# Patient Record
Sex: FEMALE | Race: WHITE | HISPANIC OR LATINO | Employment: UNEMPLOYED | URBAN - METROPOLITAN AREA
[De-identification: names, ages, dates, MRNs, and addresses within clinical notes are randomized per-mention and may not be internally consistent; named-entity substitution may affect disease eponyms.]

---

## 2017-08-14 ENCOUNTER — OFFICE VISIT (OUTPATIENT)
Dept: SURGERY | Facility: CLINIC | Age: 42
End: 2017-08-14
Payer: COMMERCIAL

## 2017-08-14 VITALS
SYSTOLIC BLOOD PRESSURE: 114 MMHG | DIASTOLIC BLOOD PRESSURE: 74 MMHG | WEIGHT: 168.31 LBS | HEART RATE: 67 BPM | TEMPERATURE: 98 F | HEIGHT: 64 IN | BODY MASS INDEX: 28.73 KG/M2

## 2017-08-14 DIAGNOSIS — R10.33 PERIUMBILICAL ABDOMINAL PAIN: Primary | ICD-10-CM

## 2017-08-14 PROCEDURE — 99999 PR PBB SHADOW E&M-NEW PATIENT-LVL III: CPT | Mod: PBBFAC,,, | Performed by: SURGERY

## 2017-08-14 PROCEDURE — 3008F BODY MASS INDEX DOCD: CPT | Mod: S$GLB,,, | Performed by: SURGERY

## 2017-08-14 PROCEDURE — 99204 OFFICE O/P NEW MOD 45 MIN: CPT | Mod: S$GLB,,, | Performed by: SURGERY

## 2017-08-14 RX ORDER — OMEPRAZOLE 20 MG/1
1 CAPSULE, DELAYED RELEASE ORAL DAILY
COMMUNITY
Start: 2017-07-12 | End: 2018-11-13 | Stop reason: ALTCHOICE

## 2017-08-14 NOTE — PROGRESS NOTES
History & Physical    SUBJECTIVE:     History of Present Illness:  Pt referred by Dr. House for eval of gallstones.   Reports similar pain for 11 years, on right side periumbilical, intermittent. Described as pulsating 3-4/10 and 7-8/10/worst. Radiates to upper abdomen and back. This is getting more frequent and worse. It is associated with food with spice and fried foods.     Aggravating factors: fatty foods and spicy foods.  Alleviating factors: none. Associated symptoms: none. The patient denies diarrhea, dysuria, hematochezia, hematuria, nausea and vomiting.    She has been to er twice for this pain, last time 2014 in our system.   US   Findings: Liver measures 15-cm.  There is sludge in the gallbladder.  There is no Charles sign.  Portal vein is patent.  Bile duct measures 0.25-cm.  Gallbladder wall thickness is 0.28-cm.  Right kidney measures 10.3-cm.  Pancreas is normal.  Spleen   measures 7.2-cm.    She is currently taking prilosec every day.       Chief Complaint   Patient presents with    Cholelithiasis       Review of patient's allergies indicates:  No Known Allergies    Current Outpatient Prescriptions   Medication Sig Dispense Refill    omeprazole (PRILOSEC) 20 MG capsule Take 1 capsule by mouth once daily.      ondansetron (ZOFRAN) 4 MG tablet Take 1 tablet (4 mg total) by mouth every 6 (six) hours as needed for Nausea. 12 tablet 0    famotidine (PEPCID) 20 MG tablet Take 1 tablet (20 mg total) by mouth 2 (two) times daily. 30 tablet 0     No current facility-administered medications for this visit.        History reviewed. No pertinent past medical history.  History reviewed. No pertinent surgical history.  History reviewed. No pertinent family history.  Social History   Substance Use Topics    Smoking status: Never Smoker    Smokeless tobacco: Not on file    Alcohol use No          Review of Systems   Constitutional: Negative for activity change, appetite change, chills and fever.   HENT:  "Negative for congestion and sore throat.    Eyes: Negative for photophobia and visual disturbance.   Respiratory: Negative for cough, shortness of breath and wheezing.    Cardiovascular: Negative for chest pain and palpitations.   Gastrointestinal: Positive for abdominal pain. Negative for constipation and diarrhea.   Genitourinary: Negative for difficulty urinating, dysuria and frequency.   Musculoskeletal: Negative for gait problem and joint swelling.   Skin: Negative for rash and wound.   Neurological: Negative for dizziness and headaches.   Hematological: Negative for adenopathy. Does not bruise/bleed easily.   Psychiatric/Behavioral: Negative for dysphoric mood and sleep disturbance.       OBJECTIVE:     Vital Signs (Most Recent)  Temp: 97.6 °F (36.4 °C) (08/14/17 1526)  Pulse: 67 (08/14/17 1526)  BP: 114/74 (08/14/17 1526)  5' 4" (1.626 m)  76.4 kg (168 lb 5.1 oz)     Physical Exam   Constitutional: She is oriented to person, place, and time. She appears well-developed and well-nourished. No distress.   HENT:   Head: Normocephalic and atraumatic.   Eyes: Conjunctivae and EOM are normal. No scleral icterus.   Neck: Normal range of motion.   Cardiovascular: Normal rate and intact distal pulses.    Pulmonary/Chest: Effort normal. No stridor. No respiratory distress.   Abdominal: Soft. She exhibits no distension. There is no tenderness.   Musculoskeletal: Normal range of motion. She exhibits no edema or tenderness.   Neurological: She is alert and oriented to person, place, and time.   Skin: No rash noted. No pallor.   Psychiatric: She has a normal mood and affect. Her behavior is normal.       Laboratory  n/a    Diagnostic Results:  n/a    ASSESSMENT/PLAN:   41 female with abdominal pain, cholelithiasis/sludge on us. She has had an outpatient workup that is not available to me today. Her abd pain is described as RLQ, unclear if this is biliary in etiology. willobtain labs and outside workup and plan ccy " accordingly.

## 2017-08-17 ENCOUNTER — TELEPHONE (OUTPATIENT)
Dept: SURGERY | Facility: CLINIC | Age: 42
End: 2017-08-17

## 2017-08-17 NOTE — TELEPHONE ENCOUNTER
"08/17/17   1600  Contacted pt regarding scheduling Lap Yoana. Pt informed that records have been received from [facility], and Dr. Pichardo wishes to proceed with surgery. Pt verbalized understanding, and states "do you know about how much the surgery will cost, because when I was at , they said the cost was too much." Pt informed that I was unaware of how much she would have to pay for surgery, but that I could provide the CPT code to give the insurance company for a better estimate or that she could contact fee for services. Pt states, "ok give me the number. I would like talk to them to get an estimate and I also need to speak to my . I will call back to schedule surgery. Name, office number, and CPT Code provided.   "

## 2017-08-17 NOTE — TELEPHONE ENCOUNTER
----- Message from Ashley Elizabeth LPN sent at 8/16/2017  3:55 PM CDT -----  Records from [facility] received, and scanned into chart. It looks like patient only received a HIDA Scan and a US, but no CT. Please review records, and let me know if patient still needs to be scheduled for a CT.     Thanks,  Ashley

## 2017-08-17 NOTE — TELEPHONE ENCOUNTER
Her records have been reviewed, she can be directly scheduled for lap cholecystectomy. I will do consent morning of surgery. Please call her to pick a date.

## 2017-08-22 ENCOUNTER — TELEPHONE (OUTPATIENT)
Dept: SURGERY | Facility: CLINIC | Age: 42
End: 2017-08-22

## 2017-08-22 NOTE — TELEPHONE ENCOUNTER
08/22/17    1342  Attempted to contact patient to follow up on whether or not patient has decided to proceed with surgery. No answer. Left voicemail to return call to the office.

## 2017-08-24 ENCOUNTER — TELEPHONE (OUTPATIENT)
Dept: SURGERY | Facility: CLINIC | Age: 42
End: 2017-08-24

## 2017-08-24 NOTE — TELEPHONE ENCOUNTER
----- Message from Leeann Juares sent at 8/24/2017  2:56 PM CDT -----  Contact: Self [phone]  Patient Returning Your Phone Call    08/24/2017    1621  Contacted pt regarding the above message. Pt stated that she would like to schedule her surgery on 08/31/2017.

## 2017-08-24 NOTE — TELEPHONE ENCOUNTER
----- Message from Leeann Juares sent at 8/23/2017  3:21 PM CDT -----  Contact: Self [phone]  Patient is calling to schedule her procedure. Please advice       08/24/2017     1058  Contacted pt regarding the above message. No answer. Left voicemail.

## 2017-08-30 ENCOUNTER — TELEPHONE (OUTPATIENT)
Dept: SURGERY | Facility: CLINIC | Age: 42
End: 2017-08-30

## 2017-08-30 DIAGNOSIS — K80.20 GALLSTONES: Primary | ICD-10-CM

## 2017-08-30 NOTE — TELEPHONE ENCOUNTER
----- Message from Leeann Juares sent at 8/30/2017  4:11 PM CDT -----  Contact: Self [phone]  Good Afternoon I have Tessa Wynne on the phone and she is really upset that no one has returned her phone calls concerning her procedure for tomorrow. Please advice         08/30/2017    1647  Contacted pt regarding the above message. After researching realized that the case request had not been put in the system. Explained to pt that w/o the case request surgery cannot be scheduled. Apologized to pt for any inconvenience that this has caused. Informed her that the case request would be entered into the system for her surgery to be scheduled for 09/07/2017 and someone from the pre-op dept would be contacting her w/ an appt date and time. Also, informed her any questions or concerns that she may have feel free to contact the office. Pt verbalized understanding.

## 2017-09-05 ENCOUNTER — HOSPITAL ENCOUNTER (OUTPATIENT)
Dept: PREADMISSION TESTING | Facility: HOSPITAL | Age: 42
Discharge: HOME | End: 2017-09-05
Attending: SURGERY
Payer: COMMERCIAL

## 2017-09-05 ENCOUNTER — ANESTHESIA EVENT (OUTPATIENT)
Dept: SURGERY | Facility: HOSPITAL | Age: 42
End: 2017-09-05
Payer: COMMERCIAL

## 2017-09-05 VITALS
DIASTOLIC BLOOD PRESSURE: 76 MMHG | HEART RATE: 78 BPM | RESPIRATION RATE: 16 BRPM | OXYGEN SATURATION: 100 % | WEIGHT: 168 LBS | BODY MASS INDEX: 28.68 KG/M2 | SYSTOLIC BLOOD PRESSURE: 123 MMHG | HEIGHT: 64 IN

## 2017-09-05 DIAGNOSIS — K81.9 CHOLECYSTITIS: ICD-10-CM

## 2017-09-05 RX ORDER — LIDOCAINE HYDROCHLORIDE 10 MG/ML
1 INJECTION, SOLUTION EPIDURAL; INFILTRATION; INTRACAUDAL; PERINEURAL ONCE
Status: CANCELLED | OUTPATIENT
Start: 2017-09-05 | End: 2017-09-05

## 2017-09-05 RX ORDER — SODIUM CHLORIDE, SODIUM LACTATE, POTASSIUM CHLORIDE, CALCIUM CHLORIDE 600; 310; 30; 20 MG/100ML; MG/100ML; MG/100ML; MG/100ML
INJECTION, SOLUTION INTRAVENOUS CONTINUOUS
Status: CANCELLED | OUTPATIENT
Start: 2017-09-05

## 2017-09-05 NOTE — DISCHARGE INSTRUCTIONS
Your surgery is scheduled for 9/7/17.    Please report to Outpatient Surgery Intake Office on the 2nd FLOOR at 8:45a.m.          INSTRUCTIONS IMPORTANT!!!  ¨ Do not eat or drink after 12 midnight-including water. OK to brush teeth, no   gum, candy or mints!    ¨ Take only these medicines with a small swallow of water-morning of surgery. Omeprazole      ____  Proceed to [facility] on 9/5/17 for additional blood test.        ____  Do not wear makeup, including mascara.  ____  No powder, lotions or creams to surgical area.  ____  Please remove all jewelry, including piercings and leave at home.  ____  No money or valuables needed. Please leave at home.  ____  Please bring any documents given by your doctor.  ____  If going home the same day, arrange for a ride home. You will not be able to             drive if Anesthesia was used.  ____  Wear loose fitting clothing. Allow for dressings, bandages.  ____  Wash the surgical area with Hibiclens the night before surgery, and again the             morning of surgery.  Be sure to rinse hibiclens off completely (if instructed by nurse).  ____  If you take diabetic medication, do not take am of surgery unless instructed by Doctor.  ____  Call MD for temperature above 101 degrees.  ____ Do Not wear your contact lenses the day of your procedure.  You may wear your glasses.        I have read or had read and explained to me, and understand the above information.  Additional comments or instructions:  For additional questions call [phone]     Take a Hibiclens shower twice a day for 3 days prior to surgery, including the morning of surgery.   Gargle with Listerine twice a day for 3 days prior to surgery, including the morning of surgery.      Pre-Op Bathing Instructions    Before surgery, you can play an important role in your own health.    Because skin is not sterile, we need to be sure that your skin is as free of germs as possible. By following the instructions  below, you can reduce the number of germs on your skin before surgery.    IMPORTANT: You will need to shower with a special soap called Hibiclens*, available at any pharmacy.  If you are allergic to Chlorhexidine (the antiseptic in Hibiclens), use an antibacterial soap such as Dial Soap for your preoperative shower.  You will shower with Hibiclens both the night before your surgery and the morning of your surgery.  Do not use Hibiclens on the head, face or genitals to avoid injury to those areas.    STEP #1: THE NIGHT BEFORE YOUR SURGERY     1. Do not shave the area of your body where your surgery will be performed.  2. Shower and wash your hair and body as usual with your normal soap and shampoo.  3. Rinse your hair and body thoroughly after you shower to remove all soap residue.  4. With your hand, apply one packet of Hibiclens soap to the surgical site.   5. Wash the site gently for five (5) minutes. Do not scrub your skin too hard.   6. Do not wash with your regular soap after Hibiclens is used.  7. Rinse your body thoroughly.  8. Pat yourself dry with a clean, soft towel.  9. Do not use lotion, cream, or powder.  10. Wear clean clothes.    STEP #2: THE MORNING OF YOUR SURGERY     1. Repeat Step #1.    * Not to be used by people allergic to Chlorhexidine.          Anesthesia: General Anesthesia  Youre due to have surgery. During surgery, youll be given medication called anesthesia. (It is also called anesthetic.) This will keep you comfortable and pain-free. Your anesthesia provider will use general anesthesia. This sheet tells you more about it.  What is general anesthesia?     You are watched continuously during your procedure by the anesthesia provider   General anesthesia puts you into a state like deep sleep. It goes into the bloodstream (IV anesthetics), into the lungs (gas anesthetics), or both. You feel nothing during the procedure. You will not remember it. During the procedure, the anesthesia  provider monitors you continuously. He or she checks your heart rate and rhythm, blood pressure, breathing, and blood oxygen.  · IV Anesthetics. IV anesthetics are given through an IV line in your arm. Theyre often given first. This is so you are asleep before a gas anesthetic is started. Some kinds of IV anesthetics relieve pain. Others relax you. Your doctor will decide which kind is best in your case.  · Gas Anesthetics. Gas anesthetics are breathed into the lungs. They are often used to keep you asleep. They can be given through a facemask or a tube placed in your larynx or trachea (breathing tube).  ¨ If you have a facemask, your anesthesia provider will most likely place it over your nose and mouth while youre still awake. Youll breathe oxygen through the mask as your IV anesthetic is started. Gas anesthetic may be added through the mask.  ¨ If you have a tube in the larynx or trachea, it will be inserted into your throat after youre asleep.  Anesthesia tools and medications  You will likely have:  · IV anesthetics. These are put into an IV line into your bloodstream.  · Gas anesthetics. You breathe these anesthetics into your lungs, where they pass into your bloodstream.  · Pulse oximeter. This is a small clip that is attached to the end of your finger. This measures your blood oxygen level.  · Electrocardiography leads (electrodes). These are small sticky pads that are placed on your chest. They record your heart rate and rhythm.  · Blood pressure cuff. This reads your blood pressure.  Risks and possible complications  General anesthesia has some risks. These include:  · Breathing problems  · Nausea and vomiting  · Sore throat or hoarseness (usually temporary)  · Allergic reaction to the anesthetic  · Irregular heartbeat (rare)  · Cardiac arrest (rare)   Anesthesia safety  · Follow all instructions you are given for how long not to eat or drink before your procedure.  · Be sure your doctor knows what  medications and drugs you take. This includes over-the-counter medications, herbs, supplements, alcohol or other drugs. You will be asked when those were last taken.  · Have an adult family member or friend drive you home after the procedure.  · For the first 24 hours after your surgery:  ¨ Do not drive or use heavy equipment.  ¨ Have a trusted family member or spouse make important decisions or sign documents.  ¨ Avoid alcohol.  ¨ Have a responsible adult stay with you. He or she can watch for problems and help keep you safe.  Date Last Reviewed: 10/16/2014  © 8780-7916 [employer]. [address] [place] [address]. All rights reserved. This information is not intended as a substitute for professional medical care. Always follow your healthcare professional's instructions.        Cholecystectomy     Clips close off the duct connecting the gallbladder to the bile duct. The gallbladder is then removed.     Youve had painful attacks caused by gallstones. To treat the problem, your healthcare provider wants to remove your gallbladder. This surgery is called cholecystectomy. Removing the gallbladder can relieve pain. It will also prevent future attacks. You can live a healthy life without your gallbladder. You may also be able to go back to eating foods you enjoyed before your gallbladder problems started.  Before your surgery  Be prepared:  · Tell your provider what medicines you take. Include those bought over the counter. Also include herbs or supplements. Be sure to mention if you take prescription blood thinners. This includes warfarin, clopidogrel, and aspirin.  · Have any tests your provider asks for, such as blood tests.  · Dont eat or drink after midnight, the night before your surgery. This includes water, coffee, and mints. However, you may need to take some medicine with sips of water--talk with your healthcare provider.  The day of surgery  When you arrive, you will prepare for  surgery:  · An IV line will be put into a vein in your arm or hand. This gives you fluids and medicine.  · An anesthesiologist will talk with you about anesthesia. This is medicine used to prevent pain. You will receive general anesthesia. This puts you into a state like deep sleep through the procedure.  During surgery  There are 2 methods for removing the gallbladder. Your healthcare provider will choose which method is best for you:  · Laparoscopic cholecystectomy. This is most common. During surgery, 2 to 4 small incisions are made. A thin tube with a camera is used. This is called a laparoscope. The scope is put through one of the incisions. It sends images to a video screen. Surgical tools are put through other incisions. The gallbladder is removed using the scope and these tools.  · Open cholecystectomy. One larger incision is made. The surgeon sees and works through this incision. Open surgery is most often used when scarring or other factors make it a better choice for you.  In some cases, safety requires a change from laparoscopic to open surgery during the procedure.  After surgery  You will be sent to a room to wake up from the anesthesia. You will likely go home the same day. In some cases, an overnight stay is needed. If you had open cholecystectomy, you may need to stay in the hospital for a few days. When you are released to go home, have a family member or friend ready to drive you.  Risks and possible complications of gallbladder surgery  All surgeries have risks. The risks of gallbladder surgery include:  · Bleeding  · Infection  · Injury to the common bile duct or nearby organs  · Blood clots in the legs  · Bile leaks  · Hernia at incision site  · Pnemonia   Date Last Reviewed: 7/1/2016 © 2000-2016 [employer]. [address] [place] [address], [place] [address]. All rights reserved. This information is not intended as a substitute for professional medical care. Always follow your healthcare  professional's instructions.

## 2017-09-05 NOTE — ANESTHESIA PREPROCEDURE EVALUATION
09/05/2017  Tessa Wynne is a 41 y.o., female is scheduled for laparoscopic cholecystectomy under [facility] on 9/07/2017.        Anesthesia Evaluation    I have reviewed the Patient Summary Reports.    I have reviewed the Nursing Notes.   I have reviewed the Medications.     Review of Systems  Anesthesia Hx:  No problems with previous Anesthesia  History of prior surgery of interest to airway management or planning: Previous anesthesia: MAC  EGD with MAC.  Denies Family Hx of Anesthesia complications.   Denies Personal Hx of Anesthesia complications.   Social:  Non-Smoker, No Alcohol Use    Hematology/Oncology:  Hematology Normal        EENT/Dental:EENT/Dental Normal   Cardiovascular:   Exercise tolerance: good Denies Hypertension.  Denies Dysrhythmias.   Denies Angina.        Pulmonary:   Denies Shortness of breath.    Renal/:  Renal/ Normal     Hepatic/GI:   GERD, well controlled Intermittent RUQ pain; denies at present   Neurological:  Neurology Normal    Endocrine:  Endocrine Normal        Physical Exam  General:  Obesity    Airway/Jaw/Neck:  Airway Findings: Mouth Opening: Normal Tongue: Normal  General Airway Assessment: Adult  Mallampati: II  TM Distance: Normal, at least 6 cm        Eyes/Ears/Nose:  EYES/EARS/NOSE FINDINGS: Normal   Dental:  Dental Findings: In tact   Chest/Lungs:  Chest/Lungs Clear    Heart/Vascular:  Heart Findings: Normal Heart murmur: negative    Abdomen:  Abdomen Findings: Normal      Mental Status:  Mental Status Findings: Normal        Anesthesia Plan  Type of Anesthesia, risks & benefits discussed:  Anesthesia Type:  general  Patient's Preference:   Intra-op Monitoring Plan:   Intra-op Monitoring Plan Comments:   Post Op Pain Control Plan:   Post Op Pain Control Plan Comments:   Induction:   IV  Beta Blocker:  Patient is not currently on a Beta-Blocker (No further  documentation required).       Informed Consent: Patient understands risks and agrees with Anesthesia plan.  Questions answered.   ASA Score: 2     Day of Surgery Review of History & Physical:        Anesthesia Plan Notes: 2017-09-07   - needs consent   - UPT ordered        Ready For Surgery From Anesthesia Perspective.

## 2017-09-05 NOTE — PRE-PROCEDURE INSTRUCTIONS
Allergies, medical, surgical, family and psychosocial histories reviewed with patient. Periop plan of care reviewed. Preop instructions given, including medications to take and to hold. Time allotted for questions to be addressed.  Patient verbalized understanding.

## 2017-09-07 ENCOUNTER — ANESTHESIA (OUTPATIENT)
Dept: SURGERY | Facility: HOSPITAL | Age: 42
End: 2017-09-07
Payer: COMMERCIAL

## 2017-09-07 ENCOUNTER — HOSPITAL ENCOUNTER (OUTPATIENT)
Facility: HOSPITAL | Age: 42
Discharge: HOME HEALTH | End: 2017-09-09
Attending: SURGERY | Admitting: SURGERY
Payer: COMMERCIAL

## 2017-09-07 DIAGNOSIS — K81.9 CHOLECYSTITIS: Primary | ICD-10-CM

## 2017-09-07 DIAGNOSIS — K80.50 BILIARY COLIC: ICD-10-CM

## 2017-09-07 LAB
B-HCG UR QL: NEGATIVE
CTP QC/QA: YES

## 2017-09-07 PROCEDURE — 25000003 PHARM REV CODE 250: Performed by: NURSE PRACTITIONER

## 2017-09-07 PROCEDURE — S0030 INJECTION, METRONIDAZOLE: HCPCS | Performed by: SURGERY

## 2017-09-07 PROCEDURE — C1729 CATH, DRAINAGE: HCPCS | Performed by: SURGERY

## 2017-09-07 PROCEDURE — 81025 URINE PREGNANCY TEST: CPT | Performed by: ANESTHESIOLOGY

## 2017-09-07 PROCEDURE — 63600175 PHARM REV CODE 636 W HCPCS: Performed by: SURGERY

## 2017-09-07 PROCEDURE — 36000709 HC OR TIME LEV III EA ADD 15 MIN: Performed by: SURGERY

## 2017-09-07 PROCEDURE — 25000003 PHARM REV CODE 250: Performed by: SURGERY

## 2017-09-07 PROCEDURE — 37000009 HC ANESTHESIA EA ADD 15 MINS: Performed by: SURGERY

## 2017-09-07 PROCEDURE — 25000003 PHARM REV CODE 250: Performed by: NURSE ANESTHETIST, CERTIFIED REGISTERED

## 2017-09-07 PROCEDURE — 63600175 PHARM REV CODE 636 W HCPCS: Performed by: ANESTHESIOLOGY

## 2017-09-07 PROCEDURE — 63600175 PHARM REV CODE 636 W HCPCS: Performed by: NURSE ANESTHETIST, CERTIFIED REGISTERED

## 2017-09-07 PROCEDURE — 47562 LAPAROSCOPIC CHOLECYSTECTOMY: CPT | Mod: ,,, | Performed by: SURGERY

## 2017-09-07 PROCEDURE — 88304 TISSUE EXAM BY PATHOLOGIST: CPT | Performed by: PATHOLOGY

## 2017-09-07 PROCEDURE — 27201423 OPTIME MED/SURG SUP & DEVICES STERILE SUPPLY: Performed by: SURGERY

## 2017-09-07 PROCEDURE — 37000008 HC ANESTHESIA 1ST 15 MINUTES: Performed by: SURGERY

## 2017-09-07 PROCEDURE — 36000708 HC OR TIME LEV III 1ST 15 MIN: Performed by: SURGERY

## 2017-09-07 PROCEDURE — 71000039 HC RECOVERY, EACH ADD'L HOUR: Performed by: SURGERY

## 2017-09-07 PROCEDURE — 71000033 HC RECOVERY, INTIAL HOUR: Performed by: SURGERY

## 2017-09-07 PROCEDURE — 94761 N-INVAS EAR/PLS OXIMETRY MLT: CPT

## 2017-09-07 PROCEDURE — 88304 TISSUE EXAM BY PATHOLOGIST: CPT | Mod: 26,,, | Performed by: PATHOLOGY

## 2017-09-07 RX ORDER — CIPROFLOXACIN 2 MG/ML
400 INJECTION, SOLUTION INTRAVENOUS
Status: DISCONTINUED | OUTPATIENT
Start: 2017-09-07 | End: 2017-09-09 | Stop reason: HOSPADM

## 2017-09-07 RX ORDER — SODIUM CHLORIDE 0.9 % (FLUSH) 0.9 %
3 SYRINGE (ML) INJECTION EVERY 8 HOURS
Status: DISCONTINUED | OUTPATIENT
Start: 2017-09-07 | End: 2017-09-07 | Stop reason: HOSPADM

## 2017-09-07 RX ORDER — ACETAMINOPHEN 10 MG/ML
INJECTION, SOLUTION INTRAVENOUS
Status: DISCONTINUED | OUTPATIENT
Start: 2017-09-07 | End: 2017-09-07

## 2017-09-07 RX ORDER — LIDOCAINE HYDROCHLORIDE 10 MG/ML
INJECTION, SOLUTION EPIDURAL; INFILTRATION; INTRACAUDAL; PERINEURAL
Status: DISCONTINUED | OUTPATIENT
Start: 2017-09-07 | End: 2017-09-07 | Stop reason: HOSPADM

## 2017-09-07 RX ORDER — HYDROMORPHONE HYDROCHLORIDE 2 MG/ML
1 INJECTION, SOLUTION INTRAMUSCULAR; INTRAVENOUS; SUBCUTANEOUS
Status: DISCONTINUED | OUTPATIENT
Start: 2017-09-07 | End: 2017-09-08

## 2017-09-07 RX ORDER — HYDROMORPHONE HYDROCHLORIDE 2 MG/ML
0.4 INJECTION, SOLUTION INTRAMUSCULAR; INTRAVENOUS; SUBCUTANEOUS EVERY 5 MIN PRN
Status: DISCONTINUED | OUTPATIENT
Start: 2017-09-07 | End: 2017-09-07 | Stop reason: HOSPADM

## 2017-09-07 RX ORDER — SODIUM CHLORIDE, SODIUM LACTATE, POTASSIUM CHLORIDE, CALCIUM CHLORIDE 600; 310; 30; 20 MG/100ML; MG/100ML; MG/100ML; MG/100ML
INJECTION, SOLUTION INTRAVENOUS CONTINUOUS
Status: DISCONTINUED | OUTPATIENT
Start: 2017-09-07 | End: 2017-09-07

## 2017-09-07 RX ORDER — METRONIDAZOLE 500 MG/100ML
500 INJECTION, SOLUTION INTRAVENOUS
Status: DISCONTINUED | OUTPATIENT
Start: 2017-09-07 | End: 2017-09-09 | Stop reason: HOSPADM

## 2017-09-07 RX ORDER — FENTANYL CITRATE 50 UG/ML
INJECTION, SOLUTION INTRAMUSCULAR; INTRAVENOUS
Status: DISCONTINUED | OUTPATIENT
Start: 2017-09-07 | End: 2017-09-07

## 2017-09-07 RX ORDER — ONDANSETRON 2 MG/ML
8 INJECTION INTRAMUSCULAR; INTRAVENOUS EVERY 6 HOURS PRN
Status: DISCONTINUED | OUTPATIENT
Start: 2017-09-07 | End: 2017-09-09 | Stop reason: HOSPADM

## 2017-09-07 RX ORDER — HEPARIN SODIUM 5000 [USP'U]/ML
5000 INJECTION, SOLUTION INTRAVENOUS; SUBCUTANEOUS EVERY 8 HOURS
Status: DISCONTINUED | OUTPATIENT
Start: 2017-09-07 | End: 2017-09-07 | Stop reason: HOSPADM

## 2017-09-07 RX ORDER — PANTOPRAZOLE SODIUM 40 MG/1
40 TABLET, DELAYED RELEASE ORAL DAILY
Status: DISCONTINUED | OUTPATIENT
Start: 2017-09-07 | End: 2017-09-09 | Stop reason: HOSPADM

## 2017-09-07 RX ORDER — LIDOCAINE HCL/PF 100 MG/5ML
SYRINGE (ML) INTRAVENOUS
Status: DISCONTINUED | OUTPATIENT
Start: 2017-09-07 | End: 2017-09-07

## 2017-09-07 RX ORDER — PROPOFOL 10 MG/ML
VIAL (ML) INTRAVENOUS
Status: DISCONTINUED | OUTPATIENT
Start: 2017-09-07 | End: 2017-09-07

## 2017-09-07 RX ORDER — OXYCODONE AND ACETAMINOPHEN 10; 325 MG/1; MG/1
1 TABLET ORAL EVERY 4 HOURS PRN
Status: DISCONTINUED | OUTPATIENT
Start: 2017-09-07 | End: 2017-09-09 | Stop reason: HOSPADM

## 2017-09-07 RX ORDER — ROCURONIUM BROMIDE 10 MG/ML
INJECTION, SOLUTION INTRAVENOUS
Status: DISCONTINUED | OUTPATIENT
Start: 2017-09-07 | End: 2017-09-07

## 2017-09-07 RX ORDER — BUPIVACAINE HYDROCHLORIDE 2.5 MG/ML
INJECTION, SOLUTION EPIDURAL; INFILTRATION; INTRACAUDAL
Status: DISCONTINUED | OUTPATIENT
Start: 2017-09-07 | End: 2017-09-07 | Stop reason: HOSPADM

## 2017-09-07 RX ORDER — LIDOCAINE HYDROCHLORIDE 10 MG/ML
1 INJECTION, SOLUTION EPIDURAL; INFILTRATION; INTRACAUDAL; PERINEURAL ONCE
Status: DISCONTINUED | OUTPATIENT
Start: 2017-09-07 | End: 2017-09-09 | Stop reason: HOSPADM

## 2017-09-07 RX ORDER — SODIUM CHLORIDE 0.9 % (FLUSH) 0.9 %
3 SYRINGE (ML) INJECTION
Status: DISCONTINUED | OUTPATIENT
Start: 2017-09-07 | End: 2017-09-07 | Stop reason: HOSPADM

## 2017-09-07 RX ORDER — ONDANSETRON 2 MG/ML
INJECTION INTRAMUSCULAR; INTRAVENOUS
Status: DISCONTINUED | OUTPATIENT
Start: 2017-09-07 | End: 2017-09-07

## 2017-09-07 RX ORDER — HYDROCODONE BITARTRATE AND ACETAMINOPHEN 5; 325 MG/1; MG/1
TABLET ORAL
Qty: 50 TABLET | Refills: 0 | Status: SHIPPED | OUTPATIENT
Start: 2017-09-07 | End: 2017-09-29

## 2017-09-07 RX ORDER — CEFAZOLIN SODIUM 2 G/50ML
2 SOLUTION INTRAVENOUS
Status: COMPLETED | OUTPATIENT
Start: 2017-09-07 | End: 2017-09-07

## 2017-09-07 RX ORDER — MIDAZOLAM HYDROCHLORIDE 1 MG/ML
INJECTION, SOLUTION INTRAMUSCULAR; INTRAVENOUS
Status: DISCONTINUED | OUTPATIENT
Start: 2017-09-07 | End: 2017-09-07

## 2017-09-07 RX ORDER — OXYCODONE AND ACETAMINOPHEN 5; 325 MG/1; MG/1
1 TABLET ORAL EVERY 4 HOURS PRN
Status: DISCONTINUED | OUTPATIENT
Start: 2017-09-07 | End: 2017-09-09 | Stop reason: HOSPADM

## 2017-09-07 RX ORDER — DOCUSATE SODIUM 100 MG/1
100 CAPSULE, LIQUID FILLED ORAL 2 TIMES DAILY
Qty: 60 CAPSULE | Refills: 0 | Status: SHIPPED | OUTPATIENT
Start: 2017-09-07 | End: 2017-11-08

## 2017-09-07 RX ORDER — NEOSTIGMINE METHYLSULFATE 1 MG/ML
INJECTION, SOLUTION INTRAVENOUS
Status: DISCONTINUED | OUTPATIENT
Start: 2017-09-07 | End: 2017-09-07

## 2017-09-07 RX ORDER — LIDOCAINE HYDROCHLORIDE 10 MG/ML
1 INJECTION, SOLUTION EPIDURAL; INFILTRATION; INTRACAUDAL; PERINEURAL ONCE
Status: DISCONTINUED | OUTPATIENT
Start: 2017-09-07 | End: 2017-09-07 | Stop reason: HOSPADM

## 2017-09-07 RX ORDER — HYDROMORPHONE HYDROCHLORIDE 2 MG/ML
0.2 INJECTION, SOLUTION INTRAMUSCULAR; INTRAVENOUS; SUBCUTANEOUS EVERY 5 MIN PRN
Status: DISCONTINUED | OUTPATIENT
Start: 2017-09-07 | End: 2017-09-07 | Stop reason: HOSPADM

## 2017-09-07 RX ORDER — GLYCOPYRROLATE 0.2 MG/ML
INJECTION INTRAMUSCULAR; INTRAVENOUS
Status: DISCONTINUED | OUTPATIENT
Start: 2017-09-07 | End: 2017-09-07

## 2017-09-07 RX ADMIN — FENTANYL CITRATE 50 MCG: 50 INJECTION, SOLUTION INTRAMUSCULAR; INTRAVENOUS at 11:09

## 2017-09-07 RX ADMIN — METRONIDAZOLE 500 MG: 500 INJECTION, SOLUTION INTRAVENOUS at 09:09

## 2017-09-07 RX ADMIN — ACETAMINOPHEN 1000 MG: 10 INJECTION, SOLUTION INTRAVENOUS at 12:09

## 2017-09-07 RX ADMIN — HYDROMORPHONE HYDROCHLORIDE 0.4 MG: 2 INJECTION INTRAMUSCULAR; INTRAVENOUS; SUBCUTANEOUS at 02:09

## 2017-09-07 RX ADMIN — ROCURONIUM BROMIDE 30 MG: 10 INJECTION, SOLUTION INTRAVENOUS at 11:09

## 2017-09-07 RX ADMIN — CIPROFLOXACIN 400 MG: 2 INJECTION, SOLUTION INTRAVENOUS at 03:09

## 2017-09-07 RX ADMIN — METRONIDAZOLE 500 MG: 500 INJECTION, SOLUTION INTRAVENOUS at 05:09

## 2017-09-07 RX ADMIN — NEOSTIGMINE METHYLSULFATE 4 MG: 1 INJECTION INTRAVENOUS at 01:09

## 2017-09-07 RX ADMIN — PANTOPRAZOLE SODIUM 40 MG: 40 TABLET, DELAYED RELEASE ORAL at 05:09

## 2017-09-07 RX ADMIN — HEPARIN SODIUM 5000 UNITS: 5000 INJECTION, SOLUTION INTRAVENOUS; SUBCUTANEOUS at 09:09

## 2017-09-07 RX ADMIN — LIDOCAINE HYDROCHLORIDE 80 MG: 20 INJECTION, SOLUTION INTRAVENOUS at 11:09

## 2017-09-07 RX ADMIN — OXYCODONE HYDROCHLORIDE AND ACETAMINOPHEN 1 TABLET: 10; 325 TABLET ORAL at 03:09

## 2017-09-07 RX ADMIN — MIDAZOLAM 2 MG: 1 INJECTION INTRAMUSCULAR; INTRAVENOUS at 11:09

## 2017-09-07 RX ADMIN — ONDANSETRON 8 MG: 2 INJECTION INTRAMUSCULAR; INTRAVENOUS at 06:09

## 2017-09-07 RX ADMIN — ROCURONIUM BROMIDE 20 MG: 10 INJECTION, SOLUTION INTRAVENOUS at 11:09

## 2017-09-07 RX ADMIN — CEFAZOLIN SODIUM 2 G: 2 SOLUTION INTRAVENOUS at 11:09

## 2017-09-07 RX ADMIN — SODIUM CHLORIDE, SODIUM LACTATE, POTASSIUM CHLORIDE, AND CALCIUM CHLORIDE: .6; .31; .03; .02 INJECTION, SOLUTION INTRAVENOUS at 11:09

## 2017-09-07 RX ADMIN — FENTANYL CITRATE 50 MCG: 50 INJECTION, SOLUTION INTRAMUSCULAR; INTRAVENOUS at 01:09

## 2017-09-07 RX ADMIN — FENTANYL CITRATE 150 MCG: 50 INJECTION, SOLUTION INTRAMUSCULAR; INTRAVENOUS at 11:09

## 2017-09-07 RX ADMIN — ONDANSETRON 4 MG: 2 INJECTION, SOLUTION INTRAMUSCULAR; INTRAVENOUS at 01:09

## 2017-09-07 RX ADMIN — GLYCOPYRROLATE 0.6 MG: 0.2 INJECTION, SOLUTION INTRAMUSCULAR; INTRAVENOUS at 01:09

## 2017-09-07 RX ADMIN — HYDROMORPHONE HYDROCHLORIDE 1 MG: 2 INJECTION INTRAMUSCULAR; INTRAVENOUS; SUBCUTANEOUS at 05:09

## 2017-09-07 RX ADMIN — PROPOFOL 150 MG: 10 INJECTION, EMULSION INTRAVENOUS at 11:09

## 2017-09-07 NOTE — OP NOTE
DATE OF PROCEDURE: 09/07/2017        PREOPERATIVE DIAGNOSIS: Biliary colic.      POSTOPERATIVE DIAGNOSIS: Biliary colic.      PROCEDURE PERFORMED:   1. Laparoscopic cholecystectomy      ATTENDING SURGEON: Alina Pichardo M.D.      HOUSESTAFF SURGEON: Brielle Corona M.D. ([facility])      ANESTHESIA: General endotracheal.      ESTIMATED BLOOD LOSS: 20 mL.      FINDINGS: Cholelithiasis and severe inflammation with purulent white fluid in gallbladder     SPECIMEN: Gallbladder.      DRAINS: 10 Fr EMMETT drain     COMPLICATIONS: None.      INDICATIONS: Tessa Wynne is a 41 y.o.female referred to my General Surgery Clinic with a history of postprandial right upper quadrant abdominal pain. The history and exam were consistent with biliary colic, which was confirmed by laboratory studies and ultrasound. We recommended laparoscopic cholecystectomy and the patient agreed to proceed. The patient signed informed consent and expressed understanding of the risks and benefits of surgery.      OPERATIVE PROCEDURE: The patient was identified in Preoperative Holding and brought back to the Operating Room. Placed supine on the operating table and padded appropriately. Monitors were applied and there was smooth induction of general endotracheal anesthesia. The patient's abdomen was prepped and draped in the standard sterile surgical fashion. A time-out was performed and all team members present agreed this was the correct procedure on the correct patient. We also confirmed administration of appropriate preoperative antibiotics.     A 2-cm curvilinear infraumbilical skin incision was made. Subcutaneous tissue was bluntly dissected. The abdominal wall fascia was grasped with two kochers and elevated and a 1 -cm midline infraumbilical fascial incision was made. The abdomen was entered under direct vision. A 0 Vicryl stay suture was placed around the fascial incision in a figure of eight fashion. A Dustin trocar was placed and the abdomen was  insufflated with carbon dioxide to a maximum pressure of 15 mmHg. A 10-mm laparoscope was placed and the abdomen was examined. There was no evidence of injury from the initial trocar placement. An additional 11 mm subxiphoid trocar was placed, and two 5-mm trocars were placed under direct vision through separate stab incisions in the right upper quadrant. We directed our attention to the right upper quadrant. The gallbladder was identified and noted to have significant inflammatory change. The fundus was grasped and retracted cranially and the infundibulum was grasped and retracted laterally. We bluntly dissected the peritoneal reflection off the infundibulum and neck of the gallbladder. With careful blunt dissection in this area, we were able to identify the cystic duct. Further careful dissection identified the cystic artery and we did obtain a critical view of safety. The cystic duct and artery were then triply clipped and divided. The gallbladder was dissected off the gallbladder fossa using Bovie electrocautery from infundibulum to fundus until free. It was quite adherent to the liver, and there was a small portion of the gallbladder that was left behind on the liver. We used María in the gallbladder fossa. The was placed into an EndoCatch bag and removed from the subxiphoid port. We returned the laparoscope to the umbilicus and reexamined the right upper quadrant. The gallbladder fossa was vigorously irrigated no bleeding or bile leak were noted. A 10 Fr EMMETT drain was placed through the right lateral trocar. All ports were removed under direct vision and no bleeding from any port site was noted. The insufflation of the abdomen was evacuated and the laparoscope and Dustin trocar were removed. The fascial incision at the umbilical port site was closed with the preexisting 0 Vicryl stitch. All port sites were infiltrated with Marcaine and closed in a subcuticular fashion. The drain was secured with a 2-0 Nylon.  Sterile dressings were applied. The patient was extubated in the Operating Room and transported to the Recovery Room in stable condition. All sponge, instrument and needle counts were correct at the end of the case.

## 2017-09-07 NOTE — ANESTHESIA POSTPROCEDURE EVALUATION
"Anesthesia Post Evaluation    Patient: Tessa Wynne    Procedure(s) Performed: Procedure(s) (LRB):  CHOLECYSTECTOMY-LAPAROSCOPIC (N/A)    Final Anesthesia Type: general  Patient location during evaluation: PACU  Patient participation: Yes- Able to Participate  Level of consciousness: awake  Post-procedure vital signs: reviewed and stable  Pain management: adequate  PONV status at discharge: No PONV  Anesthetic complications: no      Cardiovascular status: stable  Respiratory status: unassisted, spontaneous ventilation and room air  Hydration status: euvolemic  Follow-up not needed.        Visit Vitals  /75   Pulse 60   Temp 36.5 °C (97.7 °F)   Resp 16   Ht 5' 4" (1.626 m)   Wt 76.2 kg (168 lb)   LMP 07/18/2017   SpO2 96%   Breastfeeding? No   BMI 28.84 kg/m²       Pain/Emmie Score: Pain Assessment Performed: Yes (9/7/2017  1:55 PM)  Presence of Pain: non-verbal indicators absent (9/7/2017  1:55 PM)  Emmie Score: 8 (9/7/2017  1:55 PM)  Modified Emmie Score: 18 (9/7/2017  1:55 PM)      "

## 2017-09-07 NOTE — INTERVAL H&P NOTE
The patient has been examined and the H&P has been reviewed:    I concur with the findings and changes have been noted since the H&P was written: HIDA with nonfilling of gallbladder. Obtained consent for lap johnny    Anesthesia/Surgery risks, benefits and alternative options discussed and understood by patient/family.          Active Hospital Problems    Diagnosis  POA    Biliary colic [K80.50]  Yes      Resolved Hospital Problems    Diagnosis Date Resolved POA   No resolved problems to display.

## 2017-09-07 NOTE — ANESTHESIA RELEASE NOTE
"Anesthesia Release from PACU Note    Patient: Tessa Wynne    Procedure(s) Performed: Procedure(s) (LRB):  CHOLECYSTECTOMY-LAPAROSCOPIC (N/A)    Anesthesia type: general    Post pain: Adequate analgesia    Post assessment: no apparent anesthetic complications    Last Vitals:   Visit Vitals  /75   Pulse 60   Temp 36.5 °C (97.7 °F)   Resp 16   Ht 5' 4" (1.626 m)   Wt 76.2 kg (168 lb)   LMP 07/18/2017   SpO2 96%   Breastfeeding? No   BMI 28.84 kg/m²       Post vital signs: stable    Level of consciousness: awake, alert  and oriented    Nausea/Vomiting: no nausea/no vomiting    Complications: none    Airway Patency: patent    Respiratory: unassisted, spontaneous ventilation, room air    Cardiovascular: stable    Hydration: euvolemic  "

## 2017-09-07 NOTE — BRIEF OP NOTE
[facility]  Brief Operative Note    SUMMARY     Surgery Date: 9/7/2017     Surgeon(s) and Role:     * Alina Pichardo MD - Primary    Assisting Surgeon: Brielle Corona MD, Res    Pre-op Diagnosis:  Gallstones [K80.20]    Post-op Diagnosis:  Post-Op Diagnosis Codes:     * Gallstones [K80.20]    Procedure(s) (LRB):  CHOLECYSTECTOMY-LAPAROSCOPIC (N/A)    Anesthesia: General    Description of Procedure: inflamed gallbladder with white purulent fluid consistent with cholecystitis with large stone at infundibulum, critical view of safety obtained, 10 Fr EMMETT drain left in place    Description of the findings of the procedure: see op note    Estimated Blood Loss: * No values recorded between 9/7/2017 11:38 AM and 9/7/2017  1:41 PM *         Specimens:   Specimen (12h ago through future)    Start     Ordered    09/07/17 1314  Specimen to Pathology - Surgery  Once     Comments:  Gallbladder-perm      09/07/17 1315

## 2017-09-07 NOTE — PLAN OF CARE
Received fromOR per stretcher, Monitors nd O2 applied. See flow sheets. Oral airway in place. Family notified of pt arrival and status.

## 2017-09-07 NOTE — TRANSFER OF CARE
"Anesthesia Transfer of Care Note    Patient: Tessa Wynne    Procedure(s) Performed: Procedure(s) (LRB):  CHOLECYSTECTOMY-LAPAROSCOPIC (N/A)    Patient location: PACU    Anesthesia Type: general    Transport from OR: Transported from OR on 6-10 L/min O2 by face mask with adequate spontaneous ventilation    Post pain: adequate analgesia    Post assessment: no apparent anesthetic complications and tolerated procedure well    Post vital signs: stable    Level of consciousness: awake, alert and oriented    Nausea/Vomiting: no nausea/vomiting    Complications: none    Transfer of care protocol was followed      Last vitals:   Visit Vitals  /66 (BP Location: Left arm, Patient Position: Lying)   Pulse 63   Temp 36.5 °C (97.7 °F) (Skin)   Resp 17   Ht 5' 4" (1.626 m)   Wt 76.2 kg (168 lb)   LMP 07/18/2017   SpO2 98%   Breastfeeding? No   BMI 28.84 kg/m²     "

## 2017-09-07 NOTE — NURSING
Pt. Arrived into room 532 via stretcher.  Oriented pt. To room, understanding verbalized.  Noted x4 puncture sites/covered with gauze and tegaderm.  Will monitor pt. Throughout shift.

## 2017-09-07 NOTE — PLAN OF CARE
Problem: Patient Care Overview  Goal: Plan of Care Review  Patient is still a little sleepy.  Deep breaths on request.  SpO2 96% on room air.  Will monitor SpO2 daily

## 2017-09-07 NOTE — DISCHARGE INSTRUCTIONS
Ok to remove dressing on 9/9, leave underlying strips in place until follow up. If they fall off it is okay. Ok to shower 9/9, use mild soap on incision, pat incision dry. No soaking bath or swimming until after follow up. Take your pain medication as needed. Take over the counter stool softener while taking pain medication to prevent constipation. If no bowel movement in 2 days take miralax twice a day. Ok for light activity (light housework, walking, stair climbing) no strenuous exercise until after follow up. No lifting greater than 20 pounds or 1 gallon of milk until after follow up. Please call my office if fever > 101.5, pain uncontrolled with oral medications, persistent nausea/vomiting/or diarrhea, redness or drainage from the incisions, or any other worrisome concerns.     Empty drain daily  Ok to shower around drain, redress with gauze

## 2017-09-08 ENCOUNTER — TELEPHONE (OUTPATIENT)
Dept: SURGERY | Facility: CLINIC | Age: 42
End: 2017-09-08

## 2017-09-08 LAB
ALBUMIN SERPL BCP-MCNC: 3 G/DL
ALP SERPL-CCNC: 108 U/L
ALT SERPL W/O P-5'-P-CCNC: 55 U/L
ANION GAP SERPL CALC-SCNC: 9 MMOL/L
AST SERPL-CCNC: 64 U/L
BASOPHILS # BLD AUTO: 0.01 K/UL
BASOPHILS NFR BLD: 0.1 %
BILIRUB SERPL-MCNC: 0.6 MG/DL
BUN SERPL-MCNC: 8 MG/DL
CALCIUM SERPL-MCNC: 8.5 MG/DL
CHLORIDE SERPL-SCNC: 102 MMOL/L
CO2 SERPL-SCNC: 24 MMOL/L
CREAT SERPL-MCNC: 0.9 MG/DL
DIFFERENTIAL METHOD: ABNORMAL
EOSINOPHIL # BLD AUTO: 0 K/UL
EOSINOPHIL NFR BLD: 0.3 %
ERYTHROCYTE [DISTWIDTH] IN BLOOD BY AUTOMATED COUNT: 13.9 %
EST. GFR  (AFRICAN AMERICAN): >60 ML/MIN/1.73 M^2
EST. GFR  (NON AFRICAN AMERICAN): >60 ML/MIN/1.73 M^2
GLUCOSE SERPL-MCNC: 132 MG/DL
HCT VFR BLD AUTO: 36.1 %
HGB BLD-MCNC: 11.8 G/DL
LIPASE SERPL-CCNC: 27 U/L
LYMPHOCYTES # BLD AUTO: 1.4 K/UL
LYMPHOCYTES NFR BLD: 11.1 %
MCH RBC QN AUTO: 28.4 PG
MCHC RBC AUTO-ENTMCNC: 32.7 G/DL
MCV RBC AUTO: 87 FL
MONOCYTES # BLD AUTO: 0.6 K/UL
MONOCYTES NFR BLD: 4.8 %
NEUTROPHILS # BLD AUTO: 10.2 K/UL
NEUTROPHILS NFR BLD: 83.5 %
PLATELET # BLD AUTO: 269 K/UL
PMV BLD AUTO: 9.7 FL
POTASSIUM SERPL-SCNC: 3.7 MMOL/L
PROT SERPL-MCNC: 6.5 G/DL
RBC # BLD AUTO: 4.15 M/UL
SODIUM SERPL-SCNC: 135 MMOL/L
WBC # BLD AUTO: 12.19 K/UL

## 2017-09-08 PROCEDURE — 94761 N-INVAS EAR/PLS OXIMETRY MLT: CPT

## 2017-09-08 PROCEDURE — 85025 COMPLETE CBC W/AUTO DIFF WBC: CPT

## 2017-09-08 PROCEDURE — 25000003 PHARM REV CODE 250: Performed by: SURGERY

## 2017-09-08 PROCEDURE — 63600175 PHARM REV CODE 636 W HCPCS: Performed by: SURGERY

## 2017-09-08 PROCEDURE — 87040 BLOOD CULTURE FOR BACTERIA: CPT

## 2017-09-08 PROCEDURE — 80053 COMPREHEN METABOLIC PANEL: CPT

## 2017-09-08 PROCEDURE — S0030 INJECTION, METRONIDAZOLE: HCPCS | Performed by: SURGERY

## 2017-09-08 PROCEDURE — 83690 ASSAY OF LIPASE: CPT

## 2017-09-08 PROCEDURE — 36415 COLL VENOUS BLD VENIPUNCTURE: CPT

## 2017-09-08 RX ORDER — METRONIDAZOLE 500 MG/1
500 TABLET ORAL EVERY 8 HOURS
Qty: 30 TABLET | Refills: 0 | Status: SHIPPED | OUTPATIENT
Start: 2017-09-08 | End: 2017-09-18

## 2017-09-08 RX ORDER — CIPROFLOXACIN 500 MG/1
500 TABLET ORAL EVERY 12 HOURS
Qty: 20 TABLET | Refills: 0 | Status: SHIPPED | OUTPATIENT
Start: 2017-09-08 | End: 2017-09-18

## 2017-09-08 RX ADMIN — CIPROFLOXACIN 400 MG: 2 INJECTION, SOLUTION INTRAVENOUS at 02:09

## 2017-09-08 RX ADMIN — OXYCODONE HYDROCHLORIDE AND ACETAMINOPHEN 1 TABLET: 10; 325 TABLET ORAL at 10:09

## 2017-09-08 RX ADMIN — OXYCODONE AND ACETAMINOPHEN 1 TABLET: 5; 325 TABLET ORAL at 04:09

## 2017-09-08 RX ADMIN — METRONIDAZOLE 500 MG: 500 INJECTION, SOLUTION INTRAVENOUS at 02:09

## 2017-09-08 RX ADMIN — OXYCODONE AND ACETAMINOPHEN 1 TABLET: 5; 325 TABLET ORAL at 05:09

## 2017-09-08 RX ADMIN — OXYCODONE HYDROCHLORIDE AND ACETAMINOPHEN 1 TABLET: 10; 325 TABLET ORAL at 09:09

## 2017-09-08 RX ADMIN — OXYCODONE HYDROCHLORIDE AND ACETAMINOPHEN 1 TABLET: 10; 325 TABLET ORAL at 02:09

## 2017-09-08 RX ADMIN — PANTOPRAZOLE SODIUM 40 MG: 40 TABLET, DELAYED RELEASE ORAL at 08:09

## 2017-09-08 RX ADMIN — METRONIDAZOLE 500 MG: 500 INJECTION, SOLUTION INTRAVENOUS at 09:09

## 2017-09-08 RX ADMIN — OXYCODONE AND ACETAMINOPHEN 1 TABLET: 5; 325 TABLET ORAL at 12:09

## 2017-09-08 RX ADMIN — METRONIDAZOLE 500 MG: 500 INJECTION, SOLUTION INTRAVENOUS at 05:09

## 2017-09-08 NOTE — ASSESSMENT & PLAN NOTE
S/p lap johnny 9/7/17, with gallbladder appearing acutely inflamed with purulent white fluid  Cont cipro/flagyl  Reg diet as richard  EMMETT serosang with no signs of bile leak or bleeding  D/c today if pain controlled

## 2017-09-08 NOTE — HOSPITAL COURSE
Pt admitted for lap johnny 9/7/16. Intraop found to have gallbladder consistent with acute cholecystitis. Placed in obs, starting on abx.

## 2017-09-08 NOTE — PROGRESS NOTES
[facility]  General Surgery  Progress Note    Subjective:     History of Present Illness:  Pt referred by Dr. House for eval of gallstones.   Reports similar pain for 11 years, on right side periumbilical, intermittent. Described as pulsating 3-4/10 and 7-8/10/worst. Radiates to upper abdomen and back. This is getting more frequent and worse. It is associated with food with spice and fried foods.     Aggravating factors: fatty foods and spicy foods.  Alleviating factors: none. Associated symptoms: none. The patient denies diarrhea, dysuria, hematochezia, hematuria, nausea and vomiting.     She has been to er twice for this pain, last time 2014 in our system.     Post-Op Info:  Procedure(s) (LRB):  CHOLECYSTECTOMY-LAPAROSCOPIC (N/A)   1 Day Post-Op     Interval History: NAEON. Pt c/o pain, no N/V. Hasn't eaten yet, richard water.     Medications:  Continuous Infusions:   Scheduled Meds:   ciprofloxacin  400 mg Intravenous Q12H    lidocaine (PF) 10 mg/ml (1%)  1 mL Intradermal Once    metronidazole  500 mg Intravenous Q8H    pantoprazole  40 mg Oral Daily     PRN Meds:ondansetron, oxycodone-acetaminophen, oxycodone-acetaminophen     Review of patient's allergies indicates:  No Known Allergies  Objective:     Vital Signs (Most Recent):  Temp: 97.1 °F (36.2 °C) (09/08/17 0441)  Pulse: 62 (09/08/17 0441)  Resp: 18 (09/08/17 0441)  BP: 119/71 (09/08/17 0441)  SpO2: 97 % (09/08/17 0410) Vital Signs (24h Range):  Temp:  [97.1 °F (36.2 °C)-98.2 °F (36.8 °C)] 97.1 °F (36.2 °C)  Pulse:  [58-73] 62  Resp:  [14-18] 18  SpO2:  [90 %-100 %] 97 %  BP: (110-131)/(61-80) 119/71     Weight: 76.2 kg (168 lb)  Body mass index is 28.84 kg/m².    Intake/Output - Last 3 Shifts       09/06 0700 - 09/07 0659 09/07 0700 - 09/08 0659 09/08 0700 - 09/09 0659    P.O.  340     I.V. (mL/kg)  2100 (27.6)     IV Piggyback  700     Total Intake(mL/kg)  3140 (41.2)     Drains  120     Stool  0     Total Output   120      Net    +3020             Stool Occurrence  0 x           Physical Exam   Constitutional: She is oriented to person, place, and time. She appears well-developed and well-nourished. No distress.   Cardiovascular: Normal rate and regular rhythm.    Pulmonary/Chest: Effort normal. She has no wheezes.   Abdominal: Soft.   Incisions C/D/I  EMMETT serosang  Appropriately ttp   Musculoskeletal: Normal range of motion. She exhibits no edema.   Neurological: She is alert and oriented to person, place, and time.   Skin: Skin is warm and dry. She is not diaphoretic.   Psychiatric: She has a normal mood and affect. Her behavior is normal.       Significant Labs:  No new labs    Significant Diagnostics:  No new imaging    Assessment/Plan:     Cholecystitis    S/p lap johnny 9/7/17, with gallbladder appearing acutely inflamed with purulent white fluid  Cont cipro/flagyl  Reg diet as richard  EMMETT serosang with no signs of bile leak or bleeding  D/c today if pain controlled              Brielle Corona MD  General Surgery  [facility]

## 2017-09-08 NOTE — PLAN OF CARE
Patient states she was transferred here from [facility], due to [facility] being in network provider. Pt AAOx3, independent with ADL's. Mother at bedside.         09/08/17 1212   Discharge Assessment   Assessment Type Discharge Planning Assessment   Confirmed/corrected address and phone number on facesheet? Yes   Assessment information obtained from? Patient   Communicated expected length of stay with patient/caregiver yes   Prior to hospitilization cognitive status: Alert/Oriented   Prior to hospitalization functional status: Independent   Current cognitive status: Alert/Oriented   Current Functional Status: Independent   Lives With spouse;child(corazon), dependent   Able to Return to Prior Arrangements yes   Is patient able to care for self after discharge? Yes   Patient's perception of discharge disposition home or selfcare   Readmission Within The Last 30 Days no previous admission in last 30 days   Patient currently being followed by outpatient case management? No   Patient currently receives any other outside agency services? No   Equipment Currently Used at Home none   Do you have any problems affording any of your prescribed medications? No   Is the patient taking medications as prescribed? yes   Does the patient have transportation home? Yes   Transportation Available family or friend will provide   Does the patient receive services at the [facility]? No   Discharge Plan A Home with family;Home Health   Discharge Plan B Home with family   Patient/Family In Agreement With Plan yes

## 2017-09-08 NOTE — PLAN OF CARE
Patient to discharge today,   [facility] arranged for SN services.                                                                             Future Appointments  Date Time Provider Department Center   9/14/2017 9:40 AM Germán Hernandez MD [facility]             09/08/17 1543   Final Note   Assessment Type Final Discharge Note   Discharge Disposition Home-Health   What phone number can be called within the next 1-3 days to see how you are doing after discharge? [phone]   Hospital Follow Up  Appt(s) scheduled? Yes   Discharge plans and expectations educations in teach back method with documentation complete? Yes   Right Care Referral Info   Post Acute Recommendation No Care

## 2017-09-08 NOTE — HPI
Pt referred by Dr. House for eval of gallstones.   Reports similar pain for 11 years, on right side periumbilical, intermittent. Described as pulsating 3-4/10 and 7-8/10/worst. Radiates to upper abdomen and back. This is getting more frequent and worse. It is associated with food with spice and fried foods.     Aggravating factors: fatty foods and spicy foods.  Alleviating factors: none. Associated symptoms: none. The patient denies diarrhea, dysuria, hematochezia, hematuria, nausea and vomiting.     She has been to er twice for this pain, last time 2014 in our system.

## 2017-09-08 NOTE — PLAN OF CARE
Problem: Patient Care Overview  Goal: Plan of Care Review  Reviewed plan of care with pt.  Pt. Verbalized understanding.

## 2017-09-08 NOTE — PROGRESS NOTES
orders faxed to [facility], , [facility] not in network. Leeann with [facility] updated on referral       Per geraldo patient experiencing a lot of pain, TN placed call to MD for update on d/c status.    Call back received, per Dr Pichardo plan for patient to discharge today.      Future Appointments  Date Time Provider Department Center   9/14/2017 9:40 AM Germán Hernandez MD [facility] [place]

## 2017-09-09 VITALS
HEART RATE: 84 BPM | BODY MASS INDEX: 28.68 KG/M2 | OXYGEN SATURATION: 90 % | DIASTOLIC BLOOD PRESSURE: 70 MMHG | TEMPERATURE: 98 F | RESPIRATION RATE: 18 BRPM | SYSTOLIC BLOOD PRESSURE: 110 MMHG | WEIGHT: 168 LBS | HEIGHT: 64 IN

## 2017-09-09 LAB
ALBUMIN SERPL BCP-MCNC: 2.9 G/DL
ALP SERPL-CCNC: 116 U/L
ALT SERPL W/O P-5'-P-CCNC: 40 U/L
ANION GAP SERPL CALC-SCNC: 10 MMOL/L
AST SERPL-CCNC: 40 U/L
BASOPHILS # BLD AUTO: 0.01 K/UL
BASOPHILS NFR BLD: 0.1 %
BILIRUB SERPL-MCNC: 0.4 MG/DL
BUN SERPL-MCNC: 7 MG/DL
CALCIUM SERPL-MCNC: 8.6 MG/DL
CHLORIDE SERPL-SCNC: 102 MMOL/L
CO2 SERPL-SCNC: 22 MMOL/L
CREAT SERPL-MCNC: 0.8 MG/DL
DIFFERENTIAL METHOD: ABNORMAL
EOSINOPHIL # BLD AUTO: 0.1 K/UL
EOSINOPHIL NFR BLD: 0.9 %
ERYTHROCYTE [DISTWIDTH] IN BLOOD BY AUTOMATED COUNT: 13.8 %
EST. GFR  (AFRICAN AMERICAN): >60 ML/MIN/1.73 M^2
EST. GFR  (NON AFRICAN AMERICAN): >60 ML/MIN/1.73 M^2
GLUCOSE SERPL-MCNC: 149 MG/DL
HCT VFR BLD AUTO: 35.8 %
HGB BLD-MCNC: 11.6 G/DL
LIPASE SERPL-CCNC: 53 U/L
LYMPHOCYTES # BLD AUTO: 1.2 K/UL
LYMPHOCYTES NFR BLD: 10.5 %
MCH RBC QN AUTO: 28.2 PG
MCHC RBC AUTO-ENTMCNC: 32.4 G/DL
MCV RBC AUTO: 87 FL
MONOCYTES # BLD AUTO: 0.7 K/UL
MONOCYTES NFR BLD: 5.9 %
NEUTROPHILS # BLD AUTO: 9.5 K/UL
NEUTROPHILS NFR BLD: 82.3 %
PLATELET # BLD AUTO: 268 K/UL
PMV BLD AUTO: 9.9 FL
POTASSIUM SERPL-SCNC: 3.8 MMOL/L
PROT SERPL-MCNC: 6.5 G/DL
RBC # BLD AUTO: 4.11 M/UL
SODIUM SERPL-SCNC: 134 MMOL/L
WBC # BLD AUTO: 11.6 K/UL

## 2017-09-09 PROCEDURE — 83690 ASSAY OF LIPASE: CPT

## 2017-09-09 PROCEDURE — S0030 INJECTION, METRONIDAZOLE: HCPCS | Performed by: SURGERY

## 2017-09-09 PROCEDURE — 25000003 PHARM REV CODE 250: Performed by: SURGERY

## 2017-09-09 PROCEDURE — 94761 N-INVAS EAR/PLS OXIMETRY MLT: CPT

## 2017-09-09 PROCEDURE — 99024 POSTOP FOLLOW-UP VISIT: CPT | Mod: ,,, | Performed by: SURGERY

## 2017-09-09 PROCEDURE — 63600175 PHARM REV CODE 636 W HCPCS: Performed by: SURGERY

## 2017-09-09 PROCEDURE — 85025 COMPLETE CBC W/AUTO DIFF WBC: CPT

## 2017-09-09 PROCEDURE — 36415 COLL VENOUS BLD VENIPUNCTURE: CPT

## 2017-09-09 PROCEDURE — 94799 UNLISTED PULMONARY SVC/PX: CPT

## 2017-09-09 PROCEDURE — 80053 COMPREHEN METABOLIC PANEL: CPT

## 2017-09-09 RX ADMIN — METRONIDAZOLE 500 MG: 500 INJECTION, SOLUTION INTRAVENOUS at 05:09

## 2017-09-09 RX ADMIN — OXYCODONE HYDROCHLORIDE AND ACETAMINOPHEN 1 TABLET: 10; 325 TABLET ORAL at 02:09

## 2017-09-09 RX ADMIN — PANTOPRAZOLE SODIUM 40 MG: 40 TABLET, DELAYED RELEASE ORAL at 09:09

## 2017-09-09 RX ADMIN — CIPROFLOXACIN 400 MG: 2 INJECTION, SOLUTION INTRAVENOUS at 02:09

## 2017-09-09 NOTE — PLAN OF CARE
Problem: Patient Care Overview  Goal: Plan of Care Review  Outcome: Ongoing (interventions implemented as appropriate)  Pts safety maintained, family at bedside. Pt stating she is feeling overall better, no feer spikes during night. Pt also asking less for pain medications. Incisions CDI, drain draining serosanguinous fluid. No SOB, N/V, distress during night. Vitals stable.

## 2017-09-09 NOTE — PROGRESS NOTES
[facility]  General Surgery  Progress Note    Subjective:     History of Present Illness:  Pt referred by Dr. House for eval of gallstones.   Reports similar pain for 11 years, on right side periumbilical, intermittent. Described as pulsating 3-4/10 and 7-8/10/worst. Radiates to upper abdomen and back. This is getting more frequent and worse. It is associated with food with spice and fried foods.     Aggravating factors: fatty foods and spicy foods.  Alleviating factors: none. Associated symptoms: none. The patient denies diarrhea, dysuria, hematochezia, hematuria, nausea and vomiting.     She has been to er twice for this pain, last time 2014 in our system.     Post-Op Info:  Procedure(s) (LRB):  CHOLECYSTECTOMY-LAPAROSCOPIC (N/A)   2 Days Post-Op     Interval History: No o/n events, tmax 101 4pm 9/8/17. Pt now feels well. No c/o. Pain controlled. Caesar diet. Interested in d/c home later today. Voiding well. No BM.    Medications:  Continuous Infusions:   Scheduled Meds:   ciprofloxacin  400 mg Intravenous Q12H    lidocaine (PF) 10 mg/ml (1%)  1 mL Intradermal Once    metronidazole  500 mg Intravenous Q8H    pantoprazole  40 mg Oral Daily     PRN Meds:ondansetron, oxycodone-acetaminophen, oxycodone-acetaminophen     Review of patient's allergies indicates:  No Known Allergies  Objective:     Vital Signs (Most Recent):  Temp: 98.8 °F (37.1 °C) (09/09/17 0813)  Pulse: 82 (09/09/17 0813)  Resp: 18 (09/09/17 0813)  BP: 104/60 (09/09/17 0813)  SpO2: 95 % (09/08/17 1147) Vital Signs (24h Range):  Temp:  [98 °F (36.7 °C)-101.1 °F (38.4 °C)] 98.8 °F (37.1 °C)  Pulse:  [64-86] 82  Resp:  [18] 18  SpO2:  [95 %] 95 %  BP: (103-122)/(60-71) 104/60     Weight: 76.2 kg (168 lb)  Body mass index is 28.84 kg/m².    Intake/Output - Last 3 Shifts       09/07 0700 - 09/08 0659 09/08 0700 - 09/09 0659 09/09 0700 - 09/10 0659    P.O. 340      I.V. (mL/kg) 2100 (27.6) 225 (3)     IV Piggyback 700 700     Total  Intake(mL/kg) 3140 (41.2) 925 (12.1)     Urine (mL/kg/hr)  3000 (1.6)     Drains 120 115 (0.1)     Stool 0 0 (0)     Total Output 120 3115      Net +3020 -2190             Stool Occurrence 0 x 0 x           Physical Exam   NAD  RRR  CTA b/l  Abd- soft, approp ttp near incisions, drain with serosang output, drsg c/d/i    Significant Labs:  CBC:   Recent Labs  Lab 09/08/17 1819   WBC 12.19   RBC 4.15   HGB 11.8*   HCT 36.1*      MCV 87   MCH 28.4   MCHC 32.7     CMP:   Recent Labs  Lab 09/08/17 1819   *   CALCIUM 8.5*   ALBUMIN 3.0*   PROT 6.5   *   K 3.7   CO2 24      BUN 8   CREATININE 0.9   ALKPHOS 108   ALT 55*   AST 64*   BILITOT 0.6     Microbiology Results (last 7 days)     Procedure Component Value Units Date/Time    Blood culture [[id number]] Collected:  09/08/17 1819    Order Status:  Completed Specimen:  Blood Updated:  09/09/17 0515     Blood Culture, Routine No Growth to date    Blood culture [[id number]]     Order Status:  Canceled Specimen:  Blood         No results for input(s): COLORU, CLARITYU, SPECGRAV, PHUR, PROTEINUA, GLUCOSEU, BILIRUBINCON, BLOODU, WBCU, RBCU, BACTERIA, MUCUS, NITRITE, LEUKOCYTESUR, UROBILINOGEN, HYALINECASTS in the last 168 hours.    Significant Diagnostics:  path - pending    Assessment/Plan:     * Cholecystitis    S/p lap johnny 9/7/17, with gallbladder appearing acutely inflamed with purulent white fluid  Cont cipro/flagyl  Reg diet as richard  EMMETT serosang with no signs of bile leak or bleeding  Fever 4pm yesterday. Bl cx NGTD  Ok to d/c home on oral antibiotics if patient remains afebrile  All questions answered  IS and drain teaching ordered          Pt has f/u appt 9/14/17 with Dr. David Daniels, DO  General Surgery  [facility]

## 2017-09-09 NOTE — ASSESSMENT & PLAN NOTE
S/p lap johnny 9/7/17, with gallbladder appearing acutely inflamed with purulent white fluid  Cont cipro/flagyl  Reg diet as richard  EMMETT serosang with no signs of bile leak or bleeding  Fever 4pm yesterday. Bl cx NGTD  Ok to d/c home on oral antibiotics if patient remains afebrile  All questions answered  IS and drain teaching ordered

## 2017-09-09 NOTE — SUBJECTIVE & OBJECTIVE
Interval History: No o/n events, tmax 101 4pm 9/8/17. Pt now feels well. No c/o. Pain controlled. Caesar diet. Interested in d/c home later today. Voiding well. No BM.    Medications:  Continuous Infusions:   Scheduled Meds:   ciprofloxacin  400 mg Intravenous Q12H    lidocaine (PF) 10 mg/ml (1%)  1 mL Intradermal Once    metronidazole  500 mg Intravenous Q8H    pantoprazole  40 mg Oral Daily     PRN Meds:ondansetron, oxycodone-acetaminophen, oxycodone-acetaminophen     Review of patient's allergies indicates:  No Known Allergies  Objective:     Vital Signs (Most Recent):  Temp: 98.8 °F (37.1 °C) (09/09/17 0813)  Pulse: 82 (09/09/17 0813)  Resp: 18 (09/09/17 0813)  BP: 104/60 (09/09/17 0813)  SpO2: 95 % (09/08/17 1147) Vital Signs (24h Range):  Temp:  [98 °F (36.7 °C)-101.1 °F (38.4 °C)] 98.8 °F (37.1 °C)  Pulse:  [64-86] 82  Resp:  [18] 18  SpO2:  [95 %] 95 %  BP: (103-122)/(60-71) 104/60     Weight: 76.2 kg (168 lb)  Body mass index is 28.84 kg/m².    Intake/Output - Last 3 Shifts       09/07 0700 - 09/08 0659 09/08 0700 - 09/09 0659 09/09 0700 - 09/10 0659    P.O. 340      I.V. (mL/kg) 2100 (27.6) 225 (3)     IV Piggyback 700 700     Total Intake(mL/kg) 3140 (41.2) 925 (12.1)     Urine (mL/kg/hr)  3000 (1.6)     Drains 120 115 (0.1)     Stool 0 0 (0)     Total Output 120 3115      Net +3020 -2190             Stool Occurrence 0 x 0 x           Physical Exam   NAD  RRR  CTA b/l  Abd- soft, approp ttp near incisions, drain with serosang output, drsg c/d/i    Significant Labs:  CBC:   Recent Labs  Lab 09/08/17  1819   WBC 12.19   RBC 4.15   HGB 11.8*   HCT 36.1*      MCV 87   MCH 28.4   MCHC 32.7     CMP:   Recent Labs  Lab 09/08/17 1819   *   CALCIUM 8.5*   ALBUMIN 3.0*   PROT 6.5   *   K 3.7   CO2 24      BUN 8   CREATININE 0.9   ALKPHOS 108   ALT 55*   AST 64*   BILITOT 0.6     Microbiology Results (last 7 days)     Procedure Component Value Units Date/Time    Blood culture [[id number]]  Collected:  09/08/17 1819    Order Status:  Completed Specimen:  Blood Updated:  09/09/17 0515     Blood Culture, Routine No Growth to date    Blood culture [[id number]]     Order Status:  Canceled Specimen:  Blood         No results for input(s): COLORU, CLARITYU, SPECGRAV, PHUR, PROTEINUA, GLUCOSEU, BILIRUBINCON, BLOODU, WBCU, RBCU, BACTERIA, MUCUS, NITRITE, LEUKOCYTESUR, UROBILINOGEN, HYALINECASTS in the last 168 hours.    Significant Diagnostics:  path - pending

## 2017-09-09 NOTE — PROGRESS NOTES
TN called Kenyetta Young [phone] ([facility] main line not working, called Kenyetta's cell). TN informed [facility] that pt discharging today and they will contact pt to perform assessment Tomorrow, Sunday.  No E ordered    Final note input by RUTHANN Javier, 9/8/2017 @3:44pm

## 2017-09-09 NOTE — PLAN OF CARE
Problem: Patient Care Overview  Goal: Plan of Care Review  Outcome: Ongoing (interventions implemented as appropriate)  Patient AAO x 3. Bed remained low, locked and call bell within reach. Patient verbalized understanding to call for any needs or assistance. PRN pain medication administered per Md order. Physicians discharge order discontinued per telephone order via Dr. Pichardo. Telephone orders received for CBC, CMP, lipase, and blood culture Patient had temp 101 oral. Temperature recheck 99.3 oral. Will continue to monitor patient

## 2017-09-09 NOTE — PROGRESS NOTES
Patient discharge per Md orders. Patient verbalized understanding of discharge instructions. IV discontinued per Md order. Catheter tip intact and pressure dressing applied. Patient verbalized understanding of how to empty EMMETT drain and record measurements. Patient informed to bring measurements to follow up appointment. Awaiting transport to arrive to [facility] patient to main lobby

## 2017-09-10 NOTE — DISCHARGE SUMMARY
[facility]  General Surgery  Discharge Summary      Patient Name: Tessa Wynne  MRN: [id number]  Admission Date: 9/7/2017  Hospital Length of Stay: 2 days  Discharge Date and Time: 9/9/2017  4:38 PM  Attending Physician: Alina Pichardo MD  Discharging Provider: Leora Daniels DO  Primary Care Provider: Jonathan White MD     HPI: Patient presented for outpatient surgery and was found to have purulent cholecystitis intra-operatively.  A drain was placed at the time of OR. She tolerated the procedure well and was kept overnight for observation and pain control.      Procedure(s) (LRB):  CHOLECYSTECTOMY-LAPAROSCOPIC (N/A)     Hospital Course: Patient presented for outpatient surgery and was found to have purulent cholecystitis intra-operatively. A drain was placed at the time of OR. She tolerated the procedure well and was kept overnight for observation and pain control.  She had a fever of 101 pod 1 and remained overnight for another night. She was continued on IV cipro/flagyl. She subsequently remained afebrile over the next 24hr and was discharged home. She was ambulating well, tolerating her diet and voiding well. No BM so prn stool softeners were initiated.  She will f/u later this week with Surgeon Dr. Hernandez for possible drain removal. All of her questions were answered prior to discharge. Detailed postop restrictions and wound care instructions were also discussed.      Consults: none    Significant Diagnostic Studies: path pending    Pending Diagnostic Studies:     None        Final Active Diagnoses:    Diagnosis Date Noted POA    PRINCIPAL PROBLEM:  Cholecystitis [K81.9] 09/05/2017 Yes    Biliary colic [K80.50] 09/07/2017 Yes      Problems Resolved During this Admission:    Diagnosis Date Noted Date Resolved POA      Discharged Condition: good    [place] [facility]    Follow Up:  Follow-up Information     Alina Pichardo MD In 1 week.    Specialties:  Surgery, General  Surgery  Why:  EMMETT drain check  Contact information:  [address]  [address]  [place] [address]  [phone]             [facility].    Specialties:  Home Health Services, DME Provider  Why:  Home Health  Contact information:  [address] [facility] [address]  [phone]                 Patient Instructions:     Ambulatory referral to Home Health   Referral Priority: Routine Referral Type: Home Health   Referral Reason: Specialty Services Required    Requested Specialty: Home Health Services    Number of Visits Requested: 1      Diet general     Diet general     Activity as tolerated     Shower on day dressing removed (No bath)     Remove dressing in 48 hours     Activity as tolerated       Medications:  Reconciled Home Medications:   Discharge Medication List as of 9/9/2017  1:45 PM      START taking these medications    Details   ciprofloxacin HCl (CIPRO) 500 MG tablet Take 1 tablet (500 mg total) by mouth every 12 (twelve) hours., Starting Fri 9/8/2017, Until Mon 9/18/2017, Normal      docusate sodium (COLACE) 100 MG capsule Take 1 capsule (100 mg total) by mouth 2 (two) times daily., Starting Thu 9/7/2017, Normal      hydrocodone-acetaminophen 5-325mg (NORCO) 5-325 mg per tablet Take 1-2 tablets PO q4-6hours PRN pain, Normal      metronidazole (FLAGYL) 500 MG tablet Take 1 tablet (500 mg total) by mouth every 8 (eight) hours., Starting Fri 9/8/2017, Until Mon 9/18/2017, Normal         CONTINUE these medications which have NOT CHANGED    Details   omeprazole (PRILOSEC) 20 MG capsule Take 1 capsule by mouth once daily., Starting Wed 7/12/2017, Historical Med      ondansetron (ZOFRAN) 4 MG tablet Take 1 tablet (4 mg total) by mouth every 6 (six) hours as needed for Nausea., Starting 3/16/2014, Until Discontinued, Print         STOP taking these medications       famotidine (PEPCID) 20 MG tablet Comments:   Reason for Stopping:               Leora Daniels, DO  General Surgery  [facility]

## 2017-09-13 LAB — BACTERIA BLD CULT: NORMAL

## 2017-09-14 ENCOUNTER — OFFICE VISIT (OUTPATIENT)
Dept: SURGERY | Facility: CLINIC | Age: 42
End: 2017-09-14
Payer: COMMERCIAL

## 2017-09-14 VITALS
TEMPERATURE: 97 F | DIASTOLIC BLOOD PRESSURE: 76 MMHG | BODY MASS INDEX: 28.09 KG/M2 | SYSTOLIC BLOOD PRESSURE: 104 MMHG | WEIGHT: 164.56 LBS | HEART RATE: 85 BPM | HEIGHT: 64 IN

## 2017-09-14 DIAGNOSIS — K81.9 CHOLECYSTITIS: Primary | ICD-10-CM

## 2017-09-14 PROCEDURE — 99999 PR PBB SHADOW E&M-EST. PATIENT-LVL III: CPT | Mod: PBBFAC,,, | Performed by: STUDENT IN AN ORGANIZED HEALTH CARE EDUCATION/TRAINING PROGRAM

## 2017-09-14 PROCEDURE — 99024 POSTOP FOLLOW-UP VISIT: CPT | Mod: S$GLB,,, | Performed by: STUDENT IN AN ORGANIZED HEALTH CARE EDUCATION/TRAINING PROGRAM

## 2017-09-29 ENCOUNTER — TELEPHONE (OUTPATIENT)
Dept: SURGERY | Facility: CLINIC | Age: 42
End: 2017-09-29

## 2017-09-29 NOTE — TELEPHONE ENCOUNTER
----- Message from Hannah Abbott LPN sent at 9/29/2017  3:08 PM CDT -----  Contact: [phone]/ self       ----- Message -----  From: Tessa De Jesus  Sent: 9/29/2017  11:16 AM  To: [facility] Staff    Pt its requesting a letter stating she can go back to work . Please advise

## 2017-10-19 ENCOUNTER — OFFICE VISIT (OUTPATIENT)
Dept: CARDIOLOGY | Facility: CLINIC | Age: 42
End: 2017-10-19
Payer: COMMERCIAL

## 2017-10-19 VITALS
DIASTOLIC BLOOD PRESSURE: 79 MMHG | WEIGHT: 168.63 LBS | HEART RATE: 86 BPM | OXYGEN SATURATION: 98 % | BODY MASS INDEX: 28.94 KG/M2 | SYSTOLIC BLOOD PRESSURE: 113 MMHG

## 2017-10-19 DIAGNOSIS — Z91.89 RISK FOR CORONARY ARTERY DISEASE LESS THAN 10% IN NEXT 10 YEARS: ICD-10-CM

## 2017-10-19 DIAGNOSIS — R07.9 CHEST PAIN, UNSPECIFIED TYPE: Primary | ICD-10-CM

## 2017-10-19 DIAGNOSIS — R00.2 PALPITATIONS: ICD-10-CM

## 2017-10-19 PROCEDURE — 93000 ELECTROCARDIOGRAM COMPLETE: CPT | Mod: S$GLB,,, | Performed by: INTERNAL MEDICINE

## 2017-10-19 PROCEDURE — 99999 PR PBB SHADOW E&M-EST. PATIENT-LVL III: CPT | Mod: PBBFAC,,, | Performed by: INTERNAL MEDICINE

## 2017-10-19 PROCEDURE — 99204 OFFICE O/P NEW MOD 45 MIN: CPT | Mod: S$GLB,,, | Performed by: INTERNAL MEDICINE

## 2017-10-19 NOTE — PROGRESS NOTES
Subjective:   Patient ID:  Tessa Wynne is a 42 y.o. female who presents for evaluation of No chief complaint on file.      HPI: 41 y/o  female with no significant PMH present with complaints of chest pain that started 2 weeks ago while sleeping and awaken her from sleep. Chest pain is located in left chest and radiate under both breast. Pain is sharp/aching in quality and moderate in intensity and went away in 20-25 minutes. Pain again occurred last night so she made urgent appointment. She has associated palpitation describe as heart racing but denies syncope, dizziness, dyspnea, orthopnea or PND. BP is controlled. 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   She does not smoke and have no family history of CAD.    History reviewed. No pertinent past medical history.    History reviewed. No pertinent surgical history.    Social History   Substance Use Topics    Smoking status: Never Smoker    Smokeless tobacco: Never Used    Alcohol use No       History reviewed. No pertinent family history.    Patient's Medications   New Prescriptions    No medications on file   Previous Medications    DOCUSATE SODIUM (COLACE) 100 MG CAPSULE    Take 1 capsule (100 mg total) by mouth 2 (two) times daily.    OMEPRAZOLE (PRILOSEC) 20 MG CAPSULE    Take 1 capsule by mouth once daily.    ONDANSETRON (ZOFRAN) 4 MG TABLET    Take 1 tablet (4 mg total) by mouth every 6 (six) hours as needed for Nausea.   Modified Medications    No medications on file   Discontinued Medications    No medications on file        Review of patient's allergies indicates:  No Known Allergies    Review of Systems   Constitution: Negative.   HENT: Negative.    Eyes: Negative.    Cardiovascular: Positive for chest pain. Negative for claudication, cyanosis, dyspnea on exertion, irregular heartbeat, leg swelling, near-syncope, orthopnea,  palpitations, paroxysmal nocturnal dyspnea and syncope.   Respiratory: Negative.    Endocrine: Negative.    Hematologic/Lymphatic: Negative.    Skin: Negative.    Musculoskeletal: Negative.    Gastrointestinal: Negative.    Neurological: Negative.    Psychiatric/Behavioral: Negative.      Objective:   Physical Exam   Constitutional: She is oriented to person, place, and time. She appears well-developed and well-nourished. No distress.   Examination of the digits showed no clubbing or cyanosis   HENT:   Head: Normocephalic and atraumatic.   Eyes: Conjunctivae are normal. Pupils are equal, round, and reactive to light. Right eye exhibits no discharge.   Neck: Normal range of motion. Neck supple. No JVD present. No thyromegaly present.   No carotid bruits   Cardiovascular: Normal rate, regular rhythm, S1 normal, S2 normal, normal heart sounds, intact distal pulses and normal pulses.  PMI is not displaced.  Exam reveals no gallop, no friction rub and no opening snap.    No murmur heard.  Pulmonary/Chest: Effort normal and breath sounds normal. No respiratory distress. She has no wheezes. She has no rales. She exhibits no tenderness.   Exacerbation of pain with palpation of chest.   Abdominal: Soft. Bowel sounds are normal. She exhibits no distension and no mass. There is no tenderness. There is no guarding.   No hepatosplenomegaly   Musculoskeletal: Normal range of motion. She exhibits no edema or tenderness.   Lymphadenopathy:     She has no cervical adenopathy.   Neurological: She is alert and oriented to person, place, and time.   Skin: Skin is warm. No rash noted. She is not diaphoretic. No erythema.   Psychiatric: She has a normal mood and affect.   Nursing note and vitals reviewed.      Lab Results   Component Value Date    WBC 11.60 09/09/2017    HGB 11.6 (L) 09/09/2017    HCT 35.8 (L) 09/09/2017    MCV 87 09/09/2017     09/09/2017         Chemistry        Component Value Date/Time     (L) 09/09/2017  1328    K 3.8 09/09/2017 1328     09/09/2017 1328    CO2 22 (L) 09/09/2017 1328    BUN 7 09/09/2017 1328    CREATININE 0.8 09/09/2017 1328     (H) 09/09/2017 1328        Component Value Date/Time    CALCIUM 8.6 (L) 09/09/2017 1328    ALKPHOS 116 09/09/2017 1328    AST 40 09/09/2017 1328    ALT 40 09/09/2017 1328    BILITOT 0.4 09/09/2017 1328    ESTGFRAFRICA >60 09/09/2017 1328    EGFRNONAA >60 09/09/2017 1328          ECGs reviewed-NSR  LABS reviewed      Assessment:     1. Chest pain, unspecified type    2. Risk for coronary artery disease less than 10% in next 10 years    3. Palpitations        Plan:     Chest pain considering description and risk factors is likely muscle skeletal. Will however get treadmill stress to rule out obstructive CAD as she is also having palpitations with pain.   Continue current medications as is.  Continue activity as tolerate  Lipid panel  F/u in 2 months      Clinic time spent with patient discussing and treating medical condition including reviewing images, ECG, labs and medications > 40 minutes.

## 2017-10-24 DIAGNOSIS — R00.2 PALPITATIONS: Primary | ICD-10-CM

## 2017-10-27 ENCOUNTER — CLINICAL SUPPORT (OUTPATIENT)
Dept: LAB | Facility: HOSPITAL | Age: 42
End: 2017-10-27
Attending: INTERNAL MEDICINE
Payer: COMMERCIAL

## 2017-10-27 DIAGNOSIS — R00.2 PALPITATIONS: ICD-10-CM

## 2017-10-27 PROCEDURE — 93010 ELECTROCARDIOGRAM REPORT: CPT | Mod: ,,, | Performed by: INTERNAL MEDICINE

## 2017-10-27 PROCEDURE — 93005 ELECTROCARDIOGRAM TRACING: CPT

## 2017-10-27 PROCEDURE — 93010 EKG 12-LEAD: ICD-10-PCS | Mod: ,,, | Performed by: INTERNAL MEDICINE

## 2017-11-08 ENCOUNTER — LAB VISIT (OUTPATIENT)
Dept: LAB | Facility: HOSPITAL | Age: 42
End: 2017-11-08
Attending: INTERNAL MEDICINE
Payer: COMMERCIAL

## 2017-11-08 ENCOUNTER — OFFICE VISIT (OUTPATIENT)
Dept: INTERNAL MEDICINE | Facility: CLINIC | Age: 42
End: 2017-11-08
Payer: COMMERCIAL

## 2017-11-08 VITALS
HEART RATE: 68 BPM | OXYGEN SATURATION: 98 % | SYSTOLIC BLOOD PRESSURE: 112 MMHG | WEIGHT: 167.56 LBS | DIASTOLIC BLOOD PRESSURE: 68 MMHG | HEIGHT: 64 IN | BODY MASS INDEX: 28.6 KG/M2

## 2017-11-08 DIAGNOSIS — Z12.31 ENCOUNTER FOR SCREENING MAMMOGRAM FOR BREAST CANCER: ICD-10-CM

## 2017-11-08 DIAGNOSIS — Z23 NEED FOR IMMUNIZATION AGAINST INFLUENZA: ICD-10-CM

## 2017-11-08 DIAGNOSIS — R73.9 HYPERGLYCEMIA: ICD-10-CM

## 2017-11-08 DIAGNOSIS — R00.2 HEART PALPITATIONS: ICD-10-CM

## 2017-11-08 DIAGNOSIS — Z00.00 ANNUAL PHYSICAL EXAM: Primary | ICD-10-CM

## 2017-11-08 PROBLEM — K81.9 CHOLECYSTITIS: Status: RESOLVED | Noted: 2017-09-05 | Resolved: 2017-11-08

## 2017-11-08 PROBLEM — K80.50 BILIARY COLIC: Status: RESOLVED | Noted: 2017-09-07 | Resolved: 2017-11-08

## 2017-11-08 LAB
ESTIMATED AVG GLUCOSE: 105 MG/DL
HBA1C MFR BLD HPLC: 5.3 %
TSH SERPL DL<=0.005 MIU/L-ACNC: 1.38 UIU/ML

## 2017-11-08 PROCEDURE — 36415 COLL VENOUS BLD VENIPUNCTURE: CPT | Mod: PO

## 2017-11-08 PROCEDURE — 83036 HEMOGLOBIN GLYCOSYLATED A1C: CPT

## 2017-11-08 PROCEDURE — 84443 ASSAY THYROID STIM HORMONE: CPT

## 2017-11-08 PROCEDURE — 90688 IIV4 VACCINE SPLT 0.5 ML IM: CPT | Mod: S$GLB,,, | Performed by: INTERNAL MEDICINE

## 2017-11-08 PROCEDURE — 99999 PR PBB SHADOW E&M-EST. PATIENT-LVL III: CPT | Mod: PBBFAC,,, | Performed by: INTERNAL MEDICINE

## 2017-11-08 PROCEDURE — 99386 PREV VISIT NEW AGE 40-64: CPT | Mod: 25,S$GLB,, | Performed by: INTERNAL MEDICINE

## 2017-11-08 PROCEDURE — 90471 IMMUNIZATION ADMIN: CPT | Mod: S$GLB,,, | Performed by: INTERNAL MEDICINE

## 2017-11-08 NOTE — PROGRESS NOTES
Portions of this note are generated with voice recognition software. Typographical errors may exist.       Patient Name:CAROL FLOWER  Patient MRN:   [id number]    History of Present Illness   ================================================================  CAROL FLOWER is a 42 y.o. female here for primary care visit for  Chief Complaint   Patient presents with    Establish Care       History reviewed. No pertinent past medical history.    History reviewed. No pertinent surgical history.    Review of patient's allergies indicates:  No Known Allergies    Current Outpatient Prescriptions on File Prior to Visit   Medication Sig Dispense Refill    omeprazole (PRILOSEC) 20 MG capsule Take 1 capsule by mouth once daily.      [DISCONTINUED] docusate sodium (COLACE) 100 MG capsule Take 1 capsule (100 mg total) by mouth 2 (two) times daily. 60 capsule 0    [DISCONTINUED] ondansetron (ZOFRAN) 4 MG tablet Take 1 tablet (4 mg total) by mouth every 6 (six) hours as needed for Nausea. 12 tablet 0     No current facility-administered medications on file prior to visit.        Family History   Problem Relation Age of Onset    Diabetes Mellitus Mother     Diabetes Mellitus Father     Colon cancer Neg Hx     Breast cancer Neg Hx     Ovarian cancer Neg Hx     Uterine cancer Neg Hx        Social History     Social History    Marital status:      Spouse name: N/A    Number of children: 2    Years of education: N/A     Occupational History    Not on file.     Social History Main Topics    Smoking status: Never Smoker    Smokeless tobacco: Never Used    Alcohol use No    Drug use: No    Sexual activity: Not on file     Other Topics Concern    Not on file     Social History Narrative    No narrative on file       History   Sexual Activity    Sexual activity: Not on file         SUBJECTIVE:    Patient states that she has been out of routine primary care.  Has not been seen for probably more than 10 years.   Did receive some primary care from gynecologist.  States that she has not been diagnosed with diabetes.  Several family members with type 2 diabetes mellitus.    Patient states that she has struggled to lose weight but she has maintained relatively the same weight since giving birth to her last child.    States that palpitations that prompted cardiology evaluation and have never recurred and this is the reason that she did not pursue cardiac stress testing.  States that Nexium helped with substernal chest symptoms.      Receives routine gynecologic care at outside gynecology office.  States that she is up-to-date on Pap testing and all Pap testing has been normal.        Patient states that she is doing well after cholecystectomy but has been sidelined with regard to her aerobic exercise due to intra-abdominal discomfort when jogging.  States that this is gradually getting better    Medications Reviewed and Updated    Past medical, family, and social histories were reviewed and updated.    Review of Systems negative unless otherwise noted in history of present illness-  ROS      General ROS: negative  Psychological ROS: negative  ENT ROS: negative  Allergy and Immunology ROS: negative  Cardiovascular ROS: negative  Gastrointestinal ROS: negative  Genito-Urinary ROS: negative  Musculoskeletal ROS: negative  Neurological ROS: negative  Dermatological ROS: negative      Allergic:  Review of patient's allergies indicates:  No Known Allergies    OBJECTIVE:  BP: 112/68 Pulse: 68    Wt Readings from Last 3 Encounters:   11/08/17 76 kg (167 lb 8.8 oz)   10/19/17 76.5 kg (168 lb 9.6 oz)   09/14/17 74.6 kg (164 lb 9.2 oz)    Body mass index is 28.76 kg/m².  Previous Blood Pressure Readings :   BP Readings from Last 3 Encounters:   11/08/17 112/68   10/19/17 113/79   09/14/17 104/76       Physical Exam    GEN: healthy appearing  HEENT: sclera non-icteric, conjunctiva clear  CV: no peripheral edema, RRR, no r/m/g  PULM:  breathing non-labored, no w/r/r  ABD: no HSM   PSYCH: appropriate affect  MSK: able to rise from chair without assistance  SKIN: normal skin turgor      Pertinent Labs Reviewed         ASSESSMENT/PLAN:    Annual physical exam    Encounter for screening mammogram for breast cancer  -     Mammo Digital Screening Bilateral With CAD; Future; Expected date: 11/08/2017    Hyperglycemia  -     Hemoglobin A1c; Future; Expected date: 11/22/2017  -     Hemoglobin A1c; Future; Expected date: 11/22/2017  -     TSH; Future; Expected date: 11/22/2017    Need for immunization against influenza  -     Influenza - Quadrivalent (3 years & older)    Heart palpitations.Condition stable.  Counseling on self-care measures. Plan to monitor clinically. Continue current medical plan.           Future Appointments  Date Time Provider Department Center   11/13/2017 5:30 PM [facility] MAMMO1 [facility] MAMMO [facility]   12/20/2017 3:20 PM Yrn Jack MD [facility] CARDIO [facility]   5/7/2018 7:00 AM LAB, [facility] [place]   5/10/2018 9:00 AM Bill Roe MD [facility]  11/8/2017  3:04 PM

## 2017-11-08 NOTE — PATIENT INSTRUCTIONS
Diabetes: Información sobre carbohidratos, grasas y proteínas  Los alimentos son la kunal de energía y nutrición para el cuerpo, además de ser jaziel kunal de placer. La diabetes no significa que deba comer alimentos especiales o dejar de comer postres por completo. Herrera dietista le enseñará a preparar comidas adecuadas para herrera cuerpo. Para comenzar, aprenda cómo los distintos alimentos afectan el nivel de azúcar en la leanne.  Carbohidratos  Los carbohidratos son la principal kunal de energía para el cuerpo y aumentan el nivel de azúcar en la leanne. Muchas personas piensan que los carbohidratos se encuentran sólo en las pastas y el pan, nickie en realidad los carbohidratos están presentes en muchos tipos diferentes de alimentos.  · Los azúcares se encuentran de forma natural en ciertos alimentos felix las frutas, la leche, la miel y la melaza. También pueden añadirse azúcares a muchos tipos de comidas, desde cereales y yogures hasta dulces y postres. Los azúcares aumentan el nivel de glucosa en la leanne.  · Las féculas se encuentran en el pan, los cereales, las pastas, los frijoles secos, el maíz, los chícharos (arvejas), las kirsty, las batatas, los ñames y las calabazas. Las féculas también aumentan el azúcar en la leanne.   · La fibra se encuentra en alimentos felix las verduras, las frutas, los frijoles y los granos integrales. A diferencia de otros carbohidratos, la fibra no se digiere ni se absorbe, y por lo tanto no aumenta el nivel de azúcar en la leanne. En realidad, la fibra puede evitar que el nivel de azúcar en la leanne aumente demasiado rápido y también ayuda a mantener el nivel de colesterol dentro de los límites considerados saludables.     ¿Sabía usted?  Aunque los carbohidratos aumentan el nivel de azúcar, es mejor incluir jaziel cierta cantidad de ellos en todas las comidas ya que son parte importante de jaziel dieta saludable.   Grasas  Las grasas son jaziel kunal de energía que puede almacenarse hasta que  se necesite. Las grasas no aumentan el nivel de azúcar, eloina pueden aumentar el nivel de colesterol, y en consecuencia el riesgo de trastornos cardíacos. Las grasas también tienen un alto contenido calórico que puede provocar un aumento de peso. Eloina no todos los tipos de grasas son iguales.  Más saludables  · Las grasas monoinsaturadas se encuentran sobre todo en aceites vegetales felix el aceite de morin, de canola y de cacahuate (maní). También se encuentran en los aguacates y en algunos tipos de nueces. Las grasas monoinsaturadas son saludables para el corazón porque reducen el nivel de colesterol LDL (colesterol santos).  · Las grasas poliinsaturadas se encuentran sobre todo en los aceites vegetales felix el aceite de maíz, de alazor y de soya. También se encuentran en algunas semillas, nueces y pescados.  Las grasas poliinsaturadas reducen el colesterol LDL (santos). De manera que escoger grasas poliinsaturadas en vez de grasas saturadas es más suzan para el corazón. Ciertas grasas insaturadas pueden ayudar a reducir los valores de triglicéridos.  Menos saludables  · Las grasas saturadas se encuentran en productos animales felix la carne, la leche entera, la manteca y la mantequilla. Las grasas saturadas aumentan el nivel de colesterol LDL y no son saludables para el corazón.  · Los aceites hidrogenados y las grasas trans se pablo al procesar los aceites vegetales para producir las grasas sólidas. Se encuentran en muchos alimentos procesados. Los aceites hidrogenados y las grasas trans aumentan el nivel de colesterol LDL (santos)  y reducen el colesterol HDL (beck). Éstos no son saludables para el corazón.  Proteínas  Las proteínas ayudan al cuerpo a generar y reparar el tejido muscular y otros tejidos. Las proteínas tienen poco o ningún efecto sobre el nivel de azúcar en la leanne. De todas formas, muchos alimentos que contienen proteínas también contienen grasas saturadas. Si escoge mccauley de proteínas bajas en  grasas, puede aprovechar los beneficios de las proteínas sin las desventajas de la grasa adicional.  · Las proteínas vegetales se encuentran en los frijoles y chícharos secos, las nueces y los productos derivados de la soya, felix el tofu y la leche de soya. Estas mccauley de proteínas tienden a estar libres de colesterol y tener un bajo contenido de grasas saturadas.  · Las proteínas animales se encuentran en el pescado, la carne, el queso, la leche y los huevos. Estos productos tienen colesterol y pueden tener un alto contenido de grasas saturadas. Trate de escoger mccauley de proteínas con bajo contenido en grasa.  Date Last Reviewed: 3/31/2014  © 9582-8089 [email or web address]. [address] [place] [address]. Todos los derechos reservados. Esta información no pretende sustituir la atención médica profesional. Sólo herrera médico puede diagnosticar y tratar un problema de amanda.        Diet: Diabetes  Food is an important tool that you can use to control diabetes and stay healthy. Eating well-balanced meals in the correct amounts will help you control your blood glucose levels and prevent low blood sugar reactions. It will also help you reduce the health risks of diabetes. There is no one specific diet that is right for everyone with diabetes. But there are general guidelines to follow. A registered dietitian (RD) will create a tailored diet approach thats just right for you. He or she will also help you plan healthy meals and snacks. If you have any questions, call your dietitian for advice.     Guidelines for success  Talk with your healthcare provider before starting a diabetes diet or weight loss program. If you haven't talked with a dietitian yet, ask your provider for a referral. The following guidelines can help you succeed:  · Select foods from the 6 food groups below. Your dietitian will help you find food choices within each group. He or she will also show you serving sizes and how many  servings you can have at each meal.  ¨ Grains, beans, and starchy vegetables  ¨ Vegetables  ¨ Fruit  ¨ Milk or yogurt  ¨ Meat, poultry, fish, or tofu  ¨ Healthy fats  · Check your blood sugar levels as directed by your provider. Take any medicine as prescribed by your provider.  · Learn to read food labels and pick the right portion sizes.  · Eat only the amount of food in your meal plan. Eat about the same amount of food at regular times each day. Dont skip meals. Eat meals 4 to 5 hours apart, with snacks in between.  · Limit alcohol. It raises blood sugar levels. Drink water or calorie-free diet drinks that use safe sweeteners.  · Eat less fat to help lower your risk of heart disease. Use nonfat or low-fat dairy products and lean meats. Avoid fried foods. Use cooking oils that are unsaturated, such as olive, canola, or peanut oil.  · Talk with your dietitian about safe sugar substitutes.  · Avoid added salt. It can contribute to high blood pressure, which can cause heart disease. People with diabetes already have a risk of high blood pressure and heart disease.  · Stay at a healthy weight. If you need to lose weight, cut down on your portion sizes. But dont skip meals. Exercise is an important part of any weight management program. Talk with your provider about an exercise program thats right for you.  · For more information about the best diet plan for you, talk with a registered dietitian (RD). To find an RD in your area, contact:  ¨ Academy of Nutrition and Dietetics www.eatright.org  ¨ The American Diabetes Association 682-090-3758 www.diabetes.org  Date Last Reviewed: 8/1/2016  © 5577-0395 [employer]. [address] [place] [address], [place] [address]. All rights reserved. This information is not intended as a substitute for professional medical care. Always follow your healthcare professional's instructions.        Dieta: Diabetes [Diabetic Diet]  Los alimentos son jaziel herramienta importante que  puede utilizar para controlar la diabetes y mantenerse suzan. [place] alimentos jeannette equilibrados en las cantidades adecuadas le ayudará a controlar la concentración de azúcar en la leanne (glucemia) y a evitar las reacciones por falta de azúcar en leanne. También le ayudará a reducir los riesgos de la diabetes para la amanda.     Un dietista certificado (RADHA, registered dietitian) le explicará la dieta para la diabetes y le ayudará a planificar comidas y refrigerios que zonia saludables. Si tiene alguna fernando, consulte al dietista.  Pautas Para El [place]xito:  · Consulte con herrera médico antes de iniciar jaziel dieta para la diabetes o un programa para adelgazar. Si todavía no ha consultado a un dietista, pídale a herrera médico que lo transfiera.  · Seleccione alimentos de los seis grupos. Herrera dietista lo asesorará sobre las elecciones de alimentos dentro de cada kalyani, el tamaño de las porciones y la cantidad de porciones que puede comer en cada comida.  ¨ Granos, frijoles y vegetales con almidón  ¨ Vegetales  ¨ Fruta  ¨ Leche o yogur  ¨ Lj  ¨ Grasas, dulces y alcohol (solamente jaziel pequeña cantidad de shiv kalyani)  · Vigile el azúcar en herrera leanne según lo indique herrera médico. [place] los medicamentos según le indique herrera médico.  · Aprenda a leer las etiquetas de información nutricional y elija las porciones adecuadas.  · Ingiera solamente la cantidad de alimentos de herrera plan de comidas. Coma más o menos la misma cantidad de alimentos en horarios regulares todos los días. No saltee comidas. Deje un intervalo de 4 a 5 horas entre las comidas, con refrigerios intermedios.  · Limite el consumo de alcohol. Aumenta el azúcar en la leanne. Sandra agua o bebidas dietéticas sin calorías que utilizan edulcorantes seguros.  · Coma menos grasa para ayudar a reducir herrera riesgo de enfermedad del corazón. Consuma productos lácteos sin grasa o con bajo contenido graso y lj magras. Evite los alimentos fritos. Use aceites de cocina no  saturados.  · Consulte a herrera nutricionista sobre los sustitutos seguros del azúcar.  · Evite la sal adicional. Puede contribuir a la presión alexander (hipertensión), que puede provocar enfermedad del corazón. Las personas con diabetes ya tienen riesgo de hipertensión y enfermedad del corazón.  · Mantenga un peso saludable. Si necesita adelgazar, reduzca el tamaño de disha porciones. No se saltee comidas. El ejercicio es jaziel parte importante de un programa para el manejo del peso. Pregúntele a herrera médico si un programa de ejercicios es adecuado para usted.  · Si desea obtener más información acerca del mejor plan para usted, consulte con un dietista certificado. Si desea que lo transfieran a un dietista certificado en herrera shawanda, comuníquese con:  ¨ Academy of Nutrition and Dietetics: www.eatright.org  ¨ American Diabetes Association: [phone] www.diabetes.org  Date Last Reviewed: 11/29/2013  © 0431-6831 [employer]. [address] [place] [address], [place] [address]. Todos los derechos reservados. Esta información no pretende sustituir la atención médica profesional. Sólo herrera médico puede diagnosticar y tratar un problema de amanda.

## 2017-11-13 ENCOUNTER — HOSPITAL ENCOUNTER (OUTPATIENT)
Dept: RADIOLOGY | Facility: HOSPITAL | Age: 42
Discharge: HOME | End: 2017-11-13
Attending: INTERNAL MEDICINE
Payer: COMMERCIAL

## 2017-11-13 VITALS — BODY MASS INDEX: 28.51 KG/M2 | WEIGHT: 167 LBS | HEIGHT: 64 IN

## 2017-11-13 DIAGNOSIS — Z12.31 ENCOUNTER FOR SCREENING MAMMOGRAM FOR BREAST CANCER: ICD-10-CM

## 2017-11-13 PROCEDURE — 77067 SCR MAMMO BI INCL CAD: CPT | Mod: 26,,, | Performed by: RADIOLOGY

## 2017-11-13 PROCEDURE — 77067 SCR MAMMO BI INCL CAD: CPT | Mod: TC

## 2017-11-13 PROCEDURE — 77063 BREAST TOMOSYNTHESIS BI: CPT | Mod: 26,,, | Performed by: RADIOLOGY

## 2018-05-07 ENCOUNTER — LAB VISIT (OUTPATIENT)
Dept: LAB | Facility: HOSPITAL | Age: 43
End: 2018-05-07
Attending: INTERNAL MEDICINE
Payer: MEDICAID

## 2018-05-07 DIAGNOSIS — R73.9 HYPERGLYCEMIA: ICD-10-CM

## 2018-05-07 LAB
ESTIMATED AVG GLUCOSE: 108 MG/DL
HBA1C MFR BLD HPLC: 5.4 %

## 2018-05-07 PROCEDURE — 36415 COLL VENOUS BLD VENIPUNCTURE: CPT

## 2018-05-07 PROCEDURE — 83036 HEMOGLOBIN GLYCOSYLATED A1C: CPT

## 2018-05-10 ENCOUNTER — OFFICE VISIT (OUTPATIENT)
Dept: INTERNAL MEDICINE | Facility: CLINIC | Age: 43
End: 2018-05-10
Payer: MEDICAID

## 2018-05-10 ENCOUNTER — LAB VISIT (OUTPATIENT)
Dept: LAB | Facility: HOSPITAL | Age: 43
End: 2018-05-10
Attending: INTERNAL MEDICINE
Payer: MEDICAID

## 2018-05-10 VITALS
HEIGHT: 64 IN | OXYGEN SATURATION: 98 % | SYSTOLIC BLOOD PRESSURE: 110 MMHG | BODY MASS INDEX: 28.46 KG/M2 | HEART RATE: 62 BPM | WEIGHT: 166.69 LBS | DIASTOLIC BLOOD PRESSURE: 70 MMHG

## 2018-05-10 DIAGNOSIS — R10.31 RLQ ABDOMINAL PAIN: Primary | ICD-10-CM

## 2018-05-10 DIAGNOSIS — K58.9 IRRITABLE BOWEL SYNDROME WITHOUT DIARRHEA: ICD-10-CM

## 2018-05-10 DIAGNOSIS — R00.2 HEART PALPITATIONS: ICD-10-CM

## 2018-05-10 DIAGNOSIS — R10.31 RLQ ABDOMINAL PAIN: ICD-10-CM

## 2018-05-10 LAB
ALBUMIN SERPL BCP-MCNC: 3.8 G/DL
ALP SERPL-CCNC: 79 U/L
ALT SERPL W/O P-5'-P-CCNC: 22 U/L
ANION GAP SERPL CALC-SCNC: 7 MMOL/L
AST SERPL-CCNC: 23 U/L
BASOPHILS # BLD AUTO: 0.02 K/UL
BASOPHILS NFR BLD: 0.3 %
BILIRUB SERPL-MCNC: 0.3 MG/DL
BUN SERPL-MCNC: 13 MG/DL
CALCIUM SERPL-MCNC: 9.1 MG/DL
CHLORIDE SERPL-SCNC: 106 MMOL/L
CO2 SERPL-SCNC: 26 MMOL/L
CREAT SERPL-MCNC: 1 MG/DL
CRP SERPL-MCNC: 8.5 MG/L
DIFFERENTIAL METHOD: ABNORMAL
EOSINOPHIL # BLD AUTO: 0 K/UL
EOSINOPHIL NFR BLD: 0.6 %
ERYTHROCYTE [DISTWIDTH] IN BLOOD BY AUTOMATED COUNT: 13.5 %
ERYTHROCYTE [SEDIMENTATION RATE] IN BLOOD BY WESTERGREN METHOD: 9 MM/HR
EST. GFR  (AFRICAN AMERICAN): >60 ML/MIN/1.73 M^2
EST. GFR  (NON AFRICAN AMERICAN): >60 ML/MIN/1.73 M^2
GLUCOSE SERPL-MCNC: 89 MG/DL
HCT VFR BLD AUTO: 38.1 %
HGB BLD-MCNC: 12 G/DL
IMM GRANULOCYTES # BLD AUTO: 0 K/UL
IMM GRANULOCYTES NFR BLD AUTO: 0 %
LYMPHOCYTES # BLD AUTO: 2.1 K/UL
LYMPHOCYTES NFR BLD: 31.1 %
MCH RBC QN AUTO: 29.3 PG
MCHC RBC AUTO-ENTMCNC: 31.5 G/DL
MCV RBC AUTO: 93 FL
MONOCYTES # BLD AUTO: 0.5 K/UL
MONOCYTES NFR BLD: 6.6 %
NEUTROPHILS # BLD AUTO: 4.2 K/UL
NEUTROPHILS NFR BLD: 61.4 %
NRBC BLD-RTO: 0 /100 WBC
PLATELET # BLD AUTO: 261 K/UL
PMV BLD AUTO: 11.2 FL
POTASSIUM SERPL-SCNC: 4.2 MMOL/L
PROT SERPL-MCNC: 7.2 G/DL
RBC # BLD AUTO: 4.1 M/UL
SODIUM SERPL-SCNC: 139 MMOL/L
WBC # BLD AUTO: 6.79 K/UL

## 2018-05-10 PROCEDURE — 99213 OFFICE O/P EST LOW 20 MIN: CPT | Mod: PBBFAC,PO | Performed by: INTERNAL MEDICINE

## 2018-05-10 PROCEDURE — 99999 PR PBB SHADOW E&M-EST. PATIENT-LVL III: CPT | Mod: PBBFAC,,, | Performed by: INTERNAL MEDICINE

## 2018-05-10 PROCEDURE — 85025 COMPLETE CBC W/AUTO DIFF WBC: CPT

## 2018-05-10 PROCEDURE — 86140 C-REACTIVE PROTEIN: CPT

## 2018-05-10 PROCEDURE — 99213 OFFICE O/P EST LOW 20 MIN: CPT | Mod: S$PBB,,, | Performed by: INTERNAL MEDICINE

## 2018-05-10 PROCEDURE — 36415 COLL VENOUS BLD VENIPUNCTURE: CPT | Mod: PO

## 2018-05-10 PROCEDURE — 80053 COMPREHEN METABOLIC PANEL: CPT

## 2018-05-10 PROCEDURE — 85651 RBC SED RATE NONAUTOMATED: CPT

## 2018-05-10 NOTE — PROGRESS NOTES
Portions of this note are generated with voice recognition software. Typographical errors may exist.     SUBJECTIVE:    This is a/an 42 y.o. female here for primary care visit for  Chief Complaint   Patient presents with    Follow-up     6 month     Results    Abdominal Pain     a few weeks ago      Patient states that she has been having lower abdominal pain on a recurring basis.  There has not been associated constipation or flatus.  Bowel movements have been normal.  No melanotic stool or hematochezia.  No pain with defecation.  Does not have a history of hernia.  Pain is not cyclical are associated with menstrual cycles.    Patient states that she has ongoing concerns about palpitations which seem to bother her whenever they induce and emotional reaction.  Most recent episode happened overnight.  No syncopal or presyncopal symptoms.  Patient canceled her cardiac testing recommended by cardiology.  States that she has not had angina symptoms.  Patient completed CHELSY 7 screen for anxiety.  Patient does not believe that symptoms are related to anxiety.  She is ambivalent about pursuing diagnostic cardiac testing today and instead prefers watchful waiting.    Medications Reviewed and Updated    Past medical, family, and social histories were reviewed and updated.    Review of Systems negative unless otherwise noted in history of present illness-  ROS    General ROS: negative  Psychological ROS: negative  ENT ROS: negative  Endocrine ROS: Negative  Allergy and Immunology ROS: negative  Cardiovascular ROS: negative  Pulmonary ROS: Negative  Gastrointestinal ROS: negative  Genito-Urinary ROS: negative    Allergic:  Review of patient's allergies indicates:  No Known Allergies    OBJECTIVE:  BP: 110/70 Pulse: 62    Wt Readings from Last 3 Encounters:   05/10/18 75.6 kg (166 lb 10.7 oz)   11/13/17 75.8 kg (167 lb)   11/08/17 76 kg (167 lb 8.8 oz)    Body mass index is 28.61 kg/m².  Previous Blood Pressure Readings :    BP Readings from Last 3 Encounters:   05/10/18 110/70   11/08/17 112/68   10/19/17 113/79       Physical Exam    GEN: No apparent distress  HEENT: sclera non-icteric, conjunctiva clear  CV: no peripheral edema.  Regular rate and rhythm.  No murmurs.  PULM: breathing non-labored  ABD: non, protuberant abdomen.  Supple abdomen.  No tenderness to palpation.  No masses.  PSYCH: appropriate affect  MSK: able to rise from chair without assistance  SKIN: normal skin turgor    Pertinent Labs Reviewed       ASSESSMENT/PLAN:    RLQ abdominal pain.Condition not optimally controlled. Detailed counseling on self care measures. Plan to monitor clinically in addition to plan below and on After Visit Summary.   -     Sedimentation rate, manual; Future; Expected date: 05/10/2018  -     C-reactive protein; Future; Expected date: 05/10/2018  -     CBC auto differential; Future; Expected date: 05/10/2018  -     Comprehensive metabolic panel; Future; Expected date: 05/10/2018    Irritable bowel syndrome without diarrhea.Condition not optimally controlled. Detailed counseling on self care measures. Plan to monitor clinically in addition to plan below and on After Visit Summary.     Heart palpitations.Condition stable.  Counseling on self-care measures. Plan to monitor clinically. Continue current medical plan.       Future Appointments  Date Time Provider Department Center   5/31/2018 2:40 PM Bill Roe MD [facility] [place]       Bill Roe  5/13/2018  9:32 AM

## 2018-05-14 ENCOUNTER — TELEPHONE (OUTPATIENT)
Dept: INTERNAL MEDICINE | Facility: CLINIC | Age: 43
End: 2018-05-14

## 2018-05-14 NOTE — TELEPHONE ENCOUNTER
----- Message from Bill Roe MD sent at 5/13/2018  1:09 PM CDT -----  Please call patient letting her know the blood work was normal so no CT scan is recommended. We think her pain could be related to IBS. We will disuss more at the next visit.

## 2018-05-15 ENCOUNTER — TELEPHONE (OUTPATIENT)
Dept: INTERNAL MEDICINE | Facility: CLINIC | Age: 43
End: 2018-05-15

## 2018-05-15 NOTE — TELEPHONE ENCOUNTER
----- Message from Beata Peck MA sent at 5/15/2018  4:16 PM CDT -----  Contact: Self [phone]      ----- Message -----  From: Leeann Juares  Sent: 5/15/2018   3:39 PM  To: Bhupinder HART Staff    Patient Returning Your Phone Call

## 2018-05-15 NOTE — TELEPHONE ENCOUNTER
----- Message from Beata Peck MA sent at 5/14/2018 12:59 PM CDT -----  Contact: self, [phone]      ----- Message -----  From: Amy Ovalle  Sent: 5/14/2018  11:28 AM  To: Bhupinder HART Staff    Patient called in returning your call. Please advise.

## 2018-09-11 ENCOUNTER — OFFICE VISIT (OUTPATIENT)
Dept: OBSTETRICS AND GYNECOLOGY | Facility: CLINIC | Age: 43
End: 2018-09-11
Payer: MEDICAID

## 2018-09-11 VITALS
BODY MASS INDEX: 29.02 KG/M2 | SYSTOLIC BLOOD PRESSURE: 120 MMHG | HEIGHT: 64 IN | WEIGHT: 170 LBS | DIASTOLIC BLOOD PRESSURE: 80 MMHG

## 2018-09-11 DIAGNOSIS — Z01.419 ENCOUNTER FOR GYNECOLOGICAL EXAMINATION WITHOUT ABNORMAL FINDING: Primary | ICD-10-CM

## 2018-09-11 DIAGNOSIS — Z30.011 ENCOUNTER FOR INITIAL PRESCRIPTION OF CONTRACEPTIVE PILLS: ICD-10-CM

## 2018-09-11 DIAGNOSIS — Z12.39 SCREENING BREAST EXAMINATION: ICD-10-CM

## 2018-09-11 DIAGNOSIS — Z12.4 CERVICAL CANCER SCREENING: ICD-10-CM

## 2018-09-11 PROCEDURE — 87624 HPV HI-RISK TYP POOLED RSLT: CPT

## 2018-09-11 PROCEDURE — 88175 CYTOPATH C/V AUTO FLUID REDO: CPT

## 2018-09-11 PROCEDURE — 99999 PR PBB SHADOW E&M-EST. PATIENT-LVL III: CPT | Mod: PBBFAC,,, | Performed by: OBSTETRICS & GYNECOLOGY

## 2018-09-11 PROCEDURE — 99213 OFFICE O/P EST LOW 20 MIN: CPT | Mod: PBBFAC,PO | Performed by: OBSTETRICS & GYNECOLOGY

## 2018-09-11 PROCEDURE — 99386 PREV VISIT NEW AGE 40-64: CPT | Mod: S$PBB,,, | Performed by: OBSTETRICS & GYNECOLOGY

## 2018-09-11 PROCEDURE — 87491 CHLMYD TRACH DNA AMP PROBE: CPT

## 2018-09-11 RX ORDER — NORGESTIMATE AND ETHINYL ESTRADIOL 7DAYSX3 28
1 KIT ORAL DAILY
Qty: 30 TABLET | Refills: 11 | Status: SHIPPED | OUTPATIENT
Start: 2018-09-11 | End: 2019-08-15 | Stop reason: SDUPTHER

## 2018-09-11 NOTE — PROGRESS NOTES
"43 yo now g2 female who presents for routine gyn visit.  Regular cycles q month with OCPs. Wants refills.  . No h/o STDs;  No h/o abl pap smears.  No h/o abl mammograms.    ROS:  GENERAL: Denies weight gain or weight loss. Feeling well overall.   SKIN: Denies rash or lesions.   CHEST: Denies chest pain or shortness of breath.   CARDIOVASCULAR: Denies palpitations or left sided chest pain.   ABDOMEN: No abdominal pain, constipation, diarrhea, nausea, vomiting or rectal bleeding.   URINARY: No frequency, dysuria, hematuria, or burning on urination.  REPRODUCTIVE: no complaints  BREASTS:  denies pain, lumps, or nipple discharge.   HEMATOLOGIC: No easy bruisability or excessive bleeding.   MUSCULOSKELETAL: Denies joint pain or swelling.   NEUROLOGIC: Denies syncope or weakness.   PSYCHIATRIC: Denies depression, anxiety or mood swings.         PE:   Vitals: /80   Ht 5' 4" (1.626 m)   Wt 77.1 kg (169 lb 15.6 oz)   LMP 08/17/2018   BMI 29.18 kg/m²   APPEARANCE: Well nourished, well developed, in no acute distress.  SKIN: Normal skin turgor, no lesions.  CHEST: Lungs clear to auscultation.  HEART: Regular rate and rhythm, no murmurs, rubs or gallops.  ABDOMEN: Soft. No tenderness or masses. No hepatosplenomegaly. No hernias.  BREASTS: Symmetrical, no skin changes or visible lesions. No palpable masses, nipple discharge or adenopathy bilaterally.  PELVIC: Normal external female genitalia without lesions. Normal hair distribution. Adequate perineal body, normal urethral meatus. Vagina moist and well rugated without lesions or discharge. Cervix pink and without lesions. No significant cystocele or rectocele. Bimanual exam showed uterus normal size, shape, position, mobile and nontender. Adnexa without masses or tenderness. Urethra and bladder normal.    AP  Routine gyn  -s/p normal breast exam: mammogram ordered  -s/p normal pelvic exam:   -Pap and HPV: collected  -STD testing: gc/chl ordered; declined " HIV  -contraception: refills on ortho tri cyclen provided        TERESA Butler MD

## 2018-09-13 LAB
C TRACH DNA SPEC QL NAA+PROBE: NOT DETECTED
N GONORRHOEA DNA SPEC QL NAA+PROBE: NOT DETECTED

## 2018-09-17 LAB
HPV HR 12 DNA CVX QL NAA+PROBE: NEGATIVE
HPV16 AG SPEC QL: NEGATIVE
HPV18 DNA SPEC QL NAA+PROBE: NEGATIVE

## 2018-09-28 ENCOUNTER — TELEPHONE (OUTPATIENT)
Dept: OBSTETRICS AND GYNECOLOGY | Facility: CLINIC | Age: 43
End: 2018-09-28

## 2018-10-12 ENCOUNTER — TELEPHONE (OUTPATIENT)
Dept: INTERNAL MEDICINE | Facility: CLINIC | Age: 43
End: 2018-10-12

## 2018-10-12 NOTE — TELEPHONE ENCOUNTER
Informed pt due to her ins I will have my supervisor schedule her appt and we will call her as soon as possible. Pt verbalized understanding.

## 2018-10-12 NOTE — TELEPHONE ENCOUNTER
----- Message from Kelsi Higgins sent at 10/12/2018  1:46 PM CDT -----  Patient request an appt for her annual exam, she has medicaid.

## 2018-11-13 ENCOUNTER — OFFICE VISIT (OUTPATIENT)
Dept: INTERNAL MEDICINE | Facility: CLINIC | Age: 43
End: 2018-11-13
Payer: MEDICAID

## 2018-11-13 VITALS
DIASTOLIC BLOOD PRESSURE: 80 MMHG | SYSTOLIC BLOOD PRESSURE: 124 MMHG | OXYGEN SATURATION: 97 % | HEART RATE: 64 BPM | HEIGHT: 64 IN | WEIGHT: 166.69 LBS | BODY MASS INDEX: 28.46 KG/M2

## 2018-11-13 DIAGNOSIS — Z00.00 ANNUAL PHYSICAL EXAM: Primary | ICD-10-CM

## 2018-11-13 DIAGNOSIS — Z12.31 BREAST CANCER SCREENING BY MAMMOGRAM: ICD-10-CM

## 2018-11-13 DIAGNOSIS — Z23 NEED FOR IMMUNIZATION AGAINST INFLUENZA: ICD-10-CM

## 2018-11-13 DIAGNOSIS — Z13.6 ENCOUNTER FOR LIPID SCREENING FOR CARDIOVASCULAR DISEASE: ICD-10-CM

## 2018-11-13 DIAGNOSIS — Z13.220 ENCOUNTER FOR LIPID SCREENING FOR CARDIOVASCULAR DISEASE: ICD-10-CM

## 2018-11-13 DIAGNOSIS — R10.2 PELVIC PAIN IN FEMALE: ICD-10-CM

## 2018-11-13 DIAGNOSIS — Z23 NEED FOR DIPHTHERIA-TETANUS-PERTUSSIS (TDAP) VACCINE: ICD-10-CM

## 2018-11-13 PROCEDURE — 90715 TDAP VACCINE 7 YRS/> IM: CPT | Mod: PBBFAC,PO

## 2018-11-13 PROCEDURE — 99999 PR PBB SHADOW E&M-EST. PATIENT-LVL III: CPT | Mod: PBBFAC,,, | Performed by: INTERNAL MEDICINE

## 2018-11-13 PROCEDURE — 90471 IMMUNIZATION ADMIN: CPT | Mod: PBBFAC,PO

## 2018-11-13 PROCEDURE — 99396 PREV VISIT EST AGE 40-64: CPT | Mod: S$PBB,,, | Performed by: INTERNAL MEDICINE

## 2018-11-13 PROCEDURE — 90472 IMMUNIZATION ADMIN EACH ADD: CPT | Mod: PBBFAC,PO

## 2018-11-13 PROCEDURE — 99213 OFFICE O/P EST LOW 20 MIN: CPT | Mod: PBBFAC,PO,25 | Performed by: INTERNAL MEDICINE

## 2018-11-13 RX ORDER — PANTOPRAZOLE SODIUM 40 MG/1
TABLET, DELAYED RELEASE ORAL
Refills: 12 | COMMUNITY
Start: 2018-10-30 | End: 2019-07-29

## 2018-11-18 NOTE — PROGRESS NOTES
Portions of this note are generated with voice recognition software. Typographical errors may exist.     SUBJECTIVE:    This is a/an 43 y.o. female here for primary care visit for  Chief Complaint   Patient presents with    Annual Exam     Patient doing well. Voices no complaints. PPI use sporadic. No misuse. Pelvic pains resolved.       Medications Reviewed and Updated    Past medical, family, and social histories were reviewed and updated.    Review of Systems negative unless otherwise noted in history of present illness-  ROS    General ROS: negative  Psychological ROS: negative  ENT ROS: negative  Endocrine ROS: Negative  Allergy and Immunology ROS: negative  Cardiovascular ROS: negative  Pulmonary ROS: Negative  Gastrointestinal ROS: negative  Genito-Urinary ROS: negative  Musculoskeletal ROS: negative  Neurological ROS: negative  Dermatological ROS: negative        Allergic:  Review of patient's allergies indicates:  No Known Allergies    OBJECTIVE:  BP: 124/80 Pulse: 64    Wt Readings from Last 3 Encounters:   11/13/18 75.6 kg (166 lb 10.7 oz)   09/11/18 77.1 kg (169 lb 15.6 oz)   05/10/18 75.6 kg (166 lb 10.7 oz)    Body mass index is 28.61 kg/m².  Previous Blood Pressure Readings :   BP Readings from Last 3 Encounters:   11/13/18 124/80   09/11/18 120/80   05/10/18 110/70       Physical Exam    GEN: No apparent distress  HEENT: sclera non-icteric, conjunctiva clear  CV: no peripheral edema, RRR  PULM: breathing non-labored  ABD: non, protuberant abdomen.  PSYCH: appropriate affect  MSK: able to rise from chair without assistance  SKIN: normal skin turgor    Pertinent Labs Reviewed       ASSESSMENT/PLAN:    Annual physical exam  -     Mammo Digital Screening Bilat with CAD; Standing  -     Comprehensive metabolic panel; Standing  -     Lipid panel; Standing    Need for diphtheria-tetanus-pertussis (Tdap) vaccine  -     (In Office Administered) Tdap Vaccine    Need for immunization against influenza  -      Influenza - Quadrivalent (3 years & older) (PF)    Breast cancer screening by mammogram  -     Mammo Digital Screening Bilat with CAD; Standing    Encounter for lipid screening for cardiovascular disease  -     Lipid panel; Standing    Pelvic pain in female  -     Comprehensive metabolic panel; Standing          Future Appointments   Date Time Provider Department Center   11/19/2018  5:00 PM [facility]   5/13/2019  8:45 AM [facility] [place]   5/16/2019  4:00 PM Bill Roe MD [facility] Roe  11/18/2018  4:32 PM

## 2018-11-20 ENCOUNTER — HOSPITAL ENCOUNTER (OUTPATIENT)
Dept: RADIOLOGY | Facility: HOSPITAL | Age: 43
Discharge: HOME | End: 2018-11-20
Attending: OBSTETRICS & GYNECOLOGY
Payer: MEDICAID

## 2018-11-20 DIAGNOSIS — Z12.39 SCREENING BREAST EXAMINATION: ICD-10-CM

## 2018-11-20 PROCEDURE — 77063 BREAST TOMOSYNTHESIS BI: CPT | Mod: TC

## 2018-11-20 PROCEDURE — 77067 SCR MAMMO BI INCL CAD: CPT | Mod: 26,,, | Performed by: RADIOLOGY

## 2018-11-20 PROCEDURE — 77063 BREAST TOMOSYNTHESIS BI: CPT | Mod: 26,,, | Performed by: RADIOLOGY

## 2019-02-19 ENCOUNTER — LAB VISIT (OUTPATIENT)
Dept: LAB | Facility: HOSPITAL | Age: 44
End: 2019-02-19
Attending: FAMILY MEDICINE
Payer: MEDICAID

## 2019-02-19 ENCOUNTER — TELEPHONE (OUTPATIENT)
Dept: INTERNAL MEDICINE | Facility: CLINIC | Age: 44
End: 2019-02-19

## 2019-02-19 ENCOUNTER — OFFICE VISIT (OUTPATIENT)
Dept: FAMILY MEDICINE | Facility: CLINIC | Age: 44
End: 2019-02-19
Payer: MEDICAID

## 2019-02-19 VITALS
HEART RATE: 69 BPM | HEIGHT: 64 IN | DIASTOLIC BLOOD PRESSURE: 62 MMHG | SYSTOLIC BLOOD PRESSURE: 112 MMHG | WEIGHT: 171.75 LBS | BODY MASS INDEX: 29.32 KG/M2 | OXYGEN SATURATION: 97 %

## 2019-02-19 DIAGNOSIS — R10.30 LOWER ABDOMINAL PAIN: Primary | ICD-10-CM

## 2019-02-19 DIAGNOSIS — R10.30 LOWER ABDOMINAL PAIN: ICD-10-CM

## 2019-02-19 LAB
ALBUMIN SERPL BCP-MCNC: 3.9 G/DL
ALP SERPL-CCNC: 85 U/L
ALT SERPL W/O P-5'-P-CCNC: 27 U/L
ANION GAP SERPL CALC-SCNC: 7 MMOL/L
AST SERPL-CCNC: 26 U/L
BASOPHILS # BLD AUTO: 0.03 K/UL
BASOPHILS NFR BLD: 0.5 %
BILIRUB SERPL-MCNC: 0.2 MG/DL
BUN SERPL-MCNC: 17 MG/DL
CALCIUM SERPL-MCNC: 9.5 MG/DL
CHLORIDE SERPL-SCNC: 103 MMOL/L
CO2 SERPL-SCNC: 28 MMOL/L
CREAT SERPL-MCNC: 0.9 MG/DL
DIFFERENTIAL METHOD: NORMAL
EOSINOPHIL # BLD AUTO: 0.1 K/UL
EOSINOPHIL NFR BLD: 1.6 %
ERYTHROCYTE [DISTWIDTH] IN BLOOD BY AUTOMATED COUNT: 13.1 %
EST. GFR  (AFRICAN AMERICAN): >60 ML/MIN/1.73 M^2
EST. GFR  (NON AFRICAN AMERICAN): >60 ML/MIN/1.73 M^2
GLUCOSE SERPL-MCNC: 93 MG/DL
HCT VFR BLD AUTO: 37.8 %
HGB BLD-MCNC: 12.3 G/DL
IMM GRANULOCYTES # BLD AUTO: 0 K/UL
IMM GRANULOCYTES NFR BLD AUTO: 0 %
LYMPHOCYTES # BLD AUTO: 2.8 K/UL
LYMPHOCYTES NFR BLD: 46.3 %
MCH RBC QN AUTO: 29.4 PG
MCHC RBC AUTO-ENTMCNC: 32.5 G/DL
MCV RBC AUTO: 90 FL
MONOCYTES # BLD AUTO: 0.4 K/UL
MONOCYTES NFR BLD: 6.3 %
NEUTROPHILS # BLD AUTO: 2.8 K/UL
NEUTROPHILS NFR BLD: 45.3 %
NRBC BLD-RTO: 0 /100 WBC
PLATELET # BLD AUTO: 321 K/UL
PMV BLD AUTO: 10.8 FL
POTASSIUM SERPL-SCNC: 4.1 MMOL/L
PROT SERPL-MCNC: 7.2 G/DL
RBC # BLD AUTO: 4.18 M/UL
SODIUM SERPL-SCNC: 138 MMOL/L
WBC # BLD AUTO: 6.07 K/UL

## 2019-02-19 PROCEDURE — 99999 PR PBB SHADOW E&M-EST. PATIENT-LVL IV: ICD-10-PCS | Mod: PBBFAC,,, | Performed by: FAMILY MEDICINE

## 2019-02-19 PROCEDURE — 85025 COMPLETE CBC W/AUTO DIFF WBC: CPT

## 2019-02-19 PROCEDURE — 80053 COMPREHEN METABOLIC PANEL: CPT

## 2019-02-19 PROCEDURE — 99214 OFFICE O/P EST MOD 30 MIN: CPT | Mod: PBBFAC,PO | Performed by: FAMILY MEDICINE

## 2019-02-19 PROCEDURE — 36415 COLL VENOUS BLD VENIPUNCTURE: CPT | Mod: PO

## 2019-02-19 PROCEDURE — 99214 PR OFFICE/OUTPT VISIT, EST, LEVL IV, 30-39 MIN: ICD-10-PCS | Mod: S$PBB,,, | Performed by: FAMILY MEDICINE

## 2019-02-19 PROCEDURE — 99999 PR PBB SHADOW E&M-EST. PATIENT-LVL IV: CPT | Mod: PBBFAC,,, | Performed by: FAMILY MEDICINE

## 2019-02-19 PROCEDURE — 99214 OFFICE O/P EST MOD 30 MIN: CPT | Mod: S$PBB,,, | Performed by: FAMILY MEDICINE

## 2019-02-19 NOTE — TELEPHONE ENCOUNTER
----- Message from Kiki Manjarrez sent at 2/19/2019  7:07 AM CST -----  Contact: [phone]'s  Zafar   Patient's  would like pt's to be seen today for pain left side of abdomen.  Please advise.

## 2019-02-19 NOTE — PROGRESS NOTES
Subjective:       Patient ID: Tessa Wynne is a 43 y.o. female.    Chief Complaint: Abdominal Pain (middle right side x 3 wks )    HPI   44 yo female pt of Dr. Bill Roe presents c/o abdominal pain x 1.5 years. Pain has worsened over the past 3 weeks. Yesterday had constant pain. Pain is intermittent today. Yesterday pain was 5/10. Today pain is 2/10. No change with eating. Pt s/p cholecystectomy Sept 7, 2017. Pt has not taken any medications for this pain. LMP 3 weeks ago. Menses q month. Pt uses OCPs daily. Pt is followed by Dr. Rao for GERD. Pt s/p EGD 2-3 years ago.  Review of Systems   Constitutional: Negative for chills and fever.   Gastrointestinal: Negative for anal bleeding, blood in stool, constipation, diarrhea, nausea and vomiting.   Genitourinary: Negative for dysuria and hematuria.       Objective:      Physical Exam   Constitutional: She is oriented to person, place, and time. She appears well-developed and well-nourished. No distress.   HENT:   Head: Normocephalic and atraumatic.   Neck: Normal range of motion. Neck supple. No JVD present. No thyromegaly present.   Cardiovascular: Normal rate, regular rhythm, normal heart sounds and intact distal pulses. Exam reveals no gallop and no friction rub.   No murmur heard.  Pulmonary/Chest: Effort normal and breath sounds normal. She has no wheezes. She has no rales.   Abdominal: Soft. Bowel sounds are normal. There is no hepatosplenomegaly. There is tenderness in the right lower quadrant and left lower quadrant. There is no rebound and no CVA tenderness.   Lymphadenopathy:     She has no cervical adenopathy.   Neurological: She is alert and oriented to person, place, and time.   Skin: Skin is warm and dry. She is not diaphoretic.   Vitals reviewed.      Assessment:       See plan  Plan:       Tessa RAHMAN was seen today for abdominal pain.    Diagnoses and all orders for this visit:    Lower abdominal pain: Unclear etiology  -     Ambulatory  consult to Gastroenterology (Dr. Rao)  -     US Pelvis Complete Non OB; Future  -     CBC auto differential; Future  -     Comprehensive metabolic panel; Future    F/U as needed.

## 2019-02-19 NOTE — TELEPHONE ENCOUNTER
Spoke with patient and she request a Dr that speaks Moldovan if she can not see Dr. Roe today. Appt scheduled with Dr. Reynaga. Patient voices understanding.

## 2019-02-24 ENCOUNTER — HOSPITAL ENCOUNTER (EMERGENCY)
Facility: HOSPITAL | Age: 44
Discharge: HOME | End: 2019-02-25
Attending: EMERGENCY MEDICINE
Payer: MEDICAID

## 2019-02-24 VITALS
OXYGEN SATURATION: 99 % | WEIGHT: 165 LBS | HEIGHT: 64 IN | DIASTOLIC BLOOD PRESSURE: 83 MMHG | HEART RATE: 74 BPM | SYSTOLIC BLOOD PRESSURE: 156 MMHG | BODY MASS INDEX: 28.17 KG/M2 | RESPIRATION RATE: 16 BRPM | TEMPERATURE: 97 F

## 2019-02-24 DIAGNOSIS — M79.10 MYALGIA: Primary | ICD-10-CM

## 2019-02-24 PROCEDURE — 99284 PR EMERGENCY DEPT VISIT,LEVEL IV: ICD-10-PCS | Mod: ,,, | Performed by: PHYSICIAN ASSISTANT

## 2019-02-24 PROCEDURE — 99284 EMERGENCY DEPT VISIT MOD MDM: CPT | Mod: ,,, | Performed by: PHYSICIAN ASSISTANT

## 2019-02-24 PROCEDURE — 99283 EMERGENCY DEPT VISIT LOW MDM: CPT

## 2019-02-25 ENCOUNTER — TELEPHONE (OUTPATIENT)
Dept: INTERNAL MEDICINE | Facility: CLINIC | Age: 44
End: 2019-02-25

## 2019-02-25 PROCEDURE — 25000003 PHARM REV CODE 250: Performed by: PHYSICIAN ASSISTANT

## 2019-02-25 RX ORDER — METHOCARBAMOL 750 MG/1
750 TABLET, FILM COATED ORAL
Status: COMPLETED | OUTPATIENT
Start: 2019-02-25 | End: 2019-02-25

## 2019-02-25 RX ORDER — NAPROXEN 500 MG/1
500 TABLET ORAL
Status: COMPLETED | OUTPATIENT
Start: 2019-02-25 | End: 2019-02-25

## 2019-02-25 RX ADMIN — NAPROXEN 500 MG: 500 TABLET ORAL at 12:02

## 2019-02-25 RX ADMIN — METHOCARBAMOL TABLETS 750 MG: 750 TABLET, COATED ORAL at 12:02

## 2019-02-25 NOTE — TELEPHONE ENCOUNTER
----- Message from Faith Leija sent at 2/25/2019  8:00 AM CST -----  No. [phone]   Patient went to the ER last night because of bone pain.   She needs an appointment today for a follow up.

## 2019-02-25 NOTE — ED PROVIDER NOTES
Encounter Date: 2/24/2019       History     Chief Complaint   Patient presents with    Generalized Body Aches     Pt to ER c/o body aches starting yesterday. Denies fevers or URI symptoms.      Patient is a 43 year old female with no significant PMHX. She presents to the ED for generalized bodyaches. She reports having generalized bodyaches for one week. Denies OTC medication use. She denies fever,chills, nausea, vomiting, sob, chest pain, abd pain, dysuria, diarrhea, or constipation. She is a non smoker and denies alcohol use.          Review of patient's allergies indicates:  No Known Allergies  History reviewed. No pertinent past medical history.  Past Surgical History:   Procedure Laterality Date    CHOLECYSTECTOMY      CHOLECYSTECTOMY-LAPAROSCOPIC N/A 9/7/2017    Performed by Alina Pichardo MD at [facility] OR     Family History   Problem Relation Age of Onset    Diabetes Mellitus Mother     Diabetes Mellitus Father     Colon cancer Neg Hx     Breast cancer Neg Hx     Ovarian cancer Neg Hx     Uterine cancer Neg Hx     Heart attack Neg Hx      Social History     Tobacco Use    Smoking status: Never Smoker    Smokeless tobacco: Never Used   Substance Use Topics    Alcohol use: No    Drug use: No     Review of Systems   Constitutional: Negative for fever.   HENT: Negative for sore throat.    Respiratory: Negative for shortness of breath.    Cardiovascular: Negative for chest pain.   Gastrointestinal: Negative for abdominal pain, nausea and vomiting.   Genitourinary: Negative for dysuria.   Musculoskeletal: Positive for myalgias. Negative for back pain.   Skin: Negative for rash.   Neurological: Negative for weakness.   Hematological: Does not bruise/bleed easily.       Physical Exam     Initial Vitals [02/24/19 2248]   BP Pulse Resp Temp SpO2   (!) 156/83 74 16 97.4 °F (36.3 °C) 99 %      MAP       --         Physical Exam    Vitals reviewed.  Constitutional: She appears well-developed and  well-nourished. No distress.   HENT:   Head: Normocephalic.   Eyes: Conjunctivae are normal.   Neck: Normal range of motion.   Cardiovascular: Normal rate and regular rhythm.   No murmur heard.  Pulmonary/Chest: Breath sounds normal. No respiratory distress. She has no wheezes. She has no rales.   Abdominal: Soft. Bowel sounds are normal. She exhibits no distension. There is no tenderness.   Musculoskeletal: Normal range of motion.   Neurological: She is alert and oriented to person, place, and time. She has normal strength.   Skin: Skin is warm and dry. No erythema.         ED Course   Procedures  Labs Reviewed - No data to display                APC / Resident Notes:   Patient is a 43 year old female presents to the ED for emergent evaluation of myalgias.     Differential diagnoses include, but are not limited to: muscle strain, fibromyalgia, viral syndrome, or neuropathy.     I have discussed emergency department findings, and plan with the patient. Will discharge home with F/U with PCP. Patient verbalizes understanding of plan and agrees. Return precautions given.     I have discussed and reviewed with my supervising physician.        Clinical Impression:       ICD-10-CM ICD-9-CM   1. Myalgia M79.10 729.1         Disposition:   Disposition: Discharged  Condition: Stable                        Jane Mathews PA-C  02/25/19 [id number]

## 2019-02-25 NOTE — ED TRIAGE NOTES
Pt reports both wrist pain for weeks and generalized body aches since Monday. Denies fever or chills, n/v      LOC: The patient is awake, alert, aware of environment with an appropriate affect. Oriented x4, speaking appropriately  APPEARANCE: Pt resting comfortably, in no acute distress, pt is clean and well groomed, clothing properly fastened  SKIN:The skin is warm and dry, color consistent with ethnicity, patient has normal skin turgor and moist mucus membranes  RESPIRATORY:Airway is open and patent, respirations are spontaneous, patient has a normal effort and rate, no accessory muscle use noted.  CARDIAC: Normal rate and rhythm, no peripheral edema noted, capillary refill < 3 seconds, bilateral radial pulses 2+.  NEUROLOGIC: PERRLA, facial expression is symmetrical, patient moving all extremities spontaneously, normal sensation in all extremities when touched with a finger.  Follows all commands appropriately  MUSCULOSKELETAL: Patient moving all extremities spontaneously, no obvious swelling or deformities noted.

## 2019-02-27 ENCOUNTER — HOSPITAL ENCOUNTER (OUTPATIENT)
Dept: RADIOLOGY | Facility: HOSPITAL | Age: 44
Discharge: HOME | End: 2019-02-27
Attending: FAMILY MEDICINE
Payer: MEDICAID

## 2019-02-27 DIAGNOSIS — R10.30 LOWER ABDOMINAL PAIN: ICD-10-CM

## 2019-02-27 PROCEDURE — 76856 US EXAM PELVIC COMPLETE: CPT | Mod: 26,,, | Performed by: RADIOLOGY

## 2019-02-27 PROCEDURE — 76856 US PELVIS COMP WITH TRANSVAG NON-OB (XPD): ICD-10-PCS | Mod: 26,,, | Performed by: RADIOLOGY

## 2019-02-27 PROCEDURE — 76830 US PELVIS COMP WITH TRANSVAG NON-OB (XPD): ICD-10-PCS | Mod: 26,,, | Performed by: RADIOLOGY

## 2019-02-27 PROCEDURE — 76830 TRANSVAGINAL US NON-OB: CPT | Mod: 26,,, | Performed by: RADIOLOGY

## 2019-02-27 PROCEDURE — 76830 TRANSVAGINAL US NON-OB: CPT | Mod: TC

## 2019-03-04 ENCOUNTER — OFFICE VISIT (OUTPATIENT)
Dept: INTERNAL MEDICINE | Facility: CLINIC | Age: 44
End: 2019-03-04
Payer: MEDICAID

## 2019-03-04 ENCOUNTER — LAB VISIT (OUTPATIENT)
Dept: LAB | Facility: HOSPITAL | Age: 44
End: 2019-03-04
Attending: INTERNAL MEDICINE
Payer: MEDICAID

## 2019-03-04 VITALS
WEIGHT: 170.88 LBS | DIASTOLIC BLOOD PRESSURE: 70 MMHG | HEART RATE: 70 BPM | BODY MASS INDEX: 29.17 KG/M2 | OXYGEN SATURATION: 99 % | HEIGHT: 64 IN | SYSTOLIC BLOOD PRESSURE: 128 MMHG

## 2019-03-04 DIAGNOSIS — M25.50 ARTHRALGIA OF MULTIPLE JOINTS: ICD-10-CM

## 2019-03-04 DIAGNOSIS — M79.7 FIBROMYALGIA SYNDROME: Primary | ICD-10-CM

## 2019-03-04 DIAGNOSIS — M79.10 MYALGIA: ICD-10-CM

## 2019-03-04 DIAGNOSIS — K58.1 IRRITABLE BOWEL SYNDROME WITH CONSTIPATION: ICD-10-CM

## 2019-03-04 LAB
CK SERPL-CCNC: 173 U/L
CRP SERPL-MCNC: 4.2 MG/L
ERYTHROCYTE [SEDIMENTATION RATE] IN BLOOD BY WESTERGREN METHOD: 7 MM/HR

## 2019-03-04 PROCEDURE — 36415 COLL VENOUS BLD VENIPUNCTURE: CPT | Mod: PO

## 2019-03-04 PROCEDURE — 99213 OFFICE O/P EST LOW 20 MIN: CPT | Mod: S$PBB,,, | Performed by: INTERNAL MEDICINE

## 2019-03-04 PROCEDURE — 86140 C-REACTIVE PROTEIN: CPT

## 2019-03-04 PROCEDURE — 82550 ASSAY OF CK (CPK): CPT

## 2019-03-04 PROCEDURE — 86592 SYPHILIS TEST NON-TREP QUAL: CPT

## 2019-03-04 PROCEDURE — 99213 OFFICE O/P EST LOW 20 MIN: CPT | Mod: PBBFAC,PO | Performed by: INTERNAL MEDICINE

## 2019-03-04 PROCEDURE — 99999 PR PBB SHADOW E&M-EST. PATIENT-LVL III: CPT | Mod: PBBFAC,,, | Performed by: INTERNAL MEDICINE

## 2019-03-04 PROCEDURE — 99213 PR OFFICE/OUTPT VISIT, EST, LEVL III, 20-29 MIN: ICD-10-PCS | Mod: S$PBB,,, | Performed by: INTERNAL MEDICINE

## 2019-03-04 PROCEDURE — 99999 PR PBB SHADOW E&M-EST. PATIENT-LVL III: ICD-10-PCS | Mod: PBBFAC,,, | Performed by: INTERNAL MEDICINE

## 2019-03-04 PROCEDURE — 85652 RBC SED RATE AUTOMATED: CPT

## 2019-03-04 NOTE — PATIENT INSTRUCTIONS
Recommendations for today    It is okay to take over-the-counter analgesics medicine such as Tylenol for aches and pains.  This is a much safer medication that prescription medication and it is safe to take this on a daily basis if necessary.      Qué es la fibromialgia     Las personas con fibromialgia suelen presentar por lo menos 11 de los 18 puntos sensibles que se indican arriba.     La fibromialgia es un trastorno que produce dolor muscular, rigidez corporal y cansancio. Las pruebas de laboratorio no permiten diagnosticar shiv trastorno. Para hacerlo, herrera médico observará herrera historia clínica y examinará disha articulaciones y músculos. Luego, juntos, podrán diseñar un plan para ayudarle a manejar los síntomas.  Puntos sensibles  En la mayoría de los casos, usted tendrá puntos sensibles en herrera cuerpo. Estos puntos son muy dolorosos, sobre todo cuando se los toca. Encontrar algunos de estos puntos ayuda a herrera médico a diagnosticar la fibromialgia.  Otros síntomas de la fibromialgia  Además de los puntos sensibles, es posible que se presente algunos de los siguientes síntomas (o todos):  · Cansancio aaron, incluso después de dormir toda la noche  · Ardor o dolor punzante en muchas partes del cuerpo. La intensidad de shiv dolor puede variar greyson el día.  · Rigidez o dolor en todo el cuerpo  · Adormecimiento u hormigueo en los brazos y piernas  · Dificultad para dormir  · Problemas intestinales (inflamación abdominal, diarrea, estreñimiento)  · Stefany de angle  · Depresión  Date Last Reviewed: 2/14/2016  © 5131-2937 [employer]. [address] [place] [address], [place] [address]. Todos los derechos reservados. Esta información no pretende sustituir la atención médica profesional. Sólo herrera médico puede diagnosticar y tratar un problema de amanda.

## 2019-03-04 NOTE — PROGRESS NOTES
Portions of this note are generated with voice recognition software. Typographical errors may exist.     SUBJECTIVE:    This is a/an 43 y.o. female here for primary care visit for  Chief Complaint   Patient presents with    Follow-up     The patient states that she went to the emergency room because she was having diffuse pain that became intolerable.  She states that the focus of pain involve the bilateral knees lower back shoulders.  She denies misuse or abuse of supplemental complementary or alternative medication.  Patient states that is not having joint swelling or erythema.  No muscle weakness.  No rashes in the past in a distribution on the face.  No sun sensitivity rashes.  No Raynaud symptoms.  No family history known of rheumatologic conditions.    Patient states that abdominal pain tends to be most often in the right lower quadrant.  She has had a cholecystectomy but not an appendectomy.  She feels that symptoms in the right lower quadrant may correspond with episodes of constipation.  Patient states that self-care behaviors include nothing for recurring constipation.  She will have bowel movements daily but sometimes hard with straining.    Patient is very reluctant to use pharmacotherapy is to help with episodic pain.    Menses q month. Pt uses OCPs daily. Pt is followed by Dr. Rao for GERD. Pt s/p EGD 2-3 years ago.      Medications Reviewed and Updated    Past medical, family, and social histories were reviewed and updated.    Review of Systems negative unless otherwise noted in history of present illness-  ROS    General ROS: negative  Psychological ROS: negative  Endocrine ROS: Negative  Allergy and Immunology ROS: negative  Cardiovascular ROS: negative  Pulmonary ROS: Negative  Gastrointestinal ROS: negative  Genito-Urinary ROS: negative  Musculoskeletal ROS: negative  Neurological ROS: negative  Dermatological ROS: negative        Allergic:  Review of patient's allergies indicates:  No Known  Allergies    OBJECTIVE:  BP: 128/70 Pulse: 70    Wt Readings from Last 3 Encounters:   03/04/19 77.5 kg (170 lb 13.7 oz)   02/24/19 74.8 kg (165 lb)   02/19/19 77.9 kg (171 lb 11.8 oz)    Body mass index is 29.33 kg/m².  Previous Blood Pressure Readings :   BP Readings from Last 3 Encounters:   03/04/19 128/70   02/24/19 (!) 156/83   02/19/19 112/62       Physical Exam    GEN: No apparent distress  HEENT: sclera non-icteric, conjunctiva clear  CV: no peripheral edema regular rate and rhythm.  PULM: breathing non-labored  ABD: non, protuberant abdomen.  Supple abdomen in all quadrants.  PSYCH: appropriate affect  MSK: able to rise from chair without assistance.  No signs of synovitis or joint hypertrophy hands bilaterally, knees and ankles bilaterally  SKIN: normal skin turgor    Pertinent Labs Reviewed       ASSESSMENT/PLAN:    Fibromyalgia syndrome.Condition not optimally controlled. Detailed counseling on self care measures. Plan to monitor clinically in addition to plan below or as listed on After Visit Summary.   -     CK; Future; Expected date: 03/04/2019  -     RPR; Future; Expected date: 03/04/2019    Arthralgia of multiple joints.Etiology unclear. Not controlled. Further evaluation warranted.  Recommendations as below or as written on After Visit Summary.   -     Sedimentation rate; Future; Expected date: 03/04/2019  -     C-reactive protein; Future; Expected date: 03/04/2019  -     RPR; Future; Expected date: 03/04/2019    Irritable bowel syndrome with constipation.Condition stable.  Counseling given today on self-care measures. Plan to monitor clinically. Continue current medical plan.       Future Appointments   Date Time Provider Department Center   4/2/2019  4:00 PM Bill Roe MD [facility] [place]   5/13/2019  8:45 AM LAB, [facility] [place]   5/16/2019  4:00 PM Bill Roe MD [facility]       Bill Roe  3/8/2019  12:37 PM

## 2019-03-06 LAB — RPR SER QL: NORMAL

## 2019-04-02 ENCOUNTER — OFFICE VISIT (OUTPATIENT)
Dept: INTERNAL MEDICINE | Facility: CLINIC | Age: 44
End: 2019-04-02
Payer: MEDICAID

## 2019-04-02 VITALS
DIASTOLIC BLOOD PRESSURE: 64 MMHG | HEIGHT: 64 IN | HEART RATE: 64 BPM | BODY MASS INDEX: 29.24 KG/M2 | SYSTOLIC BLOOD PRESSURE: 118 MMHG | OXYGEN SATURATION: 98 % | WEIGHT: 171.31 LBS

## 2019-04-02 DIAGNOSIS — M79.7 FIBROMYALGIA: ICD-10-CM

## 2019-04-02 DIAGNOSIS — R51.9 SINUS HEADACHE: Primary | ICD-10-CM

## 2019-04-02 PROCEDURE — 99213 OFFICE O/P EST LOW 20 MIN: CPT | Mod: S$PBB,,, | Performed by: INTERNAL MEDICINE

## 2019-04-02 PROCEDURE — 99213 PR OFFICE/OUTPT VISIT, EST, LEVL III, 20-29 MIN: ICD-10-PCS | Mod: S$PBB,,, | Performed by: INTERNAL MEDICINE

## 2019-04-02 PROCEDURE — 99999 PR PBB SHADOW E&M-EST. PATIENT-LVL III: ICD-10-PCS | Mod: PBBFAC,,, | Performed by: INTERNAL MEDICINE

## 2019-04-02 PROCEDURE — 99213 OFFICE O/P EST LOW 20 MIN: CPT | Mod: PBBFAC,PO | Performed by: INTERNAL MEDICINE

## 2019-04-02 PROCEDURE — 99999 PR PBB SHADOW E&M-EST. PATIENT-LVL III: CPT | Mod: PBBFAC,,, | Performed by: INTERNAL MEDICINE

## 2019-04-02 RX ORDER — FLUTICASONE PROPIONATE 50 MCG
2 SPRAY, SUSPENSION (ML) NASAL DAILY PRN
Qty: 16 G | Refills: 1 | Status: SHIPPED | OUTPATIENT
Start: 2019-04-02 | End: 2019-08-09

## 2019-04-02 NOTE — PATIENT INSTRUCTIONS
Dolor De Angle Por Sinusitis [Sinus Headache]    Los senos paranasales son cavidades llenas de aire en los huesos de la kim. Se conectan al interior de la nariz. La sinusitis es jaziel inflamación del tejido que recubre la cavidad de los senos paranasales. La inflamación de los senos paranasales se puede producir greyson un resfriado o jaziel rinitis alérgica (alergias al polen y otras partículas en el aire) y provocar síntomas de congestión y plenitud sinusal y quizás jaziel ligera fiebre. Por lo general, existe infección cuando también hay dolor facial o de angle. Además, puede wolf secreción maryjane o amarillenta de la nariz o en la parte posterior de la garganta (goteo posnasal). Esta enfermedad se trata a menudo con antibióticos.  El dolor de angle por sinusitis puede causar dolor en diferentes partes del cuerpo, según el seno que esté infectado. Puede wolf dolor en las sienes, la frente, la parte superior de la angle, detrás o alrededor de los ojos, en los pómulos o en los dientes superiores.  El dolor sinusal y de angle pueden empeorar con la tos o los esfuerzos. Algunos conrad de angle por sinusitis se alivian al estar sentado y empeoran al estar acostado, mientras que sucede al revés para otras personas.  Cuidados En [place]:  1. Sandra mucha agua, té caliente y otros líquidos para mantenerse jeannette hidratado. [place] diluye el moco y favorece la secreción nasal.  2. Aplique calor a las zonas dolorosas de la kim. Utilice jaziel toalla empapada en [place]. O, de pie en la ducha, dirija el chorro caliente hacia la kim. Esta es jaziel buena forma de inhalar vapor de agua tibia y calentar la kim simultáneamente. (Cúbrase la boca y la nariz con las lorenzo para poder respirar).  3. Utilice un vaporizador con productos felix Vicks VapoRub (contiene mentol) greyson la noche. Consuma caramelos duros de menta, mentol o eucaliptos greyson el día.  4. Puede utilizar un expectorante que contenga guaifenesina (felix  Robitussin) para ayudar a diluir el moco y a estimular la descongestión de los senos paranasales.  5. Puede utilizar descongestivos de venta sin receta a menos que le hayan recetado un medicamento similar. Los aerosoles nasales son los que más rápido actúan. Elija ebonie que contenga fenilefrina (Neosynephrine, Sinex y otros) u oximetazolina (Afrin). Sople la nariz con cuidado para eliminar el moco y después aplique las gotas. No utilice estos medicamentos con mayor frecuencia que la indicada en la etiqueta, ni greyson más de mavis días o los síntomas pueden empeorar. También puede nicole comprimidos que contengan seudoefedrina (Sudafed). Muchos medicamentos para la sinusitis combinan ingredientes, lo que puede aumentar los efectos secundarios. Christine las etiquetas o pida ayuda al farmacéutico. NOTA:  Las personas con presión arterial alexander no deben utilizar descongestivos. Pueden aumentar la presión arterial.  6. Los antihistamínicos resultan útiles si herrera sinusitis es de origen alérgico. El más suave es la clorfeniramina. La dosis para adultos es de 8 a 12 mg mavis veces por día. [NOTA: No tome chlorpheniramine si tiene glaucoma o dificultades al orinar debido a jaziel próstata agrandada]. Claritin (loratidine) es un otra antihistamínico que se puede comprar sin receta médica y que no causa sueño.  7. Cuando la sinusitis se debe a alergias, un enjuague nasal de solución salina puede aliviarla. La solución salina reduce la hinchazón y limpia el exceso de moco. De esta forma los senos paranasales pueden drenar. La mayoría de las farmacias venden kits preempacados. Los kits tienen paquetes premezclados de sal y un dispositivo de irrigación. Si le recetaron antibióticos para tratar jaziel infección aguda de los senos paranasales, consulte a herrera médico antes de usar un enjuague nasal para claire si es seguro para usted.  8. Puede nicole acetaminofeno (Tylenol) o ibuprofeno (Motrin, Advil) para controlar el dolor, a menos que se le haya  recetado otro analgésico. [NOTA: Si usted tiene jaziel enfermedad crónica del hígado o del riñón o ha tenido alguna vez jaziel úlcera de estómago, hable con herrera médico antes de usar estos medicamentos.] (Los menores de 18 años que tengan fiebre no deben usar aspirina. Puede provocar daño grave al hígado).  9. Si le recetaron antibióticos, complete el tratamiento, aunque se sienta mejor después de unos días.  Seguimiento  con herrera médico o shiv centro en jaziel semana o felix lo indique nuestro personal en prabha de que no mejore.  Busque Prontamente Atención Médica  si algo de lo siguiente ocurre:  · Dolor facial o de angle que se agrava  · Rigidez en el parul  · Somnolencia o confusión inusuales, o conducta diferente a herrera conducta habitual  · Hinchazón de la frente o los párpados  · Problemas de visión, felix visión borrosa o doble  · Fiebre con temperatura bucal superior a 101.5º F (38.6º C) greyson más de mavis nicolette de tratamiento con antibióticos  · Un ataque (convulsión)  Date Last Reviewed: 1/9/2014  © 7023-2508 [employer]. [address] [place] [address], [place] [address]. Todos los derechos reservados. Esta información no pretende sustituir la atención médica profesional. Sólo herrera médico puede diagnosticar y tratar un problema de amanda.

## 2019-04-03 NOTE — PROGRESS NOTES
Portions of this note are generated with voice recognition software. Typographical errors may exist.     SUBJECTIVE:    This is a/an 43 y.o. female here for primary care visit for  Chief Complaint   Patient presents with    Results     Patient states that for the last 10 days she has been having recurring frontal headache in the setting of rhinitis and postnasal drip.  No fevers chills or body aches.  Self care measures have been minimal.  She feels that her right lower quadrant pain has resolved completely.  She is using MiraLax once daily to help have a bowel movement with less effort.  She finds this to be useful.    Patient states that she has not been having significant problems with myalgias.  Overall stress levels are low.      Medications Reviewed and Updated    Past medical, family, and social histories were reviewed and updated.    Review of Systems negative unless otherwise noted in history of present illness-  ROS    General ROS: negative  Psychological ROS: negative  ENT ROS: negative  Endocrine ROS: Negative  Allergy and Immunology ROS: negative  Gastrointestinal ROS: negative  Genito-Urinary ROS: negative  Musculoskeletal ROS: negative  Neurological ROS: negative      Allergic:  Review of patient's allergies indicates:  No Known Allergies    OBJECTIVE:  BP: 118/64 Pulse: 64    Wt Readings from Last 3 Encounters:   04/02/19 77.7 kg (171 lb 4.8 oz)   03/04/19 77.5 kg (170 lb 13.7 oz)   02/24/19 74.8 kg (165 lb)    Body mass index is 29.4 kg/m².  Previous Blood Pressure Readings :   BP Readings from Last 3 Encounters:   04/02/19 118/64   03/04/19 128/70   02/24/19 (!) 156/83       Physical Exam    GEN: No apparent distress  HEENT: sclera non-icteric, conjunctiva clear no sinus pain on palpation  CV: no peripheral edema  PULM: breathing non-labored  ABD: Obese, protuberant abdomen.  PSYCH: appropriate affect  MSK: able to rise from chair without assistance no point tenderness muscles all 4  extremities  SKIN: normal skin turgor    Pertinent Labs Reviewed       ASSESSMENT/PLAN:    Sinus headache.Condition not optimally controlled. Detailed counseling on self care measures. Plan to monitor clinically in addition to plan below or as listed on After Visit Summary.   -     fluticasone (FLONASE) 50 mcg/actuation nasal spray; 2 sprays (100 mcg total) by Each Nare route daily as needed for Rhinitis or Allergies.  Dispense: 16 g; Refill: 1    Fibromyalgia.Condition stable.  Counseling given today on self-care measures. Plan to monitor clinically. Continue current medical plan.     Future Appointments   Date Time Provider Department Center   10/1/2019  9:00 AM LAB, [facility] [place]   10/3/2019  4:00 PM Bill Roe MD [facility] [place] Roe  4/6/2019  7:10 PM

## 2019-07-26 ENCOUNTER — TELEPHONE (OUTPATIENT)
Dept: INTERNAL MEDICINE | Facility: CLINIC | Age: 44
End: 2019-07-26

## 2019-07-26 NOTE — TELEPHONE ENCOUNTER
----- Message from Beata Peck MA sent at 7/26/2019  3:31 PM CDT -----  Contact: Self [phone]      ----- Message -----  From: Priyanka Ordonez  Sent: 7/26/2019   3:00 PM  To: Bhupinder HART Staff    Patient would like to speak with you about being seen today for foot pain. Please advise.

## 2019-07-26 NOTE — TELEPHONE ENCOUNTER
I spoke to patient and inform that appointment was made for Monday since he was not in on Friday. Patient voices understanding

## 2019-07-29 ENCOUNTER — HOSPITAL ENCOUNTER (OUTPATIENT)
Dept: RADIOLOGY | Facility: HOSPITAL | Age: 44
Discharge: HOME | End: 2019-07-29
Attending: INTERNAL MEDICINE
Payer: MEDICAID

## 2019-07-29 ENCOUNTER — OFFICE VISIT (OUTPATIENT)
Dept: INTERNAL MEDICINE | Facility: CLINIC | Age: 44
End: 2019-07-29
Payer: MEDICAID

## 2019-07-29 VITALS
BODY MASS INDEX: 29.84 KG/M2 | SYSTOLIC BLOOD PRESSURE: 126 MMHG | DIASTOLIC BLOOD PRESSURE: 74 MMHG | HEIGHT: 64 IN | HEART RATE: 58 BPM | WEIGHT: 174.81 LBS | OXYGEN SATURATION: 99 %

## 2019-07-29 DIAGNOSIS — M79.673 ACUTE FOOT PAIN, UNSPECIFIED LATERALITY: ICD-10-CM

## 2019-07-29 DIAGNOSIS — M79.673 ACUTE FOOT PAIN, UNSPECIFIED LATERALITY: Primary | ICD-10-CM

## 2019-07-29 PROCEDURE — 99213 PR OFFICE/OUTPT VISIT, EST, LEVL III, 20-29 MIN: ICD-10-PCS | Mod: S$PBB,,, | Performed by: INTERNAL MEDICINE

## 2019-07-29 PROCEDURE — 99999 PR PBB SHADOW E&M-EST. PATIENT-LVL IV: CPT | Mod: PBBFAC,,, | Performed by: INTERNAL MEDICINE

## 2019-07-29 PROCEDURE — 73630 XR FOOT COMPLETE 3 VIEW LEFT: ICD-10-PCS | Mod: 26,LT,, | Performed by: RADIOLOGY

## 2019-07-29 PROCEDURE — 99999 PR PBB SHADOW E&M-EST. PATIENT-LVL IV: ICD-10-PCS | Mod: PBBFAC,,, | Performed by: INTERNAL MEDICINE

## 2019-07-29 PROCEDURE — 73630 X-RAY EXAM OF FOOT: CPT | Mod: 26,LT,, | Performed by: RADIOLOGY

## 2019-07-29 PROCEDURE — 99214 OFFICE O/P EST MOD 30 MIN: CPT | Mod: PBBFAC,25,PO | Performed by: INTERNAL MEDICINE

## 2019-07-29 PROCEDURE — 73630 X-RAY EXAM OF FOOT: CPT | Mod: TC,FY,PO,LT

## 2019-07-29 PROCEDURE — 99213 OFFICE O/P EST LOW 20 MIN: CPT | Mod: S$PBB,,, | Performed by: INTERNAL MEDICINE

## 2019-07-29 RX ORDER — MELOXICAM 7.5 MG/1
7.5 TABLET ORAL DAILY PRN
Qty: 30 TABLET | Refills: 0 | Status: SHIPPED | OUTPATIENT
Start: 2019-07-29 | End: 2019-08-23 | Stop reason: SDUPTHER

## 2019-07-29 RX ORDER — PANTOPRAZOLE SODIUM 20 MG/1
20 TABLET, DELAYED RELEASE ORAL DAILY
Qty: 30 TABLET | Refills: 0 | Status: SHIPPED | OUTPATIENT
Start: 2019-07-29 | End: 2019-08-09

## 2019-07-30 NOTE — PROGRESS NOTES
Portions of this note are generated with voice recognition software. Typographical errors may exist.     SUBJECTIVE:    This is a/an 43 y.o. female here for primary care visit for  Chief Complaint   Patient presents with    Foot Pain     x2w     Patient states that with no apparent provocation she has started to have pain along the extensor surface of her left foot.  States that it got to be so bad that she was not able to continue walking which was something that she did at a low intensity most days.  States that she started to develop some of the pain after completing a normal day at work.  Nothing strenuous or out of the ordinary.  The next day she went to the park for a walk and was not able to do so because of pain. Patient states that the pain has gradually improved with refraining from walking in the park but she is concerned because this pain is the 1st of it is kind.  She does not have a family history of gout.  She describes the extensor surface of the foot as swollen but not red.  Self care measures have been limited      Medications Reviewed and Updated    Past medical, family, and social histories were reviewed and updated.    Review of Systems negative unless otherwise noted in history of present illness-  ROS    General ROS: negative  Psychological ROS: negative  ENT ROS: negative  Endocrine ROS: Negative  Allergy and Immunology ROS: negative  Cardiovascular ROS: negative  Pulmonary ROS: Negative  Gastrointestinal ROS: negative  Genito-Urinary ROS: negative  Musculoskeletal ROS: negative        Allergic:  Review of patient's allergies indicates:  No Known Allergies    OBJECTIVE:  BP: 126/74 Pulse: (!) 58    Wt Readings from Last 3 Encounters:   07/29/19 79.3 kg (174 lb 13.2 oz)   04/02/19 77.7 kg (171 lb 4.8 oz)   03/04/19 77.5 kg (170 lb 13.7 oz)    Body mass index is 30.01 kg/m².  Previous Blood Pressure Readings :   BP Readings from Last 3 Encounters:   07/29/19 126/74   04/02/19 118/64   03/04/19  128/70       Physical Exam    GEN: No apparent distress  HEENT: sclera non-icteric, conjunctiva clear  CV: no peripheral edema  PULM: breathing non-labored  ABD: non, protuberant abdomen.  PSYCH: appropriate affect  MSK: able to rise from chair without assistance Pain at extremes of extension of the forefoot.  SKIN: normal skin turgor no signs of podagra.      Pertinent Labs Reviewed       ASSESSMENT/PLAN:    Acute foot pain, unspecified laterality.Etiology unclear. Not controlled. Further evaluation warranted.  Recommendations as below or as written on After Visit Summary.   -     X-Ray Foot Complete Left; Future; Expected date: 07/29/2019  -     Ambulatory Referral to Podiatry  -     Comprehensive metabolic panel; Future; Expected date: 07/29/2019  -     URIC ACID; Future; Expected date: 07/29/2019  -     meloxicam (MOBIC) 7.5 MG tablet; Take 1 tablet (7.5 mg total) by mouth daily as needed for Pain.  Dispense: 30 tablet; Refill: 0  -     pantoprazole (PROTONIX) 20 MG tablet; Take 1 tablet (20 mg total) by mouth once daily.  Dispense: 30 tablet; Refill: 0  -     Sedimentation rate; Future; Expected date: 07/29/2019  -     C-reactive protein; Future; Expected date: 07/29/2019          Future Appointments   Date Time Provider Department Center   8/9/2019  2:00 PM Edith Morales DPM [facility] [place]   10/1/2019  9:00 AM LAB, [facility] [place]   10/3/2019  4:00 PM Bill Roe MD [facility] [place] Roe  7/30/2019  6:34 PM

## 2019-08-09 ENCOUNTER — OFFICE VISIT (OUTPATIENT)
Dept: PODIATRY | Facility: CLINIC | Age: 44
End: 2019-08-09
Payer: MEDICAID

## 2019-08-09 VITALS
HEIGHT: 64 IN | SYSTOLIC BLOOD PRESSURE: 101 MMHG | BODY MASS INDEX: 29.65 KG/M2 | DIASTOLIC BLOOD PRESSURE: 68 MMHG | HEART RATE: 65 BPM | WEIGHT: 173.69 LBS

## 2019-08-09 DIAGNOSIS — M79.672 LEFT FOOT PAIN: Primary | ICD-10-CM

## 2019-08-09 PROCEDURE — 99203 PR OFFICE/OUTPT VISIT, NEW, LEVL III, 30-44 MIN: ICD-10-PCS | Mod: S$PBB,,, | Performed by: PODIATRIST

## 2019-08-09 PROCEDURE — 99213 OFFICE O/P EST LOW 20 MIN: CPT | Mod: PBBFAC,PN | Performed by: PODIATRIST

## 2019-08-09 PROCEDURE — 99999 PR PBB SHADOW E&M-EST. PATIENT-LVL III: ICD-10-PCS | Mod: PBBFAC,,, | Performed by: PODIATRIST

## 2019-08-09 PROCEDURE — 99999 PR PBB SHADOW E&M-EST. PATIENT-LVL III: CPT | Mod: PBBFAC,,, | Performed by: PODIATRIST

## 2019-08-09 PROCEDURE — 99203 OFFICE O/P NEW LOW 30 MIN: CPT | Mod: S$PBB,,, | Performed by: PODIATRIST

## 2019-08-09 RX ORDER — METRONIDAZOLE 7.5 MG/G
GEL TOPICAL
Refills: 3 | COMMUNITY
Start: 2019-07-26 | End: 2019-11-21

## 2019-08-09 RX ORDER — CLOBETASOL PROPIONATE 0.46 MG/ML
SOLUTION TOPICAL
Refills: 1 | COMMUNITY
Start: 2019-07-26 | End: 2019-11-21

## 2019-08-09 RX ORDER — OMEPRAZOLE 20 MG/1
CAPSULE, DELAYED RELEASE ORAL
COMMUNITY
Start: 2019-08-05 | End: 2019-11-21

## 2019-08-09 NOTE — LETTER
August 9, 2019      Bill Roe MD  [address] [facility]  [place] [address]           [facility]  [address], [id number]  [place] [address]  Phone: [phone]  Fax: [phone]          Patient: Tessa Wynne   MR Number: [id number]   YOB: 1975   Date of Visit: 8/9/2019       Dear Dr. Bill Roe:    Thank you for referring Tessa Wynne to me for evaluation. Attached you will find relevant portions of my assessment and plan of care.    If you have questions, please do not hesitate to call me. I look forward to following Tessa Wynne along with you.    Sincerely,    Edith Morales, COSTA    Enclosure  CC:  No Recipients    If you would like to receive this communication electronically, please contact [email or web address] or [phone] to request more information on [employer] access.    For providers and/or their staff who would like to refer a patient to [facility], please contact us through our one-stop-shop provider referral line, [facility], at [phone].    If you feel you have received this communication in error or would no longer like to receive these types of communications, please e-mail [email or web address]

## 2019-08-09 NOTE — PROGRESS NOTES
Subjective:      Patient ID: Tessa Wynne is a 43 y.o. female.    Chief Complaint: Foot Pain (Left, approx. 3 weeks)    43 y.o. female presenting with left foot pain.  Patient points dorsal aspect of the 2nd and 3rd metatarsal.  First noticed symptoms 3 weeks ago. Denies any trauma.  Describes pain as aching and throbbing.  Gets worse with ambulation and standing. Patient denies pain during resting.  Patient tried meloxicam but not working with the pain.  Patient is ambulating in open toe shoe.  Presenting with her .   Pain level 7/10 today.       Review of Systems   Constitution: Negative for decreased appetite, fever and malaise/fatigue.   HENT: Negative for congestion.    Cardiovascular: Negative for chest pain and leg swelling.   Respiratory: Negative for cough and shortness of breath.    Skin: Negative for color change, nail changes and rash.   Musculoskeletal: Negative for arthritis, joint pain, joint swelling and muscle weakness.        Left foot pain   Gastrointestinal: Negative for bloating, abdominal pain, nausea and vomiting.   Neurological: Negative for headaches, numbness and weakness.   Psychiatric/Behavioral: Negative for altered mental status.             No past medical history on file.    Past Surgical History:   Procedure Laterality Date    CHOLECYSTECTOMY      CHOLECYSTECTOMY-LAPAROSCOPIC N/A 9/7/2017    Performed by Alina Pichardo MD at [facility] OR       Family History   Problem Relation Age of Onset    Diabetes Mellitus Mother     Diabetes Mellitus Father     Colon cancer Neg Hx     Breast cancer Neg Hx     Ovarian cancer Neg Hx     Uterine cancer Neg Hx     Heart attack Neg Hx        Social History     Socioeconomic History    Marital status:      Spouse name: Not on file    Number of children: 2    Years of education: Not on file    Highest education level: Not on file   Occupational History    Not on file   Social Needs    Financial resource strain: Not on file  "   Food insecurity:     Worry: Not on file     Inability: Not on file    Transportation needs:     Medical: Not on file     Non-medical: Not on file   Tobacco Use    Smoking status: Never Smoker    Smokeless tobacco: Never Used   Substance and Sexual Activity    Alcohol use: No    Drug use: No    Sexual activity: Yes     Partners: Male     Birth control/protection: OCP   Lifestyle    Physical activity:     Days per week: Not on file     Minutes per session: Not on file    Stress: Not on file   Relationships    Social connections:     Talks on phone: Not on file     Gets together: Not on file     Attends Restorationism service: Not on file     Active member of club or organization: Not on file     Attends meetings of clubs or organizations: Not on file     Relationship status: Not on file   Other Topics Concern    Not on file   Social History Narrative    Not on file       Current Outpatient Medications   Medication Sig Dispense Refill    clobetasol (TEMOVATE) 0.05 % external solution ELAINE EXT AA QHS  1    meloxicam (MOBIC) 7.5 MG tablet Take 1 tablet (7.5 mg total) by mouth daily as needed for Pain. 30 tablet 0    metroNIDAZOLE (METROGEL) 0.75 % gel ELAINE EXT AA BID  3    norgestimate-ethinyl estradiol (ORTHO TRI-CYCLEN,TRI-SPRINTEC) 0.18/0.215/0.25 mg-35 mcg (28) tablet Take 1 tablet by mouth once daily. 30 tablet 11    omeprazole (PRILOSEC) 20 MG capsule        No current facility-administered medications for this visit.        Review of patient's allergies indicates:  No Known Allergies    Vitals:    08/09/19 1443   BP: 101/68   Pulse: 65   Weight: 78.8 kg (173 lb 11.2 oz)   Height: 5' 4" (1.626 m)   PainSc:   7   PainLoc: Foot       Objective:      Physical Exam    Vascular: Distal DP/PT pulses palpable 2/4. CRT < 3 sec to tips of toes. No vericosities noted to LEs. Hair growth present LE, warm to touch LE, No edema noted to LE.    Dermatologic: No open lesions, lacerations or wounds. Interdigital " spaces clean, dry and intact. No erythema, rubor, calor noted LE    Musculoskeletal: No calf tenderness LE, Compartments soft/compressible.   Left foot:  Pain on palpation distal aspect of the shaft of 2nd and more on 3rd metatarsal.  Diffuse pain and metatarsal phalangeal joint 2nd and 3rd.  No pain on along the extensors upon range of motion of the MPJs.     Neurological: Light touch, proprioception, and sharp/dull sensation are all intact. Protective threshold with the [place]-Wienstein monofilament is intact. Vibratory sensation intact.         Assessment:       Encounter Diagnosis   Name Primary?    Left foot pain - Left Foot Yes         Plan:       Tessa RAHMAN was seen today for foot pain.    Diagnoses and all orders for this visit:    Left foot pain - Left Foot  -     MRI Foot (Forefoot) Left Without Contrast; Future      I counseled the patient on her conditions, their implications and medical management.    43 y.o. female with left foot pain over 2nd and 3rd metatarsal shaft    -Rx left foot MRI.  X-rays were reviewed with the patient. No bony abnormality.  Differential diagnosis are stress fracture, bone contusion, cyst formation.   -short Cam dispensed.  Weightbearing as tolerated in short Cam  -The nature of the condition, options for management, as well as potential risks and complications were discussed in detail with patient. Patient was amenable to my recommendations and left my office fully informed and will follow up as instructed or sooner if necessary.    -f/u 2 weeks      Note dictated with voice recognition software, please excuse any grammatical errors.

## 2019-08-15 DIAGNOSIS — Z30.011 ENCOUNTER FOR INITIAL PRESCRIPTION OF CONTRACEPTIVE PILLS: ICD-10-CM

## 2019-08-20 ENCOUNTER — HOSPITAL ENCOUNTER (OUTPATIENT)
Dept: RADIOLOGY | Facility: HOSPITAL | Age: 44
Discharge: HOME | End: 2019-08-20
Attending: PODIATRIST
Payer: MEDICAID

## 2019-08-20 DIAGNOSIS — Z30.011 ENCOUNTER FOR INITIAL PRESCRIPTION OF CONTRACEPTIVE PILLS: ICD-10-CM

## 2019-08-20 DIAGNOSIS — M79.672 LEFT FOOT PAIN: ICD-10-CM

## 2019-08-20 PROCEDURE — 73718 MRI FOOT (FOREFOOT) LEFT WITHOUT CONTRAST: ICD-10-PCS | Mod: 26,LT,, | Performed by: RADIOLOGY

## 2019-08-20 PROCEDURE — 73718 MRI LOWER EXTREMITY W/O DYE: CPT | Mod: 26,LT,, | Performed by: RADIOLOGY

## 2019-08-20 PROCEDURE — 73718 MRI LOWER EXTREMITY W/O DYE: CPT | Mod: TC,LT

## 2019-08-22 ENCOUNTER — TELEPHONE (OUTPATIENT)
Dept: OBSTETRICS AND GYNECOLOGY | Facility: CLINIC | Age: 44
End: 2019-08-22

## 2019-08-22 NOTE — TELEPHONE ENCOUNTER
----- Message from Natalia Olson sent at 8/22/2019  2:42 PM CDT -----  Contact: [phone]/self  Type:  RX Refill Request    Who Called: pt  Refill or New Rx: refill  RX Name and Strength: norgestimate-ethinyl estradiol (ORTHO TRI-CYCLEN,TRI-SPRINTEC) 0.18/0.215/0.25 mg-35 mcg (28) tablet  How is the patient currently taking it? (ex. 1XDay):  Is this a 30 day or 90 day RX: 90 day  [facility] phone number: [facility]  Local or Mail Order: local  Ordering Provider: Dr. Butler   Would the patient rather a call back or a response via [facility]? Call back  Best Call Back Number: [phone]  Additional Information:

## 2019-08-22 NOTE — TELEPHONE ENCOUNTER
Called and left a voicemail requesting a callback. Pt will need to schedule annual in order to refill OCP.

## 2019-08-23 DIAGNOSIS — M79.673 ACUTE FOOT PAIN, UNSPECIFIED LATERALITY: ICD-10-CM

## 2019-08-24 RX ORDER — MELOXICAM 7.5 MG/1
TABLET ORAL
Qty: 30 TABLET | Refills: 0 | Status: SHIPPED | OUTPATIENT
Start: 2019-08-24 | End: 2019-09-11

## 2019-08-25 RX ORDER — NORGESTIMATE AND ETHINYL ESTRADIOL 7DAYSX3 28
KIT ORAL
Qty: 28 TABLET | Refills: 0 | Status: SHIPPED | OUTPATIENT
Start: 2019-08-25 | End: 2019-11-21

## 2019-08-25 RX ORDER — NORGESTIMATE AND ETHINYL ESTRADIOL 7DAYSX3 28
KIT ORAL
Qty: 28 TABLET | Refills: 0 | Status: SHIPPED | OUTPATIENT
Start: 2019-08-25 | End: 2019-09-11 | Stop reason: SDUPTHER

## 2019-09-03 ENCOUNTER — OFFICE VISIT (OUTPATIENT)
Dept: PODIATRY | Facility: CLINIC | Age: 44
End: 2019-09-03
Payer: MEDICAID

## 2019-09-03 VITALS
DIASTOLIC BLOOD PRESSURE: 75 MMHG | HEART RATE: 68 BPM | BODY MASS INDEX: 29.85 KG/M2 | WEIGHT: 174.88 LBS | HEIGHT: 64 IN | SYSTOLIC BLOOD PRESSURE: 113 MMHG

## 2019-09-03 DIAGNOSIS — S92.335A CLOSED NONDISPLACED FRACTURE OF THIRD METATARSAL BONE OF LEFT FOOT, INITIAL ENCOUNTER: Primary | ICD-10-CM

## 2019-09-03 PROCEDURE — 99213 OFFICE O/P EST LOW 20 MIN: CPT | Mod: S$PBB,,, | Performed by: PODIATRIST

## 2019-09-03 PROCEDURE — 99999 PR PBB SHADOW E&M-EST. PATIENT-LVL III: ICD-10-PCS | Mod: PBBFAC,,, | Performed by: PODIATRIST

## 2019-09-03 PROCEDURE — 99213 OFFICE O/P EST LOW 20 MIN: CPT | Mod: PBBFAC,PN | Performed by: PODIATRIST

## 2019-09-03 PROCEDURE — 99999 PR PBB SHADOW E&M-EST. PATIENT-LVL III: CPT | Mod: PBBFAC,,, | Performed by: PODIATRIST

## 2019-09-03 PROCEDURE — 99213 PR OFFICE/OUTPT VISIT, EST, LEVL III, 20-29 MIN: ICD-10-PCS | Mod: S$PBB,,, | Performed by: PODIATRIST

## 2019-09-03 NOTE — PROGRESS NOTES
Subjective:      Patient ID: Tessa Wynne is a 43 y.o. female.    Chief Complaint: Follow-up (Left foot MRI)    43 y.o. female presenting with left foot pain.  Patient points dorsal aspect of the 2nd and 3rd metatarsal.  First noticed symptoms 3 weeks ago. Denies any trauma.  Describes pain as aching and throbbing.  Gets worse with ambulation and standing. Patient denies pain during resting.  Patient tried meloxicam but not working with the pain.  Patient is ambulating in open toe shoe.  Presenting with her .   Pain level 7/10 today.     9/3/19: f/u for left foot pain. Pt is here to review the MRI as well. Pt is presenting and ambulating in normal tennis shoes today. Saw her 2 weeks ago and recommended to be WBAT in CAM. Pt was wearing for a week. Symptoms significantly improved so she transitioned about of CAM. She tells me she does not feel any pain on her left foot.     Review of Systems   Constitution: Negative for decreased appetite, fever and malaise/fatigue.   HENT: Negative for congestion.    Cardiovascular: Negative for chest pain and leg swelling.   Respiratory: Negative for cough and shortness of breath.    Skin: Negative for color change, nail changes and rash.   Musculoskeletal: Negative for arthritis, joint pain, joint swelling and muscle weakness.   Gastrointestinal: Negative for bloating, abdominal pain, nausea and vomiting.   Neurological: Negative for headaches, numbness and weakness.   Psychiatric/Behavioral: Negative for altered mental status.             History reviewed. No pertinent past medical history.    Past Surgical History:   Procedure Laterality Date    CHOLECYSTECTOMY      CHOLECYSTECTOMY-LAPAROSCOPIC N/A 9/7/2017    Performed by [facility] at [facility] OR       Family History   Problem Relation Age of Onset    Diabetes Mellitus Mother     Diabetes Mellitus Father     Colon cancer Neg Hx     Breast cancer Neg Hx     Ovarian cancer Neg Hx     Uterine cancer Neg Hx      Heart attack Neg Hx        Social History     Socioeconomic History    Marital status:      Spouse name: Not on file    Number of children: 2    Years of education: Not on file    Highest education level: Not on file   Occupational History    Not on file   Social Needs    Financial resource strain: Not on file    Food insecurity:     Worry: Not on file     Inability: Not on file    Transportation needs:     Medical: Not on file     Non-medical: Not on file   Tobacco Use    Smoking status: Never Smoker    Smokeless tobacco: Never Used   Substance and Sexual Activity    Alcohol use: No    Drug use: No    Sexual activity: Yes     Partners: Male     Birth control/protection: OCP   Lifestyle    Physical activity:     Days per week: Not on file     Minutes per session: Not on file    Stress: Not on file   Relationships    Social connections:     Talks on phone: Not on file     Gets together: Not on file     Attends Denominational service: Not on file     Active member of club or organization: Not on file     Attends meetings of clubs or organizations: Not on file     Relationship status: Not on file   Other Topics Concern    Not on file   Social History Narrative    Not on file       Current Outpatient Medications   Medication Sig Dispense Refill    metroNIDAZOLE (METROGEL) 0.75 % gel ELAINE EXT AA BID  3    omeprazole (PRILOSEC) 20 MG capsule       clobetasol (TEMOVATE) 0.05 % external solution ELAINE EXT AA QHS  1    meloxicam (MOBIC) 7.5 MG tablet TAKE 1 TABLET(7.5 MG) BY MOUTH DAILY AS NEEDED FOR PAIN 30 tablet 0    TRI-SPRINTEC, 28, 0.18/0.215/0.25 mg-35 mcg (28) tablet TAKE 1 TABLET BY MOUTH EVERY DAY 28 tablet 0    TRI-SPRINTEC, 28, 0.18/0.215/0.25 mg-35 mcg (28) tablet TAKE 1 TABLET BY MOUTH EVERY DAY 28 tablet 0     No current facility-administered medications for this visit.        Review of patient's allergies indicates:  No Known Allergies    Vitals:    09/03/19 1415   BP: 113/75  "  Pulse: 68   Weight: 79.3 kg (174 lb 14.4 oz)   Height: 5' 4" (1.626 m)   PainSc:   1       Objective:      Physical Exam    Vascular: Distal DP/PT pulses palpable 2/4. CRT < 3 sec to tips of toes. No vericosities noted to LEs. Hair growth present LE, warm to touch LE, No edema noted to LE.    Dermatologic: No open lesions, lacerations or wounds. Interdigital spaces clean, dry and intact. No erythema, rubor, calor noted LE    Musculoskeletal: No calf tenderness LE, Compartments soft/compressible.   Left foot:  Mild tenderness at distal aspect of 3rd metatarsal.  Diffuse pain and metatarsal phalangeal joint 2nd and 3rd.  No pain on along the extensors upon range of motion of the MPJs.     Neurological: Light touch, proprioception, and sharp/dull sensation are all intact. Protective threshold with the [place]-Wienstein monofilament is intact. Vibratory sensation intact.         Assessment:       Encounter Diagnosis   Name Primary?    Closed nondisplaced fracture of third metatarsal bone of left foot, initial encounter Yes         Plan:       Tessa RAHMAN was seen today for follow-up.    Diagnoses and all orders for this visit:    Closed nondisplaced fracture of third metatarsal bone of left foot, initial encounter      I counseled the patient on her conditions, their implications and medical management.    43 y.o. female with left foot pain 2/2 fx of 3rd met metaphysis distally.     -MRI reviewed with the patient. MRI shows patient has nondisplaced fracture at the distal metaphysis of the 3rd metatarsal with the 2nd intensity of the 3rd metatarsal.  Symptoms significantly improved.   -WBAT in CAM for 2 more weeks then transition to normal weight-bearing in tennis shoe.   -It was discussed the importance of wearing shoes with adequate room in toe box to accommodate toe deformities. Recommended New Balance/[employer]ics shoe brands with adequate arch supports to alleviate abnormal pressure and improve stability of foot while " walking. Avoid flat shoes and barefoot walking as these will exacerbate or worsen symptoms.   -The nature of the condition, options for management, as well as potential risks and complications were discussed in detail with patient. Patient was amenable to my recommendations and left my office fully informed and will follow up as instructed or sooner if necessary.    -f/u prn      Note dictated with voice recognition software, please excuse any grammatical errors.

## 2019-09-11 ENCOUNTER — OFFICE VISIT (OUTPATIENT)
Dept: OBSTETRICS AND GYNECOLOGY | Facility: CLINIC | Age: 44
End: 2019-09-11
Payer: MEDICAID

## 2019-09-11 VITALS — SYSTOLIC BLOOD PRESSURE: 104 MMHG | DIASTOLIC BLOOD PRESSURE: 64 MMHG | BODY MASS INDEX: 29.78 KG/M2 | WEIGHT: 173.5 LBS

## 2019-09-11 DIAGNOSIS — Z12.4 CERVICAL CANCER SCREENING: ICD-10-CM

## 2019-09-11 DIAGNOSIS — Z01.419 ENCOUNTER FOR GYNECOLOGICAL EXAMINATION WITHOUT ABNORMAL FINDING: Primary | ICD-10-CM

## 2019-09-11 DIAGNOSIS — Z12.39 SCREENING BREAST EXAMINATION: ICD-10-CM

## 2019-09-11 DIAGNOSIS — Z30.011 ENCOUNTER FOR INITIAL PRESCRIPTION OF CONTRACEPTIVE PILLS: ICD-10-CM

## 2019-09-11 PROCEDURE — 99396 PR PREVENTIVE VISIT,EST,40-64: ICD-10-PCS | Mod: S$PBB,,, | Performed by: OBSTETRICS & GYNECOLOGY

## 2019-09-11 PROCEDURE — 99999 PR PBB SHADOW E&M-EST. PATIENT-LVL III: CPT | Mod: PBBFAC,,, | Performed by: OBSTETRICS & GYNECOLOGY

## 2019-09-11 PROCEDURE — 87624 HPV HI-RISK TYP POOLED RSLT: CPT

## 2019-09-11 PROCEDURE — 99999 PR PBB SHADOW E&M-EST. PATIENT-LVL III: ICD-10-PCS | Mod: PBBFAC,,, | Performed by: OBSTETRICS & GYNECOLOGY

## 2019-09-11 PROCEDURE — 88175 CYTOPATH C/V AUTO FLUID REDO: CPT

## 2019-09-11 PROCEDURE — 99213 OFFICE O/P EST LOW 20 MIN: CPT | Mod: PBBFAC,PO | Performed by: OBSTETRICS & GYNECOLOGY

## 2019-09-11 PROCEDURE — 99396 PREV VISIT EST AGE 40-64: CPT | Mod: S$PBB,,, | Performed by: OBSTETRICS & GYNECOLOGY

## 2019-09-11 PROCEDURE — 87491 CHLMYD TRACH DNA AMP PROBE: CPT

## 2019-09-11 RX ORDER — NORGESTIMATE AND ETHINYL ESTRADIOL 7DAYSX3 28
1 KIT ORAL DAILY
Qty: 28 TABLET | Refills: 12 | Status: SHIPPED | OUTPATIENT
Start: 2019-09-11 | End: 2020-02-04

## 2019-09-11 NOTE — PROGRESS NOTES
42 yo now g2 female who presents for routine gyn visit.  Regular cycles q month with OCPs. Wants refills.  . No h/o STDs;  No h/o abl pap smears.  No h/o abl mammograms.    ROS:  GENERAL: Denies weight gain or weight loss. Feeling well overall.   SKIN: Denies rash or lesions.   CHEST: Denies chest pain or shortness of breath.   CARDIOVASCULAR: Denies palpitations or left sided chest pain.   ABDOMEN: No abdominal pain, constipation, diarrhea, nausea, vomiting or rectal bleeding.   URINARY: No frequency, dysuria, hematuria, or burning on urination.  REPRODUCTIVE: no complaints  BREASTS:  denies pain, lumps, or nipple discharge.   HEMATOLOGIC: No easy bruisability or excessive bleeding.   MUSCULOSKELETAL: Denies joint pain or swelling.   NEUROLOGIC: Denies syncope or weakness.   PSYCHIATRIC: Denies depression, anxiety or mood swings.         PE:   Vitals: /64   Wt 78.7 kg (173 lb 8 oz)   LMP 08/14/2019   BMI 29.78 kg/m²   APPEARANCE: Well nourished, well developed, in no acute distress.  SKIN: Normal skin turgor, no lesions.  CHEST: Lungs clear to auscultation.  HEART: Regular rate and rhythm, no murmurs, rubs or gallops.  ABDOMEN: Soft. No tenderness or masses. No hepatosplenomegaly. No hernias.  BREASTS: Symmetrical, no skin changes or visible lesions. No palpable masses, nipple discharge or adenopathy bilaterally.  PELVIC: Normal external female genitalia without lesions. Normal hair distribution. Adequate perineal body, normal urethral meatus. Vagina moist and well rugated without lesions or discharge. Cervix pink and without lesions. No significant cystocele or rectocele. Bimanual exam showed uterus normal size, shape, position, mobile and nontender. Adnexa without masses or tenderness. Urethra and bladder normal.    AP  Routine gyn  -s/p normal breast exam: mammogram ordered  -s/p normal pelvic exam:   -Pap and HPV: collected  -STD testing: gc/chl ordered; declined HIV  -contraception: refills on  ortho tri cyclen provided        TERESA Butler MD

## 2019-09-12 LAB
C TRACH DNA SPEC QL NAA+PROBE: NOT DETECTED
N GONORRHOEA DNA SPEC QL NAA+PROBE: NOT DETECTED

## 2019-10-01 ENCOUNTER — LAB VISIT (OUTPATIENT)
Dept: LAB | Facility: HOSPITAL | Age: 44
End: 2019-10-01
Attending: INTERNAL MEDICINE
Payer: MEDICAID

## 2019-10-01 DIAGNOSIS — Z00.00 ANNUAL PHYSICAL EXAM: ICD-10-CM

## 2019-10-01 DIAGNOSIS — R10.2 PELVIC PAIN IN FEMALE: ICD-10-CM

## 2019-10-01 DIAGNOSIS — Z13.220 ENCOUNTER FOR LIPID SCREENING FOR CARDIOVASCULAR DISEASE: ICD-10-CM

## 2019-10-01 DIAGNOSIS — Z13.6 ENCOUNTER FOR LIPID SCREENING FOR CARDIOVASCULAR DISEASE: ICD-10-CM

## 2019-10-01 LAB
ALBUMIN SERPL BCP-MCNC: 4 G/DL (ref 3.5–5.2)
ALP SERPL-CCNC: 72 U/L (ref 55–135)
ALT SERPL W/O P-5'-P-CCNC: 30 U/L (ref 10–44)
ANION GAP SERPL CALC-SCNC: 9 MMOL/L (ref 8–16)
AST SERPL-CCNC: 34 U/L (ref 10–40)
BILIRUB SERPL-MCNC: 0.3 MG/DL (ref 0.1–1)
BUN SERPL-MCNC: 12 MG/DL (ref 6–20)
CALCIUM SERPL-MCNC: 9.2 MG/DL (ref 8.7–10.5)
CHLORIDE SERPL-SCNC: 104 MMOL/L (ref 95–110)
CHOLEST SERPL-MCNC: 221 MG/DL (ref 120–199)
CHOLEST/HDLC SERPL: 3.6 {RATIO} (ref 2–5)
CO2 SERPL-SCNC: 24 MMOL/L (ref 23–29)
CREAT SERPL-MCNC: 0.9 MG/DL (ref 0.5–1.4)
EST. GFR  (AFRICAN AMERICAN): >60 ML/MIN/1.73 M^2
EST. GFR  (NON AFRICAN AMERICAN): >60 ML/MIN/1.73 M^2
GLUCOSE SERPL-MCNC: 89 MG/DL (ref 70–110)
HDLC SERPL-MCNC: 61 MG/DL (ref 40–75)
HDLC SERPL: 27.6 % (ref 20–50)
LDLC SERPL CALC-MCNC: 141 MG/DL (ref 63–159)
NONHDLC SERPL-MCNC: 160 MG/DL
POTASSIUM SERPL-SCNC: 3.9 MMOL/L (ref 3.5–5.1)
PROT SERPL-MCNC: 7 G/DL (ref 6–8.4)
SODIUM SERPL-SCNC: 137 MMOL/L (ref 136–145)
TRIGL SERPL-MCNC: 95 MG/DL (ref 30–150)

## 2019-10-01 PROCEDURE — 80061 LIPID PANEL: CPT

## 2019-10-01 PROCEDURE — 80053 COMPREHEN METABOLIC PANEL: CPT

## 2019-10-01 PROCEDURE — 36415 COLL VENOUS BLD VENIPUNCTURE: CPT | Mod: PO

## 2019-10-03 ENCOUNTER — OFFICE VISIT (OUTPATIENT)
Dept: INTERNAL MEDICINE | Facility: CLINIC | Age: 44
End: 2019-10-03
Payer: MEDICAID

## 2019-10-03 VITALS
WEIGHT: 170.44 LBS | OXYGEN SATURATION: 98 % | SYSTOLIC BLOOD PRESSURE: 112 MMHG | TEMPERATURE: 98 F | HEIGHT: 64 IN | HEART RATE: 72 BPM | BODY MASS INDEX: 29.1 KG/M2 | DIASTOLIC BLOOD PRESSURE: 60 MMHG

## 2019-10-03 DIAGNOSIS — F41.9 ANXIETY: ICD-10-CM

## 2019-10-03 DIAGNOSIS — L29.9 PRURITIC DISORDER: ICD-10-CM

## 2019-10-03 DIAGNOSIS — M72.2 PLANTAR FASCIITIS: Primary | ICD-10-CM

## 2019-10-03 PROCEDURE — 99213 OFFICE O/P EST LOW 20 MIN: CPT | Mod: PBBFAC,PO | Performed by: INTERNAL MEDICINE

## 2019-10-03 PROCEDURE — 99213 PR OFFICE/OUTPT VISIT, EST, LEVL III, 20-29 MIN: ICD-10-PCS | Mod: S$PBB,,, | Performed by: INTERNAL MEDICINE

## 2019-10-03 PROCEDURE — 99999 PR PBB SHADOW E&M-EST. PATIENT-LVL III: CPT | Mod: PBBFAC,,, | Performed by: INTERNAL MEDICINE

## 2019-10-03 PROCEDURE — 99999 PR PBB SHADOW E&M-EST. PATIENT-LVL III: ICD-10-PCS | Mod: PBBFAC,,, | Performed by: INTERNAL MEDICINE

## 2019-10-03 PROCEDURE — 99213 OFFICE O/P EST LOW 20 MIN: CPT | Mod: S$PBB,,, | Performed by: INTERNAL MEDICINE

## 2019-10-03 RX ORDER — HYDROXYZINE HYDROCHLORIDE 25 MG/1
25 TABLET, FILM COATED ORAL 3 TIMES DAILY PRN
Qty: 60 TABLET | Refills: 0 | Status: SHIPPED | OUTPATIENT
Start: 2019-10-03 | End: 2019-11-02

## 2019-10-03 NOTE — PATIENT INSTRUCTIONS
Tratamiento de la fascitis plantar  En primer lugar, herrera proveedor de atención médica tratará de determinar la causa de herrera problema para sugerirle formas de aliviar el dolor. Si el dolor se debe a la fariha mecánica del pie, puede ayudar usar unas plantillas hechas a la medida (órtesis).  Reduzca los síntomas    Las siguientes son algunas sugerencias:  · Para aliviar síntomas leves, pruebe tomando aspirina, ibuprofeno u otro medicamento según le indiquen. También podría ayudar frotarse hielo en la shawanda afectada.  · Para reducir un dolor e hinchazón joann, herrera proveedor de atención médica puede recetarle pastillas, inyecciones o jaziel férula para caminar en algunos casos. También podrían recomendarle un tipo de fisioterapia, felix el ultrasonido o un plan de ejercicios diarios de estiramiento. Es raro que se necesite cirugía.  · Para reducir los síntomas producidos por jaziel fariha mecánica del pie, erik vez le coloquen jaziel venda adhesiva para boni apoyo al arco y controlar los movimientos por un tiempo. Las férulas nocturnas también pueden ayudar a estirar la fascia.  Control del movimiento  Si los vendajes surten efecto, herrera proveedor de atención médica podría recetarle unas órtesis. Elaboradas a partir de un molde de yeso de disha pies, estas órtesis controlan los movimientos del pie y deberían eliminar los síntomas.  Reducción del uso excesivo  Cada vez que herrera pie golpea el suelo, la fascia plantar se estira. Para reducir el esfuerzo de la fascia plantar y la posibilidad de usarla excesivamente, siga estos consejos:  · Adelgace lo necesario.  · Evite correr en terreno amado o irregular.  · Use las órtesis todo el tiempo en disha zapatos o pantuflas.  Si se necesita cirugía  Si los demás tipos de tratamiento no logran controlar herrera dolor, herrera proveedor de atención médica podría considerar hacerle jaziel cirugía. Merle esta operación, se corta parcialmente la fascia plantar para liberar la tensión. En la etapa de cicatrización, el  espacio entre el hueso del talón y la fascia plantar se rellena de tejido fibroso.   Date Last Reviewed: 10/14/2015  © 7388-1813 [employer]. [address] [place] [address], [place] [address]. Todos los derechos reservados. Esta información no pretende sustituir la atención médica profesional. Sólo herrera médico puede diagnosticar y tratar un problema de amanda.

## 2019-10-04 NOTE — PROGRESS NOTES
Portions of this note are generated with voice recognition software. Typographical errors may exist.     SUBJECTIVE:    This is a/an 43 y.o. female here for primary care visit for  Chief Complaint   Patient presents with    Foot Pain     Patient states that she was completely recovered from the toe fracture that she had many months ago but states that about 2 or 3 days ago she started to have significant pain in the plantar aspect of the left foot.  Not traumatic in nature.  Patient states that over night the pain became very difficult but during the daytime with stretching and walking the symptoms have gradually improved.  Self care measures have been minimal.    Patient has also been having recurring diffuse itching.  She is under lot of stress.  There has not been any hives or any primary skin lesions. No swelling of the lips or tongue.  No sneezing.  No coughing.      Medications Reviewed and Updated    Past medical, family, and social histories were reviewed and updated.    Review of Systems negative unless otherwise noted in history of present illness-  ROS    General ROS: negative  Psychological ROS: negative  ENT ROS: negative  Endocrine ROS: Negative  Allergy and Immunology ROS: negative  Cardiovascular ROS: negative  Pulmonary ROS: Negative  Gastrointestinal ROS: negative  Genito-Urinary ROS: negative  Musculoskeletal ROS: negative  Neurological ROS: negative  Dermatological ROS: negative        Allergic:  Review of patient's allergies indicates:  No Known Allergies    OBJECTIVE:  BP: 112/60 Pulse: 72 Temp: 97.9 °F (36.6 °C)  Wt Readings from Last 3 Encounters:   10/03/19 77.3 kg (170 lb 6.7 oz)   09/11/19 78.7 kg (173 lb 8 oz)   09/03/19 79.3 kg (174 lb 14.4 oz)    Body mass index is 29.25 kg/m².  Previous Blood Pressure Readings :   BP Readings from Last 3 Encounters:   10/03/19 112/60   09/11/19 104/64   09/03/19 113/75       Physical Exam    GEN: No apparent distress  HEENT: sclera non-icteric,  conjunctiva clear  CV: no peripheral edema  PULM: breathing non-labored  ABD: Obese, protuberant abdomen.  PSYCH:  Anxious affect  MSK: able to rise from chair without assistance  - plantar aspect of the foot with point tenderness bilaterally  SKIN: normal skin turgor    Pertinent Labs Reviewed       ASSESSMENT/PLAN:    Plantar fasciitis.Condition not optimally controlled. Detailed counseling on self care measures. Plan to monitor clinically in addition to plan below or as listed on After Visit Summary.     Anxiety.Condition not optimally controlled. Detailed counseling on self care measures. Plan to monitor clinically in addition to plan below or as listed on After Visit Summary.   -     hydrOXYzine HCl (ATARAX) 25 MG tablet; Take 1 tablet (25 mg total) by mouth 3 (three) times daily as needed for Itching.  Dispense: 60 tablet; Refill: 0    Pruritic disorder.Condition stable.  Counseling given today on self-care measures. Plan to monitor clinically. Continue current medical plan.   -     hydrOXYzine HCl (ATARAX) 25 MG tablet; Take 1 tablet (25 mg total) by mouth 3 (three) times daily as needed for Itching.  Dispense: 60 tablet; Refill: 0          Future Appointments   Date Time Provider Department Center   11/21/2019  1:00 PM Bill Roe MD [facility]   11/22/2019  3:00 PM [facility] BRENDA Roe  10/4/2019  5:14 PM

## 2019-11-21 ENCOUNTER — OFFICE VISIT (OUTPATIENT)
Dept: INTERNAL MEDICINE | Facility: CLINIC | Age: 44
End: 2019-11-21
Payer: MEDICAID

## 2019-11-21 VITALS
OXYGEN SATURATION: 98 % | HEIGHT: 64 IN | HEART RATE: 70 BPM | SYSTOLIC BLOOD PRESSURE: 126 MMHG | DIASTOLIC BLOOD PRESSURE: 68 MMHG | WEIGHT: 168.88 LBS | BODY MASS INDEX: 28.83 KG/M2

## 2019-11-21 DIAGNOSIS — R10.31 RLQ ABDOMINAL PAIN: Primary | ICD-10-CM

## 2019-11-21 PROCEDURE — 99213 OFFICE O/P EST LOW 20 MIN: CPT | Mod: PBBFAC,PO | Performed by: INTERNAL MEDICINE

## 2019-11-21 PROCEDURE — 99213 PR OFFICE/OUTPT VISIT, EST, LEVL III, 20-29 MIN: ICD-10-PCS | Mod: S$PBB,,, | Performed by: INTERNAL MEDICINE

## 2019-11-21 PROCEDURE — 99999 PR PBB SHADOW E&M-EST. PATIENT-LVL III: CPT | Mod: PBBFAC,,, | Performed by: INTERNAL MEDICINE

## 2019-11-21 PROCEDURE — 99213 OFFICE O/P EST LOW 20 MIN: CPT | Mod: S$PBB,,, | Performed by: INTERNAL MEDICINE

## 2019-11-21 PROCEDURE — 99999 PR PBB SHADOW E&M-EST. PATIENT-LVL III: ICD-10-PCS | Mod: PBBFAC,,, | Performed by: INTERNAL MEDICINE

## 2019-11-21 RX ORDER — HYOSCYAMINE SULFATE 0.125 MG
125 TABLET ORAL EVERY 6 HOURS PRN
Qty: 30 TABLET | Refills: 0 | Status: SHIPPED | OUTPATIENT
Start: 2019-11-21 | End: 2020-02-04

## 2019-11-21 NOTE — PROGRESS NOTES
Portions of this note are generated with voice recognition software. Typographical errors may exist.     SUBJECTIVE:    This is a/an 44 y.o. female here for primary care visit for  Chief Complaint   Patient presents with    Anxiety     1 mo      patient states that pruritus and plantar pain that was noted at the previous visit completely resolved.  Patient states that she has having a recurrence of a chronic issue her left lower quadrant abdominal discomfort.  Not severe but very frequent now.  States that there is no association with a change in bowel habits.  She describes [place] type for bowel movements despite having almost daily recurrences of this pain.  Does not seem to have a coincidence with cycles although cycles have been fairly regular for the past 6 months.  Patient has not had hot flashes but understands that she is perimenopausal.    Medications Reviewed and Updated    Past medical, family, and social histories were reviewed and updated.    Review of Systems negative unless otherwise noted in history of present illness-  ROS    General ROS: negative  Psychological ROS: negative  ENT ROS: negative  Endocrine ROS: Negative  Allergy and Immunology ROS: negative  Cardiovascular ROS: negative  Pulmonary ROS: Negative  Gastrointestinal ROS: negative  Genito-Urinary ROS: negative  Musculoskeletal ROS: negative      Allergic:  Review of patient's allergies indicates:  No Known Allergies    OBJECTIVE:  BP: 126/68 Pulse: 70    Wt Readings from Last 3 Encounters:   11/21/19 76.6 kg (168 lb 14 oz)   10/03/19 77.3 kg (170 lb 6.7 oz)   09/11/19 78.7 kg (173 lb 8 oz)    Body mass index is 28.99 kg/m².  Previous Blood Pressure Readings :   BP Readings from Last 3 Encounters:   11/21/19 126/68   10/03/19 112/60   09/11/19 104/64       Physical Exam    GEN: No apparent distress  HEENT: sclera non-icteric, conjunctiva clear  CV: no peripheral edema  PULM: breathing non-labored  ABD: non, protuberant abdomen.modest TTP  RLQ. No abdominal masses.  PSYCH: appropriate affect  MSK: able to rise from chair without assistance  SKIN: normal skin turgor    Pertinent Labs Reviewed       ASSESSMENT/PLAN:    RLQ abdominal pain.Etiology unclear. Not controlled. Further evaluation warranted.  Recommendations as below or as written on After Visit Summary.   -     hyoscyamine (ANASPAZ,LEVSIN) 0.125 mg Tab; Take 1 tablet (125 mcg total) by mouth every 6 (six) hours as needed (abdominal spasms (espasmos abdominales)).  Dispense: 30 tablet; Refill: 0  -     CT Abdomen Pelvis With Contrast; Future; Expected date: 11/21/2019          Future Appointments   Date Time Provider Department Center   11/25/2019  2:45 PM [facility] MAMMO1 [facility] MAMMO [facility]   11/26/2019  1:30 PM [facility] CT1 LIMIT 400 LBS [facility] CT SCAN [facility]   1/15/2020  2:20 PM Bill Roe MD [facility]       Bill Roe  11/24/2019  2:23 PM

## 2019-11-21 NOTE — PATIENT INSTRUCTIONS
Hyoscyamine tablets  ¿Qué es pamela medicamento?  La HIOSCIAMINA se utiliza para tratar problemas del estómago o la vejiga. Pamela medicamento también se utiliza para la rinitis, para reducir algunos problemas provocados por la enfermedad de Parkinson y para el tratamiento de la intoxicación con medicamentos normalmente usados para tratar la miastenia gravis.  ¿Cómo faiza utilizar pamela medicamento?  [place] pamela medicamento por vía oral con un vaso de agua. Siga las instrucciones de la etiqueta del medicamento. [place] disha dosis a intervalos regulares. No tome herrera medicamento con jaziel frecuencia mayor a la indicada.  Hable con herrera pediatra para informarse acerca del uso de pamela medicamento en niños. Puede requerir atención especial. Aunque pamela medicamento ha sido recetado a niños tan menores felix de 12 años de edad para condiciones selectivas, las precauciones se aplican.  ¿Qué efectos secundarios puedo tener al utilizar pamela medicamento?  Efectos secundarios que debe informar a herrera médico o a herrera profesional de la amanda tan pronto felix sea posible:  · ansiedad, nerviosismo  · confusión  · mareos o desmayos  · pulso cardiaco rápido  · fiebre  · dolor o dificultad para orinar  · cansancio o debilidad inusual  · vómito  Efectos secundarios que, por lo general, no requieren atención médica (debe informarlos a herrera médico o a herrera profesional de la amanda si persisten o si son molestos):  · alteración en el sentido del gusto  · estreñimiento  · náuseas      ¿Qué le faiza informar a mi profesional de la amanda antes de nicole pamela medicamento?  Necesita saber si usted presenta alguno de los siguientes problemas o situaciones:  · dificultad para orinar      ¿A qué faiza estar atento al usar pamela medicamento?  Puede experimentar mareos. No conduzca ni utilice maquinaria, ni sharon nada que le exija permanecer en estado de alerta hasta que sepa cómo le afecta pamela medicamento. Para reducir el riesgo de mareos o desmayos, no se siente ni se ponga  de pie con rapidez, especialmente si es un paciente de edad avanzada. El alcohol puede hacerlo sentirse más mareado. Evite consumir bebidas alcohólicas.  Si disha ojos se vuelven más sensibles a la casey por el uso de pamela medicamento, evite la casey abbey y use anteojos para el sol.  Se le podrá secar la boca. Masticar chicle sin azúcar, chupar caramelos duros y beber agua en abundancia le ayudarán a mantener la boca húmeda. Si el problema no desaparece o es severo, consulte a herrera médico.  Pamela medicamento puede resecarle los ojos y provocar visión borrosa. Si usa lentes de contacto, puede sentir ciertas molestias. Las gotas lubricantes pueden ser útiles. Si el problema no desaparece o es severo, consulte a herrera médico de los ojos.  Evite temperaturas elevadas extremas (p. ej. piletas de [place], saunas). Pamela medicamento puede hacer que sude menos que lo normal. La temperatura de herrera cuerpo puede elevarse a niveles peligrosos, lo que puede causar un golpe de calor.  © 9368-5161 [employer], [employer]. [address] [place] [address], [place] [address]. Todos los derechos reservados. Esta información no pretende sustituir la atención médica profesional. Sólo herrera médico puede diagnosticar y tratar un problema de amanda.

## 2019-11-25 ENCOUNTER — HOSPITAL ENCOUNTER (OUTPATIENT)
Dept: RADIOLOGY | Facility: HOSPITAL | Age: 44
Discharge: HOME | End: 2019-11-25
Attending: OBSTETRICS & GYNECOLOGY
Payer: MEDICAID

## 2019-11-25 DIAGNOSIS — Z12.39 SCREENING BREAST EXAMINATION: ICD-10-CM

## 2019-11-25 PROCEDURE — 77063 BREAST TOMOSYNTHESIS BI: CPT | Mod: 26,,, | Performed by: RADIOLOGY

## 2019-11-25 PROCEDURE — 77063 MAMMO DIGITAL SCREENING BILAT WITH TOMOSYNTHESIS_CAD: ICD-10-PCS | Mod: 26,,, | Performed by: RADIOLOGY

## 2019-11-25 PROCEDURE — 77067 SCR MAMMO BI INCL CAD: CPT | Mod: TC

## 2019-11-25 PROCEDURE — 77067 MAMMO DIGITAL SCREENING BILAT WITH TOMOSYNTHESIS_CAD: ICD-10-PCS | Mod: 26,,, | Performed by: RADIOLOGY

## 2019-11-25 PROCEDURE — 77067 SCR MAMMO BI INCL CAD: CPT | Mod: 26,,, | Performed by: RADIOLOGY

## 2019-11-26 ENCOUNTER — HOSPITAL ENCOUNTER (OUTPATIENT)
Dept: RADIOLOGY | Facility: HOSPITAL | Age: 44
Discharge: HOME | End: 2019-11-26
Attending: INTERNAL MEDICINE
Payer: MEDICAID

## 2019-11-26 DIAGNOSIS — R10.31 RLQ ABDOMINAL PAIN: ICD-10-CM

## 2019-11-26 PROCEDURE — 74177 CT ABD & PELVIS W/CONTRAST: CPT | Mod: TC

## 2019-11-26 PROCEDURE — 74177 CT ABDOMEN PELVIS WITH CONTRAST: ICD-10-PCS | Mod: 26,,, | Performed by: RADIOLOGY

## 2019-11-26 PROCEDURE — 74177 CT ABD & PELVIS W/CONTRAST: CPT | Mod: 26,,, | Performed by: RADIOLOGY

## 2019-11-26 PROCEDURE — 25500020 PHARM REV CODE 255: Performed by: INTERNAL MEDICINE

## 2019-11-26 RX ADMIN — IOHEXOL 30 ML: 350 INJECTION, SOLUTION INTRAVENOUS at 01:11

## 2019-11-26 RX ADMIN — IOHEXOL 75 ML: 350 INJECTION, SOLUTION INTRAVENOUS at 02:11

## 2019-11-26 RX ADMIN — IOHEXOL 60 ML: 350 INJECTION, SOLUTION INTRAVENOUS at 02:11

## 2019-12-03 ENCOUNTER — TELEPHONE (OUTPATIENT)
Dept: INTERNAL MEDICINE | Facility: CLINIC | Age: 44
End: 2019-12-03

## 2019-12-03 NOTE — TELEPHONE ENCOUNTER
Please contact patient about results. No urgent findings. CT did should that the right side of colon showed significant signs of constipation and increasing daily water intake and fiber (citrucel brand, 1 dose up to 3 times daily) will be important to reduce abdominal bloating and pain. If the pain does not improve, over the next 7-10 days she should call so we can do additional testing.

## 2019-12-03 NOTE — TELEPHONE ENCOUNTER
Spoke with pt. Informed her CT scan results . Advised pt to buy vitamins recommened  by  ,also to intake more fiber in diet . Pt voices understanding

## 2019-12-20 ENCOUNTER — TELEPHONE (OUTPATIENT)
Dept: FAMILY MEDICINE | Facility: CLINIC | Age: 44
End: 2019-12-20

## 2019-12-20 ENCOUNTER — TELEPHONE (OUTPATIENT)
Dept: INTERNAL MEDICINE | Facility: CLINIC | Age: 44
End: 2019-12-20

## 2019-12-20 DIAGNOSIS — R10.31 RLQ ABDOMINAL PAIN: Primary | ICD-10-CM

## 2019-12-20 NOTE — TELEPHONE ENCOUNTER
Spoke with patient to make appointment  To have u/s done and also to see someone at urgent care tomorrow pt. Voices understanding

## 2019-12-20 NOTE — TELEPHONE ENCOUNTER
----- Message from Indy Falcon MA sent at 12/20/2019  3:35 PM CST -----  Patient called to inform you medication is not helping abdominal  pain , patient would like to know if anything else can be done?

## 2019-12-20 NOTE — TELEPHONE ENCOUNTER
----- Message from Patsy Rosenberg sent at 12/20/2019  3:19 PM CST -----  Contact: patient   Patient wanted to inform the doctor that she is still not feeling well. She stated that she would like to see the doctor as soon as possible. Medication is not helping at all.     Please call [phone] to discuss today.

## 2019-12-20 NOTE — TELEPHONE ENCOUNTER
----- Message from Patsy Rosenberg sent at 12/20/2019  3:19 PM CST -----  Contact: patient   Patient wanted to inform the doctor that she is still not feeling well. She stated that she would like to see the doctor as soon as possible. Medication is not helping at all.     Please call [phone] to discuss today.

## 2019-12-26 ENCOUNTER — HOSPITAL ENCOUNTER (OUTPATIENT)
Dept: RADIOLOGY | Facility: HOSPITAL | Age: 44
Discharge: HOME | End: 2019-12-26
Attending: INTERNAL MEDICINE
Payer: MEDICAID

## 2019-12-26 ENCOUNTER — TELEPHONE (OUTPATIENT)
Dept: INTERNAL MEDICINE | Facility: CLINIC | Age: 44
End: 2019-12-26

## 2019-12-26 DIAGNOSIS — R10.31 RLQ ABDOMINAL PAIN: ICD-10-CM

## 2019-12-26 PROCEDURE — 76856 US EXAM PELVIC COMPLETE: CPT | Mod: 26,,, | Performed by: RADIOLOGY

## 2019-12-26 PROCEDURE — 76830 TRANSVAGINAL US NON-OB: CPT | Mod: 26,,, | Performed by: RADIOLOGY

## 2019-12-26 PROCEDURE — 76856 US PELVIS COMP WITH TRANSVAG NON-OB (XPD): ICD-10-PCS | Mod: 26,,, | Performed by: RADIOLOGY

## 2019-12-26 PROCEDURE — 76830 US PELVIS COMP WITH TRANSVAG NON-OB (XPD): ICD-10-PCS | Mod: 26,,, | Performed by: RADIOLOGY

## 2019-12-26 PROCEDURE — 76830 TRANSVAGINAL US NON-OB: CPT | Mod: TC

## 2019-12-26 NOTE — TELEPHONE ENCOUNTER
Please cancel follow-up appointment with me and instead get an appointment with gynecologist Dr. Butler.  Ultrasound indicates that pain is likely due to ovaries.  Gynecologist Dr. Butler will help to review the results of the recent ultrasound and come up with a treatment plan.  She can follow up with me after the gynecology appointment.

## 2019-12-26 NOTE — TELEPHONE ENCOUNTER
Spoke with patient and scheduled appt with ob/gyn and f/u after with Dr. Roe. Patient voice understanding.

## 2020-01-20 ENCOUNTER — OFFICE VISIT (OUTPATIENT)
Dept: URGENT CARE | Facility: CLINIC | Age: 45
End: 2020-01-20
Payer: MEDICAID

## 2020-01-20 VITALS
DIASTOLIC BLOOD PRESSURE: 46 MMHG | HEART RATE: 70 BPM | HEIGHT: 64 IN | BODY MASS INDEX: 28.17 KG/M2 | RESPIRATION RATE: 16 BRPM | WEIGHT: 165 LBS | SYSTOLIC BLOOD PRESSURE: 112 MMHG | TEMPERATURE: 99 F | OXYGEN SATURATION: 98 %

## 2020-01-20 DIAGNOSIS — J02.9 SORE THROAT: ICD-10-CM

## 2020-01-20 DIAGNOSIS — J06.9 URI, ACUTE: Primary | ICD-10-CM

## 2020-01-20 LAB
CTP QC/QA: YES
MOLECULAR STREP A: NEGATIVE

## 2020-01-20 PROCEDURE — 87651 POCT STREP A MOLECULAR: ICD-10-PCS | Mod: QW,S$GLB,, | Performed by: PHYSICIAN ASSISTANT

## 2020-01-20 PROCEDURE — 99214 OFFICE O/P EST MOD 30 MIN: CPT | Mod: 25,S$GLB,, | Performed by: PHYSICIAN ASSISTANT

## 2020-01-20 PROCEDURE — 87651 STREP A DNA AMP PROBE: CPT | Mod: QW,S$GLB,, | Performed by: PHYSICIAN ASSISTANT

## 2020-01-20 PROCEDURE — 99214 PR OFFICE/OUTPT VISIT, EST, LEVL IV, 30-39 MIN: ICD-10-PCS | Mod: 25,S$GLB,, | Performed by: PHYSICIAN ASSISTANT

## 2020-01-20 RX ORDER — OXYMETAZOLINE HCL 0.05 %
2 SPRAY, NON-AEROSOL (ML) NASAL 2 TIMES DAILY
Qty: 15 ML | Refills: 0 | COMMUNITY
Start: 2020-01-20 | End: 2020-01-23

## 2020-01-20 RX ORDER — PSEUDOEPHEDRINE HCL 120 MG/1
120 TABLET, FILM COATED, EXTENDED RELEASE ORAL 2 TIMES DAILY PRN
Qty: 20 TABLET | Refills: 0 | Status: SHIPPED | OUTPATIENT
Start: 2020-01-20 | End: 2020-01-30

## 2020-01-20 RX ORDER — DEXAMETHASONE SODIUM PHOSPHATE 100 MG/10ML
8 INJECTION INTRAMUSCULAR; INTRAVENOUS
Status: COMPLETED | OUTPATIENT
Start: 2020-01-20 | End: 2020-01-20

## 2020-01-20 RX ADMIN — DEXAMETHASONE SODIUM PHOSPHATE 8 MG: 100 INJECTION INTRAMUSCULAR; INTRAVENOUS at 12:01

## 2020-01-20 NOTE — PROGRESS NOTES
"Subjective:       Patient ID: Tessa Wynne is a 44 y.o. female.    Vitals:  height is 5' 4" (1.626 m) and weight is 74.8 kg (165 lb). Her temperature is 99.1 °F (37.3 °C). Her blood pressure is 112/46 (abnormal) and her pulse is 70. Her respiration is 16 and oxygen saturation is 98%.     Chief Complaint: Sore Throat    44-year-old female presents with nasal congestion, sore throat, ear pain for 3 days.  Patient denies any fevers, chills, chest pain, shortness of breath, dyspnea, nausea, vomiting, diarrhea or abdominal pain.  Patient denies any known sick contacts or recent travel.  Patient has been taking Tylenol without much improvement.    Sore Throat    This is a new problem. The current episode started in the past 7 days (3 days). The problem has been gradually worsening. There has been no fever. The pain is at a severity of 4/10. The pain is mild. Associated symptoms include congestion, ear pain and trouble swallowing. Pertinent negatives include no coughing, shortness of breath, stridor or vomiting. She has had no exposure to strep or mono. She has tried acetaminophen (decongestion) for the symptoms. The treatment provided mild relief.       Constitution: Negative for chills, sweating, fatigue and fever.   HENT: Positive for ear pain, congestion, sinus pain, sinus pressure, sore throat and trouble swallowing. Negative for voice change.    Neck: Negative for painful lymph nodes.   Eyes: Negative for eye redness.   Respiratory: Negative for chest tightness, cough, sputum production, bloody sputum, COPD, shortness of breath, stridor, wheezing and asthma.    Gastrointestinal: Negative for nausea and vomiting.   Musculoskeletal: Negative for muscle ache.   Skin: Negative for rash.   Allergic/Immunologic: Negative for seasonal allergies and asthma.   Hematologic/Lymphatic: Negative for swollen lymph nodes.       Objective:      Physical Exam   Constitutional: She is oriented to person, place, and time. She " appears well-developed and well-nourished. She is cooperative.  Non-toxic appearance. She does not have a sickly appearance. She does not appear ill. No distress.   HENT:   Head: Normocephalic and atraumatic.   Right Ear: Hearing, tympanic membrane, external ear and ear canal normal.   Left Ear: Hearing, tympanic membrane, external ear and ear canal normal.   Nose: Mucosal edema present. No rhinorrhea or nasal deformity. No epistaxis. Right sinus exhibits no maxillary sinus tenderness and no frontal sinus tenderness. Left sinus exhibits no maxillary sinus tenderness and no frontal sinus tenderness.   Mouth/Throat: Uvula is midline and mucous membranes are normal. No trismus in the jaw. Normal dentition. No uvula swelling. Posterior oropharyngeal erythema present. No oropharyngeal exudate or posterior oropharyngeal edema.   Eyes: Conjunctivae and lids are normal. No scleral icterus.   Neck: Trachea normal, full passive range of motion without pain and phonation normal. Neck supple. No neck rigidity. No edema and no erythema present.   Cardiovascular: Normal rate, regular rhythm, normal heart sounds, intact distal pulses and normal pulses.   Pulmonary/Chest: Effort normal and breath sounds normal. No respiratory distress. She has no decreased breath sounds. She has no rhonchi.   Abdominal: Normal appearance.   Musculoskeletal: Normal range of motion. She exhibits no edema or deformity.   Neurological: She is alert and oriented to person, place, and time. She exhibits normal muscle tone. Coordination normal.   Skin: Skin is warm, dry, intact, not diaphoretic and not pale.   Psychiatric: She has a normal mood and affect. Her speech is normal and behavior is normal. Judgment and thought content normal. Cognition and memory are normal.   Nursing note and vitals reviewed.          Results for orders placed or performed in visit on 01/20/20   POCT Strep A, Molecular   Result Value Ref Range    Molecular Strep A, POC  Negative Negative     Acceptable Yes        Assessment:       1. URI, acute    2. Sore throat        Plan:         URI, acute  -     dexamethasone injection 8 mg  -     pseudoephedrine (SUDAFED) 120 mg 12 hr tablet; Take 1 tablet (120 mg total) by mouth 2 (two) times daily as needed for Congestion.  Dispense: 20 tablet; Refill: 0  -     oxymetazoline (AFRIN) 0.05 % nasal spray; 2 sprays by Nasal route 2 (two) times daily. for 3 days  Dispense: 15 mL; Refill: 0    Sore throat  -     POCT Strep A, Molecular      Patient Instructions     Enfermedad Respiratoria Viral [Uri, Viral Respiratory Illness, Adult, No Abx]  Usted tiene jaziel enfermedad respiratoria de las vías superiores provocada por un virus. Esta enfermedad es contagiosa greyson los primeros días. Se transmite por el aire, por la tos o el estornudo de la persona afectada, o por contacto directo con alok persona (si ebonie toca a la persona enferma y después se toca los ojos, la nariz o la boca). La mayoría de las enfermedades virales mejoran en 7-10 días con reposo y simples chito caseros; aunque, en ocasiones, la enfermedad puede durar varias semanas. Los antibióticos son ineficaces contra los virus, por lo que generalmente no se recetan para esta enfermedad.    Cuidados En [place]:  1) Si los síntomas son severos, descanse en herrera casa greyson los primeros 2 ó 3 días. Cuando reanude disha actividades, evite cansarse demasiado.  2) Evite exponerse al humo de cigarrillo (suyo o de otras personas).  3) Puede usar Tylenol (acetaminofén) o ibuprofeno (Motrin o Advil) para controlar la fiebre o el dolor muscular y el dolor de angle. (La aspirina no debe usarse nunca en personas menores de 18 años enfermas con fiebre, ya que puede causar daños graves al hígado.)  4) Es posible que tenga poco apetito, por lo que jaziel dieta ligera es adecuada. Para evitar la deshidratación, bear entre 6 y 8 vasos de líquido cada día (agua, refrescos, jugos de fruta, té,  sopa etc.). La abundancia de líquido ayuda a desprender las secreciones de la nariz y los pulmones.  5) Los medicamentos sin receta para el resfriado no acortarán la duración de la enfermedad, nickie pueden ser útiles para aliviar los siguientes síntomas: tos (Robitussin MD); dolor de garganta (Chloraseptic en comprimidos o spray); congestión nasal y de los senos paranasales (Actifed, Sudafed o Chlortrimeton).  Keven jaziel VISITA DE CONTROL a herrera médico, o según le indiquen, si no se siente mejor greyson la semana próxima.  Busque Prontamente Atención Médica  si algo de lo siguiente ocurre:  -- Tos con esputo de color (mucosidad) o con leanne.  -- Dolor en el pecho, falta de aire, silbidos o dificultad para respirar.  -- Dolor de angle abbey, dolor en la kim, el parul o los oídos.  -- Fiebre superior a los 100.4º F (38.0º C) greyson más de mavis días.  -- Incapacidad de tragar a causa del dolor de garganta.  Date Last Reviewed: 9/13/2015  © 7803-7953 [employer], "2,10E+07". [address] [place] [address], [place] [address]. Todos los derechos reservados. Esta información no pretende sustituir la atención médica profesional. Sólo herrera médico puede diagnosticar y tratar un problema de amanda.

## 2020-01-20 NOTE — PATIENT INSTRUCTIONS
Enfermedad Respiratoria Viral [Uri, Viral Respiratory Illness, Adult, No Abx]  Usted tiene jaziel enfermedad respiratoria de las vías superiores provocada por un virus. Esta enfermedad es contagiosa greyson los primeros días. Se transmite por el aire, por la tos o el estornudo de la persona afectada, o por contacto directo con alok persona (si ebonie toca a la persona enferma y después se toca los ojos, la nariz o la boca). La mayoría de las enfermedades virales mejoran en 7-10 días con reposo y simples chito caseros; aunque, en ocasiones, la enfermedad puede durar varias semanas. Los antibióticos son ineficaces contra los virus, por lo que generalmente no se recetan para esta enfermedad.    Cuidados En [place]:  1) Si los síntomas son severos, descanse en herrera casa greyson los primeros 2 ó 3 días. Cuando reanude disha actividades, evite cansarse demasiado.  2) Evite exponerse al humo de cigarrillo (suyo o de otras personas).  3) Puede usar Tylenol (acetaminofén) o ibuprofeno (Motrin o Advil) para controlar la fiebre o el dolor muscular y el dolor de angle. (La aspirina no debe usarse nunca en personas menores de 18 años enfermas con fiebre, ya que puede causar daños graves al hígado.)  4) Es posible que tenga poco apetito, por lo que jaziel dieta ligera es adecuada. Para evitar la deshidratación, bear entre 6 y 8 vasos de líquido cada día (agua, refrescos, jugos de fruta, té, sopa etc.). La abundancia de líquido ayuda a desprender las secreciones de la nariz y los pulmones.  5) Los medicamentos sin receta para el resfriado no acortarán la duración de la enfermedad, nickie pueden ser útiles para aliviar los siguientes síntomas: tos (Robitussin MD); dolor de garganta (Chloraseptic en comprimidos o spray); congestión nasal y de los senos paranasales (Actifed, Sudafed o Chlortrimeton).  Keven jaziel VISITA DE CONTROL a herrera médico, o según le indiquen, si no se siente mejor greyson la semana próxima.  Busque Prontamente Atención  Médica  si algo de lo siguiente ocurre:  -- Tos con esputo de color (mucosidad) o con leanne.  -- Dolor en el pecho, falta de aire, silbidos o dificultad para respirar.  -- Dolor de angle abbey, dolor en la kim, el parul o los oídos.  -- Fiebre superior a los 100.4º F (38.0º C) greyson más de mavis días.  -- Incapacidad de tragar a causa del dolor de garganta.  Date Last Reviewed: 9/13/2015  © 5977-0500 [employer]. [address] [place] [address], [place] [address]. Todos los derechos reservados. Esta información no pretende sustituir la atención médica profesional. Sólo herrera médico puede diagnosticar y tratar un problema de amanda.

## 2020-01-23 ENCOUNTER — PATIENT OUTREACH (OUTPATIENT)
Dept: ADMINISTRATIVE | Facility: OTHER | Age: 45
End: 2020-01-23

## 2020-01-28 ENCOUNTER — OFFICE VISIT (OUTPATIENT)
Dept: OBSTETRICS AND GYNECOLOGY | Facility: CLINIC | Age: 45
End: 2020-01-28
Payer: MEDICAID

## 2020-01-28 VITALS
DIASTOLIC BLOOD PRESSURE: 62 MMHG | WEIGHT: 167.13 LBS | SYSTOLIC BLOOD PRESSURE: 102 MMHG | BODY MASS INDEX: 28.68 KG/M2

## 2020-01-28 DIAGNOSIS — N83.202 LEFT OVARIAN CYST: Primary | ICD-10-CM

## 2020-01-28 DIAGNOSIS — N89.8 CLEAR VAGINAL DISCHARGE: ICD-10-CM

## 2020-01-28 PROCEDURE — 87661 TRICHOMONAS VAGINALIS AMPLIF: CPT

## 2020-01-28 PROCEDURE — 87481 CANDIDA DNA AMP PROBE: CPT | Mod: 59

## 2020-01-28 PROCEDURE — 99999 PR PBB SHADOW E&M-EST. PATIENT-LVL III: ICD-10-PCS | Mod: PBBFAC,,, | Performed by: OBSTETRICS & GYNECOLOGY

## 2020-01-28 PROCEDURE — 99213 OFFICE O/P EST LOW 20 MIN: CPT | Mod: PBBFAC,PO | Performed by: OBSTETRICS & GYNECOLOGY

## 2020-01-28 PROCEDURE — 99213 PR OFFICE/OUTPT VISIT, EST, LEVL III, 20-29 MIN: ICD-10-PCS | Mod: S$PBB,,, | Performed by: OBSTETRICS & GYNECOLOGY

## 2020-01-28 PROCEDURE — 99999 PR PBB SHADOW E&M-EST. PATIENT-LVL III: CPT | Mod: PBBFAC,,, | Performed by: OBSTETRICS & GYNECOLOGY

## 2020-01-28 PROCEDURE — 99213 OFFICE O/P EST LOW 20 MIN: CPT | Mod: S$PBB,,, | Performed by: OBSTETRICS & GYNECOLOGY

## 2020-01-28 RX ORDER — SULFACETAMIDE SODIUM, SULFUR 100; 50 MG/G; MG/G
EMULSION TOPICAL
Refills: 0 | COMMUNITY
Start: 2019-11-20 | End: 2020-02-04

## 2020-01-28 RX ORDER — TRETINOIN 0.25 MG/G
CREAM TOPICAL
Refills: 0 | COMMUNITY
Start: 2019-11-21 | End: 2020-02-04

## 2020-01-28 RX ORDER — HYOSCYAMINE SULFATE 0.12 MG/1
TABLET, ORALLY DISINTEGRATING ORAL
Refills: 0 | COMMUNITY
Start: 2019-11-21 | End: 2020-02-04

## 2020-01-28 NOTE — PROGRESS NOTES
43 yo female who presents to discuss pelvic pain.  Reports that pain started in dec 2019.  Her PCP ordered an US and patient was noted to have LEFT complex ovarian cyst.  She reports that her pain comes and goes now. But, it does still occur.  For this patient, however, the pain on the right side.  She has not taken her OCPs since her last visit because of problems with her pharmacy.  Reports that she is not sexually active with her  - so she has been ok with NOT taking the pills.  Her menstrual cycle, however, has not been regular without the OCPs.  Reports that she saw me in sept 2019. She then had her next cycle just this Jan 6, 2020.  Patient also complains of clear vaginal discharge for a few months.    On pelvic exam, there is clear discharge from the vagina.  On bimanual exam, I can feel a mass in the LLQ. It is slightly tender to palpation. No rebound. NO guarding.    Pelvic US completed in 12/2019 was reviewed with the patient today.    Recommend f/u for repeat US per radiology recommendation.  I suspect that the patient's LEFT complex ovarian cyst is still present.  We will discuss management at her next visit.  May want to restart OCPs (can send rx to our pharmacy) and then repeat US a few cycles later to see if cyst has resolved.  Other option could be to remove ovary.  Affirm collected today    F/u in 2 wks    charly patterson MD

## 2020-01-30 LAB
BACTERIAL VAGINOSIS DNA: NEGATIVE
CANDIDA GLABRATA DNA: NEGATIVE
CANDIDA KRUSEI DNA: NEGATIVE
CANDIDA RRNA VAG QL PROBE: NEGATIVE
T VAGINALIS RRNA GENITAL QL PROBE: NEGATIVE

## 2020-02-04 ENCOUNTER — OFFICE VISIT (OUTPATIENT)
Dept: INTERNAL MEDICINE | Facility: CLINIC | Age: 45
End: 2020-02-04
Payer: MEDICAID

## 2020-02-04 ENCOUNTER — LAB VISIT (OUTPATIENT)
Dept: LAB | Facility: HOSPITAL | Age: 45
End: 2020-02-04
Attending: INTERNAL MEDICINE
Payer: MEDICAID

## 2020-02-04 VITALS
SYSTOLIC BLOOD PRESSURE: 128 MMHG | WEIGHT: 167.56 LBS | BODY MASS INDEX: 28.6 KG/M2 | OXYGEN SATURATION: 98 % | DIASTOLIC BLOOD PRESSURE: 64 MMHG | HEIGHT: 64 IN | HEART RATE: 73 BPM

## 2020-02-04 DIAGNOSIS — Z11.4 SCREENING FOR HIV WITHOUT PRESENCE OF RISK FACTORS: ICD-10-CM

## 2020-02-04 DIAGNOSIS — Z23 FLU VACCINE NEED: Primary | ICD-10-CM

## 2020-02-04 DIAGNOSIS — K58.2 IRRITABLE BOWEL SYNDROME WITH BOTH CONSTIPATION AND DIARRHEA: Primary | ICD-10-CM

## 2020-02-04 DIAGNOSIS — N83.209 CYST OF OVARY, UNSPECIFIED LATERALITY: ICD-10-CM

## 2020-02-04 PROCEDURE — 36415 COLL VENOUS BLD VENIPUNCTURE: CPT | Mod: PO

## 2020-02-04 PROCEDURE — 99213 PR OFFICE/OUTPT VISIT, EST, LEVL III, 20-29 MIN: ICD-10-PCS | Mod: S$PBB,,, | Performed by: INTERNAL MEDICINE

## 2020-02-04 PROCEDURE — 90686 IIV4 VACC NO PRSV 0.5 ML IM: CPT | Mod: PBBFAC,PO

## 2020-02-04 PROCEDURE — 99999 PR PBB SHADOW E&M-EST. PATIENT-LVL III: ICD-10-PCS | Mod: PBBFAC,,, | Performed by: INTERNAL MEDICINE

## 2020-02-04 PROCEDURE — 99213 OFFICE O/P EST LOW 20 MIN: CPT | Mod: PBBFAC,PO,25 | Performed by: INTERNAL MEDICINE

## 2020-02-04 PROCEDURE — 99999 PR PBB SHADOW E&M-EST. PATIENT-LVL III: CPT | Mod: PBBFAC,,, | Performed by: INTERNAL MEDICINE

## 2020-02-04 PROCEDURE — 86703 HIV-1/HIV-2 1 RESULT ANTBDY: CPT

## 2020-02-04 PROCEDURE — 99213 OFFICE O/P EST LOW 20 MIN: CPT | Mod: S$PBB,,, | Performed by: INTERNAL MEDICINE

## 2020-02-04 NOTE — PATIENT INSTRUCTIONS
Dieta FODMAP       Qué es el síndrome del intestino irritable (IBS)     Las contracciones musculares mueven los alimentos a través del tracto digestivo.      Las personas que tienen el síndrome del intestino irritable (IBS, por disha siglas en inglés) tienen tractos digestivos que normalmente reaccionan a ciertas sustancias o al estrés. [place] produce síntomas felix retorcijones, gas, hinchazón, dolor, estreñimiento y diarrea. A veces también se le llama colon espástico y es jaziel condición médica común que no es jaziel enfermedad, sino más jeannette un kalyani de síntomas que ocurren juntos.  IBS: un problema de motilidad  El movimiento muscular que hace que pase la comida a través del tracto digestivo se llama motilidad. Cuando se tiene IBS, la motilidad normal del tracto digestivo, sobre todo del colon, se ve afectada. La motilidad puede acelerarse, o hacerse más lenta e irregular. Si las heces pasan demasiado rápido por el colon, no se absorbe suficiente agua, y felix resultado las deposiciones pueden ser blandas y aguadas (diarrea). Por lo contrario, si las heces pasan demasiado lentamente por el colon se absorbe demasiada agua y las deposiciones se hacen duras y secas (estreñimiento). Además, las heces y el gas pueden retroceder, produciendo jaziel presión y retorcijones dolorosos. No existe jaziel prueba única que pueda diagnosticar el IBS. Pamela es un conjunto de síntomas que le ayudan a herrera proveedor de atención médica a hacer el diagnóstico. Por lo general se realizan múltiples análisis de leanne, materias fecales, pruebas radiológicas o incluso jaziel colonoscopia para evaluar a las personas a quienes se les sospecha IBS. Estos se hacen principalmente para cerciorarse de que no haya otras enfermedades que causen los síntomas.  Qué causa el IBS  Se ha hecho mucha investigación sobre el IBS, nickie todavía se desconoce herrera causa. algunos de los posibles factores incluyen:  · Fumar, comer ciertos alimentos o nicole alcohol o bebidas con  cafeína pueden desencadenar los síntomas de IBS.  · Aunque no se tiene la certeza absoluta, se loreta que el IBS puede ser causado por un problema de los nervios o de los músculos del tracto digestivo.  · También hay evidencia de que ciertas bacterias que quedan en el intestino barron y el colon después de infección gastrointetinal pueden causar el IBS.  · Aunque el estrés y la ansiedad empeoran los síntomas del IBS, no se loreta que lo causen.  Qué puede hacer  Entre las recomendaciones están:  · Algunos medicamentos pueden ayudar a regular la motilidad del tracto digestivo. Herrera proveedor de atención médica podría recetarle ebonie de ellos o más.  · Los medicamentos no pueden curar el IBS, sino que pueden ayudarle a tratar los síntomas.  · Algunos medicamentos pueden empeorar el IBS. Por esto, a menos que se lo recete herrera proveedor de atención médica, no tome medicamentos sin consultárselo, especialmente laxantes.  · Herrera proveedor de atención médica puede sugerirle cambios en el estilo de katharine que pueden contribuir a controlar el IBS. Dos de los cambios más importantes son el cambio de dieta y el control del estrés. Si se sugieren cambios en la alimentación, pida la orientación de un nutricionista de manera que pueda mantener un consumo saludable de alimentos.  Date Last Reviewed: 3/29/2014  © 6731-3687 [employer], [employer]. [address] [place] [address], [place] [address]. Todos los derechos reservados. Esta información no pretende sustituir la atención médica profesional. Sólo herrera médico puede diagnosticar y tratar un problema de amanda.

## 2020-02-05 LAB — HIV 1+2 AB+HIV1 P24 AG SERPL QL IA: NEGATIVE

## 2020-02-05 NOTE — PROGRESS NOTES
Portions of this note are generated with voice recognition software. Typographical errors may exist.     SUBJECTIVE:    This is a/an 44 y.o. female here for primary care visit for  Chief Complaint   Patient presents with    Follow-up     Patient states that she has tried to gain control of bowel habits by eliminating lactose and most glucose containing food.  Even so she has bowel irregularity and occasional cramping.      Medications Reviewed and Updated    Past medical, family, and social histories were reviewed and updated.    Review of Systems negative unless otherwise noted in history of present illness-  ROS    General ROS: negative  Psychological ROS: negative  ENT ROS: negative  Endocrine ROS: Negative  Allergy and Immunology ROS: negative  Cardiovascular ROS: negative  Pulmonary ROS: Negative  Gastrointestinal ROS: negative    Allergic:  Review of patient's allergies indicates:  No Known Allergies    OBJECTIVE:  BP: 128/64 Pulse: 73    Wt Readings from Last 3 Encounters:   02/04/20 76 kg (167 lb 8.8 oz)   01/28/20 75.8 kg (167 lb 1.7 oz)   01/20/20 74.8 kg (165 lb)    Body mass index is 28.76 kg/m².  Previous Blood Pressure Readings :   BP Readings from Last 3 Encounters:   02/04/20 128/64   01/28/20 102/62   01/20/20 (!) 112/46       Physical Exam    GEN: No apparent distress  HEENT: sclera non-icteric, conjunctiva clear  CV: no peripheral edema  PULM: breathing non-labored  ABD: non protuberant abdomen.  PSYCH: appropriate affect  MSK: able to rise from chair without assistance  SKIN: normal skin turgor    Pertinent Labs Reviewed       ASSESSMENT/PLAN:    Irritable bowel syndrome with both constipation and diarrhea.Condition not optimally controlled. Detailed counseling on self care measures. Plan to monitor clinically in addition to plan below or as listed on After Visit Summary.    - FODMAP diet     Screening for HIV without presence of risk factors  -     HIV 1/2 Ag/Ab (4th Gen); Future; Expected  date: 02/04/2020    Ovarian cyst. . Further evaluation warranted.  Recommendations as below or as written on After Visit Summary.    - follow with OB/Gyn         Future Appointments   Date Time Provider Department Center   2/12/2020  3:30 PM Ariel Butler MD [facility]   2/12/2020  3:30 PM BEN MINOR [facility]   4/2/2020  3:00 PM Bill Roe MD [facility]       Bill Roe  2/5/2020  8:40 AM

## 2020-02-12 ENCOUNTER — PROCEDURE VISIT (OUTPATIENT)
Dept: OBSTETRICS AND GYNECOLOGY | Facility: CLINIC | Age: 45
End: 2020-02-12
Payer: MEDICAID

## 2020-02-12 ENCOUNTER — OFFICE VISIT (OUTPATIENT)
Dept: OBSTETRICS AND GYNECOLOGY | Facility: CLINIC | Age: 45
End: 2020-02-12
Payer: MEDICAID

## 2020-02-12 VITALS — WEIGHT: 168 LBS | SYSTOLIC BLOOD PRESSURE: 100 MMHG | DIASTOLIC BLOOD PRESSURE: 62 MMHG | BODY MASS INDEX: 28.84 KG/M2

## 2020-02-12 DIAGNOSIS — N83.202 LEFT OVARIAN CYST: ICD-10-CM

## 2020-02-12 DIAGNOSIS — N83.202 CYSTS OF BOTH OVARIES: Primary | ICD-10-CM

## 2020-02-12 DIAGNOSIS — N83.201 CYSTS OF BOTH OVARIES: Primary | ICD-10-CM

## 2020-02-12 PROCEDURE — 99213 OFFICE O/P EST LOW 20 MIN: CPT | Mod: PBBFAC,PO,25 | Performed by: OBSTETRICS & GYNECOLOGY

## 2020-02-12 PROCEDURE — 99212 OFFICE O/P EST SF 10 MIN: CPT | Mod: 25,S$PBB,, | Performed by: OBSTETRICS & GYNECOLOGY

## 2020-02-12 PROCEDURE — 99999 PR PBB SHADOW E&M-EST. PATIENT-LVL III: ICD-10-PCS | Mod: PBBFAC,,, | Performed by: OBSTETRICS & GYNECOLOGY

## 2020-02-12 PROCEDURE — 76830 TRANSVAGINAL US NON-OB: CPT | Mod: PBBFAC,PO

## 2020-02-12 PROCEDURE — 76830 TRANSVAGINAL US NON-OB: CPT | Mod: 26,S$PBB,, | Performed by: OBSTETRICS & GYNECOLOGY

## 2020-02-12 PROCEDURE — 76830 PR  ECHOGRAPHY,TRANSVAGINAL: ICD-10-PCS | Mod: 26,S$PBB,, | Performed by: OBSTETRICS & GYNECOLOGY

## 2020-02-12 PROCEDURE — 99999 PR PBB SHADOW E&M-EST. PATIENT-LVL III: CPT | Mod: PBBFAC,,, | Performed by: OBSTETRICS & GYNECOLOGY

## 2020-02-12 PROCEDURE — 99212 PR OFFICE/OUTPT VISIT, EST, LEVL II, 10-19 MIN: ICD-10-PCS | Mod: 25,S$PBB,, | Performed by: OBSTETRICS & GYNECOLOGY

## 2020-02-12 RX ORDER — NORGESTIMATE AND ETHINYL ESTRADIOL 0.25-0.035
1 KIT ORAL DAILY
Qty: 30 TABLET | Refills: 11 | Status: SHIPPED | OUTPATIENT
Start: 2020-02-12 | End: 2020-12-15 | Stop reason: SDUPTHER

## 2020-02-12 NOTE — PROGRESS NOTES
43 yo female who presents to discuss pelvic pain.  The patient has h/o ovarian cyst.  She presents today to discuss pelvic US results.  US today shows that both ovaries have multiple simple cyst.  Endometrial lining is 9.4mm  Patient's last menstrual period was 01/06/2020 (exact date).  Patient reports no recent sexual activity. Reports that she is NOT pregnant.  We have discussed using OCPs to help problems with recurrent cysts.  The patient has used OCPs in the past.  She wants to proceed with OCPs.  rx for sprintec provided.  Patient instructed on use.    F/u in 12 weeks for repeat US and gyn appointment.  If no improvement, surgical intervention to be discussed, if warranted.    charly patterson MD

## 2020-03-11 ENCOUNTER — TELEPHONE (OUTPATIENT)
Dept: INTERNAL MEDICINE | Facility: CLINIC | Age: 45
End: 2020-03-11

## 2020-03-11 NOTE — TELEPHONE ENCOUNTER
----- Message from Priyanka Ordonez sent at 3/11/2020  1:07 PM CDT -----  Contact: Self [phone]  Patient would like to speak with you about not feeling well. Please advise

## 2020-03-12 ENCOUNTER — OFFICE VISIT (OUTPATIENT)
Dept: INTERNAL MEDICINE | Facility: CLINIC | Age: 45
End: 2020-03-12
Payer: MEDICAID

## 2020-03-12 VITALS
WEIGHT: 167.31 LBS | HEIGHT: 64 IN | DIASTOLIC BLOOD PRESSURE: 64 MMHG | TEMPERATURE: 98 F | BODY MASS INDEX: 28.56 KG/M2 | OXYGEN SATURATION: 97 % | HEART RATE: 64 BPM | SYSTOLIC BLOOD PRESSURE: 100 MMHG

## 2020-03-12 DIAGNOSIS — K64.4 INFLAMED EXTERNAL HEMORRHOID: Primary | ICD-10-CM

## 2020-03-12 PROCEDURE — 99213 PR OFFICE/OUTPT VISIT, EST, LEVL III, 20-29 MIN: ICD-10-PCS | Mod: S$PBB,,, | Performed by: INTERNAL MEDICINE

## 2020-03-12 PROCEDURE — 99213 OFFICE O/P EST LOW 20 MIN: CPT | Mod: PBBFAC,PO | Performed by: INTERNAL MEDICINE

## 2020-03-12 PROCEDURE — 99999 PR PBB SHADOW E&M-EST. PATIENT-LVL III: CPT | Mod: PBBFAC,,, | Performed by: INTERNAL MEDICINE

## 2020-03-12 PROCEDURE — 99999 PR PBB SHADOW E&M-EST. PATIENT-LVL III: ICD-10-PCS | Mod: PBBFAC,,, | Performed by: INTERNAL MEDICINE

## 2020-03-12 PROCEDURE — 99213 OFFICE O/P EST LOW 20 MIN: CPT | Mod: S$PBB,,, | Performed by: INTERNAL MEDICINE

## 2020-03-12 NOTE — PROGRESS NOTES
Portions of this note are generated with voice recognition software. Typographical errors may exist.     SUBJECTIVE:    This is a/an 44 y.o. female here for primary care visit for  Chief Complaint   Patient presents with    Follow-up     Patient for the past 2 weeks has been having increased rectal itching.  States that there has been no bright red blood per rectum.  No pain with bowel movements.  She is using daily fiber supplementation.  [place] category for bowel movements most of the time.  No protruding mass from the rectal area.  Self care measures have been limited.  This is the 1st such occurrence.  Not a significant recurring problem.      Medications Reviewed and Updated    Past medical, family, and social histories were reviewed and updated.    Review of Systems negative unless otherwise noted in history of present illness-  ROS    General ROS: negative  Psychological ROS: negative  ENT ROS: negative  Endocrine ROS: Negative  Allergy and Immunology ROS: negative  Cardiovascular ROS: negative  Pulmonary ROS: Negative  Gastrointestinal ROS: negative  Genito-Urinary ROS: negative  Musculoskeletal ROS: negative      Allergic:  Review of patient's allergies indicates:  No Known Allergies    OBJECTIVE:  BP: 100/64 Pulse: 64 Temp: 98.3 °F (36.8 °C)  Wt Readings from Last 3 Encounters:   03/12/20 75.9 kg (167 lb 5.3 oz)   02/12/20 76.2 kg (167 lb 15.9 oz)   02/04/20 76 kg (167 lb 8.8 oz)    Body mass index is 28.72 kg/m².  Previous Blood Pressure Readings :   BP Readings from Last 3 Encounters:   03/12/20 100/64   02/12/20 100/62   02/04/20 128/64       Physical Exam    GEN: No apparent distress  HEENT: sclera non-icteric, conjunctiva clear  CV: no peripheral edema  PULM: breathing non-labored  ABD: non, protuberant abdomen.  PSYCH: appropriate affect  MSK: able to rise from chair without assistance  SKIN: normal skin turgor    Pertinent Labs Reviewed       ASSESSMENT/PLAN:    Inflamed external  hemorrhoid.Condition not optimally controlled. Detailed counseling on self care measures. Plan to monitor clinically in addition to plan below or as listed on After Visit Summary.   -     hydrocortisone 2.5 % cream; Apply topically 2 (two) times daily as needed.  Dispense: 20 g; Refill: 0          Future Appointments   Date Time Provider Department Center   5[facility]12[facility]2020  [facility]   5/12/2020  3:00 PM [facility]   5/27/2020  8:00 AM LAB, [facility] [place]   6/2/2020  3:20 PM Bill Roe MD [facility] [place]       Bill Roe  3/15/2020  4:06 PM

## 2020-03-13 RX ORDER — HYDROCORTISONE 25 MG/G
CREAM TOPICAL 2 TIMES DAILY PRN
Qty: 20 G | Refills: 0 | Status: SHIPPED | OUTPATIENT
Start: 2020-03-13 | End: 2022-09-06

## 2020-05-26 ENCOUNTER — TELEPHONE (OUTPATIENT)
Dept: OBSTETRICS AND GYNECOLOGY | Facility: CLINIC | Age: 45
End: 2020-05-26

## 2020-05-26 NOTE — TELEPHONE ENCOUNTER
----- Message from Mary Flores MA sent at 5/26/2020  3:02 PM CDT -----  Contact: [phone] self -[place]      ----- Message -----  From: Marleni Perez  Sent: 5/26/2020   2:45 PM CDT  To: Luke RIVERA Staff    Pt is requesting to reschedule US appt that was cancelled. Please advise

## 2020-05-27 ENCOUNTER — LAB VISIT (OUTPATIENT)
Dept: LAB | Facility: HOSPITAL | Age: 45
End: 2020-05-27
Attending: INTERNAL MEDICINE
Payer: MEDICAID

## 2020-05-27 DIAGNOSIS — R10.2 PELVIC PAIN IN FEMALE: ICD-10-CM

## 2020-05-27 DIAGNOSIS — Z00.00 ANNUAL PHYSICAL EXAM: ICD-10-CM

## 2020-05-27 DIAGNOSIS — Z13.6 ENCOUNTER FOR LIPID SCREENING FOR CARDIOVASCULAR DISEASE: ICD-10-CM

## 2020-05-27 DIAGNOSIS — Z13.220 ENCOUNTER FOR LIPID SCREENING FOR CARDIOVASCULAR DISEASE: ICD-10-CM

## 2020-05-27 LAB
ALBUMIN SERPL BCP-MCNC: 3.6 G/DL (ref 3.5–5.2)
ALP SERPL-CCNC: 54 U/L (ref 55–135)
ALT SERPL W/O P-5'-P-CCNC: 15 U/L (ref 10–44)
ANION GAP SERPL CALC-SCNC: 5 MMOL/L (ref 8–16)
AST SERPL-CCNC: 22 U/L (ref 10–40)
BILIRUB SERPL-MCNC: 0.3 MG/DL (ref 0.1–1)
BUN SERPL-MCNC: 7 MG/DL (ref 6–20)
CALCIUM SERPL-MCNC: 8.7 MG/DL (ref 8.7–10.5)
CHLORIDE SERPL-SCNC: 108 MMOL/L (ref 95–110)
CHOLEST SERPL-MCNC: 166 MG/DL (ref 120–199)
CHOLEST/HDLC SERPL: 2.3 {RATIO} (ref 2–5)
CO2 SERPL-SCNC: 25 MMOL/L (ref 23–29)
CREAT SERPL-MCNC: 0.9 MG/DL (ref 0.5–1.4)
EST. GFR  (AFRICAN AMERICAN): >60 ML/MIN/1.73 M^2
EST. GFR  (NON AFRICAN AMERICAN): >60 ML/MIN/1.73 M^2
GLUCOSE SERPL-MCNC: 81 MG/DL (ref 70–110)
HDLC SERPL-MCNC: 71 MG/DL (ref 40–75)
HDLC SERPL: 42.8 % (ref 20–50)
LDLC SERPL CALC-MCNC: 73 MG/DL (ref 63–159)
NONHDLC SERPL-MCNC: 95 MG/DL
POTASSIUM SERPL-SCNC: 4.1 MMOL/L (ref 3.5–5.1)
PROT SERPL-MCNC: 6.7 G/DL (ref 6–8.4)
SODIUM SERPL-SCNC: 138 MMOL/L (ref 136–145)
TRIGL SERPL-MCNC: 110 MG/DL (ref 30–150)

## 2020-05-27 PROCEDURE — 36415 COLL VENOUS BLD VENIPUNCTURE: CPT | Mod: PO

## 2020-05-27 PROCEDURE — 80061 LIPID PANEL: CPT

## 2020-05-27 PROCEDURE — 80053 COMPREHEN METABOLIC PANEL: CPT

## 2020-06-02 ENCOUNTER — OFFICE VISIT (OUTPATIENT)
Dept: INTERNAL MEDICINE | Facility: CLINIC | Age: 45
End: 2020-06-02
Payer: MEDICAID

## 2020-06-02 VITALS
OXYGEN SATURATION: 98 % | TEMPERATURE: 98 F | DIASTOLIC BLOOD PRESSURE: 68 MMHG | WEIGHT: 171.5 LBS | BODY MASS INDEX: 29.28 KG/M2 | SYSTOLIC BLOOD PRESSURE: 114 MMHG | HEIGHT: 64 IN | HEART RATE: 73 BPM

## 2020-06-02 DIAGNOSIS — K58.1 IRRITABLE BOWEL SYNDROME WITH CONSTIPATION: Primary | ICD-10-CM

## 2020-06-02 PROCEDURE — 99214 OFFICE O/P EST MOD 30 MIN: CPT | Mod: S$PBB,,, | Performed by: INTERNAL MEDICINE

## 2020-06-02 PROCEDURE — 99999 PR PBB SHADOW E&M-EST. PATIENT-LVL III: CPT | Mod: PBBFAC,,, | Performed by: INTERNAL MEDICINE

## 2020-06-02 PROCEDURE — 99999 PR PBB SHADOW E&M-EST. PATIENT-LVL III: ICD-10-PCS | Mod: PBBFAC,,, | Performed by: INTERNAL MEDICINE

## 2020-06-02 PROCEDURE — 99213 OFFICE O/P EST LOW 20 MIN: CPT | Mod: PBBFAC,PO | Performed by: INTERNAL MEDICINE

## 2020-06-02 PROCEDURE — 99214 PR OFFICE/OUTPT VISIT, EST, LEVL IV, 30-39 MIN: ICD-10-PCS | Mod: S$PBB,,, | Performed by: INTERNAL MEDICINE

## 2020-06-02 RX ORDER — HYOSCYAMINE SULFATE 0.125 MG
125 TABLET ORAL EVERY 4 HOURS PRN
Qty: 45 TABLET | Refills: 0 | Status: SHIPPED | OUTPATIENT
Start: 2020-06-02 | End: 2021-04-07

## 2020-06-02 NOTE — PROGRESS NOTES
Portions of this note are generated with voice recognition software. Typographical errors may exist.     SUBJECTIVE:    This is a/an 44 y.o. female here for primary care visit for  Chief Complaint   Patient presents with    Follow-up      patient states that often on for the past couple months she has been having a feeling of bloating and diffuse abdominal pain.  The pain is mild.  Relapsing and remitting most days.  When it happens it seems to precede bowel movements.  Most often on postprandial.  Patient notes a clear relationship with foods higher in fat but can happen even with low-fat meals.  There is also in association with stress.  She denies melena or hematochezia.  Denies nausea.  States that she continues to use Citrucel fiber supplement to prevent constipation.  Patient has never tried anti spasmodic medication.   Patient understands that dicyclomine does not carry clear pregnancy precautions but that she should practice a contraceptive method to prevent unintended pregnancy.      Medications Reviewed and Updated    Past medical, family, and social histories were reviewed and updated.    Review of Systems negative unless otherwise noted in history of present illness-  ROS    General ROS: negative  Psychological ROS: negative  ENT ROS: negative  Endocrine ROS: Negative  Allergy and Immunology ROS: negative  Cardiovascular ROS: negative  Pulmonary ROS: Negative  Gastrointestinal ROS: negative  Genito-Urinary ROS: negative  Musculoskeletal ROS: negative      Allergic:  Review of patient's allergies indicates:  No Known Allergies    OBJECTIVE:  BP: 114/68 Pulse: 73 Temp: 98.2 °F (36.8 °C)  Wt Readings from Last 3 Encounters:   06/02/20 77.8 kg (171 lb 8.3 oz)   03/12/20 75.9 kg (167 lb 5.3 oz)   02/12/20 76.2 kg (167 lb 15.9 oz)    Body mass index is 29.44 kg/m².  Previous Blood Pressure Readings :   BP Readings from Last 3 Encounters:   06/02/20 114/68   03/12/20 100/64   02/12/20 100/62       Physical  Exam    GEN: No apparent distress  HEENT: sclera non-icteric, conjunctiva clear  CV: no peripheral edema  PULM: breathing non-labored  ABD: non, protuberant abdomen.  Supple in all quadrants without masses or focal tenderness.  PSYCH: appropriate affect  MSK: able to rise from chair without assistance  SKIN: normal skin turgor    Pertinent Labs Reviewed       ASSESSMENT/PLAN:    Irritable bowel syndrome with constipation.Condition not optimally controlled. Detailed counseling on self care measures. Plan to monitor clinically in addition to plan below or as listed on After Visit Summary.     -     hyoscyamine (ANASPAZ,LEVSIN) 0.125 mg Tab; Take 1 tablet (125 mcg total) by mouth every 4 (four) hours as needed.  Dispense: 45 tablet; Refill: 0  -     CBC auto differential; Future; Expected date: 06/02/2020  -     TSH; Future; Expected date: 06/02/2020  -     Sedimentation rate; Future; Expected date: 06/02/2020  -     C-Reactive Protein; Future; Expected date: 06/02/2020          Future Appointments   Date Time Provider Department Center   7/8/2020  1:00 PM [facility] [place]   7/8/2020  2:00 PM Ariel Butler MD [facility] [place]   7/10/2020  9:30 AM LAB, [facility] [place]   11/25/2020  3:00 PM [facility] Roe  6/7/2020  3:23 PM

## 2020-07-08 ENCOUNTER — OFFICE VISIT (OUTPATIENT)
Dept: OBSTETRICS AND GYNECOLOGY | Facility: CLINIC | Age: 45
End: 2020-07-08
Payer: MEDICAID

## 2020-07-08 ENCOUNTER — PROCEDURE VISIT (OUTPATIENT)
Dept: OBSTETRICS AND GYNECOLOGY | Facility: CLINIC | Age: 45
End: 2020-07-08
Payer: MEDICAID

## 2020-07-08 DIAGNOSIS — N83.202 CYSTS OF BOTH OVARIES: ICD-10-CM

## 2020-07-08 DIAGNOSIS — N83.201 CYSTS OF BOTH OVARIES: Primary | ICD-10-CM

## 2020-07-08 DIAGNOSIS — N83.202 CYSTS OF BOTH OVARIES: Primary | ICD-10-CM

## 2020-07-08 DIAGNOSIS — N83.201 CYSTS OF BOTH OVARIES: ICD-10-CM

## 2020-07-08 PROCEDURE — 76830 PR  ECHOGRAPHY,TRANSVAGINAL: ICD-10-PCS | Mod: 26,S$PBB,, | Performed by: OBSTETRICS & GYNECOLOGY

## 2020-07-08 PROCEDURE — 76830 TRANSVAGINAL US NON-OB: CPT | Mod: PBBFAC,PO

## 2020-07-08 PROCEDURE — 76856 US EXAM PELVIC COMPLETE: CPT | Mod: PBBFAC,PO

## 2020-07-08 PROCEDURE — 99212 PR OFFICE/OUTPT VISIT, EST, LEVL II, 10-19 MIN: ICD-10-PCS | Mod: 25,S$PBB,, | Performed by: OBSTETRICS & GYNECOLOGY

## 2020-07-08 PROCEDURE — 76856 PR  ECHO,PELVIC (NONOBSTETRIC): ICD-10-PCS | Mod: 26,S$PBB,, | Performed by: OBSTETRICS & GYNECOLOGY

## 2020-07-08 PROCEDURE — 76856 US EXAM PELVIC COMPLETE: CPT | Mod: 26,S$PBB,, | Performed by: OBSTETRICS & GYNECOLOGY

## 2020-07-08 PROCEDURE — 99212 OFFICE O/P EST SF 10 MIN: CPT | Mod: 25,S$PBB,, | Performed by: OBSTETRICS & GYNECOLOGY

## 2020-07-08 PROCEDURE — 76830 TRANSVAGINAL US NON-OB: CPT | Mod: 26,S$PBB,, | Performed by: OBSTETRICS & GYNECOLOGY

## 2020-07-08 NOTE — PROGRESS NOTES
43 yo female who presents to discuss pelvic pain.  The patient has h/o ovarian cyst.  She presents today to discuss pelvic US results.  US today shows that both ovaries have multiple simple cyst.  Today the left ovary is larger than before measuring with simple cyst of 6cm.  Patient, however, reports that her pain is on the RIGHT side.  I have discussed options with the patient.  She desires expectant management for now.  Will have repeat US in 8 weeks.  If no improvement, if pain is worse, or if cyst is growing - the patient will reconsider surgery.  Continue OCPs for now.    Today, we discussed LSC LEFT oophorectomy.    charly patterson MD

## 2020-07-10 ENCOUNTER — LAB VISIT (OUTPATIENT)
Dept: LAB | Facility: HOSPITAL | Age: 45
End: 2020-07-10
Attending: INTERNAL MEDICINE
Payer: MEDICAID

## 2020-07-10 ENCOUNTER — HOSPITAL ENCOUNTER (EMERGENCY)
Facility: HOSPITAL | Age: 45
Discharge: HOME | End: 2020-07-10
Attending: EMERGENCY MEDICINE
Payer: MEDICAID

## 2020-07-10 VITALS
BODY MASS INDEX: 27.31 KG/M2 | SYSTOLIC BLOOD PRESSURE: 117 MMHG | DIASTOLIC BLOOD PRESSURE: 71 MMHG | WEIGHT: 160 LBS | TEMPERATURE: 99 F | HEART RATE: 66 BPM | HEIGHT: 64 IN | RESPIRATION RATE: 16 BRPM | OXYGEN SATURATION: 100 %

## 2020-07-10 DIAGNOSIS — Z20.822 SUSPECTED 2019 NOVEL CORONAVIRUS INFECTION: ICD-10-CM

## 2020-07-10 DIAGNOSIS — M79.10 MYALGIA: Primary | ICD-10-CM

## 2020-07-10 DIAGNOSIS — R06.02 SHORTNESS OF BREATH: ICD-10-CM

## 2020-07-10 DIAGNOSIS — K58.1 IRRITABLE BOWEL SYNDROME WITH CONSTIPATION: ICD-10-CM

## 2020-07-10 LAB
B-HCG UR QL: NEGATIVE
BASOPHILS # BLD AUTO: 0.03 K/UL (ref 0–0.2)
BASOPHILS NFR BLD: 0.4 % (ref 0–1.9)
CRP SERPL-MCNC: 8.5 MG/L (ref 0–8.2)
CTP QC/QA: YES
DIFFERENTIAL METHOD: ABNORMAL
EOSINOPHIL # BLD AUTO: 0.1 K/UL (ref 0–0.5)
EOSINOPHIL NFR BLD: 0.6 % (ref 0–8)
ERYTHROCYTE [DISTWIDTH] IN BLOOD BY AUTOMATED COUNT: 13.8 % (ref 11.5–14.5)
ERYTHROCYTE [SEDIMENTATION RATE] IN BLOOD BY WESTERGREN METHOD: 6 MM/HR (ref 0–36)
HCT VFR BLD AUTO: 40.6 % (ref 37–48.5)
HGB BLD-MCNC: 12.7 G/DL (ref 12–16)
IMM GRANULOCYTES # BLD AUTO: 0.01 K/UL (ref 0–0.04)
IMM GRANULOCYTES NFR BLD AUTO: 0.1 % (ref 0–0.5)
LYMPHOCYTES # BLD AUTO: 2.6 K/UL (ref 1–4.8)
LYMPHOCYTES NFR BLD: 32.7 % (ref 18–48)
MCH RBC QN AUTO: 29.4 PG (ref 27–31)
MCHC RBC AUTO-ENTMCNC: 31.3 G/DL (ref 32–36)
MCV RBC AUTO: 94 FL (ref 82–98)
MONOCYTES # BLD AUTO: 0.5 K/UL (ref 0.3–1)
MONOCYTES NFR BLD: 6.2 % (ref 4–15)
NEUTROPHILS # BLD AUTO: 4.7 K/UL (ref 1.8–7.7)
NEUTROPHILS NFR BLD: 60 % (ref 38–73)
NRBC BLD-RTO: 0 /100 WBC
PLATELET # BLD AUTO: 285 K/UL (ref 150–350)
PMV BLD AUTO: 11.6 FL (ref 9.2–12.9)
RBC # BLD AUTO: 4.32 M/UL (ref 4–5.4)
SARS-COV-2 RNA RESP QL NAA+PROBE: NOT DETECTED
TSH SERPL DL<=0.005 MIU/L-ACNC: 2.54 UIU/ML (ref 0.4–4)
WBC # BLD AUTO: 7.85 K/UL (ref 3.9–12.7)

## 2020-07-10 PROCEDURE — 99283 EMERGENCY DEPT VISIT LOW MDM: CPT | Mod: 25

## 2020-07-10 PROCEDURE — 86140 C-REACTIVE PROTEIN: CPT

## 2020-07-10 PROCEDURE — 36415 COLL VENOUS BLD VENIPUNCTURE: CPT | Mod: PO

## 2020-07-10 PROCEDURE — U0003 INFECTIOUS AGENT DETECTION BY NUCLEIC ACID (DNA OR RNA); SEVERE ACUTE RESPIRATORY SYNDROME CORONAVIRUS 2 (SARS-COV-2) (CORONAVIRUS DISEASE [COVID-19]), AMPLIFIED PROBE TECHNIQUE, MAKING USE OF HIGH THROUGHPUT TECHNOLOGIES AS DESCRIBED BY CMS-2020-01-R: HCPCS

## 2020-07-10 PROCEDURE — 84443 ASSAY THYROID STIM HORMONE: CPT

## 2020-07-10 PROCEDURE — 81025 URINE PREGNANCY TEST: CPT | Performed by: EMERGENCY MEDICINE

## 2020-07-10 PROCEDURE — 85025 COMPLETE CBC W/AUTO DIFF WBC: CPT

## 2020-07-10 PROCEDURE — 85652 RBC SED RATE AUTOMATED: CPT

## 2020-07-10 PROCEDURE — 25000003 PHARM REV CODE 250: Performed by: EMERGENCY MEDICINE

## 2020-07-10 RX ORDER — IBUPROFEN 600 MG/1
600 TABLET ORAL
Status: COMPLETED | OUTPATIENT
Start: 2020-07-10 | End: 2020-07-10

## 2020-07-10 RX ORDER — IBUPROFEN 600 MG/1
600 TABLET ORAL EVERY 6 HOURS PRN
Qty: 20 TABLET | Refills: 0 | Status: SHIPPED | OUTPATIENT
Start: 2020-07-10 | End: 2021-04-07

## 2020-07-10 RX ADMIN — IBUPROFEN 600 MG: 600 TABLET ORAL at 03:07

## 2020-07-10 NOTE — ED PROVIDER NOTES
"Encounter Date: 7/10/2020    SCRIBE #1 NOTE: I, Joseph Zuniga , am scribing for, and in the presence of,  Dr.Lefort . I have scribed the entire note.       History     Chief Complaint   Patient presents with    Shortness of Breath     pt having sore throat, chest pain and all over body aches. pt reports not being able to sleep. pt denies any OTC. pt reports having an appointment to day with primary.    Abdominal Pain    Chest Pain     Tessa Wynne is a 44 y.o. female who  has no past medical history on file.    The patient presents to the ED with complaint of generalized body pain favoring the L side. Patient reports that symptoms onset early last night and could not go to sleep. She describes the pain as a "electricity" sensation. Her associated symptoms include sore throat and body aches. Pt explains that she does not want covid test because she already had tested negative two weeks ago. She denies any abdominal pain,  cough, shortness of breath or fever.  Pt notes she has appointment later today with PCP.          The history is provided by the patient.     Review of patient's allergies indicates:  No Known Allergies  No past medical history on file.  Past Surgical History:   Procedure Laterality Date    CHOLECYSTECTOMY       Family History   Problem Relation Age of Onset    Diabetes Mellitus Mother     Diabetes Mellitus Father     Colon cancer Neg Hx     Breast cancer Neg Hx     Ovarian cancer Neg Hx     Uterine cancer Neg Hx     Heart attack Neg Hx      Social History     Tobacco Use    Smoking status: Never Smoker    Smokeless tobacco: Never Used   Substance Use Topics    Alcohol use: No    Drug use: No     Review of Systems   Constitutional: Negative for chills and fever.   HENT: Positive for sore throat.    Respiratory: Negative for cough and shortness of breath.    Cardiovascular: Positive for chest pain.   Gastrointestinal: Negative for abdominal pain, diarrhea, nausea and vomiting. "   Genitourinary: Negative for dysuria.   Musculoskeletal: Positive for myalgias (generalized ).   Neurological: Negative for weakness.   Psychiatric/Behavioral: Positive for sleep disturbance. Negative for agitation and behavioral problems.   All other systems reviewed and are negative.      Physical Exam     Initial Vitals [07/10/20 0325]   BP Pulse Resp Temp SpO2   (!) 156/78 77 16 98.6 °F (37 °C) 100 %      MAP       --         Physical Exam    Nursing note and vitals reviewed.  Constitutional: She appears well-developed and well-nourished. She is not diaphoretic. No distress.   HENT:   Head: Normocephalic and atraumatic.   Mouth/Throat: Oropharynx is clear and moist.   Eyes: EOM are normal. Pupils are equal, round, and reactive to light.   Neck: Normal range of motion. No tracheal deviation present.   Cardiovascular: Normal rate, regular rhythm, normal heart sounds and intact distal pulses.   Pulmonary/Chest: Breath sounds normal. No stridor. No respiratory distress. She has no wheezes.   Abdominal: Soft. Bowel sounds are normal. She exhibits no distension and no mass. There is no abdominal tenderness.   Musculoskeletal: Normal range of motion. No edema.   Neurological: She is alert and oriented to person, place, and time. She has normal strength. No cranial nerve deficit or sensory deficit.   Skin: Skin is warm and dry. Capillary refill takes less than 2 seconds.   Psychiatric: She has a normal mood and affect. Her behavior is normal. Thought content normal.         ED Course   Procedures  Labs Reviewed   SARS-COV-2 (COVID-19) QUALITATIVE PCR    Narrative:     Is this needed for pre-procedure or pre-op testing?->No   POCT URINE PREGNANCY          Imaging Results          X-Ray Chest AP Portable (Final result)  Result time 07/10/20 04:16:52    Final result by Gopal Stephens MD (07/10/20 04:16:52)                 Impression:      No radiographic evidence of acute intrathoracic process on this single  view.      Electronically signed by: Gopal Stephens MD  Date:    07/10/2020  Time:    04:16             Narrative:    EXAMINATION:  XR CHEST AP PORTABLE    CLINICAL HISTORY:  Shortness of breath    TECHNIQUE:  Single frontal view of the chest was performed.    COMPARISON:  None    FINDINGS:  The cardiomediastinal silhouette appears within normal limits.  Lungs are symmetrically expanded without evidence of confluent airspace consolidation.  No evidence of significant volume of pleural fluid or pneumothorax.  Osseous structures appear grossly intact.                                 Medical Decision Making:   Independently Interpreted Test(s):   I have ordered and independently interpreted X-rays - see prior notes.  Clinical Tests:   Lab Tests: Reviewed and Ordered  Radiological Study: Reviewed and Ordered  ED Management:  Unremarkable PE  VSS  No distress, VSS  New myalgias/sore throat, will reswab covid considered  Imaging reassuring  Discussed conitnued follow up return precautions and indications for return                                  Clinical Impression:       ICD-10-CM ICD-9-CM   1. Shortness of breath  R06.02 786.05         Disposition:   Disposition: Discharged  Condition: Stable           Scribe attestation I, Dr. Guy Lefort, personally performed the services described in this documentation. All medical record entries made by the scribe were at my direction and in my presence. I have reviewed the chart and agree that the record reflects my personal performance and is accurate and complete. Guy Lefort, MD.  7:22 AM 07/11/2020               Guy J. Lefort, MD  07/11/20 0725

## 2020-07-10 NOTE — ED NOTES
Review of patient's allergies indicates:  No Known Allergies     Patient has verified the spelling of their name and  on armband.   APPEARANCE: Patient is alert, calm, oriented x 4, and does not appear distressed.   SKIN: Skin is normal for race, warm, and dry. Normal skin turgor and mucous membranes moist.  CARDIAC: Normal rate and rhythm, no murmur heard.   RESPIRATORY:Normal rate and effort. Breath sounds clear bilaterally throughout chest. Respirations are equal and unlabored.  Pt reports SOB and cough at this time. Pt is 100% on RA.   GASTRO: Bowel sounds normal, abdomen is soft, no tenderness, and no abdominal distention. Pt denying any pain at this time  MUSCLE: Full ROM. No bony tenderness or soft tissue tenderness. No obvious deformity. Pt reports burning constant pain to left leg that goes down her leg.   PERIPHERAL VASCULAR: peripheral pulses present. Normal cap refill. No edema. Warm to touch.  NEURO: 5/5 strength major flexors/extensors bilaterally. Sensory intact to light touch bilaterally. [place] coma scale: eyes open spontaneously-4, oriented & converses-5, obeys commands-6. No neurological abnormalities.   MENTAL STATUS: awake, alert and aware of environment.  : Voids without complication

## 2020-07-14 ENCOUNTER — TELEPHONE (OUTPATIENT)
Dept: INTERNAL MEDICINE | Facility: CLINIC | Age: 45
End: 2020-07-14

## 2020-07-14 NOTE — TELEPHONE ENCOUNTER
----- Message from Priyanka Ordonez sent at 7/14/2020  3:15 PM CDT -----  Contact: SELF [phone]  Patient would like to speak with you about getting test results. Please advise.

## 2020-07-14 NOTE — TELEPHONE ENCOUNTER
Spoke with pt. Who informed me of going to ER and having a feeling inside her stomach and throat. Patient is not feeling like her self. I informed pt a message will be sent to

## 2020-07-15 ENCOUNTER — TELEPHONE (OUTPATIENT)
Dept: INTERNAL MEDICINE | Facility: CLINIC | Age: 45
End: 2020-07-15

## 2020-07-15 NOTE — TELEPHONE ENCOUNTER
----- Message from Bill Roe MD sent at 7/14/2020  5:27 PM CDT -----  Think she was having a panic attack. If you can bump someone, try to add her to my schedule. Otherwise, try to schedule with someone who speaks Kiswahili as an urgent patient  ----- Message -----  From: Indy Falcon MA  Sent: 7/14/2020   3:48 PM CDT  To: Bill Roe MD    Pt. Went to ER ,felt as if something was in her throat and stomach

## 2020-07-27 ENCOUNTER — LAB VISIT (OUTPATIENT)
Dept: LAB | Facility: HOSPITAL | Age: 45
End: 2020-07-27
Attending: INTERNAL MEDICINE
Payer: MEDICAID

## 2020-07-27 ENCOUNTER — TELEPHONE (OUTPATIENT)
Dept: INTERNAL MEDICINE | Facility: CLINIC | Age: 45
End: 2020-07-27

## 2020-07-27 DIAGNOSIS — Z13.6 ENCOUNTER FOR LIPID SCREENING FOR CARDIOVASCULAR DISEASE: ICD-10-CM

## 2020-07-27 DIAGNOSIS — Z13.220 ENCOUNTER FOR LIPID SCREENING FOR CARDIOVASCULAR DISEASE: ICD-10-CM

## 2020-07-27 DIAGNOSIS — Z00.00 ANNUAL PHYSICAL EXAM: ICD-10-CM

## 2020-07-27 DIAGNOSIS — R10.2 PELVIC PAIN IN FEMALE: ICD-10-CM

## 2020-07-27 PROCEDURE — 80061 LIPID PANEL: CPT

## 2020-07-27 PROCEDURE — 36415 COLL VENOUS BLD VENIPUNCTURE: CPT | Mod: PO

## 2020-07-27 PROCEDURE — 80053 COMPREHEN METABOLIC PANEL: CPT

## 2020-07-27 NOTE — TELEPHONE ENCOUNTER
----- Message from Leeann Multani sent at 7/27/2020  8:30 AM CDT -----  Regarding: call back  Contact: [phone]  Patient is calling to talk to nurse in regards to see if she can be recommended to another PCP since she is still having severe body aches and pain. Please advice

## 2020-07-27 NOTE — TELEPHONE ENCOUNTER
Spoke with pt. Who informed me she has been having abd. Pain . Patient is requesting to have lab work done, an appointment has been scheduled to have lab work today.

## 2020-07-28 ENCOUNTER — TELEPHONE (OUTPATIENT)
Dept: FAMILY MEDICINE | Facility: CLINIC | Age: 45
End: 2020-07-28

## 2020-07-28 LAB
ALBUMIN SERPL BCP-MCNC: 3.9 G/DL (ref 3.5–5.2)
ALP SERPL-CCNC: 62 U/L (ref 55–135)
ALT SERPL W/O P-5'-P-CCNC: 15 U/L (ref 10–44)
ANION GAP SERPL CALC-SCNC: 9 MMOL/L (ref 8–16)
AST SERPL-CCNC: 22 U/L (ref 10–40)
BILIRUB SERPL-MCNC: 0.2 MG/DL (ref 0.1–1)
BUN SERPL-MCNC: 15 MG/DL (ref 6–20)
CALCIUM SERPL-MCNC: 9.5 MG/DL (ref 8.7–10.5)
CHLORIDE SERPL-SCNC: 103 MMOL/L (ref 95–110)
CHOLEST SERPL-MCNC: 176 MG/DL (ref 120–199)
CHOLEST/HDLC SERPL: 2.3 {RATIO} (ref 2–5)
CO2 SERPL-SCNC: 24 MMOL/L (ref 23–29)
CREAT SERPL-MCNC: 0.9 MG/DL (ref 0.5–1.4)
EST. GFR  (AFRICAN AMERICAN): >60 ML/MIN/1.73 M^2
EST. GFR  (NON AFRICAN AMERICAN): >60 ML/MIN/1.73 M^2
GLUCOSE SERPL-MCNC: 87 MG/DL (ref 70–110)
HDLC SERPL-MCNC: 75 MG/DL (ref 40–75)
HDLC SERPL: 42.6 % (ref 20–50)
LDLC SERPL CALC-MCNC: 76 MG/DL (ref 63–159)
NONHDLC SERPL-MCNC: 101 MG/DL
POTASSIUM SERPL-SCNC: 4 MMOL/L (ref 3.5–5.1)
PROT SERPL-MCNC: 6.9 G/DL (ref 6–8.4)
SODIUM SERPL-SCNC: 136 MMOL/L (ref 136–145)
TRIGL SERPL-MCNC: 125 MG/DL (ref 30–150)

## 2020-07-29 ENCOUNTER — TELEPHONE (OUTPATIENT)
Dept: INTERNAL MEDICINE | Facility: CLINIC | Age: 45
End: 2020-07-29

## 2020-08-03 NOTE — PROGRESS NOTES
Subjective:      I asked the patient their language preference; Thai  I asked the patient what they preferred for translation; video  ; [id number]         Patient ID: Tessa Wynne is a 44 y.o. female.    Chief Complaint:   Establish Care      HPI    #Last visit/Previous Provider  This patient is new to me  Previously seen by Bill Roe MD  Last visit was 6/7/20        #Interim Hx      #Concerns Today      Patient reports multiple complaints today.  She reports that she is having pain in the legs.  She notes that with walking.  She reports that she has tried taking meloxicam for the pain symptoms without improvement in the pain symptoms.  She reports that she also feels a burning sensation throughout the whole body.  She states that it is not really her skin but just in her whole body.  She also reports that she has burning of the chest and reflux symptoms.  She has tried PPI and Zantac in the past.  She does have a gastroenterologist that she is following with.  She has an appointment later this month with that  doctor.  She reports that she has a rash that is gone often on for the last 3-4 days.  She notes redness, itching with central papule.  She does not have any oral or your genital lesions.  She has no fevers or chills.  She has no night sweats or weight loss.  Of note she has a history of fibromyalgia and IBS.      #Chronic Problems    Fibromyalgia    IBS            Health Maintenance Due   Topic Date Due    Hepatitis C Screening  1975       Health Maintenance Topics with due status: Not Due       Topic Last Completion Date    TETANUS VACCINE 11/13/2018    Cervical Cancer Screening 09/11/2019    Mammogram 11/25/2019    Influenza Vaccine 02/04/2020    Lipid Panel 07/27/2020             Results for orders placed during the hospital encounter of 11/25/19   Mammo Digital Screening Bilat w/ Jose Guadalupe    Narrative Result:  Mammo Digital Screening Bilat w/ Jose Guadalupe    History:  Patient is 44  "y.o. and is seen for a screening mammogram.    Films Compared:  Prior images (if available) were compared.    Findings:  Computer-aided detection was utilized in the interpretation of this   examination. This procedure was performed using tomosynthesis.       The breasts have scattered areas of fibroglandular density. There is no   evidence of suspicious masses, microcalcifications or architectural   distortion.      Impression No mammographic evidence of malignancy.    BI-RADS Category 1: Negative    Recommendation:  Routine screening mammogram in 1 year is recommended.     Your estimated lifetime risk of breast cancer (to age 85) based on   Tyrer-Cuzick risk assessment model is Tyrer-Cuzick: 7.17 %. According to   the American Cancer Society, patients with a lifetime breast cancer risk   of 20% or higher might benefit from supplemental screening tests. ??              Review of Systems   Constitutional: Negative for activity change, chills, diaphoresis, fatigue, fever and unexpected weight change.   Eyes: Negative for redness and visual disturbance.   Respiratory: Negative for shortness of breath.    Cardiovascular: Negative for chest pain and palpitations.   Gastrointestinal: Negative for abdominal distention and blood in stool.   Genitourinary: Negative for difficulty urinating, dysuria, frequency and menstrual problem.   Neurological: Negative for syncope and headaches.       Objective:   /74 (BP Location: Right arm, Patient Position: Sitting, BP Method: Large (Manual))   Pulse 73   Temp 98.8 °F (37.1 °C) (Temporal)   Ht 5' 4" (1.626 m)   Wt 78.6 kg (173 lb 4.5 oz)   SpO2 98%   BMI 29.74 kg/m²              Physical Exam  Vitals signs and nursing note reviewed.   Constitutional:       General: She is not in acute distress.     Appearance: She is well-developed. She is not diaphoretic.   HENT:      Head: Normocephalic.      Nose: Nose normal.   Eyes:      General:         Right eye: No discharge.    "      Left eye: No discharge.      Conjunctiva/sclera: Conjunctivae normal.      Pupils: Pupils are equal, round, and reactive to light.   Neck:      Musculoskeletal: Normal range of motion.   Cardiovascular:      Rate and Rhythm: Normal rate and regular rhythm.      Heart sounds: Normal heart sounds. No murmur. No friction rub. No gallop.    Pulmonary:      Effort: Pulmonary effort is normal. No respiratory distress.   Abdominal:      General: Bowel sounds are normal. There is no distension.      Palpations: Abdomen is soft.   Musculoskeletal: Normal range of motion.         General: No deformity.   Skin:     General: Skin is warm.   Neurological:      Mental Status: She is alert and oriented to person, place, and time.      Cranial Nerves: No cranial nerve deficit.             ASCVD  The 10-year ASCVD risk score (Axel SHEIKH Jr., et al., 2013) is: 0.3%    Values used to calculate the score:      Age: 44 years      Sex: Female      Is Non- : No      Diabetic: No      Tobacco smoker: No      Systolic Blood Pressure: 118 mmHg      Is BP treated: No      HDL Cholesterol: 75 mg/dL      Total Cholesterol: 176 mg/dL    Basic Labs  BMP  Lab Results   Component Value Date     07/27/2020    K 4.0 07/27/2020     07/27/2020    CO2 24 07/27/2020    BUN 15 07/27/2020    CREATININE 0.9 07/27/2020    CALCIUM 9.5 07/27/2020    ANIONGAP 9 07/27/2020    ESTGFRAFRICA >60.0 07/27/2020    EGFRNONAA >60.0 07/27/2020     Lab Results   Component Value Date    ALT 15 07/27/2020    AST 22 07/27/2020    ALKPHOS 62 07/27/2020    BILITOT 0.2 07/27/2020       Lab Results   Component Value Date    TSH 2.538 07/10/2020           STD  Lab Results   Component Value Date    LMO33YJLW Negative 02/04/2020     Lab Results   Component Value Date    RPR Non-reactive 03/04/2019     No results found for: HAV, HEPAIGM, HEPBIGM, HEPBCAB, HBEAG, HEPCAB      Lipids  Lab Results   Component Value Date    CHOL 176 07/27/2020      Lab Results   Component Value Date    HDL 75 07/27/2020     Lab Results   Component Value Date    LDLCALC 76.0 07/27/2020     Lab Results   Component Value Date    TRIG 125 07/27/2020     Lab Results   Component Value Date    CHOLHDL 42.6 07/27/2020       DM  Lab Results   Component Value Date    HGBA1C 5.4 05/07/2018     Lab Results   Component Value Date    SEDRATE 6 07/10/2020     Lab Results   Component Value Date    CRP 8.5 (H) 07/10/2020         Assessment:       1. Pain in both lower extremities    2. Preventative health care    3. Irritable bowel syndrome with constipation    4. Anxiety    5. Fibromyalgia syndrome    6. Arthropod bite, initial encounter          New: stable  New: Uncontrolled  New : worsening  New: Improved    Chronic stable  [place]onic : uncontrolled  Chronic : worsening  Chronic : improved    Plan:             Tessa was seen today for establish care.    Diagnoses and all orders for this visit:    Preventative health care    Irritable bowel syndrome with constipation    Anxiety    Fibromyalgia syndrome    Arthropod bite, initial encounter  -     hydrocortisone 2.5 % ointment; Apply topically 2 (two) times daily. for 7 days    Pain in both lower extremities  -     CV Ankle Brachial Indices W Stress W Toe Tracings; Future          Future Appointments   Date Time Provider Department Center   8/5/2020 11:30 AM [facility], [place] [facility] [place]   9/9/2020  3:00 PM Ariel Butler MD [facility] [place] [facility]   11/25/2020  3:00 PM [facility] MAMMO1 [facility] M[facility] [place] [facility]           Medication List with Changes/Refills   New Medications    HYDROCORTISONE 2.5 % OINTMENT    Apply topically 2 (two) times daily. for 7 days   Current Medications    HYDROCORTISONE 2.5 % CREAM    Apply topically 2 (two) times daily as needed.    HYOSCYAMINE (ANASPAZ,LEVSIN) 0.125 MG TAB    Take 1 tablet (125 mcg total) by mouth every 4 (four) hours as needed.    IBUPROFEN (ADVIL,MOTRIN) 600 MG TABLET    Take 1 tablet  (600 mg total) by mouth every 6 (six) hours as needed for Pain.    NORGESTIMATE-ETHINYL ESTRADIOL (ORTHO-CYCLEN) 0.25-35 MG-MCG PER TABLET    Take 1 tablet by mouth once daily.         Disclaimer:  This note has been generated using voice-recognition software. There may be grammatical or spelling errors that have been missed during proof-reading

## 2020-08-04 ENCOUNTER — OFFICE VISIT (OUTPATIENT)
Dept: INTERNAL MEDICINE | Facility: CLINIC | Age: 45
End: 2020-08-04
Payer: MEDICAID

## 2020-08-04 VITALS
DIASTOLIC BLOOD PRESSURE: 74 MMHG | BODY MASS INDEX: 29.59 KG/M2 | WEIGHT: 173.31 LBS | TEMPERATURE: 99 F | HEIGHT: 64 IN | OXYGEN SATURATION: 98 % | HEART RATE: 73 BPM | SYSTOLIC BLOOD PRESSURE: 118 MMHG

## 2020-08-04 DIAGNOSIS — F41.9 ANXIETY: ICD-10-CM
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
## 2020-08-04 DIAGNOSIS — M79.605 PAIN IN BOTH LOWER EXTREMITIES: Primary | ICD-10-CM

## 2020-08-04 DIAGNOSIS — M79.7 FIBROMYALGIA SYNDROME: ICD-10-CM

## 2020-08-04 DIAGNOSIS — Z00.00 PREVENTATIVE HEALTH CARE: ICD-10-CM

## 2020-08-04 DIAGNOSIS — K58.1 IRRITABLE BOWEL SYNDROME WITH CONSTIPATION: ICD-10-CM

## 2020-08-04 DIAGNOSIS — M79.604 PAIN IN BOTH LOWER EXTREMITIES: Primary | ICD-10-CM

## 2020-08-04 PROCEDURE — 99214 OFFICE O/P EST MOD 30 MIN: CPT | Mod: S$PBB,,, | Performed by: INTERNAL MEDICINE

## 2020-08-04 PROCEDURE — 99214 PR OFFICE/OUTPT VISIT, EST, LEVL IV, 30-39 MIN: ICD-10-PCS | Mod: S$PBB,,, | Performed by: INTERNAL MEDICINE

## 2020-08-04 PROCEDURE — 99999 PR PBB SHADOW E&M-EST. PATIENT-LVL IV: CPT | Mod: PBBFAC,,, | Performed by: INTERNAL MEDICINE

## 2020-08-04 PROCEDURE — 99214 OFFICE O/P EST MOD 30 MIN: CPT | Mod: PBBFAC,PO | Performed by: INTERNAL MEDICINE

## 2020-08-04 PROCEDURE — 99999 PR PBB SHADOW E&M-EST. PATIENT-LVL IV: ICD-10-PCS | Mod: PBBFAC,,, | Performed by: INTERNAL MEDICINE

## 2020-08-04 RX ORDER — HYDROCORTISONE 25 MG/G
OINTMENT TOPICAL 2 TIMES DAILY
Qty: 20 G | Refills: 0 | Status: SHIPPED | OUTPATIENT
Start: 2020-08-04 | End: 2020-08-11

## 2020-08-05 ENCOUNTER — CLINICAL SUPPORT (OUTPATIENT)
Dept: CARDIOLOGY | Facility: CLINIC | Age: 45
End: 2020-08-05
Attending: INTERNAL MEDICINE
Payer: MEDICAID

## 2020-08-05 DIAGNOSIS — M79.604 PAIN IN BOTH LOWER EXTREMITIES: ICD-10-CM

## 2020-08-05 DIAGNOSIS — I73.9 PAD (PERIPHERAL ARTERY DISEASE): Primary | ICD-10-CM

## 2020-08-05 DIAGNOSIS — M79.605 PAIN IN BOTH LOWER EXTREMITIES: ICD-10-CM

## 2020-08-05 LAB
LEFT ABI: 1.08
LEFT ARM BP: 109 MMHG
LEFT DORSALIS PEDIS: 124 MMHG
LEFT POSTERIOR TIBIAL: 128 MMHG
RIGHT ABI: 1.13
RIGHT ARM BP: 119 MMHG
RIGHT DORSALIS PEDIS: 126 MMHG
RIGHT POSTERIOR TIBIAL: 135 MMHG
TREADMILL GRADE: 12 %
TREADMILL SPEED: 2 MPH
TREADMILL TIME: 5 MIN

## 2020-08-05 PROCEDURE — 93924 LWR XTR VASC STDY BILAT: CPT | Mod: PBBFAC,PO | Performed by: INTERNAL MEDICINE

## 2020-08-05 PROCEDURE — 93924 CV US ANKLE BRACHIAL INDICES W STRESS W TOE TRACINGS (CUPID ONLY): ICD-10-PCS | Mod: 26,S$PBB,, | Performed by: INTERNAL MEDICINE

## 2020-08-12 RX ORDER — ATORVASTATIN CALCIUM 20 MG/1
20 TABLET, FILM COATED ORAL DAILY
Qty: 90 TABLET | Refills: 3 | Status: SHIPPED | OUTPATIENT
Start: 2020-08-12 | End: 2021-04-07

## 2020-08-12 NOTE — PROGRESS NOTES
pelase call patient    She did have some signs of athrosclerosis in the lower extremitiy  Recommending to start an over the counter aspirin, and a choletrol medication that we will send to the pharmacy  If symptoms are persistent she can be seen by cardiology or vascular   
.

## 2020-09-04 ENCOUNTER — TELEPHONE (OUTPATIENT)
Dept: FAMILY MEDICINE | Facility: CLINIC | Age: 45
End: 2020-09-04

## 2020-09-04 NOTE — TELEPHONE ENCOUNTER
Spoke with pt. Who informed me she does not feel good with her medication . Patient is requesting to to have an appt. With a new primary care doctor. I scheduled an appt. Also placed her on waiting list in case I have a cancellation patient voices understanding

## 2020-09-04 NOTE — TELEPHONE ENCOUNTER
----- Message from Eden Mae sent at 9/4/2020  1:24 PM CDT -----  Contact: patient  Name of Caller:     Tessa     Reason for Visit/Symptoms:    high cholesterol      Best Contact Number or Confirm if Mychart Preferred: [phone]    Preferred Date/Time of Appointment:as soon as possible     Interested in Virtual Visit:  no    Additional Information: Patient called and was former patient Bhupinder and was told you were going to be her new PCP and wants an appointment     Patient saw  and he gave her medication for cholesterol but it seems like its not working     Patient speaks Malay

## 2020-09-08 ENCOUNTER — PATIENT OUTREACH (OUTPATIENT)
Dept: ADMINISTRATIVE | Facility: OTHER | Age: 45
End: 2020-09-08

## 2020-09-08 NOTE — PROGRESS NOTES
Health Maintenance Due   Topic Date Due    Hepatitis C Screening  1975    Influenza Vaccine (1) 08/01/2020     Updates were requested from care everywhere.  Chart was reviewed for overdue Proactive [facility] Encounters ([facility]) topics (CRS, Breast Cancer Screening, Eye exam)  Health Maintenance has been updated.  LINKS immunization registry triggered.  Immunizations were reconciled.

## 2020-09-09 ENCOUNTER — PROCEDURE VISIT (OUTPATIENT)
Dept: OBSTETRICS AND GYNECOLOGY | Facility: CLINIC | Age: 45
End: 2020-09-09
Payer: MEDICAID

## 2020-09-09 ENCOUNTER — OFFICE VISIT (OUTPATIENT)
Dept: OBSTETRICS AND GYNECOLOGY | Facility: CLINIC | Age: 45
End: 2020-09-09
Payer: MEDICAID

## 2020-09-09 VITALS
BODY MASS INDEX: 29.82 KG/M2 | SYSTOLIC BLOOD PRESSURE: 110 MMHG | DIASTOLIC BLOOD PRESSURE: 70 MMHG | WEIGHT: 173.75 LBS

## 2020-09-09 DIAGNOSIS — N83.201 RIGHT OVARIAN CYST: Primary | ICD-10-CM

## 2020-09-09 DIAGNOSIS — N83.201 CYSTS OF BOTH OVARIES: ICD-10-CM

## 2020-09-09 DIAGNOSIS — N83.202 CYSTS OF BOTH OVARIES: ICD-10-CM

## 2020-09-09 PROCEDURE — 76856 US EXAM PELVIC COMPLETE: CPT | Mod: 26,S$PBB,, | Performed by: OBSTETRICS & GYNECOLOGY

## 2020-09-09 PROCEDURE — 76830 PR  ECHOGRAPHY,TRANSVAGINAL: ICD-10-PCS | Mod: 26,S$PBB,, | Performed by: OBSTETRICS & GYNECOLOGY

## 2020-09-09 PROCEDURE — 99212 OFFICE O/P EST SF 10 MIN: CPT | Mod: 25,S$PBB,, | Performed by: OBSTETRICS & GYNECOLOGY

## 2020-09-09 PROCEDURE — 76856 PR  ECHO,PELVIC (NONOBSTETRIC): ICD-10-PCS | Mod: 26,S$PBB,, | Performed by: OBSTETRICS & GYNECOLOGY

## 2020-09-09 PROCEDURE — 99999 PR PBB SHADOW E&M-EST. PATIENT-LVL III: ICD-10-PCS | Mod: PBBFAC,,, | Performed by: OBSTETRICS & GYNECOLOGY

## 2020-09-09 PROCEDURE — 76856 US EXAM PELVIC COMPLETE: CPT | Mod: PBBFAC,PO

## 2020-09-09 PROCEDURE — 76830 TRANSVAGINAL US NON-OB: CPT | Mod: 26,S$PBB,, | Performed by: OBSTETRICS & GYNECOLOGY

## 2020-09-09 PROCEDURE — 99999 PR PBB SHADOW E&M-EST. PATIENT-LVL III: CPT | Mod: PBBFAC,,, | Performed by: OBSTETRICS & GYNECOLOGY

## 2020-09-09 PROCEDURE — 99213 OFFICE O/P EST LOW 20 MIN: CPT | Mod: PBBFAC,PO,25 | Performed by: OBSTETRICS & GYNECOLOGY

## 2020-09-09 PROCEDURE — 76830 TRANSVAGINAL US NON-OB: CPT | Mod: PBBFAC,PO

## 2020-09-09 PROCEDURE — 99212 PR OFFICE/OUTPT VISIT, EST, LEVL II, 10-19 MIN: ICD-10-PCS | Mod: 25,S$PBB,, | Performed by: OBSTETRICS & GYNECOLOGY

## 2020-09-09 NOTE — PROGRESS NOTES
45 yo female who presents to discuss pelvic pain.  The patient has h/o ovarian cyst.  She presents today to discuss pelvic US results.  U/S in the pasts have showed both ovaries have multiple simple cyst.  Today the left ovary is larger than before measuring with simple cyst of 6cm.  Patient, however, reports that her pain is on the RIGHT side in the past.  She was started on OCPs at her last visit.  Reports NO pain since that time.  Repeat U/S today shows stable LEFT ovarian simple cyst.  RIGHT ovary is multiloculated and measures 6.5cm.  I have discussed options with the patient.  She desires to continue expectant management for now.  Will have repeat US in 12 weeks.  Will continue OCPs for now.    If no improvement, if pain is worse, or if cyst is growing - the patient will reconsider surgery.    charly patterson MD

## 2020-09-14 ENCOUNTER — DOCUMENTATION ONLY (OUTPATIENT)
Dept: INTERNAL MEDICINE | Facility: CLINIC | Age: 45
End: 2020-09-14

## 2020-09-14 DIAGNOSIS — K27.9 PUD (PEPTIC ULCER DISEASE): ICD-10-CM

## 2020-11-13 ENCOUNTER — OFFICE VISIT (OUTPATIENT)
Dept: FAMILY MEDICINE | Facility: CLINIC | Age: 45
End: 2020-11-13
Payer: MEDICAID

## 2020-11-13 VITALS
DIASTOLIC BLOOD PRESSURE: 68 MMHG | TEMPERATURE: 98 F | SYSTOLIC BLOOD PRESSURE: 114 MMHG | HEIGHT: 64 IN | OXYGEN SATURATION: 97 % | WEIGHT: 169.06 LBS | HEART RATE: 63 BPM | BODY MASS INDEX: 28.86 KG/M2

## 2020-11-13 DIAGNOSIS — Z11.59 ENCOUNTER FOR HEPATITIS C SCREENING TEST FOR LOW RISK PATIENT: ICD-10-CM

## 2020-11-13 DIAGNOSIS — Z09 FRACTURE FOLLOW-UP: ICD-10-CM

## 2020-11-13 DIAGNOSIS — Z91.89 RISK FOR CORONARY ARTERY DISEASE LESS THAN 10% IN NEXT 10 YEARS: Primary | ICD-10-CM

## 2020-11-13 PROCEDURE — 99999 PR PBB SHADOW E&M-EST. PATIENT-LVL IV: ICD-10-PCS | Mod: PBBFAC,,, | Performed by: INTERNAL MEDICINE

## 2020-11-13 PROCEDURE — 99214 OFFICE O/P EST MOD 30 MIN: CPT | Mod: S$PBB,,, | Performed by: INTERNAL MEDICINE

## 2020-11-13 PROCEDURE — 99999 PR PBB SHADOW E&M-EST. PATIENT-LVL IV: CPT | Mod: PBBFAC,,, | Performed by: INTERNAL MEDICINE

## 2020-11-13 PROCEDURE — 99214 OFFICE O/P EST MOD 30 MIN: CPT | Mod: PBBFAC,PO | Performed by: INTERNAL MEDICINE

## 2020-11-13 PROCEDURE — 99214 PR OFFICE/OUTPT VISIT, EST, LEVL IV, 30-39 MIN: ICD-10-PCS | Mod: S$PBB,,, | Performed by: INTERNAL MEDICINE

## 2020-11-13 NOTE — PROGRESS NOTES
Subjective:       Patient ID: Tessa Wynne is a 45 y.o. female.    Chief Complaint: Establish Care      This is a 45-year-old female that comes to the clinic to get established with new PCP.  She reports history of nonspecific no extremity pains.  She was evaluated with normal labs but an NASIMA done was compatible with mild and early peripheral vascular disease.  Due to these findings she was started on medication for cholesterol and feels he may have helped her lower extremity pain.  Reports occasional neck pains on both sign an feeling of little bumps on her scalp.  At this moment the symptoms have resolved but they tend to be intermittent with no clear precipitants.  Denies previous medical illnesses.  Exercises regularly walking or running 3-4 miles per day.  Denies chest pains, palpitations, lightheadedness, shortness of breath, or headaches.  Has been tolerating the medications.  She was taking ibuprofen for her discomfort but had upper endoscopy finding ulcers and she was advised to stop aspirin or anti-inflammatories    Review of Systems   Constitutional: Negative for appetite change, chills, fatigue, fever and unexpected weight change.   HENT: Negative for nasal congestion, ear pain, hearing loss, rhinorrhea and sore throat.    Eyes: Negative for redness and visual disturbance.   Respiratory: Negative for cough and shortness of breath.    Cardiovascular: Negative for chest pain, palpitations, leg swelling and claudication.   Gastrointestinal: Negative for abdominal pain, change in bowel habit, constipation, diarrhea, nausea, vomiting and change in bowel habit.   Genitourinary: Negative for bladder incontinence, difficulty urinating and dysuria.   Musculoskeletal: Positive for leg pain (Reported leg pains that were below-the-knee I better and not present at this moment.) and neck pain (Location on pain on both sides of her neck to the sternocleidomastoid muscle).   Integumentary:  Positive for rash  (Occasional tiny bumps on her scalp.).   Neurological: Negative for dizziness, weakness, light-headedness, numbness and headaches.   Hematological: Does not bruise/bleed easily.   Psychiatric/Behavioral: Negative for sleep disturbance. The patient is not nervous/anxious.       No past medical history on file.    Past Surgical History:   Procedure Laterality Date    CHOLECYSTECTOMY         Family History   Problem Relation Age of Onset    Diabetes Mellitus Mother     Diabetes Mellitus Father     Colon cancer Neg Hx     Breast cancer Neg Hx     Ovarian cancer Neg Hx     Uterine cancer Neg Hx     Heart attack Neg Hx        Social History     Socioeconomic History    Marital status:      Spouse name: Not on file    Number of children: 2    Years of education: Not on file    Highest education level: Not on file   Occupational History    Occupation: cleaning housing    Social Needs    Financial resource strain: Not on file    Food insecurity     Worry: Not on file     Inability: Not on file    Transportation needs     Medical: Not on file     Non-medical: Not on file   Tobacco Use    Smoking status: Never Smoker    Smokeless tobacco: Never Used   Substance and Sexual Activity    Alcohol use: No    Drug use: No    Sexual activity: Yes     Partners: Male     Birth control/protection: OCP   Lifestyle    Physical activity     Days per week: Not on file     Minutes per session: Not on file    Stress: Not on file   Relationships    Social connections     Talks on phone: Not on file     Gets together: Not on file     Attends Rastafari service: Not on file     Active member of club or organization: Not on file     Attends meetings of clubs or organizations: Not on file     Relationship status: Not on file   Other Topics Concern    Not on file   Social History Narrative    Patient is originally from [place] [id number]         School at ; high school         College/university ;         Working ; [employer]   "               Children    joban - 21    silvia - 13         Lives with     dazoë and son and husabd        Diet/Exericse           Current Outpatient Medications   Medication Sig Dispense Refill    atorvastatin (LIPITOR) 20 MG tablet Take 1 tablet (20 mg total) by mouth once daily. 90 tablet 3    FINACEA 15 % Foam APPLY TO FACE TWICE A DAY      hydrocortisone 2.5 % cream Apply topically 2 (two) times daily as needed. 20 g 0    hydrocortisone 2.5 % ointment ELAINE EXT AA BID FOR 7 DAYS      ibuprofen (ADVIL,MOTRIN) 600 MG tablet Take 1 tablet (600 mg total) by mouth every 6 (six) hours as needed for Pain. 20 tablet 0    norgestimate-ethinyl estradiol (ORTHO-CYCLEN) 0.25-35 mg-mcg per tablet Take 1 tablet by mouth once daily. 30 tablet 11    pantoprazole (PROTONIX) 40 MG tablet       tretinoin (RETIN-A) 0.025 % cream APPLY PEA SIZED AMOUNT TO ENTIRE FACE ONCE EVERY NIGHT BEFORE BED      hyoscyamine (ANASPAZ,LEVSIN) 0.125 mg Tab Take 1 tablet (125 mcg total) by mouth every 4 (four) hours as needed. 45 tablet 0     No current facility-administered medications for this visit.        Review of patient's allergies indicates:  No Known Allergies      Objective:       Last 3 sets of Vitals    Vitals - 1 value per visit 8/4/2020 9/9/2020 11/13/2020   SYSTOLIC 118 110 114   DIASTOLIC 74 70 68   PULSE 73 - 63   TEMPERATURE 98.8 - 98.3   RESPIRATIONS - - -   SPO2 98 - 97   Weight (lb) 173.28 173.72 169.09   Weight (kg) 78.6 78.8 76.7   HEIGHT 5' 4" - 5' 4"   BODY MASS INDEX 29.74 29.82 29.02   VISIT REPORT - - -   Pain Score  0 0 0   Physical Exam  Constitutional:       General: She is not in acute distress.     Appearance: Normal appearance.   HENT:      Head: Normocephalic.      Right Ear: Tympanic membrane, ear canal and external ear normal.      Left Ear: Tympanic membrane, ear canal and external ear normal.      Nose: Nose normal.      Mouth/Throat:      Mouth: Mucous membranes are moist.      Pharynx: " Oropharynx is clear.   Eyes:      General: No scleral icterus.     Extraocular Movements: Extraocular movements intact.      Conjunctiva/sclera: Conjunctivae normal.      Pupils: Pupils are equal, round, and reactive to light.   Neck:      Musculoskeletal: Neck supple. No neck rigidity or muscular tenderness.      Vascular: No carotid bruit.   Cardiovascular:      Rate and Rhythm: Normal rate and regular rhythm.      Pulses: Normal pulses.      Heart sounds: Normal heart sounds.   Pulmonary:      Effort: Pulmonary effort is normal.      Breath sounds: Normal breath sounds.   Abdominal:      General: Bowel sounds are normal. There is no distension.      Palpations: Abdomen is soft. There is no mass.      Tenderness: There is no abdominal tenderness.   Musculoskeletal: Normal range of motion.         General: No swelling or tenderness.   Lymphadenopathy:      Cervical: No cervical adenopathy.   Skin:     General: Skin is warm and dry.   Neurological:      General: No focal deficit present.      Mental Status: She is alert and oriented to person, place, and time.   Psychiatric:         Mood and Affect: Mood normal.         Behavior: Behavior normal.           Results for orders placed or performed in visit on 08/05/20   CV Ankle Brachial Indices W Stress W Toe Tracings   Result Value Ref Range    Left arm  mmHg    Right arm  mmHg    Left posterior tibial 128 mmHg    Right posterior tibial 135 mmHg    Left dorsalis pedis 124 mmHg    Right dorsalis pedis 126 mmHg    Left NASIMA 1.08     Right NASIMA 1.13     Treadmill speed 2 mph    Treadmill grade 12 %    Treadmill time 5 min   Conclusion    · Normal resting NASIMA's.  · Triphasic waveforms throughout.  · Exercise NASIMA's are not increased consistent with mild bilateral LE PAD.       Results for CAROL FLOWER (MRN [id number]) as of 11/13/2020 19:10   Ref. Range 7/27/2020 15:30   Sodium Latest Ref Range: 136 - 145 mmol/L 136   Potassium Latest Ref Range: 3.5 - 5.1  mmol/L 4.0   Chloride Latest Ref Range: 95 - 110 mmol/L 103   CO2 Latest Ref Range: 23 - 29 mmol/L 24   Anion Gap Latest Ref Range: 8 - 16 mmol/L 9   BUN Latest Ref Range: 6 - 20 mg/dL 15   Creatinine Latest Ref Range: 0.5 - 1.4 mg/dL 0.9   eGFR if non African American Latest Ref Range: >60 mL/min/1.73 m^2 >60.0   eGFR if African American Latest Ref Range: >60 mL/min/1.73 m^2 >60.0   Glucose Latest Ref Range: 70 - 110 mg/dL 87   Calcium Latest Ref Range: 8.7 - 10.5 mg/dL 9.5   Alkaline Phosphatase Latest Ref Range: 55 - 135 U/L 62   PROTEIN TOTAL Latest Ref Range: 6.0 - 8.4 g/dL 6.9   Albumin Latest Ref Range: 3.5 - 5.2 g/dL 3.9   BILIRUBIN TOTAL Latest Ref Range: 0.1 - 1.0 mg/dL 0.2   AST Latest Ref Range: 10 - 40 U/L 22   ALT Latest Ref Range: 10 - 44 U/L 15   Triglycerides Latest Ref Range: 30 - 150 mg/dL 125   Cholesterol Latest Ref Range: 120 - 199 mg/dL 176   HDL Latest Ref Range: 40 - 75 mg/dL 75   HDL/Cholesterol Ratio Latest Ref Range: 20.0 - 50.0 % 42.6   LDL Cholesterol External Latest Ref Range: 63.0 - 159.0 mg/dL 76.0   Non-HDL Cholesterol Latest Units: mg/dL 101   Total Cholesterol/HDL Ratio Latest Ref Range: 2.0 - 5.0  2.3     A1C:  Recent Labs   Lab 05/07/18  0712   Hemoglobin A1C 5.4       Foot MRI.  Impression:   Nondisplaced fracture of the 3rd metatarsal neck with associated periosteal and marrow edema.    Assessment:       1. Risk for coronary artery disease less than 10% in next 10 years    2. Encounter for hepatitis C screening test for low risk patient    3. Fracture follow-up        Plan:       Tessa was seen today for establish care.    Diagnoses and all orders for this visit:    Risk for coronary artery disease less than 10% in next 10 years  -     C-Reactive Protein; Future  -     Comprehensive Metabolic Panel; Future  - The 10 yr CVD risk is low but in view of toleration and possible benefit of statin will continue tx for now.     Encounter for hepatitis C screening test for low risk  patient  -     Hepatitis C Antibody; Future    Fracture follow-up  -     Noted foot fx which patient reports no significant trauma.   -  DXA Bone Density Spine And Hip; Future    Other orders  -     Influenza - Quadrivalent *Preferred* (6 months+) (PF)

## 2020-11-16 ENCOUNTER — LAB VISIT (OUTPATIENT)
Dept: LAB | Facility: HOSPITAL | Age: 45
End: 2020-11-16
Attending: INTERNAL MEDICINE
Payer: MEDICAID

## 2020-11-16 DIAGNOSIS — Z11.59 ENCOUNTER FOR HEPATITIS C SCREENING TEST FOR LOW RISK PATIENT: ICD-10-CM

## 2020-11-16 DIAGNOSIS — Z91.89 RISK FOR CORONARY ARTERY DISEASE LESS THAN 10% IN NEXT 10 YEARS: ICD-10-CM

## 2020-11-16 LAB
ALBUMIN SERPL BCP-MCNC: 3.9 G/DL (ref 3.5–5.2)
ALP SERPL-CCNC: 71 U/L (ref 55–135)
ALT SERPL W/O P-5'-P-CCNC: 19 U/L (ref 10–44)
ANION GAP SERPL CALC-SCNC: 8 MMOL/L (ref 8–16)
AST SERPL-CCNC: 21 U/L (ref 10–40)
BILIRUB SERPL-MCNC: 0.3 MG/DL (ref 0.1–1)
BUN SERPL-MCNC: 16 MG/DL (ref 6–20)
CALCIUM SERPL-MCNC: 9.5 MG/DL (ref 8.7–10.5)
CHLORIDE SERPL-SCNC: 103 MMOL/L (ref 95–110)
CO2 SERPL-SCNC: 28 MMOL/L (ref 23–29)
CREAT SERPL-MCNC: 1.1 MG/DL (ref 0.5–1.4)
CRP SERPL-MCNC: 3.6 MG/L (ref 0–8.2)
EST. GFR  (AFRICAN AMERICAN): >60 ML/MIN/1.73 M^2
EST. GFR  (NON AFRICAN AMERICAN): >60 ML/MIN/1.73 M^2
GLUCOSE SERPL-MCNC: 88 MG/DL (ref 70–110)
HCV AB SERPL QL IA: NEGATIVE
POTASSIUM SERPL-SCNC: 4.5 MMOL/L (ref 3.5–5.1)
PROT SERPL-MCNC: 7.1 G/DL (ref 6–8.4)
SODIUM SERPL-SCNC: 139 MMOL/L (ref 136–145)

## 2020-11-16 PROCEDURE — 86803 HEPATITIS C AB TEST: CPT

## 2020-11-16 PROCEDURE — 86140 C-REACTIVE PROTEIN: CPT

## 2020-11-16 PROCEDURE — 80053 COMPREHEN METABOLIC PANEL: CPT

## 2020-11-16 PROCEDURE — 36415 COLL VENOUS BLD VENIPUNCTURE: CPT

## 2020-11-18 ENCOUNTER — TELEPHONE (OUTPATIENT)
Dept: FAMILY MEDICINE | Facility: CLINIC | Age: 45
End: 2020-11-18

## 2020-11-18 NOTE — TELEPHONE ENCOUNTER
Left msg for CB to inform patient and let them know that their labs were fine and do not require any change in treatment

## 2020-11-18 NOTE — TELEPHONE ENCOUNTER
----- Message from Sabiha Overton MD sent at 11/17/2020  7:51 PM CST -----  Please contact the patient and let them know that their labs were fine and do not require any change in treatment.

## 2020-11-19 ENCOUNTER — TELEPHONE (OUTPATIENT)
Dept: FAMILY MEDICINE | Facility: CLINIC | Age: 45
End: 2020-11-19

## 2020-11-19 NOTE — TELEPHONE ENCOUNTER
----- Message from Leeann Multani sent at 11/19/2020  1:58 PM CST -----  Regarding: call back  Contact: [phone]  Patient Returning Your Phone Call

## 2020-11-24 ENCOUNTER — HOSPITAL ENCOUNTER (OUTPATIENT)
Dept: RADIOLOGY | Facility: HOSPITAL | Age: 45
Discharge: HOME | End: 2020-11-24
Attending: INTERNAL MEDICINE
Payer: MEDICAID

## 2020-11-24 DIAGNOSIS — Z09 FRACTURE FOLLOW-UP: ICD-10-CM

## 2020-11-24 DIAGNOSIS — Z12.31 ENCOUNTER FOR SCREENING MAMMOGRAM FOR BREAST CANCER: ICD-10-CM

## 2020-11-24 PROCEDURE — 77080 DEXA BONE DENSITY SPINE HIP: ICD-10-PCS | Mod: 26,,, | Performed by: RADIOLOGY

## 2020-11-24 PROCEDURE — 77063 MAMMO DIGITAL SCREENING BILAT WITH TOMO: ICD-10-PCS | Mod: 26,,, | Performed by: RADIOLOGY

## 2020-11-24 PROCEDURE — 77067 SCR MAMMO BI INCL CAD: CPT | Mod: TC

## 2020-11-24 PROCEDURE — 77067 SCR MAMMO BI INCL CAD: CPT | Mod: 26,,, | Performed by: RADIOLOGY

## 2020-11-24 PROCEDURE — 77080 DXA BONE DENSITY AXIAL: CPT | Mod: TC

## 2020-11-24 PROCEDURE — 77080 DXA BONE DENSITY AXIAL: CPT | Mod: 26,,, | Performed by: RADIOLOGY

## 2020-11-24 PROCEDURE — 77067 MAMMO DIGITAL SCREENING BILAT WITH TOMO: ICD-10-PCS | Mod: 26,,, | Performed by: RADIOLOGY

## 2020-11-24 PROCEDURE — 77063 BREAST TOMOSYNTHESIS BI: CPT | Mod: 26,,, | Performed by: RADIOLOGY

## 2020-11-30 ENCOUNTER — TELEPHONE (OUTPATIENT)
Dept: FAMILY MEDICINE | Facility: CLINIC | Age: 45
End: 2020-11-30

## 2020-11-30 NOTE — TELEPHONE ENCOUNTER
----- Message from Sabiha Overton MD sent at 11/25/2020 12:15 PM CST -----  Please contact the patient and let them know that mammogram was fine.

## 2020-12-01 ENCOUNTER — PATIENT OUTREACH (OUTPATIENT)
Dept: ADMINISTRATIVE | Facility: OTHER | Age: 45
End: 2020-12-01

## 2020-12-01 NOTE — PROGRESS NOTES
Health Maintenance Due   Topic Date Due    Influenza Vaccine (1) 08/01/2020     Updates were requested from care everywhere.  Chart was reviewed for overdue Proactive [facility] Encounters ([facility]) topics (CRS, Breast Cancer Screening, Eye exam)  Health Maintenance has been updated.  LINKS immunization registry triggered.  Immunizations were reconciled.

## 2020-12-15 ENCOUNTER — OFFICE VISIT (OUTPATIENT)
Dept: OBSTETRICS AND GYNECOLOGY | Facility: CLINIC | Age: 45
End: 2020-12-15
Payer: MEDICAID

## 2020-12-15 ENCOUNTER — PROCEDURE VISIT (OUTPATIENT)
Dept: OBSTETRICS AND GYNECOLOGY | Facility: CLINIC | Age: 45
End: 2020-12-15
Payer: MEDICAID

## 2020-12-15 VITALS — DIASTOLIC BLOOD PRESSURE: 64 MMHG | WEIGHT: 170.19 LBS | BODY MASS INDEX: 29.21 KG/M2 | SYSTOLIC BLOOD PRESSURE: 98 MMHG

## 2020-12-15 DIAGNOSIS — N83.202 CYSTS OF BOTH OVARIES: ICD-10-CM

## 2020-12-15 DIAGNOSIS — N83.201 RIGHT OVARIAN CYST: ICD-10-CM

## 2020-12-15 DIAGNOSIS — N83.201 CYSTS OF BOTH OVARIES: ICD-10-CM

## 2020-12-15 PROCEDURE — 99212 OFFICE O/P EST SF 10 MIN: CPT | Mod: PBBFAC,PO | Performed by: OBSTETRICS & GYNECOLOGY

## 2020-12-15 PROCEDURE — 76830 PR  ECHOGRAPHY,TRANSVAGINAL: ICD-10-PCS | Mod: 26,S$PBB,, | Performed by: OBSTETRICS & GYNECOLOGY

## 2020-12-15 PROCEDURE — 99212 PR OFFICE/OUTPT VISIT, EST, LEVL II, 10-19 MIN: ICD-10-PCS | Mod: 25,S$PBB,, | Performed by: OBSTETRICS & GYNECOLOGY

## 2020-12-15 PROCEDURE — 76856 US EXAM PELVIC COMPLETE: CPT | Mod: PBBFAC,PO

## 2020-12-15 PROCEDURE — 99212 OFFICE O/P EST SF 10 MIN: CPT | Mod: 25,S$PBB,, | Performed by: OBSTETRICS & GYNECOLOGY

## 2020-12-15 PROCEDURE — 76856 US EXAM PELVIC COMPLETE: CPT | Mod: 26,S$PBB,, | Performed by: OBSTETRICS & GYNECOLOGY

## 2020-12-15 PROCEDURE — 99999 PR PBB SHADOW E&M-EST. PATIENT-LVL II: CPT | Mod: PBBFAC,,, | Performed by: OBSTETRICS & GYNECOLOGY

## 2020-12-15 PROCEDURE — 76856 PR  ECHO,PELVIC (NONOBSTETRIC): ICD-10-PCS | Mod: 26,S$PBB,, | Performed by: OBSTETRICS & GYNECOLOGY

## 2020-12-15 PROCEDURE — 76830 TRANSVAGINAL US NON-OB: CPT | Mod: 26,S$PBB,, | Performed by: OBSTETRICS & GYNECOLOGY

## 2020-12-15 PROCEDURE — 99999 PR PBB SHADOW E&M-EST. PATIENT-LVL II: ICD-10-PCS | Mod: PBBFAC,,, | Performed by: OBSTETRICS & GYNECOLOGY

## 2020-12-15 PROCEDURE — 76830 TRANSVAGINAL US NON-OB: CPT | Mod: PBBFAC,PO

## 2020-12-15 RX ORDER — NORGESTIMATE AND ETHINYL ESTRADIOL 0.25-0.035
1 KIT ORAL DAILY
Qty: 30 TABLET | Refills: 11 | Status: SHIPPED | OUTPATIENT
Start: 2020-12-15 | End: 2022-09-06

## 2020-12-15 NOTE — PROGRESS NOTES
44 yo female who presents to discuss pelvic pain.  The patient has h/o ovarian cyst.  She presents today to discuss pelvic US results.  U/S in the past have showed both ovaries have multiple simple cyst.  On US on 9/9/2020, the left ovary is larger than before measuring with simple cyst of 6cm.  Patient is current on OCPs.  Reports no side effects.  We have repeated her US today.  On 12/15/2020: no ovarian cysts are appreciated. Stable small uterine fibroid appreciated.  Results discussed with the patient.  She is happy with results.  Wants to continue OCPs.  rx for 1 yr sent.    F/u in 1 yr for annual exam    charly patterson

## 2021-03-02 ENCOUNTER — LAB VISIT (OUTPATIENT)
Dept: LAB | Facility: HOSPITAL | Age: 46
End: 2021-03-02
Attending: INTERNAL MEDICINE
Payer: MEDICAID

## 2021-03-02 ENCOUNTER — OFFICE VISIT (OUTPATIENT)
Dept: INTERNAL MEDICINE | Facility: CLINIC | Age: 46
End: 2021-03-02
Payer: MEDICAID

## 2021-03-02 VITALS
BODY MASS INDEX: 29.32 KG/M2 | WEIGHT: 171.75 LBS | HEIGHT: 64 IN | OXYGEN SATURATION: 98 % | DIASTOLIC BLOOD PRESSURE: 72 MMHG | TEMPERATURE: 97 F | HEART RATE: 71 BPM | SYSTOLIC BLOOD PRESSURE: 124 MMHG | RESPIRATION RATE: 17 BRPM

## 2021-03-02 DIAGNOSIS — K58.1 IRRITABLE BOWEL SYNDROME WITH CONSTIPATION: ICD-10-CM

## 2021-03-02 DIAGNOSIS — K58.1 IRRITABLE BOWEL SYNDROME WITH CONSTIPATION: Primary | ICD-10-CM

## 2021-03-02 DIAGNOSIS — M54.2 NECK PAIN: ICD-10-CM

## 2021-03-02 DIAGNOSIS — I73.9 PAD (PERIPHERAL ARTERY DISEASE): ICD-10-CM

## 2021-03-02 LAB
BASOPHILS # BLD AUTO: 0.02 K/UL (ref 0–0.2)
BASOPHILS NFR BLD: 0.4 % (ref 0–1.9)
DIFFERENTIAL METHOD: NORMAL
EOSINOPHIL # BLD AUTO: 0.1 K/UL (ref 0–0.5)
EOSINOPHIL NFR BLD: 1.5 % (ref 0–8)
ERYTHROCYTE [DISTWIDTH] IN BLOOD BY AUTOMATED COUNT: 13.5 % (ref 11.5–14.5)
ERYTHROCYTE [SEDIMENTATION RATE] IN BLOOD BY WESTERGREN METHOD: 4 MM/HR (ref 0–36)
HCT VFR BLD AUTO: 41 % (ref 37–48.5)
HGB BLD-MCNC: 13.3 G/DL (ref 12–16)
IMM GRANULOCYTES # BLD AUTO: 0.01 K/UL (ref 0–0.04)
IMM GRANULOCYTES NFR BLD AUTO: 0.2 % (ref 0–0.5)
LYMPHOCYTES # BLD AUTO: 2.1 K/UL (ref 1–4.8)
LYMPHOCYTES NFR BLD: 43 % (ref 18–48)
MCH RBC QN AUTO: 30 PG (ref 27–31)
MCHC RBC AUTO-ENTMCNC: 32.4 G/DL (ref 32–36)
MCV RBC AUTO: 93 FL (ref 82–98)
MONOCYTES # BLD AUTO: 0.4 K/UL (ref 0.3–1)
MONOCYTES NFR BLD: 9.1 % (ref 4–15)
NEUTROPHILS # BLD AUTO: 2.2 K/UL (ref 1.8–7.7)
NEUTROPHILS NFR BLD: 45.8 % (ref 38–73)
NRBC BLD-RTO: 0 /100 WBC
PLATELET # BLD AUTO: 265 K/UL (ref 150–350)
PMV BLD AUTO: 11.5 FL (ref 9.2–12.9)
RBC # BLD AUTO: 4.43 M/UL (ref 4–5.4)
WBC # BLD AUTO: 4.81 K/UL (ref 3.9–12.7)

## 2021-03-02 PROCEDURE — 99214 OFFICE O/P EST MOD 30 MIN: CPT | Mod: S$PBB,,, | Performed by: INTERNAL MEDICINE

## 2021-03-02 PROCEDURE — 85652 RBC SED RATE AUTOMATED: CPT

## 2021-03-02 PROCEDURE — 36415 COLL VENOUS BLD VENIPUNCTURE: CPT | Mod: PO

## 2021-03-02 PROCEDURE — 80061 LIPID PANEL: CPT

## 2021-03-02 PROCEDURE — 99214 OFFICE O/P EST MOD 30 MIN: CPT | Mod: PBBFAC,PO | Performed by: INTERNAL MEDICINE

## 2021-03-02 PROCEDURE — 80048 BASIC METABOLIC PNL TOTAL CA: CPT

## 2021-03-02 PROCEDURE — 99999 PR PBB SHADOW E&M-EST. PATIENT-LVL IV: CPT | Mod: PBBFAC,,, | Performed by: INTERNAL MEDICINE

## 2021-03-02 PROCEDURE — 99999 PR PBB SHADOW E&M-EST. PATIENT-LVL IV: ICD-10-PCS | Mod: PBBFAC,,, | Performed by: INTERNAL MEDICINE

## 2021-03-02 PROCEDURE — 99214 PR OFFICE/OUTPT VISIT, EST, LEVL IV, 30-39 MIN: ICD-10-PCS | Mod: S$PBB,,, | Performed by: INTERNAL MEDICINE

## 2021-03-02 PROCEDURE — 85025 COMPLETE CBC W/AUTO DIFF WBC: CPT

## 2021-03-03 ENCOUNTER — PATIENT MESSAGE (OUTPATIENT)
Dept: INTERNAL MEDICINE | Facility: CLINIC | Age: 46
End: 2021-03-03

## 2021-03-03 LAB
ANION GAP SERPL CALC-SCNC: 7 MMOL/L (ref 8–16)
BUN SERPL-MCNC: 11 MG/DL (ref 6–20)
CALCIUM SERPL-MCNC: 9.5 MG/DL (ref 8.7–10.5)
CHLORIDE SERPL-SCNC: 104 MMOL/L (ref 95–110)
CHOLEST SERPL-MCNC: 156 MG/DL (ref 120–199)
CHOLEST/HDLC SERPL: 2.8 {RATIO} (ref 2–5)
CO2 SERPL-SCNC: 28 MMOL/L (ref 23–29)
CREAT SERPL-MCNC: 0.9 MG/DL (ref 0.5–1.4)
EST. GFR  (AFRICAN AMERICAN): >60 ML/MIN/1.73 M^2
EST. GFR  (NON AFRICAN AMERICAN): >60 ML/MIN/1.73 M^2
GLUCOSE SERPL-MCNC: 93 MG/DL (ref 70–110)
HDLC SERPL-MCNC: 56 MG/DL (ref 40–75)
HDLC SERPL: 35.9 % (ref 20–50)
LDLC SERPL CALC-MCNC: 83.2 MG/DL (ref 63–159)
NONHDLC SERPL-MCNC: 100 MG/DL
POTASSIUM SERPL-SCNC: 4.3 MMOL/L (ref 3.5–5.1)
SODIUM SERPL-SCNC: 139 MMOL/L (ref 136–145)
TRIGL SERPL-MCNC: 84 MG/DL (ref 30–150)

## 2021-03-29 ENCOUNTER — TELEPHONE (OUTPATIENT)
Dept: FAMILY MEDICINE | Facility: CLINIC | Age: 46
End: 2021-03-29

## 2021-04-07 ENCOUNTER — OFFICE VISIT (OUTPATIENT)
Dept: FAMILY MEDICINE | Facility: CLINIC | Age: 46
End: 2021-04-07
Payer: MEDICAID

## 2021-04-07 VITALS
HEIGHT: 64 IN | OXYGEN SATURATION: 98 % | TEMPERATURE: 98 F | WEIGHT: 169.56 LBS | DIASTOLIC BLOOD PRESSURE: 66 MMHG | SYSTOLIC BLOOD PRESSURE: 108 MMHG | HEART RATE: 73 BPM | BODY MASS INDEX: 28.95 KG/M2

## 2021-04-07 DIAGNOSIS — M79.605 PAIN IN BOTH LOWER EXTREMITIES: ICD-10-CM

## 2021-04-07 DIAGNOSIS — M79.604 PAIN IN BOTH LOWER EXTREMITIES: ICD-10-CM

## 2021-04-07 DIAGNOSIS — I73.9 PAD (PERIPHERAL ARTERY DISEASE): ICD-10-CM

## 2021-04-07 DIAGNOSIS — M79.10 MYALGIA: Primary | ICD-10-CM

## 2021-04-07 PROCEDURE — 99214 OFFICE O/P EST MOD 30 MIN: CPT | Mod: S$PBB,,, | Performed by: INTERNAL MEDICINE

## 2021-04-07 PROCEDURE — 99214 OFFICE O/P EST MOD 30 MIN: CPT | Mod: PBBFAC,PO | Performed by: INTERNAL MEDICINE

## 2021-04-07 PROCEDURE — 99999 PR PBB SHADOW E&M-EST. PATIENT-LVL IV: ICD-10-PCS | Mod: PBBFAC,,, | Performed by: INTERNAL MEDICINE

## 2021-04-07 PROCEDURE — 99999 PR PBB SHADOW E&M-EST. PATIENT-LVL IV: CPT | Mod: PBBFAC,,, | Performed by: INTERNAL MEDICINE

## 2021-04-07 PROCEDURE — 99214 PR OFFICE/OUTPT VISIT, EST, LEVL IV, 30-39 MIN: ICD-10-PCS | Mod: S$PBB,,, | Performed by: INTERNAL MEDICINE

## 2021-04-07 RX ORDER — ATORVASTATIN CALCIUM 10 MG/1
10 TABLET, FILM COATED ORAL DAILY
Qty: 90 TABLET | Refills: 3 | Status: SHIPPED | OUTPATIENT
Start: 2021-04-07 | End: 2022-09-06

## 2021-04-07 RX ORDER — ASPIRIN 81 MG/1
81 TABLET ORAL
Qty: 30 TABLET | Refills: 3 | Status: SHIPPED | OUTPATIENT
Start: 2021-04-07 | End: 2022-09-06

## 2021-04-07 RX ORDER — PANTOPRAZOLE SODIUM 40 MG/1
40 TABLET, DELAYED RELEASE ORAL DAILY
Qty: 30 TABLET | Refills: 6 | Status: SHIPPED | OUTPATIENT
Start: 2021-04-07 | End: 2022-09-16 | Stop reason: SDUPTHER

## 2021-04-20 ENCOUNTER — TELEPHONE (OUTPATIENT)
Dept: FAMILY MEDICINE | Facility: CLINIC | Age: 46
End: 2021-04-20

## 2021-04-20 PROCEDURE — 93005 ELECTROCARDIOGRAM TRACING: CPT

## 2021-04-20 PROCEDURE — 99285 EMERGENCY DEPT VISIT HI MDM: CPT | Mod: 25

## 2021-04-20 PROCEDURE — 93010 EKG 12-LEAD: ICD-10-PCS | Mod: ,,, | Performed by: INTERNAL MEDICINE

## 2021-04-20 PROCEDURE — 93010 ELECTROCARDIOGRAM REPORT: CPT | Mod: ,,, | Performed by: INTERNAL MEDICINE

## 2021-04-21 ENCOUNTER — HOSPITAL ENCOUNTER (EMERGENCY)
Facility: HOSPITAL | Age: 46
Discharge: HOME | End: 2021-04-21
Attending: EMERGENCY MEDICINE
Payer: MEDICAID

## 2021-04-21 VITALS
HEART RATE: 53 BPM | WEIGHT: 165 LBS | BODY MASS INDEX: 28.32 KG/M2 | RESPIRATION RATE: 18 BRPM | SYSTOLIC BLOOD PRESSURE: 121 MMHG | DIASTOLIC BLOOD PRESSURE: 79 MMHG | TEMPERATURE: 98 F | OXYGEN SATURATION: 99 %

## 2021-04-21 DIAGNOSIS — R07.9 CHEST PAIN: ICD-10-CM

## 2021-04-21 LAB
ALBUMIN SERPL BCP-MCNC: 4.4 G/DL (ref 3.5–5.2)
ALP SERPL-CCNC: 71 U/L (ref 55–135)
ALT SERPL W/O P-5'-P-CCNC: 25 U/L (ref 10–44)
ANION GAP SERPL CALC-SCNC: 8 MMOL/L (ref 8–16)
AST SERPL-CCNC: 32 U/L (ref 10–40)
B-HCG UR QL: NEGATIVE
BASOPHILS # BLD AUTO: 0.02 K/UL (ref 0–0.2)
BASOPHILS NFR BLD: 0.3 % (ref 0–1.9)
BILIRUB SERPL-MCNC: 0.2 MG/DL (ref 0.1–1)
BNP SERPL-MCNC: <10 PG/ML (ref 0–99)
BUN SERPL-MCNC: 9 MG/DL (ref 6–20)
CALCIUM SERPL-MCNC: 9.3 MG/DL (ref 8.7–10.5)
CHLORIDE SERPL-SCNC: 104 MMOL/L (ref 95–110)
CO2 SERPL-SCNC: 26 MMOL/L (ref 23–29)
CREAT SERPL-MCNC: 0.8 MG/DL (ref 0.5–1.4)
CTP QC/QA: YES
DIFFERENTIAL METHOD: ABNORMAL
EOSINOPHIL # BLD AUTO: 0.1 K/UL (ref 0–0.5)
EOSINOPHIL NFR BLD: 1.7 % (ref 0–8)
ERYTHROCYTE [DISTWIDTH] IN BLOOD BY AUTOMATED COUNT: 13.2 % (ref 11.5–14.5)
EST. GFR  (AFRICAN AMERICAN): >60 ML/MIN/1.73 M^2
EST. GFR  (NON AFRICAN AMERICAN): >60 ML/MIN/1.73 M^2
GLUCOSE SERPL-MCNC: 93 MG/DL (ref 70–110)
HCT VFR BLD AUTO: 38.4 % (ref 37–48.5)
HGB BLD-MCNC: 12.7 G/DL (ref 12–16)
IMM GRANULOCYTES # BLD AUTO: 0.02 K/UL (ref 0–0.04)
IMM GRANULOCYTES NFR BLD AUTO: 0.3 % (ref 0–0.5)
LYMPHOCYTES # BLD AUTO: 3.5 K/UL (ref 1–4.8)
LYMPHOCYTES NFR BLD: 49.9 % (ref 18–48)
MCH RBC QN AUTO: 29.7 PG (ref 27–31)
MCHC RBC AUTO-ENTMCNC: 33.1 G/DL (ref 32–36)
MCV RBC AUTO: 90 FL (ref 82–98)
MONOCYTES # BLD AUTO: 0.6 K/UL (ref 0.3–1)
MONOCYTES NFR BLD: 8.7 % (ref 4–15)
NEUTROPHILS # BLD AUTO: 2.7 K/UL (ref 1.8–7.7)
NEUTROPHILS NFR BLD: 39.1 % (ref 38–73)
NRBC BLD-RTO: 0 /100 WBC
PLATELET # BLD AUTO: 229 K/UL (ref 150–450)
PMV BLD AUTO: 11 FL (ref 9.2–12.9)
POTASSIUM SERPL-SCNC: 3.7 MMOL/L (ref 3.5–5.1)
PROT SERPL-MCNC: 7.3 G/DL (ref 6–8.4)
RBC # BLD AUTO: 4.27 M/UL (ref 4–5.4)
SODIUM SERPL-SCNC: 138 MMOL/L (ref 136–145)
TROPONIN I SERPL DL<=0.01 NG/ML-MCNC: <0.006 NG/ML (ref 0–0.03)
WBC # BLD AUTO: 6.92 K/UL (ref 3.9–12.7)

## 2021-04-21 PROCEDURE — 81025 URINE PREGNANCY TEST: CPT | Performed by: EMERGENCY MEDICINE

## 2021-04-21 PROCEDURE — 83880 ASSAY OF NATRIURETIC PEPTIDE: CPT | Performed by: NURSE PRACTITIONER

## 2021-04-21 PROCEDURE — 85025 COMPLETE CBC W/AUTO DIFF WBC: CPT | Performed by: NURSE PRACTITIONER

## 2021-04-21 PROCEDURE — 84484 ASSAY OF TROPONIN QUANT: CPT | Performed by: NURSE PRACTITIONER

## 2021-04-21 PROCEDURE — 25000003 PHARM REV CODE 250: Performed by: EMERGENCY MEDICINE

## 2021-04-21 PROCEDURE — 80053 COMPREHEN METABOLIC PANEL: CPT | Performed by: NURSE PRACTITIONER

## 2021-04-21 RX ORDER — LIDOCAINE HYDROCHLORIDE 20 MG/ML
10 SOLUTION OROPHARYNGEAL
Status: COMPLETED | OUTPATIENT
Start: 2021-04-21 | End: 2021-04-21

## 2021-04-21 RX ORDER — MAG HYDROX/ALUMINUM HYD/SIMETH 200-200-20
30 SUSPENSION, ORAL (FINAL DOSE FORM) ORAL
Status: COMPLETED | OUTPATIENT
Start: 2021-04-21 | End: 2021-04-21

## 2021-04-21 RX ADMIN — ALUMINUM HYDROXIDE, MAGNESIUM HYDROXIDE, AND SIMETHICONE 30 ML: 200; 200; 20 SUSPENSION ORAL at 01:04

## 2021-04-21 RX ADMIN — LIDOCAINE HYDROCHLORIDE 10 ML: 20 SOLUTION ORAL at 01:04

## 2021-04-25 ENCOUNTER — HOSPITAL ENCOUNTER (EMERGENCY)
Facility: HOSPITAL | Age: 46
Discharge: HOME | End: 2021-04-25
Attending: EMERGENCY MEDICINE
Payer: MEDICAID

## 2021-04-25 VITALS
RESPIRATION RATE: 18 BRPM | HEIGHT: 64 IN | SYSTOLIC BLOOD PRESSURE: 168 MMHG | TEMPERATURE: 98 F | OXYGEN SATURATION: 100 % | WEIGHT: 165 LBS | BODY MASS INDEX: 28.17 KG/M2 | HEART RATE: 79 BPM | DIASTOLIC BLOOD PRESSURE: 81 MMHG

## 2021-04-25 DIAGNOSIS — M79.7 FIBROMYALGIA: ICD-10-CM

## 2021-04-25 DIAGNOSIS — R52 BODY ACHES: Primary | ICD-10-CM

## 2021-04-25 PROCEDURE — 99281 EMR DPT VST MAYX REQ PHY/QHP: CPT

## 2021-04-27 ENCOUNTER — TELEPHONE (OUTPATIENT)
Dept: FAMILY MEDICINE | Facility: CLINIC | Age: 46
End: 2021-04-27

## 2021-04-28 ENCOUNTER — TELEPHONE (OUTPATIENT)
Dept: FAMILY MEDICINE | Facility: CLINIC | Age: 46
End: 2021-04-28

## 2021-04-28 DIAGNOSIS — R19.5 CHANGE IN STOOL: Primary | ICD-10-CM

## 2021-04-29 ENCOUNTER — TELEPHONE (OUTPATIENT)
Dept: FAMILY MEDICINE | Facility: CLINIC | Age: 46
End: 2021-04-29

## 2021-04-30 ENCOUNTER — LAB VISIT (OUTPATIENT)
Dept: LAB | Facility: HOSPITAL | Age: 46
End: 2021-04-30
Attending: INTERNAL MEDICINE
Payer: MEDICAID

## 2021-04-30 DIAGNOSIS — R19.5 CHANGE IN STOOL: ICD-10-CM

## 2021-04-30 LAB — WBC #/AREA STL HPF: NORMAL /[HPF]

## 2021-04-30 PROCEDURE — 87209 SMEAR COMPLEX STAIN: CPT | Performed by: INTERNAL MEDICINE

## 2021-04-30 PROCEDURE — 89055 LEUKOCYTE ASSESSMENT FECAL: CPT | Performed by: INTERNAL MEDICINE

## 2021-05-04 LAB — O+P STL MICRO: NORMAL

## 2021-05-07 ENCOUNTER — PATIENT MESSAGE (OUTPATIENT)
Dept: FAMILY MEDICINE | Facility: CLINIC | Age: 46
End: 2021-05-07

## 2021-05-11 ENCOUNTER — TELEPHONE (OUTPATIENT)
Dept: NEUROLOGY | Facility: CLINIC | Age: 46
End: 2021-05-11

## 2021-05-11 ENCOUNTER — TELEPHONE (OUTPATIENT)
Dept: FAMILY MEDICINE | Facility: CLINIC | Age: 46
End: 2021-05-11

## 2021-05-18 ENCOUNTER — TELEPHONE (OUTPATIENT)
Dept: FAMILY MEDICINE | Facility: CLINIC | Age: 46
End: 2021-05-18

## 2021-05-20 ENCOUNTER — TELEPHONE (OUTPATIENT)
Dept: FAMILY MEDICINE | Facility: CLINIC | Age: 46
End: 2021-05-20

## 2021-06-11 ENCOUNTER — LAB VISIT (OUTPATIENT)
Dept: LAB | Facility: HOSPITAL | Age: 46
End: 2021-06-11
Attending: INTERNAL MEDICINE
Payer: MEDICAID

## 2021-06-11 ENCOUNTER — TELEPHONE (OUTPATIENT)
Dept: FAMILY MEDICINE | Facility: CLINIC | Age: 46
End: 2021-06-11

## 2021-06-11 ENCOUNTER — OFFICE VISIT (OUTPATIENT)
Dept: FAMILY MEDICINE | Facility: CLINIC | Age: 46
End: 2021-06-11
Payer: MEDICAID

## 2021-06-11 VITALS
SYSTOLIC BLOOD PRESSURE: 106 MMHG | HEIGHT: 64 IN | WEIGHT: 166.88 LBS | OXYGEN SATURATION: 99 % | BODY MASS INDEX: 28.49 KG/M2 | HEART RATE: 54 BPM | DIASTOLIC BLOOD PRESSURE: 64 MMHG | TEMPERATURE: 98 F

## 2021-06-11 DIAGNOSIS — M79.10 MYALGIA: ICD-10-CM

## 2021-06-11 DIAGNOSIS — M79.604 PAIN IN BOTH LOWER EXTREMITIES: ICD-10-CM

## 2021-06-11 DIAGNOSIS — R23.3 EASY BRUISING: Primary | ICD-10-CM

## 2021-06-11 DIAGNOSIS — M79.605 PAIN IN BOTH LOWER EXTREMITIES: ICD-10-CM

## 2021-06-11 DIAGNOSIS — R23.3 EASY BRUISING: ICD-10-CM

## 2021-06-11 DIAGNOSIS — I73.9 PAD (PERIPHERAL ARTERY DISEASE): ICD-10-CM

## 2021-06-11 DIAGNOSIS — R73.9 HYPERGLYCEMIA: ICD-10-CM

## 2021-06-11 LAB
APTT BLDCRRT: 26.5 SEC (ref 21–32)
CK SERPL-CCNC: 160 U/L (ref 20–180)
INR PPP: 0.9 (ref 0.8–1.2)
PROTHROMBIN TIME: 10 SEC (ref 9–12.5)
URATE SERPL-MCNC: 4.1 MG/DL (ref 2.4–5.7)
VIT B12 SERPL-MCNC: 852 PG/ML (ref 210–950)

## 2021-06-11 PROCEDURE — 85610 PROTHROMBIN TIME: CPT | Performed by: INTERNAL MEDICINE

## 2021-06-11 PROCEDURE — 99214 OFFICE O/P EST MOD 30 MIN: CPT | Mod: PBBFAC,PO | Performed by: INTERNAL MEDICINE

## 2021-06-11 PROCEDURE — 99214 OFFICE O/P EST MOD 30 MIN: CPT | Mod: S$PBB,,, | Performed by: INTERNAL MEDICINE

## 2021-06-11 PROCEDURE — 86038 ANTINUCLEAR ANTIBODIES: CPT | Performed by: INTERNAL MEDICINE

## 2021-06-11 PROCEDURE — 82550 ASSAY OF CK (CPK): CPT | Performed by: INTERNAL MEDICINE

## 2021-06-11 PROCEDURE — 36415 COLL VENOUS BLD VENIPUNCTURE: CPT | Performed by: INTERNAL MEDICINE

## 2021-06-11 PROCEDURE — 84550 ASSAY OF BLOOD/URIC ACID: CPT | Performed by: INTERNAL MEDICINE

## 2021-06-11 PROCEDURE — 85730 THROMBOPLASTIN TIME PARTIAL: CPT | Performed by: INTERNAL MEDICINE

## 2021-06-11 PROCEDURE — 99999 PR PBB SHADOW E&M-EST. PATIENT-LVL IV: CPT | Mod: PBBFAC,,, | Performed by: INTERNAL MEDICINE

## 2021-06-11 PROCEDURE — 99214 PR OFFICE/OUTPT VISIT, EST, LEVL IV, 30-39 MIN: ICD-10-PCS | Mod: S$PBB,,, | Performed by: INTERNAL MEDICINE

## 2021-06-11 PROCEDURE — 82607 VITAMIN B-12: CPT | Performed by: INTERNAL MEDICINE

## 2021-06-11 PROCEDURE — 99999 PR PBB SHADOW E&M-EST. PATIENT-LVL IV: ICD-10-PCS | Mod: PBBFAC,,, | Performed by: INTERNAL MEDICINE

## 2021-06-14 ENCOUNTER — PATIENT MESSAGE (OUTPATIENT)
Dept: FAMILY MEDICINE | Facility: CLINIC | Age: 46
End: 2021-06-14

## 2021-06-14 LAB — ANA SER QL IF: NORMAL

## 2021-07-15 ENCOUNTER — OFFICE VISIT (OUTPATIENT)
Dept: RHEUMATOLOGY | Facility: CLINIC | Age: 46
End: 2021-07-15
Payer: MEDICAID

## 2021-07-15 VITALS
SYSTOLIC BLOOD PRESSURE: 102 MMHG | WEIGHT: 167.56 LBS | BODY MASS INDEX: 28.6 KG/M2 | HEART RATE: 68 BPM | DIASTOLIC BLOOD PRESSURE: 68 MMHG | HEIGHT: 64 IN

## 2021-07-15 DIAGNOSIS — M79.7 FIBROMYALGIA: ICD-10-CM

## 2021-07-15 PROCEDURE — 99999 PR PBB SHADOW E&M-EST. PATIENT-LVL III: CPT | Mod: PBBFAC,,,

## 2021-07-15 PROCEDURE — 99204 OFFICE O/P NEW MOD 45 MIN: CPT | Mod: S$PBB,,,

## 2021-07-15 PROCEDURE — 99204 PR OFFICE/OUTPT VISIT, NEW, LEVL IV, 45-59 MIN: ICD-10-PCS | Mod: S$PBB,,,

## 2021-07-15 PROCEDURE — 99213 OFFICE O/P EST LOW 20 MIN: CPT | Mod: PBBFAC

## 2021-07-15 PROCEDURE — 99999 PR PBB SHADOW E&M-EST. PATIENT-LVL III: ICD-10-PCS | Mod: PBBFAC,,,

## 2021-08-03 ENCOUNTER — OFFICE VISIT (OUTPATIENT)
Dept: OBSTETRICS AND GYNECOLOGY | Facility: CLINIC | Age: 46
End: 2021-08-03
Payer: MEDICAID

## 2021-08-03 VITALS
BODY MASS INDEX: 28.53 KG/M2 | WEIGHT: 166.25 LBS | DIASTOLIC BLOOD PRESSURE: 78 MMHG | SYSTOLIC BLOOD PRESSURE: 102 MMHG

## 2021-08-03 DIAGNOSIS — Z12.31 VISIT FOR SCREENING MAMMOGRAM: ICD-10-CM

## 2021-08-03 DIAGNOSIS — N76.0 ACUTE VAGINITIS: Primary | ICD-10-CM

## 2021-08-03 PROCEDURE — 99999 PR PBB SHADOW E&M-EST. PATIENT-LVL II: CPT | Mod: PBBFAC,,, | Performed by: OBSTETRICS & GYNECOLOGY

## 2021-08-03 PROCEDURE — 87491 CHLMYD TRACH DNA AMP PROBE: CPT | Performed by: OBSTETRICS & GYNECOLOGY

## 2021-08-03 PROCEDURE — 99999 PR PBB SHADOW E&M-EST. PATIENT-LVL II: ICD-10-PCS | Mod: PBBFAC,,, | Performed by: OBSTETRICS & GYNECOLOGY

## 2021-08-03 PROCEDURE — 87591 N.GONORRHOEAE DNA AMP PROB: CPT | Performed by: OBSTETRICS & GYNECOLOGY

## 2021-08-03 PROCEDURE — 87481 CANDIDA DNA AMP PROBE: CPT | Mod: 59 | Performed by: OBSTETRICS & GYNECOLOGY

## 2021-08-03 PROCEDURE — 99213 PR OFFICE/OUTPT VISIT, EST, LEVL III, 20-29 MIN: ICD-10-PCS | Mod: S$PBB,,, | Performed by: OBSTETRICS & GYNECOLOGY

## 2021-08-03 PROCEDURE — 99212 OFFICE O/P EST SF 10 MIN: CPT | Mod: PBBFAC,PO | Performed by: OBSTETRICS & GYNECOLOGY

## 2021-08-03 PROCEDURE — 87661 TRICHOMONAS VAGINALIS AMPLIF: CPT | Performed by: OBSTETRICS & GYNECOLOGY

## 2021-08-03 PROCEDURE — 99213 OFFICE O/P EST LOW 20 MIN: CPT | Mod: S$PBB,,, | Performed by: OBSTETRICS & GYNECOLOGY

## 2021-08-05 LAB
BACTERIAL VAGINOSIS DNA: POSITIVE
CANDIDA GLABRATA DNA: NEGATIVE
CANDIDA KRUSEI DNA: NEGATIVE
CANDIDA RRNA VAG QL PROBE: NEGATIVE
T VAGINALIS RRNA GENITAL QL PROBE: NEGATIVE

## 2021-08-06 LAB
C TRACH DNA SPEC QL NAA+PROBE: NOT DETECTED
N GONORRHOEA DNA SPEC QL NAA+PROBE: NOT DETECTED

## 2021-08-08 DIAGNOSIS — B96.89 BV (BACTERIAL VAGINOSIS): Primary | ICD-10-CM

## 2021-08-08 DIAGNOSIS — N76.0 BV (BACTERIAL VAGINOSIS): Primary | ICD-10-CM

## 2021-08-08 RX ORDER — METRONIDAZOLE 500 MG/1
500 TABLET ORAL
Qty: 14 TABLET | Refills: 0 | Status: SHIPPED | OUTPATIENT
Start: 2021-08-08 | End: 2021-08-15

## 2021-08-10 ENCOUNTER — TELEPHONE (OUTPATIENT)
Dept: OBSTETRICS AND GYNECOLOGY | Facility: CLINIC | Age: 46
End: 2021-08-10

## 2021-08-17 DIAGNOSIS — I73.9 PAD (PERIPHERAL ARTERY DISEASE): ICD-10-CM

## 2021-08-18 RX ORDER — ATORVASTATIN CALCIUM 20 MG/1
TABLET, FILM COATED ORAL
Qty: 90 TABLET | Refills: 3 | OUTPATIENT
Start: 2021-08-18

## 2021-08-23 NOTE — SUBJECTIVE & OBJECTIVE
Interval History: NAEON. Pt c/o pain, no N/V. Hasn't eaten yet, richard water.     Medications:  Continuous Infusions:   Scheduled Meds:   ciprofloxacin  400 mg Intravenous Q12H    lidocaine (PF) 10 mg/ml (1%)  1 mL Intradermal Once    metronidazole  500 mg Intravenous Q8H    pantoprazole  40 mg Oral Daily     PRN Meds:ondansetron, oxycodone-acetaminophen, oxycodone-acetaminophen     Review of patient's allergies indicates:  No Known Allergies  Objective:     Vital Signs (Most Recent):  Temp: 97.1 °F (36.2 °C) (09/08/17 0441)  Pulse: 62 (09/08/17 0441)  Resp: 18 (09/08/17 0441)  BP: 119/71 (09/08/17 0441)  SpO2: 97 % (09/08/17 0410) Vital Signs (24h Range):  Temp:  [97.1 °F (36.2 °C)-98.2 °F (36.8 °C)] 97.1 °F (36.2 °C)  Pulse:  [58-73] 62  Resp:  [14-18] 18  SpO2:  [90 %-100 %] 97 %  BP: (110-131)/(61-80) 119/71     Weight: 76.2 kg (168 lb)  Body mass index is 28.84 kg/m².    Intake/Output - Last 3 Shifts       09/06 0700 - 09/07 0659 09/07 0700 - 09/08 0659 09/08 0700 - 09/09 0659    P.O.  340     I.V. (mL/kg)  2100 (27.6)     IV Piggyback  700     Total Intake(mL/kg)  3140 (41.2)     Drains  120     Stool  0     Total Output   120      Net   +3020             Stool Occurrence  0 x           Physical Exam   Constitutional: She is oriented to person, place, and time. She appears well-developed and well-nourished. No distress.   Cardiovascular: Normal rate and regular rhythm.    Pulmonary/Chest: Effort normal. She has no wheezes.   Abdominal: Soft.   Incisions C/D/I  EMMETT serosang  Appropriately ttp   Musculoskeletal: Normal range of motion. She exhibits no edema.   Neurological: She is alert and oriented to person, place, and time.   Skin: Skin is warm and dry. She is not diaphoretic.   Psychiatric: She has a normal mood and affect. Her behavior is normal.       Significant Labs:  No new labs    Significant Diagnostics:  No new imaging   [Time Spent: ___ minutes] : I have spent [unfilled] minutes of time on the encounter.

## 2021-12-15 ENCOUNTER — PATIENT OUTREACH (OUTPATIENT)
Dept: ADMINISTRATIVE | Facility: OTHER | Age: 46
End: 2021-12-15
Payer: MEDICAID

## 2021-12-17 ENCOUNTER — TELEPHONE (OUTPATIENT)
Dept: OBSTETRICS AND GYNECOLOGY | Facility: CLINIC | Age: 46
End: 2021-12-17
Payer: MEDICAID

## 2021-12-17 ENCOUNTER — OFFICE VISIT (OUTPATIENT)
Dept: OBSTETRICS AND GYNECOLOGY | Facility: CLINIC | Age: 46
End: 2021-12-17
Payer: MEDICAID

## 2021-12-17 ENCOUNTER — LAB VISIT (OUTPATIENT)
Dept: LAB | Facility: HOSPITAL | Age: 46
End: 2021-12-17
Attending: OBSTETRICS & GYNECOLOGY
Payer: MEDICAID

## 2021-12-17 VITALS
WEIGHT: 166.25 LBS | SYSTOLIC BLOOD PRESSURE: 100 MMHG | BODY MASS INDEX: 28.38 KG/M2 | HEIGHT: 64 IN | DIASTOLIC BLOOD PRESSURE: 64 MMHG

## 2021-12-17 DIAGNOSIS — Z12.4 CERVICAL CANCER SCREENING: ICD-10-CM

## 2021-12-17 DIAGNOSIS — Z01.419 ROUTINE GYNECOLOGICAL EXAMINATION: Primary | ICD-10-CM

## 2021-12-17 DIAGNOSIS — Z12.31 VISIT FOR SCREENING MAMMOGRAM: ICD-10-CM

## 2021-12-17 DIAGNOSIS — N93.9 ABNORMAL UTERINE BLEEDING (AUB): ICD-10-CM

## 2021-12-17 DIAGNOSIS — N72 CERVICITIS: ICD-10-CM

## 2021-12-17 LAB
BASOPHILS # BLD AUTO: 0.02 K/UL (ref 0–0.2)
BASOPHILS NFR BLD: 0.3 % (ref 0–1.9)
DIFFERENTIAL METHOD: ABNORMAL
EOSINOPHIL # BLD AUTO: 0.1 K/UL (ref 0–0.5)
EOSINOPHIL NFR BLD: 0.7 % (ref 0–8)
ERYTHROCYTE [DISTWIDTH] IN BLOOD BY AUTOMATED COUNT: 13.2 % (ref 11.5–14.5)
HCT VFR BLD AUTO: 35.1 % (ref 37–48.5)
HGB BLD-MCNC: 11.7 G/DL (ref 12–16)
IMM GRANULOCYTES # BLD AUTO: 0.01 K/UL (ref 0–0.04)
IMM GRANULOCYTES NFR BLD AUTO: 0.1 % (ref 0–0.5)
LYMPHOCYTES # BLD AUTO: 2.2 K/UL (ref 1–4.8)
LYMPHOCYTES NFR BLD: 32.3 % (ref 18–48)
MCH RBC QN AUTO: 30.7 PG (ref 27–31)
MCHC RBC AUTO-ENTMCNC: 33.3 G/DL (ref 32–36)
MCV RBC AUTO: 92 FL (ref 82–98)
MONOCYTES # BLD AUTO: 0.6 K/UL (ref 0.3–1)
MONOCYTES NFR BLD: 8.2 % (ref 4–15)
NEUTROPHILS # BLD AUTO: 4 K/UL (ref 1.8–7.7)
NEUTROPHILS NFR BLD: 58.4 % (ref 38–73)
NRBC BLD-RTO: 0 /100 WBC
PLATELET # BLD AUTO: 228 K/UL (ref 150–450)
PMV BLD AUTO: 11.3 FL (ref 9.2–12.9)
RBC # BLD AUTO: 3.81 M/UL (ref 4–5.4)
TSH SERPL DL<=0.005 MIU/L-ACNC: 1.41 UIU/ML (ref 0.4–4)
WBC # BLD AUTO: 6.84 K/UL (ref 3.9–12.7)

## 2021-12-17 PROCEDURE — 85025 COMPLETE CBC W/AUTO DIFF WBC: CPT | Performed by: OBSTETRICS & GYNECOLOGY

## 2021-12-17 PROCEDURE — 99999 PR PBB SHADOW E&M-EST. PATIENT-LVL III: CPT | Mod: PBBFAC,,, | Performed by: OBSTETRICS & GYNECOLOGY

## 2021-12-17 PROCEDURE — 87624 HPV HI-RISK TYP POOLED RSLT: CPT | Performed by: OBSTETRICS & GYNECOLOGY

## 2021-12-17 PROCEDURE — 84443 ASSAY THYROID STIM HORMONE: CPT | Performed by: OBSTETRICS & GYNECOLOGY

## 2021-12-17 PROCEDURE — 87491 CHLMYD TRACH DNA AMP PROBE: CPT | Performed by: OBSTETRICS & GYNECOLOGY

## 2021-12-17 PROCEDURE — 58100 BIOPSY OF UTERUS LINING: CPT | Mod: PBBFAC,PO | Performed by: OBSTETRICS & GYNECOLOGY

## 2021-12-17 PROCEDURE — 87591 N.GONORRHOEAE DNA AMP PROB: CPT | Performed by: OBSTETRICS & GYNECOLOGY

## 2021-12-17 PROCEDURE — 88175 CYTOPATH C/V AUTO FLUID REDO: CPT | Performed by: OBSTETRICS & GYNECOLOGY

## 2021-12-17 PROCEDURE — 99213 OFFICE O/P EST LOW 20 MIN: CPT | Mod: PBBFAC,PO | Performed by: OBSTETRICS & GYNECOLOGY

## 2021-12-17 PROCEDURE — 88305 TISSUE EXAM BY PATHOLOGIST: CPT | Performed by: PATHOLOGY

## 2021-12-17 PROCEDURE — 88305 TISSUE EXAM BY PATHOLOGIST: CPT | Mod: 26,,, | Performed by: PATHOLOGY

## 2021-12-17 PROCEDURE — 99999 PR PBB SHADOW E&M-EST. PATIENT-LVL III: ICD-10-PCS | Mod: PBBFAC,,, | Performed by: OBSTETRICS & GYNECOLOGY

## 2021-12-17 PROCEDURE — 58100 BIOPSY OF UTERUS LINING: CPT | Mod: S$PBB,,, | Performed by: OBSTETRICS & GYNECOLOGY

## 2021-12-17 PROCEDURE — 36415 COLL VENOUS BLD VENIPUNCTURE: CPT | Performed by: OBSTETRICS & GYNECOLOGY

## 2021-12-17 PROCEDURE — 88305 TISSUE EXAM BY PATHOLOGIST: ICD-10-PCS | Mod: 26,,, | Performed by: PATHOLOGY

## 2021-12-17 PROCEDURE — 58100 PR BIOPSY OF UTERUS LINING: ICD-10-PCS | Mod: S$PBB,,, | Performed by: OBSTETRICS & GYNECOLOGY

## 2021-12-17 PROCEDURE — 99396 PREV VISIT EST AGE 40-64: CPT | Mod: 57,S$PBB,, | Performed by: OBSTETRICS & GYNECOLOGY

## 2021-12-17 PROCEDURE — 99396 PR PREVENTIVE VISIT,EST,40-64: ICD-10-PCS | Mod: 57,S$PBB,, | Performed by: OBSTETRICS & GYNECOLOGY

## 2021-12-25 LAB
C TRACH DNA SPEC QL NAA+PROBE: NOT DETECTED
N GONORRHOEA DNA SPEC QL NAA+PROBE: NOT DETECTED

## 2021-12-27 LAB
CLINICAL INFO: ABNORMAL
CYTO CVX: ABNORMAL
CYTOLOGIST CVX/VAG CYTO: ABNORMAL
CYTOLOGIST CVX/VAG CYTO: ABNORMAL
CYTOLOGY CMNT CVX/VAG CYTO-IMP: ABNORMAL
CYTOLOGY PAP THIN PREP EXPLANATION: ABNORMAL
DATE OF PREVIOUS PAP: ABNORMAL
DATE PREVIOUS BX: NO
GEN CATEG CVX/VAG CYTO-IMP: ABNORMAL
HPV I/H RISK 4 DNA CVX QL NAA+PROBE: NOT DETECTED
LMP START DATE: ABNORMAL
MICROORGANISM CVX/VAG CYTO: ABNORMAL
PATHOLOGIST CVX/VAG CYTO: ABNORMAL
SERVICE CMNT-IMP: ABNORMAL
SPECIMEN SOURCE CVX/VAG CYTO: ABNORMAL
STAT OF ADQ CVX/VAG CYTO-IMP: ABNORMAL

## 2021-12-28 ENCOUNTER — TELEPHONE (OUTPATIENT)
Dept: OBSTETRICS AND GYNECOLOGY | Facility: CLINIC | Age: 46
End: 2021-12-28
Payer: MEDICAID

## 2021-12-28 ENCOUNTER — HOSPITAL ENCOUNTER (OUTPATIENT)
Dept: RADIOLOGY | Facility: HOSPITAL | Age: 46
Discharge: HOME | End: 2021-12-28
Attending: OBSTETRICS & GYNECOLOGY
Payer: MEDICAID

## 2021-12-28 DIAGNOSIS — N93.9 ABNORMAL UTERINE BLEEDING (AUB): ICD-10-CM

## 2021-12-28 LAB
FINAL PATHOLOGIC DIAGNOSIS: NORMAL
GROSS: NORMAL
Lab: NORMAL

## 2021-12-28 PROCEDURE — 76856 US EXAM PELVIC COMPLETE: CPT | Mod: TC

## 2021-12-28 PROCEDURE — 76856 US EXAM PELVIC COMPLETE: CPT | Mod: 26,,, | Performed by: RADIOLOGY

## 2021-12-28 PROCEDURE — 76830 TRANSVAGINAL US NON-OB: CPT | Mod: 26,,, | Performed by: RADIOLOGY

## 2021-12-28 PROCEDURE — 76856 US PELVIS COMP WITH TRANSVAG NON-OB (XPD): ICD-10-PCS | Mod: 26,,, | Performed by: RADIOLOGY

## 2021-12-28 PROCEDURE — 76830 US PELVIS COMP WITH TRANSVAG NON-OB (XPD): ICD-10-PCS | Mod: 26,,, | Performed by: RADIOLOGY

## 2021-12-29 ENCOUNTER — OFFICE VISIT (OUTPATIENT)
Dept: OBSTETRICS AND GYNECOLOGY | Facility: CLINIC | Age: 46
End: 2021-12-29
Payer: MEDICAID

## 2021-12-29 VITALS
BODY MASS INDEX: 28.87 KG/M2 | DIASTOLIC BLOOD PRESSURE: 74 MMHG | WEIGHT: 168.19 LBS | SYSTOLIC BLOOD PRESSURE: 114 MMHG

## 2021-12-29 DIAGNOSIS — N93.9 ABNORMAL UTERINE BLEEDING (AUB): Primary | ICD-10-CM

## 2021-12-29 PROCEDURE — 1159F MED LIST DOCD IN RCRD: CPT | Mod: CPTII,,, | Performed by: OBSTETRICS & GYNECOLOGY

## 2021-12-29 PROCEDURE — 99999 PR PBB SHADOW E&M-EST. PATIENT-LVL III: CPT | Mod: PBBFAC,,, | Performed by: OBSTETRICS & GYNECOLOGY

## 2021-12-29 PROCEDURE — 3078F DIAST BP <80 MM HG: CPT | Mod: CPTII,,, | Performed by: OBSTETRICS & GYNECOLOGY

## 2021-12-29 PROCEDURE — 1159F PR MEDICATION LIST DOCUMENTED IN MEDICAL RECORD: ICD-10-PCS | Mod: CPTII,,, | Performed by: OBSTETRICS & GYNECOLOGY

## 2021-12-29 PROCEDURE — 3008F PR BODY MASS INDEX (BMI) DOCUMENTED: ICD-10-PCS | Mod: CPTII,,, | Performed by: OBSTETRICS & GYNECOLOGY

## 2021-12-29 PROCEDURE — 99212 OFFICE O/P EST SF 10 MIN: CPT | Mod: S$PBB,,, | Performed by: OBSTETRICS & GYNECOLOGY

## 2021-12-29 PROCEDURE — 3074F SYST BP LT 130 MM HG: CPT | Mod: CPTII,,, | Performed by: OBSTETRICS & GYNECOLOGY

## 2021-12-29 PROCEDURE — 3008F BODY MASS INDEX DOCD: CPT | Mod: CPTII,,, | Performed by: OBSTETRICS & GYNECOLOGY

## 2021-12-29 PROCEDURE — 99212 PR OFFICE/OUTPT VISIT, EST, LEVL II, 10-19 MIN: ICD-10-PCS | Mod: S$PBB,,, | Performed by: OBSTETRICS & GYNECOLOGY

## 2021-12-29 PROCEDURE — 99999 PR PBB SHADOW E&M-EST. PATIENT-LVL III: ICD-10-PCS | Mod: PBBFAC,,, | Performed by: OBSTETRICS & GYNECOLOGY

## 2021-12-29 PROCEDURE — 3078F PR MOST RECENT DIASTOLIC BLOOD PRESSURE < 80 MM HG: ICD-10-PCS | Mod: CPTII,,, | Performed by: OBSTETRICS & GYNECOLOGY

## 2021-12-29 PROCEDURE — 3074F PR MOST RECENT SYSTOLIC BLOOD PRESSURE < 130 MM HG: ICD-10-PCS | Mod: CPTII,,, | Performed by: OBSTETRICS & GYNECOLOGY

## 2021-12-29 PROCEDURE — 99213 OFFICE O/P EST LOW 20 MIN: CPT | Mod: PBBFAC,PO | Performed by: OBSTETRICS & GYNECOLOGY

## 2021-12-29 RX ORDER — PROGESTERONE 200 MG/1
200 CAPSULE ORAL NIGHTLY
Qty: 30 CAPSULE | Refills: 0 | Status: SHIPPED | OUTPATIENT
Start: 2021-12-29 | End: 2022-09-06

## 2022-01-04 ENCOUNTER — PATIENT MESSAGE (OUTPATIENT)
Dept: OBSTETRICS AND GYNECOLOGY | Facility: CLINIC | Age: 47
End: 2022-01-04
Payer: MEDICAID

## 2022-01-04 ENCOUNTER — HOSPITAL ENCOUNTER (OUTPATIENT)
Dept: RADIOLOGY | Facility: HOSPITAL | Age: 47
Discharge: HOME | End: 2022-01-04
Attending: OBSTETRICS & GYNECOLOGY
Payer: MEDICAID

## 2022-01-04 DIAGNOSIS — Z12.31 VISIT FOR SCREENING MAMMOGRAM: ICD-10-CM

## 2022-01-04 PROCEDURE — 77063 MAMMO DIGITAL SCREENING BILAT WITH TOMO: ICD-10-PCS | Mod: 26,,, | Performed by: RADIOLOGY

## 2022-01-04 PROCEDURE — 77067 SCR MAMMO BI INCL CAD: CPT | Mod: 26,,, | Performed by: RADIOLOGY

## 2022-01-04 PROCEDURE — 77063 BREAST TOMOSYNTHESIS BI: CPT | Mod: 26,,, | Performed by: RADIOLOGY

## 2022-01-04 PROCEDURE — 77063 BREAST TOMOSYNTHESIS BI: CPT | Mod: TC

## 2022-01-04 PROCEDURE — 77067 MAMMO DIGITAL SCREENING BILAT WITH TOMO: ICD-10-PCS | Mod: 26,,, | Performed by: RADIOLOGY

## 2022-01-18 ENCOUNTER — TELEPHONE (OUTPATIENT)
Dept: OBSTETRICS AND GYNECOLOGY | Facility: CLINIC | Age: 47
End: 2022-01-18
Payer: MEDICAID

## 2022-01-18 NOTE — TELEPHONE ENCOUNTER
I spoke to the patient.  She finished her 10 days of prometrium.  Has been having heavy bleeding/period x 3 days.  Informed her that bleeding is expected (the medication was given to make her have a cycle given that she had amenorrhea recently and proliferative endometrium on endometrial biopsy).    Patient reeducated that withdrawal bleed was expected. Her bleeding should last no more than 7 days.    Need to get her scheduled for endometrial ablation.    charly patterson MD

## 2022-01-20 ENCOUNTER — TELEPHONE (OUTPATIENT)
Dept: OBSTETRICS AND GYNECOLOGY | Facility: CLINIC | Age: 47
End: 2022-01-20
Payer: MEDICAID

## 2022-01-20 DIAGNOSIS — Z01.818 PRE-OP TESTING: ICD-10-CM

## 2022-01-20 DIAGNOSIS — N92.0 MENORRHAGIA: ICD-10-CM

## 2022-01-20 RX ORDER — SODIUM CHLORIDE, SODIUM LACTATE, POTASSIUM CHLORIDE, CALCIUM CHLORIDE 600; 310; 30; 20 MG/100ML; MG/100ML; MG/100ML; MG/100ML
INJECTION, SOLUTION INTRAVENOUS CONTINUOUS
Status: CANCELLED | OUTPATIENT
Start: 2022-01-20

## 2022-01-20 RX ORDER — MUPIROCIN 20 MG/G
OINTMENT TOPICAL
Status: CANCELLED | OUTPATIENT
Start: 2022-01-20

## 2022-02-05 ENCOUNTER — LAB VISIT (OUTPATIENT)
Dept: PRIMARY CARE CLINIC | Facility: CLINIC | Age: 47
End: 2022-02-05
Payer: MEDICAID

## 2022-02-05 DIAGNOSIS — Z01.818 PRE-OP TESTING: ICD-10-CM

## 2022-02-05 PROCEDURE — U0003 INFECTIOUS AGENT DETECTION BY NUCLEIC ACID (DNA OR RNA); SEVERE ACUTE RESPIRATORY SYNDROME CORONAVIRUS 2 (SARS-COV-2) (CORONAVIRUS DISEASE [COVID-19]), AMPLIFIED PROBE TECHNIQUE, MAKING USE OF HIGH THROUGHPUT TECHNOLOGIES AS DESCRIBED BY CMS-2020-01-R: HCPCS | Performed by: OBSTETRICS & GYNECOLOGY

## 2022-02-05 PROCEDURE — U0005 INFEC AGEN DETEC AMPLI PROBE: HCPCS | Performed by: OBSTETRICS & GYNECOLOGY

## 2022-02-06 LAB
SARS-COV-2 RNA RESP QL NAA+PROBE: NOT DETECTED
SARS-COV-2- CYCLE NUMBER: NORMAL

## 2022-02-07 ENCOUNTER — TELEPHONE (OUTPATIENT)
Dept: OBSTETRICS AND GYNECOLOGY | Facility: CLINIC | Age: 47
End: 2022-02-07
Payer: MEDICAID

## 2022-02-07 NOTE — TELEPHONE ENCOUNTER
----- Message from Sarahi Jackson sent at 2/7/2022 10:35 AM CST -----  Contact: [phone]/ self  Can you inform this patient that outpatient surgery will be calling her this afternoon to confirm her surgery time  ----- Message -----  From: Kristen Diaz MA  Sent: 2/7/2022  10:32 AM CST  To: Sarahi Jackson      ----- Message -----  From: Tessa De Jesus  Sent: 2/7/2022  10:04 AM CST  To: Luke RIVERA Staff    Who Called: pt   Regarding: wants to know the time of her procedure tomorrow   Would the patient rather a call back or a response via [facility]? Call back  Best Call Back Number: [phone]  Additional Information:

## 2022-02-08 ENCOUNTER — TELEPHONE (OUTPATIENT)
Dept: OBSTETRICS AND GYNECOLOGY | Facility: CLINIC | Age: 47
End: 2022-02-08
Payer: MEDICAID

## 2022-02-08 ENCOUNTER — HOSPITAL ENCOUNTER (OUTPATIENT)
Facility: HOSPITAL | Age: 47
Discharge: HOME | End: 2022-02-08
Attending: OBSTETRICS & GYNECOLOGY | Admitting: OBSTETRICS & GYNECOLOGY
Payer: MEDICAID

## 2022-02-08 ENCOUNTER — ANESTHESIA EVENT (OUTPATIENT)
Dept: SURGERY | Facility: HOSPITAL | Age: 47
End: 2022-02-08
Payer: MEDICAID

## 2022-02-08 ENCOUNTER — ANESTHESIA (OUTPATIENT)
Dept: SURGERY | Facility: HOSPITAL | Age: 47
End: 2022-02-08
Payer: MEDICAID

## 2022-02-08 VITALS
TEMPERATURE: 98 F | DIASTOLIC BLOOD PRESSURE: 68 MMHG | BODY MASS INDEX: 27.31 KG/M2 | OXYGEN SATURATION: 99 % | RESPIRATION RATE: 17 BRPM | HEIGHT: 64 IN | WEIGHT: 160 LBS | HEART RATE: 59 BPM | SYSTOLIC BLOOD PRESSURE: 118 MMHG

## 2022-02-08 DIAGNOSIS — N92.0 MENORRHAGIA: ICD-10-CM

## 2022-02-08 DIAGNOSIS — G89.18 POSTOPERATIVE PAIN: Primary | ICD-10-CM

## 2022-02-08 PROCEDURE — 25000003 PHARM REV CODE 250: Performed by: NURSE ANESTHETIST, CERTIFIED REGISTERED

## 2022-02-08 PROCEDURE — 88305 TISSUE EXAM BY PATHOLOGIST: CPT | Performed by: STUDENT IN AN ORGANIZED HEALTH CARE EDUCATION/TRAINING PROGRAM

## 2022-02-08 PROCEDURE — 00952 ANES VAG PX HYSTSC&/HSG: CPT | Performed by: OBSTETRICS & GYNECOLOGY

## 2022-02-08 PROCEDURE — 36000707: Performed by: OBSTETRICS & GYNECOLOGY

## 2022-02-08 PROCEDURE — 88305 TISSUE EXAM BY PATHOLOGIST: CPT | Mod: 26,,, | Performed by: STUDENT IN AN ORGANIZED HEALTH CARE EDUCATION/TRAINING PROGRAM

## 2022-02-08 PROCEDURE — 37000008 HC ANESTHESIA 1ST 15 MINUTES: Performed by: OBSTETRICS & GYNECOLOGY

## 2022-02-08 PROCEDURE — 63600175 PHARM REV CODE 636 W HCPCS: Performed by: OBSTETRICS & GYNECOLOGY

## 2022-02-08 PROCEDURE — 63600175 PHARM REV CODE 636 W HCPCS: Performed by: NURSE ANESTHETIST, CERTIFIED REGISTERED

## 2022-02-08 PROCEDURE — 71000015 HC POSTOP RECOV 1ST HR: Performed by: OBSTETRICS & GYNECOLOGY

## 2022-02-08 PROCEDURE — 88305 TISSUE EXAM BY PATHOLOGIST: ICD-10-PCS | Mod: 26,,, | Performed by: STUDENT IN AN ORGANIZED HEALTH CARE EDUCATION/TRAINING PROGRAM

## 2022-02-08 PROCEDURE — 36000706: Performed by: OBSTETRICS & GYNECOLOGY

## 2022-02-08 PROCEDURE — 37000009 HC ANESTHESIA EA ADD 15 MINS: Performed by: OBSTETRICS & GYNECOLOGY

## 2022-02-08 PROCEDURE — 71000033 HC RECOVERY, INTIAL HOUR: Performed by: OBSTETRICS & GYNECOLOGY

## 2022-02-08 PROCEDURE — 58563 HYSTEROSCOPY ABLATION: CPT | Mod: ,,, | Performed by: OBSTETRICS & GYNECOLOGY

## 2022-02-08 PROCEDURE — 71000016 HC POSTOP RECOV ADDL HR: Performed by: OBSTETRICS & GYNECOLOGY

## 2022-02-08 PROCEDURE — 58563 PR HYSTEROSCOPY,W/ENDOMETRIAL ABLATION: ICD-10-PCS | Mod: ,,, | Performed by: OBSTETRICS & GYNECOLOGY

## 2022-02-08 PROCEDURE — 27201423 OPTIME MED/SURG SUP & DEVICES STERILE SUPPLY: Performed by: OBSTETRICS & GYNECOLOGY

## 2022-02-08 RX ORDER — SODIUM CHLORIDE, SODIUM LACTATE, POTASSIUM CHLORIDE, CALCIUM CHLORIDE 600; 310; 30; 20 MG/100ML; MG/100ML; MG/100ML; MG/100ML
INJECTION, SOLUTION INTRAVENOUS CONTINUOUS
Status: DISCONTINUED | OUTPATIENT
Start: 2022-02-08 | End: 2022-02-08 | Stop reason: HOSPADM

## 2022-02-08 RX ORDER — DIPHENHYDRAMINE HCL 25 MG
25 CAPSULE ORAL EVERY 4 HOURS PRN
Status: DISCONTINUED | OUTPATIENT
Start: 2022-02-08 | End: 2022-02-08 | Stop reason: HOSPADM

## 2022-02-08 RX ORDER — ACETAMINOPHEN 10 MG/ML
INJECTION, SOLUTION INTRAVENOUS
Status: DISCONTINUED | OUTPATIENT
Start: 2022-02-08 | End: 2022-02-08

## 2022-02-08 RX ORDER — FENTANYL CITRATE 50 UG/ML
INJECTION, SOLUTION INTRAMUSCULAR; INTRAVENOUS
Status: DISCONTINUED | OUTPATIENT
Start: 2022-02-08 | End: 2022-02-08

## 2022-02-08 RX ORDER — HYDROCODONE BITARTRATE AND ACETAMINOPHEN 5; 325 MG/1; MG/1
1 TABLET ORAL EVERY 4 HOURS PRN
Status: DISCONTINUED | OUTPATIENT
Start: 2022-02-08 | End: 2022-02-08 | Stop reason: HOSPADM

## 2022-02-08 RX ORDER — KETOROLAC TROMETHAMINE 30 MG/ML
INJECTION, SOLUTION INTRAMUSCULAR; INTRAVENOUS
Status: DISCONTINUED | OUTPATIENT
Start: 2022-02-08 | End: 2022-02-08

## 2022-02-08 RX ORDER — DIPHENHYDRAMINE HYDROCHLORIDE 50 MG/ML
25 INJECTION INTRAMUSCULAR; INTRAVENOUS EVERY 4 HOURS PRN
Status: DISCONTINUED | OUTPATIENT
Start: 2022-02-08 | End: 2022-02-08 | Stop reason: HOSPADM

## 2022-02-08 RX ORDER — ONDANSETRON 8 MG/1
8 TABLET, ORALLY DISINTEGRATING ORAL EVERY 8 HOURS PRN
Status: DISCONTINUED | OUTPATIENT
Start: 2022-02-08 | End: 2022-02-08 | Stop reason: HOSPADM

## 2022-02-08 RX ORDER — MUPIROCIN 20 MG/G
OINTMENT TOPICAL
Status: DISCONTINUED | OUTPATIENT
Start: 2022-02-08 | End: 2022-02-08

## 2022-02-08 RX ORDER — LIDOCAINE HCL/PF 100 MG/5ML
SYRINGE (ML) INTRAVENOUS
Status: DISCONTINUED | OUTPATIENT
Start: 2022-02-08 | End: 2022-02-08

## 2022-02-08 RX ORDER — PROCHLORPERAZINE EDISYLATE 5 MG/ML
5 INJECTION INTRAMUSCULAR; INTRAVENOUS EVERY 6 HOURS PRN
Status: DISCONTINUED | OUTPATIENT
Start: 2022-02-08 | End: 2022-02-08 | Stop reason: HOSPADM

## 2022-02-08 RX ORDER — ONDANSETRON 2 MG/ML
INJECTION INTRAMUSCULAR; INTRAVENOUS
Status: DISCONTINUED | OUTPATIENT
Start: 2022-02-08 | End: 2022-02-08

## 2022-02-08 RX ORDER — MIDAZOLAM HYDROCHLORIDE 1 MG/ML
INJECTION INTRAMUSCULAR; INTRAVENOUS
Status: DISCONTINUED | OUTPATIENT
Start: 2022-02-08 | End: 2022-02-08

## 2022-02-08 RX ORDER — PHENYLEPHRINE HYDROCHLORIDE 10 MG/ML
INJECTION INTRAVENOUS
Status: DISCONTINUED | OUTPATIENT
Start: 2022-02-08 | End: 2022-02-08

## 2022-02-08 RX ORDER — PROPOFOL 10 MG/ML
VIAL (ML) INTRAVENOUS
Status: DISCONTINUED | OUTPATIENT
Start: 2022-02-08 | End: 2022-02-08

## 2022-02-08 RX ORDER — DEXAMETHASONE SODIUM PHOSPHATE 4 MG/ML
INJECTION, SOLUTION INTRA-ARTICULAR; INTRALESIONAL; INTRAMUSCULAR; INTRAVENOUS; SOFT TISSUE
Status: DISCONTINUED | OUTPATIENT
Start: 2022-02-08 | End: 2022-02-08

## 2022-02-08 RX ORDER — IBUPROFEN 800 MG/1
800 TABLET ORAL EVERY 8 HOURS PRN
Qty: 30 TABLET | Refills: 0 | Status: SHIPPED | OUTPATIENT
Start: 2022-02-08 | End: 2022-09-06

## 2022-02-08 RX ADMIN — LIDOCAINE HYDROCHLORIDE 100 MG: 20 INJECTION, SOLUTION INTRAVENOUS at 10:02

## 2022-02-08 RX ADMIN — SODIUM CHLORIDE, SODIUM LACTATE, POTASSIUM CHLORIDE, AND CALCIUM CHLORIDE: .6; .31; .03; .02 INJECTION, SOLUTION INTRAVENOUS at 10:02

## 2022-02-08 RX ADMIN — KETOROLAC TROMETHAMINE 30 MG: 30 INJECTION, SOLUTION INTRAMUSCULAR; INTRAVENOUS at 10:02

## 2022-02-08 RX ADMIN — ACETAMINOPHEN 1000 MG: 10 INJECTION, SOLUTION INTRAVENOUS at 10:02

## 2022-02-08 RX ADMIN — FENTANYL CITRATE 50 MCG: 50 INJECTION, SOLUTION INTRAMUSCULAR; INTRAVENOUS at 10:02

## 2022-02-08 RX ADMIN — GLYCOPYRROLATE 0.2 MG: 0.2 INJECTION, SOLUTION INTRAMUSCULAR; INTRAVITREAL at 10:02

## 2022-02-08 RX ADMIN — PHENYLEPHRINE HYDROCHLORIDE 200 MCG: 10 INJECTION INTRAVENOUS at 10:02

## 2022-02-08 RX ADMIN — MIDAZOLAM HYDROCHLORIDE 2 MG: 1 INJECTION, SOLUTION INTRAMUSCULAR; INTRAVENOUS at 10:02

## 2022-02-08 RX ADMIN — PROPOFOL 200 MG: 10 INJECTION, EMULSION INTRAVENOUS at 10:02

## 2022-02-08 RX ADMIN — DEXAMETHASONE SODIUM PHOSPHATE 8 MG: 4 INJECTION, SOLUTION INTRA-ARTICULAR; INTRALESIONAL; INTRAMUSCULAR; INTRAVENOUS; SOFT TISSUE at 10:02

## 2022-02-08 RX ADMIN — ONDANSETRON 4 MG: 2 INJECTION, SOLUTION INTRAMUSCULAR; INTRAVENOUS at 10:02

## 2022-02-08 NOTE — TELEPHONE ENCOUNTER
----- Message from Ariel Butler MD sent at 2/8/2022  9:41 AM CST -----  Regarding: postop visit  Please add patient to schedule for postop visit on feb 23 at 830am    Dr butler

## 2022-02-08 NOTE — ANESTHESIA PREPROCEDURE EVALUATION
02/08/2022  eTssa Wynne is a 46 y.o., female 9/07/2018.        Pre-op Assessment    I have reviewed the Patient Summary Reports.     I have reviewed the Nursing Notes.    I have reviewed the Medications.     Review of Systems  Anesthesia Hx:  No problems with previous Anesthesia  History of prior surgery of interest to airway management or planning: Previous anesthesia: MAC  EGD with MAC.  Denies Family Hx of Anesthesia complications.   Denies Personal Hx of Anesthesia complications.   Social:  Non-Smoker, No Alcohol Use    Hematology/Oncology:  Hematology Normal        EENT/Dental:EENT/Dental Normal   Cardiovascular:   Exercise tolerance: good Denies Hypertension.  Denies Dysrhythmias.   Denies Angina.        Pulmonary:   Denies Shortness of breath.    Renal/:  Renal/ Normal     Hepatic/GI:   GERD, well controlled Intermittent RUQ pain; denies at present   Neurological:  Neurology Normal    Endocrine:  Endocrine Normal        Physical Exam  General:  Obesity    Airway/Jaw/Neck:  Airway Findings: Mouth Opening: Normal Tongue: Normal  General Airway Assessment: Adult  Mallampati: II  TM Distance: Normal, at least 6 cm        Eyes/Ears/Nose:  EYES/EARS/NOSE FINDINGS: Normal   Dental:  Dental Findings: In tact   Chest/Lungs:  Chest/Lungs Clear    Heart/Vascular:  Heart Findings: Normal Heart murmur: negative    Abdomen:  Abdomen Findings: Normal      Mental Status:  Mental Status Findings: Normal        Anesthesia Plan  Type of Anesthesia, risks & benefits discussed:  Anesthesia Type:  general    Patient's Preference:   Plan Factors:          Intra-op Monitoring Plan:   Intra-op Monitoring Plan Comments:   Post Op Pain Control Plan:   Post Op Pain Control Plan Comments:     Induction:   IV  Beta Blocker:  Patient is not currently on a Beta-Blocker (No further documentation required).       Informed  Consent: Patient understands risks and agrees with Anesthesia plan.  Questions answered.   ASA Score: 2     Day of Surgery Review of History & Physical:        Anesthesia Plan Notes: 2017-09-07   - needs consent   - UPT ordered        Ready For Surgery From Anesthesia Perspective.

## 2022-02-08 NOTE — DISCHARGE INSTRUCTIONS
Patient Education       Dilation and Curettage Discharge Instructions   About this topic   The uterus or womb is the organ where a baby grows when you are pregnant. You get rid of the lining of the uterus each time you have your period. Sometimes, the lining needs to be scraped out. Doctors call the procedure a dilation and curettage or a D and C. You may need to have a D and C to:  · Remove polyps from the uterus  · Remove a birth control device  · Remove a part of the placenta after you have a baby if part of the placenta does not come out by itself  · Empty the womb after a miscarried pregnancy  · Check the lining of the womb for diseases or infection  · Understand why you are having heavy periods or bleeding that is not normal. You may be having pain in your pelvis or your uterus may be larger than a normal uterus.  · Removal of molar pregnancy  · Induced   Dilation means the opening to the womb or cervix is stretched. Curettage is the scraping of the lining.     What care is needed at home?   · Ask your doctor what you need to do when you go home. Make sure you understand everything the doctor says so you will know what you need to do.   · It is normal to have vaginal bleeding for a few weeks. You may use sanitary napkins, but not tampons.  · Ask your doctor when you may douche, use tampons, or have sexual contact. NOT UNTIL AFTER YOUR POST OP VISIT UNTIL RELEASED BY MD  · Talk to your doctor about when you may take a tub bath, go swimming, or use a hot tub. NOT FOR AT LEAST 3-5 DAYS  · Talk to your doctor about when it is safe for you to go back to your normal activities like work, driving, and sex. NOT UNTIL RELEASED AFTER POST OP VISIT  · Your doctor may give you antibiotics. Take the drugs as ordered.  What follow-up care is needed?   · Your doctor may ask you to make visits to the office to check on your progress. Be sure to keep these visits.  · Ask your doctor when you can get the results.  Discuss the results with your doctor. Together you can make a plan for further care.  What drugs may be needed?   The doctor may order drugs to:  · Help with pain  · Prevent infection  Will physical activity be limited?   Rest for the first few days after the procedure. Avoid strenuous activities like heavy lifting and hard workouts.  What problems could happen?   · Harm to the cervix  · Scarring of the lining of the uterus  · Infection  · Bleeding  · A hole in the uterus from the tools used during the procedure  When do I need to call the doctor?   · Signs of infection like a fever of 100.4°F (38°C) or higher, chills, pain with passing urine, or bad smelling drainage from the vagina.  · Very bad belly pain  · Heavy bleeding  · Upset stomach and throwing up  · You are not feeling better in 2 to 3 days or you are feeling worse  Teach Back: Helping You Understand   The Teach Back Method helps you understand the information we are giving you. After you talk with the staff, tell them in your own words what you learned. This helps to make sure the staff has described each thing clearly. It also helps to explain things that may have been confusing. Before going home, make sure you can do these:  · I can tell you about my procedure.  · I can tell you how much vaginal bleeding is normal and what I can use for it.  · I can tell you what I will do if I have a bad smell coming from my vagina.  Where can I learn more?   [employer]  https://www.acog.org/Patients/FAQs/Dilation-and-Curettage   Family Doctor  [email or web address]/egenvuzf-bccacfsbk-v-c /   [facility] Choices  http://www.nhs.uk/Conditions/Dilatation-and-curettage-(DC)/Pages/Introduction.aspx   Last Reviewed Date   2021-03-17  Consumer Information Use and Disclaimer   This information is not specific medical advice and does not replace information you receive from your health care provider. This is only a brief summary of  general information. It does NOT include all information about conditions, illnesses, injuries, tests, procedures, treatments, therapies, discharge instructions or life-style choices that may apply to you. You must talk with your health care provider for complete information about your health and treatment options. This information should not be used to decide whether or not to accept your health care providers advice, instructions or recommendations. Only your health care provider has the knowledge and training to provide advice that is right for you.  Copyright   Copyright © 2021 [employer] and its affiliates and/or licensors. All rights reserved.  ANESTHESIA  -For the first 24 hours after surgery:  Do not drive, use heavy equipment, make important decisions, or drink alcohol  -It is normal to feel sleepy for several hours.  Rest until you are more awake.  -Have someone stay with you, if needed.  They can watch for problems and help keep you safe.  -Some possible post anesthesia side effects include: nausea and vomiting, sore throat and hoarseness, sleepiness, and dizziness.    PAIN  -If you have pain after surgery, pain medicine will help you feel better.  Take it as directed, before pain becomes severe.  Most pain relievers taken by mouth need at least 20-30 minutes to start working.  -Do not drive or drink alcohol while taking pain medicine.  -Pain medication can upset your stomach.  Taking them with a little food may help.  -Other ways to help control pain: elevation, ice, and relaxation  -Call your surgeon if still having unmanageable pain an hour after taking pain medicine.  -Pain medicine can cause constipation.  Taking an over-the counter stool softener while on prescription pain medicine and drinking plenty of fluids can prevent this side effect.  -Call your surgeon if you have severe side effects like: breathing problems, trouble waking up, dizziness, confusion, or severe  constipation.    NAUSEA  -Some people have nausea after surgery.  This is often because of anesthesia, pain, pain medicine, or the stress of surgery.  -Do not push yourself to eat.  Start off with clear liquids and soup.  Slowly move to solid foods.  Don't eat fatty, rich, spicy foods at first.  Eat smaller amounts.  -If you develop persistent nausea and vomiting please notify your surgeon immediately.    BLEEDING  -Different types of surgery require different types of care and dressing changes.  It is important to follow all instructions and advice from your surgeon.  Change dressing as directed.  Call your surgeon for any concerns regarding postop bleeding.    SIGNS OF INFECTION  -Signs of infection include: fever, swelling, drainage, and redness  -Notify your surgeon if you have a fever of 100.4 F (38.0 C) or higher.  -Notify your surgeon if you notice redness, swelling, increased pain, pus, or a foul smell at the incision site.  Patient Education       Ibuprofen (eye byoo PROE fen)   Brand Names: US Addaprin [OTC] [DSC]; Advil Jose Strength [OTC]; Advil Liqui-Gels minis [OTC]; Advil Migraine [OTC]; Advil [OTC]; Caldolor; Childrens Advil [OTC]; Childrens Ibuprofen [OTC]; Childrens Motrin [OTC]; Dyspel [OTC] [DSC]; Genpril [OTC] [DSC]; GoodSense Ibuprofen Childrens [OTC]; GoodSense Ibuprofen [OTC]; I-Prin [OTC] [DSC]; IBU; -EZS [DSC]; IBU-200 [OTC]; Ibupak; Ibuprofen Childrens [OTC]; Ibuprofen Comfort Pac [DSC]; Infants Advil [OTC]; KS Ibuprofen [OTC] [DSC]; Motrin Childrens [OTC]; Motrin IB [OTC]; Motrin Infants Drops [OTC]; NeoProfen; Provil [OTC]   Brand Names: Jennifer APO-Ibuprofen FC; Caldolor; PMS-Ibuprofen [DSC]; TEVA-Profen   Warning   · This drug may raise the risk of heart and blood vessel problems like heart attack and stroke. These effects can be deadly. The risk may be greater if you have heart disease or risks for heart disease. However, it can also be raised even if you do not have heart  disease or risks for heart disease. The risk can happen within the first weeks of using this drug and may be greater with higher doses or long-term use. Do not use this drug right before or after bypass heart surgery.  · This drug may raise the chance of severe and sometimes deadly stomach or bowel problems like ulcers or bleeding. The risk is greater in older people, and in people who have had stomach or bowel ulcers or bleeding before. These problems may occur without warning signs.    What is this drug used for?   · It is used to ease pain, swelling, and fever.  · It is used to ease painful period (menstrual) cycles.  · It is used to treat arthritis.  · It may be given to you for other reasons. Talk with the doctor.    What do I need to tell my doctor BEFORE I take this drug?   · If you are allergic to this drug; any part of this drug; or any other drugs, foods, or substances. Tell your doctor about the allergy and what signs you had.  · If you have an allergy to aspirin or nonsteroidal anti-inflammatory drugs (NSAIDs) like ibuprofen or naproxen.  · If you have ever had asthma caused by a salicylate drug like aspirin or a drug like this one like NSAIDs.  · If you have any of these health problems: GI (gastrointestinal) bleeding or kidney problems.  · If you have heart failure (weak heart).  · If you have had a recent heart attack.  · If you are taking any other NSAID, a salicylate drug like aspirin, or pemetrexed.  · If you are having trouble getting pregnant or you are having your fertility checked.  · If you are pregnant, plan to become pregnant, or get pregnant while taking this drug. This drug may cause harm to an unborn baby if taken at 20 weeks or later in pregnancy. If you are between 20 to 30 weeks of pregnancy, only take this drug if your doctor has told you to. Do not take this drug if you are more than 30 weeks pregnant.  This is not a list of all drugs or health problems that interact with this  drug.  Tell your doctor and pharmacist about all of your drugs (prescription or OTC, natural products, vitamins) and health problems. You must check to make sure that it is safe for you to take this drug with all of your drugs and health problems. Do not start, stop, or change the dose of any drug without checking with your doctor.  What are some things I need to know or do while I take this drug?   · Tell all of your health care providers that you take this drug. This includes your doctors, nurses, pharmacists, and dentists.  · Have your blood work checked if you are on this drug for a long time. Talk with your doctor.  · High blood pressure has happened with drugs like this one. Have your blood pressure checked as you have been told by your doctor.  · Talk with your doctor before you drink alcohol.  · If you smoke, talk with your doctor.  · If you have asthma, talk with your doctor. You may be more sensitive to this drug.  · Do not take more than what your doctor told you to take. Taking more than you are told may raise your chance of very bad side effects.  · Do not take this drug for longer than you were told by your doctor.  · You may bleed more easily. Be careful and avoid injury. Use a soft toothbrush and an electric razor.  · The chance of heart failure is raised with the use of drugs like this one. In people who already have heart failure, the chance of heart attack, having to go to the hospital for heart failure, and death is raised. Talk with the doctor.  · The chance of heart attack and heart-related death is raised in people taking drugs like this one after a recent heart attack. People taking drugs like this one after a first heart attack were also more likely to die in the year after the heart attack compared with people not taking drugs like this one. Talk with the doctor.  · If you have phenylketonuria (PKU), talk with your doctor. Some products have phenylalanine.  · This drug may raise the chance  of a very bad brain problem called aseptic meningitis. Call your doctor right away if you have a headache, fever, chills, very upset stomach or throwing up, stiff neck, rash, bright lights bother your eyes, feeling sleepy, or feeling confused.  · Liver problems have happened with drugs like this one. Sometimes, this has been deadly. Call your doctor right away if you have signs of liver problems like dark urine, feeling tired, not hungry, upset stomach or stomach pain, light-colored stools, throwing up, or yellow skin or eyes.  · A severe and sometimes deadly reaction has happened. Most of the time, this reaction has signs like fever, rash, or swollen glands with problems in body organs like the liver, kidney, blood, heart, muscles and joints, or lungs. If you have questions, talk with the doctor.  · If you are 60 or older, use this drug with care. You could have more side effects.  · NSAIDs like this drug may affect egg release (ovulation). This may affect being able to get pregnant. This goes back to normal when this drug is stopped. Talk with the doctor.  · Tell your doctor if you are breast-feeding. You will need to talk about any risks to your baby.    What are some side effects that I need to call my doctor about right away?   WARNING/CAUTION: Even though it may be rare, some people may have very bad and sometimes deadly side effects when taking a drug. Tell your doctor or get medical help right away if you have any of the following signs or symptoms that may be related to a very bad side effect:  · Signs of an allergic reaction, like rash; hives; itching; red, swollen, blistered, or peeling skin with or without fever; wheezing; tightness in the chest or throat; trouble breathing, swallowing, or talking; unusual hoarseness; or swelling of the mouth, face, lips, tongue, or throat.  · Signs of bleeding like throwing up or coughing up blood; vomit that looks like coffee grounds; blood in the urine; black, red, or  tarry stools; bleeding from the gums; abnormal vaginal bleeding; bruises without a cause or that get bigger; or bleeding you cannot stop.  · Signs of kidney problems like unable to pass urine, change in how much urine is passed, blood in the urine, or a big weight gain.  · Signs of high potassium levels like a heartbeat that does not feel normal; feeling confused; feeling weak, lightheaded, or dizzy; feeling like passing out; numbness or tingling; or shortness of breath.  · Signs of high blood pressure like very bad headache or dizziness, passing out, or change in eyesight.  · Shortness of breath, a big weight gain, or swelling in the arms or legs.  · Chest pain or pressure or a fast heartbeat.  · Weakness on 1 side of the body, trouble speaking or thinking, change in balance, drooping on one side of the face, or blurred eyesight.  · Feeling very tired or weak.  · Ringing in ears.  · Very bad back pain.  · Change in eyesight.  · Swollen gland.  · A severe skin reaction (Valderrama-Jason syndrome/toxic epidermal necrolysis) may happen. It can cause severe health problems that may not go away, and sometimes death. Get medical help right away if you have signs like red, swollen, blistered, or peeling skin (with or without fever); red or irritated eyes; or sores in your mouth, throat, nose, or eyes.  What are some other side effects of this drug?   All drugs may cause side effects. However, many people have no side effects or only have minor side effects. Call your doctor or get medical help if any of these side effects or any other side effects bother you or do not go away:  · Constipation, diarrhea, stomach pain, upset stomach, or throwing up.  · Heartburn.  · Gas.  · Dizziness.  These are not all of the side effects that may occur. If you have questions about side effects, call your doctor. Call your doctor for medical advice about side effects.  You may report side effects to your national health agency.  You may  report side effects to the FDA at 1-645.416.2873. You may also report side effects at https://www.fda.gov/medwatch.  How is this drug best taken?   Use this drug as ordered by your doctor. Read all information given to you. Follow all instructions closely.  All oral products:   · Take with or without food. Take with food if it causes an upset stomach.  · Take with a full glass of water.  Capsules:   · Swallow whole. Do not chew, break, or crush.  Chewable tablets:   · Chew well before swallowing.  Liquid (suspension):   · Shake well before use.  · Measure liquid doses carefully. Use the measuring device that comes with this drug. If there is none, ask the pharmacist for a device to measure this drug.  Injection:   · It is given as an infusion into a vein over a period of time.  What do I do if I miss a dose?   All oral products:   · If you take this drug on a regular basis, take a missed dose as soon as you think about it.  · If it is close to the time for your next dose, skip the missed dose and go back to your normal time.  · Do not take 2 doses at the same time or extra doses.  · Many times this drug is taken on an as needed basis. Do not take more often than told by the doctor.  Injection:   · Call your doctor to find out what to do.  How do I store and/or throw out this drug?   All oral products:   · Store at room temperature in a dry place. Do not store in a bathroom.  · Protect from heat.  Injection:   · If you need to store this drug at home, talk with your doctor, nurse, or pharmacist about how to store it.  All products:   · Keep all drugs in a safe place. Keep all drugs out of the reach of children and pets.  · Throw away unused or  drugs. Do not flush down a toilet or pour down a drain unless you are told to do so. Check with your pharmacist if you have questions about the best way to throw out drugs. There may be drug take-back programs in your area.  General drug facts   · If your symptoms or  health problems do not get better or if they become worse, call your doctor.  · Do not share your drugs with others and do not take anyone else's drugs.  · Some drugs may have another patient information leaflet. If you have any questions about this drug, please talk with your doctor, nurse, pharmacist, or other health care provider.  · Some drugs may have another patient information leaflet. Check with your pharmacist. If you have any questions about this drug, please talk with your doctor, nurse, pharmacist, or other health care provider.  · If you think there has been an overdose, call your poison control center or get medical care right away. Be ready to tell or show what was taken, how much, and when it happened.    Consumer Information Use and Disclaimer   This generalized information is a limited summary of diagnosis, treatment, and/or medication information. It is not meant to be comprehensive and should be used as a tool to help the user understand and/or assess potential diagnostic and treatment options. It does NOT include all information about conditions, treatments, medications, side effects, or risks that may apply to a specific patient. It is not intended to be medical advice or a substitute for the medical advice, diagnosis, or treatment of a health care provider based on the health care provider's examination and assessment of a patient's specific and unique circumstances. Patients must speak with a health care provider for complete information about their health, medical questions, and treatment options, including any risks or benefits regarding use of medications. This information does not endorse any treatments or medications as safe, effective, or approved for treating a specific patient. [employer] and its affiliates disclaim any warranty or liability relating to this information or the use thereof. The use of this information is governed by the Terms of Use, available at  https://www.[employer].com/en/solutions/lexicomp/about/angelo.  Last Reviewed Date   2020-11-19  Copyright   © 2021 [employer] and its affiliates and/or licensors. All rights reserved.

## 2022-02-08 NOTE — H&P
Dr. Butler' GYN H&P    Diagnosis: Menorrhagia  Planned Procedure: hysteroscopy, D&C, endometrial ablation  Date of Planned Procedure: 2022    Cc: heavy menstrual cycles    HPI: Tessa Wynen is a 46 y.o. female with history of irregular menstrual cycles and menorrhagia.  At her last visit in early dec 2021, endometrial biopsy was completed.  Showed disordered proliferative endometrium.  Patient has been on OCPs in the past to control her cycle. But, she does not like the side effects. She does not want to continue with hormones to regulate her cycle. Now desires to proceed with surgery.  Wants to have endometrial ablation.    ROS:  GENERAL: Denies weight gain or weight loss. Feeling well overall.   SKIN: Denies rash or lesions.   HEAD: Denies head injury or headache.   CHEST: Denies chest pain or shortness of breath.   CARDIOVASCULAR: Denies palpitations or left sided chest pain.   ABDOMEN: No abdominal pain, constipation, diarrhea, nausea, vomiting or rectal bleeding.   URINARY: No frequency, dysuria, hematuria, or burning on urination.  REPRODUCTIVE: See HPI.   HEMATOLOGIC: No easy bruisability or excessive bleeding.   MUSCULOSKELETAL: Denies joint pain or swelling.   NEUROLOGIC: Denies syncope or weakness.   PSYCHIATRIC: Denies depression, anxiety or mood swings.     PMHx:   Past Medical History:   Diagnosis Date    Elevated fasting glucose     PAD (peripheral artery disease)        Surgical hx:   Past Surgical History:   Procedure Laterality Date    CHOLECYSTECTOMY         GYNhx: No LMP recorded.    Obhx:     ALLERGY: NKDA    MEDS: Reviewed, reconciled      Current Facility-Administered Medications:     lactated ringers infusion, , Intravenous, Continuous, Ariel Butler MD    Social hx:    Social History     Socioeconomic History    Marital status:     Number of children: 2   Occupational History    Occupation: cleaning housing    Tobacco Use    Smoking status: Never Smoker     "Smokeless tobacco: Never Used   Substance and Sexual Activity    Alcohol use: No    Drug use: No    Sexual activity: Yes     Partners: Male     Birth control/protection: OCP   Social History Narrative    Patient is originally from [place] [id number]         School at ; high school         College/university ;         Working ; [employer]                 Children    joban - 21    lidmira - 13         Lives with     zeb and son and finn        Diet/Exericse           Family hx:    Family History   Problem Relation Age of Onset    Diabetes Mellitus Mother     Diabetes Mellitus Father     Colon cancer Neg Hx     Breast cancer Neg Hx     Ovarian cancer Neg Hx     Uterine cancer Neg Hx     Heart attack Neg Hx        PE:   Vitals: /62 (BP Location: Right arm, Patient Position: Lying)   Pulse (!) 52   Temp 99 °F (37.2 °C) (Temporal)   Resp 17   Ht 5' 4" (1.626 m)   Wt 72.6 kg (160 lb)   SpO2 98%   Breastfeeding No   BMI 27.46 kg/m²   APPEARANCE: Well nourished, well developed, in no acute distress.  Exam: deferred    Imaging: pelvic US 12/2021    TECHNIQUE:  Transabdominal sonography of the pelvis was performed, followed by transvaginal sonography to better evaluate the uterus and ovaries.     COMPARISON:  12/26/2019     FINDINGS:  Uterus:     Size: 9.4 x 5.2 x 5.5 cm     Masses: 2.8 cm intramural fibroid in the left anterior uterus.     Endometrium: Prominent in this pre menopausal patient, measuring 16 mm.     Right ovary:     Size: 3.9 x 2.7 x 3.8 cm     Appearance: Benign-appearing cyst within internal septation measures 3.4 cm.     Vascular flow: Normal.     Left ovary:     Size: 3 x 2.2 x 5.2 cm     Appearance: Benign-appearing cyst within internal septation measures 4.5 cm.     Vascular Flow: Normal.     Free Fluid:     None.     Impression:     Bilateral benign-appearing ovarian cyst within internal septations.  If there is concern, 8-10 week follow up may be obtained to ensure " resolution.     Uterine fibroid is again noted.  The endometrial stripe is again noted to be prominent (16 mm thickness), similar in appearance to 2019 exam.  Further evaluation as clinically indicated.         A/P: Tessa Wynne is a 46 y.o. female who presents for surgery.      1) Surgery:   -Risks and benefits of surgery discussed with the patient.  All questions were answered.  Consents for surgery and blood were signed by the patient today.  -Patient has been instructed to be NPO on night prior to procedure  -Preop labs ordered: MCKENNA Butler MD

## 2022-02-08 NOTE — TRANSFER OF CARE
"Anesthesia Transfer of Care Note    Patient: Tessa Wynne    Procedure(s) Performed: Procedure(s) (LRB):  HYSTEROSCOPY, WITH DILATION AND CURETTAGE OF UTERUS (N/A)  ABLATION, ENDOMETRIUM (N/A)    Patient location: PACU    Anesthesia Type: general    Transport from OR: Transported from OR on room air with adequate spontaneous ventilation    Post pain: adequate analgesia    Post assessment: no apparent anesthetic complications    Post vital signs: stable    Level of consciousness: awake    Nausea/Vomiting: no nausea/vomiting    Complications: none    Transfer of care protocol was followed      Last vitals:   Visit Vitals  /62 (BP Location: Right arm, Patient Position: Lying)   Pulse (!) 52   Temp 37.2 °C (99 °F) (Temporal)   Resp 17   Ht 5' 4" (1.626 m)   Wt 72.6 kg (160 lb)   SpO2 98%   Breastfeeding No   BMI 27.46 kg/m²     "

## 2022-02-08 NOTE — OP NOTE
Operative Note    Date: 2/8/2022  Time: 11:04 AM  Preoperative Diagnosis: menorrhagia, irregular menstrual cycles  Postoperative Diagnosis: same  Procedure: hysteroscopy, D&C, endometrial ablation with novasure device (settings: length 4.5cm width 3.5cm)  Type of Anesthesia: general   Surgeon: Ariel Butler MD  Specimen/Tissue Removed: endometrial currettings  Estimated Blood Loss: min  Complications: none  Findings: normal appearing endometrial cavity, bilateral ostia noted.    TECHNIQUE:  The patient was taken to the Operating Room where her general   anesthesia was found to be adequate.  Her legs were placed in Mahendra stirrups.    She was then prepared and draped in normal sterile fashion.  Speculum was placed   into the vagina where the cervix was visualized.  Single-tooth tenaculum was   used to grasp at the anterior cervix.  The patient's uterus was sounded.  The   cervical dilators were used to dilate the cervix.  The hysteroscope was then   placed into the uterus where the aforementioned findings were noted.  The camera   was then removed and then a sharp curette was used to obtain endometrial curetting.  The sample was sent to Pathology.  The endometrial ablation device was placed into the uterus.  Cavity assessment was completed and noted to be successful and the device was then turned on.  The ablation started and was completed without complications.  The endometrial device was then removed from the uterine cavity.  The hysteroscope was then   replaced into the uterus where the endometrial cavity was noted to be ablated.    The hysteroscope was then removed.  The single-tooth tenaculum was removed.    Hemostasis was noted.  Speculum was removed from the vagina.    The patient tolerated the procedure well.  Sponge and needle counts were correct   x3.  The patient did not receive any antibiotics as this was not indicated.    She was extubated successfully in the Operating Room and then taken to the PACU    in stable condition.      mD KEVIN dee MD

## 2022-02-08 NOTE — ANESTHESIA POSTPROCEDURE EVALUATION
Anesthesia Post Evaluation    Patient: Tessa Wynne    Procedure(s) Performed: Procedure(s) (LRB):  HYSTEROSCOPY, WITH DILATION AND CURETTAGE OF UTERUS (N/A)  ABLATION, ENDOMETRIUM (N/A)    Final Anesthesia Type: general      Patient location during evaluation: PACU  Patient participation: Yes- Able to Participate  Level of consciousness: awake and alert  Post-procedure vital signs: reviewed and stable  Pain management: adequate  Airway patency: patent    PONV status at discharge: No PONV  Anesthetic complications: no      Cardiovascular status: blood pressure returned to baseline and hemodynamically stable  Respiratory status: unassisted  Hydration status: euvolemic  Follow-up not needed.          Vitals Value Taken Time   /67 02/08/22 1138   Temp 36.6 °C (97.9 °F) 02/08/22 1135   Pulse 62 02/08/22 1138   Resp 9 02/08/22 1138   SpO2 99 % 02/08/22 1138   Vitals shown include unvalidated device data.      Event Time   Out of Recovery 11:39:40         Pain/Emmie Score: Emmie Score: 10 (2/8/2022 11:38 AM)

## 2022-02-08 NOTE — DISCHARGE SUMMARY
[facility] (H[facility])  Discharge Note  Short Stay    Procedure(s) (LRB):  HYSTEROSCOPY, WITH DILATION AND CURETTAGE OF UTERUS (N/A)  ABLATION, ENDOMETRIUM (N/A)    OUTCOME: Patient tolerated treatment/procedure well without complication and is now ready for discharge.    DISPOSITION: Home or Self Care    FINAL DIAGNOSIS:  Menorrhagia    FOLLOWUP: In clinic    DISCHARGE INSTRUCTIONS:  No discharge procedures on file.     TIME SPENT ON DISCHARGE: 5 minutes

## 2022-02-08 NOTE — PLAN OF CARE
PATIENT OOB AND URINATED ON ARRIVAL, VSS, ALL INTRUCTIONS GIVEN, WAITING ON MEDS THEN WILL DISCHARGE TO HOME IN

## 2022-02-10 LAB
FINAL PATHOLOGIC DIAGNOSIS: NORMAL
GROSS: NORMAL
Lab: NORMAL

## 2022-02-23 ENCOUNTER — OFFICE VISIT (OUTPATIENT)
Dept: OBSTETRICS AND GYNECOLOGY | Facility: CLINIC | Age: 47
End: 2022-02-23
Payer: MEDICAID

## 2022-02-23 VITALS
SYSTOLIC BLOOD PRESSURE: 100 MMHG | BODY MASS INDEX: 29.02 KG/M2 | WEIGHT: 170 LBS | DIASTOLIC BLOOD PRESSURE: 63 MMHG | HEIGHT: 64 IN

## 2022-02-23 DIAGNOSIS — Z09 POSTOP CHECK: Primary | ICD-10-CM

## 2022-02-23 DIAGNOSIS — N76.0 ACUTE VAGINITIS: ICD-10-CM

## 2022-02-23 PROCEDURE — 3078F PR MOST RECENT DIASTOLIC BLOOD PRESSURE < 80 MM HG: ICD-10-PCS | Mod: CPTII,,, | Performed by: OBSTETRICS & GYNECOLOGY

## 2022-02-23 PROCEDURE — 3008F PR BODY MASS INDEX (BMI) DOCUMENTED: ICD-10-PCS | Mod: CPTII,,, | Performed by: OBSTETRICS & GYNECOLOGY

## 2022-02-23 PROCEDURE — 3074F SYST BP LT 130 MM HG: CPT | Mod: CPTII,,, | Performed by: OBSTETRICS & GYNECOLOGY

## 2022-02-23 PROCEDURE — 99213 OFFICE O/P EST LOW 20 MIN: CPT | Mod: PBBFAC,PO | Performed by: OBSTETRICS & GYNECOLOGY

## 2022-02-23 PROCEDURE — 3074F PR MOST RECENT SYSTOLIC BLOOD PRESSURE < 130 MM HG: ICD-10-PCS | Mod: CPTII,,, | Performed by: OBSTETRICS & GYNECOLOGY

## 2022-02-23 PROCEDURE — 99024 PR POST-OP FOLLOW-UP VISIT: ICD-10-PCS | Mod: ,,, | Performed by: OBSTETRICS & GYNECOLOGY

## 2022-02-23 PROCEDURE — 3078F DIAST BP <80 MM HG: CPT | Mod: CPTII,,, | Performed by: OBSTETRICS & GYNECOLOGY

## 2022-02-23 PROCEDURE — 1159F PR MEDICATION LIST DOCUMENTED IN MEDICAL RECORD: ICD-10-PCS | Mod: CPTII,,, | Performed by: OBSTETRICS & GYNECOLOGY

## 2022-02-23 PROCEDURE — 99999 PR PBB SHADOW E&M-EST. PATIENT-LVL III: CPT | Mod: PBBFAC,,, | Performed by: OBSTETRICS & GYNECOLOGY

## 2022-02-23 PROCEDURE — 1159F MED LIST DOCD IN RCRD: CPT | Mod: CPTII,,, | Performed by: OBSTETRICS & GYNECOLOGY

## 2022-02-23 PROCEDURE — 99999 PR PBB SHADOW E&M-EST. PATIENT-LVL III: ICD-10-PCS | Mod: PBBFAC,,, | Performed by: OBSTETRICS & GYNECOLOGY

## 2022-02-23 PROCEDURE — 99024 POSTOP FOLLOW-UP VISIT: CPT | Mod: ,,, | Performed by: OBSTETRICS & GYNECOLOGY

## 2022-02-23 PROCEDURE — 3008F BODY MASS INDEX DOCD: CPT | Mod: CPTII,,, | Performed by: OBSTETRICS & GYNECOLOGY

## 2022-02-23 RX ORDER — METRONIDAZOLE 500 MG/1
500 TABLET ORAL
Qty: 14 TABLET | Refills: 0 | Status: SHIPPED | OUTPATIENT
Start: 2022-02-23 | End: 2022-05-27

## 2022-02-23 NOTE — PROGRESS NOTES
"Postop     Patient presents for postoperative visit today. She is s/p hysteroscopy, endometrial ablation  For irregular menstrual cycles on on 2/8/2022. Patient denies feverand chills. She denies constipation and diarrhea. Denies pain.  Does report discharge with an odor.     ROS: per HPI     PE:   Vitals: /63   Ht 5' 4" (1.626 m)   Wt 77.1 kg (169 lb 15.6 oz)   LMP 01/15/2022   BMI 29.18 kg/m²   APPEARANCE: Well nourished, well developed, in no acute distress.  PELVIC: Normal external female genitalia without lesions. Normal hair distribution. Adequate perineal body, normal urethral meatus. Vagina moist and well rugated without lesions watery discharge. Cervix pink and without lesions. No significant cystocele or rectocele. Bimanual exam deferred  EXTREMITIES: No clubbing cyanosis or edema.    A/P:   1) Postop:  -patient healing well from procedure/surgery  -continue pain meds as needed   -benign pathology discussed with the patient - copy of pathology results discussed with the patient   -rx for flagyl provided    F/u in 12/2022 for annual exam, pap smear    charly patterson MD              "

## 2022-03-18 ENCOUNTER — PATIENT MESSAGE (OUTPATIENT)
Dept: ADMINISTRATIVE | Facility: HOSPITAL | Age: 47
End: 2022-03-18
Payer: MEDICAID

## 2022-05-03 ENCOUNTER — TELEPHONE (OUTPATIENT)
Dept: FAMILY MEDICINE | Facility: CLINIC | Age: 47
End: 2022-05-03
Payer: MEDICAID

## 2022-05-03 DIAGNOSIS — R73.9 HYPERGLYCEMIA: Primary | ICD-10-CM

## 2022-05-03 DIAGNOSIS — M79.10 MYALGIA: ICD-10-CM

## 2022-05-03 NOTE — TELEPHONE ENCOUNTER
----- Message from Suma Che sent at 5/3/2022 10:08 AM CDT -----  Type: Requesting to speak with nurse         Who Called: PT  Regarding: pt needing to make an appt for check up please   Would the patient rather a call back or a response via [facility]? Call back  Best Call Back Number: [phone]  Additional Information: n/a

## 2022-05-04 ENCOUNTER — OFFICE VISIT (OUTPATIENT)
Dept: FAMILY MEDICINE | Facility: CLINIC | Age: 47
End: 2022-05-04
Payer: MEDICAID

## 2022-05-04 ENCOUNTER — LAB VISIT (OUTPATIENT)
Dept: LAB | Facility: HOSPITAL | Age: 47
End: 2022-05-04
Attending: INTERNAL MEDICINE
Payer: MEDICAID

## 2022-05-04 VITALS
WEIGHT: 168 LBS | BODY MASS INDEX: 28.68 KG/M2 | OXYGEN SATURATION: 98 % | HEIGHT: 64 IN | HEART RATE: 60 BPM | DIASTOLIC BLOOD PRESSURE: 62 MMHG | SYSTOLIC BLOOD PRESSURE: 114 MMHG

## 2022-05-04 DIAGNOSIS — H53.8 BLURRY VISION: ICD-10-CM

## 2022-05-04 DIAGNOSIS — R73.9 HYPERGLYCEMIA: ICD-10-CM

## 2022-05-04 DIAGNOSIS — M79.10 MYALGIA: ICD-10-CM

## 2022-05-04 DIAGNOSIS — Z12.11 COLON CANCER SCREENING: ICD-10-CM

## 2022-05-04 DIAGNOSIS — Z00.00 ANNUAL PHYSICAL EXAM: Primary | ICD-10-CM

## 2022-05-04 DIAGNOSIS — I73.9 PAD (PERIPHERAL ARTERY DISEASE): ICD-10-CM

## 2022-05-04 DIAGNOSIS — M79.605 PAIN IN BOTH LOWER EXTREMITIES: ICD-10-CM

## 2022-05-04 DIAGNOSIS — M54.2 NECK PAIN: ICD-10-CM

## 2022-05-04 DIAGNOSIS — M79.604 PAIN IN BOTH LOWER EXTREMITIES: ICD-10-CM

## 2022-05-04 DIAGNOSIS — Z00.00 ANNUAL PHYSICAL EXAM: ICD-10-CM

## 2022-05-04 LAB
ESTIMATED AVG GLUCOSE: 108 MG/DL (ref 68–131)
HBA1C MFR BLD: 5.4 % (ref 4–5.6)
URATE SERPL-MCNC: 4.8 MG/DL (ref 2.4–5.7)
VIT B12 SERPL-MCNC: 1083 PG/ML (ref 210–950)

## 2022-05-04 PROCEDURE — 3008F PR BODY MASS INDEX (BMI) DOCUMENTED: ICD-10-PCS | Mod: CPTII,,, | Performed by: INTERNAL MEDICINE

## 2022-05-04 PROCEDURE — 3078F PR MOST RECENT DIASTOLIC BLOOD PRESSURE < 80 MM HG: ICD-10-PCS | Mod: CPTII,,, | Performed by: INTERNAL MEDICINE

## 2022-05-04 PROCEDURE — 82274 ASSAY TEST FOR BLOOD FECAL: CPT | Performed by: INTERNAL MEDICINE

## 2022-05-04 PROCEDURE — 3074F SYST BP LT 130 MM HG: CPT | Mod: CPTII,,, | Performed by: INTERNAL MEDICINE

## 2022-05-04 PROCEDURE — 1160F PR REVIEW ALL MEDS BY PRESCRIBER/CLIN PHARMACIST DOCUMENTED: ICD-10-PCS | Mod: CPTII,,, | Performed by: INTERNAL MEDICINE

## 2022-05-04 PROCEDURE — 1160F RVW MEDS BY RX/DR IN RCRD: CPT | Mod: CPTII,,, | Performed by: INTERNAL MEDICINE

## 2022-05-04 PROCEDURE — 3008F BODY MASS INDEX DOCD: CPT | Mod: CPTII,,, | Performed by: INTERNAL MEDICINE

## 2022-05-04 PROCEDURE — 1159F PR MEDICATION LIST DOCUMENTED IN MEDICAL RECORD: ICD-10-PCS | Mod: CPTII,,, | Performed by: INTERNAL MEDICINE

## 2022-05-04 PROCEDURE — 99396 PREV VISIT EST AGE 40-64: CPT | Mod: S$PBB,,, | Performed by: INTERNAL MEDICINE

## 2022-05-04 PROCEDURE — 83036 HEMOGLOBIN GLYCOSYLATED A1C: CPT | Performed by: INTERNAL MEDICINE

## 2022-05-04 PROCEDURE — 99214 OFFICE O/P EST MOD 30 MIN: CPT | Mod: PBBFAC,PO | Performed by: INTERNAL MEDICINE

## 2022-05-04 PROCEDURE — 36415 COLL VENOUS BLD VENIPUNCTURE: CPT | Performed by: INTERNAL MEDICINE

## 2022-05-04 PROCEDURE — 82607 VITAMIN B-12: CPT | Performed by: INTERNAL MEDICINE

## 2022-05-04 PROCEDURE — 99396 PR PREVENTIVE VISIT,EST,40-64: ICD-10-PCS | Mod: S$PBB,,, | Performed by: INTERNAL MEDICINE

## 2022-05-04 PROCEDURE — 99999 PR PBB SHADOW E&M-EST. PATIENT-LVL IV: ICD-10-PCS | Mod: PBBFAC,,, | Performed by: INTERNAL MEDICINE

## 2022-05-04 PROCEDURE — 3074F PR MOST RECENT SYSTOLIC BLOOD PRESSURE < 130 MM HG: ICD-10-PCS | Mod: CPTII,,, | Performed by: INTERNAL MEDICINE

## 2022-05-04 PROCEDURE — 1159F MED LIST DOCD IN RCRD: CPT | Mod: CPTII,,, | Performed by: INTERNAL MEDICINE

## 2022-05-04 PROCEDURE — 86038 ANTINUCLEAR ANTIBODIES: CPT | Performed by: INTERNAL MEDICINE

## 2022-05-04 PROCEDURE — 3044F HG A1C LEVEL LT 7.0%: CPT | Mod: CPTII,,, | Performed by: INTERNAL MEDICINE

## 2022-05-04 PROCEDURE — 3078F DIAST BP <80 MM HG: CPT | Mod: CPTII,,, | Performed by: INTERNAL MEDICINE

## 2022-05-04 PROCEDURE — 3044F PR MOST RECENT HEMOGLOBIN A1C LEVEL <7.0%: ICD-10-PCS | Mod: CPTII,,, | Performed by: INTERNAL MEDICINE

## 2022-05-04 PROCEDURE — 99999 PR PBB SHADOW E&M-EST. PATIENT-LVL IV: CPT | Mod: PBBFAC,,, | Performed by: INTERNAL MEDICINE

## 2022-05-04 PROCEDURE — 84550 ASSAY OF BLOOD/URIC ACID: CPT | Performed by: INTERNAL MEDICINE

## 2022-05-04 NOTE — PROGRESS NOTES
Subjective:       Patient ID: Tessa Wynne is a 46 y.o. female.    Chief Complaint: Annual Exam      HPI  Tessa Wynne is a 46 y.o. female with chronic conditions of mild hyperglycemia, mild changes of PAD in [place]  who presents today for annual evaluation.    Has been exercising for her lost a few lb.  Watching her diet is and currently not taking her cholesterol medication.  Leg pain has improved but continues with occasional bruising of extremities.  No nose bleeds, gum bleeding or any other evidence of bleeding.    Has been having some sinus congestion but no shortness of breath, chest pains, palpitations.  Her neck tends to hurt by the sternocleidomastoid muscles.    Has no toxic habits.    Health Maintenance:  Health Maintenance   Topic Date Due    Mammogram  01/04/2023    Lipid Panel  03/02/2026    TETANUS VACCINE  11/13/2028    Hepatitis C Screening  Completed       Review of Systems   Constitutional: Negative for appetite change, chills, fatigue and fever.   HENT: Positive for nasal congestion. Negative for hearing loss, rhinorrhea and sore throat.    Eyes: Positive for visual disturbance (With pterygium).   Respiratory: Negative for cough and shortness of breath.    Cardiovascular: Negative for chest pain, palpitations, leg swelling and claudication.   Gastrointestinal: Negative for abdominal pain, change in bowel habit, constipation, diarrhea, nausea, vomiting and change in bowel habit.   Genitourinary: Negative for bladder incontinence, difficulty urinating and dysuria.   Musculoskeletal: Negative.    Neurological: Negative for dizziness, weakness, light-headedness, numbness and headaches.   Hematological: Bruises/bleeds easily.   Psychiatric/Behavioral: Negative for sleep disturbance. The patient is not nervous/anxious.       Past Medical History:   Diagnosis Date    Elevated fasting glucose     PAD (peripheral artery disease)        Past Surgical History:   Procedure Laterality Date     CHOLECYSTECTOMY      ENDOMETRIAL ABLATION N/A 2/8/2022    Procedure: ABLATION, ENDOMETRIUM;  Surgeon: Ariel Butler MD;  Location: [facility] OR;  Service: OB/GYN;  Laterality: N/A;    HYSTEROSCOPY WITH DILATION AND CURETTAGE OF UTERUS N/A 2/8/2022    Procedure: HYSTEROSCOPY, WITH DILATION AND CURETTAGE OF UTERUS;  Surgeon: Ariel Butler MD;  Location: [facility] OR;  Service: OB/GYN;  Laterality: N/A;       Family History   Problem Relation Age of Onset    Diabetes Mellitus Mother     Diabetes Mellitus Father     Colon cancer Neg Hx     Breast cancer Neg Hx     Ovarian cancer Neg Hx     Uterine cancer Neg Hx     Heart attack Neg Hx        Social History     Socioeconomic History    Marital status:     Number of children: 2   Occupational History    Occupation: cleaning housing    Tobacco Use    Smoking status: Never Smoker    Smokeless tobacco: Never Used   Substance and Sexual Activity    Alcohol use: No    Drug use: No    Sexual activity: Yes     Partners: Male     Birth control/protection: OCP   Social History Narrative    Patient is originally from [place] [address]         School at ; high school         College/university ;         Working ; [employer]                 Children    joban - 21    lidnsey - 13         Lives with     dazoë and son and husabd        Diet/Exericse           Current Outpatient Medications   Medication Sig Dispense Refill    aspirin (ECOTRIN) 81 MG EC tablet Take 1 tablet (81 mg total) by mouth every Mon, Wed, Fri. 30 tablet 3    atorvastatin (LIPITOR) 10 MG tablet Take 1 tablet (10 mg total) by mouth once daily. 90 tablet 3    FINACEA 15 % Foam APPLY TO FACE TWICE A DAY      hydrocortisone 2.5 % cream Apply topically 2 (two) times daily as needed. 20 g 0    hydrocortisone 2.5 % ointment ELAINE EXT AA BID FOR 7 DAYS      ibuprofen (ADVIL,MOTRIN) 800 MG tablet Take 1 tablet (800 mg total) by mouth every 8 (eight) hours as needed for Pain. 30 tablet 0     pantoprazole (PROTONIX) 40 MG tablet Take 1 tablet (40 mg total) by mouth once daily. 30 tablet 6    progesterone (PROMETRIUM) 200 MG capsule Take 1 capsule (200 mg total) by mouth nightly. 30 capsule 0    tretinoin (RETIN-A) 0.025 % cream APPLY PEA SIZED AMOUNT TO ENTIRE FACE ONCE EVERY NIGHT BEFORE BED      norgestimate-ethinyl estradioL (ORTHO-CYCLEN) 0.25-35 mg-mcg per tablet Take 1 tablet by mouth once daily. 30 tablet 11     No current facility-administered medications for this visit.       Review of patient's allergies indicates:  No Known Allergies      Objective:       Last 3 sets of Vitals    Vitals - 1 value per visit 2/23/2022 5/4/2022 5/4/2022   SYSTOLIC 100 - 114   DIASTOLIC 63 - 62   Pulse - - 60   Temp - - -   Resp - - -   SPO2 - - 98   Weight (lb) 169.97 - 167.99   Weight (kg) 77.1 - 76.2   Height 64 - 64   BMI (Calculated) 29.2 - 28.8   VISIT REPORT - - -   Pain Score  - 0 -   Some recent data might be hidden   Physical Exam  Constitutional:       General: She is not in acute distress.     Appearance: Normal appearance.   HENT:      Head: Normocephalic.      Right Ear: Tympanic membrane, ear canal and external ear normal.      Left Ear: Tympanic membrane, ear canal and external ear normal.      Nose: Nose normal.      Mouth/Throat:      Mouth: Mucous membranes are moist.   Eyes:      General: No scleral icterus.     Extraocular Movements: Extraocular movements intact.      Conjunctiva/sclera: Conjunctivae normal.      Pupils: Pupils are equal, round, and reactive to light.   Neck:      Vascular: No carotid bruit.      Comments: Possible thyroid nodule on left side  Cardiovascular:      Rate and Rhythm: Normal rate and regular rhythm.      Pulses: Normal pulses.      Heart sounds: Normal heart sounds.   Pulmonary:      Effort: Pulmonary effort is normal.      Breath sounds: Normal breath sounds.   Abdominal:      General: Bowel sounds are normal. There is no distension.      Palpations: Abdomen  is soft. There is no mass.      Tenderness: There is no abdominal tenderness.   Musculoskeletal:         General: No swelling. Normal range of motion.      Cervical back: Neck supple.   Lymphadenopathy:      Cervical: No cervical adenopathy.   Skin:     General: Skin is warm and dry.      Findings: Bruising (Almost healed scattered bruises it upper extremities.) present.   Neurological:      General: No focal deficit present.      Mental Status: She is alert and oriented to person, place, and time.   Psychiatric:         Mood and Affect: Mood normal.         Behavior: Behavior normal.           CBC:  Recent Labs   Lab 03/02/21  0959 04/21/21  0031 12/17/21  1537   WBC 4.81 6.92 6.84   RBC 4.43 4.27 3.81 L   Hemoglobin 13.3 12.7 11.7 L   Hematocrit 41.0 38.4 35.1 L   Platelets 265 229 228   MCV 93 90 92   MCH 30.0 29.7 30.7   MCHC 32.4 33.1 33.3     CMP:  Recent Labs   Lab 07/27/20  1530 11/16/20  0707 03/02/21  0959 04/21/21  0031   Glucose 87 88   < > 93   Calcium 9.5 9.5   < > 9.3   Albumin 3.9 3.9  --  4.4   Total Protein 6.9 7.1  --  7.3   Sodium 136 139   < > 138   Potassium 4.0 4.5   < > 3.7   CO2 24 28   < > 26   Chloride 103 103   < > 104   BUN 15 16   < > 9   Creatinine 0.9 1.1   < > 0.8   Alkaline Phosphatase 62 71  --  71   ALT 15 19  --  25   AST 22 21  --  32   Total Bilirubin 0.2 0.3  --  0.2    < > = values in this interval not displayed.     URINALYSIS:       LIPIDS:  Recent Labs   Lab 05/27/20  0742 07/10/20  0715 07/27/20  1530 03/02/21  0959 12/17/21  1537   TSH  --  2.538  --   --  1.412   HDL 71  --  75 56  --    Cholesterol 166  --  176 156  --    Triglycerides 110  --  125 84  --    LDL Cholesterol 73.0  --  76.0 83.2  --    HDL/Cholesterol Ratio 42.8  --  42.6 35.9  --    Non-HDL Cholesterol 95  --  101 100  --    Total Cholesterol/HDL Ratio 2.3  --  2.3 2.8  --      TSH:  Recent Labs   Lab 07/10/20  0715 12/17/21  1537   TSH 2.538 1.412       A1C:  Recent Labs   Lab 05/04/22  0809    Hemoglobin A1C 5.4       Imaging:  Mammo Digital Screening Bilat w/ Jose Guadalupe  Narrative: Result:  Mammo Digital Screening Bilat w/ Jose Guadalupe    History:  Patient is 46 y.o. and is seen for a screening mammogram.    Films Compared:  Prior images (if available) were compared.     Findings:   This procedure was performed using tomosynthesis.   Computer-aided detection was utilized in the interpretation of this   examination.    The breasts have scattered areas of fibroglandular density. There is no   evidence of suspicious masses, microcalcifications or architectural   distortion.  Impression:    No mammographic evidence of malignancy.    BI-RADS Category 1: Negative    Recommendation:  Routine screening mammogram in 1 year is recommended.    Your estimated lifetime risk of breast cancer (to age 85) based on   Tyrer-Cuzick risk assessment model is 5.71 %.  According to the American   Cancer Society, patients with a lifetime breast cancer risk of 20% or   higher might benefit from supplemental screening tests. ??       Assessment:       1. Annual physical exam    2. Neck pain    3. Hyperglycemia    4. Blurry vision    5. Colon cancer screening    6. PAD (peripheral artery disease)          Chronic conditions status updated as per HPI. Other than changes above, cont current medications and maintain follow up with specialist.   Plan:       Tessa was seen today for annual exam.    Diagnoses and all orders for this visit:    Annual physical exam  -     Ambulatory referral/consult to Ophthalmology; Future  -     Fecal Immunochemical Test (iFOBT); Future  -     US Soft Tissue Head Neck Thyroid; Future  -     CBC Auto Differential; Future  -     Comprehensive Metabolic Panel; Future  -     Hemoglobin A1C; Future  -     Lipid Panel; Future  -     TSH; Future  - Stable today she    Neck pain  -     US Soft Tissue Head Neck Thyroid; Future  -     TSH; Future-is  - possible thyroid nodule or lymph node but on the anterior side of the neck  not worse she reports the pain.    Hyperglycemia  -     Comprehensive Metabolic Panel; Future  -     Hemoglobin A1C; Future    Blurry vision  -     Ambulatory referral/consult to Ophthalmology; Future    Colon cancer screening  -     Fecal Immunochemical Test (iFOBT); Future    PAD (peripheral artery disease)  -     Lipid Panel; Future      Health Maintenance Due   Topic Date Due    Colorectal Cancer Screening  Never done    COVID-19 Vaccine (3 - Booster for Pfizer series) 04/06/2022        Sabiha Overton MD  [facility]  Disclaimer:  This note has been generated using voice-recognition software. There may be grammatical or spelling errors that have been missed during proof-reading

## 2022-05-05 ENCOUNTER — TELEPHONE (OUTPATIENT)
Dept: OPTOMETRY | Facility: CLINIC | Age: 47
End: 2022-05-05
Payer: MEDICAID

## 2022-05-05 ENCOUNTER — PATIENT MESSAGE (OUTPATIENT)
Dept: FAMILY MEDICINE | Facility: CLINIC | Age: 47
End: 2022-05-05
Payer: MEDICAID

## 2022-05-05 LAB — ANA SER QL IF: NORMAL

## 2022-05-05 NOTE — TELEPHONE ENCOUNTER
----- Message from Loraine Begum sent at 5/5/2022  4:00 PM CDT -----    ----- Message -----  From: Indy Galicia  Sent: 5/5/2022   3:50 PM CDT  To: Loraine Begum    Can you leanna this pt for Dr. Wasserman  or Dr. Srinivasan please   ----- Message -----  From: Mouna Lo  Sent: 5/5/2022   7:50 AM CDT  To: [facility] Clinical Support Staff    Patient has a referral placed by Dr. Overton diagnosis is       H53.8 (ICD-10-CM) - Blurry vision  Z00.00 (ICD-10-CM) - Annual physical exam      Can you please assist in scheduling, thank you

## 2022-05-10 LAB — HEMOCCULT STL QL IA: NEGATIVE

## 2022-05-11 ENCOUNTER — HOSPITAL ENCOUNTER (OUTPATIENT)
Dept: RADIOLOGY | Facility: HOSPITAL | Age: 47
Discharge: HOME | End: 2022-05-11
Attending: INTERNAL MEDICINE
Payer: MEDICAID

## 2022-05-11 DIAGNOSIS — M54.2 NECK PAIN: ICD-10-CM

## 2022-05-11 DIAGNOSIS — Z00.00 ANNUAL PHYSICAL EXAM: ICD-10-CM

## 2022-05-11 PROCEDURE — 76536 US EXAM OF HEAD AND NECK: CPT | Mod: TC

## 2022-05-11 PROCEDURE — 76536 US EXAM OF HEAD AND NECK: CPT | Mod: 26,,, | Performed by: RADIOLOGY

## 2022-05-11 PROCEDURE — 76536 US SOFT TISSUE HEAD NECK THYROID: ICD-10-PCS | Mod: 26,,, | Performed by: RADIOLOGY

## 2022-05-13 ENCOUNTER — PATIENT MESSAGE (OUTPATIENT)
Dept: FAMILY MEDICINE | Facility: CLINIC | Age: 47
End: 2022-05-13
Payer: MEDICAID

## 2022-05-30 ENCOUNTER — PATIENT MESSAGE (OUTPATIENT)
Dept: FAMILY MEDICINE | Facility: CLINIC | Age: 47
End: 2022-05-30
Payer: MEDICAID

## 2022-06-27 ENCOUNTER — OFFICE VISIT (OUTPATIENT)
Dept: OPTOMETRY | Facility: CLINIC | Age: 47
End: 2022-06-27
Payer: MEDICAID

## 2022-06-27 DIAGNOSIS — H52.03 HYPEROPIA WITH PRESBYOPIA, BILATERAL: Primary | ICD-10-CM

## 2022-06-27 DIAGNOSIS — H52.4 HYPEROPIA WITH PRESBYOPIA, BILATERAL: Primary | ICD-10-CM

## 2022-06-27 DIAGNOSIS — H53.8 BLURRY VISION: ICD-10-CM

## 2022-06-27 DIAGNOSIS — Z00.00 ANNUAL PHYSICAL EXAM: ICD-10-CM

## 2022-06-27 DIAGNOSIS — H11.001 PTERYGIUM EYE, RIGHT: ICD-10-CM

## 2022-06-27 DIAGNOSIS — G51.4 EYELID MYOKYMIA: ICD-10-CM

## 2022-06-27 DIAGNOSIS — H53.10 SUBJECTIVE VISUAL DISTURBANCE: ICD-10-CM

## 2022-06-27 PROCEDURE — 92004 COMPRE OPH EXAM NEW PT 1/>: CPT | Mod: S$PBB,,, | Performed by: OPTOMETRIST

## 2022-06-27 PROCEDURE — 99999 PR PBB SHADOW E&M-EST. PATIENT-LVL III: CPT | Mod: PBBFAC,,, | Performed by: OPTOMETRIST

## 2022-06-27 PROCEDURE — 3044F PR MOST RECENT HEMOGLOBIN A1C LEVEL <7.0%: ICD-10-PCS | Mod: CPTII,,, | Performed by: OPTOMETRIST

## 2022-06-27 PROCEDURE — 99999 PR PBB SHADOW E&M-EST. PATIENT-LVL III: ICD-10-PCS | Mod: PBBFAC,,, | Performed by: OPTOMETRIST

## 2022-06-27 PROCEDURE — 92004 PR EYE EXAM, NEW PATIENT,COMPREHESV: ICD-10-PCS | Mod: S$PBB,,, | Performed by: OPTOMETRIST

## 2022-06-27 PROCEDURE — 3044F HG A1C LEVEL LT 7.0%: CPT | Mod: CPTII,,, | Performed by: OPTOMETRIST

## 2022-06-27 PROCEDURE — 1159F PR MEDICATION LIST DOCUMENTED IN MEDICAL RECORD: ICD-10-PCS | Mod: CPTII,,, | Performed by: OPTOMETRIST

## 2022-06-27 PROCEDURE — 92015 PR REFRACTION: ICD-10-PCS | Mod: ,,, | Performed by: OPTOMETRIST

## 2022-06-27 PROCEDURE — 1159F MED LIST DOCD IN RCRD: CPT | Mod: CPTII,,, | Performed by: OPTOMETRIST

## 2022-06-27 PROCEDURE — 92015 DETERMINE REFRACTIVE STATE: CPT | Mod: ,,, | Performed by: OPTOMETRIST

## 2022-06-27 PROCEDURE — 99213 OFFICE O/P EST LOW 20 MIN: CPT | Mod: PBBFAC,PN | Performed by: OPTOMETRIST

## 2022-06-27 NOTE — PROGRESS NOTES
HPI     SEEMA: 3 years    Presents today for ocular health check.  Pt reports blurry vision x 7 months.  Pt report occasional twitching OD.  Pt denies eye pain, flashes and floaters.  No using any eye meds.    Last edited by Shannon Rosa MA on 6/27/2022  2:45 PM. (History)        ROS     Negative for: Constitutional, Gastrointestinal, Neurological, Skin,   Genitourinary, Musculoskeletal, HENT, Endocrine, Cardiovascular, Eyes,   Respiratory, Psychiatric, Allergic/Imm, Heme/Lymph    Last edited by Paloma Aponte, OD on 6/27/2022  3:52 PM. (History)        Assessment /Plan     For exam results, see Encounter Report.    Hyperopia with presbyopia, bilateral    Blurry vision  -     Ambulatory referral/consult to Ophthalmology    Annual physical exam  -     Ambulatory referral/consult to Ophthalmology    Subjective visual disturbance    Pterygium eye, right    Eyelid myokymia      MONITOR. ED PT ON ALL EXAM FINDINGS  RX FINAL SPECS   DISCUSSED CAUSES OF MYOKEMIA; CONSIDER TOPICAL ANTI-HISTMAINE AND ETC TO PROVIDE RELIEF  MILD PTERYGIUM OD; NO SURGICAL INTERVENTION NEEDED AT THIS TIME  RTC 1 YR//PRN FOR REE/DFE

## 2022-07-29 ENCOUNTER — LAB VISIT (OUTPATIENT)
Dept: LAB | Facility: HOSPITAL | Age: 47
End: 2022-07-29
Attending: INTERNAL MEDICINE
Payer: MEDICAID

## 2022-07-29 DIAGNOSIS — M54.2 NECK PAIN: ICD-10-CM

## 2022-07-29 DIAGNOSIS — I73.9 PAD (PERIPHERAL ARTERY DISEASE): ICD-10-CM

## 2022-07-29 DIAGNOSIS — R73.9 HYPERGLYCEMIA: ICD-10-CM

## 2022-07-29 DIAGNOSIS — Z00.00 ANNUAL PHYSICAL EXAM: ICD-10-CM

## 2022-07-29 LAB
ALBUMIN SERPL BCP-MCNC: 3.5 G/DL (ref 3.5–5.2)
ALP SERPL-CCNC: 55 U/L (ref 55–135)
ALT SERPL W/O P-5'-P-CCNC: 21 U/L (ref 10–44)
ANION GAP SERPL CALC-SCNC: 9 MMOL/L (ref 8–16)
AST SERPL-CCNC: 28 U/L (ref 10–40)
BASOPHILS # BLD AUTO: 0.02 K/UL (ref 0–0.2)
BASOPHILS NFR BLD: 0.3 % (ref 0–1.9)
BILIRUB SERPL-MCNC: 0.4 MG/DL (ref 0.1–1)
BUN SERPL-MCNC: 14 MG/DL (ref 6–20)
CALCIUM SERPL-MCNC: 9.3 MG/DL (ref 8.7–10.5)
CHLORIDE SERPL-SCNC: 103 MMOL/L (ref 95–110)
CHOLEST SERPL-MCNC: 127 MG/DL (ref 120–199)
CHOLEST/HDLC SERPL: 2.2 {RATIO} (ref 2–5)
CO2 SERPL-SCNC: 28 MMOL/L (ref 23–29)
CREAT SERPL-MCNC: 0.9 MG/DL (ref 0.5–1.4)
DIFFERENTIAL METHOD: ABNORMAL
EOSINOPHIL # BLD AUTO: 0.1 K/UL (ref 0–0.5)
EOSINOPHIL NFR BLD: 1.5 % (ref 0–8)
ERYTHROCYTE [DISTWIDTH] IN BLOOD BY AUTOMATED COUNT: 13.3 % (ref 11.5–14.5)
EST. GFR  (AFRICAN AMERICAN): >60 ML/MIN/1.73 M^2
EST. GFR  (NON AFRICAN AMERICAN): >60 ML/MIN/1.73 M^2
ESTIMATED AVG GLUCOSE: 128 MG/DL (ref 68–131)
GLUCOSE SERPL-MCNC: 74 MG/DL (ref 70–110)
HBA1C MFR BLD: 6.1 % (ref 4–5.6)
HCT VFR BLD AUTO: 36.4 % (ref 37–48.5)
HDLC SERPL-MCNC: 57 MG/DL (ref 40–75)
HDLC SERPL: 44.9 % (ref 20–50)
HGB BLD-MCNC: 12.2 G/DL (ref 12–16)
IMM GRANULOCYTES # BLD AUTO: 0.02 K/UL (ref 0–0.04)
IMM GRANULOCYTES NFR BLD AUTO: 0.3 % (ref 0–0.5)
LDLC SERPL CALC-MCNC: 57.4 MG/DL (ref 63–159)
LYMPHOCYTES # BLD AUTO: 2.2 K/UL (ref 1–4.8)
LYMPHOCYTES NFR BLD: 36.1 % (ref 18–48)
MCH RBC QN AUTO: 30.4 PG (ref 27–31)
MCHC RBC AUTO-ENTMCNC: 33.5 G/DL (ref 32–36)
MCV RBC AUTO: 91 FL (ref 82–98)
MONOCYTES # BLD AUTO: 0.4 K/UL (ref 0.3–1)
MONOCYTES NFR BLD: 7.3 % (ref 4–15)
NEUTROPHILS # BLD AUTO: 3.3 K/UL (ref 1.8–7.7)
NEUTROPHILS NFR BLD: 54.5 % (ref 38–73)
NONHDLC SERPL-MCNC: 70 MG/DL
NRBC BLD-RTO: 0 /100 WBC
PLATELET # BLD AUTO: 214 K/UL (ref 150–450)
PMV BLD AUTO: 11.4 FL (ref 9.2–12.9)
POTASSIUM SERPL-SCNC: 3.9 MMOL/L (ref 3.5–5.1)
PROT SERPL-MCNC: 6.5 G/DL (ref 6–8.4)
RBC # BLD AUTO: 4.01 M/UL (ref 4–5.4)
SODIUM SERPL-SCNC: 140 MMOL/L (ref 136–145)
TRIGL SERPL-MCNC: 63 MG/DL (ref 30–150)
TSH SERPL DL<=0.005 MIU/L-ACNC: 2.02 UIU/ML (ref 0.4–4)
WBC # BLD AUTO: 5.99 K/UL (ref 3.9–12.7)

## 2022-07-29 PROCEDURE — 83036 HEMOGLOBIN GLYCOSYLATED A1C: CPT | Performed by: INTERNAL MEDICINE

## 2022-07-29 PROCEDURE — 80053 COMPREHEN METABOLIC PANEL: CPT | Performed by: INTERNAL MEDICINE

## 2022-07-29 PROCEDURE — 36415 COLL VENOUS BLD VENIPUNCTURE: CPT | Performed by: INTERNAL MEDICINE

## 2022-07-29 PROCEDURE — 85025 COMPLETE CBC W/AUTO DIFF WBC: CPT | Performed by: INTERNAL MEDICINE

## 2022-07-29 PROCEDURE — 84443 ASSAY THYROID STIM HORMONE: CPT | Performed by: INTERNAL MEDICINE

## 2022-07-29 PROCEDURE — 80061 LIPID PANEL: CPT | Performed by: INTERNAL MEDICINE

## 2022-08-01 ENCOUNTER — OFFICE VISIT (OUTPATIENT)
Dept: FAMILY MEDICINE | Facility: CLINIC | Age: 47
End: 2022-08-01
Payer: MEDICAID

## 2022-08-01 VITALS
HEART RATE: 61 BPM | OXYGEN SATURATION: 99 % | WEIGHT: 172.81 LBS | DIASTOLIC BLOOD PRESSURE: 62 MMHG | SYSTOLIC BLOOD PRESSURE: 114 MMHG | BODY MASS INDEX: 29.5 KG/M2 | HEIGHT: 64 IN

## 2022-08-01 DIAGNOSIS — R73.03 PREDIABETES: Primary | ICD-10-CM

## 2022-08-01 DIAGNOSIS — R63.5 WEIGHT GAIN: ICD-10-CM

## 2022-08-01 DIAGNOSIS — R20.0 HAND NUMBNESS: ICD-10-CM

## 2022-08-01 DIAGNOSIS — M48.02 SPINAL STENOSIS, CERVICAL REGION: ICD-10-CM

## 2022-08-01 DIAGNOSIS — R55 SYNCOPE AND COLLAPSE: ICD-10-CM

## 2022-08-01 PROCEDURE — 99215 OFFICE O/P EST HI 40 MIN: CPT | Mod: PBBFAC,PO | Performed by: INTERNAL MEDICINE

## 2022-08-01 PROCEDURE — 1160F PR REVIEW ALL MEDS BY PRESCRIBER/CLIN PHARMACIST DOCUMENTED: ICD-10-PCS | Mod: CPTII,,, | Performed by: INTERNAL MEDICINE

## 2022-08-01 PROCEDURE — 1159F PR MEDICATION LIST DOCUMENTED IN MEDICAL RECORD: ICD-10-PCS | Mod: CPTII,,, | Performed by: INTERNAL MEDICINE

## 2022-08-01 PROCEDURE — 3078F DIAST BP <80 MM HG: CPT | Mod: CPTII,,, | Performed by: INTERNAL MEDICINE

## 2022-08-01 PROCEDURE — 3044F HG A1C LEVEL LT 7.0%: CPT | Mod: CPTII,,, | Performed by: INTERNAL MEDICINE

## 2022-08-01 PROCEDURE — 99999 PR PBB SHADOW E&M-EST. PATIENT-LVL V: CPT | Mod: PBBFAC,,, | Performed by: INTERNAL MEDICINE

## 2022-08-01 PROCEDURE — 3008F BODY MASS INDEX DOCD: CPT | Mod: CPTII,,, | Performed by: INTERNAL MEDICINE

## 2022-08-01 PROCEDURE — 1160F RVW MEDS BY RX/DR IN RCRD: CPT | Mod: CPTII,,, | Performed by: INTERNAL MEDICINE

## 2022-08-01 PROCEDURE — 3074F SYST BP LT 130 MM HG: CPT | Mod: CPTII,,, | Performed by: INTERNAL MEDICINE

## 2022-08-01 PROCEDURE — 3044F PR MOST RECENT HEMOGLOBIN A1C LEVEL <7.0%: ICD-10-PCS | Mod: CPTII,,, | Performed by: INTERNAL MEDICINE

## 2022-08-01 PROCEDURE — 99214 OFFICE O/P EST MOD 30 MIN: CPT | Mod: S$PBB,,, | Performed by: INTERNAL MEDICINE

## 2022-08-01 PROCEDURE — 3008F PR BODY MASS INDEX (BMI) DOCUMENTED: ICD-10-PCS | Mod: CPTII,,, | Performed by: INTERNAL MEDICINE

## 2022-08-01 PROCEDURE — 1159F MED LIST DOCD IN RCRD: CPT | Mod: CPTII,,, | Performed by: INTERNAL MEDICINE

## 2022-08-01 PROCEDURE — 3074F PR MOST RECENT SYSTOLIC BLOOD PRESSURE < 130 MM HG: ICD-10-PCS | Mod: CPTII,,, | Performed by: INTERNAL MEDICINE

## 2022-08-01 PROCEDURE — 3078F PR MOST RECENT DIASTOLIC BLOOD PRESSURE < 80 MM HG: ICD-10-PCS | Mod: CPTII,,, | Performed by: INTERNAL MEDICINE

## 2022-08-01 PROCEDURE — 99214 PR OFFICE/OUTPT VISIT, EST, LEVL IV, 30-39 MIN: ICD-10-PCS | Mod: S$PBB,,, | Performed by: INTERNAL MEDICINE

## 2022-08-01 PROCEDURE — 99999 PR PBB SHADOW E&M-EST. PATIENT-LVL V: ICD-10-PCS | Mod: PBBFAC,,, | Performed by: INTERNAL MEDICINE

## 2022-08-01 NOTE — PROGRESS NOTES
Subjective:       Patient ID: Tessa Wynne is a 46 y.o. female.    Chief Complaint: Follow-up (3 month )      HPI  Tessa Wynne is a 46 y.o. female with chronic conditions of  mild hyperglycemia, mild changes of PAD in NASMIA who presents today for follow up.    Continues to exercise at the gym for 40-50 min. Had a fainting episode once when she was getting off a machine. She eats small meals 3 times a day and a juice. Feels ok during exercise. No chest pain, abnormal palpitations, or abnormal dizziness. Has some ROWLEY. Has noted a right arm pain that radiates to fingers 2-3, no weakness or discoloration. No headache, leg or arm swelling, no fever.    Despite above efforts has not been able to loose weight.    Labs with normal CBC, metabolic panel, thyroid, and lipid. A1c elevated compatible with prediabetes.    Health Maintenance:  Health Maintenance   Topic Date Due    Mammogram  01/04/2023    Lipid Panel  07/29/2027    TETANUS VACCINE  11/13/2028    Hepatitis C Screening  Completed       Review of Systems   Constitutional: Positive for unexpected weight change (gaining weight). Negative for fatigue and fever.   HENT: Positive for sinus pressure/congestion.    Respiratory: Positive for shortness of breath (ROWLEY).    Cardiovascular: Positive for chest pain (Nonexertional.  At random and for a short time and nonradiating.). Negative for palpitations and leg swelling.   Gastrointestinal: Negative.    Genitourinary: Positive for menstrual irregularity.   Musculoskeletal: Positive for neck pain.   Neurological: Positive for syncope, numbness and headaches. Negative for weakness.   Psychiatric/Behavioral: Negative for sleep disturbance.      Past Medical History:   Diagnosis Date    Elevated fasting glucose     PAD (peripheral artery disease)        Past Surgical History:   Procedure Laterality Date    CHOLECYSTECTOMY      ENDOMETRIAL ABLATION N/A 2/8/2022    Procedure: ABLATION, ENDOMETRIUM;  Surgeon:  Ariel Butler MD;  Location: [facility] OR;  Service: OB/GYN;  Laterality: N/A;    HYSTEROSCOPY WITH DILATION AND CURETTAGE OF UTERUS N/A 2/8/2022    Procedure: HYSTEROSCOPY, WITH DILATION AND CURETTAGE OF UTERUS;  Surgeon: Ariel Butler MD;  Location: [facility] OR;  Service: OB/GYN;  Laterality: N/A;       Family History   Problem Relation Age of Onset    Diabetes Mellitus Mother     Diabetes Mellitus Father     Colon cancer Neg Hx     Breast cancer Neg Hx     Ovarian cancer Neg Hx     Uterine cancer Neg Hx     Heart attack Neg Hx        Social History     Socioeconomic History    Marital status:     Number of children: 2   Occupational History    Occupation: cleaning housing    Tobacco Use    Smoking status: Never Smoker    Smokeless tobacco: Never Used   Substance and Sexual Activity    Alcohol use: No    Drug use: No    Sexual activity: Yes     Partners: Male     Birth control/protection: OCP   Social History Narrative    Patient is originally from [place] [id number]         School at ; high school         College/university ;         Working ; [employer]                 Children    joban - 21    lidnsey - 13         Lives with     daught and son and husabd        Diet/Exericse           Current Outpatient Medications   Medication Sig Dispense Refill    FINACEA 15 % Foam APPLY TO FACE TWICE A DAY      hydrocortisone 2.5 % cream Apply topically 2 (two) times daily as needed. 20 g 0    hydrocortisone 2.5 % ointment ELAINE EXT AA BID FOR 7 DAYS      ibuprofen (ADVIL,MOTRIN) 800 MG tablet Take 1 tablet (800 mg total) by mouth every 8 (eight) hours as needed for Pain. 30 tablet 0    metroNIDAZOLE (FLAGYL) 500 MG tablet TAKE 1 TABLET(500 MG) BY MOUTH TWICE DAILY 2 HOURS AFTER A MEAL FOR 7 DAYS 14 tablet 0    pantoprazole (PROTONIX) 40 MG tablet Take 1 tablet (40 mg total) by mouth once daily. 30 tablet 6    progesterone (PROMETRIUM) 200 MG capsule Take 1 capsule (200 mg total) by mouth  nightly. 30 capsule 0    tretinoin (RETIN-A) 0.025 % cream APPLY PEA SIZED AMOUNT TO ENTIRE FACE ONCE EVERY NIGHT BEFORE BED      aspirin (ECOTRIN) 81 MG EC tablet Take 1 tablet (81 mg total) by mouth every Mon, Wed, Fri. 30 tablet 3    atorvastatin (LIPITOR) 10 MG tablet Take 1 tablet (10 mg total) by mouth once daily. 90 tablet 3    norgestimate-ethinyl estradioL (ORTHO-CYCLEN) 0.25-35 mg-mcg per tablet Take 1 tablet by mouth once daily. 30 tablet 11     No current facility-administered medications for this visit.       Review of patient's allergies indicates:  No Known Allergies      Objective:       Last 3 sets of Vitals    Vitals - 1 value per visit 6/27/2022 8/1/2022 8/1/2022   SYSTOLIC - - 114   DIASTOLIC - - 62   Pulse - - 61   Temp - - -   Resp - - -   SPO2 - - 99   Weight (lb) - - 172.84   Weight (kg) - - 78.4   Height - - 64   BMI (Calculated) - - 29.7   VISIT REPORT - - -   Pain Score  0 0 -   Some recent data might be hidden   Physical Exam  Constitutional:       General: She is not in acute distress.     Comments: Overweight   HENT:      Head: Normocephalic.      Right Ear: Tympanic membrane, ear canal and external ear normal.      Left Ear: Tympanic membrane, ear canal and external ear normal.      Nose: Nose normal.      Mouth/Throat:      Mouth: Mucous membranes are moist.   Eyes:      General: No scleral icterus.     Extraocular Movements: Extraocular movements intact.      Conjunctiva/sclera: Conjunctivae normal.   Neck:      Vascular: No carotid bruit.      Comments: No goiter.  Cardiovascular:      Rate and Rhythm: Normal rate and regular rhythm.      Pulses: Normal pulses.      Heart sounds: Normal heart sounds.   Pulmonary:      Effort: Pulmonary effort is normal.      Breath sounds: Normal breath sounds.   Abdominal:      General: Bowel sounds are normal. There is no distension.      Palpations: Abdomen is soft. There is no mass.      Tenderness: There is no abdominal tenderness.    Musculoskeletal:         General: No swelling.   Lymphadenopathy:      Cervical: No cervical adenopathy.   Skin:     General: Skin is warm and dry.   Neurological:      General: No focal deficit present.      Mental Status: She is alert and oriented to person, place, and time.   Psychiatric:         Mood and Affect: Mood normal.         Behavior: Behavior normal.           CBC:  Recent Labs   Lab 04/21/21  0031 12/17/21 1537 07/29/22  0827   WBC 6.92 6.84 5.99   RBC 4.27 3.81 L 4.01   Hemoglobin 12.7 11.7 L 12.2   Hematocrit 38.4 35.1 L 36.4 L   Platelets 229 228 214   MCV 90 92 91   MCH 29.7 30.7 30.4   MCHC 33.1 33.3 33.5     CMP:  Recent Labs   Lab 11/16/20  0707 03/02/21  0959 04/21/21 0031 07/29/22 0827   Glucose 88   < > 93 74   Calcium 9.5   < > 9.3 9.3   Albumin 3.9  --  4.4 3.5   Total Protein 7.1  --  7.3 6.5   Sodium 139   < > 138 140   Potassium 4.5   < > 3.7 3.9   CO2 28   < > 26 28   Chloride 103   < > 104 103   BUN 16   < > 9 14   Creatinine 1.1   < > 0.8 0.9   Alkaline Phosphatase 71  --  71 55   ALT 19  --  25 21   AST 21  --  32 28   Total Bilirubin 0.3  --  0.2 0.4    < > = values in this interval not displayed.     URINALYSIS:       LIPIDS:  Recent Labs   Lab 07/10/20  0715 07/27/20  1530 03/02/21 0959 12/17/21 1537 07/29/22  0827   TSH 2.538  --   --  1.412 2.017   HDL  --  75 56  --  57   Cholesterol  --  176 156  --  127   Triglycerides  --  125 84  --  63   LDL Cholesterol  --  76.0 83.2  --  57.4 L   HDL/Cholesterol Ratio  --  42.6 35.9  --  44.9   Non-HDL Cholesterol  --  101 100  --  70   Total Cholesterol/HDL Ratio  --  2.3 2.8  --  2.2     TSH:  Recent Labs   Lab 07/10/20  0715 12/17/21  1537 07/29/22  0827   TSH 2.538 1.412 2.017       A1C:  Recent Labs   Lab 05/04/22  0809 07/29/22  0827   Hemoglobin A1C 5.4 6.1 H       Imaging:  US Soft Tissue Head Neck Thyroid  Narrative: EXAMINATION:  US SOFT TISSUE HEAD NECK THYROID    CLINICAL HISTORY:  Cervicalgia    TECHNIQUE:  Ultrasound  of the thyroid and cervical lymph nodes was performed.    COMPARISON:  None.    FINDINGS:  Thyroid gland is normal in size.  The right lobe measures 4.6 x 1.4 x 1.3 cm.  The left lobe measures 3.9 x 1.3 x 1.0 cm.  Total thyroid volume is 6.6 mL.  Background thyroid parenchyma is homogeneous and vascularity is within normal limits.  No thyroid nodules are present.    There is no cervical adenopathy.  Impression: Normal thyroid ultrasound.    Electronically signed by: Indy Younger MD  Date:    05/12/2022  Time:    08:20      Assessment:       1. Prediabetes    2. Syncope and collapse    3. Spinal stenosis, cervical region    4. Hand numbness    5. Weight gain            Plan:       Tessa was seen today for follow-up.    Diagnoses and all orders for this visit:    Prediabetes  -     Ambulatory referral/consult to Neurology; Future  -     GLUCOSE TOLERANCE, 5 HOURS; Future  -     CORTISOL, 8AM; Future    Syncope and collapse  -     MRI Brain Without Contrast; Future  -     GLUCOSE TOLERANCE, 5 HOURS; Future  -     CORTISOL, 8AM; Future    Spinal stenosis, cervical region  -     MRI Cervical Spine Without Contrast; Future    Hand numbness  -     C-Reactive Protein; Future  -     BERNARDO Screen w/Reflex; Future    Weight gain  -     CORTISOL, 8AM; Future      Health Maintenance Due   Topic Date Due    COVID-19 Vaccine (3 - Booster for Pfizer series) 04/06/2022        Return to clinic in 1 months.    Sabiha Overton MD  [facility]  Disclaimer:  This note has been generated using voice-recognition software. There may be grammatical or spelling errors that have been missed during proof-reading

## 2022-08-15 ENCOUNTER — HOSPITAL ENCOUNTER (OUTPATIENT)
Dept: RADIOLOGY | Facility: HOSPITAL | Age: 47
Discharge: HOME | End: 2022-08-15
Attending: INTERNAL MEDICINE
Payer: MEDICAID

## 2022-08-15 DIAGNOSIS — M48.02 SPINAL STENOSIS, CERVICAL REGION: ICD-10-CM

## 2022-08-15 DIAGNOSIS — R55 SYNCOPE AND COLLAPSE: ICD-10-CM

## 2022-08-15 PROCEDURE — 72141 MRI CERVICAL SPINE WITHOUT CONTRAST: ICD-10-PCS | Mod: 26,,, | Performed by: INTERNAL MEDICINE

## 2022-08-15 PROCEDURE — 72141 MRI NECK SPINE W/O DYE: CPT | Mod: TC

## 2022-08-15 PROCEDURE — 70551 MRI BRAIN STEM W/O DYE: CPT | Mod: 26,,, | Performed by: RADIOLOGY

## 2022-08-15 PROCEDURE — 72141 MRI NECK SPINE W/O DYE: CPT | Mod: 26,,, | Performed by: INTERNAL MEDICINE

## 2022-08-15 PROCEDURE — 70551 MRI BRAIN STEM W/O DYE: CPT | Mod: TC

## 2022-08-15 PROCEDURE — 70551 MRI BRAIN WITHOUT CONTRAST: ICD-10-PCS | Mod: 26,,, | Performed by: RADIOLOGY

## 2022-08-17 ENCOUNTER — TELEPHONE (OUTPATIENT)
Dept: FAMILY MEDICINE | Facility: CLINIC | Age: 47
End: 2022-08-17
Payer: MEDICAID

## 2022-08-17 DIAGNOSIS — R93.89 ABNORMAL MRI: ICD-10-CM

## 2022-08-17 DIAGNOSIS — R55 SYNCOPE AND COLLAPSE: Primary | ICD-10-CM

## 2022-08-18 ENCOUNTER — TELEPHONE (OUTPATIENT)
Dept: FAMILY MEDICINE | Facility: CLINIC | Age: 47
End: 2022-08-18
Payer: MEDICAID

## 2022-08-18 ENCOUNTER — PATIENT MESSAGE (OUTPATIENT)
Dept: FAMILY MEDICINE | Facility: CLINIC | Age: 47
End: 2022-08-18
Payer: MEDICAID

## 2022-08-23 ENCOUNTER — TELEPHONE (OUTPATIENT)
Dept: FAMILY MEDICINE | Facility: CLINIC | Age: 47
End: 2022-08-23
Payer: MEDICAID

## 2022-08-23 ENCOUNTER — TELEPHONE (OUTPATIENT)
Dept: ENDOCRINOLOGY | Facility: CLINIC | Age: 47
End: 2022-08-23
Payer: MEDICAID

## 2022-08-23 ENCOUNTER — PATIENT MESSAGE (OUTPATIENT)
Dept: FAMILY MEDICINE | Facility: CLINIC | Age: 47
End: 2022-08-23
Payer: MEDICAID

## 2022-08-23 DIAGNOSIS — E23.7 PITUITARY LESION: Primary | ICD-10-CM

## 2022-08-23 NOTE — TELEPHONE ENCOUNTER
Pt referred for large pituitary lesion compressing optic chiasm.  Please schedule 8 am fasting lab (ordered by me, not Dr. Overton) asap followed by same day visit with me and Dr. Ferrell with in person .  She also needs to have hvf (blake visual fields) asap.     MRI 8/15/22  Sella: There is a large sellar lesion with suprasellar extension.  Lesion measures roughly 3.2 x 2.8 x 3.5 cm.  There is mass effect on the adjacent structures including the optic chiasm which is displaced superiorly as well as the bilateral A1 segments of the anterior cerebral arteries.  There is possible extension into the cavernous sinuses bilaterally best visualized on coronal T2 series 10.  The carotid flow voids are preserved.  There is bony remodeling of the adjacent clivus secondary to this lesion with preserved marrow signal.    1. Pituitary lesion  - Ambulatory referral/consult to Ophthalmology; Future  - ACTH; Future  - Cortisol, 8AM; Future  - Follicle Stimulating Hormone; Future  - Insulin-Like Growth Factor; Future  - Prolactin; Future  - T4, Free; Future  - TSH; Future  - Basic Metabolic Panel; Future    Jimmy Batista MD

## 2022-08-23 NOTE — TELEPHONE ENCOUNTER
----- Message from Joseline Birmingham sent at 8/23/2022  9:38 AM CDT -----  Regarding: Pt returning calll  Contact: Pt  Please call the pt back @ [phone]

## 2022-08-24 NOTE — PROGRESS NOTES
"  Date:  8/25/2022    ?  Referring Provider:   Jimmy Batista MD    Copies of Letters to the Following:   MD Miguel Ángel Rick MD    Chief Complaint:  I saw Tessa Wynne at the [facility] for neuro-ophthalmic evaluation.   She is a 46 y.o. female with a history of FM, HLD, pituitary adenoma who presents for evaluation of formal visual fields.     History:        45 y/o female is here for Neuro-ophthalmic evaluation who present with   concerns of Pituitary lesion. H/o of Pterygium excision, right eye. Pt   report of blurry vision of the LT eye associated with headaches, eye   allergies and tinnitus. No diplopia or flashes of light reported today. Pt   is accompany by son who is her . Declines formal interpretation.     Eyemeds  No gtts       6/27/2022 Fadi:  "Presents today for ocular health check.  Pt reports blurry vision x 7 months.  Pt report occasional twitching OD.  Pt denies eye pain, flashes and floaters.  No using any eye meds. "  ?  Current Outpatient Medications   Medication Sig Dispense Refill    FINACEA 15 % Foam APPLY TO FACE TWICE A DAY      hydrocortisone 2.5 % cream Apply topically 2 (two) times daily as needed. 20 g 0    hydrocortisone 2.5 % ointment ELAINE EXT AA BID FOR 7 DAYS      ibuprofen (ADVIL,MOTRIN) 800 MG tablet Take 1 tablet (800 mg total) by mouth every 8 (eight) hours as needed for Pain. 30 tablet 0    metroNIDAZOLE (FLAGYL) 500 MG tablet TAKE 1 TABLET(500 MG) BY MOUTH TWICE DAILY 2 HOURS AFTER A MEAL FOR 7 DAYS 14 tablet 0    pantoprazole (PROTONIX) 40 MG tablet Take 1 tablet (40 mg total) by mouth once daily. 30 tablet 6    progesterone (PROMETRIUM) 200 MG capsule Take 1 capsule (200 mg total) by mouth nightly. 30 capsule 0    tretinoin (RETIN-A) 0.025 % cream APPLY PEA SIZED AMOUNT TO ENTIRE FACE ONCE EVERY NIGHT BEFORE BED      aspirin (ECOTRIN) 81 MG EC tablet Take 1 tablet (81 mg total) by mouth every Mon, Wed, Fri. 30 tablet 3    " atorvastatin (LIPITOR) 10 MG tablet Take 1 tablet (10 mg total) by mouth once daily. 90 tablet 3    norgestimate-ethinyl estradioL (ORTHO-CYCLEN) 0.25-35 mg-mcg per tablet Take 1 tablet by mouth once daily. 30 tablet 11     No current facility-administered medications for this visit.     Review of patient's allergies indicates:  No Known Allergies  Past Medical History:   Diagnosis Date    Elevated fasting glucose     PAD (peripheral artery disease)      Past Surgical History:   Procedure Laterality Date    CHOLECYSTECTOMY      ENDOMETRIAL ABLATION N/A 2/8/2022    Procedure: ABLATION, ENDOMETRIUM;  Surgeon: [place]el Butler MD;  Location: [facility] OR;  Service: OB/GYN;  Laterality: N/A;    HYSTEROSCOPY WITH DILATION AND CURETTAGE OF UTERUS N/A 2/8/2022    Procedure: HYSTEROSCOPY, WITH DILATION AND CURETTAGE OF UTERUS;  Surgeon: [place] Butler MD;  Location: [facility] OR;  Service: OB/GYN;  Laterality: N/A;     Family History   Problem Relation Age of Onset    Diabetes Mellitus Mother     Diabetes Mellitus Father     Colon cancer Neg Hx     Breast cancer Neg Hx     Ovarian cancer Neg Hx     Uterine cancer Neg Hx     Heart attack Neg Hx      Social History     Socioeconomic History    Marital status:     Number of children: 2   Occupational History    Occupation: cleaning housing    Tobacco Use    Smoking status: Never Smoker    Smokeless tobacco: Never Used   Substance and Sexual Activity    Alcohol use: No    Drug use: No    Sexual activity: Yes     Partners: Male     Birth control/protection: OCP   Social History Narrative    Patient is originally from [place] [address]         School at ; high school         College/university ;         Working ; [employer]                 Children    joban - 21    lidnsey - 13         Lives with     daught and son and husabd        Diet/Exericse           ?  Review of Systems:  Detailed review of systems was negative except as noted below.  Endocrine  (hormone): Negative  Cardiovascular ( heart/blood vessels): Negative  Fevers/weight loss (constitutional):Negative  Ear, nose, or throat : Negative  Respiratory (lung): Negative  Gastrointestinal (stomach): Negative  Genitourinary (bladder/kidneys): Negative  Musculoskeletal (muscle/bones): Negative  Skin: Negative  Psychiatric: Negative  Hematologic (blood): Negative    Examination:  She was well-appearing. She was alert and oriented. Attention span and concentration were normal. Speech, language, memory, and general knowledge were intact.      Her distance visual acuity without correction was 20/20  in the right eye and 20/20  in the left eye.  Her near visual acuity without correction was J7 PH J5 in the right eye and J7 PH NI in the left eye.      She perceived 8/8 OD and 8/8 OS Ishihara color plates correctly. Pupils were brisk to light without an afferent defect. Ocular ductions were full. Orthophoric in primary, right, left, up and down gaze by cross cover. There was no nystagmus. Saccades and pursuits were normal. Lids were symmetric.     Optic discs with mild pallor OU, no edema. Pupillary dilation was not necessary for visualization of the optic disc today.     Her intra ocular pressure was 12 in the right eye and 13 in the left eye at 1025 by applanation.     On the remainder of the neurologic examination, facial sensation was intact. Face was symmetric. Tongue was midline.     Laboratories Reviewed:     n/a  ?  Neuroimaging Reviewed:     8/15/2022 MRI brain wo contrast  No midline shift or hydrocephalus.  No acute infarction.  Brain parenchyma is overall normal in signal and contour.  No acute intracranial hemorrhage.  Possible left frontal developmental venous anomaly incidentally noted on SWAN images.  No abnormal extra-axial fluid collections.  Mild cerebellar tonsillar ectopia.  Otherwise, structures at the craniocervical junction show no significant abnormalities.  Major skull base flow voids are  present.  Orbits, paranasal sinuses and mastoid air cells show no significant abnormalities.     Sella: There is a large sellar lesion with suprasellar extension.  Lesion measures roughly 3.2 x 2.8 x 3.5 cm.  There is mass effect on the adjacent structures including the optic chiasm which is displaced superiorly as well as the bilateral A1 segments of the anterior cerebral arteries.  There is possible extension into the cavernous sinuses bilaterally best visualized on coronal T2 series 10.  The carotid flow voids are preserved.  There is bony remodeling of the adjacent clivus secondary to this lesion with preserved marrow signal.     Impression:     Large sellar/suprasellar lesion primarily worrisome for pituitary macroadenoma.  There is mass effect on the optic chiasm and possible extension into the cavernous sinus bilaterally.  Recommend correlation with pituitary labs, dedicated MRI pituitary protocol and follow-up with neurosurgery.       I personally reviewed these images and findings include large sellar mass with optic chiasm compression/elevation and extension into bilateral cavernous sinuses  ?  Ocular Imaging, Photos, Records Reviewed:     OCT RNFL Today 8/25/2022:   Right Eye - Average RNFL 81 borderline inferior and temporal thinning   Left Eye - Average RNFL 81 borderline inferior thinning     Normal macular architecture OU    Visual Field Test 24-2 OU Today 8/25/2022: Right Eye - fixation losses 0/13, false positives 0%, false negatives 6%, MD -9.38dB, Impression OD: moderate generalized depression, worst temporally, ST. Left Eye - fixation losses 0/15, false positives 0%, false negatives 9%, MD -9.28dB, Impression OS: temporal depression, worst ST.  ?  Impression:  Tessa Wynne has history of FM, HLD, pituitary adenoma who presents for evaluation of formal visual fields. She reports blurred vision and headaches. She has MRI with large sellar mass with optic chiasm compression/elevation and  extension into bilateral cavernous sinuses. OCT with borderline RNFL thinning OD>OS. HVF with generalized depression OD (worse temporally) and temporal depression OS. Findings consistent with chiasmal compression. Exam notable for mild disc pallor OU, normal eye movements and alignment. I will monitor her for any changes throughout treatment and beyond.   ?  Plan:  1. Follow up with Dalia Batista and Ghassan as planned (9/6/2022 at 1400 and 1420)  2. Monitor for any new blur, red desaturation, diplopia, ptosis  3. Follow up with optometry/ophthalmology for yearly routine eye exams and refraction needs    Follow-up:  I will see her in follow-up in 3 months or sooner with any change.  ?OCT and HVF  ?  Visit Checklist (as applicable):  1. Status of new and prior symptoms discussed? yes  2. Neuroimaging reviewed/ ordered as appropriate? yes  3. Ocular imaging and photos reviewed/ ordered as appropriate? yes  4. Plan for work-up and treatment discussed with patient? yes  5. Potential medication side-effects and monitoring plan discussed? n/a  6. Review of outside medical records was performed and pertinent details are summarized in the HPI above? n/a    Time spent on this encounter: 45 minutes. This includes face to face time and non-face to face time preparing to see the patient (eg, review of tests), obtaining and/or reviewing separately obtained history, documenting clinical information in the electronic or other health record, independently interpreting results and communicating results to the patient/family/caregiver, or care coordinator.      JUANITA Palmer  Neuro-Ophthalmology Consultant

## 2022-08-25 ENCOUNTER — OFFICE VISIT (OUTPATIENT)
Dept: OPHTHALMOLOGY | Facility: CLINIC | Age: 47
End: 2022-08-25
Payer: MEDICAID

## 2022-08-25 ENCOUNTER — CLINICAL SUPPORT (OUTPATIENT)
Dept: OPHTHALMOLOGY | Facility: CLINIC | Age: 47
End: 2022-08-25
Payer: MEDICAID

## 2022-08-25 ENCOUNTER — PATIENT MESSAGE (OUTPATIENT)
Dept: NEUROSURGERY | Facility: CLINIC | Age: 47
End: 2022-08-25
Payer: MEDICAID

## 2022-08-25 ENCOUNTER — TELEPHONE (OUTPATIENT)
Dept: NEUROSURGERY | Facility: CLINIC | Age: 47
End: 2022-08-25
Payer: MEDICAID

## 2022-08-25 DIAGNOSIS — H53.15 VISUAL DISTORTIONS OF SHAPE AND SIZE: ICD-10-CM

## 2022-08-25 DIAGNOSIS — H53.47 BITEMPORAL HEMIANOPIA: ICD-10-CM

## 2022-08-25 DIAGNOSIS — R93.89 ABNORMAL MRI: ICD-10-CM

## 2022-08-25 DIAGNOSIS — D35.2 PITUITARY MACROADENOMA WITH EXTRASELLAR EXTENSION: Primary | ICD-10-CM

## 2022-08-25 PROCEDURE — 92083 EXTENDED VISUAL FIELD XM: CPT | Mod: PBBFAC | Performed by: STUDENT IN AN ORGANIZED HEALTH CARE EDUCATION/TRAINING PROGRAM

## 2022-08-25 PROCEDURE — 1159F PR MEDICATION LIST DOCUMENTED IN MEDICAL RECORD: ICD-10-PCS | Mod: CPTII,,, | Performed by: STUDENT IN AN ORGANIZED HEALTH CARE EDUCATION/TRAINING PROGRAM

## 2022-08-25 PROCEDURE — 99213 OFFICE O/P EST LOW 20 MIN: CPT | Mod: PBBFAC | Performed by: STUDENT IN AN ORGANIZED HEALTH CARE EDUCATION/TRAINING PROGRAM

## 2022-08-25 PROCEDURE — 1160F RVW MEDS BY RX/DR IN RCRD: CPT | Mod: CPTII,,, | Performed by: STUDENT IN AN ORGANIZED HEALTH CARE EDUCATION/TRAINING PROGRAM

## 2022-08-25 PROCEDURE — 1160F PR REVIEW ALL MEDS BY PRESCRIBER/CLIN PHARMACIST DOCUMENTED: ICD-10-PCS | Mod: CPTII,,, | Performed by: STUDENT IN AN ORGANIZED HEALTH CARE EDUCATION/TRAINING PROGRAM

## 2022-08-25 PROCEDURE — 99999 PR PBB SHADOW E&M-EST. PATIENT-LVL III: CPT | Mod: PBBFAC,,, | Performed by: STUDENT IN AN ORGANIZED HEALTH CARE EDUCATION/TRAINING PROGRAM

## 2022-08-25 PROCEDURE — 3044F PR MOST RECENT HEMOGLOBIN A1C LEVEL <7.0%: ICD-10-PCS | Mod: CPTII,,, | Performed by: STUDENT IN AN ORGANIZED HEALTH CARE EDUCATION/TRAINING PROGRAM

## 2022-08-25 PROCEDURE — 92133 POSTERIOR SEGMENT OCT OPTIC NERVE(OCULAR COHERENCE TOMOGRAPHY) - OU - BOTH EYES: ICD-10-PCS | Mod: 26,S$PBB,, | Performed by: STUDENT IN AN ORGANIZED HEALTH CARE EDUCATION/TRAINING PROGRAM

## 2022-08-25 PROCEDURE — 92133 CPTRZD OPH DX IMG PST SGM ON: CPT | Mod: PBBFAC | Performed by: STUDENT IN AN ORGANIZED HEALTH CARE EDUCATION/TRAINING PROGRAM

## 2022-08-25 PROCEDURE — 99205 PR OFFICE/OUTPT VISIT, NEW, LEVL V, 60-74 MIN: ICD-10-PCS | Mod: S$PBB,,, | Performed by: STUDENT IN AN ORGANIZED HEALTH CARE EDUCATION/TRAINING PROGRAM

## 2022-08-25 PROCEDURE — 1159F MED LIST DOCD IN RCRD: CPT | Mod: CPTII,,, | Performed by: STUDENT IN AN ORGANIZED HEALTH CARE EDUCATION/TRAINING PROGRAM

## 2022-08-25 PROCEDURE — 3044F HG A1C LEVEL LT 7.0%: CPT | Mod: CPTII,,, | Performed by: STUDENT IN AN ORGANIZED HEALTH CARE EDUCATION/TRAINING PROGRAM

## 2022-08-25 PROCEDURE — 99999 PR PBB SHADOW E&M-EST. PATIENT-LVL III: ICD-10-PCS | Mod: PBBFAC,,, | Performed by: STUDENT IN AN ORGANIZED HEALTH CARE EDUCATION/TRAINING PROGRAM

## 2022-08-25 PROCEDURE — 99205 OFFICE O/P NEW HI 60 MIN: CPT | Mod: S$PBB,,, | Performed by: STUDENT IN AN ORGANIZED HEALTH CARE EDUCATION/TRAINING PROGRAM

## 2022-08-25 PROCEDURE — 92083 HUMPHREY VISUAL FIELD - OU - BOTH EYES: ICD-10-PCS | Mod: 26,S$PBB,, | Performed by: STUDENT IN AN ORGANIZED HEALTH CARE EDUCATION/TRAINING PROGRAM

## 2022-08-25 NOTE — LETTER
[facility] - [address] [place] [address]  [address]  [facility] [address]  Phone: [phone]  Fax: [phone]   August 25, 2022    Jimmy Batista MD  [address]  [facility] [address]    Patient: Tessa Wynne   MR Number: [id number]   YOB: 1975   Date of Visit: 8/25/2022       Dear Dr. Batista :    Thank you for referring Tessa Wynne to me for evaluation. Here is my assessment and plan of care:    ?  Impression:  Tessa Wynne has history of FM, HLD, pituitary adenoma who presents for evaluation of formal visual fields. She reports blurred vision and headaches. She has MRI with large sellar mass with optic chiasm compression/elevation and extension into bilateral cavernous sinuses. OCT with borderline RNFL thinning OD>OS. HVF with generalized depression OD (worse temporally) and temporal depression OS. Findings consistent with chiasmal compression. Exam notable for mild disc pallor OU, normal eye movements and alignment. I will monitor her for any changes throughout treatment and beyond.   ?  Plan:  1. Follow up with Dalia Batista and Ghassan as planned (9/6/2022 at 1400 and 1420)  2. Monitor for any new blur, red desaturation, diplopia, ptosis  3. Follow up with optometry/ophthalmology for yearly routine eye exams and refraction needs    Follow-up:  I will see her in follow-up in 3 months or sooner with any change.  ?OCT and HVF  If you have questions, please do not hesitate to call me. I look forward to following Ms. Tessa Wynne along with you.    Sincerely,        Bekah Yan MD       CC  Miguel Ángel Ferrell, DO

## 2022-08-25 NOTE — TELEPHONE ENCOUNTER
AMAURY for pt, scheduled her fasting labs to be in conjunction with her previously scheduled fasting labs for 8/26/22. Scheduled her appointment with Dr. Batista and Dr. Ferrell for 9/6/22. Advised pt call back with any questions or concerns. Information will also sent through the portal.  requested and letter mailed.

## 2022-08-26 ENCOUNTER — LAB VISIT (OUTPATIENT)
Dept: LAB | Facility: HOSPITAL | Age: 47
End: 2022-08-26
Attending: INTERNAL MEDICINE
Payer: MEDICAID

## 2022-08-26 DIAGNOSIS — R55 SYNCOPE AND COLLAPSE: ICD-10-CM

## 2022-08-26 DIAGNOSIS — R73.03 PREDIABETES: ICD-10-CM

## 2022-08-26 DIAGNOSIS — R63.5 WEIGHT GAIN: ICD-10-CM

## 2022-08-26 DIAGNOSIS — R20.0 HAND NUMBNESS: ICD-10-CM

## 2022-08-26 LAB
CORTIS SERPL-MCNC: 13.7 UG/DL (ref 4.3–22.4)
CRP SERPL-MCNC: 3.4 MG/L (ref 0–8.2)
GLUCOSE SERPL-MCNC: 172 MG/DL
GLUCOSE SERPL-MCNC: 173 MG/DL
GLUCOSE SERPL-MCNC: 56 MG/DL
GLUCOSE SERPL-MCNC: 65 MG/DL
GLUCOSE SERPL-MCNC: 70 MG/DL
GLUCOSE SERPL-MCNC: 88 MG/DL (ref 70–110)

## 2022-08-26 PROCEDURE — 36415 COLL VENOUS BLD VENIPUNCTURE: CPT | Performed by: INTERNAL MEDICINE

## 2022-08-26 PROCEDURE — 86038 ANTINUCLEAR ANTIBODIES: CPT | Performed by: INTERNAL MEDICINE

## 2022-08-26 PROCEDURE — 82951 GLUCOSE TOLERANCE TEST (GTT): CPT | Performed by: INTERNAL MEDICINE

## 2022-08-26 PROCEDURE — 86140 C-REACTIVE PROTEIN: CPT | Performed by: INTERNAL MEDICINE

## 2022-08-26 PROCEDURE — 82533 TOTAL CORTISOL: CPT | Mod: 91 | Performed by: INTERNAL MEDICINE

## 2022-08-29 LAB — ANA SER QL IF: NORMAL

## 2022-08-31 ENCOUNTER — TELEPHONE (OUTPATIENT)
Dept: FAMILY MEDICINE | Facility: CLINIC | Age: 47
End: 2022-08-31
Payer: MEDICAID

## 2022-08-31 NOTE — TELEPHONE ENCOUNTER
----- Message from Wilman Muller sent at 8/30/2022  2:12 PM CDT -----  Regarding: Patient advice  Contact: [facility] [place] Called in regards to questions/concerns about referral that was faxed over     Please advise , Juana can be reached at [phone]

## 2022-09-01 ENCOUNTER — OFFICE VISIT (OUTPATIENT)
Dept: FAMILY MEDICINE | Facility: CLINIC | Age: 47
End: 2022-09-01
Payer: MEDICAID

## 2022-09-01 VITALS
HEIGHT: 64 IN | OXYGEN SATURATION: 99 % | WEIGHT: 169.56 LBS | SYSTOLIC BLOOD PRESSURE: 116 MMHG | HEART RATE: 62 BPM | BODY MASS INDEX: 28.95 KG/M2 | DIASTOLIC BLOOD PRESSURE: 68 MMHG

## 2022-09-01 DIAGNOSIS — H53.47 BITEMPORAL HEMIANOPIA: ICD-10-CM

## 2022-09-01 DIAGNOSIS — R73.9 HYPERGLYCEMIA: ICD-10-CM

## 2022-09-01 DIAGNOSIS — E03.9 HYPOTHYROIDISM, UNSPECIFIED TYPE: ICD-10-CM

## 2022-09-01 DIAGNOSIS — D35.2 PITUITARY MACROADENOMA WITH EXTRASELLAR EXTENSION: Primary | ICD-10-CM

## 2022-09-01 PROCEDURE — 3078F DIAST BP <80 MM HG: CPT | Mod: CPTII,,, | Performed by: INTERNAL MEDICINE

## 2022-09-01 PROCEDURE — 99214 OFFICE O/P EST MOD 30 MIN: CPT | Mod: PBBFAC,PO | Performed by: INTERNAL MEDICINE

## 2022-09-01 PROCEDURE — 99214 PR OFFICE/OUTPT VISIT, EST, LEVL IV, 30-39 MIN: ICD-10-PCS | Mod: S$PBB,,, | Performed by: INTERNAL MEDICINE

## 2022-09-01 PROCEDURE — 3074F SYST BP LT 130 MM HG: CPT | Mod: CPTII,,, | Performed by: INTERNAL MEDICINE

## 2022-09-01 PROCEDURE — 99999 PR PBB SHADOW E&M-EST. PATIENT-LVL IV: CPT | Mod: PBBFAC,,, | Performed by: INTERNAL MEDICINE

## 2022-09-01 PROCEDURE — 3044F PR MOST RECENT HEMOGLOBIN A1C LEVEL <7.0%: ICD-10-PCS | Mod: CPTII,,, | Performed by: INTERNAL MEDICINE

## 2022-09-01 PROCEDURE — 3078F PR MOST RECENT DIASTOLIC BLOOD PRESSURE < 80 MM HG: ICD-10-PCS | Mod: CPTII,,, | Performed by: INTERNAL MEDICINE

## 2022-09-01 PROCEDURE — 3044F HG A1C LEVEL LT 7.0%: CPT | Mod: CPTII,,, | Performed by: INTERNAL MEDICINE

## 2022-09-01 PROCEDURE — 1159F MED LIST DOCD IN RCRD: CPT | Mod: CPTII,,, | Performed by: INTERNAL MEDICINE

## 2022-09-01 PROCEDURE — 1160F RVW MEDS BY RX/DR IN RCRD: CPT | Mod: CPTII,,, | Performed by: INTERNAL MEDICINE

## 2022-09-01 PROCEDURE — 3008F BODY MASS INDEX DOCD: CPT | Mod: CPTII,,, | Performed by: INTERNAL MEDICINE

## 2022-09-01 PROCEDURE — 3008F PR BODY MASS INDEX (BMI) DOCUMENTED: ICD-10-PCS | Mod: CPTII,,, | Performed by: INTERNAL MEDICINE

## 2022-09-01 PROCEDURE — 1159F PR MEDICATION LIST DOCUMENTED IN MEDICAL RECORD: ICD-10-PCS | Mod: CPTII,,, | Performed by: INTERNAL MEDICINE

## 2022-09-01 PROCEDURE — 1160F PR REVIEW ALL MEDS BY PRESCRIBER/CLIN PHARMACIST DOCUMENTED: ICD-10-PCS | Mod: CPTII,,, | Performed by: INTERNAL MEDICINE

## 2022-09-01 PROCEDURE — 99999 PR PBB SHADOW E&M-EST. PATIENT-LVL IV: ICD-10-PCS | Mod: PBBFAC,,, | Performed by: INTERNAL MEDICINE

## 2022-09-01 PROCEDURE — 99214 OFFICE O/P EST MOD 30 MIN: CPT | Mod: S$PBB,,, | Performed by: INTERNAL MEDICINE

## 2022-09-01 PROCEDURE — 3074F PR MOST RECENT SYSTOLIC BLOOD PRESSURE < 130 MM HG: ICD-10-PCS | Mod: CPTII,,, | Performed by: INTERNAL MEDICINE

## 2022-09-01 NOTE — PROGRESS NOTES
Subjective:       Patient ID: Tessa Wynne is a 46 y.o. female.    Chief Complaint: Follow-up      HPI  Tessa Wynne is a 46 y.o. female with chronic conditions of mild hyperglycemia, mild changes of PAD in [place] who presents today for follow up.    Brain MRI showed a large sellar/suprasellar lesion very some for pituitary macroadenoma with mass effect on the optic chiasm and possibly to the cavernous sinus bilaterally.  Reports occasional headaches on an off and mild on an of vision changes.  She was seen by the Ophthalmology and noted with some compression changes that will be followed.  She had appointment with Endocrinology and Neurosurgery but reports she was called with appointment cancellation and to be managed at [facility].    Labs with stable metabolic panel, ANS, CRP are normal, glucose tolerance test and cortisol levels are normal.  Acth, FSH, and TSH are normal.  Prolactin is high.  Free T4 is mildly decreased.  Has appointment with Endocrinology next week.        Health Maintenance:  Health Maintenance   Topic Date Due    Mammogram  01/04/2023    Lipid Panel  07/29/2027    TETANUS VACCINE  11/13/2028    Hepatitis C Screening  Completed       Review of Systems   Constitutional: Negative.  Negative for fever and unexpected weight change.   HENT: Negative.     Eyes:  Positive for visual disturbance (On an off).   Respiratory: Negative.     Cardiovascular: Negative.    Gastrointestinal: Negative.    Genitourinary:  Negative for difficulty urinating.        No menses is after ablation.   Musculoskeletal: Negative.    Integumentary:  Negative.   Neurological:  Positive for headaches (Occasional headaches self-limited).   Psychiatric/Behavioral: Negative.      Past Medical History:   Diagnosis Date    Elevated fasting glucose     PAD (peripheral artery disease)        Past Surgical History:   Procedure Laterality Date    CHOLECYSTECTOMY      ENDOMETRIAL ABLATION N/A 2/8/2022    Procedure: ABLATION,  ENDOMETRIUM;  Surgeon: Ariel Butler MD;  Location: [facility] OR;  Service: OB/GYN;  Laterality: N/A;    HYSTEROSCOPY WITH DILATION AND CURETTAGE OF UTERUS N/A 2/8/2022    Procedure: HYSTEROSCOPY, WITH DILATION AND CURETTAGE OF UTERUS;  Surgeon: Ariel Butler MD;  Location: [facility] OR;  Service: OB/GYN;  Laterality: N/A;       Family History   Problem Relation Age of Onset    Diabetes Mellitus Mother     Diabetes Mellitus Father     Colon cancer Neg Hx     Breast cancer Neg Hx     Ovarian cancer Neg Hx     Uterine cancer Neg Hx     Heart attack Neg Hx        Social History     Socioeconomic History    Marital status:     Number of children: 2   Occupational History    Occupation: cleaning housing    Tobacco Use    Smoking status: Never    Smokeless tobacco: Never   Substance and Sexual Activity    Alcohol use: No    Drug use: No    Sexual activity: Yes     Partners: Male     Birth control/protection: OCP   Social History Narrative    Patient is originally from [place] [address]         School at ; high school         College/university ;         Working ; [employer]                 Children    joban - 21    lidnsey - 13         Lives with     daught and son and husabd        Diet/Exericse           Current Outpatient Medications   Medication Sig Dispense Refill    aspirin (ECOTRIN) 81 MG EC tablet Take 1 tablet (81 mg total) by mouth every Mon, Wed, Fri. 30 tablet 3    atorvastatin (LIPITOR) 10 MG tablet Take 1 tablet (10 mg total) by mouth once daily. 90 tablet 3    FINACEA 15 % Foam APPLY TO FACE TWICE A DAY      hydrocortisone 2.5 % cream Apply topically 2 (two) times daily as needed. 20 g 0    hydrocortisone 2.5 % ointment ELAINE EXT AA BID FOR 7 DAYS      ibuprofen (ADVIL,MOTRIN) 800 MG tablet Take 1 tablet (800 mg total) by mouth every 8 (eight) hours as needed for Pain. 30 tablet 0    metroNIDAZOLE (FLAGYL) 500 MG tablet TAKE 1 TABLET(500 MG) BY MOUTH TWICE DAILY 2 HOURS AFTER A MEAL FOR 7 DAYS 14  tablet 0    norgestimate-ethinyl estradioL (ORTHO-CYCLEN) 0.25-35 mg-mcg per tablet Take 1 tablet by mouth once daily. 30 tablet 11    pantoprazole (PROTONIX) 40 MG tablet Take 1 tablet (40 mg total) by mouth once daily. 30 tablet 6    progesterone (PROMETRIUM) 200 MG capsule Take 1 capsule (200 mg total) by mouth nightly. 30 capsule 0    tretinoin (RETIN-A) 0.025 % cream APPLY PEA SIZED AMOUNT TO ENTIRE FACE ONCE EVERY NIGHT BEFORE BED       No current facility-administered medications for this visit.       Review of patient's allergies indicates:  No Known Allergies      Objective:       Last 3 sets of Vitals    Vitals - 1 value per visit 8/25/2022 9/1/2022 9/1/2022   SYSTOLIC - - 116   DIASTOLIC - - 68   Pulse - - 62   Temp - - -   Resp - - -   SPO2 - - 99   Weight (lb) - - 169.53   Weight (kg) - - 76.9   Height - - 64   BMI (Calculated) - - 29.1   VISIT REPORT - - -   Pain Score  0 0 -   Some recent data might be hidden   Physical Exam  Constitutional:       General: She is not in acute distress.     Appearance: Normal appearance.   Eyes:      General: No scleral icterus.     Extraocular Movements: Extraocular movements intact.      Conjunctiva/sclera: Conjunctivae normal.   Neck:      Comments: No goiter.  Cardiovascular:      Rate and Rhythm: Normal rate and regular rhythm.      Pulses: Normal pulses.      Heart sounds: Normal heart sounds.   Pulmonary:      Effort: Pulmonary effort is normal.      Breath sounds: Normal breath sounds.   Abdominal:      General: Bowel sounds are normal. There is no distension.      Palpations: Abdomen is soft.   Musculoskeletal:         General: No swelling. Normal range of motion.   Lymphadenopathy:      Cervical: No cervical adenopathy.   Skin:     General: Skin is warm and dry.   Neurological:      General: No focal deficit present.      Mental Status: She is alert and oriented to person, place, and time.   Psychiatric:         Mood and Affect: Mood normal.         Behavior:  Behavior normal.         CBC:  Recent Labs   Lab 04/21/21  0031 12/17/21 1537 07/29/22  0827   WBC 6.92 6.84 5.99   RBC 4.27 3.81 L 4.01   Hemoglobin 12.7 11.7 L 12.2   Hematocrit 38.4 35.1 L 36.4 L   Platelets 229 228 214   MCV 90 92 91   MCH 29.7 30.7 30.4   MCHC 33.1 33.3 33.5     CMP:  Recent Labs   Lab 11/16/20  0707 03/02/21  0959 04/21/21  0031 07/29/22 0827 08/26/22  0845   Glucose 88   < > 93 74 91   Calcium 9.5   < > 9.3 9.3 8.8   Albumin 3.9  --  4.4 3.5  --    Total Protein 7.1  --  7.3 6.5  --    Sodium 139   < > 138 140 138   Potassium 4.5   < > 3.7 3.9 4.0   CO2 28   < > 26 28 24   Chloride 103   < > 104 103 105   BUN 16   < > 9 14 12   Creatinine 1.1   < > 0.8 0.9 0.8   Alkaline Phosphatase 71  --  71 55  --    ALT 19  --  25 21  --    AST 21  --  32 28  --    Total Bilirubin 0.3  --  0.2 0.4  --     < > = values in this interval not displayed.     URINALYSIS:       LIPIDS:  Recent Labs   Lab 07/27/20  1530 03/02/21  0959 12/17/21  1537 07/29/22  0827 08/26/22  0845   TSH  --   --  1.412 2.017 2.402   HDL 75 56  --  57  --    Cholesterol 176 156  --  127  --    Triglycerides 125 84  --  63  --    LDL Cholesterol 76.0 83.2  --  57.4 L  --    HDL/Cholesterol Ratio 42.6 35.9  --  44.9  --    Non-HDL Cholesterol 101 100  --  70  --    Total Cholesterol/HDL Ratio 2.3 2.8  --  2.2  --      TSH:  Recent Labs   Lab 12/17/21  1537 07/29/22  0827 08/26/22  0845   TSH 1.412 2.017 2.402       A1C:  Recent Labs   Lab 05/04/22  0809 07/29/22  0827   Hemoglobin A1C 5.4 6.1 H       Imaging:  Posterior Segment OCT Optic Nerve- Both eyes  Right Eye - Average RNFL 81 borderline inferior and temporal thinning   Left Eye - Average RNFL 81 borderline inferior thinning     Normal macular architecture OU  Au Visual Field - OU - Extended - Both Eyes  Right Eye - fixation losses 0/13, false positives 0%, false negatives 6%,   MD -9.38dB, Impression OD: moderate generalized depression, worst   temporally, ST. Left Eye  - fixation losses 0/15, false positives 0%, false   negatives 9%, MD -9.28dB, Impression OS: temporal depression, worst ST.      Assessment:       1. Pituitary macroadenoma with extrasellar extension    2. Bitemporal hemianopia    3. Hypothyroidism, unspecified type    4. Hyperglycemia            Plan:       Tessa was seen today for follow-up.    Diagnoses and all orders for this visit:    Pituitary macroadenoma with extrasellar extension   - follow-up with consultants: Endocrinology, Neurosurgery.  Apparently to be seen at [facility].  The patient will notify us if does not hear from [facility] in 2 weeks.    Bitemporal hemianopia   - often montages following.    Hypothyroidism, unspecified type   - normal TSH with low free T4.  She will see Endocrinology within a week and will defer treatment until evaluated by endocrinology.    Hyperglycemia   - normal glucose tolerance.  Continue healthy diet with low refined carbs.    Health Maintenance Due   Topic Date Due    COVID-19 Vaccine (3 - Booster for Pfizer series) 04/06/2022    Influenza Vaccine (1) 09/01/2022        Return to clinic in 3 months.    Sabiha Overton MD  [facility]  Disclaimer:  This note has been generated using voice-recognition software. There may be grammatical or spelling errors that have been missed during proof-reading

## 2022-09-02 ENCOUNTER — TELEPHONE (OUTPATIENT)
Dept: FAMILY MEDICINE | Facility: CLINIC | Age: 47
End: 2022-09-02
Payer: MEDICAID

## 2022-09-02 NOTE — TELEPHONE ENCOUNTER
----- Message from Leidy Fall sent at 9/2/2022  8:14 AM CDT -----  Regarding: Get/[phone]  Contact: [place]/[phone]  Type:  Patient Returning Call    Who Called: Get  Who Left Message for Patient: Indy   Does the patient know what this is regarding?: referral   Would the patient rather a call back or a response via [facility]?  call  Best Call Back Number: Get/[phone]  Additional Information:

## 2022-09-05 PROBLEM — E03.9 HYPOTHYROIDISM: Status: ACTIVE | Noted: 2022-09-05

## 2022-09-06 ENCOUNTER — OFFICE VISIT (OUTPATIENT)
Dept: NEUROSURGERY | Facility: CLINIC | Age: 47
End: 2022-09-06
Payer: MEDICAID

## 2022-09-06 ENCOUNTER — OFFICE VISIT (OUTPATIENT)
Dept: ENDOCRINOLOGY | Facility: CLINIC | Age: 47
End: 2022-09-06
Payer: MEDICAID

## 2022-09-06 VITALS
DIASTOLIC BLOOD PRESSURE: 84 MMHG | BODY MASS INDEX: 29.24 KG/M2 | TEMPERATURE: 99 F | WEIGHT: 171.31 LBS | SYSTOLIC BLOOD PRESSURE: 136 MMHG | HEIGHT: 64 IN | HEART RATE: 72 BPM

## 2022-09-06 DIAGNOSIS — R55 SYNCOPE AND COLLAPSE: ICD-10-CM

## 2022-09-06 DIAGNOSIS — E03.9 HYPOTHYROIDISM, UNSPECIFIED TYPE: ICD-10-CM

## 2022-09-06 DIAGNOSIS — H53.47 BITEMPORAL HEMIANOPIA: ICD-10-CM

## 2022-09-06 DIAGNOSIS — D35.2 PITUITARY MACROADENOMA WITH EXTRASELLAR EXTENSION: Primary | ICD-10-CM

## 2022-09-06 DIAGNOSIS — E23.7 PITUITARY LESION: ICD-10-CM

## 2022-09-06 DIAGNOSIS — R93.89 ABNORMAL MRI: ICD-10-CM

## 2022-09-06 PROCEDURE — 3044F PR MOST RECENT HEMOGLOBIN A1C LEVEL <7.0%: ICD-10-PCS | Mod: CPTII,,, | Performed by: NEUROLOGICAL SURGERY

## 2022-09-06 PROCEDURE — 3008F BODY MASS INDEX DOCD: CPT | Mod: CPTII,,, | Performed by: NEUROLOGICAL SURGERY

## 2022-09-06 PROCEDURE — 3008F PR BODY MASS INDEX (BMI) DOCUMENTED: ICD-10-PCS | Mod: CPTII,,, | Performed by: NEUROLOGICAL SURGERY

## 2022-09-06 PROCEDURE — 3079F PR MOST RECENT DIASTOLIC BLOOD PRESSURE 80-89 MM HG: ICD-10-PCS | Mod: CPTII,,, | Performed by: NEUROLOGICAL SURGERY

## 2022-09-06 PROCEDURE — 3044F HG A1C LEVEL LT 7.0%: CPT | Mod: CPTII,,, | Performed by: NEUROLOGICAL SURGERY

## 2022-09-06 PROCEDURE — 99999 PR PBB SHADOW E&M-EST. PATIENT-LVL IV: CPT | Mod: PBBFAC,,, | Performed by: NEUROLOGICAL SURGERY

## 2022-09-06 PROCEDURE — 1160F PR REVIEW ALL MEDS BY PRESCRIBER/CLIN PHARMACIST DOCUMENTED: ICD-10-PCS | Mod: CPTII,,, | Performed by: NEUROLOGICAL SURGERY

## 2022-09-06 PROCEDURE — 99212 OFFICE O/P EST SF 10 MIN: CPT | Mod: PBBFAC,27 | Performed by: INTERNAL MEDICINE

## 2022-09-06 PROCEDURE — 1160F RVW MEDS BY RX/DR IN RCRD: CPT | Mod: CPTII,,, | Performed by: NEUROLOGICAL SURGERY

## 2022-09-06 PROCEDURE — 99214 OFFICE O/P EST MOD 30 MIN: CPT | Mod: PBBFAC | Performed by: NEUROLOGICAL SURGERY

## 2022-09-06 PROCEDURE — 99204 OFFICE O/P NEW MOD 45 MIN: CPT | Mod: S$PBB,,, | Performed by: INTERNAL MEDICINE

## 2022-09-06 PROCEDURE — 1159F MED LIST DOCD IN RCRD: CPT | Mod: CPTII,,, | Performed by: INTERNAL MEDICINE

## 2022-09-06 PROCEDURE — 99204 PR OFFICE/OUTPT VISIT, NEW, LEVL IV, 45-59 MIN: ICD-10-PCS | Mod: S$PBB,,, | Performed by: INTERNAL MEDICINE

## 2022-09-06 PROCEDURE — 1160F RVW MEDS BY RX/DR IN RCRD: CPT | Mod: CPTII,,, | Performed by: INTERNAL MEDICINE

## 2022-09-06 PROCEDURE — 3044F HG A1C LEVEL LT 7.0%: CPT | Mod: CPTII,,, | Performed by: INTERNAL MEDICINE

## 2022-09-06 PROCEDURE — 3079F DIAST BP 80-89 MM HG: CPT | Mod: CPTII,,, | Performed by: NEUROLOGICAL SURGERY

## 2022-09-06 PROCEDURE — 3075F SYST BP GE 130 - 139MM HG: CPT | Mod: CPTII,,, | Performed by: NEUROLOGICAL SURGERY

## 2022-09-06 PROCEDURE — 99205 PR OFFICE/OUTPT VISIT, NEW, LEVL V, 60-74 MIN: ICD-10-PCS | Mod: S$PBB,,, | Performed by: NEUROLOGICAL SURGERY

## 2022-09-06 PROCEDURE — 99999 PR PBB SHADOW E&M-EST. PATIENT-LVL IV: ICD-10-PCS | Mod: PBBFAC,,, | Performed by: NEUROLOGICAL SURGERY

## 2022-09-06 PROCEDURE — 99999 PR PBB SHADOW E&M-EST. PATIENT-LVL II: CPT | Mod: PBBFAC,,, | Performed by: INTERNAL MEDICINE

## 2022-09-06 PROCEDURE — 99999 PR PBB SHADOW E&M-EST. PATIENT-LVL II: ICD-10-PCS | Mod: PBBFAC,,, | Performed by: INTERNAL MEDICINE

## 2022-09-06 PROCEDURE — 1159F PR MEDICATION LIST DOCUMENTED IN MEDICAL RECORD: ICD-10-PCS | Mod: CPTII,,, | Performed by: INTERNAL MEDICINE

## 2022-09-06 PROCEDURE — 1160F PR REVIEW ALL MEDS BY PRESCRIBER/CLIN PHARMACIST DOCUMENTED: ICD-10-PCS | Mod: CPTII,,, | Performed by: INTERNAL MEDICINE

## 2022-09-06 PROCEDURE — 3044F PR MOST RECENT HEMOGLOBIN A1C LEVEL <7.0%: ICD-10-PCS | Mod: CPTII,,, | Performed by: INTERNAL MEDICINE

## 2022-09-06 PROCEDURE — 1159F MED LIST DOCD IN RCRD: CPT | Mod: CPTII,,, | Performed by: NEUROLOGICAL SURGERY

## 2022-09-06 PROCEDURE — 99205 OFFICE O/P NEW HI 60 MIN: CPT | Mod: S$PBB,,, | Performed by: NEUROLOGICAL SURGERY

## 2022-09-06 PROCEDURE — 3075F PR MOST RECENT SYSTOLIC BLOOD PRESS GE 130-139MM HG: ICD-10-PCS | Mod: CPTII,,, | Performed by: NEUROLOGICAL SURGERY

## 2022-09-06 PROCEDURE — 1159F PR MEDICATION LIST DOCUMENTED IN MEDICAL RECORD: ICD-10-PCS | Mod: CPTII,,, | Performed by: NEUROLOGICAL SURGERY

## 2022-09-06 RX ORDER — LEVOTHYROXINE SODIUM 75 UG/1
75 TABLET ORAL
Qty: 30 TABLET | Refills: 11 | Status: SHIPPED | OUTPATIENT
Start: 2022-09-06 | End: 2022-09-27

## 2022-09-06 NOTE — PROGRESS NOTES
CHIEF COMPLAINT:  Pituitary tumor    HPI:  Tessa Wynne is a 46 y.o.  female with below PMH, who is referred for evaluation of pituitary macroadenoma.  Brain MRI obtained for syncopal episode and HA revealed large pituitary tumor.  She's had HA x 1 year (mainly occipital location).  Denies significant vision change however recent ophtho eval revealed evidence of chiasmal compression.    Elevated prolactin (138) and hypothyroidism.    See endo note for hormone eval    Review of patient's allergies indicates:  No Known Allergies    Past Medical History:   Diagnosis Date    Elevated fasting glucose     PAD (peripheral artery disease)      Past Surgical History:   Procedure Laterality Date    CHOLECYSTECTOMY      ENDOMETRIAL ABLATION N/A 2/8/2022    Procedure: ABLATION, ENDOMETRIUM;  Surgeon: Ariel Butler MD;  Location: [facility] OR;  Service: OB/GYN;  Laterality: N/A;    HYSTEROSCOPY WITH DILATION AND CURETTAGE OF UTERUS N/A 2/8/2022    Procedure: HYSTEROSCOPY, WITH DILATION AND CURETTAGE OF UTERUS;  Surgeon: Ariel Butler MD;  Location: [facility] OR;  Service: OB/GYN;  Laterality: N/A;     Family History   Problem Relation Age of Onset    Diabetes Mellitus Mother     Diabetes Mellitus Father     Colon cancer Neg Hx     Breast cancer Neg Hx     Ovarian cancer Neg Hx     Uterine cancer Neg Hx     Heart attack Neg Hx      Social History     Tobacco Use    Smoking status: Never    Smokeless tobacco: Never   Substance Use Topics    Alcohol use: No    Drug use: No        Review of Systems   Constitutional: Negative.    HENT:  Negative for congestion, ear discharge, ear pain, hearing loss, nosebleeds, sinus pain and tinnitus.    Eyes:  Positive for blurred vision. Negative for double vision, photophobia, pain, discharge and redness.   Respiratory: Negative.     Cardiovascular:  Negative for chest pain, palpitations, claudication and leg swelling.   Gastrointestinal:  Negative for abdominal pain, blood in stool,  constipation, diarrhea, melena and vomiting.   Genitourinary:  Negative for flank pain, frequency and urgency.   Musculoskeletal:  Negative for falls.   Skin: Negative.    Neurological:  Positive for headaches. Negative for dizziness, tingling, tremors, sensory change, speech change, focal weakness, seizures, loss of consciousness and weakness.   Endo/Heme/Allergies:  Does not bruise/bleed easily.   Psychiatric/Behavioral: Negative.       OBJECTIVE:   Vital Signs:  Temp: 98.8 °F (37.1 °C) (09/06/22 1427)  Pulse: 72 (09/06/22 1427)  BP: 136/84 (09/06/22 1427)    Physical Exam:  Constitutional: Patient sitting comfortably in chair. Appears well developed and well nourished.  Skin: Exposed areas are intact without abnormal markings, rashes or other lesions.  HEENT: Normocephalic. Normal conjunctivae.  Cardiovascular: Normal rate and regular rhythm.  Respiratory: Chest wall rises and falls symmetrically, without signs of respiratory distress.  Abdomen: Soft and non-tender.  Extremities: Warm and without edema. Calves supple, non-tender.  Psych/Behavior: Normal affect.    Neurological:    Mental status: Alert and oriented. Conversational and appropriate.       Cranial Nerves: VFF to confrontation. PERRL. EOMI without nystagmus. Facial STLT normal and symmetric. Strong, symmetric muscles of mastication. Facial strength full and symmetric. Hearing equal bilaterally to finger rub. Palate and uvula rise and fall normally in midline. Shoulder shrug 5/5 strength. Tongue midline.     Motor:    Upper:  Deltoids Triceps Biceps WE WF     R 5/5 5/5 5/5 5/5 5/5 5/5    L 5/5 5/5 5/5 5/5 5/5 5/5      Lower:  HF KE KF DF PF EHL    R 5/5 5/5 5/5 5/5 5/5 5/5    L 5/5 5/5 5/5 5/5 5/5 5/5     Sensory: Intact sensation to light touch in all extremities. Romberg negative.    Reflexes:          DTR: 2+ symmetrically throughout.     Rey's: Negative.     Babinski's: Negative.     Clonus: Negative.    Cerebellar: Finger-to-nose and rapid  alternating movements normal.     Gait stable    Diagnostic Results:  All imaging was independently reviewed by me.    MRI brain, dated 8/15/22:  1. 2.8 x 3.5 x 2.7 cm pit macroadenoma with compression of chiasm  2. Knosp 3 on R  3. Knosp 2 on L    ASSESSMENT/PLAN:     Problem List Items Addressed This Visit          Endocrine    Pituitary macroadenoma with extrasellar extension - Primary    Relevant Orders    MRI Pituitary W W/O Contrast    CT Medtronic Sinuses without     Other Visit Diagnoses       Syncope and collapse        Abnormal MRI                Pituitary macroadenoma with suprasellar extension and compression of optic chiasm as well as cavernous sinus extension (Knosp 3 on R and 2 on L).  HVF revealed evidence of chiasmal compression.  Elevate prolactin (138) could be due to stalk effect and hypothyroid.  Given vision loss and hormonal disturbance, I recommend endoscopic, endonasal resection.  Will coordinate with ENT colleague to plan surgery.    - Surgery scheduled for 10/13/22 at [facility]  - Preop clearance needed from Dr Gamez  - STOP TAKING ASPIRIN, NSAIDS, AND ALL OTHER BLOOD THINNERS 7 DAYS PRIOR TO SURGERY  - Ordered pituitary stealth MRI and CT medtronic sinus  - Referral to Dr Astorga (ENT)  - F/u with endo as scheduled    The patient understands and agrees with the plan of care. All questions were answered.    Time spent on this encounter: 60 minutes. This includes face-to-face time and non-face to face time preparing to see the patient (eg, review of tests), obtaining and/or reviewing separately obtained history, documenting clinical information in the electronic or other health record, independently interpreting results and communicating results to the patient/family/caregiver, or care coordinator.

## 2022-09-07 ENCOUNTER — PATIENT MESSAGE (OUTPATIENT)
Dept: NEUROSURGERY | Facility: CLINIC | Age: 47
End: 2022-09-07
Payer: MEDICAID

## 2022-09-07 ENCOUNTER — TELEPHONE (OUTPATIENT)
Dept: NEUROSURGERY | Facility: HOSPITAL | Age: 47
End: 2022-09-07
Payer: MEDICAID

## 2022-09-07 ENCOUNTER — TELEPHONE (OUTPATIENT)
Dept: NEUROSURGERY | Facility: CLINIC | Age: 47
End: 2022-09-07
Payer: MEDICAID

## 2022-09-07 DIAGNOSIS — D35.2 PITUITARY MACROADENOMA WITH EXTRASELLAR EXTENSION: Primary | ICD-10-CM

## 2022-09-07 PROBLEM — E23.7 PITUITARY LESION: Status: ACTIVE | Noted: 2022-09-07

## 2022-09-07 NOTE — TELEPHONE ENCOUNTER
Left message for son re: date for surgery on 10/13/22.  He was instructed to call clinic back to confirm if this date works for his mother.

## 2022-09-07 NOTE — ASSESSMENT & PLAN NOTE
I have independently reviewed the pituitary MRI from 8/15/22 which shows a large hypoenhancing lesion in the sella consistent with pituitary macroadenoma with significant suprasellar extension.  The mass displacing the optic chiasm and see pituitary stalk is not visible.      Although the patient has not noticed any peripheral vision changes her formal HPF shows peripheral vision loss which can potentially be reversible with surgery.  Discussed indications for surgery including vision change as well as secondary hypothyroidism and she is agreeable to proceeding with surgery which she will discuss further with Dr. Ferrell today.      She has no signs or symptoms of adrenal insufficiency and has a normal 8:00 a.m. cortisol and ACTH so she does not require perioperative glucocorticoids.       Perioperative management recommendations:  - recommend inpatient endocrine consult on admission for monitoring for adrenal insufficiency, diabetes insipidus, and coordination of outpatient follow-up.  The inpatient endocrine service is not typically write orders for neuro surgery patient however we will communicate our recommendation.  Please refer to Inpatient transsphenoidal resection protocol guide for further details.  - Avoid routine use of perioperative glucocorticoid (dexamethasone, hydrocortisone, methylprednisolone, etc) administration.  - check daily 8:00 a.m. cortisol levels and if cortisol drops to less than 8 start oral hydrocortisone 20/10 mg which patient should be discharged on   - Do not check cortisol levels if the patient has received any glucocorticoids in the past 24 hours  - Monitor for DI with strict ins and outs q1H and if urine output is >250 cc x2 hours draw BMP, Serum osm, urine osm, urine Na and UA/SG stat and notify primary team and endocrine on call  - Avoid inpatient ordering of LH, FSH, testosterone, estrogen, progesterone, TSH, free T4 as these are unlikely to be helpful in making clinical decisions  during hospital admission.  - recommended inpatient labs:    - after discharge patient will need postop day 7 BMP to screen for development of SIADH.  - inpatient endocrine service to determine if additional endocrine labs need to be obtained prior to 2 week follow-up visit with me in multidisciplinary pituitary clinic.

## 2022-09-07 NOTE — TELEPHONE ENCOUNTER
ALONDRA FINK, PTS SON, CONFIRMED SURGERY DATE OF October 13 TH. WILL GET BACK TO HIM RE: CLEARANCE AND ARRIVAL TIMES FOR SURGERY, ETC.

## 2022-09-07 NOTE — ASSESSMENT & PLAN NOTE
Preoperative labs consistent with secondary hypothyroidism from large sellar mass so will start levothyroxine 75 mcg daily.  After surgery will need to re-evaluate if she continues to need thyroid hormone replacement.  Will aim to keep free T4 upper half of the normal range

## 2022-09-07 NOTE — TELEPHONE ENCOUNTER
----- Message from Maricruz Anderson sent at 9/7/2022  2:25 PM CDT -----  Regarding: appt  Contact: Sg, son, @[phone]  Caller returning call to confirm that 10/13 is a good date for surgery. Pls call to confirm

## 2022-09-07 NOTE — ASSESSMENT & PLAN NOTE
Planning to undergo transsphenoidal resection.  Will need follow-up field testing 2-3 months after surgery for new baseline.

## 2022-09-09 ENCOUNTER — TELEPHONE (OUTPATIENT)
Dept: PREADMISSION TESTING | Facility: HOSPITAL | Age: 47
End: 2022-09-09
Payer: MEDICAID

## 2022-09-09 ENCOUNTER — HOSPITAL ENCOUNTER (EMERGENCY)
Facility: HOSPITAL | Age: 47
Discharge: HOME | End: 2022-09-10
Attending: EMERGENCY MEDICINE
Payer: MEDICAID

## 2022-09-09 DIAGNOSIS — R11.10 VOMITING, INTRACTABILITY OF VOMITING NOT SPECIFIED, PRESENCE OF NAUSEA NOT SPECIFIED, UNSPECIFIED VOMITING TYPE: ICD-10-CM

## 2022-09-09 DIAGNOSIS — M79.10 MYALGIA: Primary | ICD-10-CM

## 2022-09-09 DIAGNOSIS — Z20.822 COVID-19 VIRUS NOT DETECTED: ICD-10-CM

## 2022-09-09 DIAGNOSIS — R19.00 PELVIC MASS: ICD-10-CM

## 2022-09-09 PROCEDURE — 96375 TX/PRO/DX INJ NEW DRUG ADDON: CPT

## 2022-09-09 PROCEDURE — 99284 PR EMERGENCY DEPT VISIT,LEVEL IV: ICD-10-PCS | Mod: CS,,, | Performed by: EMERGENCY MEDICINE

## 2022-09-09 PROCEDURE — 96361 HYDRATE IV INFUSION ADD-ON: CPT

## 2022-09-09 PROCEDURE — 99284 EMERGENCY DEPT VISIT MOD MDM: CPT | Mod: CS,,, | Performed by: EMERGENCY MEDICINE

## 2022-09-09 PROCEDURE — 99285 EMERGENCY DEPT VISIT HI MDM: CPT | Mod: 25

## 2022-09-09 PROCEDURE — 96374 THER/PROPH/DIAG INJ IV PUSH: CPT

## 2022-09-09 NOTE — TELEPHONE ENCOUNTER
----- Message from Cherelle Duncan LPN sent at 9/8/2022  7:45 AM CDT -----  Looks like can be with any provider  ----- Message -----  From: Evie Gamez MD  Sent: 9/7/2022   5:57 PM CDT  To: Miguel Ángel Ferrell DO, [facility] Staff    Thanks Napoleon Recio, please schedule her for bebeto op care evaluation   ----- Message -----  From: Miguel Ángel Ferrell DO  Sent: 9/6/2022   6:57 PM CDT  To: Evie Gamez MD, #

## 2022-09-10 VITALS
DIASTOLIC BLOOD PRESSURE: 59 MMHG | TEMPERATURE: 99 F | HEIGHT: 64 IN | OXYGEN SATURATION: 99 % | BODY MASS INDEX: 28.17 KG/M2 | WEIGHT: 165 LBS | SYSTOLIC BLOOD PRESSURE: 98 MMHG | HEART RATE: 62 BPM | RESPIRATION RATE: 16 BRPM

## 2022-09-10 LAB
ALBUMIN SERPL BCP-MCNC: 4.1 G/DL (ref 3.5–5.2)
ALP SERPL-CCNC: 65 U/L (ref 55–135)
ALT SERPL W/O P-5'-P-CCNC: 20 U/L (ref 10–44)
ANION GAP SERPL CALC-SCNC: 9 MMOL/L (ref 8–16)
AST SERPL-CCNC: 37 U/L (ref 10–40)
B-HCG UR QL: NEGATIVE
BASOPHILS # BLD AUTO: 0.02 K/UL (ref 0–0.2)
BASOPHILS NFR BLD: 0.2 % (ref 0–1.9)
BILIRUB SERPL-MCNC: 0.5 MG/DL (ref 0.1–1)
BILIRUB UR QL STRIP: NEGATIVE
BUN SERPL-MCNC: 16 MG/DL (ref 6–20)
CALCIUM SERPL-MCNC: 9.2 MG/DL (ref 8.7–10.5)
CHLORIDE SERPL-SCNC: 102 MMOL/L (ref 95–110)
CK SERPL-CCNC: 155 U/L (ref 20–180)
CLARITY UR REFRACT.AUTO: CLEAR
CO2 SERPL-SCNC: 24 MMOL/L (ref 23–29)
COLOR UR AUTO: YELLOW
CREAT SERPL-MCNC: 0.8 MG/DL (ref 0.5–1.4)
CTP QC/QA: YES
DIFFERENTIAL METHOD: ABNORMAL
EOSINOPHIL # BLD AUTO: 0 K/UL (ref 0–0.5)
EOSINOPHIL NFR BLD: 0.3 % (ref 0–8)
ERYTHROCYTE [DISTWIDTH] IN BLOOD BY AUTOMATED COUNT: 13.5 % (ref 11.5–14.5)
EST. GFR  (NO RACE VARIABLE): >60 ML/MIN/1.73 M^2
GLUCOSE SERPL-MCNC: 101 MG/DL (ref 70–110)
GLUCOSE UR QL STRIP: NEGATIVE
HCT VFR BLD AUTO: 40.5 % (ref 37–48.5)
HGB BLD-MCNC: 14.1 G/DL (ref 12–16)
HGB UR QL STRIP: NEGATIVE
IMM GRANULOCYTES # BLD AUTO: 0.02 K/UL (ref 0–0.04)
IMM GRANULOCYTES NFR BLD AUTO: 0.2 % (ref 0–0.5)
KETONES UR QL STRIP: NEGATIVE
LEUKOCYTE ESTERASE UR QL STRIP: NEGATIVE
LIPASE SERPL-CCNC: 49 U/L (ref 4–60)
LYMPHOCYTES # BLD AUTO: 1 K/UL (ref 1–4.8)
LYMPHOCYTES NFR BLD: 8.5 % (ref 18–48)
MCH RBC QN AUTO: 31.8 PG (ref 27–31)
MCHC RBC AUTO-ENTMCNC: 34.8 G/DL (ref 32–36)
MCV RBC AUTO: 91 FL (ref 82–98)
MONOCYTES # BLD AUTO: 0.4 K/UL (ref 0.3–1)
MONOCYTES NFR BLD: 3.6 % (ref 4–15)
NEUTROPHILS # BLD AUTO: 10.3 K/UL (ref 1.8–7.7)
NEUTROPHILS NFR BLD: 87.2 % (ref 38–73)
NITRITE UR QL STRIP: NEGATIVE
NRBC BLD-RTO: 0 /100 WBC
PH UR STRIP: 8 [PH] (ref 5–8)
PLATELET # BLD AUTO: 225 K/UL (ref 150–450)
PMV BLD AUTO: 11.1 FL (ref 9.2–12.9)
POTASSIUM SERPL-SCNC: 4.7 MMOL/L (ref 3.5–5.1)
PROT SERPL-MCNC: 7.5 G/DL (ref 6–8.4)
PROT UR QL STRIP: NEGATIVE
RBC # BLD AUTO: 4.43 M/UL (ref 4–5.4)
SARS-COV-2 RDRP RESP QL NAA+PROBE: NEGATIVE
SODIUM SERPL-SCNC: 135 MMOL/L (ref 136–145)
SP GR UR STRIP: 1.02 (ref 1–1.03)
URN SPEC COLLECT METH UR: NORMAL
WBC # BLD AUTO: 11.82 K/UL (ref 3.9–12.7)

## 2022-09-10 PROCEDURE — 81003 URINALYSIS AUTO W/O SCOPE: CPT | Performed by: EMERGENCY MEDICINE

## 2022-09-10 PROCEDURE — 80053 COMPREHEN METABOLIC PANEL: CPT | Performed by: EMERGENCY MEDICINE

## 2022-09-10 PROCEDURE — 25500020 PHARM REV CODE 255: Performed by: EMERGENCY MEDICINE

## 2022-09-10 PROCEDURE — 85025 COMPLETE CBC W/AUTO DIFF WBC: CPT | Performed by: EMERGENCY MEDICINE

## 2022-09-10 PROCEDURE — 63600175 PHARM REV CODE 636 W HCPCS: Performed by: EMERGENCY MEDICINE

## 2022-09-10 PROCEDURE — 82550 ASSAY OF CK (CPK): CPT | Performed by: EMERGENCY MEDICINE

## 2022-09-10 PROCEDURE — 25000003 PHARM REV CODE 250: Performed by: EMERGENCY MEDICINE

## 2022-09-10 PROCEDURE — 83690 ASSAY OF LIPASE: CPT | Performed by: EMERGENCY MEDICINE

## 2022-09-10 PROCEDURE — 81025 URINE PREGNANCY TEST: CPT | Performed by: EMERGENCY MEDICINE

## 2022-09-10 PROCEDURE — U0002 COVID-19 LAB TEST NON-CDC: HCPCS | Performed by: EMERGENCY MEDICINE

## 2022-09-10 RX ORDER — ONDANSETRON 4 MG/1
4 TABLET, ORALLY DISINTEGRATING ORAL EVERY 6 HOURS PRN
Qty: 12 TABLET | Refills: 0 | Status: SHIPPED | OUTPATIENT
Start: 2022-09-10 | End: 2022-09-13

## 2022-09-10 RX ORDER — ONDANSETRON 2 MG/ML
4 INJECTION INTRAMUSCULAR; INTRAVENOUS
Status: COMPLETED | OUTPATIENT
Start: 2022-09-10 | End: 2022-09-10

## 2022-09-10 RX ORDER — IBUPROFEN 600 MG/1
600 TABLET ORAL EVERY 6 HOURS PRN
Qty: 20 TABLET | Refills: 0 | Status: SHIPPED | OUTPATIENT
Start: 2022-09-10 | End: 2022-09-10 | Stop reason: SDUPTHER

## 2022-09-10 RX ORDER — KETOROLAC TROMETHAMINE 30 MG/ML
10 INJECTION, SOLUTION INTRAMUSCULAR; INTRAVENOUS
Status: COMPLETED | OUTPATIENT
Start: 2022-09-10 | End: 2022-09-10

## 2022-09-10 RX ADMIN — SODIUM CHLORIDE 1000 ML: 0.9 INJECTION, SOLUTION INTRAVENOUS at 12:09

## 2022-09-10 RX ADMIN — KETOROLAC TROMETHAMINE 10 MG: 30 INJECTION, SOLUTION INTRAMUSCULAR at 12:09

## 2022-09-10 RX ADMIN — ONDANSETRON 4 MG: 2 INJECTION INTRAMUSCULAR; INTRAVENOUS at 12:09

## 2022-09-10 RX ADMIN — IOHEXOL 100 ML: 350 INJECTION, SOLUTION INTRAVENOUS at 02:09

## 2022-09-10 NOTE — DISCHARGE INSTRUCTIONS
Diagnosis: Vomiting    Your CT scan showed multiple incidental findings.  You have an enlarged stomach concerning for ulcers.  You have a pelvic cyst which needs to be followed up by a gynecologist.  You have a chronically enlarged appendix as well.    Tests today showed:   Labs Reviewed   CBC W/ AUTO DIFFERENTIAL - Abnormal; Notable for the following components:       Result Value    MCH 31.8 (*)     Gran # (ANC) 10.3 (*)     Gran % 87.2 (*)     Lymph % 8.5 (*)     Mono % 3.6 (*)     All other components within normal limits   COMPREHENSIVE METABOLIC PANEL - Abnormal; Notable for the following components:    Sodium 135 (*)     All other components within normal limits   SARS-COV-2 RNA AMPLIFICATION, QUAL   LIPASE   URINALYSIS, REFLEX TO URINE CULTURE    Narrative:     Specimen Source->Urine   CK   POCT URINE PREGNANCY     Imaging Results               CT Abdomen Pelvis With Contrast (Final result)  Result time 09/10/22 04:33:51      Final result by Alvin Amanda MD (09/10/22 04:33:51)                   Impression:      1. Left adnexal minimally complex cystic lesion measuring 6.6 cm in greatest dimension.  No inflammatory changes identified.  Differential diagnosis is broad including hydrosalpinx, serous and mucinous cystadenoma, hemorrhagic ovarian cyst or endometrioma.  Pelvic ultrasound can be performed for further initial assessment.  2. Small focus of air adjacent to the inferior aspect of the left sacroiliac joint of uncertain significance.  Given the history of left lower quadrant pain, consider evaluation to exclude sacroiliitis, if clinically indicated.  3. Markedly distended stomach with fluid and muscular hypertrophy of the gastric pylorus.  Consider further evaluation for PUD, as clinically indicated.  4. Mildly distended appendix (7 mm) containing a 5 mm appendicolith.  No periappendiceal inflammatory change to suggest acute inflammatory process.  Presence of appendicolith with mild appendiceal  distention could represent a subacute or chronic appendicitis.  5. Additional findings as above.  This report was flagged in Epic as abnormal.    Electronically signed by resident: Kailash Eller  Date:    09/10/2022  Time:    03:01    Electronically signed by: Alvin Amanda MD  Date:    09/10/2022  Time:    04:33               Narrative:    EXAMINATION:  CT ABDOMEN PELVIS WITH CONTRAST    CLINICAL HISTORY:  LLQ abdominal pain;    TECHNIQUE:  Low dose axial images, sagittal and coronal reformations were obtained from the lung bases to the pubic symphysis following the IV administration of 100 mL of Omnipaque 350 .  Oral contrast was not administered.    COMPARISON:  CT abdomen pelvis 11/26/2019    FINDINGS:  LUNG BASES & MEDIASTINUM (limited):  Unremarkable.    HEPATOBILIARY:  No focal hepatic lesions. No biliary ductal dilatation. Cholecystectomy.    SPLEEN:  No splenomegaly.    PANCREAS:  No focal masses. No ductal dilatation.    ADRENALS:  No adrenal nodules.    KIDNEYS/URETERS:  No hydronephrosis, stones or solid mass lesions. Ureters are unremarkable.    PELVIC ORGANS/BLADDER:  Bladder is moderately distended without focal wall thickening.  Uterus is unremarkable.  Left adnexal hypodense 6.6 x 4.7 x 5.0 cm minimally complex cystic lesion.  1.4 cm right adnexal hypodense follicle.    PERITONEUM / RETROPERITONEUM:  Small focus of intrapelvic free air adjacent to the left iliac wing (axial series 2, image 145; coronal series 601, image 89) at the left sacroiliac joint.  No free fluid.    LYMPH NODES:  No lymphadenopathy.    VESSELS:  Unremarkable.    GI TRACT: Markedly distended fluid-filled stomach with hypertrophy of the gastric pylorus.  No bowel obstruction.  Fluid-filled colon.  Mildly distended appendix (7 mm) containing a 5 mm appendicolith.  No periappendiceal inflammatory change..    BONES AND SOFT TISSUES:  Soft tissues are unremarkable.  No fractures or focal osseous lesions.                                         CT Head Without Contrast (Final result)  Result time 09/10/22 03:35:49      Final result by Alvin Amanda MD (09/10/22 03:35:49)                   Impression:      Large sellar mass with suprasellar extension, as above, with mass effect better characterized on recent MRI brain 08/15/2022.  As with recent prior, recommend correlation with pituitary labs, dedicated MRI pituitary protocol and follow-up with neuro surgery.    This report was flagged in Epic as abnormal.    Electronically signed by resident: Kailash Eller  Date:    09/10/2022  Time:    02:55    Electronically signed by: Alvin Amanda MD  Date:    09/10/2022  Time:    03:35               Narrative:    EXAMINATION:  CT HEAD WITHOUT CONTRAST    CLINICAL HISTORY:  Headache, sudden, severe;Pituitary macroadenoma, vomiting;    TECHNIQUE:  Low dose axial CT images obtained throughout the head without the use of intravenous contrast.  Axial, sagittal and coronal reconstructions were performed.    COMPARISON:  MRI brain 08/15/2022    FINDINGS:  INTRACRANIAL COMPARTMENT:    Ventricles and sulci are normal in size for age without evidence of hydrocephalus.    Large sellar isodense mass with suprasellar extension measuring 2.0 x 2.5 x 2.1 cm, AP by TV by CC, consistent known macro adenoma.  Local mass effect better characterized on recent brain 08/15/2022.  The brain parenchyma otherwise appears within normal limits.  No hemorrhage, edema or major vascular distribution infarct.    No extra-axial blood or fluid collections.    SKULL/EXTRACRANIAL CONTENTS (limited evaluation):    No fracture. Mastoid air cells and paranasal sinuses are essentially clear.                                      Treatments you had today:   Medications   sodium chloride 0.9% bolus 1,000 mL (0 mLs Intravenous Stopped 9/10/22 0313)   ondansetron injection 4 mg (4 mg Intravenous Given 9/10/22 0050)   ketorolac injection 9.999 mg (9.999 mg Intravenous Given 9/10/22  0049)   iohexoL (OMNIPAQUE 350) injection 100 mL (100 mLs Intravenous Given 9/10/22 0200)       Home Care Instructions include:  - Continue taking your home medications as prescribed    Take the ondansetron (Zofran) as prescribed.  Place it under your tongue and let the medication dissolve on its own  Do not swallow whole  Give the medication 30 minutes to work before eating or drinking  Drink clear fluids (water, gatorade, broth, etc) and eat simple foods  Do not eat fatty, greasy, heavy meals when using this medication    Follow-up plan:  - Follow-up with: Primary care doctor within 3 - 5  days  - Follow-up for additional testing and/or evaluation as directed by your primary doctor    Return to the emergency department for symptoms including but not limited to: worsening symptoms, persistent abdominal pain, shortness of breath or chest pain, vomiting with inability to hold down fluids, fevers greater than 100.4°F, bloody vomit or poop, passing out/unconsciousness, or other concerning symptoms.

## 2022-09-10 NOTE — ED NOTES
"Pt received to ED intake 01. Pt complaint of generalized body aches and head pain. Duration since 1600 / 9/9/22. Pt denies chest pain / shortness of breath. Pt reporting abdominal pain. Pt reports abdominal pain to RLQ and LLQ. Pt writhing in intake exam chair rating pain 10/10 on numerical pain scale. Pt reports nausea and vomiting but reports diarrhea. Pt states " I'm feeling body aches and body pain." Pt reports all over body aches and pain as well as headache. Pt denies urinary problems. Pt denies photophobia. Pt reports neck pain as well as stiffness.     Per family pt has brain tumor. Pituitary / pt to have surgery in 1 month to remove tumor.   "

## 2022-09-10 NOTE — ED PROVIDER NOTES
Source of History:  Patient - she is Vietnamese-speaking, history of is obtained through her son who she specifically requests to act as her .  Chart    Chief complaint:  Muscle Pain (Generalized muscle pain since 4pm. Pt has a tumor on brain, scheduled for removal in a month. Pt thinks the pain is correlated to this )      HPI:  Tessa Wynne is a 46 y.o. female with history of fibromyalgia, peptic ulcer disease, pituitary macroadenoma, hypothyroidism, presenting to emergency department with complaint of nausea, myalgias, abdominal pain, diarrhea, fatigue, headache.  Patient states the muscle pain began around 4:00 p.m. and is only improved mildly with Tylenol.  No rashes.  No cough or shortness of breath.  She complains of diffuse abdominal pain without dysuria or hematuria.  She has had diarrhea that is nonbloody.  No known exposure to COVID.  No visual changes.    ROS: As per HPI and below:  Review of Systems   Constitutional:  Positive for malaise/fatigue. Negative for fever.   HENT:  Negative for sore throat.    Eyes:  Negative for double vision.   Respiratory:  Negative for cough and shortness of breath.    Cardiovascular:  Negative for chest pain.   Gastrointestinal:  Positive for abdominal pain, diarrhea, nausea and vomiting.   Genitourinary:  Negative for dysuria and urgency.   Musculoskeletal:  Positive for myalgias. Negative for falls.   Skin:  Negative for rash.   Neurological:  Positive for headaches.     Review of patient's allergies indicates:  No Known Allergies    No current facility-administered medications on file prior to encounter.     Current Outpatient Medications on File Prior to Encounter   Medication Sig Dispense Refill    levothyroxine (SYNTHROID) 75 MCG tablet Take 1 tablet (75 mcg total) by mouth before breakfast. 30 tablet 11    pantoprazole (PROTONIX) 40 MG tablet Take 1 tablet (40 mg total) by mouth once daily. 30 tablet 6       PMH:  As per HPI and below:  Past Medical  History:   Diagnosis Date    Elevated fasting glucose     PAD (peripheral artery disease)      Past Surgical History:   Procedure Laterality Date    CHOLECYSTECTOMY      ENDOMETRIAL ABLATION N/A 2/8/2022    Procedure: ABLATION, ENDOMETRIUM;  Surgeon: Ariel Butler MD;  Location: [facility] OR;  Service: OB/GYN;  Laterality: N/A;    HYSTEROSCOPY WITH DILATION AND CURETTAGE OF UTERUS N/A 2/8/2022    Procedure: HYSTEROSCOPY, WITH DILATION AND CURETTAGE OF UTERUS;  Surgeon: Ariel Butler MD;  Location: [facility] OR;  Service: OB/GYN;  Laterality: N/A;       Social History     Socioeconomic History    Marital status:     Number of children: 2   Occupational History    Occupation: cleaning housing    Tobacco Use    Smoking status: Never    Smokeless tobacco: Never   Substance and Sexual Activity    Alcohol use: No    Drug use: No    Sexual activity: Yes     Partners: Male     Birth control/protection: OCP   Social History Narrative    Patient is originally from [place] [address]         School at ; high school         College/university ;         Working ; [employer]                 Children    joban - 21    lidnsey - 13         Lives with     dazoë and son and finn        Diet/Exericse           Family History   Problem Relation Age of Onset    Diabetes Mellitus Mother     Diabetes Mellitus Father     Colon cancer Neg Hx     Breast cancer Neg Hx     Ovarian cancer Neg Hx     Uterine cancer Neg Hx     Heart attack Neg Hx        Physical Exam:      Vitals:    09/10/22 0400   BP: (!) 98/59   Pulse: 62   Resp: 16   Temp: 99.2 °F (37.3 °C)     Gen: No acute distress.  Nontoxic.  Well appearing.  Mental Status:  Alert and oriented.  Appropriate, conversant.  Skin: Warm, dry. No rashes seen.  Eyes: No conjunctival injection.  Pulm: CTAB. No increased work of breathing.  No significant tachypnea.  No audible stridor or wheezing.  No conversational dyspnea.    CV: Regular rate. Regular rhythm.   Abd: Soft.  Not  distended.  Diffuse tenderness to palpation, most prominent in the left lower quadrant.  No focal tenderness in the right lower quadrant.  No rebound tenderness or guarding.  MSK: Good range of motion all joints.  No deformities.  Bilateral CVA tenderness.  Neck supple, no meningismus.  Neuro: Awake. Speech normal. No focal neuro deficit observed.    Laboratory Studies:  Labs Reviewed   CBC W/ AUTO DIFFERENTIAL - Abnormal; Notable for the following components:       Result Value    MCH 31.8 (*)     Gran # (ANC) 10.3 (*)     Gran % 87.2 (*)     Lymph % 8.5 (*)     Mono % 3.6 (*)     All other components within normal limits   COMPREHENSIVE METABOLIC PANEL - Abnormal; Notable for the following components:    Sodium 135 (*)     All other components within normal limits   SARS-COV-2 RNA AMPLIFICATION, QUAL   LIPASE   URINALYSIS, REFLEX TO URINE CULTURE    Narrative:     Specimen Source->Urine   CK   POCT URINE PREGNANCY       Chart reviewed. See summary in HPI    Imaging Results               CT Abdomen Pelvis With Contrast (Final result)  Result time 09/10/22 04:33:51      Final result by Alvin Amanda MD (09/10/22 04:33:51)                   Impression:      1. Left adnexal minimally complex cystic lesion measuring 6.6 cm in greatest dimension.  No inflammatory changes identified.  Differential diagnosis is broad including hydrosalpinx, serous and mucinous cystadenoma, hemorrhagic ovarian cyst or endometrioma.  Pelvic ultrasound can be performed for further initial assessment.  2. Small focus of air adjacent to the inferior aspect of the left sacroiliac joint of uncertain significance.  Given the history of left lower quadrant pain, consider evaluation to exclude sacroiliitis, if clinically indicated.  3. Markedly distended stomach with fluid and muscular hypertrophy of the gastric pylorus.  Consider further evaluation for PUD, as clinically indicated.  4. Mildly distended appendix (7 mm) containing a 5 mm  appendicolith.  No periappendiceal inflammatory change to suggest acute inflammatory process.  Presence of appendicolith with mild appendiceal distention could represent a subacute or chronic appendicitis.  5. Additional findings as above.  This report was flagged in Epic as abnormal.    Electronically signed by resident: Kailash Eller  Date:    09/10/2022  Time:    03:01    Electronically signed by: Alvin Amanda MD  Date:    09/10/2022  Time:    04:33               Narrative:    EXAMINATION:  CT ABDOMEN PELVIS WITH CONTRAST    CLINICAL HISTORY:  LLQ abdominal pain;    TECHNIQUE:  Low dose axial images, sagittal and coronal reformations were obtained from the lung bases to the pubic symphysis following the IV administration of 100 mL of Omnipaque 350 .  Oral contrast was not administered.    COMPARISON:  CT abdomen pelvis 11/26/2019    FINDINGS:  LUNG BASES & MEDIASTINUM (limited):  Unremarkable.    HEPATOBILIARY:  No focal hepatic lesions. No biliary ductal dilatation. Cholecystectomy.    SPLEEN:  No splenomegaly.    PANCREAS:  No focal masses. No ductal dilatation.    ADRENALS:  No adrenal nodules.    KIDNEYS/URETERS:  No hydronephrosis, stones or solid mass lesions. Ureters are unremarkable.    PELVIC ORGANS/BLADDER:  Bladder is moderately distended without focal wall thickening.  Uterus is unremarkable.  Left adnexal hypodense 6.6 x 4.7 x 5.0 cm minimally complex cystic lesion.  1.4 cm right adnexal hypodense follicle.    PERITONEUM / RETROPERITONEUM:  Small focus of intrapelvic free air adjacent to the left iliac wing (axial series 2, image 145; coronal series 601, image 89) at the left sacroiliac joint.  No free fluid.    LYMPH NODES:  No lymphadenopathy.    VESSELS:  Unremarkable.    GI TRACT: Markedly distended fluid-filled stomach with hypertrophy of the gastric pylorus.  No bowel obstruction.  Fluid-filled colon.  Mildly distended appendix (7 mm) containing a 5 mm appendicolith.  No periappendiceal  inflammatory change..    BONES AND SOFT TISSUES:  Soft tissues are unremarkable.  No fractures or focal osseous lesions.                                        CT Head Without Contrast (Final result)  Result time 09/10/22 03:35:49      Final result by Alvin Amanda MD (09/10/22 03:35:49)                   Impression:      Large sellar mass with suprasellar extension, as above, with mass effect better characterized on recent MRI brain 08/15/2022.  As with recent prior, recommend correlation with pituitary labs, dedicated MRI pituitary protocol and follow-up with neuro surgery.    This report was flagged in Epic as abnormal.    Electronically signed by resident: Kailash Eller  Date:    09/10/2022  Time:    02:55    Electronically signed by: Alvin Amanda MD  Date:    09/10/2022  Time:    03:35               Narrative:    EXAMINATION:  CT HEAD WITHOUT CONTRAST    CLINICAL HISTORY:  Headache, sudden, severe;Pituitary macroadenoma, vomiting;    TECHNIQUE:  Low dose axial CT images obtained throughout the head without the use of intravenous contrast.  Axial, sagittal and coronal reconstructions were performed.    COMPARISON:  MRI brain 08/15/2022    FINDINGS:  INTRACRANIAL COMPARTMENT:    Ventricles and sulci are normal in size for age without evidence of hydrocephalus.    Large sellar isodense mass with suprasellar extension measuring 2.0 x 2.5 x 2.1 cm, AP by TV by CC, consistent known macro adenoma.  Local mass effect better characterized on recent brain 08/15/2022.  The brain parenchyma otherwise appears within normal limits.  No hemorrhage, edema or major vascular distribution infarct.    No extra-axial blood or fluid collections.    SKULL/EXTRACRANIAL CONTENTS (limited evaluation):    No fracture. Mastoid air cells and paranasal sinuses are essentially clear.                                      Medications Given:  Medications   sodium chloride 0.9% bolus 1,000 mL (0 mLs Intravenous Stopped 9/10/22 3423)    ondansetron injection 4 mg (4 mg Intravenous Given 9/10/22 0050)   ketorolac injection 9.999 mg (9.999 mg Intravenous Given 9/10/22 0049)   iohexoL (OMNIPAQUE 350) injection 100 mL (100 mLs Intravenous Given 9/10/22 0200)       MDM:    46 y.o. female with above complaints.  She is afebrile, stable, nontoxic.  Normal work of breathing.  Will give fluids, Toradol, Zofran.  Will obtain labs, urinalysis, CT.    COVID negative.  Labs reviewed, no evidence of rhabdomyolysis, electrolyte derangement, or acute kidney injury.  No elevation of lipase.      CT scan of the abdomen and pelvis with multiple incidental findings, however do not feel that any of these findings either clinically correlate with her complaint today, or are relevant.  She has no evidence of sacroiliitis clinically.  She is not tender over her appendix to suggest chronic appendicitis requiring intervention.  With respect to her pelvic mass, she will need Gyne follow-up.  With respect to her distended stomach, recommend primary care follow-up after the resolution of her acute illness, with GI follow-up as needed if still having issues.    She was re-evaluated following medication administration.  States that she is feeling better.  She was updated regarding findings and plan.  Discharged home in stable condition.  Return precautions discussed at bedside.    Diagnostic Impression:    1. Myalgia    2. COVID-19 virus not detected    3. Vomiting, intractability of vomiting not specified, presence of nausea not specified, unspecified vomiting type    4. Pelvic mass         ED Disposition Condition    Discharge Stable          ED Prescriptions       Medication Sig Dispense Start Date End Date Auth. Provider    ibuprofen (ADVIL,MOTRIN) 600 MG tablet  (Status: Discontinued) Take 1 tablet (600 mg total) by mouth every 6 (six) hours as needed for Pain. 20 tablet 9/10/2022 9/10/2022 Aurelia Marcano MD    ondansetron (ZOFRAN-ODT) 4 MG TbDL Take 1 tablet (4 mg  total) by mouth every 6 (six) hours as needed. 12 tablet 9/10/2022 9/13/2022 Aurelia Marcano MD          Follow-up Information       Follow up With Specialties Details Why Contact Info Additional Information    Sabiha Overton MD Family Medicine Schedule an appointment as soon as possible for a visit   [address]  [place] [address]  [phone]       [facility] - Ob/Gyn 5th Fl Obstetrics and Gynecology Schedule an appointment as soon as possible for a visit   [address] [facility] [address] [phone] Main Building, 5th Floor Please park in [facility] and take Clinic elevator            Patient and family understand the plan and is in agreement, verbalized understanding, questions answered    Aurelia Marcano MD  Emergency Medicine         Aurelia Marcano MD  09/10/22 6807

## 2022-09-13 ENCOUNTER — TELEPHONE (OUTPATIENT)
Dept: FAMILY MEDICINE | Facility: CLINIC | Age: 47
End: 2022-09-13
Payer: MEDICAID

## 2022-09-13 ENCOUNTER — TELEPHONE (OUTPATIENT)
Dept: PREADMISSION TESTING | Facility: HOSPITAL | Age: 47
End: 2022-09-13
Payer: MEDICAID

## 2022-09-13 NOTE — TELEPHONE ENCOUNTER
----- Message from Carmen Sin sent at 9/12/2022  8:08 AM CDT -----  Type:  Same Day Appointment Request    Caller is requesting a same day appointment.  Caller declined first available appointment listed below.    Name of Caller: [place] Leone[place] speaking pt  When is the first available appointment?none  Symptoms:f/u [facility] ER per cat scan inflamed appendis   Best Call Back Number:[phone]  Additional Information: pt has Medicaid

## 2022-09-16 ENCOUNTER — OFFICE VISIT (OUTPATIENT)
Dept: FAMILY MEDICINE | Facility: CLINIC | Age: 47
End: 2022-09-16
Payer: MEDICAID

## 2022-09-16 VITALS
WEIGHT: 165.56 LBS | OXYGEN SATURATION: 98 % | BODY MASS INDEX: 28.27 KG/M2 | DIASTOLIC BLOOD PRESSURE: 66 MMHG | HEIGHT: 64 IN | SYSTOLIC BLOOD PRESSURE: 110 MMHG | HEART RATE: 69 BPM

## 2022-09-16 DIAGNOSIS — K27.9 PUD (PEPTIC ULCER DISEASE): ICD-10-CM

## 2022-09-16 DIAGNOSIS — R19.8 LEFT PELVIC ADNEXAL FLUID COLLECTION: ICD-10-CM

## 2022-09-16 DIAGNOSIS — E03.8 OTHER SPECIFIED HYPOTHYROIDISM: ICD-10-CM

## 2022-09-16 DIAGNOSIS — K37 APPENDICITIS, UNSPECIFIED APPENDICITIS TYPE: Primary | ICD-10-CM

## 2022-09-16 DIAGNOSIS — D35.2 PITUITARY MACROADENOMA WITH EXTRASELLAR EXTENSION: ICD-10-CM

## 2022-09-16 PROCEDURE — 99214 OFFICE O/P EST MOD 30 MIN: CPT | Mod: PBBFAC,PO | Performed by: INTERNAL MEDICINE

## 2022-09-16 PROCEDURE — 1160F RVW MEDS BY RX/DR IN RCRD: CPT | Mod: CPTII,,, | Performed by: INTERNAL MEDICINE

## 2022-09-16 PROCEDURE — 3008F BODY MASS INDEX DOCD: CPT | Mod: CPTII,,, | Performed by: INTERNAL MEDICINE

## 2022-09-16 PROCEDURE — 3078F DIAST BP <80 MM HG: CPT | Mod: CPTII,,, | Performed by: INTERNAL MEDICINE

## 2022-09-16 PROCEDURE — 3044F HG A1C LEVEL LT 7.0%: CPT | Mod: CPTII,,, | Performed by: INTERNAL MEDICINE

## 2022-09-16 PROCEDURE — 99999 PR PBB SHADOW E&M-EST. PATIENT-LVL IV: ICD-10-PCS | Mod: PBBFAC,,, | Performed by: INTERNAL MEDICINE

## 2022-09-16 PROCEDURE — 3074F SYST BP LT 130 MM HG: CPT | Mod: CPTII,,, | Performed by: INTERNAL MEDICINE

## 2022-09-16 PROCEDURE — 1160F PR REVIEW ALL MEDS BY PRESCRIBER/CLIN PHARMACIST DOCUMENTED: ICD-10-PCS | Mod: CPTII,,, | Performed by: INTERNAL MEDICINE

## 2022-09-16 PROCEDURE — 1159F PR MEDICATION LIST DOCUMENTED IN MEDICAL RECORD: ICD-10-PCS | Mod: CPTII,,, | Performed by: INTERNAL MEDICINE

## 2022-09-16 PROCEDURE — 3078F PR MOST RECENT DIASTOLIC BLOOD PRESSURE < 80 MM HG: ICD-10-PCS | Mod: CPTII,,, | Performed by: INTERNAL MEDICINE

## 2022-09-16 PROCEDURE — 99215 PR OFFICE/OUTPT VISIT, EST, LEVL V, 40-54 MIN: ICD-10-PCS | Mod: S$PBB,,, | Performed by: INTERNAL MEDICINE

## 2022-09-16 PROCEDURE — 99999 PR PBB SHADOW E&M-EST. PATIENT-LVL IV: CPT | Mod: PBBFAC,,, | Performed by: INTERNAL MEDICINE

## 2022-09-16 PROCEDURE — 3074F PR MOST RECENT SYSTOLIC BLOOD PRESSURE < 130 MM HG: ICD-10-PCS | Mod: CPTII,,, | Performed by: INTERNAL MEDICINE

## 2022-09-16 PROCEDURE — 3008F PR BODY MASS INDEX (BMI) DOCUMENTED: ICD-10-PCS | Mod: CPTII,,, | Performed by: INTERNAL MEDICINE

## 2022-09-16 PROCEDURE — 1159F MED LIST DOCD IN RCRD: CPT | Mod: CPTII,,, | Performed by: INTERNAL MEDICINE

## 2022-09-16 PROCEDURE — 3044F PR MOST RECENT HEMOGLOBIN A1C LEVEL <7.0%: ICD-10-PCS | Mod: CPTII,,, | Performed by: INTERNAL MEDICINE

## 2022-09-16 PROCEDURE — 99215 OFFICE O/P EST HI 40 MIN: CPT | Mod: S$PBB,,, | Performed by: INTERNAL MEDICINE

## 2022-09-16 RX ORDER — LEVOFLOXACIN 500 MG/1
500 TABLET, FILM COATED ORAL DAILY
Qty: 7 TABLET | Refills: 0 | Status: SHIPPED | OUTPATIENT
Start: 2022-09-16 | End: 2022-09-23

## 2022-09-16 RX ORDER — PANTOPRAZOLE SODIUM 40 MG/1
40 TABLET, DELAYED RELEASE ORAL DAILY
Qty: 90 TABLET | Refills: 2 | Status: SHIPPED | OUTPATIENT
Start: 2022-09-16 | End: 2022-10-10

## 2022-09-16 RX ORDER — METRONIDAZOLE 500 MG/1
500 TABLET ORAL EVERY 8 HOURS
Qty: 21 TABLET | Refills: 0 | Status: SHIPPED | OUTPATIENT
Start: 2022-09-16 | End: 2022-09-23

## 2022-09-16 NOTE — Clinical Note
I was going to wait for the surgery and GI evaluation of her abnormal abd/pelvic CT scan. Has left adnexal changes and endometrial ablation earlier this year.  Will she need to see you soon? Abdomen was benign.

## 2022-09-16 NOTE — PROGRESS NOTES
Subjective:       Patient ID: Tessa Wynne is a 46 y.o. female.    Chief Complaint: Pelvic Inflammatory Disease      HPI  Tessa Wynne is a 46 y.o. female with chronic conditions of hyperglycemia, mild changes of PAD in [place], and recently found with a pituitary macroadenoma who presents today for hospital follow-up.    Patient reports she was having muscle aches and went to the emergency room thinking it could be associated to her brain tumor.  Her she was evaluated and noted with abdominal pain.  There was history of nausea and diarrhea but no fever.  She has been taking Tylenol.  On evaluation she was found with an abnormal CT scan showing a hypertrophic gastric pylorus or peptic ulcer changes, appendicoliths with distention of the appendix, and left adnexa on complex cyst.  She was given anti-inflammatory shot and advised to follow-up as an outpatient.  COVID was negative    Reports no longer has abdominal pain and bowel movements have been okay.  No nausea vomiting or evidence of bleeding.  No fever.  Muscle aches have improved.  No antibiotics given or referrals.  She is scheduled for surgery early next month.  Usual headaches.  No new weaknesses.    Health Maintenance:  Health Maintenance   Topic Date Due    Mammogram  01/04/2023    Lipid Panel  07/29/2027    TETANUS VACCINE  11/13/2028    Hepatitis C Screening  Completed       Review of Systems   All other systems reviewed and are negative.   Past Medical History:   Diagnosis Date    Elevated fasting glucose     PAD (peripheral artery disease)        Past Surgical History:   Procedure Laterality Date    CHOLECYSTECTOMY      ENDOMETRIAL ABLATION N/A 2/8/2022    Procedure: ABLATION, ENDOMETRIUM;  Surgeon: Ariel Butler MD;  Location: [facility] OR;  Service: OB/GYN;  Laterality: N/A;    HYSTEROSCOPY WITH DILATION AND CURETTAGE OF UTERUS N/A 2/8/2022    Procedure: HYSTEROSCOPY, WITH DILATION AND CURETTAGE OF UTERUS;  Surgeon: Ariel Butler MD;   Location: [facility] OR;  Service: OB/GYN;  Laterality: N/A;       Family History   Problem Relation Age of Onset    Diabetes Mellitus Mother     Diabetes Mellitus Father     Colon cancer Neg Hx     Breast cancer Neg Hx     Ovarian cancer Neg Hx     Uterine cancer Neg Hx     Heart attack Neg Hx        Social History     Socioeconomic History    Marital status:     Number of children: 2   Occupational History    Occupation: cleaning housing    Tobacco Use    Smoking status: Never    Smokeless tobacco: Never   Substance and Sexual Activity    Alcohol use: No    Drug use: No    Sexual activity: Yes     Partners: Male     Birth control/protection: OCP   Social History Narrative    Patient is originally from [place] [id number]         School at ; high school         College/university ;         Working ; [employer]                 Children    joban - 21    lidnstaye - 13         Lives with     zeb and son and finn        Diet/Exericse           Current Outpatient Medications   Medication Sig Dispense Refill    levothyroxine (SYNTHROID) 75 MCG tablet Take 1 tablet (75 mcg total) by mouth before breakfast. 30 tablet 11    levoFLOXacin (LEVAQUIN) 500 MG tablet Take 1 tablet (500 mg total) by mouth once daily. for 7 days 7 tablet 0    metroNIDAZOLE (FLAGYL) 500 MG tablet Take 1 tablet (500 mg total) by mouth every 8 (eight) hours. for 7 days 21 tablet 0    pantoprazole (PROTONIX) 40 MG tablet Take 1 tablet (40 mg total) by mouth once daily. 90 tablet 2     No current facility-administered medications for this visit.       Review of patient's allergies indicates:  No Known Allergies      Objective:       Last 3 sets of Vitals    Vitals - 1 value per visit 9/10/2022 9/16/2022 9/16/2022   SYSTOLIC 98 - 110   DIASTOLIC 59 - 66   Pulse 62 - 69   Temp 99.2 - -   Resp 16 - -   SPO2 99 - 98   Weight (lb) - - 165.57   Weight (kg) - - 75.1   Height - - 64   BMI (Calculated) - - 28.4   VISIT REPORT - - -   Pain Score  - 0 -   Some  recent data might be hidden   Physical Exam  Constitutional:       General: She is not in acute distress.     Appearance: Normal appearance.   Eyes:      General: No scleral icterus.     Extraocular Movements: Extraocular movements intact.      Conjunctiva/sclera: Conjunctivae normal.   Neck:      Comments: No goiter.  Cardiovascular:      Rate and Rhythm: Normal rate and regular rhythm.      Pulses: Normal pulses.      Heart sounds: Normal heart sounds.   Pulmonary:      Effort: Pulmonary effort is normal.      Breath sounds: Normal breath sounds.   Abdominal:      General: Bowel sounds are normal. There is no distension.      Palpations: Abdomen is soft. There is no mass.      Tenderness: There is no abdominal tenderness. There is no guarding or rebound.   Musculoskeletal:         General: No swelling. Normal range of motion.   Lymphadenopathy:      Cervical: No cervical adenopathy.   Skin:     General: Skin is warm and dry.   Neurological:      General: No focal deficit present.      Mental Status: She is alert and oriented to person, place, and time.   Psychiatric:         Mood and Affect: Mood normal.         Behavior: Behavior normal.         CBC:  Recent Labs   Lab 12/17/21  1537 07/29/22  0827 09/10/22  0041   WBC 6.84 5.99 11.82   RBC 3.81 L 4.01 4.43   Hemoglobin 11.7 L 12.2 14.1   Hematocrit 35.1 L 36.4 L 40.5   Platelets 228 214 225   MCV 92 91 91   MCH 30.7 30.4 31.8 H   MCHC 33.3 33.5 34.8     CMP:  Recent Labs   Lab 04/21/21  0031 07/29/22  0827 08/26/22  0845 09/10/22  0041   Glucose 93 74   < > 101   Calcium 9.3 9.3   < > 9.2   Albumin 4.4 3.5  --  4.1   Total Protein 7.3 6.5  --  7.5   Sodium 138 140   < > 135 L   Potassium 3.7 3.9   < > 4.7   CO2 26 28   < > 24   Chloride 104 103   < > 102   BUN 9 14   < > 16   Creatinine 0.8 0.9   < > 0.8   Alkaline Phosphatase 71 55  --  65   ALT 25 21  --  20   AST 32 28  --  37   Total Bilirubin 0.2 0.4  --  0.5    < > = values in this interval not displayed.      URINALYSIS:  Recent Labs   Lab 09/10/22  0044   Color, UA Yellow   Specific Gravity, UA 1.020   pH, UA 8.0   Protein, UA Negative   Nitrite, UA Negative   Leukocytes, UA Negative      LIPIDS:  Recent Labs   Lab 07/27/20  1530 03/02/21  0959 12/17/21  1537 07/29/22  0827 08/26/22  0845   TSH  --   --  1.412 2.017 2.402   HDL 75 56  --  57  --    Cholesterol 176 156  --  127  --    Triglycerides 125 84  --  63  --    LDL Cholesterol 76.0 83.2  --  57.4 L  --    HDL/Cholesterol Ratio 42.6 35.9  --  44.9  --    Non-HDL Cholesterol 101 100  --  70  --    Total Cholesterol/HDL Ratio 2.3 2.8  --  2.2  --      TSH:  Recent Labs   Lab 12/17/21  1537 07/29/22  0827 08/26/22  0845   TSH 1.412 2.017 2.402       A1C:  Recent Labs   Lab 05/04/22  0809 07/29/22  0827   Hemoglobin A1C 5.4 6.1 H       Imaging:  CT Abdomen Pelvis With Contrast  Narrative: EXAMINATION:  CT ABDOMEN PELVIS WITH CONTRAST    CLINICAL HISTORY:  LLQ abdominal pain;    TECHNIQUE:  Low dose axial images, sagittal and coronal reformations were obtained from the lung bases to the pubic symphysis following the IV administration of 100 mL of Omnipaque 350 .  Oral contrast was not administered.    COMPARISON:  CT abdomen pelvis 11/26/2019    FINDINGS:  LUNG BASES & MEDIASTINUM (limited):  Unremarkable.    HEPATOBILIARY:  No focal hepatic lesions. No biliary ductal dilatation. Cholecystectomy.    SPLEEN:  No splenomegaly.    PANCREAS:  No focal masses. No ductal dilatation.    ADRENALS:  No adrenal nodules.    KIDNEYS/URETERS:  No hydronephrosis, stones or solid mass lesions. Ureters are unremarkable.    PELVIC ORGANS/BLADDER:  Bladder is moderately distended without focal wall thickening.  Uterus is unremarkable.  Left adnexal hypodense 6.6 x 4.7 x 5.0 cm minimally complex cystic lesion.  1.4 cm right adnexal hypodense follicle.    PERITONEUM / RETROPERITONEUM:  Small focus of intrapelvic free air adjacent to the left iliac wing (axial series 2, image 145;  coronal series 601, image 89) at the left sacroiliac joint.  No free fluid.    LYMPH NODES:  No lymphadenopathy.    VESSELS:  Unremarkable.    GI TRACT: Markedly distended fluid-filled stomach with hypertrophy of the gastric pylorus.  No bowel obstruction.  Fluid-filled colon.  Mildly distended appendix (7 mm) containing a 5 mm appendicolith.  No periappendiceal inflammatory change..    BONES AND SOFT TISSUES:  Soft tissues are unremarkable.  No fractures or focal osseous lesions.  Impression: 1. Left adnexal minimally complex cystic lesion measuring 6.6 cm in greatest dimension.  No inflammatory changes identified.  Differential diagnosis is broad including hydrosalpinx, serous and mucinous cystadenoma, hemorrhagic ovarian cyst or endometrioma.  Pelvic ultrasound can be performed for further initial assessment.  2. Small focus of air adjacent to the inferior aspect of the left sacroiliac joint of uncertain significance.  Given the history of left lower quadrant pain, consider evaluation to exclude sacroiliitis, if clinically indicated.  3. Markedly distended stomach with fluid and muscular hypertrophy of the gastric pylorus.  Consider further evaluation for PUD, as clinically indicated.  4. Mildly distended appendix (7 mm) containing a 5 mm appendicolith.  No periappendiceal inflammatory change to suggest acute inflammatory process.  Presence of appendicolith with mild appendiceal distention could represent a subacute or chronic appendicitis.  5. Additional findings as above.  This report was flagged in Epic as abnormal.    Electronically signed by resident: Kailash Eller  Date:    09/10/2022  Time:    03:01    Electronically signed by: Alvin Amanda MD  Date:    09/10/2022  Time:    04:33  CT Head Without Contrast  Narrative: EXAMINATION:  CT HEAD WITHOUT CONTRAST    CLINICAL HISTORY:  Headache, sudden, severe;Pituitary macroadenoma, vomiting;    TECHNIQUE:  Low dose axial CT images obtained throughout the  head without the use of intravenous contrast.  Axial, sagittal and coronal reconstructions were performed.    COMPARISON:  MRI brain 08/15/2022    FINDINGS:  INTRACRANIAL COMPARTMENT:    Ventricles and sulci are normal in size for age without evidence of hydrocephalus.    Large sellar isodense mass with suprasellar extension measuring 2.0 x 2.5 x 2.1 cm, AP by TV by CC, consistent known macro adenoma.  Local mass effect better characterized on recent brain 08/15/2022.  The brain parenchyma otherwise appears within normal limits.  No hemorrhage, edema or major vascular distribution infarct.    No extra-axial blood or fluid collections.    SKULL/EXTRACRANIAL CONTENTS (limited evaluation):    No fracture. Mastoid air cells and paranasal sinuses are essentially clear.  Impression: Large sellar mass with suprasellar extension, as above, with mass effect better characterized on recent MRI brain 08/15/2022.  As with recent prior, recommend correlation with pituitary labs, dedicated MRI pituitary protocol and follow-up with neuro surgery.    This report was flagged in Epic as abnormal.    Electronically signed by resident: Kailash Eller  Date:    09/10/2022  Time:    02:55    Electronically signed by: Alvin Amanda MD  Date:    09/10/2022  Time:    03:35      Assessment:       1. Appendicitis, unspecified appendicitis type    2. PUD (peptic ulcer disease)    3. Left pelvic adnexal fluid collection    4. Other specified hypothyroidism    5. Pituitary macroadenoma with extrasellar extension              Plan:       Tessa was seen today for pelvic inflammatory disease.    Diagnoses and all orders for this visit:    Appendicitis, unspecified appendicitis type  -     levoFLOXacin (LEVAQUIN) 500 MG tablet; Take 1 tablet (500 mg total) by mouth once daily. for 7 days  -     metroNIDAZOLE (FLAGYL) 500 MG tablet; Take 1 tablet (500 mg total) by mouth every 8 (eight) hours. for 7 days  -     Ambulatory referral/consult to  General Surgery; Future  - if has fever or increased abdominal pain advised to go to the ER.    PUD (peptic ulcer disease)  -     pantoprazole (PROTONIX) 40 MG tablet; Take 1 tablet (40 mg total) by mouth once daily.  -     Ambulatory referral/consult to Gastroenterology; Future    Left pelvic adnexal fluid collection   - She had recent ablation and followed by Dr. Butler.  Will await surgery evaluation and message gynecology.   - associated to pituitary adenoma?    Other specified hypothyroidism   - Same treatment.    Pituitary macroadenoma with extrasellar extension   - has appointment with ENT and neurosurgery.   - Follows with endocrinology and ophthalmology.    Health Maintenance Due   Topic Date Due    COVID-19 Vaccine (3 - Booster for Pfizer series) 04/06/2022    Influenza Vaccine (1) 09/01/2022        Return to clinic as needed.    Sabiha Overton MD  [facility]  Disclaimer:  This note has been generated using voice-recognition software. There may be grammatical or spelling errors that have been missed during proof-reading

## 2022-09-19 PROBLEM — K37 APPENDICITIS: Status: ACTIVE | Noted: 2022-09-19

## 2022-09-20 ENCOUNTER — OFFICE VISIT (OUTPATIENT)
Dept: SURGERY | Facility: CLINIC | Age: 47
End: 2022-09-20
Payer: MEDICAID

## 2022-09-20 ENCOUNTER — OFFICE VISIT (OUTPATIENT)
Dept: OTOLARYNGOLOGY | Facility: CLINIC | Age: 47
End: 2022-09-20
Payer: MEDICAID

## 2022-09-20 VITALS
OXYGEN SATURATION: 98 % | HEIGHT: 64 IN | SYSTOLIC BLOOD PRESSURE: 112 MMHG | WEIGHT: 165 LBS | BODY MASS INDEX: 28.17 KG/M2 | HEART RATE: 60 BPM | DIASTOLIC BLOOD PRESSURE: 65 MMHG

## 2022-09-20 VITALS
HEART RATE: 67 BPM | WEIGHT: 167.75 LBS | SYSTOLIC BLOOD PRESSURE: 106 MMHG | BODY MASS INDEX: 28.8 KG/M2 | DIASTOLIC BLOOD PRESSURE: 71 MMHG

## 2022-09-20 DIAGNOSIS — D35.2 PITUITARY MACROADENOMA WITH EXTRASELLAR EXTENSION: Primary | ICD-10-CM

## 2022-09-20 DIAGNOSIS — K37 APPENDICITIS, UNSPECIFIED APPENDICITIS TYPE: ICD-10-CM

## 2022-09-20 DIAGNOSIS — H53.47 BITEMPORAL HEMIANOPIA: ICD-10-CM

## 2022-09-20 DIAGNOSIS — J34.3 HYPERTROPHY OF BOTH INFERIOR NASAL TURBINATES: ICD-10-CM

## 2022-09-20 PROCEDURE — 3074F PR MOST RECENT SYSTOLIC BLOOD PRESSURE < 130 MM HG: ICD-10-PCS | Mod: CPTII,,, | Performed by: SURGERY

## 2022-09-20 PROCEDURE — 3008F BODY MASS INDEX DOCD: CPT | Mod: CPTII,,, | Performed by: SURGERY

## 2022-09-20 PROCEDURE — 3074F PR MOST RECENT SYSTOLIC BLOOD PRESSURE < 130 MM HG: ICD-10-PCS | Mod: CPTII,,, | Performed by: STUDENT IN AN ORGANIZED HEALTH CARE EDUCATION/TRAINING PROGRAM

## 2022-09-20 PROCEDURE — 99203 PR OFFICE/OUTPT VISIT, NEW, LEVL III, 30-44 MIN: ICD-10-PCS | Mod: S$PBB,,, | Performed by: SURGERY

## 2022-09-20 PROCEDURE — 3044F HG A1C LEVEL LT 7.0%: CPT | Mod: CPTII,,, | Performed by: STUDENT IN AN ORGANIZED HEALTH CARE EDUCATION/TRAINING PROGRAM

## 2022-09-20 PROCEDURE — 99999 PR PBB SHADOW E&M-EST. PATIENT-LVL III: CPT | Mod: PBBFAC,,, | Performed by: STUDENT IN AN ORGANIZED HEALTH CARE EDUCATION/TRAINING PROGRAM

## 2022-09-20 PROCEDURE — 99203 OFFICE O/P NEW LOW 30 MIN: CPT | Mod: S$PBB,,, | Performed by: SURGERY

## 2022-09-20 PROCEDURE — 99204 OFFICE O/P NEW MOD 45 MIN: CPT | Mod: 25,S$PBB,, | Performed by: STUDENT IN AN ORGANIZED HEALTH CARE EDUCATION/TRAINING PROGRAM

## 2022-09-20 PROCEDURE — 3008F PR BODY MASS INDEX (BMI) DOCUMENTED: ICD-10-PCS | Mod: CPTII,,, | Performed by: SURGERY

## 2022-09-20 PROCEDURE — 3044F HG A1C LEVEL LT 7.0%: CPT | Mod: CPTII,,, | Performed by: SURGERY

## 2022-09-20 PROCEDURE — 3074F SYST BP LT 130 MM HG: CPT | Mod: CPTII,,, | Performed by: SURGERY

## 2022-09-20 PROCEDURE — 99213 OFFICE O/P EST LOW 20 MIN: CPT | Mod: PBBFAC | Performed by: STUDENT IN AN ORGANIZED HEALTH CARE EDUCATION/TRAINING PROGRAM

## 2022-09-20 PROCEDURE — 31231 PR NASAL ENDOSCOPY, DX: ICD-10-PCS | Mod: S$PBB,,, | Performed by: STUDENT IN AN ORGANIZED HEALTH CARE EDUCATION/TRAINING PROGRAM

## 2022-09-20 PROCEDURE — 3044F PR MOST RECENT HEMOGLOBIN A1C LEVEL <7.0%: ICD-10-PCS | Mod: CPTII,,, | Performed by: SURGERY

## 2022-09-20 PROCEDURE — 3074F SYST BP LT 130 MM HG: CPT | Mod: CPTII,,, | Performed by: STUDENT IN AN ORGANIZED HEALTH CARE EDUCATION/TRAINING PROGRAM

## 2022-09-20 PROCEDURE — 1159F PR MEDICATION LIST DOCUMENTED IN MEDICAL RECORD: ICD-10-PCS | Mod: CPTII,,, | Performed by: SURGERY

## 2022-09-20 PROCEDURE — 99999 PR PBB SHADOW E&M-EST. PATIENT-LVL III: ICD-10-PCS | Mod: PBBFAC,,, | Performed by: STUDENT IN AN ORGANIZED HEALTH CARE EDUCATION/TRAINING PROGRAM

## 2022-09-20 PROCEDURE — 3008F BODY MASS INDEX DOCD: CPT | Mod: CPTII,,, | Performed by: STUDENT IN AN ORGANIZED HEALTH CARE EDUCATION/TRAINING PROGRAM

## 2022-09-20 PROCEDURE — 99999 PR PBB SHADOW E&M-EST. PATIENT-LVL III: ICD-10-PCS | Mod: PBBFAC,,, | Performed by: SURGERY

## 2022-09-20 PROCEDURE — 99999 PR PBB SHADOW E&M-EST. PATIENT-LVL III: CPT | Mod: PBBFAC,,, | Performed by: SURGERY

## 2022-09-20 PROCEDURE — 99204 PR OFFICE/OUTPT VISIT, NEW, LEVL IV, 45-59 MIN: ICD-10-PCS | Mod: 25,S$PBB,, | Performed by: STUDENT IN AN ORGANIZED HEALTH CARE EDUCATION/TRAINING PROGRAM

## 2022-09-20 PROCEDURE — 3078F DIAST BP <80 MM HG: CPT | Mod: CPTII,,, | Performed by: SURGERY

## 2022-09-20 PROCEDURE — 3078F PR MOST RECENT DIASTOLIC BLOOD PRESSURE < 80 MM HG: ICD-10-PCS | Mod: CPTII,,, | Performed by: SURGERY

## 2022-09-20 PROCEDURE — 3078F DIAST BP <80 MM HG: CPT | Mod: CPTII,,, | Performed by: STUDENT IN AN ORGANIZED HEALTH CARE EDUCATION/TRAINING PROGRAM

## 2022-09-20 PROCEDURE — 31231 NASAL ENDOSCOPY DX: CPT | Mod: S$PBB,,, | Performed by: STUDENT IN AN ORGANIZED HEALTH CARE EDUCATION/TRAINING PROGRAM

## 2022-09-20 PROCEDURE — 3078F PR MOST RECENT DIASTOLIC BLOOD PRESSURE < 80 MM HG: ICD-10-PCS | Mod: CPTII,,, | Performed by: STUDENT IN AN ORGANIZED HEALTH CARE EDUCATION/TRAINING PROGRAM

## 2022-09-20 PROCEDURE — 99213 OFFICE O/P EST LOW 20 MIN: CPT | Mod: PBBFAC,27 | Performed by: SURGERY

## 2022-09-20 PROCEDURE — 3044F PR MOST RECENT HEMOGLOBIN A1C LEVEL <7.0%: ICD-10-PCS | Mod: CPTII,,, | Performed by: STUDENT IN AN ORGANIZED HEALTH CARE EDUCATION/TRAINING PROGRAM

## 2022-09-20 PROCEDURE — 1159F MED LIST DOCD IN RCRD: CPT | Mod: CPTII,,, | Performed by: SURGERY

## 2022-09-20 PROCEDURE — 31231 NASAL ENDOSCOPY DX: CPT | Mod: PBBFAC | Performed by: STUDENT IN AN ORGANIZED HEALTH CARE EDUCATION/TRAINING PROGRAM

## 2022-09-20 PROCEDURE — 3008F PR BODY MASS INDEX (BMI) DOCUMENTED: ICD-10-PCS | Mod: CPTII,,, | Performed by: STUDENT IN AN ORGANIZED HEALTH CARE EDUCATION/TRAINING PROGRAM

## 2022-09-20 NOTE — ASSESSMENT & PLAN NOTE
Not new- still have occasional symptoms.   Seen by Cardiology 2017 with recs for stress test since patient was having chest pain with palpitations. This was not done and she no longer is  followed with cardiology.  Patient works out 5-6 days weekly doing cardio and with weights without chest pain/shortness of breath.   Will get new EKG

## 2022-09-20 NOTE — PROGRESS NOTES
General Surgery Office Visit   History and Physical    Patient Name: Tessa Wynne  YOB: 1975 (46 y.o.)  MRN: [id number]  Today's Date: 09/20/2022    Referring Md:   Sabiha Overton Md  [address]  [place] [id number]    SUBJECTIVE:     Chief Complaint: abdominal pain concern for appendicitis    History of Present Illness:  Tessa Wynne is a 46 y.o. female with PMHx of pituitary macroadenoma, PUD, fibromyalgia, adnexal mass and HLD who presents to the clinic today with recent history of abdominal pain which she first noticed two weeks ago. She came to the ER complaining of abdominal pain, chills, diarrhea, and nausea, and obtained a CT of the abdomen, was given IV fluids, antiemetics and was discharged. She was told that she has a large stomach, and that her appendix was enlarged. She saw her primary care doctor a week ago, and she was prescribed Cipro and Flagyl, and was recommended to come to the general surgery office for concerns of appendicitis. Today, she says the pain has significantly improved, she is no longer nauseous, and denies fever, chills, unintentional weight loss, hematochezia, dysuria, hematuria, CP, SOB, and all other symptoms. She has surgery planned for Oct 16th for removal of her pituitary adenoma.    Patient denies personal history of MI, CVA, lung disease, DM  Previous abdominal surgeries include: cholecystectomy  Denies alcohol, tobacco, and elicit drug use.   Not currently on any anticoagulants      Review of patient's allergies indicates:  No Known Allergies    Past Medical History:   Diagnosis Date    Elevated fasting glucose     PAD (peripheral artery disease)      Past Surgical History:   Procedure Laterality Date    CHOLECYSTECTOMY      ENDOMETRIAL ABLATION N/A 2/8/2022    Procedure: ABLATION, ENDOMETRIUM;  Surgeon: rAiel Butler MD;  Location: [facility] OR;  Service: OB/GYN;  Laterality: N/A;    HYSTEROSCOPY WITH DILATION AND CURETTAGE OF UTERUS N/A  "2/8/2022    Procedure: HYSTEROSCOPY, WITH DILATION AND CURETTAGE OF UTERUS;  Surgeon: Ariel Butler MD;  Location: [facility];  Service: OB/GYN;  Laterality: N/A;     Family History   Problem Relation Age of Onset    Diabetes Mellitus Mother     Diabetes Mellitus Father     Colon cancer Neg Hx     Breast cancer Neg Hx     Ovarian cancer Neg Hx     Uterine cancer Neg Hx     Heart attack Neg Hx      Social History     Tobacco Use    Smoking status: Never    Smokeless tobacco: Never   Substance Use Topics    Alcohol use: No    Drug use: No        Review of Systems:  Review of Systems   Constitutional:  Negative for chills and fever.   HENT:  Negative for congestion.    Eyes:  Negative for redness.   Respiratory:  Negative for cough and shortness of breath.    Cardiovascular:  Negative for chest pain and palpitations.   Gastrointestinal:  Negative for nausea and vomiting.   Genitourinary:  Negative for dysuria and frequency.   Skin:  Negative for itching and rash.   Neurological:  Negative for weakness.   Endo/Heme/Allergies:  Negative for environmental allergies. Does not bruise/bleed easily.     OBJECTIVE:     Vital Signs (Most Recent)  /65 (BP Location: Left arm, Patient Position: Sitting)   Pulse 60   Ht 5' 4" (1.626 m)   Wt 74.8 kg (165 lb)   SpO2 98%   BMI 28.32 kg/m²     Physical Exam  Constitutional:       General: She is not in acute distress.     Appearance: She is not ill-appearing or toxic-appearing.   HENT:      Head: Normocephalic and atraumatic.      Nose: No congestion.   Eyes:      Extraocular Movements: Extraocular movements intact.      Conjunctiva/sclera: Conjunctivae normal.   Cardiovascular:      Rate and Rhythm: Normal rate and regular rhythm.   Pulmonary:      Effort: Pulmonary effort is normal.   Abdominal:      General: There is no distension.      Palpations: Abdomen is soft.      Tenderness: There is no abdominal tenderness. There is no guarding.   Musculoskeletal:         " General: Normal range of motion.      Cervical back: Normal range of motion.   Skin:     General: Skin is warm and dry.   Neurological:      Mental Status: She is oriented to person, place, and time.       Labs:     Lab Results   Component Value Date    WBC 11.82 09/10/2022    HGB 14.1 09/10/2022    HCT 40.5 09/10/2022    MCV 91 09/10/2022     09/10/2022         CMP  Sodium   Date Value Ref Range Status   09/10/2022 135 (L) 136 - 145 mmol/L Final     Potassium   Date Value Ref Range Status   09/10/2022 4.7 3.5 - 5.1 mmol/L Final     Chloride   Date Value Ref Range Status   09/10/2022 102 95 - 110 mmol/L Final     CO2   Date Value Ref Range Status   09/10/2022 24 23 - 29 mmol/L Final     Glucose   Date Value Ref Range Status   09/10/2022 101 70 - 110 mg/dL Final     BUN   Date Value Ref Range Status   09/10/2022 16 6 - 20 mg/dL Final     Creatinine   Date Value Ref Range Status   09/10/2022 0.8 0.5 - 1.4 mg/dL Final     Calcium   Date Value Ref Range Status   09/10/2022 9.2 8.7 - 10.5 mg/dL Final     Total Protein   Date Value Ref Range Status   09/10/2022 7.5 6.0 - 8.4 g/dL Final     Albumin   Date Value Ref Range Status   09/10/2022 4.1 3.5 - 5.2 g/dL Final     Total Bilirubin   Date Value Ref Range Status   09/10/2022 0.5 0.1 - 1.0 mg/dL Final     Comment:     For infants and newborns, interpretation of results should be based  on gestational age, weight and in agreement with clinical  observations.    Premature Infant recommended reference ranges:  Up to 24 hours.............<8.0 mg/dL  Up to 48 hours............<12.0 mg/dL  3-5 days..................<15.0 mg/dL  6-29 days.................<15.0 mg/dL       Alkaline Phosphatase   Date Value Ref Range Status   09/10/2022 65 55 - 135 U/L Final     AST   Date Value Ref Range Status   09/10/2022 37 10 - 40 U/L Final     Comment:     *Result may be interfered by visible hemolysis     ALT   Date Value Ref Range Status   09/10/2022 20 10 - 44 U/L Final     Anion  Gap   Date Value Ref Range Status   09/10/2022 9 8 - 16 mmol/L Final     eGFR if    Date Value Ref Range Status   07/29/2022 >60 >60 mL/min/1.73 m^2 Final     eGFR if non    Date Value Ref Range Status   07/29/2022 >60 >60 mL/min/1.73 m^2 Final     Comment:     Calculation used to obtain the estimated glomerular filtration  rate (eGFR) is the CKD-EPI equation.          Imaging:     CT Abdomen/Pelvis 9/10/22    1. Left adnexal minimally complex cystic lesion measuring 6.6 cm in greatest dimension.  No inflammatory changes identified.  Differential diagnosis is broad including hydrosalpinx, serous and mucinous cystadenoma, hemorrhagic ovarian cyst or endometrioma.  Pelvic ultrasound can be performed for further initial assessment.  2. Small focus of air adjacent to the inferior aspect of the left sacroiliac joint of uncertain significance.  Given the history of left lower quadrant pain, consider evaluation to exclude sacroiliitis, if clinically indicated.  3. Markedly distended stomach with fluid and muscular hypertrophy of the gastric pylorus.  Consider further evaluation for PUD, as clinically indicated.  4. Mildly distended appendix (7 mm) containing a 5 mm appendicolith.  No periappendiceal inflammatory change to suggest acute inflammatory process.  Presence of appendicolith with mild appendiceal distention could represent a subacute or chronic appendicitis.      ASSESSMENT/PLAN:     Tessa was seen today for consult.    Diagnoses and all orders for this visit:    Appendicitis, unspecified appendicitis type  -     Ambulatory referral/consult to General Surgery    Tessa Wynne is a 46 y.o. female who presents to the clinic after being referred by her primary care doctor last week for concern of appendicitis. She visited the Emergency Room 2 weeks ago complaining of abdominal pain, nausea, diarrhea, and chills, and CT of the abdomen and pelvis was obtained. Based on CT results  revealing no inflammatory changes suggesting any acute inflammatory process, and the fact that her condition has improved, there is no pressing need for surgery at this time. She will be undergoing surgery October 16th for her pituitary adenoma, and is following up with her gynecologist regarding her adnexal mass.     - Patient to complete course of Cipro and Flagyl that were prescribed by primary care physician  - Patient to follow up with gynecologist, if she undergoes gynecologic surgery, consider removal of appendix at the same time.   - Patient to undergo procedure 10/16/22 for pituitary adenoma  - Patient instructed to call clinic with any questions, concerns, or new symptoms  - Patient understands and in agreement with plan; all questions answered    Pascual Sibley MD  Acute Care Surgery  [facility] [place] Department of Surgery

## 2022-09-20 NOTE — PROGRESS NOTES
General Surgery Office Visit   History and Physical    Patient Name: Tessa Wynne  YOB: 1975 (46 y.o.)  MRN: [id number]  Today's Date: 09/20/2022    Referring Md:   Sabiha Overton Md  [address]  [place] [id number]    SUBJECTIVE:     Chief Complaint: abdominal pain concern for appendicitis    History of Present Illness:  Tessa Wynne is a 46 y.o. female with PMHx of pituitary macroadenoma, PUD, fibromyalgia, adnexal mass and HLD who presents to the clinic today with recent history of abdominal pain which she first noticed two weeks ago. She came to the ER complaining of abdominal pain, chills, diarrhea, and nausea, and obtained a CT of the abdomen, was given IV fluids, antiemetics and was discharged. She was told that she has a large stomach, and that her appendix was enlarged. She saw her primary care doctor a week ago, and she was prescribed Cipro and Flagyl, and was recommended to come to the general surgery office for concerns of appendicitis. Today, she says the pain has significantly improved, she is no longer nauseous, and denies fever, chills, unintentional weight loss, hematochezia, dysuria, hematuria, CP, SOB, and all other symptoms. She has surgery planned for Oct 16th for removal of her pituitary adenoma.    Patient denies personal history of MI, CVA, lung disease, DM  Previous abdominal surgeries include: cholecystectomy  Denies alcohol, tobacco, and elicit drug use.   Not currently on any anticoagulants      Review of patient's allergies indicates:  No Known Allergies    Past Medical History:   Diagnosis Date    Elevated fasting glucose     PAD (peripheral artery disease)      Past Surgical History:   Procedure Laterality Date    CHOLECYSTECTOMY      ENDOMETRIAL ABLATION N/A 2/8/2022    Procedure: ABLATION, ENDOMETRIUM;  Surgeon: Ariel Butler MD;  Location: [facility] OR;  Service: OB/GYN;  Laterality: N/A;    HYSTEROSCOPY WITH DILATION AND CURETTAGE OF UTERUS N/A  "2/8/2022    Procedure: HYSTEROSCOPY, WITH DILATION AND CURETTAGE OF UTERUS;  Surgeon: Ariel Butler MD;  Location: [facility];  Service: OB/GYN;  Laterality: N/A;     Family History   Problem Relation Age of Onset    Diabetes Mellitus Mother     Diabetes Mellitus Father     Colon cancer Neg Hx     Breast cancer Neg Hx     Ovarian cancer Neg Hx     Uterine cancer Neg Hx     Heart attack Neg Hx      Social History     Tobacco Use    Smoking status: Never    Smokeless tobacco: Never   Substance Use Topics    Alcohol use: No    Drug use: No        Review of Systems:  Review of Systems   Constitutional:  Negative for chills and fever.   HENT:  Negative for congestion.    Eyes:  Negative for redness.   Respiratory:  Negative for cough and shortness of breath.    Cardiovascular:  Negative for chest pain and palpitations.   Gastrointestinal:  Negative for nausea and vomiting.   Genitourinary:  Negative for dysuria.   Neurological:  Negative for weakness.     OBJECTIVE:     Vital Signs (Most Recent)  /65 (BP Location: Left arm, Patient Position: Sitting)   Pulse 60   Ht 5' 4" (1.626 m)   Wt 74.8 kg (165 lb)   SpO2 98%   BMI 28.32 kg/m²     Physical Exam  Constitutional:       General: She is not in acute distress.     Appearance: She is not ill-appearing or toxic-appearing.   HENT:      Head: Normocephalic and atraumatic.      Nose: No congestion.   Eyes:      Extraocular Movements: Extraocular movements intact.      Conjunctiva/sclera: Conjunctivae normal.   Cardiovascular:      Rate and Rhythm: Normal rate and regular rhythm.   Pulmonary:      Effort: Pulmonary effort is normal.   Abdominal:      General: There is no distension.      Palpations: Abdomen is soft.      Tenderness: There is no abdominal tenderness. There is no guarding.   Musculoskeletal:         General: Normal range of motion.      Cervical back: Normal range of motion.   Skin:     General: Skin is warm and dry.   Neurological:      Mental " Status: She is oriented to person, place, and time.       Labs:     Lab Results   Component Value Date    WBC 11.82 09/10/2022    HGB 14.1 09/10/2022    HCT 40.5 09/10/2022    MCV 91 09/10/2022     09/10/2022         CMP  Sodium   Date Value Ref Range Status   09/10/2022 135 (L) 136 - 145 mmol/L Final     Potassium   Date Value Ref Range Status   09/10/2022 4.7 3.5 - 5.1 mmol/L Final     Chloride   Date Value Ref Range Status   09/10/2022 102 95 - 110 mmol/L Final     CO2   Date Value Ref Range Status   09/10/2022 24 23 - 29 mmol/L Final     Glucose   Date Value Ref Range Status   09/10/2022 101 70 - 110 mg/dL Final     BUN   Date Value Ref Range Status   09/10/2022 16 6 - 20 mg/dL Final     Creatinine   Date Value Ref Range Status   09/10/2022 0.8 0.5 - 1.4 mg/dL Final     Calcium   Date Value Ref Range Status   09/10/2022 9.2 8.7 - 10.5 mg/dL Final     Total Protein   Date Value Ref Range Status   09/10/2022 7.5 6.0 - 8.4 g/dL Final     Albumin   Date Value Ref Range Status   09/10/2022 4.1 3.5 - 5.2 g/dL Final     Total Bilirubin   Date Value Ref Range Status   09/10/2022 0.5 0.1 - 1.0 mg/dL Final     Comment:     For infants and newborns, interpretation of results should be based  on gestational age, weight and in agreement with clinical  observations.    Premature Infant recommended reference ranges:  Up to 24 hours.............<8.0 mg/dL  Up to 48 hours............<12.0 mg/dL  3-5 days..................<15.0 mg/dL  6-29 days.................<15.0 mg/dL       Alkaline Phosphatase   Date Value Ref Range Status   09/10/2022 65 55 - 135 U/L Final     AST   Date Value Ref Range Status   09/10/2022 37 10 - 40 U/L Final     Comment:     *Result may be interfered by visible hemolysis     ALT   Date Value Ref Range Status   09/10/2022 20 10 - 44 U/L Final     Anion Gap   Date Value Ref Range Status   09/10/2022 9 8 - 16 mmol/L Final     eGFR if    Date Value Ref Range Status   07/29/2022 >60 >60  mL/min/1.73 m^2 Final     eGFR if non    Date Value Ref Range Status   07/29/2022 >60 >60 mL/min/1.73 m^2 Final     Comment:     Calculation used to obtain the estimated glomerular filtration  rate (eGFR) is the CKD-EPI equation.          Imaging:     CT Abdomen/Pelvis 9/10/22    1. Left adnexal minimally complex cystic lesion measuring 6.6 cm in greatest dimension.  No inflammatory changes identified.  Differential diagnosis is broad including hydrosalpinx, serous and mucinous cystadenoma, hemorrhagic ovarian cyst or endometrioma.  Pelvic ultrasound can be performed for further initial assessment.  2. Small focus of air adjacent to the inferior aspect of the left sacroiliac joint of uncertain significance.  Given the history of left lower quadrant pain, consider evaluation to exclude sacroiliitis, if clinically indicated.  3. Markedly distended stomach with fluid and muscular hypertrophy of the gastric pylorus.  Consider further evaluation for PUD, as clinically indicated.  4. Mildly distended appendix (7 mm) containing a 5 mm appendicolith.  No periappendiceal inflammatory change to suggest acute inflammatory process.  Presence of appendicolith with mild appendiceal distention could represent a subacute or chronic appendicitis.      ASSESSMENT/PLAN:     Tessa was seen today for consult.    Diagnoses and all orders for this visit:    Appendicitis, unspecified appendicitis type  -     Ambulatory referral/consult to General Surgery      Tessa Wynne is a 46 y.o. female who presents to the clinic after being referred by her primary care doctor last week for concern of appendicitis. She visited the Emergency Room 2 weeks ago complaining of abdominal pain, nausea, diarrhea, and chills, and CT of the abdomen and pelvis was obtained. Based on CT results revealing no inflammatory changes suggesting any acute inflammatory process, and the fact that her condition has improved, there is no pressing need for  surgery at this time. She will be undergoing surgery October 16th for her pituitary adenoma, and is following up with her gynecologist regarding her adnexal mass.     - Patient to complete course of Cipro and Flagyl that were prescribed by primary care physician  - Patient to follow up with gynecologist, if she undergoes gynecologic surgery, consider removal of appendix at the same time.   - Patient to undergo procedure 10/16/22 for pituitary adenoma  - Patient instructed to call clinic with any questions, concerns, or new symptoms  - Patient understands and in agreement with plan; all questions answered    Case discussed with Dr. Maryjo Sherman, MS4  General Surgery  [facility]

## 2022-09-20 NOTE — H&P (VIEW-ONLY)
Otolaryngology - Head and Neck Surgery New Patient Visit    9/20/2022    Referring Provider: Self, Aaareferral    Chief Complaint   Patient presents with    Pre-op Exam    c/o head and ear pain (both)       History of Present Illness, Otolaryngology Specialty-Specific Exam, and Assessment and Plan:     Tessa Wynne is a 46 y.o. female who presents for evaluation of a pituitary mass, which was discovered on an MRI after a syncopal event. She complains of 1 year of non specific head aches. She denies vision changes, but has been seen by ophthalmology that shows some bitemporal depression. She has been treated with no medications for this in the past. She has  been evaluated by neurosurgery, neuroendocrinology and neurophthalmology . She is here for preoperative evaluation. She denies any recurrent sinus infections or epistaxis, no reports of nasal congestion. She denies previous skullbase surgery. She denies snoring.    She had a MRI of the brain  done on 8/15/22 which I reviewed along with the associated radiology report.    On exam today, the ears are normal. The oral cavity is clear. The neck is clear. The nasopharynx, hypopharynx, and larynx are normal. Nasal endoscopy reveals right sided septal deviation with spur. Hypertrophic inferior turbinates bilaterally. No nasal masses or nasal polyposis. No purulent drainage in the middle meatus. Nasopharynx clear without lesions. Eustachian tubes WNL.     Impression today is pituitary mass with suprasellar extention and optic chiasm compression . I have recommended that she undergo endoscopic removal and decompression with Dr Ferrell on 10/13/22.     She would benefit from an endoscopic endonasal transsphenoidal approach to the pituitary for the treatment of her condition.  I discussed the risks, benefits and alternatives to surgery with the patient, as well as the expected postoperative course.  I gave her the opportunity to ask questions and I answered all of them.   I provided relevant printed information on her condition for her to review at home.  Postoperative admission is anticipated for neuromonitoring.  She may have anesthesia triage by telephone.   The surgery will be tentatively scheduled for 10/13/22.  We will coordinate the surgery with Dr. Ferrell.  She will need to return for a postoperative visit 1 week after surgery.      Thank you for allowing us to participate in the care of your patient. We will continue to keep you informed of her progress.    Sincerely yours,    Ed Astorga MD      Objective     Physical Examination  Vitals -  weight is 76.1 kg (167 lb 12.3 oz). Her blood pressure is 106/71 and her pulse is 67.   Constitutional - General appearance: Normal. Ability to communicate: Normal.  Head & Face - Overall appearance, scars, masses: Normal. Palpation &/or percussion of face: Normal. Salivary glands: Normal. Facial strength: Normal  ENMT - Otoscopic exam: Normal. Assessment of hearing: Normal. External inspection: Normal. Nasal mucosa, septum, turbinates: Abnormal see exam details. Lips, teeth, gums: Normal. Oropharynx: Normal. Pharyngeal walls/pyriform sinus: Normal. Larynx: Normal. Nasopharynx: Normal  Neck - Neck: Normal. Thyroid: Normal  Lymphatic - Palpation of lymph nodes: Normal  Eyes - Ocular mobility: Normal  Respiratory - Inspection of Chest: Normal  Cardiovascular - Peripheral vascular system: Normal  Neurological/Psychiatric - Orientation: Normal    Review of Systems  A complete review of systems was obtained 09/20/2022 and reviewed.  The review of systems is negative for symptoms except as described above.    /71   Pulse 67   Wt 76.1 kg (167 lb 12.3 oz)   BMI 28.80 kg/m²      Nasal Endoscopy:  9/20/2022    The use of diagnostic nasal endoscopy was considered medically necessary for the evaluation and visualization of the nasal anatomy for symptoms suggestive of nasal or sinus origin. Physical examination (including a nasal speculum  evaluation) did not provide sufficient clinical information to establish a diagnosis, or symptoms did not improve or worsened following treatment.     The nasal cavity was decongested with topical 1% phenylephrine and anesthetized with 4% lidocaine.  A rigid 0-degree endoscope was introduced into the nasal cavity.    The patient was seated in the examination chair. After discussion of risks and benefits, a nasal endoscope was inserted into the nose the endoscope was passed along the left nasal floor to the nasopharynx. It was then passed between the middle and superior meatus, nasal turbinates, nasal septum, nasopharynx and sphenoethmoid region. The nasal endoscope was withdrawn and there was no complications. An identical procedure was performed on the right side. I was present for the entire procedure.The patient tolerated the above procedure well. The findings of this procedure can be found in the dictated note from 9/20/2022 visit.                            Data Reviewed    WBC (K/uL)   Date Value   09/10/2022 11.82     Eosinophil % (%)   Date Value   09/10/2022 0.3     Eos # (K/uL)   Date Value   09/10/2022 0.0     Platelets (K/uL)   Date Value   09/10/2022 225     Glucose (mg/dL)   Date Value   09/10/2022 101     No results found for: IGE    I independently reviewed the images of the MRI brain dated 8/15/22. Pertinent findings include large pituitary mass with suprasellar extension with compression of the TANISHA and optic chiasm. Some minimal extension into the cavernous sinus.

## 2022-09-20 NOTE — ASSESSMENT & PLAN NOTE
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  Currently treated with Levothyroxine  TSH:  normal but T4 is low  Denies Hx of nodules. Denies heat/cold intolerance.   Followed by Endocrinology;last seen 9/6/22

## 2022-09-20 NOTE — ASSESSMENT & PLAN NOTE
Seen in the ER two weeks for abdominal pain, nausea, chills. CT abdomen 9/10/22 revealed mildly distended appendix.   Seen by General Surgery today and since patient's condition improved and CT did not reveal any inflammatory changes- there is no need for surgery.

## 2022-09-20 NOTE — ASSESSMENT & PLAN NOTE
Currently being treated with Protonix; controlled.   Encouraged to elevate HOB and avoid laying down for 2-3 hours after meals.

## 2022-09-21 ENCOUNTER — OFFICE VISIT (OUTPATIENT)
Dept: INTERNAL MEDICINE | Facility: CLINIC | Age: 47
End: 2022-09-21
Payer: MEDICAID

## 2022-09-21 ENCOUNTER — HOSPITAL ENCOUNTER (OUTPATIENT)
Dept: CARDIOLOGY | Facility: CLINIC | Age: 47
Discharge: HOME | End: 2022-09-21
Payer: MEDICAID

## 2022-09-21 VITALS
TEMPERATURE: 98 F | BODY MASS INDEX: 28.17 KG/M2 | WEIGHT: 165 LBS | OXYGEN SATURATION: 99 % | DIASTOLIC BLOOD PRESSURE: 72 MMHG | HEIGHT: 64 IN | SYSTOLIC BLOOD PRESSURE: 126 MMHG | HEART RATE: 65 BPM

## 2022-09-21 DIAGNOSIS — R73.9 HYPERGLYCEMIA: ICD-10-CM

## 2022-09-21 DIAGNOSIS — K37 APPENDICITIS, UNSPECIFIED APPENDICITIS TYPE: ICD-10-CM

## 2022-09-21 DIAGNOSIS — E03.8 OTHER SPECIFIED HYPOTHYROIDISM: ICD-10-CM

## 2022-09-21 DIAGNOSIS — I73.9 PERIPHERAL ARTERY DISEASE: ICD-10-CM

## 2022-09-21 DIAGNOSIS — R00.2 HEART PALPITATIONS: ICD-10-CM

## 2022-09-21 DIAGNOSIS — E23.7 PITUITARY LESION: ICD-10-CM

## 2022-09-21 DIAGNOSIS — M79.7 FIBROMYALGIA: ICD-10-CM

## 2022-09-21 DIAGNOSIS — K27.9 PUD (PEPTIC ULCER DISEASE): ICD-10-CM

## 2022-09-21 PROCEDURE — 93010 ELECTROCARDIOGRAM REPORT: CPT | Mod: S$PBB,,, | Performed by: INTERNAL MEDICINE

## 2022-09-21 PROCEDURE — 93005 ELECTROCARDIOGRAM TRACING: CPT | Mod: PBBFAC | Performed by: INTERNAL MEDICINE

## 2022-09-21 PROCEDURE — 3078F DIAST BP <80 MM HG: CPT | Mod: CPTII,,, | Performed by: NURSE PRACTITIONER

## 2022-09-21 PROCEDURE — 3074F PR MOST RECENT SYSTOLIC BLOOD PRESSURE < 130 MM HG: ICD-10-PCS | Mod: CPTII,,, | Performed by: NURSE PRACTITIONER

## 2022-09-21 PROCEDURE — 3078F PR MOST RECENT DIASTOLIC BLOOD PRESSURE < 80 MM HG: ICD-10-PCS | Mod: CPTII,,, | Performed by: NURSE PRACTITIONER

## 2022-09-21 PROCEDURE — 3044F PR MOST RECENT HEMOGLOBIN A1C LEVEL <7.0%: ICD-10-PCS | Mod: CPTII,,, | Performed by: NURSE PRACTITIONER

## 2022-09-21 PROCEDURE — 99214 OFFICE O/P EST MOD 30 MIN: CPT | Mod: S$PBB,,, | Performed by: NURSE PRACTITIONER

## 2022-09-21 PROCEDURE — 93010 EKG 12-LEAD: ICD-10-PCS | Mod: S$PBB,,, | Performed by: INTERNAL MEDICINE

## 2022-09-21 PROCEDURE — 3074F SYST BP LT 130 MM HG: CPT | Mod: CPTII,,, | Performed by: NURSE PRACTITIONER

## 2022-09-21 PROCEDURE — 99999 PR PBB SHADOW E&M-EST. PATIENT-LVL III: ICD-10-PCS | Mod: PBBFAC,,, | Performed by: NURSE PRACTITIONER

## 2022-09-21 PROCEDURE — 1160F PR REVIEW ALL MEDS BY PRESCRIBER/CLIN PHARMACIST DOCUMENTED: ICD-10-PCS | Mod: CPTII,,, | Performed by: NURSE PRACTITIONER

## 2022-09-21 PROCEDURE — 99213 OFFICE O/P EST LOW 20 MIN: CPT | Mod: PBBFAC | Performed by: NURSE PRACTITIONER

## 2022-09-21 PROCEDURE — 1159F MED LIST DOCD IN RCRD: CPT | Mod: CPTII,,, | Performed by: NURSE PRACTITIONER

## 2022-09-21 PROCEDURE — 3008F BODY MASS INDEX DOCD: CPT | Mod: CPTII,,, | Performed by: NURSE PRACTITIONER

## 2022-09-21 PROCEDURE — 99999 PR PBB SHADOW E&M-EST. PATIENT-LVL III: CPT | Mod: PBBFAC,,, | Performed by: NURSE PRACTITIONER

## 2022-09-21 PROCEDURE — 3044F HG A1C LEVEL LT 7.0%: CPT | Mod: CPTII,,, | Performed by: NURSE PRACTITIONER

## 2022-09-21 PROCEDURE — 99214 PR OFFICE/OUTPT VISIT, EST, LEVL IV, 30-39 MIN: ICD-10-PCS | Mod: S$PBB,,, | Performed by: NURSE PRACTITIONER

## 2022-09-21 PROCEDURE — 1160F RVW MEDS BY RX/DR IN RCRD: CPT | Mod: CPTII,,, | Performed by: NURSE PRACTITIONER

## 2022-09-21 PROCEDURE — 1159F PR MEDICATION LIST DOCUMENTED IN MEDICAL RECORD: ICD-10-PCS | Mod: CPTII,,, | Performed by: NURSE PRACTITIONER

## 2022-09-21 PROCEDURE — 3008F PR BODY MASS INDEX (BMI) DOCUMENTED: ICD-10-PCS | Mod: CPTII,,, | Performed by: NURSE PRACTITIONER

## 2022-09-21 NOTE — HPI
This is a 46 y.o. female  who presents today with  for a preoperative evaluation in preparation for a Neurosurgery  procedure.  Scheduled for excision of pituitary tumor- transsphenoidal approach.    Surgery is indicated for  pituitary macroadenoma.   Patient is new to me.  Details of current problem: The duration of problem is  2 years.  An MRI brain was done 8/15/22 after a syncopal episode and headaches. The MRI revealed a large sellar/suprasellar lesion with a mass effect on the optic chiasm. She is being followed by ophthalmology and Endocrinology.   Reports symptoms of  intermittent headaches, ear discomfort, neck swelling, and blurred vision.  There are no aggravating or relieving factors.  Denies pain today.    The history has been obtained by speaking with the patient and reviewing the electronic medical record and/or outside health information. Significant health conditions for the perioperative period are discussed below in assessment and plan.     Patient reports current health status to be Fair.  Denies any new symptoms before surgery.

## 2022-09-21 NOTE — PRE-PROCEDURE INSTRUCTIONS
PreOp Instructions given via :    -- Medication information (what to hold and what to take)   -- NPO guidelines as follows: (or as per your Surgeon)  Stop ALL solid food, gum, candy (including vitamins) 8 hours before surgery/procedure time.  Stop all CLOUDY liquids: coffee with creamer, cloudy juices, 6 hours prior to surgery/procedure  time.  The patient should be ENCOURAGED to drink carbohydrate-rich clear liquids (sports drinks, clear juices) until 2 hours prior to surgery/procedure  time.  CLEAR liquids include only water, black coffee NO creamer, clear oral rehydration drinks, clear sports drinks or clear fruit juices (no orange juice, no pulpy juices, no apple cider).   IF IN DOUBT, drink water instead.   NOTHING TO DRINK 2 hours before to surgery/procedure  time. If you are told to take medication on the morning of surgery, it may be taken with a sip of water.   -- Arrival place and directions given; time to be given the day before procedure by the Surgeon's Office   -- Bathing with antibacterial soap   -- Don't wear any jewelry or bring any valuables AM of surgery   -- No makeup or moisturizer to face   -- No perfume/cologne, powder, lotions or aftershave     Pt verbalized understanding via

## 2022-09-21 NOTE — OUTPATIENT SUBJECTIVE & OBJECTIVE
Outpatient Subjective & Objective      Chief Complaint: Preoperative evaulation, perioperative medical management, and complication reduction plan.     Functional Capacity: Works out at gym 5-6 days weekly doing cardio/weights without CP/SOB.       Anesthesia issues: None    Difficulty mouth opening: No    Steroid use in the last 12 months:  No    Dental Issues: No    Family anesthesia difficulty: None       Family Hx of Thrombosis: None    Past Medical History:   Diagnosis Date    Elevated fasting glucose     GERD (gastroesophageal reflux disease)     Hypothyroidism     PAD (peripheral artery disease)          Past Medical History Pertinent Negatives:   Diagnosis Date Noted    Anemia 09/21/2022    Anxiety 09/21/2022    Asthma 09/21/2022    Atypical hyperplasia of breast 01/04/2022    Breast cancer 01/04/2022    Colon cancer 01/04/2022    COPD (chronic obstructive pulmonary disease) 09/21/2022    Coronary artery disease 09/21/2022    Deep vein thrombosis 09/21/2022    Depression 09/21/2022    Diabetes mellitus, type 2 09/21/2022    Disorder of kidney and ureter 09/21/2022    Endometrial cancer 01/04/2022    Hypertension 09/21/2022    Lobular carcinoma in situ 01/04/2022    Myocardial infarction 09/21/2022    Ovarian cancer 01/04/2022    Pulmonary embolism 09/21/2022    Seizures 09/21/2022    Stroke 09/21/2022    Usual hyperplasia of lactiferous duct 01/04/2022         Past Surgical History:   Procedure Laterality Date    CHOLECYSTECTOMY      ENDOMETRIAL ABLATION N/A 2/8/2022    Procedure: ABLATION, ENDOMETRIUM;  Surgeon: Ariel Butler MD;  Location: [facility] OR;  Service: OB/GYN;  Laterality: N/A;    HYSTEROSCOPY WITH DILATION AND CURETTAGE OF UTERUS N/A 2/8/2022    Procedure: HYSTEROSCOPY, WITH DILATION AND CURETTAGE OF UTERUS;  Surgeon: Ariel Butler MD;  Location: [facility] OR;  Service: OB/GYN;  Laterality: N/A;       Review of Systems   Constitutional:  Negative for chills, fatigue, fever and unexpected  "weight change.   HENT:  Negative for dental problem, hearing loss, postnasal drip, rhinorrhea, sore throat, tinnitus and trouble swallowing.    Eyes:  Positive for visual disturbance (blurred vision). Negative for photophobia, pain and discharge.   Respiratory:  Negative for apnea, cough, chest tightness, shortness of breath and wheezing.    Cardiovascular:  Positive for palpitations (not new). Negative for chest pain and leg swelling.   Gastrointestinal:  Negative for abdominal pain, blood in stool, constipation, nausea and vomiting.        Reports Fatty liver   Endocrine: Negative for cold intolerance and heat intolerance.   Genitourinary:  Negative for decreased urine volume, difficulty urinating, dysuria, frequency, hematuria and urgency.        Nocturia x 2   Musculoskeletal:  Negative for arthralgias, back pain, neck pain and neck stiffness.   Skin:  Negative for rash and wound.   Neurological:  Positive for syncope (one episode) and headaches (intermittent- attributes to pituitary lesion). Negative for dizziness, tremors, seizures, weakness and numbness.   Hematological:  Negative for adenopathy. Bruises/bleeds easily.   Psychiatric/Behavioral:  Negative for confusion, sleep disturbance and suicidal ideas.             VITALS  Visit Vitals  /72 (BP Location: Left arm, Patient Position: Sitting)   Pulse 65   Temp 98.2 °F (36.8 °C) (Oral)   Ht 5' 4" (1.626 m)   Wt 74.8 kg (165 lb)   SpO2 99%   BMI 28.32 kg/m²          Physical Exam  Vitals reviewed.   Constitutional:       General: She is not in acute distress.     Appearance: She is well-developed.   HENT:      Head: Normocephalic.      Nose: Nose normal.      Mouth/Throat:      Pharynx: No oropharyngeal exudate.   Eyes:      General:         Right eye: No discharge.         Left eye: No discharge.      Conjunctiva/sclera: Conjunctivae normal.      Pupils: Pupils are equal, round, and reactive to light.   Neck:      Thyroid: No thyromegaly.      Vascular: " No carotid bruit or JVD.      Trachea: No tracheal deviation.   Cardiovascular:      Rate and Rhythm: Normal rate and regular rhythm.      Pulses:           Carotid pulses are 2+ on the right side and 2+ on the left side.       Dorsalis pedis pulses are 2+ on the right side and 2+ on the left side.        Posterior tibial pulses are 2+ on the right side and 2+ on the left side.      Heart sounds: Normal heart sounds. No murmur heard.  Pulmonary:      Effort: Pulmonary effort is normal. No respiratory distress.      Breath sounds: Normal breath sounds. No stridor. No wheezing, rhonchi or rales.   Abdominal:      General: Bowel sounds are normal. There is no distension.      Palpations: Abdomen is soft.      Tenderness: There is no abdominal tenderness. There is no guarding.   Musculoskeletal:      Cervical back: Normal range of motion.      Right lower leg: No edema.      Left lower leg: No edema.   Lymphadenopathy:      Cervical: No cervical adenopathy.   Skin:     General: Skin is warm and dry.      Capillary Refill: Capillary refill takes less than 2 seconds.      Findings: No erythema or rash.   Neurological:      Mental Status: She is alert and oriented to person, place, and time.      Coordination: Coordination normal.        Significant Labs:  Lab Results   Component Value Date    WBC 11.82 09/10/2022    HGB 14.1 09/10/2022    HCT 40.5 09/10/2022     09/10/2022    CHOL 127 07/29/2022    TRIG 63 07/29/2022    HDL 57 07/29/2022    ALT 20 09/10/2022    AST 37 09/10/2022     (L) 09/10/2022    K 4.7 09/10/2022     09/10/2022    CREATININE 0.8 09/10/2022    BUN 16 09/10/2022    CO2 24 09/10/2022    TSH 2.402 08/26/2022    INR 0.9 06/11/2021    HGBA1C 6.1 (H) 07/29/2022       Diagnostic Studies: No relevant studies.    EKG:   Results for orders placed or performed during the hospital encounter of 09/21/22   EKG 12-lead    Collection Time: 09/21/22  9:49 AM    Narrative    Test Reason :  R00.2,    Vent. Rate : 055 BPM     Atrial Rate : 055 BPM     P-R Int : 132 ms          QRS Dur : 078 ms      QT Int : 378 ms       P-R-T Axes : 027 052 025 degrees     QTc Int : 361 ms    Sinus bradycardia  Otherwise normal ECG  When compared with ECG of 20-APR-2021 23:56,  No significant change was found  Confirmed by Katarzyna FIERRO, Olivia ([id number]) on 9/21/2022 12:18:32 PM    Referred By: OLI ALVAREZ           Confirmed By:Olivia Colón MD         Imaging     CT Head 9/10/22  Impression:     Large sellar mass with suprasellar extension, as above, with mass effect better characterized on recent MRI brain 08/15/2022.  As with recent prior, recommend correlation with pituitary labs, dedicated MRI pituitary protocol and follow-up with neuro surgery.     This report was flagged in Epic as abnormal.     Electronically signed by resident: Kailash Eller  Date:                                            09/10/2022  Time:                                           02:55        MRI C-Spine 8/15/22  Impression:     Multilevel degenerative changes, most advanced at C5-6 where there is moderate canal stenosis and mild right foraminal stenosis.     Partially imaged large sellar mass, better characterized on same day brain MRI.     This report was flagged in Epic as abnormal.        Electronically signed by: Hussain Kearney  Date:                                            08/16/2022  Time:                                           09:21      MRI Brain 8/15/22  Impression:     Large sellar/suprasellar lesion primarily worrisome for pituitary macroadenoma.  There is mass effect on the optic chiasm and possible extension into the cavernous sinus bilaterally.  Recommend correlation with pituitary labs, dedicated MRI pituitary protocol and follow-up with neurosurgery.     This report was flagged in Epic as abnormal.        Electronically signed by: Dontrell Lan MD  Date:                                            08/16/2022  Time:                                            10:08          Active Cardiac Conditions: None      Revised Cardiac Risk Index   High -Risk Surgery  Intraperitoneal; Intrathoracic; suprainguinal vascular Yes- + 1 No- 0   History of Ischemic Heart Disease   (Hx of MI/positive exercise test/current chest pain due to ischemia/use of nitrate therapy/EKG with pathological Q waves) Yes- + 1 No- 0   History of CHF  (Pulmonary edema/bilateral rales or S3 gallop/PND/CXR showing pulmonary vascular redistribution) Yes- + 1 No- 0   History of CVA   (Prior stroke or TIA) Yes- + 1 No- 0   Pre-operative treatment with insulin Yes- + 1 No- 0   Pre-operative creatinine > 2mg/dl Yes- + 1 No- 0   Total: 0      Risk Status:  Estimated risk of cardiac complications after non-cardiac surgery using the Revised Cardiac Risk Index for Preoperative risk is 3.9 %      ARISCAT (Canet) risk index: Low: 1.6% risk of post-op pulmonary complications.    American Society of Anesthesiologists Physical Status classification (ASA): 2               Will get EKG for palpitations    Outpatient Subjective & Objective

## 2022-09-21 NOTE — ASSESSMENT & PLAN NOTE
Not currently treated with ASA/statin  She is not followed by Vascular/Cardiology  Denies leg pain, claudication, or leg swelling      Mild per NASIMA results in 2020   Normal resting NASIMA's.   Triphasic waveforms throughout.   Exercise NASIMA's are not increased consistent with mild bilateral LE PAD.

## 2022-09-21 NOTE — PROGRESS NOTES
[facility]  Progress Note    Patient Name: Tessa Wynne  MRN: [id number]  Date of Evaluation- 09/22/2022  PCP- Sabiha Overton MD    Future cases for Tessa Wynne [[id number]]       Case ID Status Date Time [facility] Procedure Provider Location    [id number] [facility] 10/13/2022  7:00  EXCISION, NEOPLASM, PITUITARY, TRANSSPHENOIDAL APPROACH, USING COMPUTER-ASSISTED NAVIGATION dE Astorga MD [[id number]] [facility] OR 2ND FLR            HPI:  This is a 46 y.o. female  who presents today with  for a preoperative evaluation in preparation for a Neurosurgery  procedure.  Scheduled for excision of pituitary tumor- transsphenoidal approach.    Surgery is indicated for  pituitary macroadenoma.   Patient is new to me.  Details of current problem: The duration of problem is  2 years.  An MRI brain was done 8/15/22 after a syncopal episode and headaches. The MRI revealed a large sellar/suprasellar lesion with a mass effect on the optic chiasm. She is being followed by ophthalmology and Endocrinology.   Reports symptoms of  intermittent headaches, ear discomfort, neck swelling, and blurred vision.  There are no aggravating or relieving factors.  Denies pain today.    The history has been obtained by speaking with the patient and reviewing the electronic medical record and/or outside health information. Significant health conditions for the perioperative period are discussed below in assessment and plan.     Patient reports current health status to be Fair.  Denies any new symptoms before surgery.        Subjective/ Objective:     Chief Complaint: Preoperative evaulation, perioperative medical management, and complication reduction plan.     Functional Capacity: Works out at gym 5-6 days weekly doing cardio/weights without CP/SOB.       Anesthesia issues: None    Difficulty mouth opening: No    Steroid use in the last 12 months:  No    Dental Issues: No    Family anesthesia difficulty: None       Family Hx of  Thrombosis: None    Past Medical History:   Diagnosis Date    Elevated fasting glucose     GERD (gastroesophageal reflux disease)     Hypothyroidism     PAD (peripheral artery disease)          Past Medical History Pertinent Negatives:   Diagnosis Date Noted    Anemia 09/21/2022    Anxiety 09/21/2022    Asthma 09/21/2022    Atypical hyperplasia of breast 01/04/2022    Breast cancer 01/04/2022    Colon cancer 01/04/2022    COPD (chronic obstructive pulmonary disease) 09/21/2022    Coronary artery disease 09/21/2022    Deep vein thrombosis 09/21/2022    Depression 09/21/2022    Diabetes mellitus, type 2 09/21/2022    Disorder of kidney and ureter 09/21/2022    Endometrial cancer 01/04/2022    Hypertension 09/21/2022    Lobular carcinoma in situ 01/04/2022    Myocardial infarction 09/21/2022    Ovarian cancer 01/04/2022    Pulmonary embolism 09/21/2022    Seizures 09/21/2022    Stroke 09/21/2022    Usual hyperplasia of lactiferous duct 01/04/2022         Past Surgical History:   Procedure Laterality Date    CHOLECYSTECTOMY      ENDOMETRIAL ABLATION N/A 2/8/2022    Procedure: ABLATION, ENDOMETRIUM;  Surgeon: Ariel Butler MD;  Location: [facility] OR;  Service: OB/GYN;  Laterality: N/A;    HYSTEROSCOPY WITH DILATION AND CURETTAGE OF UTERUS N/A 2/8/2022    Procedure: HYSTEROSCOPY, WITH DILATION AND CURETTAGE OF UTERUS;  Surgeon: Ariel Butler MD;  Location: [facility] OR;  Service: OB/GYN;  Laterality: N/A;       Review of Systems   Constitutional:  Negative for chills, fatigue, fever and unexpected weight change.   HENT:  Negative for dental problem, hearing loss, postnasal drip, rhinorrhea, sore throat, tinnitus and trouble swallowing.    Eyes:  Positive for visual disturbance (blurred vision). Negative for photophobia, pain and discharge.   Respiratory:  Negative for apnea, cough, chest tightness, shortness of breath and wheezing.    Cardiovascular:  Positive for palpitations (not new). Negative for chest pain and leg  "swelling.   Gastrointestinal:  Negative for abdominal pain, blood in stool, constipation, nausea and vomiting.        Reports Fatty liver   Endocrine: Negative for cold intolerance and heat intolerance.   Genitourinary:  Negative for decreased urine volume, difficulty urinating, dysuria, frequency, hematuria and urgency.        Nocturia x 2   Musculoskeletal:  Negative for arthralgias, back pain, neck pain and neck stiffness.   Skin:  Negative for rash and wound.   Neurological:  Positive for syncope (one episode) and headaches (intermittent- attributes to pituitary lesion). Negative for dizziness, tremors, seizures, weakness and numbness.   Hematological:  Negative for adenopathy. Bruises/bleeds easily.   Psychiatric/Behavioral:  Negative for confusion, sleep disturbance and suicidal ideas.             VITALS  Visit Vitals  /72 (BP Location: Left arm, Patient Position: Sitting)   Pulse 65   Temp 98.2 °F (36.8 °C) (Oral)   Ht 5' 4" (1.626 m)   Wt 74.8 kg (165 lb)   SpO2 99%   BMI 28.32 kg/m²          Physical Exam  Vitals reviewed.   Constitutional:       General: She is not in acute distress.     Appearance: She is well-developed.   HENT:      Head: Normocephalic.      Nose: Nose normal.      Mouth/Throat:      Pharynx: No oropharyngeal exudate.   Eyes:      General:         Right eye: No discharge.         Left eye: No discharge.      Conjunctiva/sclera: Conjunctivae normal.      Pupils: Pupils are equal, round, and reactive to light.   Neck:      Thyroid: No thyromegaly.      Vascular: No carotid bruit or JVD.      Trachea: No tracheal deviation.   Cardiovascular:      Rate and Rhythm: Normal rate and regular rhythm.      Pulses:           Carotid pulses are 2+ on the right side and 2+ on the left side.       Dorsalis pedis pulses are 2+ on the right side and 2+ on the left side.        Posterior tibial pulses are 2+ on the right side and 2+ on the left side.      Heart sounds: Normal heart sounds. No " murmur heard.  Pulmonary:      Effort: Pulmonary effort is normal. No respiratory distress.      Breath sounds: Normal breath sounds. No stridor. No wheezing, rhonchi or rales.   Abdominal:      General: Bowel sounds are normal. There is no distension.      Palpations: Abdomen is soft.      Tenderness: There is no abdominal tenderness. There is no guarding.   Musculoskeletal:      Cervical back: Normal range of motion.      Right lower leg: No edema.      Left lower leg: No edema.   Lymphadenopathy:      Cervical: No cervical adenopathy.   Skin:     General: Skin is warm and dry.      Capillary Refill: Capillary refill takes less than 2 seconds.      Findings: No erythema or rash.   Neurological:      Mental Status: She is alert and oriented to person, place, and time.      Coordination: Coordination normal.        Significant Labs:  Lab Results   Component Value Date    WBC 11.82 09/10/2022    HGB 14.1 09/10/2022    HCT 40.5 09/10/2022     09/10/2022    CHOL 127 07/29/2022    TRIG 63 07/29/2022    HDL 57 07/29/2022    ALT 20 09/10/2022    AST 37 09/10/2022     (L) 09/10/2022    K 4.7 09/10/2022     09/10/2022    CREATININE 0.8 09/10/2022    BUN 16 09/10/2022    CO2 24 09/10/2022    TSH 2.402 08/26/2022    INR 0.9 06/11/2021    HGBA1C 6.1 (H) 07/29/2022       Diagnostic Studies: No relevant studies.    EKG:   Results for orders placed or performed during the hospital encounter of 09/21/22   EKG 12-lead    Collection Time: 09/21/22  9:49 AM    Narrative    Test Reason : R00.2,    Vent. Rate : 055 BPM     Atrial Rate : 055 BPM     P-R Int : 132 ms          QRS Dur : 078 ms      QT Int : 378 ms       P-R-T Axes : 027 052 025 degrees     QTc Int : 361 ms    Sinus bradycardia  Otherwise normal ECG  When compared with ECG of 20-APR-2021 23:56,  No significant change was found  Confirmed by Olivia Colón MD ([id number]) on 9/21/2022 12:18:32 PM    Referred By: OLI ALVAREZ           Confirmed By:Olivia  Katarzyna FIERRO         Imaging     CT Head 9/10/22  Impression:     Large sellar mass with suprasellar extension, as above, with mass effect better characterized on recent MRI brain 08/15/2022.  As with recent prior, recommend correlation with pituitary labs, dedicated MRI pituitary protocol and follow-up with neuro surgery.     This report was flagged in Epic as abnormal.     Electronically signed by resident: Kailash Eller  Date:                                            09/10/2022  Time:                                           02:55        MRI C-Spine 8/15/22  Impression:     Multilevel degenerative changes, most advanced at C5-6 where there is moderate canal stenosis and mild right foraminal stenosis.     Partially imaged large sellar mass, better characterized on same day brain MRI.     This report was flagged in Epic as abnormal.        Electronically signed by: Hussain Kearney  Date:                                            08/16/2022  Time:                                           09:21      MRI Brain 8/15/22  Impression:     Large sellar/suprasellar lesion primarily worrisome for pituitary macroadenoma.  There is mass effect on the optic chiasm and possible extension into the cavernous sinus bilaterally.  Recommend correlation with pituitary labs, dedicated MRI pituitary protocol and follow-up with neurosurgery.     This report was flagged in Epic as abnormal.        Electronically signed by: Dontrell Lan MD  Date:                                            08/16/2022  Time:                                           10:08          Active Cardiac Conditions: None      Revised Cardiac Risk Index   High -Risk Surgery  Intraperitoneal; Intrathoracic; suprainguinal vascular Yes- + 1 No- 0   History of Ischemic Heart Disease   (Hx of MI/positive exercise test/current chest pain due to ischemia/use of nitrate therapy/EKG with pathological Q waves) Yes- + 1 No- 0   History of CHF  (Pulmonary edema/bilateral rales  or S3 gallop/PND/CXR showing pulmonary vascular redistribution) Yes- + 1 No- 0   History of CVA   (Prior stroke or TIA) Yes- + 1 No- 0   Pre-operative treatment with insulin Yes- + 1 No- 0   Pre-operative creatinine > 2mg/dl Yes- + 1 No- 0   Total: 0      Risk Status:  Estimated risk of cardiac complications after non-cardiac surgery using the Revised Cardiac Risk Index for Preoperative risk is 3.9 %      ARISCAT (Canet) risk index: Low: 1.6% risk of post-op pulmonary complications.    American Society of Anesthesiologists Physical Status classification (ASA): 2               Will get EKG for palpitations        Orders Placed This Encounter    EKG 12-lead           Assessment/Plan:     Heart palpitations  Not new- still have occasional symptoms.   Seen by Cardiology 2017 with recs for stress test since patient was having chest pain with palpitations. This was not done and she no longer is  followed with cardiology.  Patient works out 5-6 days weekly doing cardio and with weights without chest pain/shortness of breath.   Will get new EKG    Pituitary lesion  Scheduled with Dalia Ferrell and Rizwan on 10/13/22 for excision.    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  Currently treated with Levothyroxine  TSH:  normal but T4 is low  Denies Hx of nodules. Denies heat/cold intolerance.   Followed by Endocrinology;last seen 9/6/22    PUD (peptic ulcer disease)  Currently being treated with Protonix; controlled.   Encouraged to elevate HOB and avoid laying down for 2-3 hours after meals.       Appendicitis  Seen in the ER two weeks for abdominal pain, nausea, chills. CT abdomen 9/10/22 revealed mildly distended appendix.   Seen by General Surgery today and since patient's condition improved and CT did not reveal any inflammatory changes- there is no need for surgery.     Fibromyalgia  Occasional- generalized pain   Not currently treated    Peripheral artery disease  Not currently treated with  ASA/statin  She is not followed by Vascular/Cardiology  Denies leg pain, claudication, or leg swelling      Mild per NASIMA results in 2020  Normal resting NASIMA's.  Triphasic waveforms throughout.  Exercise NASIMA's are not increased consistent with mild bilateral LE PAD.           Hyperglycemia  A1c is 6.1 on 7/29/22 labs  Oral Glucose Tolerance Test normal in August 2022  I recommend monitoring patient's glucose level during the perioperative period. Glucose may be elevated from stress hyperglycemia and may require insulin.      Discussion/Management of Perioperative Care    Thromboembolic prophylaxis (VTE) Care: Risk factors for thrombosis include: surgical procedure.  I recommend prophylaxis of thromboembolism with the use of compression stockings/pneumatic devices, and/or pharmacologic agents. The benefits should outweigh the risks for pharmacologic prophylaxis in the perioperative period. I also encourage early ambulation if not contraindicated during the post-operative period.    Risk factors for post-operative pulmonary complications include:surgery > 3 hours. To reduce the risk of pulmonary complications, prophylactic recommendations include: incentive spirometry use/deep breathing, early ambulation, and pain control.      To reduce the risk of postoperative renal complications, I recommend the patient maintain adequate fluid volume status by drinking 2 liters of water daily.  Avoid/reduce NSAIDS and KIM-2 inhibitors use as well as IV contrast for renal protection.    I recommend the use of appropriate prophylactic antibiotics to reduce the risk of surgical site infections.           This visit was focused on Preoperative evaluation, Perioperative Medical management, complication reduction plans. I suggest that the patient follows up with primary care or relevant sub specialists for ongoing health care.    I appreciate the opportunity to be involved in this patients care. Please feel free to contact me if there  were any questions about this consultation.    Patient is optimized for surgery.         Marimar Noble NP  Perioperative Medicine  [facility]

## 2022-09-21 NOTE — ASSESSMENT & PLAN NOTE
A1c is 6.1 on 7/29/22 labs  Oral Glucose Tolerance Test normal in August 2022  I recommend monitoring patient's glucose level during the perioperative period. Glucose may be elevated from stress hyperglycemia and may require insulin.

## 2022-09-22 ENCOUNTER — NURSE TRIAGE (OUTPATIENT)
Dept: ADMINISTRATIVE | Facility: CLINIC | Age: 47
End: 2022-09-22
Payer: MEDICAID

## 2022-09-22 ENCOUNTER — HOSPITAL ENCOUNTER (EMERGENCY)
Facility: HOSPITAL | Age: 47
Discharge: HOME | End: 2022-09-22
Attending: EMERGENCY MEDICINE
Payer: MEDICAID

## 2022-09-22 VITALS
TEMPERATURE: 99 F | OXYGEN SATURATION: 98 % | SYSTOLIC BLOOD PRESSURE: 123 MMHG | HEART RATE: 59 BPM | DIASTOLIC BLOOD PRESSURE: 58 MMHG | RESPIRATION RATE: 20 BRPM

## 2022-09-22 DIAGNOSIS — M79.10 MYALGIA: Primary | ICD-10-CM

## 2022-09-22 DIAGNOSIS — R52 BODY ACHES: ICD-10-CM

## 2022-09-22 DIAGNOSIS — R52 GENERALIZED PAIN: ICD-10-CM

## 2022-09-22 LAB
ALBUMIN SERPL BCP-MCNC: 4.3 G/DL (ref 3.5–5.2)
ALP SERPL-CCNC: 67 U/L (ref 55–135)
ALT SERPL W/O P-5'-P-CCNC: 48 U/L (ref 10–44)
ANION GAP SERPL CALC-SCNC: 11 MMOL/L (ref 8–16)
AST SERPL-CCNC: 42 U/L (ref 10–40)
BASOPHILS # BLD AUTO: 0.03 K/UL (ref 0–0.2)
BASOPHILS NFR BLD: 0.4 % (ref 0–1.9)
BILIRUB SERPL-MCNC: 0.5 MG/DL (ref 0.1–1)
BILIRUB UR QL STRIP: NEGATIVE
BUN SERPL-MCNC: 10 MG/DL (ref 6–20)
CALCIUM SERPL-MCNC: 9.3 MG/DL (ref 8.7–10.5)
CHLORIDE SERPL-SCNC: 99 MMOL/L (ref 95–110)
CK SERPL-CCNC: 100 U/L (ref 20–180)
CLARITY UR: CLEAR
CO2 SERPL-SCNC: 22 MMOL/L (ref 23–29)
COLOR UR: YELLOW
CREAT SERPL-MCNC: 0.9 MG/DL (ref 0.5–1.4)
DIFFERENTIAL METHOD: NORMAL
EOSINOPHIL # BLD AUTO: 0 K/UL (ref 0–0.5)
EOSINOPHIL NFR BLD: 0.6 % (ref 0–8)
ERYTHROCYTE [DISTWIDTH] IN BLOOD BY AUTOMATED COUNT: 13 % (ref 11.5–14.5)
EST. GFR  (NO RACE VARIABLE): >60 ML/MIN/1.73 M^2
GLUCOSE SERPL-MCNC: 88 MG/DL (ref 70–110)
GLUCOSE UR QL STRIP: NEGATIVE
HCT VFR BLD AUTO: 41 % (ref 37–48.5)
HGB BLD-MCNC: 13.7 G/DL (ref 12–16)
HGB UR QL STRIP: NEGATIVE
IMM GRANULOCYTES # BLD AUTO: 0.01 K/UL (ref 0–0.04)
IMM GRANULOCYTES NFR BLD AUTO: 0.1 % (ref 0–0.5)
KETONES UR QL STRIP: NEGATIVE
LEUKOCYTE ESTERASE UR QL STRIP: ABNORMAL
LYMPHOCYTES # BLD AUTO: 2.7 K/UL (ref 1–4.8)
LYMPHOCYTES NFR BLD: 39.9 % (ref 18–48)
MCH RBC QN AUTO: 30.2 PG (ref 27–31)
MCHC RBC AUTO-ENTMCNC: 33.4 G/DL (ref 32–36)
MCV RBC AUTO: 90 FL (ref 82–98)
MICROSCOPIC COMMENT: ABNORMAL
MONOCYTES # BLD AUTO: 0.5 K/UL (ref 0.3–1)
MONOCYTES NFR BLD: 6.8 % (ref 4–15)
NEUTROPHILS # BLD AUTO: 3.5 K/UL (ref 1.8–7.7)
NEUTROPHILS NFR BLD: 52.2 % (ref 38–73)
NITRITE UR QL STRIP: NEGATIVE
NRBC BLD-RTO: 0 /100 WBC
PH UR STRIP: 6 [PH] (ref 5–8)
PLATELET # BLD AUTO: 297 K/UL (ref 150–450)
PMV BLD AUTO: 10.6 FL (ref 9.2–12.9)
POTASSIUM SERPL-SCNC: 4.2 MMOL/L (ref 3.5–5.1)
PROT SERPL-MCNC: 7.7 G/DL (ref 6–8.4)
PROT UR QL STRIP: NEGATIVE
RBC # BLD AUTO: 4.54 M/UL (ref 4–5.4)
RBC #/AREA URNS HPF: 6 /HPF (ref 0–4)
SODIUM SERPL-SCNC: 132 MMOL/L (ref 136–145)
SP GR UR STRIP: 1.02 (ref 1–1.03)
SQUAMOUS #/AREA URNS HPF: 1 /HPF
TSH SERPL DL<=0.005 MIU/L-ACNC: 0.94 UIU/ML (ref 0.4–4)
URN SPEC COLLECT METH UR: ABNORMAL
UROBILINOGEN UR STRIP-ACNC: NEGATIVE EU/DL
WBC # BLD AUTO: 6.72 K/UL (ref 3.9–12.7)
WBC #/AREA URNS HPF: 9 /HPF (ref 0–5)

## 2022-09-22 PROCEDURE — 99285 EMERGENCY DEPT VISIT HI MDM: CPT | Mod: 25

## 2022-09-22 PROCEDURE — 85025 COMPLETE CBC W/AUTO DIFF WBC: CPT | Performed by: EMERGENCY MEDICINE

## 2022-09-22 PROCEDURE — 93010 EKG 12-LEAD: ICD-10-PCS | Mod: ,,, | Performed by: INTERNAL MEDICINE

## 2022-09-22 PROCEDURE — 84443 ASSAY THYROID STIM HORMONE: CPT | Performed by: EMERGENCY MEDICINE

## 2022-09-22 PROCEDURE — 82550 ASSAY OF CK (CPK): CPT | Performed by: EMERGENCY MEDICINE

## 2022-09-22 PROCEDURE — 81000 URINALYSIS NONAUTO W/SCOPE: CPT | Performed by: EMERGENCY MEDICINE

## 2022-09-22 PROCEDURE — 80053 COMPREHEN METABOLIC PANEL: CPT | Performed by: EMERGENCY MEDICINE

## 2022-09-22 PROCEDURE — 93005 ELECTROCARDIOGRAM TRACING: CPT

## 2022-09-22 PROCEDURE — 25000003 PHARM REV CODE 250: Performed by: EMERGENCY MEDICINE

## 2022-09-22 PROCEDURE — 93010 ELECTROCARDIOGRAM REPORT: CPT | Mod: ,,, | Performed by: INTERNAL MEDICINE

## 2022-09-22 RX ORDER — HYDROCODONE BITARTRATE AND ACETAMINOPHEN 5; 325 MG/1; MG/1
1 TABLET ORAL EVERY 6 HOURS PRN
Qty: 12 TABLET | Refills: 0 | Status: ON HOLD | OUTPATIENT
Start: 2022-09-22 | End: 2022-10-05 | Stop reason: HOSPADM

## 2022-09-22 RX ORDER — HYDROCODONE BITARTRATE AND ACETAMINOPHEN 5; 325 MG/1; MG/1
1 TABLET ORAL
Status: COMPLETED | OUTPATIENT
Start: 2022-09-22 | End: 2022-09-22

## 2022-09-22 RX ADMIN — HYDROCODONE BITARTRATE AND ACETAMINOPHEN 1 TABLET: 5; 325 TABLET ORAL at 06:09

## 2022-09-22 NOTE — ED NOTES
"Pt states numbness and tingling in right calf existed prior to "tumor," however after starting on new thyroid medicine, numbness and tingling spread to LLE, BUE, and back.  "

## 2022-09-22 NOTE — TELEPHONE ENCOUNTER
Reason for Disposition   Chest pain   Pain also present in shoulder(s) or arm(s) or jaw    Additional Information   Negative: Looks like a broken bone or dislocated joint (e.g., crooked or deformed)   Negative: Sounds like a life-threatening emergency to the triager   Negative: Shock suspected (e.g., cold/pale/clammy skin, too weak to stand, low BP, rapid pulse)   Negative: Difficult to awaken or acting confused (e.g., disoriented, slurred speech)   Negative: Sounds like a life-threatening emergency to the triager   Pain in one leg OR leg pains caused by recent vigorous activity (e.g., sports, lifting, overuse)   Negative: SEVERE difficulty breathing (e.g., struggling for each breath, speaks in single words)   Negative: Passed out (i.e., fainted, collapsed and was not responding)   Negative: Chest pain lasting longer than 5 minutes and ANY of the following:* Over 50 years old* Over 30 years old and at least one cardiac risk factor (i.e., high blood pressure, diabetes, high cholesterol, obesity, smoker or strong family history of heart disease)* Pain is crushing, pressure-like, or heavy * Took nitroglycerin and chest pain was not relieved* History of heart disease (i.e., angina, heart attack, bypass surgery, angioplasty, CHF)   Negative: Visible sweat on face or sweat dripping down face   Negative: Sounds like a life-threatening emergency to the triager   Negative: Followed an injury to chest   Negative: SEVERE chest pain    Protocols used: Muscle Aches and Body Pain-A-OH, Leg Pain-A-OH, Chest Pain-A-OH    Pt's son stated she is on the floor crying in pain. Stated pt has muscle pain all over. She also complains of chest pain and leg pain. Per triage protocol advised to bring pt to the ED now for evaluation. Caller verbalized understanding.

## 2022-09-22 NOTE — ED NOTES
Received report from Marilu KILPATRICK, pt ambulated with assistance, denies NV dizziness or HA. new set of VS obtained; son at bedside, call light in reach.

## 2022-09-22 NOTE — ED PROVIDER NOTES
Encounter Date: 9/22/2022       History     Chief Complaint   Patient presents with    Generalized Body Aches     Geneneralized body aches started last night, denies cough congestion and fever. Patient did not sleep last night. No recent exposure to anyone sick. Was recently started on antibiotics two weeks ago for apepdcitis. Patient completed levofloxin and flaygl. 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  No recent trauma.  Patient denies fever, chills, nausea, vomiting or diarrhea.  Patient is scheduled to undergo a transsphenoidal excision of a large sellar/suprasellar mass presumed to be a macro adenoma on October 13, 2022.  Patient also was seen in the emergency department on September 10, 2022 for generalized complaints including abdominal pain where upon a CT scan of the abdomen and pelvis exhibited a distended appendix.  Because patient exhibited no right lower quadrant abdominal tenderness and no periappendiceal inflammatory changes were appreciated, it was decided not to start antibiotics or pursue appendectomy. Her  PCP did initiate Levaquin and Flagyl for this abnormal appearing appendix on 9/10/22  CT scan when he saw patient in follow-up on  9/16/22.  Review of medical records show that patient underwent an MRI of her cervical spine on August 15, 2022 which exhibited multilevel degenerative changes, most advanced at C5/6 where there was moderate canal stenosis and mild right foraminal stenosis.  An MRI of the brain same date exhibited a large sellar/suprasellar lesion primarily worrisome for pituitary macro there was mass effect on the optic chiasm and possible extension into the cavernous sinus bilaterally.  Patient reports that she has experienced a similar body sensation  (usually in the lower extremities) intermittently for the past couple of years but now it seems to be more generalized.  He has had t walk gingerly.    Review of patient's allergies indicates:  No Known Allergies  Past Medical History:   Diagnosis Date    Elevated fasting glucose     GERD (gastroesophageal reflux disease)     Hypothyroidism     PAD (peripheral artery disease)      Past Surgical History:   Procedure Laterality Date    CHOLECYSTECTOMY      ENDOMETRIAL ABLATION N/A 2/8/2022    Procedure: ABLATION, ENDOMETRIUM;  Surgeon: Ariel Butler MD;  Location: [facility] OR;  Service: OB/GYN;  Laterality: N/A;    HYSTEROSCOPY WITH DILATION AND CURETTAGE OF UTERUS N/A 2/8/2022    Procedure: HYSTEROSCOPY, WITH DILATION AND CURETTAGE OF UTERUS;  Surgeon: Ariel Butler MD;  Location: [facility] OR;  Service: OB/GYN;  Laterality: N/A;     Family History   Problem Relation Age of Onset    Diabetes Mother     Diabetes Mellitus Mother     Diabetes Father     Diabetes Mellitus Father     No Known Problems Sister     No Known Problems Sister     No Known Problems Brother     No Known Problems Brother     No Known Problems Daughter     No Known Problems Son     Colon cancer Neg Hx     Breast cancer Neg Hx     Ovarian cancer Neg Hx     Uterine cancer Neg Hx     Heart attack Neg Hx      Social History     Tobacco Use    Smoking status: Never    Smokeless tobacco: Never   Substance Use Topics    Alcohol use: No    Drug use: No     Review of Systems   Musculoskeletal:  Positive for myalgias. Negative for joint swelling.   All other systems reviewed and are negative.    Physical Exam     Initial Vitals [09/22/22 1420]   BP Pulse Resp Temp SpO2   (!) 143/81 69 20 97.9 °F (36.6 °C) 100 %      MAP       --         Physical Exam    Constitutional: Vital signs are normal. She appears well-developed and well-nourished.   HENT:   Head: Normocephalic and atraumatic.   Eyes: Conjunctivae and lids are normal.   Neck: Trachea normal and  phonation normal. Neck supple. No thyroid mass and no thyromegaly present.   Normal range of motion.   Full passive range of motion without pain.     Cardiovascular:  Normal rate, regular rhythm, normal heart sounds, intact distal pulses and normal pulses.           Abdominal: Abdomen is soft. There is no abdominal tenderness.   Musculoskeletal:         General: Normal range of motion.      Right shoulder: No swelling, deformity or tenderness.      Cervical back: Full passive range of motion without pain, normal range of motion and neck supple. Normal.     Neurological: She is alert and oriented to person, place, and time. She has normal strength. She displays normal reflexes. No cranial nerve deficit or sensory deficit.   Skin: Skin is warm, dry and intact.   Psychiatric: She has a normal mood and affect. Her speech is normal and behavior is normal.       ED Course   Procedures  Labs Reviewed   COMPREHENSIVE METABOLIC PANEL - Abnormal; Notable for the following components:       Result Value    Sodium 132 (*)     CO2 22 (*)     AST 42 (*)     ALT 48 (*)     All other components within normal limits   URINALYSIS, REFLEX TO URINE CULTURE - Abnormal; Notable for the following components:    Leukocytes, UA 1+ (*)     All other components within normal limits    Narrative:     Specimen Source->Urine   URINALYSIS MICROSCOPIC - Abnormal; Notable for the following components:    RBC, UA 6 (*)     WBC, UA 9 (*)     All other components within normal limits    Narrative:     Specimen Source->Urine   TSH   CBC W/ AUTO DIFFERENTIAL   CK          Imaging Results              CT Head Without Contrast (Final result)  Result time 09/22/22 19:09:29      Final result by Ike Fall DO (09/22/22 19:09:29)                   Impression:      No acute intracranial abnormality.    Stable size of the large sellar/suprasellar mass.      Electronically signed by: Ike Fall  Date:    09/22/2022  Time:    19:09                Narrative:    EXAMINATION:  CT HEAD WITHOUT CONTRAST    CLINICAL HISTORY:  Neuro deficit, acute, stroke suspected;    TECHNIQUE:  Low dose axial CT images obtained throughout the head without intravenous contrast. Sagittal and coronal reconstructions were performed.    COMPARISON:  CT head from 09/10/2022.    FINDINGS:  There is a large sellar/suprasellar mass, unchanged in size from prior measuring 2.5 x 2.0 cm.  The mass effect better characterized on recent MRI brain from 08/15/2022.    Ventricles and sulci are normal in size for age without evidence of hydrocephalus. No extra-axial blood or fluid collections.  The brain parenchyma is normal. No parenchymal hemorrhage, edema or major vascular distribution infarct.    No calvarial fracture.  The scalp is unremarkable.  Bilateral paranasal sinuses and mastoid air cells are clear.                                       Medications   HYDROcodone-acetaminophen 5-325 mg per tablet 1 tablet (1 tablet Oral Given 9/22/22 1847)     Medical Decision Making:   ED Management:  7:54 PM  Patient improved following p.o. Norco.  No focal neurologic deficits on examination.  Patient and it is at baseline.  I discussed case with Neurology on-call agrees that if CT of the head appeared stable that patient may be discharged and follow-up on an outpatient basis.  Patient is scheduled for transsphenoidal excision of a suprasellar mass presumed to be a pituitary macroadenoma.  TSH level is stable.  Patient currently on levothyroxine.  No acute symptoms.  Patient is already on both Levaquin and Flagyl as prescribed by her PCP.  No evidence for rhabdomyolysis.  Joints appear normal on examination.  Toxic in appearance.  Will discharge to home.  Patient and son voice understanding of discharge instructions and agree with plan.  No other issues at this juncture.                        Clinical Impression:   Final diagnoses:  [R52] Generalized pain  [M79.10] Myalgia (Primary)  [R52] Body  aches      ED Disposition Condition    Discharge Stable          ED Prescriptions       Medication Sig Dispense Start Date End Date Auth. Provider    HYDROcodone-acetaminophen (NORCO) 5-325 mg per tablet Take 1 tablet by mouth every 6 (six) hours as needed. 12 tablet 9/22/2022 -- Pierce Bear MD          Follow-up Information       Follow up With Specialties Details Why Contact Info    Sabiha Overton MD Family Medicine In 2 days  [address] [facility] [address]  [facility] [address]  [phone]               Pierce Bear MD  09/22/22 1956

## 2022-09-22 NOTE — ED TRIAGE NOTES
Pt admitted to the ED for generalized body aches. Pt reports body aches began last night. Pt reports she has been on antibiotics for acute appendicitis. Pt reporting body aches to the entire body. Pt denies abd pain at this time. Pt reports taking tylenol for the pain this AM.

## 2022-09-23 ENCOUNTER — TELEPHONE (OUTPATIENT)
Dept: FAMILY MEDICINE | Facility: CLINIC | Age: 47
End: 2022-09-23
Payer: MEDICAID

## 2022-09-23 NOTE — TELEPHONE ENCOUNTER
Spoke with patient who informed me she went to ED yesterday because of her pain . Patient has finished her antibiotics still has not recovered .

## 2022-09-23 NOTE — DISCHARGE INSTRUCTIONS
Return promptly if concerning symptoms arise, Norco as prescribed, follow-up with your PCP/neurologist in the next couple of days.

## 2022-09-23 NOTE — NURSING
Discharge discussed with patient and family. Indication for discharge understood. Patient medication and prescriptions reviewed. Patient understood . Not futher questions at this time. Patient taken home by family member. All belingings with patient

## 2022-09-23 NOTE — TELEPHONE ENCOUNTER
----- Message from Angela Yuan sent at 9/23/2022 11:02 AM CDT -----  Type:  Needs Medical Advice    Who Called: self  Reason:needs to schedule ED f/u  Would the patient rather a call back or a response via [facility]? call  Best Call Back Number: [phone]  Additional Information: none

## 2022-09-23 NOTE — NURSING
"Patient stated she began levothyroxine treatment 4 weeks ago for hypothyroidism r/t a pituitary glad tumor.   Patient stated she started to have a burning sensation in her extremities for the past two weeks. She stated it was extreme in intensity two weeks ago and started in her legs as a severe burning sensation in "muscles, blood or bones, I cannot tell". She stated the burning sensation now is in her arms and her hands.     Patietn states this has affected her mobility as she mostly stays still in bed now and is unable to function properly.   "

## 2022-09-24 ENCOUNTER — HOSPITAL ENCOUNTER (EMERGENCY)
Facility: HOSPITAL | Age: 47
Discharge: HOME | End: 2022-09-24
Attending: EMERGENCY MEDICINE
Payer: MEDICAID

## 2022-09-24 ENCOUNTER — PATIENT MESSAGE (OUTPATIENT)
Dept: NEUROSURGERY | Facility: CLINIC | Age: 47
End: 2022-09-24
Payer: MEDICAID

## 2022-09-24 VITALS
HEART RATE: 60 BPM | WEIGHT: 160 LBS | HEIGHT: 64 IN | SYSTOLIC BLOOD PRESSURE: 124 MMHG | BODY MASS INDEX: 27.31 KG/M2 | DIASTOLIC BLOOD PRESSURE: 68 MMHG | TEMPERATURE: 99 F | OXYGEN SATURATION: 99 % | RESPIRATION RATE: 16 BRPM

## 2022-09-24 DIAGNOSIS — M79.10 MYALGIA: Primary | ICD-10-CM

## 2022-09-24 LAB
ALBUMIN SERPL BCP-MCNC: 4 G/DL (ref 3.5–5.2)
ALP SERPL-CCNC: 64 U/L (ref 55–135)
ALT SERPL W/O P-5'-P-CCNC: 30 U/L (ref 10–44)
ANION GAP SERPL CALC-SCNC: 7 MMOL/L (ref 8–16)
AST SERPL-CCNC: 27 U/L (ref 10–40)
BASOPHILS # BLD AUTO: 0.03 K/UL (ref 0–0.2)
BASOPHILS NFR BLD: 0.5 % (ref 0–1.9)
BILIRUB SERPL-MCNC: 0.2 MG/DL (ref 0.1–1)
BILIRUB UR QL STRIP: NEGATIVE
BUN SERPL-MCNC: 11 MG/DL (ref 6–20)
CALCIUM SERPL-MCNC: 8.8 MG/DL (ref 8.7–10.5)
CHLORIDE SERPL-SCNC: 102 MMOL/L (ref 95–110)
CK SERPL-CCNC: 113 U/L (ref 20–180)
CLARITY UR REFRACT.AUTO: CLEAR
CO2 SERPL-SCNC: 25 MMOL/L (ref 23–29)
COLOR UR AUTO: COLORLESS
CREAT SERPL-MCNC: 0.8 MG/DL (ref 0.5–1.4)
CRP SERPL-MCNC: 1 MG/L (ref 0–8.2)
DIFFERENTIAL METHOD: ABNORMAL
EOSINOPHIL # BLD AUTO: 0.1 K/UL (ref 0–0.5)
EOSINOPHIL NFR BLD: 0.9 % (ref 0–8)
ERYTHROCYTE [DISTWIDTH] IN BLOOD BY AUTOMATED COUNT: 12.8 % (ref 11.5–14.5)
ERYTHROCYTE [SEDIMENTATION RATE] IN BLOOD BY PHOTOMETRIC METHOD: 7 MM/HR (ref 0–36)
EST. GFR  (NO RACE VARIABLE): >60 ML/MIN/1.73 M^2
GLUCOSE SERPL-MCNC: 103 MG/DL (ref 70–110)
GLUCOSE UR QL STRIP: NEGATIVE
HCT VFR BLD AUTO: 35.5 % (ref 37–48.5)
HCV AB SERPL QL IA: NORMAL
HGB BLD-MCNC: 12.2 G/DL (ref 12–16)
HGB UR QL STRIP: ABNORMAL
HIV 1+2 AB+HIV1 P24 AG SERPL QL IA: NORMAL
IMM GRANULOCYTES # BLD AUTO: 0.01 K/UL (ref 0–0.04)
IMM GRANULOCYTES NFR BLD AUTO: 0.2 % (ref 0–0.5)
KETONES UR QL STRIP: NEGATIVE
LEUKOCYTE ESTERASE UR QL STRIP: NEGATIVE
LIPASE SERPL-CCNC: 25 U/L (ref 4–60)
LYMPHOCYTES # BLD AUTO: 2.1 K/UL (ref 1–4.8)
LYMPHOCYTES NFR BLD: 38.3 % (ref 18–48)
MCH RBC QN AUTO: 31.2 PG (ref 27–31)
MCHC RBC AUTO-ENTMCNC: 34.4 G/DL (ref 32–36)
MCV RBC AUTO: 91 FL (ref 82–98)
MICROSCOPIC COMMENT: NORMAL
MONOCYTES # BLD AUTO: 0.5 K/UL (ref 0.3–1)
MONOCYTES NFR BLD: 8.9 % (ref 4–15)
NEUTROPHILS # BLD AUTO: 2.8 K/UL (ref 1.8–7.7)
NEUTROPHILS NFR BLD: 51.2 % (ref 38–73)
NITRITE UR QL STRIP: NEGATIVE
NRBC BLD-RTO: 0 /100 WBC
PH UR STRIP: 8 [PH] (ref 5–8)
PLATELET # BLD AUTO: 256 K/UL (ref 150–450)
PMV BLD AUTO: 10.3 FL (ref 9.2–12.9)
POTASSIUM SERPL-SCNC: 3.9 MMOL/L (ref 3.5–5.1)
PROT SERPL-MCNC: 6.8 G/DL (ref 6–8.4)
PROT UR QL STRIP: NEGATIVE
RBC # BLD AUTO: 3.91 M/UL (ref 4–5.4)
RBC #/AREA URNS AUTO: 2 /HPF (ref 0–4)
SODIUM SERPL-SCNC: 134 MMOL/L (ref 136–145)
SP GR UR STRIP: 1 (ref 1–1.03)
TSH SERPL DL<=0.005 MIU/L-ACNC: 0.74 UIU/ML (ref 0.4–4)
URN SPEC COLLECT METH UR: ABNORMAL
WBC # BLD AUTO: 5.49 K/UL (ref 3.9–12.7)

## 2022-09-24 PROCEDURE — 25500020 PHARM REV CODE 255: Performed by: EMERGENCY MEDICINE

## 2022-09-24 PROCEDURE — 84443 ASSAY THYROID STIM HORMONE: CPT | Performed by: PHYSICIAN ASSISTANT

## 2022-09-24 PROCEDURE — 25000003 PHARM REV CODE 250: Performed by: PHYSICIAN ASSISTANT

## 2022-09-24 PROCEDURE — 81001 URINALYSIS AUTO W/SCOPE: CPT | Performed by: PHYSICIAN ASSISTANT

## 2022-09-24 PROCEDURE — 99285 EMERGENCY DEPT VISIT HI MDM: CPT | Mod: 25

## 2022-09-24 PROCEDURE — 99284 PR EMERGENCY DEPT VISIT,LEVEL IV: ICD-10-PCS | Mod: ,,, | Performed by: PHYSICIAN ASSISTANT

## 2022-09-24 PROCEDURE — 86140 C-REACTIVE PROTEIN: CPT | Performed by: PHYSICIAN ASSISTANT

## 2022-09-24 PROCEDURE — 82550 ASSAY OF CK (CPK): CPT | Performed by: PHYSICIAN ASSISTANT

## 2022-09-24 PROCEDURE — 80053 COMPREHEN METABOLIC PANEL: CPT | Performed by: PHYSICIAN ASSISTANT

## 2022-09-24 PROCEDURE — 85652 RBC SED RATE AUTOMATED: CPT | Performed by: PHYSICIAN ASSISTANT

## 2022-09-24 PROCEDURE — 63600175 PHARM REV CODE 636 W HCPCS: Performed by: PHYSICIAN ASSISTANT

## 2022-09-24 PROCEDURE — 83690 ASSAY OF LIPASE: CPT | Performed by: PHYSICIAN ASSISTANT

## 2022-09-24 PROCEDURE — 85025 COMPLETE CBC W/AUTO DIFF WBC: CPT | Performed by: PHYSICIAN ASSISTANT

## 2022-09-24 PROCEDURE — 87389 HIV-1 AG W/HIV-1&-2 AB AG IA: CPT | Performed by: PHYSICIAN ASSISTANT

## 2022-09-24 PROCEDURE — 96374 THER/PROPH/DIAG INJ IV PUSH: CPT

## 2022-09-24 PROCEDURE — 86803 HEPATITIS C AB TEST: CPT | Performed by: PHYSICIAN ASSISTANT

## 2022-09-24 PROCEDURE — 99284 EMERGENCY DEPT VISIT MOD MDM: CPT | Mod: ,,, | Performed by: PHYSICIAN ASSISTANT

## 2022-09-24 RX ORDER — KETOROLAC TROMETHAMINE 30 MG/ML
10 INJECTION, SOLUTION INTRAMUSCULAR; INTRAVENOUS
Status: COMPLETED | OUTPATIENT
Start: 2022-09-24 | End: 2022-09-24

## 2022-09-24 RX ORDER — NAPROXEN 500 MG/1
500 TABLET ORAL 2 TIMES DAILY WITH MEALS
Qty: 60 TABLET | Refills: 0 | Status: ON HOLD | OUTPATIENT
Start: 2022-09-24 | End: 2022-10-05 | Stop reason: HOSPADM

## 2022-09-24 RX ORDER — GABAPENTIN 100 MG/1
100 CAPSULE ORAL 3 TIMES DAILY
Qty: 42 CAPSULE | Refills: 0 | Status: ON HOLD | OUTPATIENT
Start: 2022-09-24 | End: 2022-10-19 | Stop reason: HOSPADM

## 2022-09-24 RX ORDER — GABAPENTIN 300 MG/1
600 CAPSULE ORAL
Status: COMPLETED | OUTPATIENT
Start: 2022-09-24 | End: 2022-09-24

## 2022-09-24 RX ORDER — MORPHINE SULFATE 4 MG/ML
4 INJECTION, SOLUTION INTRAMUSCULAR; INTRAVENOUS
Status: DISCONTINUED | OUTPATIENT
Start: 2022-09-24 | End: 2022-09-24

## 2022-09-24 RX ADMIN — IOHEXOL 75 ML: 350 INJECTION, SOLUTION INTRAVENOUS at 03:09

## 2022-09-24 RX ADMIN — GABAPENTIN 600 MG: 300 CAPSULE ORAL at 03:09

## 2022-09-24 RX ADMIN — KETOROLAC TROMETHAMINE 10 MG: 30 INJECTION, SOLUTION INTRAMUSCULAR; INTRAVENOUS at 03:09

## 2022-09-24 NOTE — ED TRIAGE NOTES
Pt presents to the ED c/o generalized body aches x 2 weeks. +abdominal pain, n/v/d, HA. Third visit in 2 weeks. Pt reports tumor in head. No tx.

## 2022-09-24 NOTE — ED PROVIDER NOTES
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  Patient reports history of appendicitis was treated with antibiotics but reports increased right lower quadrant abdominal pain for the past 3 days as well as nausea vomiting, diarrhea and subjective fevers/chills.  Also complaining of 3 weeks of generalized pain all over her body which states she feels in her bones in describes as sharp and burning which is worse at night.  Patient is scheduled for pituitary adenoma resection on October 13 with neurosurgery/ENT.    Review of patient's allergies indicates:  No Known Allergies  Past Medical History:   Diagnosis Date    Elevated fasting glucose     GERD (gastroesophageal reflux disease)     Hypothyroidism     PAD (peripheral artery disease)      Past Surgical History:   Procedure Laterality Date    CHOLECYSTECTOMY      ENDOMETRIAL ABLATION N/A 2/8/2022    Procedure: ABLATION, ENDOMETRIUM;  Surgeon: Ariel Butler MD;  Location: [facility] OR;  Service: OB/GYN;  Laterality: N/A;    HYSTEROSCOPY WITH DILATION AND CURETTAGE OF UTERUS N/A 2/8/2022    Procedure: HYSTEROSCOPY, WITH DILATION AND CURETTAGE OF UTERUS;  Surgeon: Ariel Butler MD;  Location: [facility] OR;  Service: OB/GYN;  Laterality: N/A;     Family History   Problem Relation Age of Onset    Diabetes Mother     Diabetes Mellitus Mother     Diabetes Father     Diabetes Mellitus Father     No Known Problems Sister     No Known Problems Sister     No Known Problems Brother     No Known Problems Brother     No Known Problems Daughter     No Known Problems Son     Colon cancer Neg Hx     Breast cancer Neg Hx     Ovarian cancer Neg Hx     Uterine cancer Neg Hx     Heart attack Neg Hx      Social History      Tobacco Use    Smoking status: Never    Smokeless tobacco: Never   Substance Use Topics    Alcohol use: No    Drug use: No     Review of Systems   Constitutional:  Positive for chills.   HENT:  Negative for sore throat.    Respiratory:  Negative for cough and shortness of breath.    Cardiovascular:  Negative for chest pain.   Gastrointestinal:  Positive for abdominal pain. Negative for nausea and vomiting.   Genitourinary:  Negative for difficulty urinating and dysuria.   Musculoskeletal:  Positive for myalgias.   Skin: Negative.    Neurological:  Negative for weakness.   Psychiatric/Behavioral:  Negative for confusion.      Physical Exam     Initial Vitals [09/24/22 1324]   BP Pulse Resp Temp SpO2   137/74 71 18 97.9 °F (36.6 °C) 98 %      MAP       --         Physical Exam    Nursing note and vitals reviewed.  Constitutional: She appears well-developed and well-nourished.   HENT:   Head: Normocephalic and atraumatic.   Eyes: Conjunctivae are normal. Pupils are equal, round, and reactive to light.   Neck: Neck supple.   Normal range of motion.  Cardiovascular:  Normal rate, regular rhythm, normal heart sounds and intact distal pulses.           Pulmonary/Chest: Breath sounds normal.   Abdominal: Abdomen is soft. Bowel sounds are normal. She exhibits no distension and no mass. There is abdominal tenderness (Mild RLQ). There is no rebound and no guarding.   Musculoskeletal:         General: Normal range of motion.      Cervical back: Normal range of motion and neck supple.     Neurological: She is alert and oriented to person, place, and time. GCS score is 15. GCS eye subscore is 4. GCS verbal subscore is 5. GCS motor subscore is 6.   Skin: Skin is warm and dry. Capillary refill takes less than 2 seconds.   Psychiatric: She has a normal mood and affect.       ED Course   Procedures  Labs Reviewed   CBC W/ AUTO DIFFERENTIAL - Abnormal; Notable for the following components:       Result Value    RBC 3.91 (*)      Hematocrit 35.5 (*)     MCH 31.2 (*)     All other components within normal limits   COMPREHENSIVE METABOLIC PANEL - Abnormal; Notable for the following components:    Sodium 134 (*)     Anion Gap 7 (*)     All other components within normal limits   URINALYSIS, REFLEX TO URINE CULTURE - Abnormal; Notable for the following components:    Color, UA Colorless (*)     Occult Blood UA 1+ (*)     All other components within normal limits    Narrative:     Specimen Source->Urine   HIV 1 / 2 ANTIBODY    Narrative:     Release to patient->Immediate   HEPATITIS C ANTIBODY    Narrative:     Release to patient->Immediate   LIPASE   TSH   CK   C-REACTIVE PROTEIN   SEDIMENTATION RATE   URINALYSIS MICROSCOPIC    Narrative:     Specimen Source->Urine          Imaging Results              CT Abdomen Pelvis With Contrast (Final result)  Result time 09/24/22 16:02:05      Final result by Gabino Sharp MD (09/24/22 16:02:05)                   Impression:      1. 2.1 cm ovoid left adnexal hypodense mass, decreased in size from the prior study could represent a complex left ovarian cyst.  Endometrioma or marginal uterine fibroid is not excluded.  Neoplasm is felt to be less likely.  Slightly more posterior in the left adnexal is a tubular low-density structure, possibly mild hydrosalpinx, improved from the prior study also.  Gyn follow-up and pelvic ultrasound surveillance may be warranted.  2. Probable 1.7 cm right ovarian cyst.  3. Stable probable 1.2 cm nabothian cyst in the cervix on the right.  4. Possible constipation.  5. Stable hepatic cyst.  6. Minimal urinary bladder wall thickening.  Mild cystitis not excluded.      Electronically signed by: Gabino Sharp  Date:    09/24/2022  Time:    16:02               Narrative:    EXAMINATION:  CT ABDOMEN PELVIS WITH CONTRAST    CLINICAL HISTORY:  RLQ abdominal pain (Age >= 14y);    TECHNIQUE:  Low dose axial images, sagittal and coronal reformations were obtained from the lung bases  to the pubic symphysis following the IV administration of 75 mL of Omnipaque 350 .  Oral contrast was not administered.    COMPARISON:  09/10/2022    FINDINGS:  Abdomen:    - Lower thorax:    - Lung bases: No infiltrates and no nodules.    - Liver: Stable small probable hepatic cyst in the left lobe centrally.    - Gallbladder: Status post cholecystectomy.    - Bile Ducts: No evidence of intra or extra hepatic biliary ductal dilation.    - Spleen: Negative.    - Kidneys: No stone, mass or hydronephrosis    - Adrenals: Unremarkable.    - Pancreas: No mass or peripancreatic fat stranding.    - Retroperitoneum:  No significant adenopathy.    - Vascular: No abdominal aortic aneurysm.    - Abdominal wall:  Unremarkable.    No evidence of appendicitis.  The appendix is within normal limits.    Pelvis:    Urinary bladder is incompletely distended with probable minimal wall thickening.    The uterus is slightly left of midline.  2.1 cm ovoid hypodense mass at the left uterine margin on axial 156 has decreased in size from 3.1 cm on the prior study and could represent a complex left ovarian cyst.  Marginally uterine fibroid or endometrioma is not excluded.  Slightly more posterior is a tubular a low-density structure, possibly mild hydrosalpinx, improved from the prior study.    Small probable 1.7 cm right ovarian cyst in the right adnexa.    Stable 1.2 cm ovoid cystic structure in the cervix on the right, possibly a nabothian cyst.    Bowel/Mesentery:    No evidence of bowel obstruction or inflammation.  Mild retained feces throughout the colon.    Bones:  No acute osseous abnormality and no suspicious lytic or blastic lesion.                                       Medications   ketorolac injection 9.999 mg (9.999 mg Intravenous Given 9/24/22 1520)   gabapentin capsule 600 mg (600 mg Oral Given 9/24/22 1520)   iohexoL (OMNIPAQUE 350) injection 75 mL (75 mLs Intravenous Given 9/24/22 1532)     Medical Decision Making:    History:   Old Medical Records: I decided to obtain old medical records.  Clinical Tests:   Lab Tests: Ordered and Reviewed  Radiological Study: Reviewed and Ordered        APC / Resident Notes:   46 y.o. year old female presenting with generalized body aches and right lower quadrant abdominal pain.  On exam patient is afebrile and nontoxic.  Heart rate and rhythm are regular. Lungs with clear breath sounds throughout. Abdomen is soft, mild right lower quadrant tenderness. No edema.    DDx includes but is not limited to appendicitis rhabdomyolysis, UTI, neuropathy    ED workup reveals UA without infection.  Given history of recent appendicitis that was treated with of antibiotics CT abdomen pelvis was obtained which shows no evidence of appendicitis.  Does show a right ovarian cysts which patient is aware of and has OB follow-up in November.  Patient lab work is grossly unremarkable.  CK and within normal limits with no rhabdomyolysis.  Patient has resolution of her symptoms with Toradol and gabapentin in the ED. no emergent pathologies for her pain found today.  Will discharge with PCP follow-up and course of NSAIDs and gabapentin for her symptoms.  Discussed return precautions for any new worsening symptoms.    Discussed findings and plan with patient who verbalized understanding and agrees with the plan and course of treatment. Return to ED precautions discussed. Patient is stable for discharge. I discussed the care of this patient with my supervising physician.        .[email or web address]       Clinical Impression:   Final diagnoses:  [M79.10] Myalgia (Primary)      ED Disposition Condition    Discharge Stable          ED Prescriptions       Medication Sig Dispense Start Date End Date Auth. Provider    naproxen (NAPROSYN) 500 MG tablet Take 1 tablet (500 mg total) by mouth 2 (two) times daily with meals. for 10 days 60 tablet 9/24/2022 10/4/2022 Huang Mcgowan PA-C    gabapentin (NEURONTIN) 100 MG capsule Take 1 capsule (100 mg  total) by mouth 3 (three) times daily. for 14 days 42 capsule 9/24/2022 10/8/2022 Huang Mcgowan PA-C          Follow-up Information       Follow up With Specialties Details Why Contact Info    Sabiha Overton MD Family Medicine Schedule an appointment as soon as possible for a visit   [address]  [place] [address]  [phone]               Huang Mcgowan PA-C  09/24/22 2418

## 2022-09-26 ENCOUNTER — TELEPHONE (OUTPATIENT)
Dept: FAMILY MEDICINE | Facility: CLINIC | Age: 47
End: 2022-09-26
Payer: MEDICAID

## 2022-09-26 NOTE — TELEPHONE ENCOUNTER
----- Message from Sabiha Overton MD sent at 9/25/2022  1:42 PM CDT -----  She just had labs. Will reevaluate on next visit. Thanks  ----- Message -----  From: Indy Falcon MA  Sent: 9/23/2022   5:22 PM CDT  To: Sabiha Overton MD    Patient went to ER she has finished her antibiotics however she  has been feelings weak and looks pale patient requested to have lab work done before appointment on Tuesday

## 2022-09-27 ENCOUNTER — PATIENT MESSAGE (OUTPATIENT)
Dept: NEUROSURGERY | Facility: CLINIC | Age: 47
End: 2022-09-27
Payer: MEDICAID

## 2022-09-27 ENCOUNTER — OFFICE VISIT (OUTPATIENT)
Dept: FAMILY MEDICINE | Facility: CLINIC | Age: 47
End: 2022-09-27
Payer: MEDICAID

## 2022-09-27 VITALS
DIASTOLIC BLOOD PRESSURE: 72 MMHG | BODY MASS INDEX: 28.04 KG/M2 | WEIGHT: 164.25 LBS | SYSTOLIC BLOOD PRESSURE: 124 MMHG | HEART RATE: 68 BPM | HEIGHT: 64 IN | OXYGEN SATURATION: 98 %

## 2022-09-27 DIAGNOSIS — E03.9 HYPOTHYROIDISM, UNSPECIFIED TYPE: Primary | ICD-10-CM

## 2022-09-27 DIAGNOSIS — D35.2 PITUITARY MACROADENOMA WITH EXTRASELLAR EXTENSION: ICD-10-CM

## 2022-09-27 DIAGNOSIS — M79.7 FIBROMYALGIA: ICD-10-CM

## 2022-09-27 DIAGNOSIS — I73.9 PERIPHERAL ARTERY DISEASE: ICD-10-CM

## 2022-09-27 DIAGNOSIS — R00.2 PALPITATIONS: ICD-10-CM

## 2022-09-27 PROCEDURE — 1160F PR REVIEW ALL MEDS BY PRESCRIBER/CLIN PHARMACIST DOCUMENTED: ICD-10-PCS | Mod: CPTII,,, | Performed by: INTERNAL MEDICINE

## 2022-09-27 PROCEDURE — 3078F PR MOST RECENT DIASTOLIC BLOOD PRESSURE < 80 MM HG: ICD-10-PCS | Mod: CPTII,,, | Performed by: INTERNAL MEDICINE

## 2022-09-27 PROCEDURE — 3044F PR MOST RECENT HEMOGLOBIN A1C LEVEL <7.0%: ICD-10-PCS | Mod: CPTII,,, | Performed by: INTERNAL MEDICINE

## 2022-09-27 PROCEDURE — 3078F DIAST BP <80 MM HG: CPT | Mod: CPTII,,, | Performed by: INTERNAL MEDICINE

## 2022-09-27 PROCEDURE — 1159F PR MEDICATION LIST DOCUMENTED IN MEDICAL RECORD: ICD-10-PCS | Mod: CPTII,,, | Performed by: INTERNAL MEDICINE

## 2022-09-27 PROCEDURE — 3008F BODY MASS INDEX DOCD: CPT | Mod: CPTII,,, | Performed by: INTERNAL MEDICINE

## 2022-09-27 PROCEDURE — 1159F MED LIST DOCD IN RCRD: CPT | Mod: CPTII,,, | Performed by: INTERNAL MEDICINE

## 2022-09-27 PROCEDURE — 3044F HG A1C LEVEL LT 7.0%: CPT | Mod: CPTII,,, | Performed by: INTERNAL MEDICINE

## 2022-09-27 PROCEDURE — 3074F PR MOST RECENT SYSTOLIC BLOOD PRESSURE < 130 MM HG: ICD-10-PCS | Mod: CPTII,,, | Performed by: INTERNAL MEDICINE

## 2022-09-27 PROCEDURE — 99999 PR PBB SHADOW E&M-EST. PATIENT-LVL IV: ICD-10-PCS | Mod: PBBFAC,,, | Performed by: INTERNAL MEDICINE

## 2022-09-27 PROCEDURE — 99214 OFFICE O/P EST MOD 30 MIN: CPT | Mod: PBBFAC,PO | Performed by: INTERNAL MEDICINE

## 2022-09-27 PROCEDURE — 99999 PR PBB SHADOW E&M-EST. PATIENT-LVL IV: CPT | Mod: PBBFAC,,, | Performed by: INTERNAL MEDICINE

## 2022-09-27 PROCEDURE — 99214 OFFICE O/P EST MOD 30 MIN: CPT | Mod: S$PBB,,, | Performed by: INTERNAL MEDICINE

## 2022-09-27 PROCEDURE — 3008F PR BODY MASS INDEX (BMI) DOCUMENTED: ICD-10-PCS | Mod: CPTII,,, | Performed by: INTERNAL MEDICINE

## 2022-09-27 PROCEDURE — 1160F RVW MEDS BY RX/DR IN RCRD: CPT | Mod: CPTII,,, | Performed by: INTERNAL MEDICINE

## 2022-09-27 PROCEDURE — 99214 PR OFFICE/OUTPT VISIT, EST, LEVL IV, 30-39 MIN: ICD-10-PCS | Mod: S$PBB,,, | Performed by: INTERNAL MEDICINE

## 2022-09-27 PROCEDURE — 3074F SYST BP LT 130 MM HG: CPT | Mod: CPTII,,, | Performed by: INTERNAL MEDICINE

## 2022-09-27 RX ORDER — LEVOTHYROXINE SODIUM 25 UG/1
75 TABLET ORAL
Qty: 90 TABLET | Refills: 11 | Status: ON HOLD | OUTPATIENT
Start: 2022-09-27 | End: 2022-10-05 | Stop reason: HOSPADM

## 2022-09-27 NOTE — PROGRESS NOTES
Subjective:       Patient ID: Tessa Wynne is a 46 y.o. female.    Chief Complaint: No chief complaint on file.      HPI  Tessa Wynne is a 46 y.o. female with chronic conditions of  hyperglycemia, mild changes of PAD in [place], pituitary macroadenoma and recently found with changes of appendicitis, pyloric stenosis, and adnexal fluid who presents today for hospital follow up.    She went to the ER due to body aches. Evaluated at the ER and no appendicitis seen, adnexal cyst noted, and stable labs. Symptoms improved with Toradol and gabapentin.    Patient reports her symptoms are usually a burning sensation from her legs to her torso and face.  Does not associate the symptoms to anxiety and has sensation of something is wrong.  ER evaluation has been stable x2.  Gabapentin could help with menopause symptoms and encouraged to continue the medication.  She request circulation to be evaluated as she has history of mild PAD.  There is no discoloration, swelling, or syncope.  There are some palpitations no clear shortness of breath.    Also feels his symptoms started when she was started on a thyroid medication.  Has trouble sleeping, no bowel movement changes.  Reports her symptoms were not associated to antibiotics.  Checked her blood pressure and was normal.  She has been recently evaluated for hypoglycemia with normal findings.    Health Maintenance:  Health Maintenance   Topic Date Due    Mammogram  01/04/2023    Lipid Panel  07/29/2027    TETANUS VACCINE  11/13/2028    Hepatitis C Screening  Completed       Review of Systems   Constitutional:  Positive for diaphoresis.   Eyes:  Positive for visual disturbance.   Cardiovascular:  Positive for palpitations.   Integumentary:  Negative for rash.   Neurological:  Positive for numbness (Or burning like sensation of extremities) and headaches.   Psychiatric/Behavioral:  Positive for sleep disturbance.     Past Medical History:   Diagnosis Date    Elevated fasting  glucose     GERD (gastroesophageal reflux disease)     Hypothyroidism     PAD (peripheral artery disease)        Past Surgical History:   Procedure Laterality Date    CHOLECYSTECTOMY      ENDOMETRIAL ABLATION N/A 2/8/2022    Procedure: ABLATION, ENDOMETRIUM;  Surgeon: Ariel Butler MD;  Location: [facility] OR;  Service: OB/GYN;  Laterality: N/A;    HYSTEROSCOPY WITH DILATION AND CURETTAGE OF UTERUS N/A 2/8/2022    Procedure: HYSTEROSCOPY, WITH DILATION AND CURETTAGE OF UTERUS;  Surgeon: Ariel Butler MD;  Location: [facility] OR;  Service: OB/GYN;  Laterality: N/A;       Family History   Problem Relation Age of Onset    Diabetes Mother     Diabetes Mellitus Mother     Diabetes Father     Diabetes Mellitus Father     No Known Problems Sister     No Known Problems Sister     No Known Problems Brother     No Known Problems Brother     No Known Problems Daughter     No Known Problems Son     Colon cancer Neg Hx     Breast cancer Neg Hx     Ovarian cancer Neg Hx     Uterine cancer Neg Hx     Heart attack Neg Hx        Social History     Socioeconomic History    Marital status:     Number of children: 2   Occupational History    Occupation: cleaning housing    Tobacco Use    Smoking status: Never    Smokeless tobacco: Never   Substance and Sexual Activity    Alcohol use: No    Drug use: No    Sexual activity: Yes     Partners: Male     Birth control/protection: OCP   Social History Narrative    Patient is originally from [place] [id number]         School at ; high school         College/university ;         Working ; [employer]                 Children    joban - 21    lidnsey - 13         Lives with     dazoë and son and finn        Diet/Exericse           Current Outpatient Medications   Medication Sig Dispense Refill    acetaminophen (TYLENOL ORAL) Take by mouth.      calcium citrate/vitamin D3 (CITRACAL REGULAR ORAL) Take by mouth.      ferrous sulfate (IRON ORAL) Take by mouth.      gabapentin (NEURONTIN) 100  MG capsule Take 1 capsule (100 mg total) by mouth 3 (three) times daily. for 14 days 42 capsule 0    HYDROcodone-acetaminophen (NORCO) 5-325 mg per tablet Take 1 tablet by mouth every 6 (six) hours as needed. 12 tablet 0    levothyroxine (SYNTHROID) 75 MCG tablet Take 1 tablet (75 mcg total) by mouth before breakfast. 30 tablet 11    mv,calcium,min/iron/folic/vitK (ONE-A-DAY WOMEN'S COMPLETE ORAL) Take by mouth.      naproxen (NAPROSYN) 500 MG tablet Take 1 tablet (500 mg total) by mouth 2 (two) times daily with meals. for 10 days 60 tablet 0    pantoprazole (PROTONIX) 40 MG tablet Take 1 tablet (40 mg total) by mouth once daily. 90 tablet 2     No current facility-administered medications for this visit.       Review of patient's allergies indicates:  No Known Allergies      Objective:       Last 3 sets of Vitals    Vitals - 1 value per visit 9/22/2022 9/24/2022 9/24/2022   SYSTOLIC 123 137 124   DIASTOLIC 58 74 68   Pulse 59 71 60   Temp - 97.9 98.6   Resp - 18 16   SPO2 98 98 99   Weight (lb) - 160 -   Weight (kg) - 72.576 -   Height - 64 -   BMI (Calculated) - 27.5 -   VISIT REPORT - - -   Pain Score  - - -   Some recent data might be hidden   Physical Exam  Constitutional:       General: She is not in acute distress.     Appearance: Normal appearance.   HENT:      Head: Normocephalic.   Eyes:      General: No scleral icterus.     Extraocular Movements: Extraocular movements intact.      Conjunctiva/sclera: Conjunctivae normal.   Neck:      Comments: No goiter.  Cardiovascular:      Rate and Rhythm: Normal rate and regular rhythm.      Pulses: Normal pulses.      Heart sounds: Normal heart sounds.   Pulmonary:      Effort: Pulmonary effort is normal.      Breath sounds: Normal breath sounds.   Abdominal:      General: Bowel sounds are normal. There is no distension.      Palpations: Abdomen is soft. There is no mass.      Tenderness: There is no abdominal tenderness.   Musculoskeletal:         General: No  swelling or tenderness. Normal range of motion.   Lymphadenopathy:      Cervical: No cervical adenopathy.   Skin:     General: Skin is warm and dry.      Coloration: Skin is not pale.      Findings: No rash.   Neurological:      General: No focal deficit present.      Mental Status: She is alert and oriented to person, place, and time.   Psychiatric:         Mood and Affect: Mood normal.         Behavior: Behavior normal.         CBC:  Recent Labs   Lab 09/10/22  0041 09/22/22  1630 09/24/22  1508   WBC 11.82 6.72 5.49   RBC 4.43 4.54 3.91 L   Hemoglobin 14.1 13.7 12.2   Hematocrit 40.5 41.0 35.5 L   Platelets 225 297 256   MCV 91 90 91   MCH 31.8 H 30.2 31.2 H   MCHC 34.8 33.4 34.4     CMP:  Recent Labs   Lab 09/10/22  0041 09/22/22  1630 09/24/22  1508   Glucose 101 88 103   Calcium 9.2 9.3 8.8   Albumin 4.1 4.3 4.0   Total Protein 7.5 7.7 6.8   Sodium 135 L 132 L 134 L   Potassium 4.7 4.2 3.9   CO2 24 22 L 25   Chloride 102 99 102   BUN 16 10 11   Creatinine 0.8 0.9 0.8   Alkaline Phosphatase 65 67 64   ALT 20 48 H 30   AST 37 42 H 27   Total Bilirubin 0.5 0.5 0.2     URINALYSIS:  Recent Labs   Lab 09/22/22  1630 09/24/22  1512   Color, UA Yellow Colorless A   Specific Gravity, UA 1.020 1.005   pH, UA 6.0 8.0   Protein, UA Negative Negative   Nitrite, UA Negative Negative   Leukocytes, UA 1+ A Negative   Urobilinogen, UA Negative  --       LIPIDS:  Recent Labs   Lab 07/27/20  1530 03/02/21  0959 12/17/21  1537 07/29/22  0827 08/26/22  0845 09/22/22  1630 09/24/22  1508   TSH  --   --    < > 2.017 2.402 0.939 0.739   HDL 75 56  --  57  --   --   --    Cholesterol 176 156  --  127  --   --   --    Triglycerides 125 84  --  63  --   --   --    LDL Cholesterol 76.0 83.2  --  57.4 L  --   --   --    HDL/Cholesterol Ratio 42.6 35.9  --  44.9  --   --   --    Non-HDL Cholesterol 101 100  --  70  --   --   --    Total Cholesterol/HDL Ratio 2.3 2.8  --  2.2  --   --   --     < > = values in this interval not displayed.      TSH:  Recent Labs   Lab 08/26/22  0845 09/22/22  1630 09/24/22  1508   TSH 2.402 0.939 0.739       A1C:  Recent Labs   Lab 05/04/22  0809 07/29/22  0827   Hemoglobin A1C 5.4 6.1 H       Imaging:  CT Abdomen Pelvis With Contrast  Narrative: EXAMINATION:  CT ABDOMEN PELVIS WITH CONTRAST    CLINICAL HISTORY:  RLQ abdominal pain (Age >= 14y);    TECHNIQUE:  Low dose axial images, sagittal and coronal reformations were obtained from the lung bases to the pubic symphysis following the IV administration of 75 mL of Omnipaque 350 .  Oral contrast was not administered.    COMPARISON:  09/10/2022    FINDINGS:  Abdomen:    - Lower thorax:    - Lung bases: No infiltrates and no nodules.    - Liver: Stable small probable hepatic cyst in the left lobe centrally.    - Gallbladder: Status post cholecystectomy.    - Bile Ducts: No evidence of intra or extra hepatic biliary ductal dilation.    - Spleen: Negative.    - Kidneys: No stone, mass or hydronephrosis    - Adrenals: Unremarkable.    - Pancreas: No mass or peripancreatic fat stranding.    - Retroperitoneum:  No significant adenopathy.    - Vascular: No abdominal aortic aneurysm.    - Abdominal wall:  Unremarkable.    No evidence of appendicitis.  The appendix is within normal limits.    Pelvis:    Urinary bladder is incompletely distended with probable minimal wall thickening.    The uterus is slightly left of midline.  2.1 cm ovoid hypodense mass at the left uterine margin on axial 156 has decreased in size from 3.1 cm on the prior study and could represent a complex left ovarian cyst.  Marginally uterine fibroid or endometrioma is not excluded.  Slightly more posterior is a tubular a low-density structure, possibly mild hydrosalpinx, improved from the prior study.    Small probable 1.7 cm right ovarian cyst in the right adnexa.    Stable 1.2 cm ovoid cystic structure in the cervix on the right, possibly a nabothian cyst.    Bowel/Mesentery:    No evidence of bowel  obstruction or inflammation.  Mild retained feces throughout the colon.    Bones:  No acute osseous abnormality and no suspicious lytic or blastic lesion.  Impression: 1. 2.1 cm ovoid left adnexal hypodense mass, decreased in size from the prior study could represent a complex left ovarian cyst.  Endometrioma or marginal uterine fibroid is not excluded.  Neoplasm is felt to be less likely.  Slightly more posterior in the left adnexal is a tubular low-density structure, possibly mild hydrosalpinx, improved from the prior study also.  Gyn follow-up and pelvic ultrasound surveillance may be warranted.  2. Probable 1.7 cm right ovarian cyst.  3. Stable probable 1.2 cm nabothian cyst in the cervix on the right.  4. Possible constipation.  5. Stable hepatic cyst.  6. Minimal urinary bladder wall thickening.  Mild cystitis not excluded.    Electronically signed by: Gabino Arreola  Date:    09/24/2022  Time:    16:02      Assessment:       1. Hypothyroidism, unspecified type    2. Palpitations    3. Pituitary macroadenoma with extrasellar extension    4. Peripheral artery disease    5. Fibromyalgia            Plan:       1. Hypothyroidism, unspecified type  Overview:  Secondary hypothyroidism due to pituitary mass.  Can not use TSH for titration of levothyroxine dose but will aim to keep free T4 in the upper half of the normal range.    Orders:  -     patient reports new symptoms appeared after thyroid medication.  Stop the medication a couple of days ago.  Agrees to start lower dose.  -     levothyroxine (SYNTHROID) 25 MCG tablet    2. Palpitations  -     Holter monitor - 48 hour    3. Pituitary macroadenoma with extrasellar extension   - has surgery scheduled within 2 weeks.  At the nothing dispensing to be changed.  Suspect her symptoms are likely hormonal.    4. Peripheral artery disease  -     Ambulatory referral/consult to Vascular Medicine    5. Fibromyalgia     Health Maintenance Due   Topic Date Due    Pneumococcal  Vaccines (Age 0-64) (1 - PCV) Never done    COVID-19 Vaccine (3 - Booster for Pfizer series) 01/01/2022    Influenza Vaccine (1) 09/01/2022            Return to clinic as scheduled.    Sabiha Overton MD  [facility]  Disclaimer:  This note has been generated using voice-recognition software. There may be grammatical or spelling errors that have been missed during proof-reading

## 2022-09-29 ENCOUNTER — OFFICE VISIT (OUTPATIENT)
Dept: GASTROENTEROLOGY | Facility: CLINIC | Age: 47
End: 2022-09-29
Payer: MEDICAID

## 2022-09-29 VITALS
HEIGHT: 64 IN | BODY MASS INDEX: 27.34 KG/M2 | WEIGHT: 160.13 LBS | HEART RATE: 67 BPM | SYSTOLIC BLOOD PRESSURE: 121 MMHG | DIASTOLIC BLOOD PRESSURE: 70 MMHG

## 2022-09-29 DIAGNOSIS — R10.10 PAIN OF UPPER ABDOMEN: ICD-10-CM

## 2022-09-29 DIAGNOSIS — R19.7 DIARRHEA, UNSPECIFIED TYPE: Primary | ICD-10-CM

## 2022-09-29 PROCEDURE — 1160F RVW MEDS BY RX/DR IN RCRD: CPT | Mod: CPTII,,, | Performed by: NURSE PRACTITIONER

## 2022-09-29 PROCEDURE — 99214 OFFICE O/P EST MOD 30 MIN: CPT | Mod: PBBFAC,PO | Performed by: NURSE PRACTITIONER

## 2022-09-29 PROCEDURE — 3044F PR MOST RECENT HEMOGLOBIN A1C LEVEL <7.0%: ICD-10-PCS | Mod: CPTII,,, | Performed by: NURSE PRACTITIONER

## 2022-09-29 PROCEDURE — 3074F PR MOST RECENT SYSTOLIC BLOOD PRESSURE < 130 MM HG: ICD-10-PCS | Mod: CPTII,,, | Performed by: NURSE PRACTITIONER

## 2022-09-29 PROCEDURE — 3074F SYST BP LT 130 MM HG: CPT | Mod: CPTII,,, | Performed by: NURSE PRACTITIONER

## 2022-09-29 PROCEDURE — 1159F MED LIST DOCD IN RCRD: CPT | Mod: CPTII,,, | Performed by: NURSE PRACTITIONER

## 2022-09-29 PROCEDURE — 99999 PR PBB SHADOW E&M-EST. PATIENT-LVL IV: ICD-10-PCS | Mod: PBBFAC,,, | Performed by: NURSE PRACTITIONER

## 2022-09-29 PROCEDURE — 1160F PR REVIEW ALL MEDS BY PRESCRIBER/CLIN PHARMACIST DOCUMENTED: ICD-10-PCS | Mod: CPTII,,, | Performed by: NURSE PRACTITIONER

## 2022-09-29 PROCEDURE — 99214 OFFICE O/P EST MOD 30 MIN: CPT | Mod: S$PBB,,, | Performed by: NURSE PRACTITIONER

## 2022-09-29 PROCEDURE — 99999 PR PBB SHADOW E&M-EST. PATIENT-LVL IV: CPT | Mod: PBBFAC,,, | Performed by: NURSE PRACTITIONER

## 2022-09-29 PROCEDURE — 3078F PR MOST RECENT DIASTOLIC BLOOD PRESSURE < 80 MM HG: ICD-10-PCS | Mod: CPTII,,, | Performed by: NURSE PRACTITIONER

## 2022-09-29 PROCEDURE — 3008F BODY MASS INDEX DOCD: CPT | Mod: CPTII,,, | Performed by: NURSE PRACTITIONER

## 2022-09-29 PROCEDURE — 1159F PR MEDICATION LIST DOCUMENTED IN MEDICAL RECORD: ICD-10-PCS | Mod: CPTII,,, | Performed by: NURSE PRACTITIONER

## 2022-09-29 PROCEDURE — 3008F PR BODY MASS INDEX (BMI) DOCUMENTED: ICD-10-PCS | Mod: CPTII,,, | Performed by: NURSE PRACTITIONER

## 2022-09-29 PROCEDURE — 3044F HG A1C LEVEL LT 7.0%: CPT | Mod: CPTII,,, | Performed by: NURSE PRACTITIONER

## 2022-09-29 PROCEDURE — 3078F DIAST BP <80 MM HG: CPT | Mod: CPTII,,, | Performed by: NURSE PRACTITIONER

## 2022-09-29 PROCEDURE — 99214 PR OFFICE/OUTPT VISIT, EST, LEVL IV, 30-39 MIN: ICD-10-PCS | Mod: S$PBB,,, | Performed by: NURSE PRACTITIONER

## 2022-09-29 NOTE — PROGRESS NOTES
Subjective:       Patient ID: Tessa Wynne is a 46 y.o. female.    Chief Complaint: Abdominal Pain and Diarrhea    47 y/o Qatari speaking female history of fibromyalgia, peptic ulcer disease, appendicitis, pituitary macroadenoma, and hypothyroidism referred by PCP for abdominal pain and diarrhea. Surgery to remove pituitary mass scheduled 10/3. Prescribed pantoprazole for GI symptoms but did not fill prescription 2/2 cost.     Language translation via [employer].    Abdominal Pain  This is a recurrent problem. The current episode started more than 1 month ago. The problem occurs intermittently. The problem has been waxing and waning. Pain location: upper abdomen. The quality of the pain is aching and burning. The abdominal pain does not radiate. Associated symptoms include diarrhea and nausea. Pertinent negatives include no belching, constipation, fever, headaches, hematochezia, melena, vomiting or weight loss. The pain is aggravated by eating. The pain is relieved by Nothing. She has tried nothing for the symptoms. Prior diagnostic workup includes CT scan. Her past medical history is significant for GERD and PUD.     Past Medical History:   Diagnosis Date    Elevated fasting glucose     GERD (gastroesophageal reflux disease)     Hypothyroidism     PAD (peripheral artery disease)        Past Surgical History:   Procedure Laterality Date    CHOLECYSTECTOMY      ENDOMETRIAL ABLATION N/A 2/8/2022    Procedure: ABLATION, ENDOMETRIUM;  Surgeon: Ariel Butler MD;  Location: [facility] OR;  Service: OB/GYN;  Laterality: N/A;    HYSTEROSCOPY WITH DILATION AND CURETTAGE OF UTERUS N/A 2/8/2022    Procedure: HYSTEROSCOPY, WITH DILATION AND CURETTAGE OF UTERUS;  Surgeon: Ariel Butler MD;  Location: [facility] OR;  Service: OB/GYN;  Laterality: N/A;       Family History   Problem Relation Age of Onset    Diabetes Mother     Diabetes Mellitus Mother     Diabetes Father     Diabetes Mellitus Father     No Known Problems  "Sister     No Known Problems Sister     No Known Problems Brother     No Known Problems Brother     No Known Problems Daughter     No Known Problems Son     Colon cancer Neg Hx     Breast cancer Neg Hx     Ovarian cancer Neg Hx     Uterine cancer Neg Hx     Heart attack Neg Hx        Social History     Socioeconomic History    Marital status:     Number of children: 2   Occupational History    Occupation: cleaning housing    Tobacco Use    Smoking status: Never    Smokeless tobacco: Never   Substance and Sexual Activity    Alcohol use: No    Drug use: No    Sexual activity: Yes     Partners: Male     Birth control/protection: OCP   Social History Narrative    Patient is originally from [place] [address]         School at ; high school         College/university ;         Working ; [employer]                 Children    joban - 21    lidnstaye - 13         Lives with     zeb and son and finn        Diet/Exericse           Review of Systems   Constitutional:  Negative for appetite change, fever and weight loss.   HENT:  Negative for trouble swallowing.    Respiratory:  Negative for shortness of breath.    Cardiovascular:  Negative for chest pain.   Gastrointestinal:  Positive for abdominal pain, diarrhea and nausea. Negative for constipation, hematochezia, melena and vomiting.   Neurological:  Negative for headaches.   Hematological:  Negative for adenopathy.   Psychiatric/Behavioral:  Negative for dysphoric mood.        Objective:     Vitals:    09/29/22 1424   BP: 121/70   BP Location: Right arm   Patient Position: Sitting   BP Method: Large (Automatic)   Pulse: 67   Weight: 72.6 kg (160 lb 1.6 oz)   Height: 5' 4" (1.626 m)          Physical Exam  Constitutional:       General: She is not in acute distress.     Appearance: Normal appearance. She is not ill-appearing.   HENT:      Head: Normocephalic.   Eyes:      Conjunctiva/sclera: Conjunctivae normal.      Pupils: Pupils are equal, round, and reactive to " light.   Pulmonary:      Effort: Pulmonary effort is normal. No respiratory distress.   Musculoskeletal:         General: Normal range of motion.      Cervical back: Normal range of motion.   Neurological:      Mental Status: She is alert and oriented to person, place, and time.   Psychiatric:         Mood and Affect: Mood normal.         Behavior: Behavior normal.             Assessment:         ICD-10-CM ICD-9-CM   1. Diarrhea, unspecified type  R19.7 787.91   2. Pain of upper abdomen  R10.10 789.09       Plan:       Diarrhea, unspecified type  -     Clostridium difficile EIA; Future; Expected date: 09/29/2022  -     Stool culture; Future; Expected date: 09/29/2022  -     Stool Exam-Ova,Cysts,Parasites; Future; Expected date: 09/29/2022  -     Giardia / Cryptosporidum, EIA; Future; Expected date: 09/29/2022  -     H. pylori antigen, stool; Future; Expected date: 09/29/2022    Pain of upper abdomen  -     Ambulatory referral/consult to Gastroenterology    - [employer] discount program recommended to help with costs of pantoprazole. Stool studies. Will consider EGD if symptoms persists.     Follow up if symptoms worsen or fail to improve.     Patient's Medications   New Prescriptions    No medications on file   Previous Medications    ACETAMINOPHEN (TYLENOL ORAL)    Take by mouth.    CALCIUM CITRATE/VITAMIN D3 (CITRACAL REGULAR ORAL)    Take by mouth.    FERROUS SULFATE (IRON ORAL)    Take by mouth.    GABAPENTIN (NEURONTIN) 100 MG CAPSULE    Take 1 capsule (100 mg total) by mouth 3 (three) times daily. for 14 days    HYDROCODONE-ACETAMINOPHEN (NORCO) 5-325 MG PER TABLET    Take 1 tablet by mouth every 6 (six) hours as needed.    LEVOTHYROXINE (SYNTHROID) 25 MCG TABLET    Take 3 tablets (75 mcg total) by mouth before breakfast.    MV,CALCIUM,MIN/IRON/FOLIC/VITK (ONE-A-DAY WOMEN'S COMPLETE ORAL)    Take by mouth.    NAPROXEN (NAPROSYN) 500 MG TABLET    Take 1 tablet (500 mg total) by mouth 2 (two) times daily with  meals. for 10 days    PANTOPRAZOLE (PROTONIX) 40 MG TABLET    Take 1 tablet (40 mg total) by mouth once daily.   Modified Medications    No medications on file   Discontinued Medications    No medications on file

## 2022-09-30 ENCOUNTER — TELEPHONE (OUTPATIENT)
Dept: NEUROSURGERY | Facility: CLINIC | Age: 47
End: 2022-09-30
Payer: MEDICAID

## 2022-09-30 ENCOUNTER — HOSPITAL ENCOUNTER (OUTPATIENT)
Dept: RADIOLOGY | Facility: HOSPITAL | Age: 47
Discharge: HOME | End: 2022-09-30
Attending: NEUROLOGICAL SURGERY
Payer: MEDICAID

## 2022-09-30 ENCOUNTER — LAB VISIT (OUTPATIENT)
Dept: LAB | Facility: HOSPITAL | Age: 47
End: 2022-09-30
Attending: INTERNAL MEDICINE
Payer: MEDICAID

## 2022-09-30 DIAGNOSIS — R19.7 DIARRHEA, UNSPECIFIED TYPE: ICD-10-CM

## 2022-09-30 DIAGNOSIS — D35.2 PITUITARY MACROADENOMA WITH EXTRASELLAR EXTENSION: ICD-10-CM

## 2022-09-30 PROCEDURE — 70486 CT MAXILLOFACIAL W/O DYE: CPT | Mod: TC

## 2022-09-30 PROCEDURE — 87045 FECES CULTURE AEROBIC BACT: CPT | Performed by: NURSE PRACTITIONER

## 2022-09-30 PROCEDURE — 87427 SHIGA-LIKE TOXIN AG IA: CPT | Mod: 59 | Performed by: NURSE PRACTITIONER

## 2022-09-30 PROCEDURE — 87449 NOS EACH ORGANISM AG IA: CPT | Performed by: NURSE PRACTITIONER

## 2022-09-30 PROCEDURE — 87177 OVA AND PARASITES SMEARS: CPT | Performed by: NURSE PRACTITIONER

## 2022-09-30 PROCEDURE — 70486 CT MAXILLOFACIAL W/O DYE: CPT | Mod: 26,,, | Performed by: RADIOLOGY

## 2022-09-30 PROCEDURE — 87338 HPYLORI STOOL AG IA: CPT | Performed by: NURSE PRACTITIONER

## 2022-09-30 PROCEDURE — 87329 GIARDIA AG IA: CPT | Performed by: NURSE PRACTITIONER

## 2022-09-30 PROCEDURE — 87046 STOOL CULTR AEROBIC BACT EA: CPT | Performed by: NURSE PRACTITIONER

## 2022-09-30 PROCEDURE — 87209 SMEAR COMPLEX STAIN: CPT | Performed by: NURSE PRACTITIONER

## 2022-09-30 PROCEDURE — 70486 CT MEDTRONIC SINUSES WITHOUT: ICD-10-PCS | Mod: 26,,, | Performed by: RADIOLOGY

## 2022-09-30 NOTE — TELEPHONE ENCOUNTER
ALONDRA pt, who has surgery scheduled 10/3/22 at 7:00 am. Therefore, pt must be at  [facility] by 5:00 am. Reminded pt not to eat or drink anything after midnight the night before with the exception of a small sip of water with medications the morning of surgery. Pt verbalized understanding and agreement to follow the plan. She did not have any further questions at this time.

## 2022-10-02 LAB
E COLI SXT1 STL QL IA: NEGATIVE
E COLI SXT2 STL QL IA: NEGATIVE

## 2022-10-03 ENCOUNTER — HOSPITAL ENCOUNTER (INPATIENT)
Facility: HOSPITAL | Age: 47
LOS: 2 days | Discharge: HOME | DRG: 983 | End: 2022-10-05
Attending: STUDENT IN AN ORGANIZED HEALTH CARE EDUCATION/TRAINING PROGRAM | Admitting: NEUROLOGICAL SURGERY
Payer: MEDICAID

## 2022-10-03 ENCOUNTER — ANESTHESIA EVENT (OUTPATIENT)
Dept: SURGERY | Facility: HOSPITAL | Age: 47
DRG: 983 | End: 2022-10-03
Payer: MEDICAID

## 2022-10-03 ENCOUNTER — ANESTHESIA (OUTPATIENT)
Dept: SURGERY | Facility: HOSPITAL | Age: 47
DRG: 983 | End: 2022-10-03
Payer: MEDICAID

## 2022-10-03 DIAGNOSIS — D35.2 PITUITARY MACROADENOMA WITH EXTRASELLAR EXTENSION: Primary | ICD-10-CM

## 2022-10-03 DIAGNOSIS — E23.6 PITUITARY MASS: ICD-10-CM

## 2022-10-03 LAB
ABO + RH BLD: NORMAL
ANION GAP SERPL CALC-SCNC: 9 MMOL/L (ref 8–16)
B-HCG UR QL: NEGATIVE
BASOPHILS # BLD AUTO: 0.01 K/UL (ref 0–0.2)
BASOPHILS NFR BLD: 0.1 % (ref 0–1.9)
BILIRUB UR QL STRIP: NEGATIVE
BLD GP AB SCN CELLS X3 SERPL QL: NORMAL
BUN SERPL-MCNC: 13 MG/DL (ref 6–20)
CALCIUM SERPL-MCNC: 8.4 MG/DL (ref 8.7–10.5)
CHLORIDE SERPL-SCNC: 106 MMOL/L (ref 95–110)
CLARITY UR REFRACT.AUTO: CLEAR
CO2 SERPL-SCNC: 23 MMOL/L (ref 23–29)
COLOR UR AUTO: YELLOW
CREAT SERPL-MCNC: 0.8 MG/DL (ref 0.5–1.4)
CRYPTOSP AG STL QL IA: NEGATIVE
CTP QC/QA: YES
DIFFERENTIAL METHOD: ABNORMAL
EOSINOPHIL # BLD AUTO: 0 K/UL (ref 0–0.5)
EOSINOPHIL NFR BLD: 0 % (ref 0–8)
ERYTHROCYTE [DISTWIDTH] IN BLOOD BY AUTOMATED COUNT: 12.8 % (ref 11.5–14.5)
EST. GFR  (NO RACE VARIABLE): >60 ML/MIN/1.73 M^2
G LAMBLIA AG STL QL IA: NEGATIVE
GLUCOSE SERPL-MCNC: 143 MG/DL (ref 70–110)
GLUCOSE UR QL STRIP: NEGATIVE
HCT VFR BLD AUTO: 37.2 % (ref 37–48.5)
HGB BLD-MCNC: 12.3 G/DL (ref 12–16)
HGB UR QL STRIP: NEGATIVE
IMM GRANULOCYTES # BLD AUTO: 0.04 K/UL (ref 0–0.04)
IMM GRANULOCYTES NFR BLD AUTO: 0.3 % (ref 0–0.5)
KETONES UR QL STRIP: NEGATIVE
LEUKOCYTE ESTERASE UR QL STRIP: NEGATIVE
LYMPHOCYTES # BLD AUTO: 1 K/UL (ref 1–4.8)
LYMPHOCYTES NFR BLD: 8.4 % (ref 18–48)
MCH RBC QN AUTO: 30.8 PG (ref 27–31)
MCHC RBC AUTO-ENTMCNC: 33.1 G/DL (ref 32–36)
MCV RBC AUTO: 93 FL (ref 82–98)
MONOCYTES # BLD AUTO: 0.1 K/UL (ref 0.3–1)
MONOCYTES NFR BLD: 0.9 % (ref 4–15)
NEUTROPHILS # BLD AUTO: 10.3 K/UL (ref 1.8–7.7)
NEUTROPHILS NFR BLD: 90.3 % (ref 38–73)
NITRITE UR QL STRIP: NEGATIVE
NRBC BLD-RTO: 0 /100 WBC
O+P STL MICRO: NORMAL
PH UR STRIP: 7 [PH] (ref 5–8)
PLATELET # BLD AUTO: 282 K/UL (ref 150–450)
PMV BLD AUTO: 10.6 FL (ref 9.2–12.9)
POCT GLUCOSE: 164 MG/DL (ref 70–110)
POTASSIUM SERPL-SCNC: 4.4 MMOL/L (ref 3.5–5.1)
PROT UR QL STRIP: NEGATIVE
RBC # BLD AUTO: 4 M/UL (ref 4–5.4)
SODIUM SERPL-SCNC: 134 MMOL/L (ref 136–145)
SODIUM SERPL-SCNC: 138 MMOL/L (ref 136–145)
SP GR UR STRIP: 1.02 (ref 1–1.03)
URN SPEC COLLECT METH UR: NORMAL
WBC # BLD AUTO: 11.44 K/UL (ref 3.9–12.7)

## 2022-10-03 PROCEDURE — 99222 1ST HOSP IP/OBS MODERATE 55: CPT | Mod: ,,, | Performed by: GENERAL ACUTE CARE HOSPITAL

## 2022-10-03 PROCEDURE — 27201423 OPTIME MED/SURG SUP & DEVICES STERILE SUPPLY: Performed by: STUDENT IN AN ORGANIZED HEALTH CARE EDUCATION/TRAINING PROGRAM

## 2022-10-03 PROCEDURE — 62165 PR NEUROENDOSCOP,EXC,PIT TUM,TRANSNAS/SPHEN: ICD-10-PCS | Mod: 62,,, | Performed by: NEUROLOGICAL SURGERY

## 2022-10-03 PROCEDURE — 63600175 PHARM REV CODE 636 W HCPCS: Performed by: NURSE ANESTHETIST, CERTIFIED REGISTERED

## 2022-10-03 PROCEDURE — 37000008 HC ANESTHESIA 1ST 15 MINUTES: Performed by: STUDENT IN AN ORGANIZED HEALTH CARE EDUCATION/TRAINING PROGRAM

## 2022-10-03 PROCEDURE — 86920 COMPATIBILITY TEST SPIN: CPT | Performed by: NEUROLOGICAL SURGERY

## 2022-10-03 PROCEDURE — 88313 SPECIAL STAINS GROUP 2: CPT | Performed by: STUDENT IN AN ORGANIZED HEALTH CARE EDUCATION/TRAINING PROGRAM

## 2022-10-03 PROCEDURE — 25000003 PHARM REV CODE 250: Performed by: STUDENT IN AN ORGANIZED HEALTH CARE EDUCATION/TRAINING PROGRAM

## 2022-10-03 PROCEDURE — D9220A PRA ANESTHESIA: ICD-10-PCS | Mod: ANES,,, | Performed by: ANESTHESIOLOGY

## 2022-10-03 PROCEDURE — 99223 PR INITIAL HOSPITAL CARE,LEVL III: ICD-10-PCS | Mod: ,,, | Performed by: PSYCHIATRY & NEUROLOGY

## 2022-10-03 PROCEDURE — 51798 US URINE CAPACITY MEASURE: CPT

## 2022-10-03 PROCEDURE — D9220A PRA ANESTHESIA: ICD-10-PCS | Mod: CRNA,,, | Performed by: NURSE ANESTHETIST, CERTIFIED REGISTERED

## 2022-10-03 PROCEDURE — 88341 IMHCHEM/IMCYTCHM EA ADD ANTB: CPT | Mod: 26,,, | Performed by: STUDENT IN AN ORGANIZED HEALTH CARE EDUCATION/TRAINING PROGRAM

## 2022-10-03 PROCEDURE — 88305 TISSUE EXAM BY PATHOLOGIST: CPT | Performed by: STUDENT IN AN ORGANIZED HEALTH CARE EDUCATION/TRAINING PROGRAM

## 2022-10-03 PROCEDURE — 88342 CHG IMMUNOCYTOCHEMISTRY: ICD-10-PCS | Mod: 26,,, | Performed by: STUDENT IN AN ORGANIZED HEALTH CARE EDUCATION/TRAINING PROGRAM

## 2022-10-03 PROCEDURE — 36000713 HC OR TIME LEV V EA ADD 15 MIN: Performed by: STUDENT IN AN ORGANIZED HEALTH CARE EDUCATION/TRAINING PROGRAM

## 2022-10-03 PROCEDURE — 94761 N-INVAS EAR/PLS OXIMETRY MLT: CPT

## 2022-10-03 PROCEDURE — 20000000 HC ICU ROOM

## 2022-10-03 PROCEDURE — 88313 PR  SPECIAL STAINS,GROUP II: ICD-10-PCS | Mod: 26,,, | Performed by: STUDENT IN AN ORGANIZED HEALTH CARE EDUCATION/TRAINING PROGRAM

## 2022-10-03 PROCEDURE — 27201037 HC PRESSURE MONITORING SET UP

## 2022-10-03 PROCEDURE — C1894 INTRO/SHEATH, NON-LASER: HCPCS | Performed by: STUDENT IN AN ORGANIZED HEALTH CARE EDUCATION/TRAINING PROGRAM

## 2022-10-03 PROCEDURE — 61782 SCAN PROC CRANIAL EXTRA: CPT | Mod: ,,, | Performed by: STUDENT IN AN ORGANIZED HEALTH CARE EDUCATION/TRAINING PROGRAM

## 2022-10-03 PROCEDURE — 99222 PR INITIAL HOSPITAL CARE,LEVL II: ICD-10-PCS | Mod: ,,, | Performed by: GENERAL ACUTE CARE HOSPITAL

## 2022-10-03 PROCEDURE — 62165 PR NEUROENDOSCOP,EXC,PIT TUM,TRANSNAS/SPHEN: ICD-10-PCS | Mod: 62,,, | Performed by: STUDENT IN AN ORGANIZED HEALTH CARE EDUCATION/TRAINING PROGRAM

## 2022-10-03 PROCEDURE — 99223 1ST HOSP IP/OBS HIGH 75: CPT | Mod: ,,, | Performed by: PSYCHIATRY & NEUROLOGY

## 2022-10-03 PROCEDURE — 37000009 HC ANESTHESIA EA ADD 15 MINS: Performed by: STUDENT IN AN ORGANIZED HEALTH CARE EDUCATION/TRAINING PROGRAM

## 2022-10-03 PROCEDURE — 62165 REMOVE PITUIT TUMOR W/SCOPE: CPT | Mod: 62,,, | Performed by: NEUROLOGICAL SURGERY

## 2022-10-03 PROCEDURE — D9220A PRA ANESTHESIA: Mod: CRNA,,, | Performed by: NURSE ANESTHETIST, CERTIFIED REGISTERED

## 2022-10-03 PROCEDURE — 81003 URINALYSIS AUTO W/O SCOPE: CPT

## 2022-10-03 PROCEDURE — 36415 COLL VENOUS BLD VENIPUNCTURE: CPT | Performed by: NEUROLOGICAL SURGERY

## 2022-10-03 PROCEDURE — 85025 COMPLETE CBC W/AUTO DIFF WBC: CPT | Performed by: STUDENT IN AN ORGANIZED HEALTH CARE EDUCATION/TRAINING PROGRAM

## 2022-10-03 PROCEDURE — 61782 PR STEREOTACTIC COMP ASSIST PROC,CRANIAL,EXTRADURAL: ICD-10-PCS | Mod: ,,, | Performed by: STUDENT IN AN ORGANIZED HEALTH CARE EDUCATION/TRAINING PROGRAM

## 2022-10-03 PROCEDURE — 81025 URINE PREGNANCY TEST: CPT | Performed by: STUDENT IN AN ORGANIZED HEALTH CARE EDUCATION/TRAINING PROGRAM

## 2022-10-03 PROCEDURE — 88341 PR IHC OR ICC EACH ADD'L SINGLE ANTIBODY  STAINPR: ICD-10-PCS | Mod: 26,,, | Performed by: STUDENT IN AN ORGANIZED HEALTH CARE EDUCATION/TRAINING PROGRAM

## 2022-10-03 PROCEDURE — 80048 BASIC METABOLIC PNL TOTAL CA: CPT | Performed by: STUDENT IN AN ORGANIZED HEALTH CARE EDUCATION/TRAINING PROGRAM

## 2022-10-03 PROCEDURE — 25000003 PHARM REV CODE 250: Performed by: NURSE ANESTHETIST, CERTIFIED REGISTERED

## 2022-10-03 PROCEDURE — 88341 IMHCHEM/IMCYTCHM EA ADD ANTB: CPT | Performed by: STUDENT IN AN ORGANIZED HEALTH CARE EDUCATION/TRAINING PROGRAM

## 2022-10-03 PROCEDURE — D9220A PRA ANESTHESIA: Mod: ANES,,, | Performed by: ANESTHESIOLOGY

## 2022-10-03 PROCEDURE — 88342 IMHCHEM/IMCYTCHM 1ST ANTB: CPT | Mod: 26,,, | Performed by: STUDENT IN AN ORGANIZED HEALTH CARE EDUCATION/TRAINING PROGRAM

## 2022-10-03 PROCEDURE — 86850 RBC ANTIBODY SCREEN: CPT | Performed by: STUDENT IN AN ORGANIZED HEALTH CARE EDUCATION/TRAINING PROGRAM

## 2022-10-03 PROCEDURE — 88313 SPECIAL STAINS GROUP 2: CPT | Mod: 26,,, | Performed by: STUDENT IN AN ORGANIZED HEALTH CARE EDUCATION/TRAINING PROGRAM

## 2022-10-03 PROCEDURE — C1762 CONN TISS, HUMAN(INC FASCIA): HCPCS | Performed by: STUDENT IN AN ORGANIZED HEALTH CARE EDUCATION/TRAINING PROGRAM

## 2022-10-03 PROCEDURE — 62165 REMOVE PITUIT TUMOR W/SCOPE: CPT | Mod: 62,,, | Performed by: STUDENT IN AN ORGANIZED HEALTH CARE EDUCATION/TRAINING PROGRAM

## 2022-10-03 PROCEDURE — 36000712 HC OR TIME LEV V 1ST 15 MIN: Performed by: STUDENT IN AN ORGANIZED HEALTH CARE EDUCATION/TRAINING PROGRAM

## 2022-10-03 PROCEDURE — 88305 TISSUE EXAM BY PATHOLOGIST: ICD-10-PCS | Mod: 26,,, | Performed by: STUDENT IN AN ORGANIZED HEALTH CARE EDUCATION/TRAINING PROGRAM

## 2022-10-03 PROCEDURE — 25000003 PHARM REV CODE 250

## 2022-10-03 PROCEDURE — 63600175 PHARM REV CODE 636 W HCPCS: Performed by: STUDENT IN AN ORGANIZED HEALTH CARE EDUCATION/TRAINING PROGRAM

## 2022-10-03 PROCEDURE — 88305 TISSUE EXAM BY PATHOLOGIST: CPT | Mod: 26,,, | Performed by: STUDENT IN AN ORGANIZED HEALTH CARE EDUCATION/TRAINING PROGRAM

## 2022-10-03 PROCEDURE — 88342 IMHCHEM/IMCYTCHM 1ST ANTB: CPT | Performed by: STUDENT IN AN ORGANIZED HEALTH CARE EDUCATION/TRAINING PROGRAM

## 2022-10-03 PROCEDURE — 84295 ASSAY OF SERUM SODIUM: CPT

## 2022-10-03 DEVICE — DURA MATRIX ONLAY PLUS 2X2: Type: IMPLANTABLE DEVICE | Site: CRANIAL | Status: FUNCTIONAL

## 2022-10-03 RX ORDER — MUPIROCIN 20 MG/G
1 OINTMENT TOPICAL 2 TIMES DAILY
Status: DISCONTINUED | OUTPATIENT
Start: 2022-10-03 | End: 2022-10-03 | Stop reason: HOSPADM

## 2022-10-03 RX ORDER — PANTOPRAZOLE SODIUM 20 MG/1
40 TABLET, DELAYED RELEASE ORAL DAILY
Status: DISCONTINUED | OUTPATIENT
Start: 2022-10-04 | End: 2022-10-05 | Stop reason: HOSPADM

## 2022-10-03 RX ORDER — EPINEPHRINE 1 MG/ML
INJECTION, SOLUTION INTRACARDIAC; INTRAMUSCULAR; INTRAVENOUS; SUBCUTANEOUS
Status: DISCONTINUED | OUTPATIENT
Start: 2022-10-03 | End: 2022-10-03 | Stop reason: HOSPADM

## 2022-10-03 RX ORDER — FERROUS GLUCONATE 324(37.5)
324 TABLET ORAL
Status: CANCELLED | OUTPATIENT
Start: 2022-10-04

## 2022-10-03 RX ORDER — MIDAZOLAM HYDROCHLORIDE 1 MG/ML
INJECTION, SOLUTION INTRAMUSCULAR; INTRAVENOUS
Status: DISCONTINUED | OUTPATIENT
Start: 2022-10-03 | End: 2022-10-03

## 2022-10-03 RX ORDER — LEVOTHYROXINE SODIUM 50 UG/1
50 TABLET ORAL
Status: DISCONTINUED | OUTPATIENT
Start: 2022-10-04 | End: 2022-10-05 | Stop reason: HOSPADM

## 2022-10-03 RX ORDER — FENTANYL CITRATE 50 UG/ML
INJECTION, SOLUTION INTRAMUSCULAR; INTRAVENOUS
Status: DISCONTINUED | OUTPATIENT
Start: 2022-10-03 | End: 2022-10-03

## 2022-10-03 RX ORDER — LEVOTHYROXINE SODIUM 25 UG/1
75 TABLET ORAL
Status: CANCELLED | OUTPATIENT
Start: 2022-10-04

## 2022-10-03 RX ORDER — NEOSTIGMINE METHYLSULFATE 0.5 MG/ML
INJECTION, SOLUTION INTRAVENOUS
Status: DISCONTINUED | OUTPATIENT
Start: 2022-10-03 | End: 2022-10-03

## 2022-10-03 RX ORDER — GABAPENTIN 100 MG/1
100 CAPSULE ORAL 3 TIMES DAILY
Status: CANCELLED | OUTPATIENT
Start: 2022-10-03

## 2022-10-03 RX ORDER — LIDOCAINE HYDROCHLORIDE 10 MG/ML
INJECTION, SOLUTION INTRAVENOUS
Status: DISCONTINUED | OUTPATIENT
Start: 2022-10-03 | End: 2022-10-03

## 2022-10-03 RX ORDER — MUPIROCIN 20 MG/G
OINTMENT TOPICAL 2 TIMES DAILY
Status: DISCONTINUED | OUTPATIENT
Start: 2022-10-03 | End: 2022-10-03

## 2022-10-03 RX ORDER — CEFAZOLIN SODIUM/WATER 2 G/20 ML
2 SYRINGE (ML) INTRAVENOUS
Status: COMPLETED | OUTPATIENT
Start: 2022-10-03 | End: 2022-10-03

## 2022-10-03 RX ORDER — SUCCINYLCHOLINE CHLORIDE 20 MG/ML
INJECTION INTRAMUSCULAR; INTRAVENOUS
Status: DISCONTINUED | OUTPATIENT
Start: 2022-10-03 | End: 2022-10-03

## 2022-10-03 RX ORDER — AMOXICILLIN 250 MG
1 CAPSULE ORAL 2 TIMES DAILY
Status: DISCONTINUED | OUTPATIENT
Start: 2022-10-03 | End: 2022-10-05 | Stop reason: HOSPADM

## 2022-10-03 RX ORDER — SODIUM CHLORIDE 0.9 % (FLUSH) 0.9 %
10 SYRINGE (ML) INJECTION
Status: DISCONTINUED | OUTPATIENT
Start: 2022-10-03 | End: 2022-10-05 | Stop reason: HOSPADM

## 2022-10-03 RX ORDER — LIDOCAINE HYDROCHLORIDE AND EPINEPHRINE 10; 10 MG/ML; UG/ML
INJECTION, SOLUTION INFILTRATION; PERINEURAL
Status: DISCONTINUED | OUTPATIENT
Start: 2022-10-03 | End: 2022-10-03 | Stop reason: HOSPADM

## 2022-10-03 RX ORDER — DEXAMETHASONE SODIUM PHOSPHATE 4 MG/ML
INJECTION, SOLUTION INTRA-ARTICULAR; INTRALESIONAL; INTRAMUSCULAR; INTRAVENOUS; SOFT TISSUE
Status: DISCONTINUED | OUTPATIENT
Start: 2022-10-03 | End: 2022-10-03

## 2022-10-03 RX ORDER — GABAPENTIN 100 MG/1
100 CAPSULE ORAL 3 TIMES DAILY
Status: DISCONTINUED | OUTPATIENT
Start: 2022-10-03 | End: 2022-10-05 | Stop reason: HOSPADM

## 2022-10-03 RX ORDER — HYDROCODONE BITARTRATE AND ACETAMINOPHEN 10; 325 MG/1; MG/1
1 TABLET ORAL EVERY 4 HOURS PRN
Status: DISCONTINUED | OUTPATIENT
Start: 2022-10-03 | End: 2022-10-04

## 2022-10-03 RX ORDER — PHENYLEPHRINE HCL IN 0.9% NACL 1 MG/10 ML
SYRINGE (ML) INTRAVENOUS
Status: DISCONTINUED | OUTPATIENT
Start: 2022-10-03 | End: 2022-10-03

## 2022-10-03 RX ORDER — SODIUM CHLORIDE 9 MG/ML
INJECTION, SOLUTION INTRAVENOUS CONTINUOUS
Status: CANCELLED | OUTPATIENT
Start: 2022-10-03

## 2022-10-03 RX ORDER — ROCURONIUM BROMIDE 10 MG/ML
INJECTION, SOLUTION INTRAVENOUS
Status: DISCONTINUED | OUTPATIENT
Start: 2022-10-03 | End: 2022-10-03

## 2022-10-03 RX ORDER — ACETAMINOPHEN 325 MG/1
650 TABLET ORAL EVERY 4 HOURS PRN
Status: DISCONTINUED | OUTPATIENT
Start: 2022-10-03 | End: 2022-10-05 | Stop reason: HOSPADM

## 2022-10-03 RX ORDER — LABETALOL HCL 20 MG/4 ML
10 SYRINGE (ML) INTRAVENOUS EVERY 10 MIN PRN
Status: DISCONTINUED | OUTPATIENT
Start: 2022-10-03 | End: 2022-10-04

## 2022-10-03 RX ORDER — ONDANSETRON 8 MG/1
8 TABLET, ORALLY DISINTEGRATING ORAL EVERY 8 HOURS PRN
Status: DISCONTINUED | OUTPATIENT
Start: 2022-10-03 | End: 2022-10-05 | Stop reason: HOSPADM

## 2022-10-03 RX ORDER — DEXMEDETOMIDINE HYDROCHLORIDE 100 UG/ML
INJECTION, SOLUTION INTRAVENOUS
Status: DISCONTINUED | OUTPATIENT
Start: 2022-10-03 | End: 2022-10-03

## 2022-10-03 RX ORDER — LEVOTHYROXINE SODIUM 75 UG/1
75 TABLET ORAL
Status: DISCONTINUED | OUTPATIENT
Start: 2022-10-04 | End: 2022-10-03

## 2022-10-03 RX ORDER — PROCHLORPERAZINE EDISYLATE 5 MG/ML
5 INJECTION INTRAMUSCULAR; INTRAVENOUS EVERY 6 HOURS PRN
Status: DISCONTINUED | OUTPATIENT
Start: 2022-10-03 | End: 2022-10-05 | Stop reason: HOSPADM

## 2022-10-03 RX ORDER — PROPOFOL 10 MG/ML
VIAL (ML) INTRAVENOUS
Status: DISCONTINUED | OUTPATIENT
Start: 2022-10-03 | End: 2022-10-03

## 2022-10-03 RX ORDER — ONDANSETRON 2 MG/ML
4 INJECTION INTRAMUSCULAR; INTRAVENOUS ONCE AS NEEDED
Status: CANCELLED | OUTPATIENT
Start: 2022-10-03 | End: 2034-03-01

## 2022-10-03 RX ORDER — HALOPERIDOL 5 MG/ML
0.5 INJECTION INTRAMUSCULAR EVERY 10 MIN PRN
Status: CANCELLED | OUTPATIENT
Start: 2022-10-03

## 2022-10-03 RX ORDER — SODIUM CHLORIDE 0.9 % (FLUSH) 0.9 %
3 SYRINGE (ML) INJECTION
Status: CANCELLED | OUTPATIENT
Start: 2022-10-03

## 2022-10-03 RX ORDER — ONDANSETRON 2 MG/ML
INJECTION INTRAMUSCULAR; INTRAVENOUS
Status: DISCONTINUED | OUTPATIENT
Start: 2022-10-03 | End: 2022-10-03

## 2022-10-03 RX ORDER — NICARDIPINE HYDROCHLORIDE 0.2 MG/ML
0-15 INJECTION INTRAVENOUS CONTINUOUS
Status: DISCONTINUED | OUTPATIENT
Start: 2022-10-03 | End: 2022-10-04

## 2022-10-03 RX ORDER — HYDROMORPHONE HYDROCHLORIDE 1 MG/ML
0.2 INJECTION, SOLUTION INTRAMUSCULAR; INTRAVENOUS; SUBCUTANEOUS EVERY 5 MIN PRN
Status: CANCELLED | OUTPATIENT
Start: 2022-10-03

## 2022-10-03 RX ORDER — OXYMETAZOLINE HCL 0.05 %
3 SPRAY, NON-AEROSOL (ML) NASAL
Status: DISCONTINUED | OUTPATIENT
Start: 2022-10-03 | End: 2022-10-05 | Stop reason: HOSPADM

## 2022-10-03 RX ORDER — HYDRALAZINE HYDROCHLORIDE 20 MG/ML
10 INJECTION INTRAMUSCULAR; INTRAVENOUS
Status: DISCONTINUED | OUTPATIENT
Start: 2022-10-03 | End: 2022-10-04

## 2022-10-03 RX ORDER — MUPIROCIN 20 MG/G
OINTMENT TOPICAL
Status: DISCONTINUED | OUTPATIENT
Start: 2022-10-03 | End: 2022-10-03 | Stop reason: HOSPADM

## 2022-10-03 RX ORDER — PANTOPRAZOLE SODIUM 40 MG/1
40 TABLET, DELAYED RELEASE ORAL DAILY
Status: CANCELLED | OUTPATIENT
Start: 2022-10-03

## 2022-10-03 RX ORDER — FERROUS GLUCONATE 324(37.5)
324 TABLET ORAL
Status: DISCONTINUED | OUTPATIENT
Start: 2022-10-04 | End: 2022-10-05 | Stop reason: HOSPADM

## 2022-10-03 RX ORDER — SODIUM CHLORIDE 9 MG/ML
INJECTION, SOLUTION INTRAVENOUS CONTINUOUS
Status: DISCONTINUED | OUTPATIENT
Start: 2022-10-03 | End: 2022-10-04

## 2022-10-03 RX ADMIN — ONDANSETRON 4 MG: 2 INJECTION INTRAMUSCULAR; INTRAVENOUS at 10:10

## 2022-10-03 RX ADMIN — ROCURONIUM BROMIDE 45 MG: 10 INJECTION INTRAVENOUS at 07:10

## 2022-10-03 RX ADMIN — DEXAMETHASONE SODIUM PHOSPHATE 8 MG: 4 INJECTION, SOLUTION INTRAMUSCULAR; INTRAVENOUS at 07:10

## 2022-10-03 RX ADMIN — FENTANYL CITRATE 100 MCG: 50 INJECTION, SOLUTION INTRAMUSCULAR; INTRAVENOUS at 07:10

## 2022-10-03 RX ADMIN — GLYCOPYRROLATE 0.4 MG: 0.2 INJECTION, SOLUTION INTRAMUSCULAR; INTRAVENOUS at 10:10

## 2022-10-03 RX ADMIN — SODIUM CHLORIDE: 0.9 INJECTION, SOLUTION INTRAVENOUS at 11:10

## 2022-10-03 RX ADMIN — LIDOCAINE HYDROCHLORIDE 50 MG: 10 INJECTION, SOLUTION INTRAVENOUS at 07:10

## 2022-10-03 RX ADMIN — DEXMEDETOMIDINE HYDROCHLORIDE 8 MCG: 100 INJECTION, SOLUTION INTRAVENOUS at 10:10

## 2022-10-03 RX ADMIN — SUCCINYLCHOLINE CHLORIDE 100 MG: 20 INJECTION, SOLUTION INTRAMUSCULAR; INTRAVENOUS at 07:10

## 2022-10-03 RX ADMIN — FENTANYL CITRATE 25 MCG: 50 INJECTION, SOLUTION INTRAMUSCULAR; INTRAVENOUS at 10:10

## 2022-10-03 RX ADMIN — HYDROCODONE BITARTRATE AND ACETAMINOPHEN 1 TABLET: 10; 325 TABLET ORAL at 11:10

## 2022-10-03 RX ADMIN — HYDROCODONE BITARTRATE AND ACETAMINOPHEN 1 TABLET: 10; 325 TABLET ORAL at 08:10

## 2022-10-03 RX ADMIN — Medication 100 MCG: at 07:10

## 2022-10-03 RX ADMIN — MUPIROCIN: 20 OINTMENT TOPICAL at 06:10

## 2022-10-03 RX ADMIN — Medication 2 G: at 07:10

## 2022-10-03 RX ADMIN — SODIUM CHLORIDE 0.1 MCG/KG/MIN: 9 INJECTION, SOLUTION INTRAVENOUS at 07:10

## 2022-10-03 RX ADMIN — MIDAZOLAM HYDROCHLORIDE 2 MG: 1 INJECTION, SOLUTION INTRAMUSCULAR; INTRAVENOUS at 07:10

## 2022-10-03 RX ADMIN — GABAPENTIN 100 MG: 100 CAPSULE ORAL at 08:10

## 2022-10-03 RX ADMIN — PROPOFOL 160 MG: 10 INJECTION, EMULSION INTRAVENOUS at 07:10

## 2022-10-03 RX ADMIN — ONDANSETRON 4 MG: 2 INJECTION INTRAMUSCULAR; INTRAVENOUS at 11:10

## 2022-10-03 RX ADMIN — SENNOSIDES AND DOCUSATE SODIUM 1 TABLET: 50; 8.6 TABLET ORAL at 08:10

## 2022-10-03 RX ADMIN — SODIUM CHLORIDE, SODIUM GLUCONATE, SODIUM ACETATE, POTASSIUM CHLORIDE, MAGNESIUM CHLORIDE, SODIUM PHOSPHATE, DIBASIC, AND POTASSIUM PHOSPHATE: .53; .5; .37; .037; .03; .012; .00082 INJECTION, SOLUTION INTRAVENOUS at 07:10

## 2022-10-03 RX ADMIN — FENTANYL CITRATE 50 MCG: 50 INJECTION, SOLUTION INTRAMUSCULAR; INTRAVENOUS at 08:10

## 2022-10-03 RX ADMIN — NEOSTIGMINE METHYLSULFATE 3 MG: 0.5 INJECTION, SOLUTION INTRAVENOUS at 10:10

## 2022-10-03 RX ADMIN — ACETAMINOPHEN 650 MG: 325 TABLET ORAL at 11:10

## 2022-10-03 RX ADMIN — ROCURONIUM BROMIDE 5 MG: 10 INJECTION INTRAVENOUS at 07:10

## 2022-10-03 RX ADMIN — SODIUM CHLORIDE: 9 INJECTION, SOLUTION INTRAVENOUS at 07:10

## 2022-10-03 NOTE — CONSULTS
"[facility]  Endocrinology  Consult Note    Consult Requested by: Vipul Arshad DO   Reason for admit: Pituitary macroadenoma with extrasellar extension    HISTORY OF PRESENT ILLNESS:  Tessa Wynne is a 46 y.o. female who initially presented to pituitary clinic with Constantino Batista and Ghassan for evaluation of pituitary lesion compressing optic chiasm, found on imaging done after a syncopal episode. Pt reported prior galactorrhea that resolved after a D&C procedure in the past. She had visual field impairment as well. Hormone evaluation preop was significant for elevated prolactin of 138 (likely from stalk compression), secondary hypothyroidism, otherwise normal. She presented to [facility] on 10/3/2022 for pituitary macroadenoma TSR. Endocrine was consulted for postop TSR.    Regarding pituitary adenoma:  Initial presentation:                  Brain imaging done for syncope and HA.       Imaging:                        MRI 8/15/22  Sella: There is a large sellar lesion with suprasellar extension.  Lesion measures roughly 3.2 x 2.8 x 3.5 cm.  There is mass effect on the adjacent structures including the optic chiasm which is displaced superiorly as well as the bilateral A1 segments of the anterior cerebral arteries.  There is possible extension into the cavernous sinuses bilaterally best visualized on coronal T2 series 10.  The carotid flow voids are preserved.  There is bony remodeling of the adjacent clivus secondary to this lesion with preserved marrow signal.       Headache:                               HA x 1 year, occipital pain a couple times per week      Vision change:                        Some blurring when reading, denies peripheral change      Formal Visual fields:               HVF w/ Dr. Yan 8/25/22, per note:  " OCT with borderline RNFL thinning OD>OS. HVF with generalized depression OD (worse temporally) and temporal depression OS. Findings consistent with chiasmal compression." rtc in " 3 month(s)     Pituitary labs: with elevated prolactin of 138 (likely from stalk compression), secondary hypothyroidism, otherwise normal    Latest Reference Range & Units 08/26/22 08:45   Cortisol, 8 AM 4.30 - 22.40 ug/dL  4.30 - 22.40 ug/dL 13.70  12.80   ACTH 0 - 46 pg/mL 19   Somatomedin (IGF-I) 44 - 227 ng/mL 42 (L)   TSH 0.400 - 4.000 uIU/mL 2.402   Free T4 0.71 - 1.51 ng/dL 0.65 (L)   FSH See Text mIU/mL 10.72   Prolactin 5.2 - 26.5 ng/mL 138.0 (H)       Medications and/or Treatments Impacting Glycemic Control:  Immunotherapy:    Immunosuppressants     None        Steroids:   Hormones (From admission, onward)    None        Pressors:    Autonomic Drugs (From admission, onward)    None          Medications Prior to Admission   Medication Sig Dispense Refill Last Dose    acetaminophen (TYLENOL ORAL) Take by mouth.   Past Week    calcium citrate/vitamin D3 (CITRACAL REGULAR ORAL) Take by mouth.   9/26/2022    ferrous sulfate (IRON ORAL) Take by mouth.   9/26/2022    gabapentin (NEURONTIN) 100 MG capsule Take 1 capsule (100 mg total) by mouth 3 (three) times daily. for 14 days 42 capsule 0 Past Week    HYDROcodone-acetaminophen (NORCO) 5-325 mg per tablet Take 1 tablet by mouth every 6 (six) hours as needed. 12 tablet 0 Past Week    levothyroxine (SYNTHROID) 25 MCG tablet Take 3 tablets (75 mcg total) by mouth before breakfast. 90 tablet 11 10/3/2022 at 0430    mv,calcium,min/iron/folic/vitK (ONE-A-DAY WOMEN'S COMPLETE ORAL) Take by mouth.   9/26/2022    pantoprazole (PROTONIX) 40 MG tablet Take 1 tablet (40 mg total) by mouth once daily. 90 tablet 2 10/3/2022 at 0430    naproxen (NAPROSYN) 500 MG tablet Take 1 tablet (500 mg total) by mouth 2 (two) times daily with meals. for 10 days 60 tablet 0 Unknown       Current Facility-Administered Medications   Medication Dose Route Frequency Provider Last Rate Last Admin    0.9%  NaCl infusion   Intravenous Continuous Miguel Ángel Teran  mL/hr at  10/03/22 1700 Rate Verify at 10/03/22 1700    acetaminophen tablet 650 mg  650 mg Oral Q4H PRN Miguel Ángel Teran MD   650 mg at 10/03/22 1152    [START ON 10/4/2022] ferrous gluconate tablet 324 mg  324 mg Oral Daily with breakfast Kiersten Greer MD        gabapentin capsule 100 mg  100 mg Oral TID Kiersten Greer MD        hydrALAZINE injection 10 mg  10 mg Intravenous Q1H PRN Miguel Ángel Teran MD        HYDROcodone-acetaminophen  mg per tablet 1 tablet  1 tablet Oral Q4H PRN Miguel Ángel Teran MD   1 tablet at 10/03/22 1153    labetalol 20 mg/4 mL (5 mg/mL) IV syring  10 mg Intravenous Q10 Min PRN Miguel Ángel Teran MD        [START ON 10/4/2022] levothyroxine tablet 75 mcg  75 mcg Oral Before breakfast Kiersten Greer MD        niCARdipine 40 mg/200 mL (0.2 mg/mL) infusion  0-15 mg/hr Intravenous Continuous Miguel Ángel Teran MD   Held at 10/03/22 1200    ondansetron disintegrating tablet 8 mg  8 mg Oral Q8H PRN Miguel Ángel Teran MD        oxymetazoline 0.05 % nasal spray 3 spray  3 spray Each Nostril Q2H PRN Dean Castro MD        [START ON 10/4/2022] pantoprazole EC tablet 40 mg  40 mg Oral Daily Kiersten Greer MD        prochlorperazine injection Soln 5 mg  5 mg Intravenous Q6H PRN Miguel Ángel Teran MD        senna-docusate 8.6-50 mg per tablet 1 tablet  1 tablet Oral BID Kiersten Greer MD        [START ON 10/4/2022] sodium chloride 0.65 % nasal spray 2 spray  2 spray Each Nostril Q4H While awake Dean Castro MD        sodium chloride 0.9% flush 10 mL  10 mL Intravenous PRN Kiersten Greer MD           Interval HPI:   Overnight events: Post-op from TSR this morning. Pt reports slight HA and sore throat. Otherwise she is without complaints and reports a noticeable improvement in her vision. She is [place] speaking - I used a  via ipad to communicate with the patient.  Eating:   NPO  Nausea: No  Hypoglycemia and  intervention: No  Fever: No  TPN and/or TF: No  If yes, type of TF/TPN and rate: n/a    PMH, PSH, FH, SH updated and reviewed     ROS:  Review of Systems   Constitutional:  Negative for chills and fever.   HENT:  Positive for sore throat. Negative for congestion, rhinorrhea and trouble swallowing.    Eyes:  Negative for photophobia, pain and visual disturbance.        Improvement in vision   Respiratory:  Negative for cough and shortness of breath.    Cardiovascular:  Negative for chest pain and palpitations.   Gastrointestinal:  Negative for abdominal pain, nausea and vomiting.   Endocrine: Negative for cold intolerance, heat intolerance, polydipsia, polyphagia and polyuria.   Genitourinary:  Negative for difficulty urinating.   Musculoskeletal:  Negative for arthralgias and myalgias.   Skin:  Negative for rash and wound.   Neurological:  Positive for headaches. Negative for dizziness, tremors, speech difficulty and light-headedness.   Psychiatric/Behavioral:  Negative for agitation and confusion.      Labs Reviewed and Include:    Lab Results   Component Value Date    WBC 11.44 10/03/2022    HGB 12.3 10/03/2022    HCT 37.2 10/03/2022    MCV 93 10/03/2022     10/03/2022     Recent Labs   Lab 10/03/22  1138   *   CALCIUM 8.4*      K 4.4   CO2 23      BUN 13   CREATININE 0.8     Lab Results   Component Value Date    HGBA1C 6.1 (H) 07/29/2022       Nutritional status:   Body mass index is 27.47 kg/m².  Lab Results   Component Value Date    ALBUMIN 4.0 09/24/2022    ALBUMIN 4.3 09/22/2022    ALBUMIN 4.1 09/10/2022     No results found for: PREALBUMIN    Estimated Creatinine Clearance: 85 mL/min (based on SCr of 0.8 mg/dL).    POCT Glucose results: 140s-160s    Current Insulin Regimen: none       PHYSICAL EXAMINATION:  Vitals:    10/03/22 1700   BP: 112/65   Pulse: 63   Resp: 14   Temp:      Body mass index is 27.47 kg/m².    Physical Exam  Vitals and nursing note reviewed.   Constitutional:        General: She is not in acute distress.     Appearance: Normal appearance. She is not ill-appearing.   HENT:      Head: Normocephalic.      Nose:      Comments: Small amount of dried blood R nare     Mouth/Throat:      Mouth: Mucous membranes are moist.   Eyes:      General:         Right eye: No discharge.         Left eye: No discharge.      Extraocular Movements: Extraocular movements intact.      Conjunctiva/sclera: Conjunctivae normal.   Cardiovascular:      Rate and Rhythm: Normal rate and regular rhythm.   Pulmonary:      Effort: Pulmonary effort is normal. No respiratory distress.   Abdominal:      General: There is no distension.      Palpations: Abdomen is soft.      Tenderness: There is no abdominal tenderness.   Musculoskeletal:      Cervical back: Normal range of motion and neck supple.      Right lower leg: No edema.      Left lower leg: No edema.   Skin:     General: Skin is warm and dry.   Neurological:      General: No focal deficit present.      Mental Status: She is alert and oriented to person, place, and time.   Psychiatric:         Mood and Affect: Mood normal.         Behavior: Behavior normal.     .     ASSESSMENT and PLAN:    * Pituitary macroadenoma with extrasellar extension  - Neuro check/sinus precautions per neurosurgery protocol    - Monitor for diabetes insipidus with q6h Urinalysis (urine specific gravity), strict monitoring of intake/output hourly, and if urine output greater than 250 ml/hr for over 2 hours, draw BMP, serum Osm, urine Osm, urine sodium, and urinalysis stat and notify primary team and endocrine on-call  - Give desmopressin (DDAVP 10 mcg intranasal x1 dose) only if:  - Urine output greater than 250cc for two or more consecutive hours  - and Na greater than 145   - and low urinary specific gravity less than 1.010  - and inability of the patient to match fluid intake with UOP  - Please page endocrine if questions    - If patient is alert and has intact thirst  response, encourage them to drink to thirst. If significant bothersome nocturia, patients may receive intranasal DDAVP (in non-operated nostril). Contact neurosurgery prior to giving DDAVP  - If patient altered or if patient unable to drink sufficiently to maintain euvolemia, then start DDAVP PRN. Avoid scheduling doses of DDAVP in patients without pre-existing DI as it may be self-limited and may lead to severe hyponatremia  - Patients with acromegaly may have large diuresis after successful surgery due to fall in GH levels (as GH promotes renal sodium resorption), be sure to distinguish this from DI using the labs above    - Avoid routine use of perioperative glucocorticoid (dexamethasone, hydrocortisone, methylprednisolone, etc) administration.  - check daily 8:00 a.m. cortisol levels and if cortisol drops to less than 8 start oral hydrocortisone 20/10 mg which patient should be discharged on   - Do not check cortisol levels if the patient has received any glucocorticoids in the past 24 hours  - Avoid inpatient ordering of LH, FSH, testosterone, estrogen, progesterone, TSH, free T4 as these are unlikely to be helpful in making clinical decisions during hospital admission.  - recommended inpatient labs:    - after discharge patient will need postop day 7 BMP to screen for development of SIADH.      Discharge Recommendations  - Discharge when ready from a surgical standpoint  - Obtain 8:00 AM Cortisol on day of discharge if patient off other steroids for over 24 hours. If 8:00 AM Cortisol is less than 8, give Hydrocortisone 20mg In the morning and 10 mg in the evening on discharge  - If patient already on steroid taper, then discharge on tapered dose (usually hydrocortisone 20/10 or 15/5  - Other pituitary hormones will be monitored outpatient in pituitary clinic  - Will require outpatient BMP 7 days post-operatively to monitor sodium level for SIADH or persistent DI  - Follow up in Pituitary Clinic with Dr. 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 Saw Dr Yan pre-op for HVF  Pt reports noticeable improvement in vision postop   Will need follow-up field testing 2-3 months after surgery for new baseline.          DISCHARGE NEEDS: will assess daily    Karen Noguera MD  Endocrinology  [facility]

## 2022-10-03 NOTE — SUBJECTIVE & OBJECTIVE
Interval HPI:   Overnight events: Post-op from TSR this morning. Pt reports slight HA and sore throat. Otherwise she is without complaints and reports a noticeable improvement in her vision. She is Azerbaijani speaking - I used a  via ipad to communicate with the patient.  Eating:   NPO  Nausea: No  Hypoglycemia and intervention: No  Fever: No  TPN and/or TF: No  If yes, type of TF/TPN and rate: n/a    PMH, PSH, FH, SH updated and reviewed     ROS:  Review of Systems   Constitutional:  Negative for chills and fever.   HENT:  Positive for sore throat. Negative for congestion, rhinorrhea and trouble swallowing.    Eyes:  Negative for photophobia, pain and visual disturbance.        Improvement in vision   Respiratory:  Negative for cough and shortness of breath.    Cardiovascular:  Negative for chest pain and palpitations.   Gastrointestinal:  Negative for abdominal pain, nausea and vomiting.   Endocrine: Negative for cold intolerance, heat intolerance, polydipsia, polyphagia and polyuria.   Genitourinary:  Negative for difficulty urinating.   Musculoskeletal:  Negative for arthralgias and myalgias.   Skin:  Negative for rash and wound.   Neurological:  Positive for headaches. Negative for dizziness, tremors, speech difficulty and light-headedness.   Psychiatric/Behavioral:  Negative for agitation and confusion.      Labs Reviewed and Include:    Lab Results   Component Value Date    WBC 11.44 10/03/2022    HGB 12.3 10/03/2022    HCT 37.2 10/03/2022    MCV 93 10/03/2022     10/03/2022     Recent Labs   Lab 10/03/22  1138   *   CALCIUM 8.4*      K 4.4   CO2 23      BUN 13   CREATININE 0.8     Lab Results   Component Value Date    HGBA1C 6.1 (H) 07/29/2022       Nutritional status:   Body mass index is 27.47 kg/m².  Lab Results   Component Value Date    ALBUMIN 4.0 09/24/2022    ALBUMIN 4.3 09/22/2022    ALBUMIN 4.1 09/10/2022     No results found for: PREALBUMIN    Estimated  Creatinine Clearance: 85 mL/min (based on SCr of 0.8 mg/dL).    POCT Glucose results: 140s-160s    Current Insulin Regimen: none       PHYSICAL EXAMINATION:  Vitals:    10/03/22 1700   BP: 112/65   Pulse: 63   Resp: 14   Temp:      Body mass index is 27.47 kg/m².    Physical Exam  Vitals and nursing note reviewed.   Constitutional:       General: She is not in acute distress.     Appearance: Normal appearance. She is not ill-appearing.   HENT:      Head: Normocephalic.      Nose:      Comments: Small amount of dried blood R nare     Mouth/Throat:      Mouth: Mucous membranes are moist.   Eyes:      General:         Right eye: No discharge.         Left eye: No discharge.      Extraocular Movements: Extraocular movements intact.      Conjunctiva/sclera: Conjunctivae normal.   Cardiovascular:      Rate and Rhythm: Normal rate and regular rhythm.   Pulmonary:      Effort: Pulmonary effort is normal. No respiratory distress.   Abdominal:      General: There is no distension.      Palpations: Abdomen is soft.      Tenderness: There is no abdominal tenderness.   Musculoskeletal:      Cervical back: Normal range of motion and neck supple.      Right lower leg: No edema.      Left lower leg: No edema.   Skin:     General: Skin is warm and dry.   Neurological:      General: No focal deficit present.      Mental Status: She is alert and oriented to person, place, and time.   Psychiatric:         Mood and Affect: Mood normal.         Behavior: Behavior normal.

## 2022-10-03 NOTE — HPI
46-year-old female with history of PVD, hypothyroidism, GERD, presumed pituitary macroadenoma who presents for an elective transphenoidal pituitary adenoma resection via neurosurgery and ENT. The patient states she feels her vision has improved since the procedure and only reports mild headache. She denies SOB, chest pain, rhinorrhea, metallic taste in back of her mouth, and numbness/tingling of any extremities. She does report some urinary retention, but states she hasn't had much fluid intake after the procedure.

## 2022-10-03 NOTE — PLAN OF CARE
[facility]  Initial Discharge Assessment       Primary Care Provider: Sabiha Overton MD    Admission Diagnosis: Pituitary macroadenoma with extrasellar extension [D35.2]  Pituitary mass [E23.6]    Admission Date: 10/3/2022  Expected Discharge Date: 10/4/2022    Discharge Barriers Identified: None    Payor: MEDICAID / Plan: HEALTHY BLUE [place] / Product Type: Managed Medicaid /     Extended Emergency Contact Information  Primary Emergency Contact: Cy Wynne  Mobile Phone: [phone]  Relation: Son    Discharge Plan A: Home  Discharge Plan B: Home      [id number] - [place] - [address] AT [facility] [address]  [address]  [place] [address]  Phone: [phone] Fax: [phone]        Transferred from:  Home    Past Medical History:   Diagnosis Date    Elevated fasting glucose     GERD (gastroesophageal reflux disease)     Hypothyroidism     PAD (peripheral artery disease)          CM met with patient in room for Discharge Planning Assessment.  Patient is able to answer questions.  Per patient, she lives with Zafar Wynne () and 2 kids in a single story house with 0 step(s) to enter.   Per patient, she was independent with ADLS and used no dme for ambulation.  Patient will have assistance from her  upon discharge.   Discharge Planning Booklet given to patient/family and discussed.  All questions addressed.  CM will follow for needs.      Initial Assessment (most recent)       Adult Discharge Assessment - 10/03/22 1510          Discharge Assessment    Assessment Type Discharge Planning Assessment     Confirmed/corrected address, phone number and insurance Yes     Confirmed Demographics Correct on Facesheet     Source of Information patient     Communicated TIMOTHY with patient/caregiver Yes     Reason For Admission Pituitary Macroadenoma     Lives With spouse     Facility Arrived From: Home     Do you expect to return to  your current living situation? Yes     Do you have help at home or someone to help you manage your care at home? Yes     Who are your caregiver(s) and their phone number(s)? Zafar Wynne ()     Prior to hospitilization cognitive status: Alert/Oriented     Current cognitive status: Alert/Oriented     Walking or Climbing Stairs Difficulty none     Dressing/Bathing Difficulty none     Home Accessibility stairs to enter home     Number of Stairs, Main Entrance none     Home Layout Able to live on 1st floor     Equipment Currently Used at Home none     Readmission within 30 days? No     Patient currently being followed by outpatient case management? No     Do you currently have service(s) that help you manage your care at home? No     Do you take prescription medications? Yes     Do you have prescription coverage? Yes     Coverage Healthy Blue     Do you have any problems affording any of your prescribed medications? No     Is the patient taking medications as prescribed? yes     Who is going to help you get home at discharge? Zafar Ricci ()     How do you get to doctors appointments? family or friend will provide     Are you on dialysis? No     Do you take coumadin? No     Discharge Plan A Home     Discharge Plan B Home     DME Needed Upon Discharge  none     Discharge Plan discussed with: Patient     Discharge Barriers Identified None        Relationship/Environment    Name(s) of Who Lives With Patient Zafar Wynne ()                        Mary Pruett RN, CCRN-K, CCM  Neuro-Critical Care   X [phone]

## 2022-10-03 NOTE — OP NOTE
10/3/2022     PREOPERATIVE DIAGNOSIS(ES):    1.   Pituitary macradenoma with suprasellar extension     POSTOPERATIVE DIAGNOSIS(ES):    1.   Same     PROCEDURE:    1.   Neuroendoscopy with exposure of the pituitary.  2.   Use of Medtronic neuronavigation.     ANESTHESIA:  General endotracheal.     SURGEON(S):  Ed Astorga MD     ASSISTANT:  Dean Castro MD.     CO-SURGEON:  Napoleon Ferrell DO.     ESTIMATED BLOOD LOSS:  Minimal.     COMPLICATIONS:  None     OPERATIVE FINDINGS:    1.   Right 1.5 pushdown approach.  2.   No CSF leak.  3.   Repair with Duragen/Adherus     INDICATIONS FOR PROCEDURE:  Tessa Wynne is a 46 y.o. female with a history of a pituitary mass, which was discovered on an MRI after a syncopal event. She complains of 1 year of non specific head aches. She denies vision changes, but has been seen by ophthalmology that shows some bitemporal depression.  Based on these findings, it was recommended that the patient proceed to the operating room for a transsphenoidal approach to the pituitary.  The risks, benefits, and alternatives of surgery were discussed at length with the patient and include, but are not limited to bleeding, infection, need for revision surgery, injury to the eye or brain, double vision, loss of vision, cerebrospinal fluid leakage, meningitis, chronic sinusitis, nasal obstruction, nasal septal perforation, numbness to teeth or cheek, or need for time and expense off work.  The patient demonstrates understanding and wishes to proceed.  All of her questions were answered to her satisfaction.     DESCRIPTION OF PROCEDURE:  The patient was taken back to the operating room and after successful induction of general anesthesia, was prepped and draped in a sterile fashion.  The CreoPop neuronavigation system was employed.  The images were uploaded.  The instruments were calibrated and accuracy was confirmed.  Afrin pledgets were used intranasally for decongestion and approximately 10 cc of 1%  "lidocaine with epinephrine 1:100,000 were used intranasally.      Using a 0-degree telescope, the inferior, middle, and superior turbinates were outfractured and the natural sphenoid ostia were identified bilaterally.  A right-sided sphenoidotomy was then created with preservation of the nasal septal pedicle.  A mucosal flap was "pushed down" in order to preserve this pedicle, and a limited septectomy was performed.  A small sphenoidotomy was created on the left side in order to accommodate the endoscope; thereby preserving the pedicle on this side as well.  Next, the sphenoid rostrum and intersinus septum was removed using Thru-Cut instruments and the sphenoid was opened widely from planum to floor and orbit to orbit in a submucosal fashion.  The mucosa and bone over the sella was carefully removed using a 2 mm Koros Kerrison rongeur and Dr. Ferrell then opened the dura and meticulously dissected the pituitary tumor which will be dictated separately by him.  There was no evidence of a cerebrospinal fluid leak.  Reconstruction of the sella was performed using duragen & adherus. This will be dictated separately by Dr. Ferrell.      At the conclusion of the case, hemostasis was obtained using suction monopolar cautery, suction bipolar cautery, FloSeal, and dilute epinephrine/saline pledgets until hemostasis was obtained.  The middle turbinates were then medialized, and NasoPore was placed in the bilateral middle meatus in order to ensure appropriate middle turbinate medialization.      The patient was then awakened, extubated, and taken to the neuro ICU in stable condition.      All sponge and needle counts were correct at the end of the case x2.     I was scrubbed and personally present during all portions of this procedure, and I was immediately available throughout the entire procedure.      Of note, the SupplyFrame neuronavigation system was used extensively throughout this case to identify the critical neurovascular " structures and to avoid them.    Ed Astorga MD

## 2022-10-03 NOTE — BRIEF OP NOTE
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 Pituitary tumor - Permanent     References:    Click here for ordering Quick Tip   Question Answer Comment   Procedure Type: Neurosurgery    Which provider would you like to cc? MIGUEL ÁNGEL CHAVEZ    Which provider would you like to cc? ANAI ASTORGA    Release to patient Immediate        10/03/22 0933                    [id number]

## 2022-10-03 NOTE — ASSESSMENT & PLAN NOTE
- Neuro check/sinus precautions per neurosurgery protocol    - Monitor for diabetes insipidus with q6h Urinalysis (urine specific gravity), strict monitoring of intake/output hourly, and if urine output greater than 250 ml/hr for over 2 hours, draw BMP, serum Osm, urine Osm, urine sodium, and urinalysis stat and notify primary team and endocrine on-call  - Give desmopressin (DDAVP 10 mcg intranasal x1 dose) only if:  - Urine output greater than 250cc for two or more consecutive hours  - and Na greater than 145   - and low urinary specific gravity less than 1.010  - and inability of the patient to match fluid intake with UOP  - Please page endocrine if questions    - If patient is alert and has intact thirst response, encourage them to drink to thirst. If significant bothersome nocturia, patients may receive intranasal DDAVP (in non-operated nostril). Contact neurosurgery prior to giving DDAVP  - If patient altered or if patient unable to drink sufficiently to maintain euvolemia, then start DDAVP PRN. Avoid scheduling doses of DDAVP in patients without pre-existing DI as it may be self-limited and may lead to severe hyponatremia  - Patients with acromegaly may have large diuresis after successful surgery due to fall in GH levels (as GH promotes renal sodium resorption), be sure to distinguish this from DI using the labs above    - Avoid routine use of perioperative glucocorticoid (dexamethasone, hydrocortisone, methylprednisolone, etc) administration.  - check daily 8:00 a.m. cortisol levels and if cortisol drops to less than 8 start oral hydrocortisone 20/10 mg which patient should be discharged on   - Do not check cortisol levels if the patient has received any glucocorticoids in the past 24 hours  - Avoid inpatient ordering of LH, FSH, testosterone, estrogen, progesterone, TSH, free T4 as these are unlikely to be helpful in making clinical decisions during hospital admission.  - recommended inpatient labs:    -  after discharge patient will need postop day 7 BMP to screen for development of SIADH.      Discharge Recommendations  - Discharge when ready from a surgical standpoint  - Obtain 8:00 AM Cortisol on day of discharge if patient off other steroids for over 24 hours. If 8:00 AM Cortisol is less than 8, give Hydrocortisone 20mg In the morning and 10 mg in the evening on discharge  - If patient already on steroid taper, then discharge on tapered dose (usually hydrocortisone 20/10 or 15/5  - Other pituitary hormones will be monitored outpatient in pituitary clinic  - Will require outpatient BMP 7 days post-operatively to monitor sodium level for SIADH or persistent DI  - Follow up in Pituitary Clinic with Dr. Batista in 2 weeks, we will set up appointment

## 2022-10-03 NOTE — ASSESSMENT & PLAN NOTE
S/p transphenoidal pituitary resection via ENT and NSGY    --q1h neuro checks  --monitor I/Os  --q6h Na and U/A for DI watch  --Endocrine consult  --SBP < 160  --Afrin prn for epistaxis

## 2022-10-03 NOTE — PLAN OF CARE
[facility] Care Plan    POC reviewed with Tessa RAHMAN Ricci and family at 1400. Pt verbalized understanding. Questions and concerns addressed. No acute events today. Pt progressing toward goals. Will continue to monitor. See below and flowsheets for full assessment and VS info.     Nasal Precautions  Post Op CT completed  Family updated  Strict I and O  Q6 Na and Urinalysis  Castorena completed and passed    Is this a stroke patient? no    Neuro:  Ken Coma Scale  Best Eye Response: 4-->(E4) spontaneous  Best Motor Response: 6-->(M6) obeys commands  Best Verbal Response: 5-->(V5) oriented  Ken Coma Scale Score: 15  Assessment Qualifiers: patient not sedated/intubated  Pupil PERRLA: yes     24 hr Temp:  [98 °F (36.7 °C)-98.1 °F (36.7 °C)]     CV:   Rhythm: normal sinus rhythm  BP goals:   SBP < 160  MAP > 65    Resp:   O2 Device (Oxygen Therapy): room air       Plan: N/A    GI/:     Diet/Nutrition Received: consistent carb/diabetic diet  Last Bowel Movement: 10/02/22  Voiding Characteristics: external catheter    Intake/Output Summary (Last 24 hours) at 10/3/2022 1453  Last data filed at 10/3/2022 1400  Gross per 24 hour   Intake 1509.73 ml   Output 100 ml   Net 1409.73 ml          Labs/Accuchecks:  Recent Labs   Lab 10/03/22  1138   WBC 11.44   RBC 4.00   HGB 12.3   HCT 37.2         Recent Labs   Lab 10/03/22  1138      K 4.4   CO2 23      BUN 13   CREATININE 0.8    No results for input(s): PROTIME, INR, APTT, HEPANTIXA in the last 168 hours. No results for input(s): CPK, CPKMB, TROPONINI, MB in the last 168 hours.    Electrolytes: N/A - electrolytes WDL  Accuchecks: Q6H    Gtts:   sodium chloride 0.9% Stopped (10/03/22 1304)    niCARdipine Stopped (10/03/22 1200)       LDA/Wounds:  Lines/Drains/Airways       Peripheral Intravenous Line  Duration                  Peripheral IV - Single Lumen 09/24/22 1508 18 G Left Antecubital 8 days         Peripheral IV - Single Lumen 10/03/22 0608 18 G Left  Wrist <1 day                  Wounds: No  Wound care consulted: No

## 2022-10-03 NOTE — PROGRESS NOTES
Patient arrived to [facility] from OR by bed    Type of stroke/diagnosis: Pituitary Mass and transphenoidal removal     TPA start and end time (if applicable) N/A    Thrombectomy start and end time (if applicable) N./A    Current symptoms:HA     Skin assessment done: Yes    Wounds noted: none    *If wounds noted, was Wound Care consulted? N/A    Castorena Completed? Yes    Patient Belongings on Admit: none    NCC notified: name of person notified   ARNAUD Aguirre team

## 2022-10-03 NOTE — ASSESSMENT & PLAN NOTE
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

## 2022-10-03 NOTE — BRIEF OP NOTE
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 Pituitary tumor - Permanent     References:    Click here for ordering Quick Tip   Question Answer Comment   Procedure Type: Neurosurgery    Which provider would you like to cc? MIGUEL ÁNGEL CHAVEZ    Which provider would you like to cc? ANAI ASTORGA    Release to patient Immediate        10/03/22 0940                    [id number]

## 2022-10-03 NOTE — ANESTHESIA PROCEDURE NOTES
Intubation    Date/Time: 10/3/2022 7:26 AM  Performed by: Teo Reynaga CRNA  Authorized by: Issa Kelly MD     Intubation:     Induction:  Intravenous    Intubated:  Postinduction    Mask Ventilation:  Easy mask    Attempts:  1    Attempted By:  CRNA    Method of Intubation:  Video laryngoscopy    Blade:  Salvador 3    Laryngeal View Grade: Grade I - full view of cords      Difficult Airway Encountered?: No      Complications:  None    Airway Device:  Oral endotracheal tube    Airway Device Size:  7.5    Style/Cuff Inflation:  Cuffed    Inflation Amount (mL):  7    Tube secured:  22    Secured at:  The lips    Placement Verified By:  Capnometry    Complicating Factors:  None    Findings Post-Intubation:  BS equal bilateral

## 2022-10-03 NOTE — SUBJECTIVE & OBJECTIVE
Past Medical History:   Diagnosis Date    Elevated fasting glucose     GERD (gastroesophageal reflux disease)     Hypothyroidism     PAD (peripheral artery disease)      Past Surgical History:   Procedure Laterality Date    CHOLECYSTECTOMY      ENDOMETRIAL ABLATION N/A 2/8/2022    Procedure: ABLATION, ENDOMETRIUM;  Surgeon: Ariel Butler MD;  Location: [facility] OR;  Service: OB/GYN;  Laterality: N/A;    HYSTEROSCOPY WITH DILATION AND CURETTAGE OF UTERUS N/A 2/8/2022    Procedure: HYSTEROSCOPY, WITH DILATION AND CURETTAGE OF UTERUS;  Surgeon: Ariel Butler MD;  Location: [facility] OR;  Service: OB/GYN;  Laterality: N/A;      No current facility-administered medications on file prior to encounter.     Current Outpatient Medications on File Prior to Encounter   Medication Sig Dispense Refill    acetaminophen (TYLENOL ORAL) Take by mouth.      calcium citrate/vitamin D3 (CITRACAL REGULAR ORAL) Take by mouth.      ferrous sulfate (IRON ORAL) Take by mouth.      mv,calcium,min/iron/folic/vitK (ONE-A-DAY WOMEN'S COMPLETE ORAL) Take by mouth.        Allergies: Patient has no known allergies.    Family History   Problem Relation Age of Onset    Diabetes Mother     Diabetes Mellitus Mother     Diabetes Father     Diabetes Mellitus Father     No Known Problems Sister     No Known Problems Sister     No Known Problems Brother     No Known Problems Brother     No Known Problems Daughter     No Known Problems Son     Colon cancer Neg Hx     Breast cancer Neg Hx     Ovarian cancer Neg Hx     Uterine cancer Neg Hx     Heart attack Neg Hx      Social History     Tobacco Use    Smoking status: Never    Smokeless tobacco: Never   Substance Use Topics    Alcohol use: No    Drug use: No     Review of Systems   Constitutional:  Negative for activity change, chills and fever.   HENT:  Negative for facial swelling, postnasal drip, rhinorrhea and trouble swallowing.    Eyes:  Negative for visual disturbance.    Respiratory:  Negative for cough and shortness of breath.    Cardiovascular:  Negative for chest pain and leg swelling.   Gastrointestinal:  Negative for abdominal distention, abdominal pain, nausea and vomiting.   Genitourinary:  Positive for difficulty urinating (some urinary retention s/p surgery).   Musculoskeletal:  Negative for arthralgias and myalgias.   Skin:  Negative for color change and wound.   Neurological:  Positive for headaches. Negative for weakness and numbness.   Psychiatric/Behavioral:  Negative for agitation and confusion.    Objective:     Vitals:    Temp: 98.2 °F (36.8 °C)  Pulse: 65  Rhythm: normal sinus rhythm  BP: 119/70  MAP (mmHg): 91  Resp: 14  SpO2: 98 %  O2 Device (Oxygen Therapy): room air    Temp  Min: 98 °F (36.7 °C)  Max: 98.2 °F (36.8 °C)  Pulse  Min: 65  Max: 72  BP  Min: 110/67  Max: 133/73  MAP (mmHg)  Min: 83  Max: 98  Resp  Min: 13  Max: 18  SpO2  Min: 96 %  Max: 99 %    No intake/output data recorded.           Physical Exam  Vitals and nursing note reviewed.   Constitutional:       General: She is not in acute distress.     Appearance: Normal appearance. She is not ill-appearing.   HENT:      Head: Normocephalic and atraumatic.      Right Ear: External ear normal.      Left Ear: External ear normal.      Nose: No rhinorrhea.      Comments: Dried blood around nares     Mouth/Throat:      Mouth: Mucous membranes are dry.      Pharynx: Oropharynx is clear.   Eyes:      Extraocular Movements: Extraocular movements intact.      Pupils: Pupils are equal, round, and reactive to light.   Cardiovascular:      Rate and Rhythm: Normal rate and regular rhythm.      Pulses: Normal pulses.      Heart sounds: No murmur heard.  Pulmonary:      Effort: Pulmonary effort is normal. No respiratory distress.      Breath sounds: Normal breath sounds.   Abdominal:      General: There is no distension.      Palpations: Abdomen is soft.   Musculoskeletal:         General: No swelling.       Cervical back: Normal range of motion and neck supple.   Skin:     General: Skin is warm and dry.   Neurological:      General: No focal deficit present.      Mental Status: She is alert and oriented to person, place, and time.   Psychiatric:         Mood and Affect: Mood normal.         Behavior: Behavior normal.

## 2022-10-03 NOTE — HPI
"Tessa Wynne is a 46 y.o. female who initially presented to pituitary clinic with Constantino Batista and Ghassan for evaluation of pituitary lesion compressing optic chiasm, found on imaging done after a syncopal episode. Pt reported prior galactorrhea that resolved after a D&C procedure in the past. She had visual field impairment as well. Hormone evaluation preop was significant for elevated prolactin of 138 (likely from stalk compression), secondary hypothyroidism, otherwise normal. She [place] [facility] [place]22 for pituitary macroadenoma TSR. Endocrine was consulted for postop TSR.    Regarding pituitary adenoma:  Initial presentation:                  Brain imaging done for syncope and HA.       Imaging:                        MRI 8/15/22  Sella: There is a large sellar lesion with suprasellar extension.  Lesion measures roughly 3.2 x 2.8 x 3.5 cm.  There is mass effect on the adjacent structures including the optic chiasm which is displaced superiorly as well as the bilateral A1 segments of the anterior cerebral arteries.  There is possible extension into the cavernous sinuses bilaterally best visualized on coronal T2 series 10.  The carotid flow voids are preserved.  There is bony remodeling of the adjacent clivus secondary to this lesion with preserved marrow signal.       Headache:                               HA x 1 year, occipital pain a couple times per week      Vision change:                        Some blurring when reading, denies peripheral change      Formal Visual fields:               HVF w/ Dr. Yan 8/25/22, per note:  " OCT with borderline RNFL thinning OD>OS. HVF with generalized depression OD (worse temporally) and temporal depression OS. Findings consistent with chiasmal compression." rtc in 3 month(s)     Pituitary labs: with elevated prolactin of 138 (likely from stalk compression), secondary hypothyroidism, otherwise normal    Latest Reference Range & Units 08/26/22 08:45   Cortisol, 8 AM " 4.30 - 22.40 ug/dL  4.30 - 22.40 ug/dL 13.70  12.80   ACTH 0 - 46 pg/mL 19   Somatomedin (IGF-I) 44 - 227 ng/mL 42 (L)   TSH 0.400 - 4.000 uIU/mL 2.402   Free T4 0.71 - 1.51 ng/dL 0.65 (L)   FSH See Text mIU/mL 10.72   Prolactin 5.2 - 26.5 ng/mL 138.0 (H)

## 2022-10-03 NOTE — TRANSFER OF CARE
Anesthesia Transfer of Care Note    Patient: Tessa Wynne    Procedure(s) Performed: Procedure(s) (LRB):  EXCISION, NEOPLASM, PITUITARY, TRANSSPHENOIDAL APPROACH, USING COMPUTER-ASSISTED NAVIGATION (N/A)  EXCISION, NEOPLASM, PITUITARY, TRANSSPHENOIDAL APPROACH, USING COMPUTER-ASSISTED NAVIGATION (N/A)    Patient location: ICU    Anesthesia Type: general    Transport from OR: Transported from OR on 6-10 L/min O2 by face mask with adequate spontaneous ventilation    Post pain: adequate analgesia    Post assessment: no apparent anesthetic complications    Post vital signs: stable    Level of consciousness: awake    Nausea/Vomiting: no nausea/vomiting    Complications: none    Transfer of care protocol was followed      Last vitals:   Visit Vitals  /73 (BP Location: Left arm, Patient Position: Lying)   Pulse 69   Temp 36.7 °C (98.1 °F) (Temporal)   Resp 16   Wt 72.6 kg (160 lb)   LMP 01/15/2022   SpO2 99%   Breastfeeding No   BMI 27.46 kg/m²

## 2022-10-03 NOTE — H&P
Jose Price - Neuro Critical Care  Neurocritical Care  History & Physical    Admit Date: 10/3/2022  Service Date: 10/03/2022  Length of Stay: 0    Subjective:     Chief Complaint: Pituitary macroadenoma with extrasellar extension    History of Present Illness: 46-year-old female with history of PVD, hypothyroidism, GERD, presumed pituitary macroadenoma who presents for an elective transphenoidal pituitary adenoma resection via neurosurgery and ENT. The patient states she feels her vision has improved since the procedure and only reports mild headache. She denies SOB, chest pain, rhinorrhea, metallic taste in back of her mouth, and numbness/tingling of any extremities. She does report some urinary retention, but states she hasn't had much fluid intake after the procedure.       Past Medical History:   Diagnosis Date    Elevated fasting glucose     GERD (gastroesophageal reflux disease)     Hypothyroidism     PAD (peripheral artery disease)      Past Surgical History:   Procedure Laterality Date    CHOLECYSTECTOMY      ENDOMETRIAL ABLATION N/A 2/8/2022    Procedure: ABLATION, ENDOMETRIUM;  Surgeon: Ariel Butler MD;  Location: [facility] OR;  Service: OB/GYN;  Laterality: N/A;    HYSTEROSCOPY WITH DILATION AND CURETTAGE OF UTERUS N/A 2/8/2022    Procedure: HYSTEROSCOPY, WITH DILATION AND CURETTAGE OF UTERUS;  Surgeon: Ariel Butler MD;  Location: [facility] OR;  Service: OB/GYN;  Laterality: N/A;      No current facility-administered medications on file prior to encounter.     Current Outpatient Medications on File Prior to Encounter   Medication Sig Dispense Refill    acetaminophen (TYLENOL ORAL) Take by mouth.      calcium citrate/vitamin D3 (CITRACAL REGULAR ORAL) Take by mouth.      ferrous sulfate (IRON ORAL) Take by mouth.      mv,calcium,min/iron/folic/vitK (ONE-A-DAY WOMEN'S COMPLETE ORAL) Take by mouth.        Allergies: Patient has no known allergies.    Family History   Problem Relation Age of Onset     Diabetes Mother     Diabetes Mellitus Mother     Diabetes Father     Diabetes Mellitus Father     No Known Problems Sister     No Known Problems Sister     No Known Problems Brother     No Known Problems Brother     No Known Problems Daughter     No Known Problems Son     Colon cancer Neg Hx     Breast cancer Neg Hx     Ovarian cancer Neg Hx     Uterine cancer Neg Hx     Heart attack Neg Hx      Social History     Tobacco Use    Smoking status: Never    Smokeless tobacco: Never   Substance Use Topics    Alcohol use: No    Drug use: No     Review of Systems   Constitutional:  Negative for activity change, chills and fever.   HENT:  Negative for facial swelling, postnasal drip, rhinorrhea and trouble swallowing.    Eyes:  Negative for visual disturbance.   Respiratory:  Negative for cough and shortness of breath.    Cardiovascular:  Negative for chest pain and leg swelling.   Gastrointestinal:  Negative for abdominal distention, abdominal pain, nausea and vomiting.   Genitourinary:  Positive for difficulty urinating (some urinary retention s/p surgery).   Musculoskeletal:  Negative for arthralgias and myalgias.   Skin:  Negative for color change and wound.   Neurological:  Positive for headaches. Negative for weakness and numbness.   Psychiatric/Behavioral:  Negative for agitation and confusion.    Objective:     Vitals:    Temp: 98.2 °F (36.8 °C)  Pulse: 65  Rhythm: normal sinus rhythm  BP: 119/70  MAP (mmHg): 91  Resp: 14  SpO2: 98 %  O2 Device (Oxygen Therapy): room air    Temp  Min: 98 °F (36.7 °C)  Max: 98.2 °F (36.8 °C)  Pulse  Min: 65  Max: 72  BP  Min: 110/67  Max: 133/73  MAP (mmHg)  Min: 83  Max: 98  Resp  Min: 13  Max: 18  SpO2  Min: 96 %  Max: 99 %    No intake/output data recorded.           Physical Exam  Vitals and nursing note reviewed.   Constitutional:       General: She is not in acute distress.     Appearance: Normal appearance. She is not ill-appearing.   HENT:      Head:  Normocephalic and atraumatic.      Right Ear: External ear normal.      Left Ear: External ear normal.      Nose: No rhinorrhea.      Comments: Dried blood around nares     Mouth/Throat:      Mouth: Mucous membranes are dry.      Pharynx: Oropharynx is clear.   Eyes:      Extraocular Movements: Extraocular movements intact.      Pupils: Pupils are equal, round, and reactive to light.   Cardiovascular:      Rate and Rhythm: Normal rate and regular rhythm.      Pulses: Normal pulses.      Heart sounds: No murmur heard.  Pulmonary:      Effort: Pulmonary effort is normal. No respiratory distress.      Breath sounds: Normal breath sounds.   Abdominal:      General: There is no distension.      Palpations: Abdomen is soft.   Musculoskeletal:         General: No swelling.      Cervical back: Normal range of motion and neck supple.   Skin:     General: Skin is warm and dry.   Neurological:      General: No focal deficit present.      Mental Status: She is alert and oriented to person, place, and time.   Psychiatric:         Mood and Affect: Mood normal.         Behavior: Behavior normal.                   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    --patient endorses improvement in vision s/p surgery  --will continue to monitor    Endocrine  * Pituitary macroadenoma with extrasellar extension  S/p transphenoidal pituitary resection via ENT and NSGY    --q1h neuro checks  --monitor I/Os  --q6h Na and U/A for DI watch  --Endocrine consult  --SBP < 160  --Afrin prn for epistaxis    Pituitary lesion  See pituitary macroadenoma    Hypothyroidism  --Continue home synthroid    GI  PUD (peptic ulcer disease)  --Continue home protonix        The patient is being Prophylaxed for:  Venous Thromboembolism with: Mechanical  Stress Ulcer with: PPI  Ventilator Pneumonia with: not applicable    Activity Orders          Diet Adult Regular (IDDSI Level 7): Regular starting at 10/03 1506    Turn patient  starting at 10/03 1400    Elevate HOB starting at 10/03 1217        Full Code    Kiersten Greer MD  Neurocritical Care  Jose elvia - Neuro Critical Care

## 2022-10-03 NOTE — ASSESSMENT & PLAN NOTE
2/2 pituitary adenoma    --patient endorses improvement in vision s/p surgery  --will continue to monitor

## 2022-10-03 NOTE — ASSESSMENT & PLAN NOTE
Due to compression of optic chiasm. Saw Dr Yan pre-op for HVF  Pt reports noticeable improvement in vision postop   Will need follow-up field testing 2-3 months after surgery for new baseline.

## 2022-10-03 NOTE — OP NOTE
PREOP DIAGNOSIS:  Pituitary macroadenoma  Vision loss     POSTOP DIAGNOSIS:  Same as above     PROCEDURE:  1. Endoscopic, endonasal transphenoidal approach for resection of pituitary adenoma  2. Sphenoidotomy (performed by ENT)  3. Use of Stealth navigation     SURGEON:  Miguel Ángel Ferrell D.O. (Neurosurgery)     CO-SURGEON:  Ed Astorga M.D. (ENT)     ASSISTANT: Miguel Ángel Teran M.D. (Neurosurgery resident)     ASSISTANT:  Dean Fregoso M.D. 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  HVF revealed evidence of chiasmal compression.  Elevated prolactin (138) could be due to stalk effect and hypothyroidism.  Given vision loss and hormonal disturbance, I recommend endoscopic, endonasal resection.      Consents were obtained and risks, benefits, and alternatives to surgery were discussed.     PROCEDURE IN DETAIL:  The patient was correctly identified and taken to the operating room where the anesthesia team administered general endotracheal anesthesia.  The patient was given prophylactic IV antibiotics.  The patient was positioned supine with the head on a foam donut.  The head was registered using Stealth navigation.  The head was translated to the right side, sidebent to the left and slightly extended.  The ENT team prepped and draped the patient's face and nasal area as well as anesthetized the sinus mucosa.  The first part of the procedure involved the ENT performing a FESS and wide sphenoidotomy to provide access to the sellar.  The 2nd part of the procedure involved neurosurgery performing the tumor resection.       ENT performed a FESS and provided access into the sphenoid sinus.  A wide sphenoidotomy was performed.  The sphenoid septum was bitten out and landmarks were identified.  The sella face was then removed with a kerrison-2  exposing the sella dura from cavernous sinus to cavernous sinus.  Stealth navigation was used to confirm midline and an cruciate incision was made in the dura with a retractable knife. The dura was dissected from the underlying tumor using an angled curette.  Leaflets were cut in the dura.  Next, a small incision was made in the pseudo-capsule and a plane was established between it and the tumor.  This was done bilaterally until the medial walls of the cavernous sinus was encountered.  Tumor was mobilized into the center where specimens were taken for pathology.  A plane was then worked superiorly dissecting it off the pseudocapsule/pituitary gland.  The diaphragma descended nicely.  The tumor cavity was inspected closely for any residual and none was seen. Tumor was sent for permanent pathologic analysis. FloSeal and a Gel-Foam soaked in thrombin were left in the tumor cavity and a dura matrix onlay was placed within the sellar opening.  Adheris dural sealant was sprayed over the sella.      (The ENT team then covered the sellar defect with the mucosal flap.)     COMPLICATIONS: None     INCISION: Endonasal     WOUND CLASS: Clean contaminated     FINDINGS: Firm macroadenoma, gross total resection achieved.  No CSF leak.     DRAIN: None     CONDITION: Stable     PROGNOSIS: Good

## 2022-10-04 LAB
ANION GAP SERPL CALC-SCNC: 9 MMOL/L (ref 8–16)
BACTERIA STL CULT: NORMAL
BASOPHILS # BLD AUTO: 0.01 K/UL (ref 0–0.2)
BASOPHILS NFR BLD: 0.1 % (ref 0–1.9)
BILIRUB UR QL STRIP: NEGATIVE
BILIRUB UR QL STRIP: NEGATIVE
BUN SERPL-MCNC: 10 MG/DL (ref 6–20)
CALCIUM SERPL-MCNC: 8.9 MG/DL (ref 8.7–10.5)
CHLORIDE SERPL-SCNC: 104 MMOL/L (ref 95–110)
CLARITY UR REFRACT.AUTO: CLEAR
CLARITY UR REFRACT.AUTO: CLEAR
CO2 SERPL-SCNC: 22 MMOL/L (ref 23–29)
COLOR UR AUTO: COLORLESS
COLOR UR AUTO: COLORLESS
CORTIS SERPL-MCNC: 7.3 UG/DL (ref 4.3–22.4)
CREAT SERPL-MCNC: 0.8 MG/DL (ref 0.5–1.4)
DIFFERENTIAL METHOD: ABNORMAL
EOSINOPHIL # BLD AUTO: 0 K/UL (ref 0–0.5)
EOSINOPHIL NFR BLD: 0 % (ref 0–8)
ERYTHROCYTE [DISTWIDTH] IN BLOOD BY AUTOMATED COUNT: 13.2 % (ref 11.5–14.5)
EST. GFR  (NO RACE VARIABLE): >60 ML/MIN/1.73 M^2
GLUCOSE SERPL-MCNC: 121 MG/DL (ref 70–110)
GLUCOSE UR QL STRIP: NEGATIVE
GLUCOSE UR QL STRIP: NEGATIVE
HCT VFR BLD AUTO: 40.5 % (ref 37–48.5)
HGB BLD-MCNC: 12.8 G/DL (ref 12–16)
HGB UR QL STRIP: NEGATIVE
HGB UR QL STRIP: NEGATIVE
IMM GRANULOCYTES # BLD AUTO: 0.03 K/UL (ref 0–0.04)
IMM GRANULOCYTES NFR BLD AUTO: 0.3 % (ref 0–0.5)
KETONES UR QL STRIP: NEGATIVE
KETONES UR QL STRIP: NEGATIVE
LEUKOCYTE ESTERASE UR QL STRIP: ABNORMAL
LEUKOCYTE ESTERASE UR QL STRIP: NEGATIVE
LYMPHOCYTES # BLD AUTO: 2 K/UL (ref 1–4.8)
LYMPHOCYTES NFR BLD: 17.4 % (ref 18–48)
MAGNESIUM SERPL-MCNC: 2.1 MG/DL (ref 1.6–2.6)
MCH RBC QN AUTO: 30.3 PG (ref 27–31)
MCHC RBC AUTO-ENTMCNC: 31.6 G/DL (ref 32–36)
MCV RBC AUTO: 96 FL (ref 82–98)
MICROSCOPIC COMMENT: NORMAL
MONOCYTES # BLD AUTO: 0.7 K/UL (ref 0.3–1)
MONOCYTES NFR BLD: 6.5 % (ref 4–15)
NEUTROPHILS # BLD AUTO: 8.6 K/UL (ref 1.8–7.7)
NEUTROPHILS NFR BLD: 75.7 % (ref 38–73)
NITRITE UR QL STRIP: NEGATIVE
NITRITE UR QL STRIP: NEGATIVE
NRBC BLD-RTO: 0 /100 WBC
PH UR STRIP: 7 [PH] (ref 5–8)
PH UR STRIP: 7 [PH] (ref 5–8)
PHOSPHATE SERPL-MCNC: 3.5 MG/DL (ref 2.7–4.5)
PLATELET # BLD AUTO: 321 K/UL (ref 150–450)
PMV BLD AUTO: 10.8 FL (ref 9.2–12.9)
POCT GLUCOSE: 119 MG/DL (ref 70–110)
POTASSIUM SERPL-SCNC: 4.8 MMOL/L (ref 3.5–5.1)
PROT UR QL STRIP: NEGATIVE
PROT UR QL STRIP: NEGATIVE
RBC # BLD AUTO: 4.23 M/UL (ref 4–5.4)
RBC #/AREA URNS AUTO: 2 /HPF (ref 0–4)
SODIUM SERPL-SCNC: 135 MMOL/L (ref 136–145)
SODIUM SERPL-SCNC: 139 MMOL/L (ref 136–145)
SODIUM SERPL-SCNC: 140 MMOL/L (ref 136–145)
SP GR UR STRIP: 1 (ref 1–1.03)
SP GR UR STRIP: 1.01 (ref 1–1.03)
SQUAMOUS #/AREA URNS AUTO: 1 /HPF
URN SPEC COLLECT METH UR: ABNORMAL
URN SPEC COLLECT METH UR: ABNORMAL
WBC # BLD AUTO: 11.28 K/UL (ref 3.9–12.7)
WBC #/AREA URNS AUTO: 0 /HPF (ref 0–5)

## 2022-10-04 PROCEDURE — 83735 ASSAY OF MAGNESIUM: CPT

## 2022-10-04 PROCEDURE — 99900035 HC TECH TIME PER 15 MIN (STAT)

## 2022-10-04 PROCEDURE — 82533 TOTAL CORTISOL: CPT

## 2022-10-04 PROCEDURE — 84295 ASSAY OF SERUM SODIUM: CPT | Mod: 91

## 2022-10-04 PROCEDURE — 25000003 PHARM REV CODE 250

## 2022-10-04 PROCEDURE — 99232 PR SUBSEQUENT HOSPITAL CARE,LEVL II: ICD-10-PCS | Mod: ,,, | Performed by: GENERAL ACUTE CARE HOSPITAL

## 2022-10-04 PROCEDURE — 84295 ASSAY OF SERUM SODIUM: CPT

## 2022-10-04 PROCEDURE — 81001 URINALYSIS AUTO W/SCOPE: CPT

## 2022-10-04 PROCEDURE — 94761 N-INVAS EAR/PLS OXIMETRY MLT: CPT

## 2022-10-04 PROCEDURE — 25500020 PHARM REV CODE 255: Performed by: PSYCHIATRY & NEUROLOGY

## 2022-10-04 PROCEDURE — 99233 SBSQ HOSP IP/OBS HIGH 50: CPT | Mod: ,,, | Performed by: PSYCHIATRY & NEUROLOGY

## 2022-10-04 PROCEDURE — 84100 ASSAY OF PHOSPHORUS: CPT

## 2022-10-04 PROCEDURE — A9585 GADOBUTROL INJECTION: HCPCS | Performed by: PSYCHIATRY & NEUROLOGY

## 2022-10-04 PROCEDURE — 11000001 HC ACUTE MED/SURG PRIVATE ROOM

## 2022-10-04 PROCEDURE — 25000003 PHARM REV CODE 250: Performed by: STUDENT IN AN ORGANIZED HEALTH CARE EDUCATION/TRAINING PROGRAM

## 2022-10-04 PROCEDURE — 80048 BASIC METABOLIC PNL TOTAL CA: CPT | Performed by: STUDENT IN AN ORGANIZED HEALTH CARE EDUCATION/TRAINING PROGRAM

## 2022-10-04 PROCEDURE — 51701 INSERT BLADDER CATHETER: CPT

## 2022-10-04 PROCEDURE — 85025 COMPLETE CBC W/AUTO DIFF WBC: CPT | Performed by: STUDENT IN AN ORGANIZED HEALTH CARE EDUCATION/TRAINING PROGRAM

## 2022-10-04 PROCEDURE — 81003 URINALYSIS AUTO W/O SCOPE: CPT

## 2022-10-04 PROCEDURE — 36415 COLL VENOUS BLD VENIPUNCTURE: CPT

## 2022-10-04 PROCEDURE — 63600175 PHARM REV CODE 636 W HCPCS

## 2022-10-04 PROCEDURE — 99233 PR SUBSEQUENT HOSPITAL CARE,LEVL III: ICD-10-PCS | Mod: ,,, | Performed by: PSYCHIATRY & NEUROLOGY

## 2022-10-04 PROCEDURE — 99232 SBSQ HOSP IP/OBS MODERATE 35: CPT | Mod: ,,, | Performed by: GENERAL ACUTE CARE HOSPITAL

## 2022-10-04 RX ORDER — OXYCODONE HYDROCHLORIDE 5 MG/1
5 TABLET ORAL EVERY 6 HOURS PRN
Status: DISCONTINUED | OUTPATIENT
Start: 2022-10-04 | End: 2022-10-05 | Stop reason: HOSPADM

## 2022-10-04 RX ORDER — GADOBUTROL 604.72 MG/ML
4 INJECTION INTRAVENOUS
Status: COMPLETED | OUTPATIENT
Start: 2022-10-04 | End: 2022-10-04

## 2022-10-04 RX ORDER — HEPARIN SODIUM 5000 [USP'U]/ML
5000 INJECTION, SOLUTION INTRAVENOUS; SUBCUTANEOUS EVERY 8 HOURS
Status: DISCONTINUED | OUTPATIENT
Start: 2022-10-04 | End: 2022-10-05 | Stop reason: HOSPADM

## 2022-10-04 RX ADMIN — GABAPENTIN 100 MG: 100 CAPSULE ORAL at 09:10

## 2022-10-04 RX ADMIN — GABAPENTIN 100 MG: 100 CAPSULE ORAL at 02:10

## 2022-10-04 RX ADMIN — OXYMETAZOLINE HCL 3 SPRAY: 0.05 SPRAY NASAL at 05:10

## 2022-10-04 RX ADMIN — HEPARIN SODIUM 5000 UNITS: 5000 INJECTION INTRAVENOUS; SUBCUTANEOUS at 09:10

## 2022-10-04 RX ADMIN — NASAL 2 SPRAY: 6.5 SPRAY NASAL at 09:10

## 2022-10-04 RX ADMIN — Medication 324 MG: at 07:10

## 2022-10-04 RX ADMIN — OXYMETAZOLINE HCL 3 SPRAY: 0.05 SPRAY NASAL at 10:10

## 2022-10-04 RX ADMIN — PANTOPRAZOLE SODIUM 40 MG: 20 TABLET, DELAYED RELEASE ORAL at 09:10

## 2022-10-04 RX ADMIN — SENNOSIDES AND DOCUSATE SODIUM 1 TABLET: 50; 8.6 TABLET ORAL at 09:10

## 2022-10-04 RX ADMIN — NASAL 2 SPRAY: 6.5 SPRAY NASAL at 06:10

## 2022-10-04 RX ADMIN — LEVOTHYROXINE SODIUM 50 MCG: 50 TABLET ORAL at 05:10

## 2022-10-04 RX ADMIN — GADOBUTROL 4 ML: 604.72 INJECTION INTRAVENOUS at 11:10

## 2022-10-04 RX ADMIN — NASAL 2 SPRAY: 6.5 SPRAY NASAL at 02:10

## 2022-10-04 RX ADMIN — NASAL 2 SPRAY: 6.5 SPRAY NASAL at 10:10

## 2022-10-04 RX ADMIN — HEPARIN SODIUM 5000 UNITS: 5000 INJECTION INTRAVENOUS; SUBCUTANEOUS at 02:10

## 2022-10-04 RX ADMIN — OXYCODONE 5 MG: 5 TABLET ORAL at 11:10

## 2022-10-04 NOTE — HPI
Tessa Wynne is a 46 y.o. female who presents for evaluation of a pituitary mass, which was discovered on an MRI after a syncopal event. She complains of 1 year of non specific head aches. She denies vision changes, but has been seen by ophthalmology that shows some bitemporal depression. She has been treated with no medications for this in the past. She has  been evaluated by neurosurgery, neuroendocrinology and neurophthalmology . She is here for preoperative evaluation. She denies any recurrent sinus infections or epistaxis, no reports of nasal congestion. She denies previous skullbase surgery. She denies snoring.

## 2022-10-04 NOTE — NURSING TRANSFER
Nursing Transfer Note      10/4/2022     Reason patient is being transferred: pituitary MRI    Transfer From: [facility]    Transfer via bed    Transfer with cardiac monitoring    Transported by primary RN    Medicines sent: none    Any special needs or follow-up needed: none, transfer to NPU afterwards    Chart send with patient: No    Notified: son    Patient reassessed at: 10/4/22, 1145     Upon arrival to floor: cardiac monitor applied, patient oriented to room, call bell in reach, and bed in lowest position

## 2022-10-04 NOTE — PROGRESS NOTES
[facility]  Otorhinolaryngology-Head & Neck Surgery  Progress Note    Subjective:     Post-Op Info:  Procedure(s) (LRB):  EXCISION, NEOPLASM, PITUITARY, TRANSSPHENOIDAL APPROACH, USING COMPUTER-ASSISTED NAVIGATION (N/A)  EXCISION, NEOPLASM, PITUITARY, TRANSSPHENOIDAL APPROACH, USING COMPUTER-ASSISTED NAVIGATION (N/A)   1 Day Post-Op  Hospital Day: 2     Interval History: NAEON. AFVSS. Minimal post nasal drainage, denies metallic taste. Otherwise pain well controlled.     Medications:  Continuous Infusions:   sodium chloride 0.9% 100 mL/hr at 10/04/22 0622    niCARdipine Stopped (10/03/22 1200)     Scheduled Meds:   ferrous gluconate  324 mg Oral Daily with breakfast    gabapentin  100 mg Oral TID    levothyroxine  50 mcg Oral Before breakfast    pantoprazole  40 mg Oral Daily    senna-docusate 8.6-50 mg  1 tablet Oral BID    sodium chloride  2 spray Each Nostril Q4H While awake     PRN Meds:acetaminophen, hydrALAZINE, HYDROcodone-acetaminophen, labetalol, ondansetron, oxymetazoline, prochlorperazine, sodium chloride 0.9%     Review of patient's allergies indicates:  No Known Allergies  Objective:     Vital Signs (24h Range):  Temp:  [98 °F (36.7 °C)-98.4 °F (36.9 °C)] 98.4 °F (36.9 °C)  Pulse:  [58-78] 63  Resp:  [12-21] 14  SpO2:  [93 %-100 %] 98 %  BP: (103-123)/(59-79) 120/66       Lines/Drains/Airways       Drain  Duration             Female External Urinary Catheter 10/03/22 1100 <1 day              Peripheral Intravenous Line  Duration                  Peripheral IV - Single Lumen 10/03/22 0608 18 G Left Wrist 1 day         Peripheral IV - Single Lumen 10/03/22 1000 20 G Anterior;Proximal;Right Forearm <1 day                    Physical Exam  NAD  Breathing well  Min bloody crusting bilat nares  No active epistaxis  OP clear, no PND   EOMMI    Significant Labs:  BMP:   Recent Labs   Lab 10/03/22  1138   *      CO2 23   BUN 13   CREATININE 0.8   CALCIUM 8.4*     CBC:    Recent Labs   Lab 10/03/22  1138   WBC 11.44   RBC 4.00   HGB 12.3   HCT 37.2      MCV 93   MCH 30.8   MCHC 33.1       Significant Diagnostics:  I have reviewed and interpreted all pertinent imaging results/findings within the past 24 hours.    Assessment/Plan:     * Pituitary macroadenoma with extrasellar extension  S/p trans-sphenoid resection of pituitary adenoma 10/3/22, Dr. Ferrell + Dr. Astorga.     - Pt doing well post-op  - Begin nasal saline spray today  - Afrin PRN nose bleeds  - Sinus precautions  - Remainder of care per NCC/NSGY  - Please page w questions    Sinus precautions for inpatients:   HOB elevated (30 degrees or greater)  No nose blowing  No forcible spitting  No smoking  Sneeze with mouth open  No heavy lifting            Dean Castro MD  Otorhinolaryngology-Head & Neck Surgery  [facility]

## 2022-10-04 NOTE — ASSESSMENT & PLAN NOTE
- Neuro check/sinus precautions per neurosurgery protocol    - Monitor for diabetes insipidus with q6h Urinalysis (urine specific gravity), strict monitoring of intake/output hourly, and if urine output greater than 250 ml/hr for over 2 hours, draw BMP, serum Osm, urine Osm, urine sodium, and urinalysis stat and notify primary team and endocrine on-call  - Give desmopressin (DDAVP 10 mcg intranasal x1 dose) only if:  - Urine output greater than 250cc for two or more consecutive hours  - and Na greater than 145   - and low urinary specific gravity less than 1.010  - and inability of the patient to match fluid intake with UOP  - Please page endocrine if questions    - If patient is alert and has intact thirst response, encourage them to drink to thirst. If significant bothersome nocturia, patients may receive intranasal DDAVP (in non-operated nostril). Contact neurosurgery prior to giving DDAVP  - If patient altered or if patient unable to drink sufficiently to maintain euvolemia, then start DDAVP PRN. Avoid scheduling doses of DDAVP in patients without pre-existing DI as it may be self-limited and may lead to severe hyponatremia  - Patients with acromegaly may have large diuresis after successful surgery due to fall in GH levels (as GH promotes renal sodium resorption), be sure to distinguish this from DI using the labs above    - Avoid routine use of perioperative glucocorticoid (dexamethasone, hydrocortisone, methylprednisolone, etc) administration.  - check daily 8:00 a.m. cortisol levels and if cortisol drops to less than 8 start oral hydrocortisone 20/10 mg which patient should be discharged on   - Do not check cortisol levels if the patient has received any glucocorticoids in the past 24 hours  - 8am cortisol po POD#1 7.3, however received 8mg dexamethasone 10/3 at 8am. Dexamethasone can take up to 72h to washout. Would recommend repeating 8am cortisol on POD#2 to reassess. No clinical signs of AI now - but low  threshold to begin HC for empiric AI tx if any suspicion   - Avoid inpatient ordering of LH, FSH, testosterone, estrogen, progesterone, TSH, free T4 as these are unlikely to be helpful in making clinical decisions during hospital admission.  - recommended inpatient labs:    - after discharge patient will need postop day 7 BMP to screen for development of SIADH.      Discharge Recommendations  - Discharge when ready from a surgical standpoint  - Obtain 8:00 AM Cortisol on day of discharge if patient off other steroids for over 24 hours. If 8:00 AM Cortisol is less than 8, give Hydrocortisone 20mg In the morning and 10 mg in the evening on discharge  - If patient already on steroid taper, then discharge on tapered dose (usually hydrocortisone 20/10 or 15/5  - Other pituitary hormones will be monitored outpatient in pituitary clinic  - Will require outpatient BMP 7 days post-operatively to monitor sodium level for SIADH or persistent DI  - Follow up in Pituitary Clinic with Dr. Batista in 2 weeks, we will set up appointment

## 2022-10-04 NOTE — NURSING
RN called report to SHONNA Mitchell at this time. Pt in MRI currently with primary RN. Will transfer to the floor afterwards.

## 2022-10-04 NOTE — PROGRESS NOTES
[facility]  Neurosurgery  Progress Note    Subjective:     History of Present Illness: Tessa Wynne is a 46 y.o. female who presents for evaluation of a pituitary mass, which was discovered on an MRI after a syncopal event. She complains of 1 year of non specific head aches. She denies vision changes, but has been seen by ophthalmology that shows some bitemporal depression. She has been treated with no medications for this in the past. She has  been evaluated by neurosurgery, neuroendocrinology and neurophthalmology . She is here for preoperative evaluation. She denies any recurrent sinus infections or epistaxis, no reports of nasal congestion. She denies previous skullbase surgery. She denies snoring.      Post-Op Info:  Procedure(s) (LRB):  EXCISION, NEOPLASM, PITUITARY, TRANSSPHENOIDAL APPROACH, USING COMPUTER-ASSISTED NAVIGATION (N/A)  EXCISION, NEOPLASM, PITUITARY, TRANSSPHENOIDAL APPROACH, USING COMPUTER-ASSISTED NAVIGATION (N/A)   1 Day Post-Op     Interval History: 10/4: POD 1 s/p TSR pituitary adenoma. NAEON. AFVSS. Exam with subjective improvement in visual acuity. Pain controlled. Scant nasal drainage overnight now resolved. No salty taste in back of throat. Urine and serum labs not c/f DI.     Medications:  Continuous Infusions:   niCARdipine Stopped (10/03/22 1200)     Scheduled Meds:   ferrous gluconate  324 mg Oral Daily with breakfast    gabapentin  100 mg Oral TID    levothyroxine  50 mcg Oral Before breakfast    pantoprazole  40 mg Oral Daily    senna-docusate 8.6-50 mg  1 tablet Oral BID    sodium chloride  2 spray Each Nostril Q4H While awake     PRN Meds:acetaminophen, hydrALAZINE, HYDROcodone-acetaminophen, labetalol, ondansetron, oxymetazoline, prochlorperazine, sodium chloride 0.9%     Review of Systems  Objective:     Weight: 72.6 kg (160 lb 0.9 oz)  Body mass index is 27.47 kg/m².  Vital Signs (Most Recent):  Temp: 98 °F (36.7 °C) (10/04/22 0701)  Pulse: 67  (10/04/22 0901)  Resp: (!) 21 (10/04/22 0901)  BP: 132/75 (10/04/22 0901)  SpO2: 99 % (10/04/22 0901)   Vital Signs (24h Range):  Temp:  [98 °F (36.7 °C)-98.4 °F (36.9 °C)] 98 °F (36.7 °C)  Pulse:  [54-78] 67  Resp:  [11-21] 21  SpO2:  [93 %-100 %] 99 %  BP: (103-132)/(59-79) 132/75     Date 10/04/22 0700 - 10/05/22 0659   Shift 8153-3650 6465-0178 5006-1709 24 Hour Total   INTAKE   P.O. 200   200   I.V.(mL/kg) 249.9(3.4)   249.9(3.4)   Shift Total(mL/kg) 449.9(6.2)   449.9(6.2)   OUTPUT   Urine(mL/kg/hr) 1100   1100   Shift Total(mL/kg) 1100(15.2)   1100(15.2)   Weight (kg) 72.6 72.6 72.6 72.6                   Female External Urinary Catheter 10/03/22 1100 (Active)   Skin no redness;no breakdown 10/04/22 0701   Tolerance no signs/symptoms of discomfort 10/04/22 0701   Suction Continuous suction at 70 mmHg 10/04/22 0701   Date of last wick change 10/03/22 10/04/22 0701   Time of last wick change 1900 10/04/22 0701   Output (mL) 300 mL 10/04/22 0801       Physical Exam    Neurosurgery Physical Exam    GCS 15  AAO x 3  CN II-XII grossly intact  Fc x 4 antigravity  SILT    No drift or dysmetria    Significant Labs:  Recent Labs   Lab 10/03/22  1138 10/03/22  1722 10/04/22  0040 10/04/22  0555   *  --   --  121*    134* 135* 135*  135*   K 4.4  --   --  4.8     --   --  104   CO2 23  --   --  22*   BUN 13  --   --  10   CREATININE 0.8  --   --  0.8   CALCIUM 8.4*  --   --  8.9   MG  --   --   --  2.1     Recent Labs   Lab 10/03/22  1138 10/04/22  0555   WBC 11.44 11.28   HGB 12.3 12.8   HCT 37.2 40.5    321     No results for input(s): LABPT, INR, APTT in the last 48 hours.  Microbiology Results (last 7 days)       ** No results found for the last 168 hours. **          All pertinent labs from the last 24 hours have been reviewed.    Significant Diagnostics:  I have reviewed all pertinent imaging results/findings within the past 24 hours.  CT Head Without Contrast    Result Date:  10/3/2022  Expected postoperative change of transsphenoidal sellar/suprasellar mass resection without evidence significant acute intracranial hemorrhage or new abnormal parenchymal attenuation. See above for additional details. Electronically signed by resident: Rox Massey Date:    10/03/2022 Time:    13:30 Electronically signed by: Rodolfo Henry DO Date:    10/03/2022 Time:    14:23       Assessment/Plan:     * Pituitary macroadenoma with extrasellar extension  46F w/ PMH of PUD who presents with pituitary lesion now s/p TSR on 10/03:    --Patient admitted to ICU on telemetry      -q1h neurochecks in ICU, q2h neurochecks in stepdown, q4h neurochecks on floor  --All labs and diagnostics reviewed  --Postop CTH with expected change.   --SBP <140 (cardene ggt; hydralazine & labetalol PRN; transition to home meds when appropriate)  --Na 135-145; serum Na to be drawn q6h with Urine Specific Gravity      -Notify NSGY with >4mEq Na change, Na >145, or Spec Grav <1.005  --Monitor strict I/Os; notify NSGY if output exceeds 250cc for 3 consecutive hours  --HOB >30  --Strict nasal precautions  --PT/OT/OOB  --ADAT      -Please keep a pitcher of cold water at bedside and encourage patient to drink to thirst  --Pain control  --TEDs/SCDs  --SQH to begin 24h post-op  --Continue to monitor clinically, notify NSGY immediately with any changes in neuro status    Dispo: TTF under NSY        Delmar Carr MD  Neurosurgery  [facility] - [facility]

## 2022-10-04 NOTE — SUBJECTIVE & OBJECTIVE
Interval History:  Patient has no complaints today. VSS. NAEON. UOP 3L but patient was receiving IVF overnight post-op. Denies rhinorrhea or metallic tasting fluid in back of mouth. Denies numbness/tingling.     Review of Systems   Constitutional:  Negative for chills and fever.   HENT:  Positive for sore throat. Negative for congestion, rhinorrhea and trouble swallowing.    Eyes:  Negative for photophobia, pain and visual disturbance.        Improvement in vision   Respiratory:  Negative for cough and shortness of breath.    Cardiovascular:  Negative for chest pain and palpitations.   Gastrointestinal:  Negative for abdominal pain, nausea and vomiting.   Endocrine: Negative for cold intolerance, heat intolerance, polydipsia, polyphagia and polyuria.   Genitourinary:  Negative for difficulty urinating.   Musculoskeletal:  Negative for arthralgias and myalgias.   Skin:  Negative for rash and wound.   Neurological:  Positive for headaches. Negative for dizziness, tremors, speech difficulty and light-headedness.   Psychiatric/Behavioral:  Negative for agitation and confusion.      Objective:     Vitals:  Temp: 98.2 °F (36.8 °C)  Pulse: (!) 59  Rhythm: normal sinus rhythm  BP: 122/71  MAP (mmHg): 102  Resp: 14  SpO2: 96 %  O2 Device (Oxygen Therapy): room air    Temp  Min: 98 °F (36.7 °C)  Max: 98.4 °F (36.9 °C)  Pulse  Min: 54  Max: 78  BP  Min: 103/59  Max: 142/77  MAP (mmHg)  Min: 78  Max: 102  Resp  Min: 11  Max: 21  SpO2  Min: 93 %  Max: 100 %    10/03 0701 - 10/04 0700  In: 3075.1 [P.O.:180; I.V.:1595.1]  Out: 3150 [Urine:3050]   Unmeasured Output  Pad Count: 1       Physical Exam  Vitals and nursing note reviewed.   Constitutional:       General: She is not in acute distress.     Appearance: Normal appearance. She is not ill-appearing.   HENT:      Head: Normocephalic and atraumatic.      Right Ear: External ear normal.      Left Ear: External ear normal.      Nose: No rhinorrhea.      Comments: Dried blood  around nares     Mouth/Throat:      Mouth: Mucous membranes are dry.      Pharynx: Oropharynx is clear.   Eyes:      Extraocular Movements: Extraocular movements intact.      Pupils: Pupils are equal, round, and reactive to light.   Cardiovascular:      Rate and Rhythm: Normal rate and regular rhythm.      Pulses: Normal pulses.      Heart sounds: No murmur heard.  Pulmonary:      Effort: Pulmonary effort is normal. No respiratory distress.      Breath sounds: Normal breath sounds.   Abdominal:      General: There is no distension.      Palpations: Abdomen is soft.   Musculoskeletal:         General: No swelling.      Cervical back: Normal range of motion and neck supple.   Skin:     General: Skin is warm and dry.   Neurological:      General: No focal deficit present.      Mental Status: She is alert and oriented to person, place, and time.   Psychiatric:         Mood and Affect: Mood normal.         Behavior: Behavior normal.         Medications:  Continuous Scheduledferrous gluconate, 324 mg, Daily with breakfast  gabapentin, 100 mg, TID  heparin (porcine), 5,000 Units, Q8H  levothyroxine, 50 mcg, Before breakfast  pantoprazole, 40 mg, Daily  senna-docusate 8.6-50 mg, 1 tablet, BID  sodium chloride, 2 spray, Q4H While awake    PRNacetaminophen, 650 mg, Q4H PRN  ondansetron, 8 mg, Q8H PRN  oxyCODONE, 5 mg, Q6H PRN  oxymetazoline, 3 spray, Q2H PRN  prochlorperazine, 5 mg, Q6H PRN  sodium chloride 0.9%, 10 mL, PRN          Diet  Diet Adult Regular (IDDSI Level 7)  Diet Adult Regular (IDDSI Level 7)

## 2022-10-04 NOTE — PLAN OF CARE
POC reviewed with LuluLACEY Wynne and family at 1100. Pt verbalized understanding. Questions and concerns addressed. No acute events today. Pt progressing toward goals. Will continue to monitor. See below and flowsheets for full assessment and VS info.     - Report called to NPU, RN.   - MRI completed.   - trending q6 sodium levels and urinalysis.     Neuro:  Ken Coma Scale  Best Eye Response: 4-->(E4) spontaneous  Best Motor Response: 6-->(M6) obeys commands  Best Verbal Response: 5-->(V5) oriented  Ken Coma Scale Score: 15  Assessment Qualifiers: patient not sedated/intubated  Pupil PERRLA: yes     24 hr Temp:  [98 °F (36.7 °C)-98.4 °F (36.9 °C)]     CV:   Rhythm: normal sinus rhythm  BP goals:   SBP < 160  MAP > 65    Resp:   O2 Device (Oxygen Therapy): room air       Plan: N/A    GI/:     Diet/Nutrition Received: regular  Last Bowel Movement: 10/02/22  Voiding Characteristics: external catheter    Intake/Output Summary (Last 24 hours) at 10/4/2022 1154  Last data filed at 10/4/2022 1001  Gross per 24 hour   Intake 2266.16 ml   Output 5100 ml   Net -2833.84 ml     Unmeasured Output  Pad Count: 1    Labs/Accuchecks:  Recent Labs   Lab 10/04/22  0555   WBC 11.28   RBC 4.23   HGB 12.8   HCT 40.5         Recent Labs   Lab 10/04/22  0555   *  135*   K 4.8   CO2 22*      BUN 10   CREATININE 0.8    No results for input(s): PROTIME, INR, APTT, HEPANTIXA in the last 168 hours. No results for input(s): CPK, CPKMB, TROPONINI, MB in the last 168 hours.    Electrolytes: N/A - electrolytes WDL  Accuchecks: q6 hours    Gtts:      LDA/Wounds:  Lines/Drains/Airways       Drain  Duration             Female External Urinary Catheter 10/03/22 1100 1 day              Peripheral Intravenous Line  Duration                  Peripheral IV - Single Lumen 10/03/22 0608 18 G Left Wrist 1 day         Peripheral IV - Single Lumen 10/03/22 1000 20 G Anterior;Proximal;Right Forearm 1 day                  Wounds:  Yes  Wound care consulted: No    Is this patient a stroke patient? No.       Problem: Adult Inpatient Plan of Care  Goal: Plan of Care Review  Outcome: Ongoing, Progressing     Problem: Adult Inpatient Plan of Care  Goal: Absence of Hospital-Acquired Illness or Injury  Outcome: Ongoing, Progressing     Problem: Adult Inpatient Plan of Care  Goal: Optimal Comfort and Wellbeing  Outcome: Ongoing, Progressing     Problem: Adult Inpatient Plan of Care  Goal: Readiness for Transition of Care  Outcome: Ongoing, Progressing     Problem: Urinary Elimination Impaired (Stroke, Hemorrhagic)  Goal: Effective Urinary Elimination  Outcome: Ongoing, Progressing

## 2022-10-04 NOTE — SUBJECTIVE & OBJECTIVE
"Interval HPI:  He will  Overnight events: No acute events o/n. Yesterday UOP matched intake. UOP slightly increased this afternoon. Na normal 138, from slightly low this AM. No s/sx of AI.   Eatin% over the  Nausea: No  Hypoglycemia and intervention: No  Fever: No  TPN and/or TF: No  If yes, type of TF/TPN and rate: n/a    BP (!) 142/76 (BP Location: Right arm, Patient Position: Lying)   Pulse 71   Temp 98.1 °F (36.7 °C) (Oral)   Resp 18   Ht 5' 4" (1.626 m)   Wt 72.6 kg (160 lb 0.9 oz)   LMP 01/15/2022   SpO2 96%   Breastfeeding No   BMI 27.47 kg/m²     Labs Reviewed and Include    Recent Labs   Lab 10/04/22  0555 10/04/22  1217   *  --    CALCIUM 8.9  --    *  135* 139   K 4.8  --    CO2 22*  --      --    BUN 10  --    CREATININE 0.8  --      Lab Results   Component Value Date    WBC 11.28 10/04/2022    HGB 12.8 10/04/2022    HCT 40.5 10/04/2022    MCV 96 10/04/2022     10/04/2022     No results for input(s): TSH, FREET4 in the last 168 hours.  Lab Results   Component Value Date    HGBA1C 6.1 (H) 2022     Or Nutritional status:   Body mass index is 27.47 kg/m².  Lab Results   Component Value Date    ALBUMIN 4.0 2022    ALBUMIN 4.3 2022    ALBUMIN 4.1 09/10/2022     No results found for: PREALBUMIN    Estimated Creatinine Clearance: 85 mL/min (based on SCr of 0.8 mg/dL).    Accu-Checks  Recent Labs     10/03/22  1724 10/04/22  1214   POCTGLUCOSE 164* 119*       Current Medications and/or Treatments Impacting Glycemic Control  Immunotherapy:    Immunosuppressants       None          Steroids:   Hormones (From admission, onward)      None          Pressors:    Autonomic Drugs (From admission, onward)      None          Hyperglycemia/Diabetes Medications:   Antihyperglycemics (From admission, onward)      None          "

## 2022-10-04 NOTE — PROGRESS NOTES
[facility] - Neurosurgery ([facility])  Neurocritical Care  Progress Note    Admit Date: 10/3/2022  Service Date: 10/04/2022  Length of Stay: 1    Subjective:     Chief Complaint: Pituitary macroadenoma with extrasellar extension    History of Present Illness: 46-year-old female with history of PVD, hypothyroidism, GERD, presumed pituitary macroadenoma who presents for an elective transphenoidal pituitary adenoma resection via neurosurgery and ENT. The patient states she feels her vision has improved since the procedure and only reports mild headache. She denies SOB, chest pain, rhinorrhea, metallic taste in back of her mouth, and numbness/tingling of any extremities. She does report some urinary retention, but states she hasn't had much fluid intake after the procedure.       Hospital Course: 10/4: UOP and Na stable. No concern for DI. Denies rhinorrhea with metallic taste. Slight HA. MRI completed today.       Interval History:  Patient has no complaints today. VSS. NAEON. UOP 3L but patient was receiving IVF overnight post-op. Denies rhinorrhea or metallic tasting fluid in back of mouth. Denies numbness/tingling.     Review of Systems   Constitutional:  Negative for chills and fever.   HENT:  Positive for sore throat. Negative for congestion, rhinorrhea and trouble swallowing.    Eyes:  Negative for photophobia, pain and visual disturbance.        Improvement in vision   Respiratory:  Negative for cough and shortness of breath.    Cardiovascular:  Negative for chest pain and palpitations.   Gastrointestinal:  Negative for abdominal pain, nausea and vomiting.   Endocrine: Negative for cold intolerance, heat intolerance, polydipsia, polyphagia and polyuria.   Genitourinary:  Negative for difficulty urinating.   Musculoskeletal:  Negative for arthralgias and myalgias.   Skin:  Negative for rash and wound.   Neurological:  Positive for headaches. Negative for dizziness, tremors, speech difficulty and light-headedness.    Psychiatric/Behavioral:  Negative for agitation and confusion.      Objective:     Vitals:  Temp: 98.2 °F (36.8 °C)  Pulse: (!) 59  Rhythm: normal sinus rhythm  BP: 122/71  MAP (mmHg): 102  Resp: 14  SpO2: 96 %  O2 Device (Oxygen Therapy): room air    Temp  Min: 98 °F (36.7 °C)  Max: 98.4 °F (36.9 °C)  Pulse  Min: 54  Max: 78  BP  Min: 103/59  Max: 142/77  MAP (mmHg)  Min: 78  Max: 102  Resp  Min: 11  Max: 21  SpO2  Min: 93 %  Max: 100 %    10/03 0701 - 10/04 0700  In: 3075.1 [P.O.:180; I.V.:1595.1]  Out: 3150 [Urine:3050]   Unmeasured Output  Pad Count: 1       Physical Exam  Vitals and nursing note reviewed.   Constitutional:       General: She is not in acute distress.     Appearance: Normal appearance. She is not ill-appearing.   HENT:      Head: Normocephalic and atraumatic.      Right Ear: External ear normal.      Left Ear: External ear normal.      Nose: No rhinorrhea.      Comments: Dried blood around nares     Mouth/Throat:      Mouth: Mucous membranes are dry.      Pharynx: Oropharynx is clear.   Eyes:      Extraocular Movements: Extraocular movements intact.      Pupils: Pupils are equal, round, and reactive to light.   Cardiovascular:      Rate and Rhythm: Normal rate and regular rhythm.      Pulses: Normal pulses.      Heart sounds: No murmur heard.  Pulmonary:      Effort: Pulmonary effort is normal. No respiratory distress.      Breath sounds: Normal breath sounds.   Abdominal:      General: There is no distension.      Palpations: Abdomen is soft.   Musculoskeletal:         General: No swelling.      Cervical back: Normal range of motion and neck supple.   Skin:     General: Skin is warm and dry.   Neurological:      General: No focal deficit present.      Mental Status: She is alert and oriented to person, place, and time.   Psychiatric:         Mood and Affect: Mood normal.         Behavior: Behavior normal.         Medications:  Continuous Scheduledferrous gluconate, 324 mg, Daily with  breakfast  gabapentin, 100 mg, TID  heparin (porcine), 5,000 Units, Q8H  levothyroxine, 50 mcg, Before breakfast  pantoprazole, 40 mg, Daily  senna-docusate 8.6-50 mg, 1 tablet, BID  sodium chloride, 2 spray, Q4H While awake    PRNacetaminophen, 650 mg, Q4H PRN  ondansetron, 8 mg, Q8H PRN  oxyCODONE, 5 mg, Q6H PRN  oxymetazoline, 3 spray, Q2H PRN  prochlorperazine, 5 mg, Q6H PRN  sodium chloride 0.9%, 10 mL, PRN          Diet  Diet Adult Regular (IDDSI Level 7)  Diet Adult Regular (IDDSI Level 7)          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    --patient endorses improvement in vision s/p surgery  --will continue to monitor    Endocrine  * Pituitary macroadenoma with extrasellar extension  S/p transphenoidal pituitary resection via ENT and NSGY    --q1h neuro checks  --monitor I/Os  --q6h Na and U/A for DI watch  --Endocrine consult  --SBP < 160  --Afrin prn for epistaxis    Pituitary lesion  See pituitary macroadenoma    Hypothyroidism  --Continue home synthroid    GI  PUD (peptic ulcer disease)  --Continue home protonix          The patient is being Prophylaxed for:  Venous Thromboembolism with: Chemical  Stress Ulcer with: PPI  Ventilator Pneumonia with: not applicable    Activity Orders          Diet Adult Regular (IDDSI Level 7): Regular starting at 10/03 1506    Turn patient starting at 10/03 1400        Full Code    Kiersten Greer MD  Neurocritical Care  [facility] - Neurosurgery ([facility])

## 2022-10-04 NOTE — ASSESSMENT & PLAN NOTE
S/p trans-sphenoid resection of pituitary adenoma 10/3/22, Dr. Ferrell + Dr. Astorga.     - Pt doing well post-op  - Begin nasal saline spray today  - Afrin PRN nose bleeds  - Sinus precautions  - Remainder of care per NCC/NSGY  - Please page w questions    Sinus precautions for inpatients:   HOB elevated (30 degrees or greater)  No nose blowing  No forcible spitting  No smoking  Sneeze with mouth open  No heavy lifting

## 2022-10-04 NOTE — PLAN OF CARE
POC reviewed with the patient and they verbalized understanding. All comments and concerns addressed. Bed locked in lowest position with bed alarm set, call light within reach. Safety precautions maintained. VSS, see flowsheets. No events this shift. Will continue to monitor for changes to POC and clinical condition.  Will report to oncoming nurse at shift change for assumption of care.     Problem: Adult Inpatient Plan of Care  Goal: Plan of Care Review  Outcome: Ongoing, Progressing  Goal: Absence of Hospital-Acquired Illness or Injury  Outcome: Ongoing, Progressing  Goal: Optimal Comfort and Wellbeing  Outcome: Ongoing, Progressing  Goal: Readiness for Transition of Care  Outcome: Ongoing, Progressing     Problem: Infection  Goal: Absence of Infection Signs and Symptoms  Outcome: Ongoing, Progressing

## 2022-10-04 NOTE — SUBJECTIVE & OBJECTIVE
Interval History: 10/4: POD 1 s/p TSR pituitary adenoma. NAEON. AFVSS. Exam with subjective improvement in visual acuity. Pain controlled. Scant nasal drainage overnight now resolved. No salty taste in back of throat. Urine and serum labs not c/f DI.     Medications:  Continuous Infusions:   niCARdipine Stopped (10/03/22 1200)     Scheduled Meds:   ferrous gluconate  324 mg Oral Daily with breakfast    gabapentin  100 mg Oral TID    levothyroxine  50 mcg Oral Before breakfast    pantoprazole  40 mg Oral Daily    senna-docusate 8.6-50 mg  1 tablet Oral BID    sodium chloride  2 spray Each Nostril Q4H While awake     PRN Meds:acetaminophen, hydrALAZINE, HYDROcodone-acetaminophen, labetalol, ondansetron, oxymetazoline, prochlorperazine, sodium chloride 0.9%     Review of Systems  Objective:     Weight: 72.6 kg (160 lb 0.9 oz)  Body mass index is 27.47 kg/m².  Vital Signs (Most Recent):  Temp: 98 °F (36.7 °C) (10/04/22 0701)  Pulse: 67 (10/04/22 0901)  Resp: (!) 21 (10/04/22 0901)  BP: 132/75 (10/04/22 0901)  SpO2: 99 % (10/04/22 0901)   Vital Signs (24h Range):  Temp:  [98 °F (36.7 °C)-98.4 °F (36.9 °C)] 98 °F (36.7 °C)  Pulse:  [54-78] 67  Resp:  [11-21] 21  SpO2:  [93 %-100 %] 99 %  BP: (103-132)/(59-79) 132/75     Date 10/04/22 0700 - 10/05/22 0659   Shift 1206-8111 1019-1205 1756-5197 24 Hour Total   INTAKE   P.O. 200   200   I.V.(mL/kg) 249.9(3.4)   249.9(3.4)   Shift Total(mL/kg) 449.9(6.2)   449.9(6.2)   OUTPUT   Urine(mL/kg/hr) 1100   1100   Shift Total(mL/kg) 1100(15.2)   1100(15.2)   Weight (kg) 72.6 72.6 72.6 72.6                   Female External Urinary Catheter 10/03/22 1100 (Active)   Skin no redness;no breakdown 10/04/22 0701   Tolerance no signs/symptoms of discomfort 10/04/22 0701   Suction Continuous suction at 70 mmHg 10/04/22 0701   Date of last wick change 10/03/22 10/04/22 0701   Time of last wick change 1900 10/04/22 0701   Output (mL) 300 mL 10/04/22 0801       Physical Exam    Neurosurgery  Physical Exam    GCS 15  AAO x 3  CN II-XII grossly intact  Fc x 4 antigravity  SILT    No drift or dysmetria    Significant Labs:  Recent Labs   Lab 10/03/22  1138 10/03/22  1722 10/04/22  0040 10/04/22  0555   *  --   --  121*    134* 135* 135*  135*   K 4.4  --   --  4.8     --   --  104   CO2 23  --   --  22*   BUN 13  --   --  10   CREATININE 0.8  --   --  0.8   CALCIUM 8.4*  --   --  8.9   MG  --   --   --  2.1     Recent Labs   Lab 10/03/22  1138 10/04/22  0555   WBC 11.44 11.28   HGB 12.3 12.8   HCT 37.2 40.5    321     No results for input(s): LABPT, INR, APTT in the last 48 hours.  Microbiology Results (last 7 days)       ** No results found for the last 168 hours. **          All pertinent labs from the last 24 hours have been reviewed.    Significant Diagnostics:  I have reviewed all pertinent imaging results/findings within the past 24 hours.  CT Head Without Contrast    Result Date: 10/3/2022  Expected postoperative change of transsphenoidal sellar/suprasellar mass resection without evidence significant acute intracranial hemorrhage or new abnormal parenchymal attenuation. See above for additional details. Electronically signed by resident: Rox Massey Date:    10/03/2022 Time:    13:30 Electronically signed by: Rodolfo Henry DO Date:    10/03/2022 Time:    14:23

## 2022-10-04 NOTE — HOSPITAL COURSE
10/4: POD 1 s/p TSR pituitary adenoma. NAEON. AFVSS. Exam with subjective improvement in visual acuity. Pain controlled. Scant nasal drainage overnight now resolved. No salty taste in back of throat. Urine and serum labs not c/f DI.   10/5: POD 2. Minor bleeding from R nare overnight, now resolving. No salty taste in back of throat. Labs not concerning for DI. 8 AM cortisol 16.5. Denies significant urination within 3 hrs. Denies pain. Reports some improvement in visual acuity.

## 2022-10-04 NOTE — ASSESSMENT & PLAN NOTE
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

## 2022-10-04 NOTE — NURSING
RN called MRI at this tome to see when they were open for Ms. Wynne to come to her MRI. Tech got SHONNA campo number and said she would call back when it was a good time for them.

## 2022-10-04 NOTE — PROGRESS NOTES
"[facility] - Neurosurgery ([facility]  Endocrinology  Progress Note    Admit Date: 10/3/2022     Tessa Wynne is a 46 y.o. female who initially presented to pituitary clinic with Constantino Batista and Ghassan for evaluation of pituitary lesion compressing optic chiasm, found on imaging done after a syncopal episode. Pt reported prior galactorrhea that resolved after a D&C procedure in the past. She had visual field impairment as well. Hormone evaluation preop was significant for elevated prolactin of 138 (likely from stalk compression), secondary hypothyroidism, otherwise normal. She presented to [facility] [place] on 10/3/2022 for pituitary macroadenoma TSR. Endocrine was consulted for postop TSR.    Regarding pituitary adenoma:  Initial presentation:                  Brain imaging done for syncope and HA.       Imaging:                        MRI 8/15/22  Sella: There is a large sellar lesion with suprasellar extension.  Lesion measures roughly 3.2 x 2.8 x 3.5 cm.  There is mass effect on the adjacent structures including the optic chiasm which is displaced superiorly as well as the bilateral A1 segments of the anterior cerebral arteries.  There is possible extension into the cavernous sinuses bilaterally best visualized on coronal T2 series 10.  The carotid flow voids are preserved.  There is bony remodeling of the adjacent clivus secondary to this lesion with preserved marrow signal.       Headache:                               HA x 1 year, occipital pain a couple times per week      Vision change:                        Some blurring when reading, denies peripheral change      Formal Visual fields:               HVF w/ Dr. Yan 8/25/22, per note:  " OCT with borderline RNFL thinning OD>OS. HVF with generalized depression OD (worse temporally) and temporal depression OS. Findings consistent with chiasmal compression." rtc in 3 month(s)     Pituitary labs: with elevated prolactin of 138 (likely from stalk compression), secondary " "hypothyroidism, otherwise normal    Latest Reference Range & Units 22 08:45   Cortisol, 8 AM 4.30 - 22.40 ug/dL  4.30 - 22.40 ug/dL 13.70  12.80   ACTH 0 - 46 pg/mL 19   Somatomedin (IGF-I) 44 - 227 ng/mL 42 (L)   TSH 0.400 - 4.000 uIU/mL 2.402   Free T4 0.71 - 1.51 ng/dL 0.65 (L)   FSH See Text mIU/mL 10.72   Prolactin 5.2 - 26.5 ng/mL 138.0 (H)       Interval HPI:  He will  Overnight events: No acute events o/n. Yesterday UOP matched intake. UOP slightly increased this afternoon. Na normal 138, from slightly low this AM. No s/sx of AI.   Eatin% over the  Nausea: No  Hypoglycemia and intervention: No  Fever: No  TPN and/or TF: No  If yes, type of TF/TPN and rate: n/a    BP (!) 142/76 (BP Location: Right arm, Patient Position: Lying)   Pulse 71   Temp 98.1 °F (36.7 °C) (Oral)   Resp 18   Ht 5' 4" (1.626 m)   Wt 72.6 kg (160 lb 0.9 oz)   LMP 01/15/2022   SpO2 96%   Breastfeeding No   BMI 27.47 kg/m²     Labs Reviewed and Include    Recent Labs   Lab 10/04/22  0555 10/04/22  1217   *  --    CALCIUM 8.9  --    *  135* 139   K 4.8  --    CO2 22*  --      --    BUN 10  --    CREATININE 0.8  --      Lab Results   Component Value Date    WBC 11.28 10/04/2022    HGB 12.8 10/04/2022    HCT 40.5 10/04/2022    MCV 96 10/04/2022     10/04/2022     No results for input(s): TSH, FREET4 in the last 168 hours.  Lab Results   Component Value Date    HGBA1C 6.1 (H) 2022     Or Nutritional status:   Body mass index is 27.47 kg/m².  Lab Results   Component Value Date    ALBUMIN 4.0 2022    ALBUMIN 4.3 2022    ALBUMIN 4.1 09/10/2022     No results found for: PREALBUMIN    Estimated Creatinine Clearance: 85 mL/min (based on SCr of 0.8 mg/dL).    Accu-Checks  Recent Labs     10/03/22  1724 10/04/22  1214   POCTGLUCOSE 164* 119*       Current Medications and/or Treatments Impacting Glycemic Control  Immunotherapy:    Immunosuppressants       None          Steroids: "   Hormones (From admission, onward)      None          Pressors:    Autonomic Drugs (From admission, onward)      None          Hyperglycemia/Diabetes Medications:   Antihyperglycemics (From admission, onward)      None            ASSESSMENT and PLAN    * Pituitary macroadenoma with extrasellar extension  - Neuro check/sinus precautions per neurosurgery protocol    - Monitor for diabetes insipidus with q6h Urinalysis (urine specific gravity), strict monitoring of intake/output hourly, and if urine output greater than 250 ml/hr for over 2 hours, draw BMP, serum Osm, urine Osm, urine sodium, and urinalysis stat and notify primary team and endocrine on-call  - Give desmopressin (DDAVP 10 mcg intranasal x1 dose) only if:  - Urine output greater than 250cc for two or more consecutive hours  - and Na greater than 145   - and low urinary specific gravity less than 1.010  - and inability of the patient to match fluid intake with UOP  - Please page endocrine if questions    - If patient is alert and has intact thirst response, encourage them to drink to thirst. If significant bothersome nocturia, patients may receive intranasal DDAVP (in non-operated nostril). Contact neurosurgery prior to giving DDAVP  - If patient altered or if patient unable to drink sufficiently to maintain euvolemia, then start DDAVP PRN. Avoid scheduling doses of DDAVP in patients without pre-existing DI as it may be self-limited and may lead to severe hyponatremia  - Patients with acromegaly may have large diuresis after successful surgery due to fall in GH levels (as GH promotes renal sodium resorption), be sure to distinguish this from DI using the labs above    - Avoid routine use of perioperative glucocorticoid (dexamethasone, hydrocortisone, methylprednisolone, etc) administration.  - check daily 8:00 a.m. cortisol levels and if cortisol drops to less than 8 start oral hydrocortisone 20/10 mg which patient should be discharged on   - Do not check  cortisol levels if the patient has received any glucocorticoids in the past 24 hours  - 8am cortisol po POD#1 7.3, however received 8mg dexamethasone 10/3 at 8am. Dexamethasone can take up to 72h to washout. Would recommend repeating 8am cortisol on POD#2 to reassess. No clinical signs of AI now - but low threshold to begin HC for empiric AI tx if any suspicion   - Avoid inpatient ordering of LH, FSH, testosterone, estrogen, progesterone, TSH, free T4 as these are unlikely to be helpful in making clinical decisions during hospital admission.  - recommended inpatient labs:    - after discharge patient will need postop day 7 BMP to screen for development of SIADH.      Discharge Recommendations  - Discharge when ready from a surgical standpoint  - Obtain 8:00 AM Cortisol on day of discharge if patient off other steroids for over 24 hours. If 8:00 AM Cortisol is less than 8, give Hydrocortisone 20mg In the morning and 10 mg in the evening on discharge  - If patient already on steroid taper, then discharge on tapered dose (usually hydrocortisone 20/10 or 15/5  - Other pituitary hormones will be monitored outpatient in pituitary clinic  - Will require outpatient BMP 7 days post-operatively to monitor sodium level for SIADH or persistent DI  - Follow up in Pituitary Clinic with Dr. 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 Saw Dr Yan pre-op for HVF  Pt reports noticeable improvement in vision postop   Will need follow-up field testing 2-3 months after surgery for new baseline.          Karen Noguera MD  Endocrinology  [facility] - Neurosurgery ([facility])

## 2022-10-04 NOTE — SUBJECTIVE & OBJECTIVE
Interval History: NAEON. AFVSS. Minimal post nasal drainage, denies metallic taste. Otherwise pain well controlled.     Medications:  Continuous Infusions:   sodium chloride 0.9% 100 mL/hr at 10/04/22 0622    niCARdipine Stopped (10/03/22 1200)     Scheduled Meds:   ferrous gluconate  324 mg Oral Daily with breakfast    gabapentin  100 mg Oral TID    levothyroxine  50 mcg Oral Before breakfast    pantoprazole  40 mg Oral Daily    senna-docusate 8.6-50 mg  1 tablet Oral BID    sodium chloride  2 spray Each Nostril Q4H While awake     PRN Meds:acetaminophen, hydrALAZINE, HYDROcodone-acetaminophen, labetalol, ondansetron, oxymetazoline, prochlorperazine, sodium chloride 0.9%     Review of patient's allergies indicates:  No Known Allergies  Objective:     Vital Signs (24h Range):  Temp:  [98 °F (36.7 °C)-98.4 °F (36.9 °C)] 98.4 °F (36.9 °C)  Pulse:  [58-78] 63  Resp:  [12-21] 14  SpO2:  [93 %-100 %] 98 %  BP: (103-123)/(59-79) 120/66       Lines/Drains/Airways       Drain  Duration             Female External Urinary Catheter 10/03/22 1100 <1 day              Peripheral Intravenous Line  Duration                  Peripheral IV - Single Lumen 10/03/22 0608 18 G Left Wrist 1 day         Peripheral IV - Single Lumen 10/03/22 1000 20 G Anterior;Proximal;Right Forearm <1 day                    Physical Exam  NAD  Breathing well  Min bloody crusting bilat nares  No active epistaxis  OP clear, no PND   EOMMI    Significant Labs:  BMP:   Recent Labs   Lab 10/03/22  1138   *      CO2 23   BUN 13   CREATININE 0.8   CALCIUM 8.4*     CBC:   Recent Labs   Lab 10/03/22  1138   WBC 11.44   RBC 4.00   HGB 12.3   HCT 37.2      MCV 93   MCH 30.8   MCHC 33.1       Significant Diagnostics:  I have reviewed and interpreted all pertinent imaging results/findings within the past 24 hours.

## 2022-10-04 NOTE — ASSESSMENT & PLAN NOTE
46F w/ PMH of PUD who presents with pituitary lesion now s/p TSR on 10/03:    --Patient admitted to ICU on telemetry      -q1h neurochecks in ICU, q2h neurochecks in stepdown, q4h neurochecks on floor  --All labs and diagnostics reviewed  --Postop CTH with expected change.   --SBP <140 (cardene ggt; hydralazine & labetalol PRN; transition to home meds when appropriate)  --Na 135-145; serum Na to be drawn q6h with Urine Specific Gravity      -Notify NSGY with >4mEq Na change, Na >145, or Spec Grav <1.005  --Monitor strict I/Os; notify NSGY if output exceeds 250cc for 3 consecutive hours  --HOB >30  --Strict nasal precautions  --PT/OT/OOB  --ADAT      -Please keep a pitcher of cold water at bedside and encourage patient to drink to thirst  --Pain control  --TEDs/SCDs  --SQH to begin 24h post-op  --Continue to monitor clinically, notify NSGY immediately with any changes in neuro status    Dispo: TTF under NSY

## 2022-10-04 NOTE — PLAN OF CARE
[facility] Care Plan    POC reviewed with Tessa Wynne and family at 0300. Pt verbalized understanding. Questions and concerns addressed. No acute events overnight. Pt progressing toward goals. Will continue to monitor. See below and flowsheets for full assessment and VS info.           Is this a stroke patient? no    Neuro:  [place] Coma Scale  Best Eye Response: 4-->(E4) spontaneous  Best Motor Response: 6-->(M6) obeys commands  Best Verbal Response: 5-->(V5) oriented  [place] Coma Scale Score: 15  Assessment Qualifiers: patient not sedated/intubated  Pupil PERRLA: yes     24hr Temp:  [98 °F (36.7 °C)-98.4 °F (36.9 °C)]     CV:   Rhythm: normal sinus rhythm  BP goals:   SBP < 160  MAP > 65    Resp:   O2 Device (Oxygen Therapy): room air       Plan: N/A    GI/:     Diet/Nutrition Received: regular  Last Bowel Movement: 10/02/22  Voiding Characteristics: external catheter    Intake/Output Summary (Last 24 hours) at 10/4/2022 0622  Last data filed at 10/4/2022 0622  Gross per 24 hour   Intake 3075.07 ml   Output 3150 ml   Net -74.93 ml     Unmeasured Output  Pad Count: 1    Labs/Accuchecks:  Recent Labs   Lab 10/03/22  1138   WBC 11.44   RBC 4.00   HGB 12.3   HCT 37.2         Recent Labs   Lab 10/03/22  1138 10/03/22  1722 10/04/22  0040      < > 135*   K 4.4  --   --    CO2 23  --   --      --   --    BUN 13  --   --    CREATININE 0.8  --   --     < > = values in this interval not displayed.    No results for input(s): PROTIME, INR, APTT, HEPANTIXA in the last 168 hours. No results for input(s): CPK, CPKMB, TROPONINI, MB in the last 168 hours.    Electrolytes: N/A - electrolytes WDL  Accuchecks: Q6H    Gtts:   sodium chloride 0.9% 100 mL/hr at 10/04/22 0622    niCARdipine Stopped (10/03/22 1200)       LDA/Wounds:  Lines/Drains/Airways       Drain  Duration             Female External Urinary Catheter 10/03/22 1100 <1 day              Peripheral Intravenous Line  Duration                   Peripheral IV - Single Lumen 10/03/22 0608 18 G Left Wrist 1 day         Peripheral IV - Single Lumen 10/03/22 1000 20 G Anterior;Proximal;Right Forearm <1 day                  Wounds: Yes  Wound care consulted: No

## 2022-10-04 NOTE — HOSPITAL COURSE
10/4: UOP and Na stable. No concern for DI. Denies rhinorrhea with metallic taste. Slight HA. MRI completed today.

## 2022-10-04 NOTE — NURSING TRANSFER
Nursing Transfer Note      10/4/2022     Reason patient is being transferred: step down to NPU    Transfer To: room 954    Transfer via bed    Transfer with cardiac monitoring    Transported by primary RN    Medicines sent: nasal sprays    Any special needs or follow-up needed: none    Chart send with patient: Yes    Notified: spouse, son    Patient reassessed at: 10/04/2022, 1240     Upon arrival to floor: cardiac monitor applied, patient oriented to room, call bell in reach, and bed in lowest position

## 2022-10-05 ENCOUNTER — TELEPHONE (OUTPATIENT)
Dept: NEUROSURGERY | Facility: CLINIC | Age: 47
End: 2022-10-05
Payer: MEDICAID

## 2022-10-05 VITALS
WEIGHT: 160.06 LBS | SYSTOLIC BLOOD PRESSURE: 116 MMHG | HEART RATE: 80 BPM | HEIGHT: 64 IN | BODY MASS INDEX: 27.33 KG/M2 | DIASTOLIC BLOOD PRESSURE: 66 MMHG | OXYGEN SATURATION: 98 % | TEMPERATURE: 98 F | RESPIRATION RATE: 17 BRPM

## 2022-10-05 DIAGNOSIS — D35.2 PITUITARY MACROADENOMA WITH EXTRASELLAR EXTENSION: Primary | ICD-10-CM

## 2022-10-05 LAB
ANION GAP SERPL CALC-SCNC: 8 MMOL/L (ref 8–16)
BASOPHILS # BLD AUTO: 0.03 K/UL (ref 0–0.2)
BASOPHILS NFR BLD: 0.4 % (ref 0–1.9)
BILIRUB UR QL STRIP: NEGATIVE
BUN SERPL-MCNC: 9 MG/DL (ref 6–20)
CALCIUM SERPL-MCNC: 8.7 MG/DL (ref 8.7–10.5)
CHLORIDE SERPL-SCNC: 103 MMOL/L (ref 95–110)
CLARITY UR REFRACT.AUTO: CLEAR
CO2 SERPL-SCNC: 28 MMOL/L (ref 23–29)
COLOR UR AUTO: ABNORMAL
COLOR UR AUTO: COLORLESS
COLOR UR AUTO: NORMAL
CORTIS SERPL-MCNC: 16.5 UG/DL (ref 4.3–22.4)
CREAT SERPL-MCNC: 0.9 MG/DL (ref 0.5–1.4)
DIFFERENTIAL METHOD: ABNORMAL
EOSINOPHIL # BLD AUTO: 0 K/UL (ref 0–0.5)
EOSINOPHIL NFR BLD: 0.2 % (ref 0–8)
ERYTHROCYTE [DISTWIDTH] IN BLOOD BY AUTOMATED COUNT: 13.3 % (ref 11.5–14.5)
EST. GFR  (NO RACE VARIABLE): >60 ML/MIN/1.73 M^2
GLUCOSE SERPL-MCNC: 97 MG/DL (ref 70–110)
GLUCOSE UR QL STRIP: NEGATIVE
HCT VFR BLD AUTO: 41.1 % (ref 37–48.5)
HGB BLD-MCNC: 13.1 G/DL (ref 12–16)
HGB UR QL STRIP: NEGATIVE
IMM GRANULOCYTES # BLD AUTO: 0.02 K/UL (ref 0–0.04)
IMM GRANULOCYTES NFR BLD AUTO: 0.2 % (ref 0–0.5)
KETONES UR QL STRIP: NEGATIVE
LEUKOCYTE ESTERASE UR QL STRIP: ABNORMAL
LEUKOCYTE ESTERASE UR QL STRIP: NEGATIVE
LEUKOCYTE ESTERASE UR QL STRIP: NEGATIVE
LYMPHOCYTES # BLD AUTO: 2.8 K/UL (ref 1–4.8)
LYMPHOCYTES NFR BLD: 35.3 % (ref 18–48)
MAGNESIUM SERPL-MCNC: 2 MG/DL (ref 1.6–2.6)
MCH RBC QN AUTO: 30.8 PG (ref 27–31)
MCHC RBC AUTO-ENTMCNC: 31.9 G/DL (ref 32–36)
MCV RBC AUTO: 97 FL (ref 82–98)
MICROSCOPIC COMMENT: NORMAL
MONOCYTES # BLD AUTO: 0.6 K/UL (ref 0.3–1)
MONOCYTES NFR BLD: 7.7 % (ref 4–15)
NEUTROPHILS # BLD AUTO: 4.5 K/UL (ref 1.8–7.7)
NEUTROPHILS NFR BLD: 56.2 % (ref 38–73)
NITRITE UR QL STRIP: NEGATIVE
NRBC BLD-RTO: 0 /100 WBC
PH UR STRIP: 7 [PH] (ref 5–8)
PHOSPHATE SERPL-MCNC: 3 MG/DL (ref 2.7–4.5)
PLATELET # BLD AUTO: 282 K/UL (ref 150–450)
PMV BLD AUTO: 10.5 FL (ref 9.2–12.9)
POTASSIUM SERPL-SCNC: 4.3 MMOL/L (ref 3.5–5.1)
PROT UR QL STRIP: NEGATIVE
RBC # BLD AUTO: 4.26 M/UL (ref 4–5.4)
RBC #/AREA URNS AUTO: 2 /HPF (ref 0–4)
SODIUM SERPL-SCNC: 139 MMOL/L (ref 136–145)
SODIUM SERPL-SCNC: 139 MMOL/L (ref 136–145)
SODIUM SERPL-SCNC: 140 MMOL/L (ref 136–145)
SODIUM SERPL-SCNC: 140 MMOL/L (ref 136–145)
SP GR UR STRIP: 1.01 (ref 1–1.03)
SQUAMOUS #/AREA URNS AUTO: 0 /HPF
URN SPEC COLLECT METH UR: ABNORMAL
URN SPEC COLLECT METH UR: ABNORMAL
URN SPEC COLLECT METH UR: NORMAL
WBC # BLD AUTO: 8.02 K/UL (ref 3.9–12.7)
WBC #/AREA URNS AUTO: 2 /HPF (ref 0–5)

## 2022-10-05 PROCEDURE — 81003 URINALYSIS AUTO W/O SCOPE: CPT | Mod: 91

## 2022-10-05 PROCEDURE — 83735 ASSAY OF MAGNESIUM: CPT

## 2022-10-05 PROCEDURE — 36415 COLL VENOUS BLD VENIPUNCTURE: CPT

## 2022-10-05 PROCEDURE — 99232 PR SUBSEQUENT HOSPITAL CARE,LEVL II: ICD-10-PCS | Mod: ,,, | Performed by: GENERAL ACUTE CARE HOSPITAL

## 2022-10-05 PROCEDURE — 81003 URINALYSIS AUTO W/O SCOPE: CPT

## 2022-10-05 PROCEDURE — 84295 ASSAY OF SERUM SODIUM: CPT | Mod: 91

## 2022-10-05 PROCEDURE — 99024 PR POST-OP FOLLOW-UP VISIT: ICD-10-PCS | Mod: ,,,

## 2022-10-05 PROCEDURE — 25000003 PHARM REV CODE 250

## 2022-10-05 PROCEDURE — 99024 POSTOP FOLLOW-UP VISIT: CPT | Mod: ,,,

## 2022-10-05 PROCEDURE — 82533 TOTAL CORTISOL: CPT | Performed by: STUDENT IN AN ORGANIZED HEALTH CARE EDUCATION/TRAINING PROGRAM

## 2022-10-05 PROCEDURE — 81001 URINALYSIS AUTO W/SCOPE: CPT

## 2022-10-05 PROCEDURE — 99232 SBSQ HOSP IP/OBS MODERATE 35: CPT | Mod: ,,, | Performed by: GENERAL ACUTE CARE HOSPITAL

## 2022-10-05 PROCEDURE — 84295 ASSAY OF SERUM SODIUM: CPT

## 2022-10-05 PROCEDURE — 36415 COLL VENOUS BLD VENIPUNCTURE: CPT | Performed by: STUDENT IN AN ORGANIZED HEALTH CARE EDUCATION/TRAINING PROGRAM

## 2022-10-05 PROCEDURE — 84100 ASSAY OF PHOSPHORUS: CPT

## 2022-10-05 PROCEDURE — 80048 BASIC METABOLIC PNL TOTAL CA: CPT | Performed by: STUDENT IN AN ORGANIZED HEALTH CARE EDUCATION/TRAINING PROGRAM

## 2022-10-05 PROCEDURE — 94761 N-INVAS EAR/PLS OXIMETRY MLT: CPT

## 2022-10-05 PROCEDURE — 85025 COMPLETE CBC W/AUTO DIFF WBC: CPT | Performed by: STUDENT IN AN ORGANIZED HEALTH CARE EDUCATION/TRAINING PROGRAM

## 2022-10-05 PROCEDURE — 99900035 HC TECH TIME PER 15 MIN (STAT)

## 2022-10-05 PROCEDURE — 63600175 PHARM REV CODE 636 W HCPCS

## 2022-10-05 RX ORDER — LEVOTHYROXINE SODIUM 50 UG/1
50 TABLET ORAL
Qty: 30 TABLET | Refills: 11 | Status: SHIPPED | OUTPATIENT
Start: 2022-10-06 | End: 2022-11-01 | Stop reason: DRUGHIGH

## 2022-10-05 RX ORDER — OXYCODONE HYDROCHLORIDE 5 MG/1
5 TABLET ORAL EVERY 6 HOURS PRN
Qty: 30 TABLET | Refills: 0 | Status: SHIPPED | OUTPATIENT
Start: 2022-10-05 | End: 2022-10-08 | Stop reason: SDUPTHER

## 2022-10-05 RX ORDER — OXYMETAZOLINE HCL 0.05 %
3 SPRAY, NON-AEROSOL (ML) NASAL
Qty: 30 ML | Refills: 0 | Status: SHIPPED | OUTPATIENT
Start: 2022-10-05 | End: 2022-12-13

## 2022-10-05 RX ADMIN — HEPARIN SODIUM 5000 UNITS: 5000 INJECTION INTRAVENOUS; SUBCUTANEOUS at 05:10

## 2022-10-05 RX ADMIN — GABAPENTIN 100 MG: 100 CAPSULE ORAL at 01:10

## 2022-10-05 RX ADMIN — PANTOPRAZOLE SODIUM 40 MG: 20 TABLET, DELAYED RELEASE ORAL at 08:10

## 2022-10-05 RX ADMIN — NASAL 2 SPRAY: 6.5 SPRAY NASAL at 01:10

## 2022-10-05 RX ADMIN — NASAL 2 SPRAY: 6.5 SPRAY NASAL at 05:10

## 2022-10-05 RX ADMIN — NASAL 2 SPRAY: 6.5 SPRAY NASAL at 08:10

## 2022-10-05 RX ADMIN — Medication 324 MG: at 07:10

## 2022-10-05 RX ADMIN — HEPARIN SODIUM 5000 UNITS: 5000 INJECTION INTRAVENOUS; SUBCUTANEOUS at 01:10

## 2022-10-05 RX ADMIN — GABAPENTIN 100 MG: 100 CAPSULE ORAL at 08:10

## 2022-10-05 RX ADMIN — LEVOTHYROXINE SODIUM 50 MCG: 50 TABLET ORAL at 05:10

## 2022-10-05 NOTE — PROGRESS NOTES
Postop TSR on 10/3 for pituitary macroadenoma. Requesting BMP be scheduled on POD#7, Monday 10/10 and an appt with Dr Batista in pit clinic on Tuesday 10/18?

## 2022-10-05 NOTE — ASSESSMENT & PLAN NOTE
46F w/ PMH of PUD who presents with pituitary lesion now s/p TSR on 10/03:    --Patient stepped down to floor; q4hr neurochecks.  --All labs and diagnostics reviewed.  --Postop CTH with expected change.   --MRI pituitary   --SBP <160.  --Na 135-145; serum Na to be drawn q6h with Urine Specific [place].      -Notify NSGY with >4mEq Na change, Na >145, or Spec Grav <1.005.  --Monitor strict I/Os; notify NSGY if output exceeds 250cc for 3 consecutive hours.  --ENT following. Appreciate recs.  --Endocrine following for post op TSR. Appreciate recs.  --HOB >30.  --Strict nasal precautions.  --PT/OT/OOB.  --ADAT      -Please keep a pitcher of cold water at bedside and encourage patient to drink to thirst.  --Pain control.  --TEDs/SCDs/SQH.  --Continue to monitor clinically, notify NSGY immediately with any changes in neuro status    Dispo: possible dc home today

## 2022-10-05 NOTE — SUBJECTIVE & OBJECTIVE
Interval History: POD 2. Minor bleeding from R nare overnight, now resolving. No salty taste in back of throat. Labs not concerning for DI. 8 AM cortisol 16.5. Denies significant urination within 3 hrs. Denies pain. Reports some improvement in visual acuity.    Medications:  Continuous Infusions:  Scheduled Meds:   ferrous gluconate  324 mg Oral Daily with breakfast    gabapentin  100 mg Oral TID    heparin (porcine)  5,000 Units Subcutaneous Q8H    levothyroxine  50 mcg Oral Before breakfast    pantoprazole  40 mg Oral Daily    senna-docusate 8.6-50 mg  1 tablet Oral BID    sodium chloride  2 spray Each Nostril Q4H While awake     PRN Meds:acetaminophen, ondansetron, oxyCODONE, oxymetazoline, prochlorperazine, sodium chloride 0.9%     Review of Systems  Objective:     Weight: 72.6 kg (160 lb 0.9 oz)  Body mass index is 27.47 kg/m².  Vital Signs (Most Recent):  Temp: 97.6 °F (36.4 °C) (10/05/22 1159)  Pulse: 80 (10/05/22 1311)  Resp: 17 (10/05/22 1159)  BP: 116/66 (10/05/22 1159)  SpO2: 98 % (10/05/22 1159) Vital Signs (24h Range):  Temp:  [97.3 °F (36.3 °C)-98.2 °F (36.8 °C)] 97.6 °F (36.4 °C)  Pulse:  [60-85] 80  Resp:  [17-20] 17  SpO2:  [95 %-98 %] 98 %  BP: (116-142)/(66-79) 116/66     Date 10/05/22 0700 - 10/06/22 0659   Shift 1990-9521 5800-8301 2511-5991 24 Hour Total   INTAKE   P.O. 880   880   Shift Total(mL/kg) 880(12.1)   880(12.1)   OUTPUT   Urine(mL/kg/hr) 400   400   Shift Total(mL/kg) 400(5.5)   400(5.5)   Weight (kg) 72.6 72.6 72.6 72.6                   Female External Urinary Catheter 10/03/22 1100 (Active)   Skin no redness;no breakdown 10/05/22 0725   Tolerance no signs/symptoms of discomfort 10/05/22 0725   Suction Continuous suction at 60 mmHg 10/05/22 0725   Date of last wick change 10/05/22 10/05/22 0725   Time of last wick change 1900 10/04/22 1311   Output (mL) 1000 mL 10/04/22 2000       Physical Exam  General: Well developed, well nourished, not in acute distress.   Head: Normocephalic,  atraumatic.  Neck: Full ROM.   Neurologic: AOx4. Thought content appropriate.  GCS: 15  Language: No aphasia.  Speech: No dysarthria.  Cranial nerves: Face symmetric, tongue midline, CN II-XII grossly intact.   Eyes: Pupils equal, round, reactive to light with accomodation, EOMI.   Ears: No drainage.   Pulmonary: Normal respirations, no signs of respiratory distress.  Abdomen: Soft, non-distended, non-tender to palpation.  Sensory: Intact to light touch throughout.  Motor Strength: Moves all extremities spontaneously with good tone. Full strength upper and lower extremities.   No drift.  No dysmetria.  Skin: Skin is warm, dry and intact.  Minimal bloody drainage from R nare. Crusted/dried blood in L nare. No active epistaxis.    Significant Labs:  Recent Labs   Lab 10/04/22  0555 10/04/22  1217 10/05/22  0041 10/05/22  0443 10/05/22  1138   *  --   --  97  --    *  135*   < > 140 139  139 140   K 4.8  --   --  4.3  --      --   --  103  --    CO2 22*  --   --  28  --    BUN 10  --   --  9  --    CREATININE 0.8  --   --  0.9  --    CALCIUM 8.9  --   --  8.7  --    MG 2.1  --   --  2.0  --     < > = values in this interval not displayed.     Recent Labs   Lab 10/04/22  0555 10/05/22  0443   WBC 11.28 8.02   HGB 12.8 13.1   HCT 40.5 41.1    282     No results for input(s): LABPT, INR, APTT in the last 48 hours.  Microbiology Results (last 7 days)       ** No results found for the last 168 hours. **          All pertinent labs from the last 24 hours have been reviewed.    Significant Diagnostics:  I have reviewed and interpreted all pertinent imaging results/findings within the past 24 hours.

## 2022-10-05 NOTE — PROGRESS NOTES
[facility] - Neurosurgery ([facility])  Otorhinolaryngology-Head & Neck Surgery  Progress Note    Subjective:     Post-Op Info:  Procedure(s) (LRB):  EXCISION, NEOPLASM, PITUITARY, TRANSSPHENOIDAL APPROACH, USING COMPUTER-ASSISTED NAVIGATION (N/A)  EXCISION, NEOPLASM, PITUITARY, TRANSSPHENOIDAL APPROACH, USING COMPUTER-ASSISTED NAVIGATION (N/A)   2 Days Post-Op  Hospital Day: 3     Interval History: NAEON. Minor bleeding from R nose. No new issues.     Medications:  Continuous Infusions:  Scheduled Meds:   ferrous gluconate  324 mg Oral Daily with breakfast    gabapentin  100 mg Oral TID    heparin (porcine)  5,000 Units Subcutaneous Q8H    levothyroxine  50 mcg Oral Before breakfast    pantoprazole  40 mg Oral Daily    senna-docusate 8.6-50 mg  1 tablet Oral BID    sodium chloride  2 spray Each Nostril Q4H While awake     PRN Meds:acetaminophen, ondansetron, oxyCODONE, oxymetazoline, prochlorperazine, sodium chloride 0.9%     Review of patient's allergies indicates:  No Known Allergies  Objective:     Vital Signs (24h Range):  Temp:  [97.3 °F (36.3 °C)-98.2 °F (36.8 °C)] 97.8 °F (36.6 °C)  Pulse:  [59-76] 63  Resp:  [14-21] 20  SpO2:  [95 %-99 %] 95 %  BP: (116-142)/(71-79) 120/71       Lines/Drains/Airways       Drain  Duration             Female External Urinary Catheter 10/03/22 1100 1 day              Peripheral Intravenous Line  Duration                  Peripheral IV - Single Lumen 10/03/22 0608 18 G Left Wrist 2 days         Peripheral IV - Single Lumen 10/03/22 1000 20 G Anterior;Proximal;Right Forearm 1 day                    Physical Exam  NAD  Breathing well  Min bloody crusting bilat nares, bloody secretions R nare, no epistaxis  No active epistaxis  OP clear, no PND   EOMMI  Significant Labs:  BMP:   Recent Labs   Lab 10/05/22  0443   GLU 97      CO2 28   BUN 9   CREATININE 0.9   CALCIUM 8.7   MG 2.0     CBC:   Recent Labs   Lab 10/05/22  0443   WBC 8.02   RBC 4.26   HGB 13.1   HCT 41.1   PLT  282   MCV 97   MCH 30.8   MCHC 31.9*       Significant Diagnostics:  I have reviewed and interpreted all pertinent imaging results/findings within the past 24 hours.    Assessment/Plan:     * Pituitary macroadenoma with extrasellar extension  S/p trans-sphenoid resection of pituitary adenoma 10/3/22, Dr. Ferrell + Dr. Astorga.     - Pt doing well post-op  - Begin nasal saline spray today  - Afrin PRN nose bleeds  - Sinus precautions  - Remainder of care per NCC/NSGY  - Please page w questions    Sinus precautions for inpatients:   HOB elevated (30 degrees or greater)  No nose blowing  No forcible spitting  No smoking  Sneeze with mouth open  No heavy lifting            Dean Castro MD  Otorhinolaryngology-Head & Neck Surgery  [facility] - Neurosurgery ([facility])

## 2022-10-05 NOTE — ANESTHESIA PREPROCEDURE EVALUATION
10/05/2022  Tessa Wynne is a 46 y.o., female.      Pre-op Assessment    I have reviewed the Patient Summary Reports.     I have reviewed the Nursing Notes. I have reviewed the NPO Status.   I have reviewed the Medications.     Review of Systems  Anesthesia Hx:  No problems with previous Anesthesia  History of prior surgery of interest to airway management or planning: Previous anesthesia: General  Denies Personal Hx of Anesthesia complications.   Cardiovascular:  Cardiovascular Normal     Pulmonary:  Pulmonary Normal    Renal/:  Renal/ Normal     Hepatic/GI:   PUD, GERD    Neurological:  Neurology Normal Pituitary adenoma   Endocrine:   Hypothyroidism      Patient Active Problem List   Diagnosis    Risk for coronary artery disease less than 10% in next 10 years    Heart palpitations    Hyperglycemia    Fibromyalgia    PUD (peptic ulcer disease)    Menorrhagia    Pituitary macroadenoma with extrasellar extension    Bitemporal hemianopia    Hypothyroidism    Pituitary lesion    Appendicitis    Peripheral artery disease     Past Medical History:   Diagnosis Date    Elevated fasting glucose     GERD (gastroesophageal reflux disease)     Hypothyroidism     PAD (peripheral artery disease)      Past Surgical History:   Procedure Laterality Date    CHOLECYSTECTOMY      ENDOMETRIAL ABLATION N/A 2/8/2022    Procedure: ABLATION, ENDOMETRIUM;  Surgeon: Ariel Butler MD;  Location: [facility];  Service: OB/GYN;  Laterality: N/A;    EXCISION, NEOPLASM, PITUITARY, TRANSSPHENOIDAL APPROACH, USING COMPUTER-ASSISTED NAVIGATION N/A 10/3/2022    Procedure: EXCISION, NEOPLASM, PITUITARY, TRANSSPHENOIDAL APPROACH, USING COMPUTER-ASSISTED NAVIGATION;  Surgeon: Ed Astorga MD;  Location: [address];  Service: ENT;  Laterality: N/A;    EXCISION, NEOPLASM, PITUITARY, TRANSSPHENOIDAL APPROACH,  USING COMPUTER-ASSISTED NAVIGATION N/A 10/3/2022    Procedure: EXCISION, NEOPLASM, PITUITARY, TRANSSPHENOIDAL APPROACH, USING COMPUTER-ASSISTED NAVIGATION;  Surgeon: Miguel Ángel Ferrell DO;  Location: [address];  Service: Neurosurgery;  Laterality: N/A;    HYSTEROSCOPY WITH DILATION AND CURETTAGE OF UTERUS N/A 2/8/2022    Procedure: HYSTEROSCOPY, WITH DILATION AND CURETTAGE OF UTERUS;  Surgeon: Ariel Butler MD;  Location: [facility];  Service: OB/GYN;  Laterality: N/A;         Physical Exam  General: Well nourished, Cooperative and Alert    Airway:  Mallampati: II   Mouth Opening: Normal  TM Distance: Normal  Tongue: Normal  Neck ROM: Normal ROM    Dental:  Intact    Chest/Lungs:  Clear to auscultation, Normal Respiratory Rate    Heart:  Rate: Normal  Rhythm: Regular Rhythm  Sounds: Normal        Anesthesia Plan  Type of Anesthesia, risks & benefits discussed:    Anesthesia Type: Gen Natural Airway, Gen ETT, MAC  Intra-op Monitoring Plan: Standard ASA Monitors  Post Op Pain Control Plan: multimodal analgesia  Induction:  IV  Airway Plan: Direct, Post-Induction  Informed Consent: Informed consent signed with the Patient and all parties understand the risks and agree with anesthesia plan.  All questions answered.   ASA Score: 2  Day of Surgery Review of History & Physical: H&P Update referred to the surgeon/provider.    Ready For Surgery From Anesthesia Perspective.     .

## 2022-10-05 NOTE — PLAN OF CARE
[facility] - Neurosurgery (Hospital)  Discharge Final Note    Primary Care Provider: Sabiha Overton MD    Expected Discharge Date: 10/5/2022    Pt to discharge home.  No needs identified.  Per nurse, pt has transportation home.      Final Discharge Note (most recent)       Final Note - 10/05/22 1603          Final Note    Assessment Type Final Discharge Note     Anticipated Discharge Disposition Home or Self Care     Hospital Resources/Appts/Education Provided Appointments scheduled and added to AVS        Post-Acute Status    Post-Acute Authorization Other     Other Status No Post-Acute Service Needs     Discharge Delays None known at this time                     Important Message from Medicare           Future Appointments   Date Time Provider Department Center   10/12/2022 12:45 PM [facility] OIC-MRI2 [facility]   10/18/2022  3:20 PM Miguel Ángel Ferrell DO [facility] NEUROSC [facility]   11/18/2022  2:00 PM [facility]   11/18/2022  3:30 PM Bekah Yan MD [facility] OPHTHAL [facility]   12/2/2022  2:30 PM [facility]  PRN-  [facility]  Ext. [phone]

## 2022-10-05 NOTE — DISCHARGE INSTRUCTIONS
Pituitary Adenoma Resection - Patient Information    -Do not take any Aspirin, Aspirin-containing products, or nonsteroidal anti-inflammatory drugs (NSAID's) like Advil, Ibuprofen, Aleve, Mobic, etc for 2 weeks after surgery.   -Continue taking the Hydrocortisone (steroid) as instructed. Do not take on empty stomach.   -Continue taking the Famotidine to protect your stomach while taking the steroid.   -Continue over the counter Ocean Nasal Spray, 1 spray per nostril, four times per day for 10 days after surgery.  -Continue over the counter Afrin nasal spray, 1 spray per nostril, twice daily for 7 days after surgery.   -You have been given a prescription for Desmopressin nasal spray. You do not need to get this filled at this time, but please hold on to it. Contact our office if you have to get up 3 or more times to urinate at night.   -No excessive or forceful blowing of your nose  -Do not drink through straws, stick anything up your nose or sneeze with a closed mouth.   -No lifting or strenuous activity.  -Do not strain to have a bowel movement. Please take over the counter Miralax and a stool softener as needed for constipation. Following dosing instructions on the packaging.   -No driving while taking narcotic pain medications  -You may take over the counter Pseudoephedrine 30-60 mg every 4 hours as needed for nasal congestion           -If you have any issues with elevated blood pressure, stop taking immediately           -If you have any issues with urinary retention, stop taking immediately           -If you have any issues with unwanted side effects such as insomnia or feeling jittery, stop taking immediately  -Do not perform any excessive bending over or leaning forward as this is a fall hazard.  -Do not perform any heavy lifting or lifting more than 10 lbs from the ground level as this is a fall hazard.  -Do not take any over the counter products containing acetaminophen at the same time as you take your  narcotic pain medication. Medications that may contain acetaminophen include but are not limited to: Excedrin and other headache medications, arthritis medications, cold and sinus medications, etc. Please review the list of active ingredients on any OTC medication prior to taking it.  -Do not consume any alcoholic beverages until released by your Neurosurgeon.  -Follow up with ENT in 2 weeks for a post op check.  -Follow up in Pituitary Clinic with Dr. Batista in 2 weeks.    Contact the Neurosurgery Office immediately if:  -If you begin to notice any excessive thirst or urination  -If you begin to notice any clear drainage with a salty taste   -If you begin to notice any neurologic changes such as:           -Sudden onset of lethargy or sleepiness           -Sudden confusion, trouble speaking, or understanding            -Sudden trouble seeing in one or both eyes            -Sudden trouble walking, dizziness, loss of coordination            -Sudden severe headache with no known cause            -Sudden onset of numbness or weakness            -Sudden onset of clear, salty drainage from your nose or a salty taste in your mouth           -Sudden onset of severe thirst that cannot be quenched           -Increased urine output. If you urinate more than 3 times in one hour, please contact Neurosurgery office.     Wound Care:  For nasal: Continue Ocean Nasal Spray four times per day for 10 days.       Call your doctor or go to the Emergency Room for any signs of infection including: increased redness, drainage, pain or fever (temperature greater than or equal to 101.4).       Neurosurgery Office: [phone]

## 2022-10-05 NOTE — ASSESSMENT & PLAN NOTE
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

## 2022-10-05 NOTE — SUBJECTIVE & OBJECTIVE
"Interval HPI:   Overnight events: No acute events o/n. Na this morning is 139. UOP>7L in last 24h with daily net -5.9L. Thirst mechanism in tact, adequate po hydration.   Eatin%  Nausea: No  Hypoglycemia and intervention: No  Fever: No  TPN and/or TF: No  If yes, type of TF/TPN and rate: n/a    /73   Pulse 71   Temp 98.2 °F (36.8 °C)   Resp 17   Ht 5' 4" (1.626 m)   Wt 72.6 kg (160 lb 0.9 oz)   LMP 01/15/2022   SpO2 97%   Breastfeeding No   BMI 27.47 kg/m²     Labs Reviewed and Include    Recent Labs   Lab 10/05/22  0443   GLU 97   CALCIUM 8.7     139   K 4.3   CO2 28      BUN 9   CREATININE 0.9     Lab Results   Component Value Date    WBC 8.02 10/05/2022    HGB 13.1 10/05/2022    HCT 41.1 10/05/2022    MCV 97 10/05/2022     10/05/2022     No results for input(s): TSH, FREET4 in the last 168 hours.  Lab Results   Component Value Date    HGBA1C 6.1 (H) 2022       Nutritional status:   Body mass index is 27.47 kg/m².  Lab Results   Component Value Date    ALBUMIN 4.0 2022    ALBUMIN 4.3 2022    ALBUMIN 4.1 09/10/2022     No results found for: PREALBUMIN    Estimated Creatinine Clearance: 75.5 mL/min (based on SCr of 0.9 mg/dL).    Accu-Checks  Recent Labs     10/03/22  1724 10/04/22  1214   POCTGLUCOSE 164* 119*       Current Medications and/or Treatments Impacting Glycemic Control  Immunotherapy:    Immunosuppressants       None          Steroids:   Hormones (From admission, onward)      None          Pressors:    Autonomic Drugs (From admission, onward)      None          Hyperglycemia/Diabetes Medications:   Antihyperglycemics (From admission, onward)      None          "

## 2022-10-05 NOTE — PROGRESS NOTES
Jose Price - Neurosurgery ([facility])  Neurosurgery  Progress Note    Subjective:     History of Present Illness: Tessa Wynne is a 46 y.o. female who presents for evaluation of a pituitary mass, which was discovered on an MRI after a syncopal event. She complains of 1 year of non specific head aches. She denies vision changes, but has been seen by ophthalmology that shows some bitemporal depression. She has been treated with no medications for this in the past. She has  been evaluated by neurosurgery, neuroendocrinology and neurophthalmology . She is here for preoperative evaluation. She denies any recurrent sinus infections or epistaxis, no reports of nasal congestion. She denies previous skullbase surgery. She denies snoring.      Post-Op Info:  Procedure(s) (LRB):  EXCISION, NEOPLASM, PITUITARY, TRANSSPHENOIDAL APPROACH, USING COMPUTER-ASSISTED NAVIGATION (N/A)  EXCISION, NEOPLASM, PITUITARY, TRANSSPHENOIDAL APPROACH, USING COMPUTER-ASSISTED NAVIGATION (N/A)   2 Days Post-Op     Interval History: POD 2. Minor bleeding from R nare overnight, now resolving. No salty taste in back of throat. Labs not concerning for DI. 8 AM cortisol 16.5. Denies significant urination within 3 hrs. Denies pain. Reports some improvement in visual acuity.    Medications:  Continuous Infusions:  Scheduled Meds:   ferrous gluconate  324 mg Oral Daily with breakfast    gabapentin  100 mg Oral TID    heparin (porcine)  5,000 Units Subcutaneous Q8H    levothyroxine  50 mcg Oral Before breakfast    pantoprazole  40 mg Oral Daily    senna-docusate 8.6-50 mg  1 tablet Oral BID    sodium chloride  2 spray Each Nostril Q4H While awake     PRN Meds:acetaminophen, ondansetron, oxyCODONE, oxymetazoline, prochlorperazine, sodium chloride 0.9%     Review of Systems  Objective:     Weight: 72.6 kg (160 lb 0.9 oz)  Body mass index is 27.47 kg/m².  Vital Signs (Most Recent):  Temp: 97.6 °F (36.4 °C) (10/05/22 1159)  Pulse: 80 (10/05/22 1311)  Resp: 17  (10/05/22 1159)  BP: 116/66 (10/05/22 1159)  SpO2: 98 % (10/05/22 1159) Vital Signs (24h Range):  Temp:  [97.3 °F (36.3 °C)-98.2 °F (36.8 °C)] 97.6 °F (36.4 °C)  Pulse:  [60-85] 80  Resp:  [17-20] 17  SpO2:  [95 %-98 %] 98 %  BP: (116-142)/(66-79) 116/66     Date 10/05/22 0700 - 10/06/22 0659   Shift 7367-8254 4189-9692 5267-0863 24 Hour Total   INTAKE   P.O. 880   880   Shift Total(mL/kg) 880(12.1)   880(12.1)   OUTPUT   Urine(mL/kg/hr) 400   400   Shift Total(mL/kg) 400(5.5)   400(5.5)   Weight (kg) 72.6 72.6 72.6 72.6                   Female External Urinary Catheter 10/03/22 1100 (Active)   Skin no redness;no breakdown 10/05/22 0725   Tolerance no signs/symptoms of discomfort 10/05/22 0725   Suction Continuous suction at 60 mmHg 10/05/22 0725   Date of last wick change 10/05/22 10/05/22 0725   Time of last wick change 1900 10/04/22 1311   Output (mL) 1000 mL 10/04/22 2000       Physical Exam  General: Well developed, well nourished, not in acute distress.   Head: Normocephalic, atraumatic.  Neck: Full ROM.   Neurologic: AOx4. Thought content appropriate.  GCS: 15  Language: No aphasia.  Speech: No dysarthria.  Cranial nerves: Face symmetric, tongue midline, CN II-XII grossly intact.   Eyes: Pupils equal, round, reactive to light with accomodation, EOMI.   Ears: No drainage.   Pulmonary: Normal respirations, no signs of respiratory distress.  Abdomen: Soft, non-distended, non-tender to palpation.  Sensory: Intact to light touch throughout.  Motor Strength: Moves all extremities spontaneously with good tone. Full strength upper and lower extremities.   No drift.  No dysmetria.  Skin: Skin is warm, dry and intact.  Minimal bloody drainage from R nare. Crusted/dried blood in L nare. No active epistaxis.    Significant Labs:  Recent Labs   Lab 10/04/22  0555 10/04/22  1217 10/05/22  0041 10/05/22  0443 10/05/22  1138   *  --   --  97  --    *  135*   < > 140 139  139 140   K 4.8  --   --  4.3  --       --   --  103  --    CO2 22*  --   --  28  --    BUN 10  --   --  9  --    CREATININE 0.8  --   --  0.9  --    CALCIUM 8.9  --   --  8.7  --    MG 2.1  --   --  2.0  --     < > = values in this interval not displayed.     Recent Labs   Lab 10/04/22  0555 10/05/22  0443   WBC 11.28 8.02   HGB 12.8 13.1   HCT 40.5 41.1    282     No results for input(s): LABPT, INR, APTT in the last 48 hours.  Microbiology Results (last 7 days)       ** No results found for the last 168 hours. **          All pertinent labs from the last 24 hours have been reviewed.    Significant Diagnostics:  I have reviewed and interpreted all pertinent imaging results/findings within the past 24 hours.    Assessment/Plan:     * Pituitary macroadenoma with extrasellar extension  46F w/ PMH of PUD who presents with pituitary lesion now s/p TSR on 10/03:    --Patient stepped down to floor; q4hr neurochecks.  --All labs and diagnostics reviewed.  --Postop CTH with expected change.   --MRI pituitary w/wo contrast showing post surgical changes s/p TSR. No longer compression of optic chiam. Residual enhancing soft tissue within left side of sella.   --SBP <160.  --Na 135-145; serum Na to be drawn q6h with Urine Specific Girard.      -Notify NSGY with >4mEq Na change, Na >145, or Spec Grav <1.005.  --Monitor strict I/Os; notify NSGY if output exceeds 250cc for 3 consecutive hours.  --ENT following. Appreciate recs.  --Endocrine following for post op TSR. Appreciate recs.  --HOB >30.  --Strict nasal precautions.  --PT/OT/OOB.  --ADAT      -Please keep a pitcher of cold water at bedside and encourage patient to drink to thirst.  --Pain control.  --TEDs/SCDs/SQH.  --Continue to monitor clinically, notify NSGY immediately with any changes in neuro status    Dispo: possible dc home today        Fabiola Michael PA-C  Neurosurgery  Jose [facility]via - Neurosurgery ([facility])

## 2022-10-05 NOTE — NURSING
PCT reported patient to have bleeding from right nostril,PCT had cleaned blood that had ran down from patient nose to her neck, bleeding controlled, on call for neurosurgery notified, on call stated she would come to the bedside to assess patient.

## 2022-10-05 NOTE — PROGRESS NOTES
"Jose Price - Neurosurgery ([facility])  Endocrinology  Progress Note    Admit Date: 10/3/2022     Tessa Wynne is a 46 y.o. female who initially presented to pituitary clinic with Constantino Batista and Ghassan for evaluation of pituitary lesion compressing optic chiasm, found on imaging done after a syncopal episode. Pt reported prior galactorrhea that resolved after a D&C procedure in the past. She had visual field impairment as well. Hormone evaluation preop was significant for elevated prolactin of 138 (likely from stalk compression), secondary hypothyroidism, otherwise normal. [place] [facility] [place] for pituitary macroadenoma TSR. Endocrine was consulted for postop TSR.    Regarding pituitary adenoma:  Initial presentation:                  Brain imaging done for syncope and HA.       Imaging:                        MRI 8/15/22  Sella: There is a large sellar lesion with suprasellar extension.  Lesion measures roughly 3.2 x 2.8 x 3.5 cm.  There is mass effect on the adjacent structures including the optic chiasm which is displaced superiorly as well as the bilateral A1 segments of the anterior cerebral arteries.  There is possible extension into the cavernous sinuses bilaterally best visualized on coronal T2 series 10.  The carotid flow voids are preserved.  There is bony remodeling of the adjacent clivus secondary to this lesion with preserved marrow signal.       Headache:                               HA x 1 year, occipital pain a couple times per week      Vision change:                        Some blurring when reading, denies peripheral change      Formal Visual fields:               HVF w/ Dr. Yan 8/25/22, per note:  " OCT with borderline RNFL thinning OD>OS. HVF with generalized depression OD (worse temporally) and temporal depression OS. Findings consistent with chiasmal compression." rtc in 3 month(s)     Pituitary labs: with elevated prolactin of 138 (likely from stalk compression), secondary " "hypothyroidism, otherwise normal    Latest Reference Range & Units 22 08:45   Cortisol, 8 AM 4.30 - 22.40 ug/dL  4.30 - 22.40 ug/dL 13.70  12.80   ACTH 0 - 46 pg/mL 19   Somatomedin (IGF-I) 44 - 227 ng/mL 42 (L)   TSH 0.400 - 4.000 uIU/mL 2.402   Free T4 0.71 - 1.51 ng/dL 0.65 (L)   FSH See Text mIU/mL 10.72   Prolactin 5.2 - 26.5 ng/mL 138.0 (H)       Interval HPI:   Overnight events: No acute events o/n. Na this morning is 139. UOP>7L in last 24h with daily net -5.9L. Thirst mechanism in tact, adequate po hydration.   Eatin%  Nausea: No  Hypoglycemia and intervention: No  Fever: No  TPN and/or TF: No  If yes, type of TF/TPN and rate: n/a    /73   Pulse 71   Temp 98.2 °F (36.8 °C)   Resp 17   Ht 5' 4" (1.626 m)   Wt 72.6 kg (160 lb 0.9 oz)   LMP 01/15/2022   SpO2 97%   Breastfeeding No   BMI 27.47 kg/m²     Labs Reviewed and Include    Recent Labs   Lab 10/05/22  0443   GLU 97   CALCIUM 8.7     139   K 4.3   CO2 28      BUN 9   CREATININE 0.9     Lab Results   Component Value Date    WBC 8.02 10/05/2022    HGB 13.1 10/05/2022    HCT 41.1 10/05/2022    MCV 97 10/05/2022     10/05/2022     No results for input(s): TSH, FREET4 in the last 168 hours.  Lab Results   Component Value Date    HGBA1C 6.1 (H) 2022       Nutritional status:   Body mass index is 27.47 kg/m².  Lab Results   Component Value Date    ALBUMIN 4.0 2022    ALBUMIN 4.3 2022    ALBUMIN 4.1 09/10/2022     No results found for: PREALBUMIN    Estimated Creatinine Clearance: 75.5 mL/min (based on SCr of 0.9 mg/dL).    Accu-Checks  Recent Labs     10/03/22  1724 10/04/22  1214   POCTGLUCOSE 164* 119*       Current Medications and/or Treatments Impacting Glycemic Control  Immunotherapy:    Immunosuppressants       None          Steroids:   Hormones (From admission, onward)      None          Pressors:    Autonomic Drugs (From admission, onward)      None          Hyperglycemia/Diabetes " Medications:   Antihyperglycemics (From admission, onward)      None            ASSESSMENT and PLAN    * Pituitary macroadenoma with extrasellar extension  - Neuro check/sinus precautions per neurosurgery protocol    - Monitor for diabetes insipidus with q6h Urinalysis (urine specific gravity), strict monitoring of intake/output hourly, and if urine output greater than 250 ml/hr for over 2 hours, draw BMP, serum Osm, urine Osm, urine sodium, and urinalysis stat and notify primary team and endocrine on-call  - Give desmopressin (DDAVP 10 mcg intranasal x1 dose) only if:  - Urine output greater than 250cc for two or more consecutive hours  - and Na greater than 145   - and low urinary specific gravity less than 1.010  - and inability of the patient to match fluid intake with UOP  - Please page endocrine if questions    - If patient is alert and has intact thirst response, encourage them to drink to thirst. If significant bothersome nocturia, patients may receive intranasal DDAVP (in non-operated nostril). Contact neurosurgery prior to giving DDAVP  - If patient altered or if patient unable to drink sufficiently to maintain euvolemia, then start DDAVP PRN. Avoid scheduling doses of DDAVP in patients without pre-existing DI as it may be self-limited and may lead to severe hyponatremia  - Patients with acromegaly may have large diuresis after successful surgery due to fall in GH levels (as GH promotes renal sodium resorption), be sure to distinguish this from DI using the labs above    - Avoid routine use of perioperative glucocorticoid (dexamethasone, hydrocortisone, methylprednisolone, etc) administration.  - check daily 8:00 a.m. cortisol levels and if cortisol drops to less than 8 start oral hydrocortisone 20/10 mg which patient should be discharged on   - Do not check cortisol levels if the patient has received any glucocorticoids in the past 24 hours  - 8am cortisol po POD#1 7.3, however received 8mg dexamethasone  10/3 at 8am. Dexamethasone can take up to 72h to washout. Repeat 8am cortisol 16.5 on POD#2, no AI.  No clinical signs of AI now - but low threshold to begin HC for empiric AI tx if any suspicion   - Avoid inpatient ordering of LH, FSH, testosterone, estrogen, progesterone, TSH, free T4 as these are unlikely to be helpful in making clinical decisions during hospital admission.  - recommended inpatient labs:    - after discharge patient will need postop day 7 BMP to screen for development of SIADH.      Discharge Recommendations  - Discharge when ready from a surgical standpoint  - Obtain 8:00 AM Cortisol on day of discharge if patient off other steroids for over 24 hours. If 8:00 AM Cortisol is less than 8, give Hydrocortisone 20mg In the morning and 10 mg in the evening on discharge  - If patient already on steroid taper, then discharge on tapered dose (usually hydrocortisone 20/10 or 15/5  - Other pituitary hormones will be monitored outpatient in pituitary clinic  - Will require outpatient BMP 7 days post-operatively to monitor sodium level for SIADH or persistent DI  - Follow up in Pituitary Clinic with Dr. 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 Saw Dr Yan pre-op for HVF  Pt reports noticeable improvement in vision postop   Will need follow-up field testing 2-3 months after surgery for new baseline.          Karen Noguera MD  Endocrinology  [facility] - Neurosurgery ([facility])

## 2022-10-05 NOTE — PLAN OF CARE
Problem: Adult Inpatient Plan of Care  Goal: Optimal Comfort and Wellbeing  Outcome: Ongoing, Progressing     Problem: Infection  Goal: Absence of Infection Signs and Symptoms  Outcome: Ongoing, Progressing     Problem: Cognitive Impairment (Stroke, Hemorrhagic)  Goal: Optimal Cognitive Function  Outcome: Ongoing, Progressing     Problem: Urinary Elimination Impaired (Stroke, Hemorrhagic)  Goal: Effective Urinary Elimination  Outcome: Ongoing, Progressing       POC reviewed with patient and family at the beside. Verbalization of understanding voiced. Questions and concerns addressed. Cardiac monitoring ongoing. Vital signs all shift. See flow sheet. Patient received PRN Oxy x 1 for c/o generalized pain. Urinalysis collected per order. Bed low and locked with bed alarm activated and call light within reach. Family member remains at the beside.

## 2022-10-05 NOTE — TELEPHONE ENCOUNTER
Pt scheduled for 10/18 with Dr. Ferrell. Message sent to Dr. Astorga's team to see if scheduling on this date is possible. If not, I will move patient.     ----- Message from Fabiola Michael PA-C sent at 10/5/2022  3:11 PM CDT -----  Hi this patient had a pituitary adenoma transphenoidal resection on 10/3. She will be seen in pituitary clinic and ENT in 2 weeks and needs an appt with william/Dr. Ferrell then as well. I don't think those appointments are scheduled yet but told her we would try to coordinate them all on the same day if possible!    Thanks

## 2022-10-05 NOTE — SUBJECTIVE & OBJECTIVE
Interval History: NAEON. Minor bleeding from R nose. No new issues.     Medications:  Continuous Infusions:  Scheduled Meds:   ferrous gluconate  324 mg Oral Daily with breakfast    gabapentin  100 mg Oral TID    heparin (porcine)  5,000 Units Subcutaneous Q8H    levothyroxine  50 mcg Oral Before breakfast    pantoprazole  40 mg Oral Daily    senna-docusate 8.6-50 mg  1 tablet Oral BID    sodium chloride  2 spray Each Nostril Q4H While awake     PRN Meds:acetaminophen, ondansetron, oxyCODONE, oxymetazoline, prochlorperazine, sodium chloride 0.9%     Review of patient's allergies indicates:  No Known Allergies  Objective:     Vital Signs (24h Range):  Temp:  [97.3 °F (36.3 °C)-98.2 °F (36.8 °C)] 97.8 °F (36.6 °C)  Pulse:  [59-76] 63  Resp:  [14-21] 20  SpO2:  [95 %-99 %] 95 %  BP: (116-142)/(71-79) 120/71       Lines/Drains/Airways       Drain  Duration             Female External Urinary Catheter 10/03/22 1100 1 day              Peripheral Intravenous Line  Duration                  Peripheral IV - Single Lumen 10/03/22 0608 18 G Left Wrist 2 days         Peripheral IV - Single Lumen 10/03/22 1000 20 G Anterior;Proximal;Right Forearm 1 day                    Physical Exam  NAD  Breathing well  Min bloody crusting bilat nares, bloody secretions R nare, no epistaxis  No active epistaxis  OP clear, no PND   EOMMI  Significant Labs:  BMP:   Recent Labs   Lab 10/05/22  0443   GLU 97      CO2 28   BUN 9   CREATININE 0.9   CALCIUM 8.7   MG 2.0     CBC:   Recent Labs   Lab 10/05/22  0443   WBC 8.02   RBC 4.26   HGB 13.1   HCT 41.1      MCV 97   MCH 30.8   MCHC 31.9*       Significant Diagnostics:  I have reviewed and interpreted all pertinent imaging results/findings within the past 24 hours.

## 2022-10-05 NOTE — ANESTHESIA POSTPROCEDURE EVALUATION
Anesthesia Post Evaluation    Patient: Tessa Wynne    Procedure(s) Performed: Procedure(s) (LRB):  EXCISION, NEOPLASM, PITUITARY, TRANSSPHENOIDAL APPROACH, USING COMPUTER-ASSISTED NAVIGATION (N/A)  EXCISION, NEOPLASM, PITUITARY, TRANSSPHENOIDAL APPROACH, USING COMPUTER-ASSISTED NAVIGATION (N/A)    Final Anesthesia Type: general      Patient location during evaluation: PACU  Patient participation: Yes- Able to Participate  Level of consciousness: awake and alert and oriented  Post-procedure vital signs: reviewed and stable  Pain management: adequate  Airway patency: patent    PONV status at discharge: No PONV  Anesthetic complications: no      Cardiovascular status: hemodynamically stable  Respiratory status: unassisted, spontaneous ventilation and room air  Hydration status: euvolemic  Follow-up not needed.          Vitals Value Taken Time   /66 10/05/22 1159   Temp 36.4 °C (97.6 °F) 10/05/22 1159   Pulse 80 10/05/22 1311   Resp 17 10/05/22 1159   SpO2 98 % 10/05/22 1159         No case tracking events are documented in the log.      Pain/Emmie Score: Pain Rating Prior to Med Admin: 0 (10/5/2022 11:30 AM)  Pain Rating Post Med Admin: 0 (10/5/2022 11:30 AM)

## 2022-10-05 NOTE — ASSESSMENT & PLAN NOTE
- Neuro check/sinus precautions per neurosurgery protocol    - Monitor for diabetes insipidus with q6h Urinalysis (urine specific gravity), strict monitoring of intake/output hourly, and if urine output greater than 250 ml/hr for over 2 hours, draw BMP, serum Osm, urine Osm, urine sodium, and urinalysis stat and notify primary team and endocrine on-call  - Give desmopressin (DDAVP 10 mcg intranasal x1 dose) only if:  - Urine output greater than 250cc for two or more consecutive hours  - and Na greater than 145   - and low urinary specific gravity less than 1.010  - and inability of the patient to match fluid intake with UOP  - Please page endocrine if questions    - If patient is alert and has intact thirst response, encourage them to drink to thirst. If significant bothersome nocturia, patients may receive intranasal DDAVP (in non-operated nostril). Contact neurosurgery prior to giving DDAVP  - If patient altered or if patient unable to drink sufficiently to maintain euvolemia, then start DDAVP PRN. Avoid scheduling doses of DDAVP in patients without pre-existing DI as it may be self-limited and may lead to severe hyponatremia  - Patients with acromegaly may have large diuresis after successful surgery due to fall in GH levels (as GH promotes renal sodium resorption), be sure to distinguish this from DI using the labs above    - Avoid routine use of perioperative glucocorticoid (dexamethasone, hydrocortisone, methylprednisolone, etc) administration.  - check daily 8:00 a.m. cortisol levels and if cortisol drops to less than 8 start oral hydrocortisone 20/10 mg which patient should be discharged on   - Do not check cortisol levels if the patient has received any glucocorticoids in the past 24 hours  - 8am cortisol po POD#1 7.3, however received 8mg dexamethasone 10/3 at 8am. Dexamethasone can take up to 72h to washout. Repeat 8am cortisol 16.5 on POD#2, no AI.  No clinical signs of AI now - but low threshold to  begin HC for empiric AI tx if any suspicion   - Avoid inpatient ordering of LH, FSH, testosterone, estrogen, progesterone, TSH, free T4 as these are unlikely to be helpful in making clinical decisions during hospital admission.  - recommended inpatient labs:    - after discharge patient will need postop day 7 BMP to screen for development of SIADH.      Discharge Recommendations  - Discharge when ready from a surgical standpoint  - Obtain 8:00 AM Cortisol on day of discharge if patient off other steroids for over 24 hours. If 8:00 AM Cortisol is less than 8, give Hydrocortisone 20mg In the morning and 10 mg in the evening on discharge  - If patient already on steroid taper, then discharge on tapered dose (usually hydrocortisone 20/10 or 15/5  - Other pituitary hormones will be monitored outpatient in pituitary clinic  - Will require outpatient BMP 7 days post-operatively to monitor sodium level for SIADH or persistent DI  - Follow up in Pituitary Clinic with Dr. Batista in 2 weeks, we will set up appointment

## 2022-10-06 ENCOUNTER — PATIENT OUTREACH (OUTPATIENT)
Dept: ADMINISTRATIVE | Facility: CLINIC | Age: 47
End: 2022-10-06
Payer: MEDICAID

## 2022-10-06 LAB
BLD PROD TYP BPU: NORMAL
BLD PROD TYP BPU: NORMAL
BLOOD UNIT EXPIRATION DATE: NORMAL
BLOOD UNIT EXPIRATION DATE: NORMAL
BLOOD UNIT TYPE CODE: 5100
BLOOD UNIT TYPE CODE: 5100
BLOOD UNIT TYPE: NORMAL
BLOOD UNIT TYPE: NORMAL
CODING SYSTEM: NORMAL
CODING SYSTEM: NORMAL
DISPENSE STATUS: NORMAL
DISPENSE STATUS: NORMAL
TRANS ERYTHROCYTES VOL PATIENT: NORMAL ML
TRANS ERYTHROCYTES VOL PATIENT: NORMAL ML

## 2022-10-06 NOTE — PROGRESS NOTES
C3 nurse spoke with Tessa Wynne for a TCC post hospital discharge follow up call. The patient has a scheduled HOSFU appointment with Sabiha Overton MD on 10/10/2022 @ 1100.

## 2022-10-07 ENCOUNTER — PATIENT MESSAGE (OUTPATIENT)
Dept: FAMILY MEDICINE | Facility: CLINIC | Age: 47
End: 2022-10-07
Payer: MEDICAID

## 2022-10-07 LAB
H PYLORI AG STL QL IA: NOT DETECTED
SPECIMEN SOURCE: 1

## 2022-10-07 NOTE — DISCHARGE SUMMARY
[facility] - Neurosurgery ([facility])  Neurosurgery  Discharge Summary      Patient Name: Tessa Wynne  MRN: [id number]  Admission Date: 10/3/2022  Hospital Length of Stay: 2 days  Discharge Date and Time: 10/5/2022  5:14 PM  Attending Physician: No att. providers found   Discharging Provider: Fabiola Michael PA-C  Primary Care Provider: Sabiha Overton MD    HPI:   Tessa Wynne is a 46 y.o. female who presents for evaluation of a pituitary mass, which was discovered on an MRI after a syncopal event. She complains of 1 year of non specific head aches. She denies vision changes, but has been seen by ophthalmology that shows some bitemporal depression. She has been treated with no medications for this in the past. She has  been evaluated by neurosurgery, neuroendocrinology and neurophthalmology . She is here for preoperative evaluation. She denies any recurrent sinus infections or epistaxis, no reports of nasal congestion. She denies previous skullbase surgery. She denies snoring.      Procedure(s) (LRB):  EXCISION, NEOPLASM, PITUITARY, TRANSSPHENOIDAL APPROACH, USING COMPUTER-ASSISTED NAVIGATION (N/A)  EXCISION, NEOPLASM, PITUITARY, TRANSSPHENOIDAL APPROACH, USING COMPUTER-ASSISTED NAVIGATION (N/A)     Hospital Course: 10/4: POD 1 s/p TSR pituitary adenoma. NAEON. AFVSS. Exam with subjective improvement in visual acuity. Pain controlled. Scant nasal drainage overnight now resolved. No salty taste in back of throat. Urine and serum labs not c/f DI.   10/5: POD 2. Minor bleeding from R nare overnight, now resolving. No salty taste in back of throat. Labs not concerning for DI. 8 AM cortisol 16.5. Denies significant urination within 3 hrs. Denies pain. Reports some improvement in visual acuity.      Goals of Care Treatment Preferences:  Code Status: Full Code      Consults:   Consults (From admission, onward)        Status Ordering Provider     Inpatient consult to Endocrinology  Once        Provider:  (Not yet assigned)     Completed ANKUR ALLAN          Significant Diagnostic Studies: Labs: CMP No results for input(s): NA, K, CL, CO2, GLU, BUN, CREATININE, CALCIUM, PROT, ALBUMIN, BILITOT, ALKPHOS, AST, ALT, ANIONGAP, ESTGFRAFRICA, EGFRNONAA in the last 48 hours., CBC No results for input(s): WBC, HGB, HCT, PLT in the last 48 hours. and All labs within the past 24 hours have been reviewed  Radiology: MRI: pituitary  CT scan: head    Pending Diagnostic Studies:     None        Final Active Diagnoses:    Diagnosis Date Noted POA    PRINCIPAL PROBLEM:  Pituitary macroadenoma with extrasellar extension [D35.2] 08/25/2022 Yes    Pituitary lesion [E23.7] 09/07/2022 Yes    Hypothyroidism [E03.9] 09/05/2022 Yes    Bitemporal hemianopia [H53.47] 08/25/2022 Yes    PUD (peptic ulcer disease) [K27.9] 09/14/2020 Yes      Problems Resolved During this Admission:      Discharged Condition: good     Disposition: Home or Self Care    Follow Up:    Patient Instructions:      Notify your health care provider if you experience any of the following:  increased confusion or weakness     Notify your health care provider if you experience any of the following:  persistent dizziness, light-headedness, or visual disturbances     Notify your health care provider if you experience any of the following:  worsening rash     Notify your health care provider if you experience any of the following:  severe persistent headache     Notify your health care provider if you experience any of the following:  difficulty breathing or increased cough     Notify your health care provider if you experience any of the following:  redness, tenderness, or signs of infection (pain, swelling, redness, odor or green/yellow discharge around incision site)     Notify your health care provider if you experience any of the following:  severe uncontrolled pain     Notify your health care provider if you experience any of the following:  persistent nausea and vomiting or diarrhea      Notify your health care provider if you experience any of the following:  temperature >100.4     Activity as tolerated     Medications:  Reconciled Home Medications:      Medication List      START taking these medications    DEEP SEA NASAL 0.65 % nasal spray  Generic drug: sodium chloride  2 sprays by Nasal route every 4 (four) hours while awake.     NASAL DECONGESTANT (OXYMETAZL) 0.05 % nasal spray  Generic drug: oxymetazoline  3 sprays by Nasal route every 2 (two) hours as needed (epistaxis).     oxyCODONE 5 MG immediate release tablet  Commonly known as: ROXICODONE  Take 1 tablet (5 mg total) by mouth every 6 (six) hours as needed for Pain (take as needed for pain/headaches).        CHANGE how you take these medications    levothyroxine 50 MCG tablet  Commonly known as: SYNTHROID  [place] 1 tableta (50 mg en total) por vía oral antes del desayuno.  (Take 1 tablet (50 mcg total) by mouth before breakfast.)  What changed:   · medication strength  · how much to take        CONTINUE taking these medications    CITRACAL REGULAR ORAL  Take by mouth.     gabapentin 100 MG capsule  Commonly known as: NEURONTIN  Take 1 capsule (100 mg total) by mouth 3 (three) times daily. for 14 days     IRON ORAL  Take by mouth.     ONE-A-DAY WOMEN'S COMPLETE ORAL  Take by mouth.     pantoprazole 40 MG tablet  Commonly known as: PROTONIX  Take 1 tablet (40 mg total) by mouth once daily.     TYLENOL ORAL  Take by mouth.        STOP taking these medications    HYDROcodone-acetaminophen 5-325 mg per tablet  Commonly known as: NORCO     naproxen 500 MG tablet  Commonly known as: CALLIE Michael PA-C  Neurosurgery  [facility] - Neurosurgery [facility])

## 2022-10-08 ENCOUNTER — HOSPITAL ENCOUNTER (EMERGENCY)
Facility: HOSPITAL | Age: 47
Discharge: HOME | End: 2022-10-08
Attending: EMERGENCY MEDICINE
Payer: MEDICAID

## 2022-10-08 VITALS
RESPIRATION RATE: 10 BRPM | DIASTOLIC BLOOD PRESSURE: 79 MMHG | SYSTOLIC BLOOD PRESSURE: 132 MMHG | OXYGEN SATURATION: 99 % | TEMPERATURE: 98 F | HEART RATE: 66 BPM

## 2022-10-08 DIAGNOSIS — G44.86 CERVICOGENIC HEADACHE: Primary | ICD-10-CM

## 2022-10-08 DIAGNOSIS — R07.9 CHEST PAIN: ICD-10-CM

## 2022-10-08 LAB
ALBUMIN SERPL BCP-MCNC: 3.7 G/DL (ref 3.5–5.2)
ALP SERPL-CCNC: 61 U/L (ref 55–135)
ALT SERPL W/O P-5'-P-CCNC: 38 U/L (ref 10–44)
ANION GAP SERPL CALC-SCNC: 11 MMOL/L (ref 8–16)
AST SERPL-CCNC: 31 U/L (ref 10–40)
B-HCG UR QL: NEGATIVE
BASOPHILS # BLD AUTO: 0.02 K/UL (ref 0–0.2)
BASOPHILS NFR BLD: 0.3 % (ref 0–1.9)
BILIRUB SERPL-MCNC: 0.4 MG/DL (ref 0.1–1)
BNP SERPL-MCNC: 19 PG/ML (ref 0–99)
BUN SERPL-MCNC: 8 MG/DL (ref 6–20)
CALCIUM SERPL-MCNC: 9.5 MG/DL (ref 8.7–10.5)
CHLORIDE SERPL-SCNC: 94 MMOL/L (ref 95–110)
CO2 SERPL-SCNC: 24 MMOL/L (ref 23–29)
CREAT SERPL-MCNC: 0.8 MG/DL (ref 0.5–1.4)
CTP QC/QA: YES
DIFFERENTIAL METHOD: ABNORMAL
EOSINOPHIL # BLD AUTO: 0.1 K/UL (ref 0–0.5)
EOSINOPHIL NFR BLD: 1.6 % (ref 0–8)
ERYTHROCYTE [DISTWIDTH] IN BLOOD BY AUTOMATED COUNT: 12.4 % (ref 11.5–14.5)
EST. GFR  (NO RACE VARIABLE): >60 ML/MIN/1.73 M^2
GLUCOSE SERPL-MCNC: 97 MG/DL (ref 70–110)
HCT VFR BLD AUTO: 36.2 % (ref 37–48.5)
HGB BLD-MCNC: 12.3 G/DL (ref 12–16)
IMM GRANULOCYTES # BLD AUTO: 0.02 K/UL (ref 0–0.04)
IMM GRANULOCYTES NFR BLD AUTO: 0.3 % (ref 0–0.5)
LYMPHOCYTES # BLD AUTO: 2.7 K/UL (ref 1–4.8)
LYMPHOCYTES NFR BLD: 35.7 % (ref 18–48)
MCH RBC QN AUTO: 30.5 PG (ref 27–31)
MCHC RBC AUTO-ENTMCNC: 34 G/DL (ref 32–36)
MCV RBC AUTO: 90 FL (ref 82–98)
MONOCYTES # BLD AUTO: 0.5 K/UL (ref 0.3–1)
MONOCYTES NFR BLD: 6.9 % (ref 4–15)
NEUTROPHILS # BLD AUTO: 4.2 K/UL (ref 1.8–7.7)
NEUTROPHILS NFR BLD: 55.2 % (ref 38–73)
NRBC BLD-RTO: 0 /100 WBC
PLATELET # BLD AUTO: 258 K/UL (ref 150–450)
PMV BLD AUTO: 9.9 FL (ref 9.2–12.9)
POTASSIUM SERPL-SCNC: 4.3 MMOL/L (ref 3.5–5.1)
PROT SERPL-MCNC: 6.6 G/DL (ref 6–8.4)
RBC # BLD AUTO: 4.03 M/UL (ref 4–5.4)
SODIUM SERPL-SCNC: 129 MMOL/L (ref 136–145)
TROPONIN I SERPL DL<=0.01 NG/ML-MCNC: <0.006 NG/ML (ref 0–0.03)
TROPONIN I SERPL DL<=0.01 NG/ML-MCNC: <0.006 NG/ML (ref 0–0.03)
TSH SERPL DL<=0.005 MIU/L-ACNC: 0.77 UIU/ML (ref 0.4–4)
WBC # BLD AUTO: 7.56 K/UL (ref 3.9–12.7)

## 2022-10-08 PROCEDURE — 83880 ASSAY OF NATRIURETIC PEPTIDE: CPT

## 2022-10-08 PROCEDURE — 93010 EKG 12-LEAD: ICD-10-PCS | Mod: ,,, | Performed by: INTERNAL MEDICINE

## 2022-10-08 PROCEDURE — 94761 N-INVAS EAR/PLS OXIMETRY MLT: CPT

## 2022-10-08 PROCEDURE — 25000003 PHARM REV CODE 250

## 2022-10-08 PROCEDURE — 81025 URINE PREGNANCY TEST: CPT | Performed by: EMERGENCY MEDICINE

## 2022-10-08 PROCEDURE — 80053 COMPREHEN METABOLIC PANEL: CPT

## 2022-10-08 PROCEDURE — 93005 ELECTROCARDIOGRAM TRACING: CPT

## 2022-10-08 PROCEDURE — 85025 COMPLETE CBC W/AUTO DIFF WBC: CPT

## 2022-10-08 PROCEDURE — 25500020 PHARM REV CODE 255: Performed by: EMERGENCY MEDICINE

## 2022-10-08 PROCEDURE — 99285 PR EMERGENCY DEPT VISIT,LEVEL V: ICD-10-PCS | Mod: ,,, | Performed by: EMERGENCY MEDICINE

## 2022-10-08 PROCEDURE — 84484 ASSAY OF TROPONIN QUANT: CPT | Mod: 91

## 2022-10-08 PROCEDURE — 99285 EMERGENCY DEPT VISIT HI MDM: CPT | Mod: 25

## 2022-10-08 PROCEDURE — 93010 ELECTROCARDIOGRAM REPORT: CPT | Mod: ,,, | Performed by: INTERNAL MEDICINE

## 2022-10-08 PROCEDURE — 84443 ASSAY THYROID STIM HORMONE: CPT

## 2022-10-08 PROCEDURE — 99285 EMERGENCY DEPT VISIT HI MDM: CPT | Mod: ,,, | Performed by: EMERGENCY MEDICINE

## 2022-10-08 RX ORDER — LIDOCAINE 50 MG/G
1 PATCH TOPICAL DAILY PRN
Qty: 15 PATCH | Refills: 0 | Status: ON HOLD | OUTPATIENT
Start: 2022-10-08 | End: 2022-10-19 | Stop reason: HOSPADM

## 2022-10-08 RX ORDER — OXYCODONE HYDROCHLORIDE 5 MG/1
5 TABLET ORAL EVERY 6 HOURS PRN
Qty: 10 TABLET | Refills: 0 | Status: SHIPPED | OUTPATIENT
Start: 2022-10-08 | End: 2022-11-22

## 2022-10-08 RX ORDER — LIDOCAINE 50 MG/G
1 PATCH TOPICAL
Status: DISCONTINUED | OUTPATIENT
Start: 2022-10-08 | End: 2022-10-08 | Stop reason: HOSPADM

## 2022-10-08 RX ORDER — MAG HYDROX/ALUMINUM HYD/SIMETH 200-200-20
30 SUSPENSION, ORAL (FINAL DOSE FORM) ORAL ONCE
Status: COMPLETED | OUTPATIENT
Start: 2022-10-08 | End: 2022-10-08

## 2022-10-08 RX ORDER — OXYCODONE HYDROCHLORIDE 5 MG/1
5 TABLET ORAL
Status: COMPLETED | OUTPATIENT
Start: 2022-10-08 | End: 2022-10-08

## 2022-10-08 RX ORDER — LIDOCAINE HYDROCHLORIDE 20 MG/ML
15 SOLUTION OROPHARYNGEAL ONCE
Status: COMPLETED | OUTPATIENT
Start: 2022-10-08 | End: 2022-10-08

## 2022-10-08 RX ADMIN — OXYCODONE 5 MG: 5 TABLET ORAL at 04:10

## 2022-10-08 RX ADMIN — LIDOCAINE HYDROCHLORIDE 15 ML: 20 SOLUTION ORAL; TOPICAL at 04:10

## 2022-10-08 RX ADMIN — ALUMINUM HYDROXIDE, MAGNESIUM HYDROXIDE, AND SIMETHICONE 30 ML: 200; 200; 20 SUSPENSION ORAL at 04:10

## 2022-10-08 RX ADMIN — LIDOCAINE 1 PATCH: 50 PATCH CUTANEOUS at 04:10

## 2022-10-08 RX ADMIN — IOHEXOL 100 ML: 350 INJECTION, SOLUTION INTRAVENOUS at 04:10

## 2022-10-08 NOTE — PROVIDER PROGRESS NOTES - EMERGENCY DEPT.
Encounter Date: 10/8/2022    ED Physician Progress Notes         EKG - STEMI Decision  Initial Reading: No STEMI present.  Response: No Action Needed.

## 2022-10-08 NOTE — ED TRIAGE NOTES
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 The patient reports that tonight she began with a throbbing headache to her posterior scalp. She reports that it began yesterday morning, and she took pain medication at home and that it then came back. She denies any vision changes. She reports that she always has tingling sensation in her arms and legs bilaterally, and reports that this has been present for a long time.  She reports that she has been having a burning chest pain which she reports radiates to her back. The patient denies any chills or fever. Patient endorses a cough.

## 2022-10-08 NOTE — ED PROVIDER NOTES
Encounter Date: 10/8/2022       History     Chief Complaint   Patient presents with    Headache     C/o worsening headache starting earlier today. Pt had macroadenoma removed Monday and was advised by MD to come to ER for HA. Also reports intermittent chest discomfort. Denies vision changes, dizziness or weakness.      Tessa Wynne is a 47-year-old female with past medical history significant for pituitary adenoma status post transsphenoidal resection, hypothyroidism is injury to the emergency department with a chief complaint of headache.  Patient reports that she started to have an occipital headache that radiates from her C-spine this morning.  Patient took her pain medications help initially but reports that her pain became worse throughout the day.  She started to have sternal burning chest pain she that radiates to her back at 6:00 p.m..  She reports that this pain is worsened with exertion.  She reports that she has had a dry cough since her transsphenoidal resection on 10-3.  She had 1 episode of hemoptysis the day after her surgery.   She has chronic paresthesias in her lower extremities and upper extremities bilaterally which is no different today.  Patient has chronic abdominal pain which is at baseline.   was used for this encounter.  Patient denies fevers, chills, nausea, vomiting, diaphoresis, vision changes, shortness of breath.      Review of patient's allergies indicates:  No Known Allergies  Past Medical History:   Diagnosis Date    Elevated fasting glucose     GERD (gastroesophageal reflux disease)     Hypothyroidism     PAD (peripheral artery disease)      Past Surgical History:   Procedure Laterality Date    CHOLECYSTECTOMY      ENDOMETRIAL ABLATION N/A 2/8/2022    Procedure: ABLATION, ENDOMETRIUM;  Surgeon: Ariel Butler MD;  Location: [facility];  Service: OB/GYN;  Laterality: N/A;    EXCISION, NEOPLASM, PITUITARY, TRANSSPHENOIDAL APPROACH, USING COMPUTER-ASSISTED  NAVIGATION N/A 10/3/2022    Procedure: EXCISION, NEOPLASM, PITUITARY, TRANSSPHENOIDAL APPROACH, USING COMPUTER-ASSISTED NAVIGATION;  Surgeon: Ed Astorga MD;  Location: [address];  Service: ENT;  Laterality: N/A;    EXCISION, NEOPLASM, PITUITARY, TRANSSPHENOIDAL APPROACH, USING COMPUTER-ASSISTED NAVIGATION N/A 10/3/2022    Procedure: EXCISION, NEOPLASM, PITUITARY, TRANSSPHENOIDAL APPROACH, USING COMPUTER-ASSISTED NAVIGATION;  Surgeon: Miguel Ángel Ferrell DO;  Location: [facility] OR [address] [place] [address];  Service: Neurosurgery;  Laterality: N/A;    HYSTEROSCOPY WITH DILATION AND CURETTAGE OF UTERUS N/A 2/8/2022    Procedure: HYSTEROSCOPY, WITH DILATION AND CURETTAGE OF UTERUS;  Surgeon: Ariel Butler MD;  Location: [facility];  Service: OB/GYN;  Laterality: N/A;     Family History   Problem Relation Age of Onset    Diabetes Mother     Diabetes Mellitus Mother     Diabetes Father     Diabetes Mellitus Father     No Known Problems Sister     No Known Problems Sister     No Known Problems Brother     No Known Problems Brother     No Known Problems Daughter     No Known Problems Son     Colon cancer Neg Hx     Breast cancer Neg Hx     Ovarian cancer Neg Hx     Uterine cancer Neg Hx     Heart attack Neg Hx      Social History     Tobacco Use    Smoking status: Never    Smokeless tobacco: Never   Substance Use Topics    Alcohol use: No    Drug use: No     Review of Systems   Constitutional:  Negative for chills and fever.   HENT:  Negative for rhinorrhea and sore throat.    Respiratory:  Positive for cough. Negative for shortness of breath and wheezing.    Cardiovascular:  Positive for chest pain. Negative for leg swelling.   Gastrointestinal:  Negative for abdominal pain, blood in stool, constipation, diarrhea, nausea and vomiting.   Genitourinary:  Negative for dysuria and hematuria.   Skin:  Negative for rash.   Neurological:  Positive for headaches. Negative for dizziness and light-headedness.        Paresthesias, chronic    All other systems reviewed and are negative.    Physical Exam     Initial Vitals [10/08/22 0140]   BP Pulse Resp Temp SpO2   139/83 77 18 97.6 °F (36.4 °C) 99 %      MAP       --         Physical Exam    Nursing note and vitals reviewed.  Constitutional: She appears well-developed and well-nourished. No distress.   HENT:   Head: Normocephalic and atraumatic.   Eyes: EOM are normal. Pupils are equal, round, and reactive to light.   Neck: Neck supple.   Normal range of motion.  Cardiovascular:  Normal rate and regular rhythm.           Pulmonary/Chest: Breath sounds normal. No respiratory distress.   Abdominal: Abdomen is soft. There is no abdominal tenderness.   Musculoskeletal:         General: No edema.      Cervical back: Normal range of motion and neck supple.      Comments: Normal but mildly painful range of motion of C-spine  Tenderness to left para vertebral musculature C-spine       Neurological: She is alert and oriented to person, place, and time.   Cranial nerves 2-12 intact   5/5 lower and upper extremity strength  Normal sensation throughout     Skin: Skin is warm and dry.   Psychiatric: She has a normal mood and affect. Thought content normal.       ED Course   Procedures  Labs Reviewed   CBC W/ AUTO DIFFERENTIAL   COMPREHENSIVE METABOLIC PANEL   PREGNANCY TEST, URINE RAPID   TROPONIN I   TSH   TROPONIN I   B-TYPE NATRIURETIC PEPTIDE     EKG Readings: (Independently Interpreted)   Initial Reading: No STEMI. Rhythm: Normal Sinus Rhythm.     Imaging Results              X-Ray Chest AP Portable (Final result)  Result time 10/08/22 02:51:43      Final result by Alvin Amanda MD (10/08/22 02:51:43)                   Impression:      No acute findings.    No significant change from prior study.      Electronically signed by: Alvin Amanda MD  Date:    10/08/2022  Time:    02:51               Narrative:    EXAMINATION:  XR CHEST AP PORTABLE    CLINICAL HISTORY:  chest pain;    TECHNIQUE:  Single  frontal view of the chest was performed.    COMPARISON:  04/21/2021.    FINDINGS:  Heart and lungs  appear unchanged when allowing for differences in technique and positioning.                                       Medications   aluminum-magnesium hydroxide-simethicone 200-200-20 mg/5 mL suspension 30 mL (has no administration in time range)     And   LIDOcaine HCl 2% oral solution 15 mL (has no administration in time range)   LIDOcaine 5 % patch 1 patch (has no administration in time range)   oxyCODONE immediate release tablet 5 mg (has no administration in time range)     Medical Decision Making:   Initial Assessment:   Tessa Wynne is a 47-year-old female with past medical history significant for pituitary adenoma status post transsphenoidal resection, hypothyroidism is injury to the emergency department with a chief complaint of headache.  Patient reports that she started to have an occipital headache that radiates from her C-spine this morning.  Patient took her pain medications help initially but reports that her pain became worse throughout the day.  She started to have sternal burning chest pain she that radiates to her back at 6:00 p.m. hemodynamically stable.  Headache is most likely due to tension and cervical muscles due to tenderness to paracervical muscles and mild pain with active range of motion of the C-spine.  Chest pain is most likely due to acid reflux given burning nature, non tachycardic, in no EKG changes.  Differential Diagnosis:   Differential includes but is not limited to ICH, PE, ACS, cervicogenic headache, GERD.  Clinical Tests:   Lab Tests: Ordered  Radiological Study: Ordered  Medical Tests: Ordered  ED Management:  Ordered CBC, CMP, chest x-ray, CTA chest, CT head, troponin.  We will give GI cocktail, lidocaine patch, oxycodone 5 mg.  EKG says shows normal sinus rhythm with no acute ST/T wave changes.  Chest x-ray shows no acute process.                        Clinical Impression:    Final diagnoses:  [R07.9] Chest pain  [G44.86] Cervicogenic headache (Primary)               Yon Dan DO  Resident  10/08/22 0307       Yon Dan DO  Resident  10/08/22 0307

## 2022-10-10 ENCOUNTER — OFFICE VISIT (OUTPATIENT)
Dept: FAMILY MEDICINE | Facility: CLINIC | Age: 47
End: 2022-10-10
Payer: MEDICAID

## 2022-10-10 ENCOUNTER — PATIENT MESSAGE (OUTPATIENT)
Dept: NEUROSURGERY | Facility: CLINIC | Age: 47
End: 2022-10-10
Payer: MEDICAID

## 2022-10-10 VITALS
HEIGHT: 64 IN | DIASTOLIC BLOOD PRESSURE: 68 MMHG | HEART RATE: 74 BPM | BODY MASS INDEX: 27.33 KG/M2 | SYSTOLIC BLOOD PRESSURE: 112 MMHG | OXYGEN SATURATION: 99 % | WEIGHT: 160.06 LBS

## 2022-10-10 DIAGNOSIS — D50.9 IRON DEFICIENCY ANEMIA, UNSPECIFIED IRON DEFICIENCY ANEMIA TYPE: ICD-10-CM

## 2022-10-10 DIAGNOSIS — I73.9 PERIPHERAL ARTERY DISEASE: ICD-10-CM

## 2022-10-10 DIAGNOSIS — K27.9 PUD (PEPTIC ULCER DISEASE): ICD-10-CM

## 2022-10-10 DIAGNOSIS — D35.2 PITUITARY MACROADENOMA WITH EXTRASELLAR EXTENSION: Primary | ICD-10-CM

## 2022-10-10 PROCEDURE — 1111F PR DISCHARGE MEDS RECONCILED W/ CURRENT OUTPATIENT MED LIST: ICD-10-PCS | Mod: CPTII,,, | Performed by: INTERNAL MEDICINE

## 2022-10-10 PROCEDURE — 99214 OFFICE O/P EST MOD 30 MIN: CPT | Mod: PBBFAC,PO | Performed by: INTERNAL MEDICINE

## 2022-10-10 PROCEDURE — 1159F PR MEDICATION LIST DOCUMENTED IN MEDICAL RECORD: ICD-10-PCS | Mod: CPTII,,, | Performed by: INTERNAL MEDICINE

## 2022-10-10 PROCEDURE — 3044F HG A1C LEVEL LT 7.0%: CPT | Mod: CPTII,,, | Performed by: INTERNAL MEDICINE

## 2022-10-10 PROCEDURE — 3078F DIAST BP <80 MM HG: CPT | Mod: CPTII,,, | Performed by: INTERNAL MEDICINE

## 2022-10-10 PROCEDURE — 1160F RVW MEDS BY RX/DR IN RCRD: CPT | Mod: CPTII,,, | Performed by: INTERNAL MEDICINE

## 2022-10-10 PROCEDURE — 1160F PR REVIEW ALL MEDS BY PRESCRIBER/CLIN PHARMACIST DOCUMENTED: ICD-10-PCS | Mod: CPTII,,, | Performed by: INTERNAL MEDICINE

## 2022-10-10 PROCEDURE — 3074F SYST BP LT 130 MM HG: CPT | Mod: CPTII,,, | Performed by: INTERNAL MEDICINE

## 2022-10-10 PROCEDURE — 99999 PR PBB SHADOW E&M-EST. PATIENT-LVL IV: CPT | Mod: PBBFAC,,, | Performed by: INTERNAL MEDICINE

## 2022-10-10 PROCEDURE — 3008F BODY MASS INDEX DOCD: CPT | Mod: CPTII,,, | Performed by: INTERNAL MEDICINE

## 2022-10-10 PROCEDURE — 3078F PR MOST RECENT DIASTOLIC BLOOD PRESSURE < 80 MM HG: ICD-10-PCS | Mod: CPTII,,, | Performed by: INTERNAL MEDICINE

## 2022-10-10 PROCEDURE — 1159F MED LIST DOCD IN RCRD: CPT | Mod: CPTII,,, | Performed by: INTERNAL MEDICINE

## 2022-10-10 PROCEDURE — 99214 PR OFFICE/OUTPT VISIT, EST, LEVL IV, 30-39 MIN: ICD-10-PCS | Mod: S$PBB,,, | Performed by: INTERNAL MEDICINE

## 2022-10-10 PROCEDURE — 3074F PR MOST RECENT SYSTOLIC BLOOD PRESSURE < 130 MM HG: ICD-10-PCS | Mod: CPTII,,, | Performed by: INTERNAL MEDICINE

## 2022-10-10 PROCEDURE — 99999 PR PBB SHADOW E&M-EST. PATIENT-LVL IV: ICD-10-PCS | Mod: PBBFAC,,, | Performed by: INTERNAL MEDICINE

## 2022-10-10 PROCEDURE — 3044F PR MOST RECENT HEMOGLOBIN A1C LEVEL <7.0%: ICD-10-PCS | Mod: CPTII,,, | Performed by: INTERNAL MEDICINE

## 2022-10-10 PROCEDURE — 99214 OFFICE O/P EST MOD 30 MIN: CPT | Mod: S$PBB,,, | Performed by: INTERNAL MEDICINE

## 2022-10-10 PROCEDURE — 3008F PR BODY MASS INDEX (BMI) DOCUMENTED: ICD-10-PCS | Mod: CPTII,,, | Performed by: INTERNAL MEDICINE

## 2022-10-10 PROCEDURE — 1111F DSCHRG MED/CURRENT MED MERGE: CPT | Mod: CPTII,,, | Performed by: INTERNAL MEDICINE

## 2022-10-10 RX ORDER — OMEPRAZOLE 40 MG/1
40 CAPSULE, DELAYED RELEASE ORAL EVERY MORNING
Qty: 30 CAPSULE | Refills: 11 | Status: SHIPPED | OUTPATIENT
Start: 2022-10-10 | End: 2023-01-31 | Stop reason: SDUPTHER

## 2022-10-10 NOTE — PROGRESS NOTES
Subjective:       Patient ID: Tessa Wynne is a 47 y.o. female.    Chief Complaint: No chief complaint on file.      HPI  Tessa Wynne is a 47 y.o. female with history of mild PAD, recently found with pituitary macroadenoma, hypothyroidism, and abnormal abdominopelvic CT who presents today for hospital follow-up.    The patient had transphenoidal pituitary adenoma resection without complications.  Her cortisol was normal with no signs of diabetes insipidus.    She went to the ER the neck and a due to headaches and chest pains.  Her blood pressure went up with a systolic in the 200s.  Chest pain was burning type radiating to the left side but reports was not associated to reflux.  CT scan of the chest did not show PE or thoracic abnormality other than atelectases.  A stable CT scan of brain noted.  EKG was normal and.  Head CT scan showed postsurgical changes and sinusitis changes.    She still has numbness of her extremities that has been stable.  She was hoping with the surgery she would feel an improvement but has been unchanged.  Vision slightly better.    She was recently started in Synthroid 75 mcg but did not like how it made her feel.  The medication was decreased to 25 mcg while re-evaluating with Endocrinology.  Endocrinology is recommended 50 mcg and to re-evaluate in a few weeks.  Feels okay so far.     Concerned about history of PAD which was associated to a mild elevation of the lipids and borderline NASIMA.    Has no toxic habits.    Health Maintenance:  Health Maintenance   Topic Date Due    Mammogram  01/04/2023    Lipid Panel  07/29/2027    TETANUS VACCINE  11/13/2028    Hepatitis C Screening  Completed       Review of Systems   Constitutional: Negative.  Negative for fever and unexpected weight change.   HENT: Negative.     Eyes:  Positive for visual disturbance (On an off).   Respiratory: Negative.     Cardiovascular:  Positive for chest pain.   Gastrointestinal: Negative.    Genitourinary:   Negative for difficulty urinating.        No menses is after ablation.   Musculoskeletal: Negative.  Positive for leg pain.   Integumentary:  Negative.   Neurological:  Positive for numbness and headaches (Occasional headaches self-limited).   Psychiatric/Behavioral: Negative.      Past Medical History:   Diagnosis Date    Elevated fasting glucose     GERD (gastroesophageal reflux disease)     Hypothyroidism     PAD (peripheral artery disease)        Past Surgical History:   Procedure Laterality Date    CHOLECYSTECTOMY      ENDOMETRIAL ABLATION N/A 2/8/2022    Procedure: ABLATION, ENDOMETRIUM;  Surgeon: Ariel Butler MD;  Location: [facility];  Service: OB/GYN;  Laterality: N/A;    EXCISION, NEOPLASM, PITUITARY, TRANSSPHENOIDAL APPROACH, USING COMPUTER-ASSISTED NAVIGATION N/A 10/3/2022    Procedure: EXCISION, NEOPLASM, PITUITARY, TRANSSPHENOIDAL APPROACH, USING COMPUTER-ASSISTED NAVIGATION;  Surgeon: Ed Astorga MD;  Location: [facility] OR [address] [place] [address];  Service: ENT;  Laterality: N/A;    EXCISION, NEOPLASM, PITUITARY, TRANSSPHENOIDAL APPROACH, USING COMPUTER-ASSISTED NAVIGATION N/A 10/3/2022    Procedure: EXCISION, NEOPLASM, PITUITARY, TRANSSPHENOIDAL APPROACH, USING COMPUTER-ASSISTED NAVIGATION;  Surgeon: Miguel Ángel Ferrell DO;  Location: [facility] OR [address] [place] [address];  Service: Neurosurgery;  Laterality: N/A;    HYSTEROSCOPY WITH DILATION AND CURETTAGE OF UTERUS N/A 2/8/2022    Procedure: HYSTEROSCOPY, WITH DILATION AND CURETTAGE OF UTERUS;  Surgeon: Ariel Butler MD;  Location: [facility];  Service: OB/GYN;  Laterality: N/A;       Family History   Problem Relation Age of Onset    Diabetes Mother     Diabetes Mellitus Mother     Diabetes Father     Diabetes Mellitus Father     No Known Problems Sister     No Known Problems Sister     No Known Problems Brother     No Known Problems Brother     No Known Problems Daughter     No Known Problems Son     Colon cancer Neg Hx     Breast cancer Neg Hx     Ovarian cancer Neg Hx      Uterine cancer Neg Hx     Heart attack Neg Hx        Social History     Socioeconomic History    Marital status:     Number of children: 2   Occupational History    Occupation: cleaning housing    Tobacco Use    Smoking status: Never    Smokeless tobacco: Never   Substance and Sexual Activity    Alcohol use: No    Drug use: No    Sexual activity: Yes     Partners: Male     Birth control/protection: OCP   Social History Narrative    Patient is originally from [place] [id number]         School at ; high school         College/university ;         Working ; [employer]                 Children    joban - 21    lidnsey - 13         Lives with     daught and son and finn        Diet/Exericse           Current Outpatient Medications   Medication Sig Dispense Refill    acetaminophen (TYLENOL ORAL) Take by mouth.      calcium citrate/vitamin D3 (CITRACAL REGULAR ORAL) Take by mouth.      ferrous sulfate (IRON ORAL) Take by mouth.      gabapentin (NEURONTIN) 100 MG capsule Take 1 capsule (100 mg total) by mouth 3 (three) times daily. for 14 days 42 capsule 0    levothyroxine (SYNTHROID) 50 MCG tablet Take 1 tablet (50 mcg total) by mouth before breakfast. 30 tablet 11    LIDOcaine (LIDODERM) 5 % Place 1 patch onto the skin daily as needed (pain). Remove & Discard patch within 12 hours or as directed by MD 15 patch 0    mv,calcium,min/iron/folic/vitK (ONE-A-DAY WOMEN'S COMPLETE ORAL) Take by mouth.      oxyCODONE (ROXICODONE) 5 MG immediate release tablet Take 1 tablet (5 mg total) by mouth every 6 (six) hours as needed for Pain (take as needed for pain/headaches). 10 tablet 0    oxymetazoline (AFRIN) 0.05 % nasal spray 3 sprays by Nasal route every 2 (two) hours as needed (epistaxis). 30 mL 0    pantoprazole (PROTONIX) 40 MG tablet Take 1 tablet (40 mg total) by mouth once daily. 90 tablet 2    sodium chloride (OCEAN) 0.65 % nasal spray 2 sprays by Nasal route every 4 (four) hours while awake. 44 mL 0     No current  facility-administered medications for this visit.       Review of patient's allergies indicates:  No Known Allergies      Objective:       Last 3 sets of Vitals    Vitals - 1 value per visit 10/8/2022 10/8/2022 10/8/2022   SYSTOLIC 127 - 132   DIASTOLIC 76 - 79   Pulse - 66 -   Temp - - -   Resp 12 - 10   SPO2 100 - 99   Weight (lb) - - -   Weight (kg) - - -   Height - - -   BMI (Calculated) - - -   VISIT REPORT - - -   Pain Score  - - -   Some recent data might be hidden   Physical Exam  Constitutional:       General: She is not in acute distress.     Appearance: Normal appearance.   HENT:      Nose: Congestion present.      Mouth/Throat:      Pharynx: No oropharyngeal exudate or posterior oropharyngeal erythema.   Eyes:      General: No scleral icterus.     Extraocular Movements: Extraocular movements intact.      Conjunctiva/sclera: Conjunctivae normal.   Neck:      Comments: No goiter.  Cardiovascular:      Rate and Rhythm: Normal rate and regular rhythm.      Pulses: Normal pulses.      Heart sounds: Normal heart sounds.   Pulmonary:      Effort: Pulmonary effort is normal.      Breath sounds: Normal breath sounds.   Abdominal:      General: Bowel sounds are normal. There is no distension.      Palpations: Abdomen is soft.   Musculoskeletal:         General: No swelling. Normal range of motion.   Lymphadenopathy:      Cervical: No cervical adenopathy.   Skin:     General: Skin is warm and dry.   Neurological:      General: No focal deficit present.      Mental Status: She is alert and oriented to person, place, and time. Mental status is at baseline.   Psychiatric:         Mood and Affect: Mood normal.         Behavior: Behavior normal.         CBC:  Recent Labs   Lab 10/04/22  0555 10/05/22  0443 10/08/22  0305   WBC 11.28 8.02 7.56   RBC 4.23 4.26 4.03   Hemoglobin 12.8 13.1 12.3   Hematocrit 40.5 41.1 36.2 L   Platelets 321 282 258   MCV 96 97 90   MCH 30.3 30.8 30.5   MCHC 31.6 L 31.9 L 34.0      CMP:  Recent Labs   Lab 09/22/22  1630 09/24/22  1508 10/03/22  1138 10/08/22  0305   Glucose 88 103   < > 97   Calcium 9.3 8.8   < > 9.5   Albumin 4.3 4.0  --  3.7   Total Protein 7.7 6.8  --  6.6   Sodium 132 L 134 L   < > 129 L   Potassium 4.2 3.9   < > 4.3   CO2 22 L 25   < > 24   Chloride 99 102   < > 94 L   BUN 10 11   < > 8   Creatinine 0.9 0.8   < > 0.8   Alkaline Phosphatase 67 64  --  61   ALT 48 H 30  --  38   AST 42 H 27  --  31   Total Bilirubin 0.5 0.2  --  0.4    < > = values in this interval not displayed.     URINALYSIS:  Recent Labs   Lab 09/22/22  1630 09/24/22  1512 10/05/22  1101   Color, UA Yellow   < > Straw   Specific [place], UA 1.020   < > 1.010   pH, UA 6.0   < > 7.0   Protein, UA Negative   < > Negative   Nitrite, UA Negative   < > Negative   Leukocytes, UA 1+ A   < > 1+ A   Urobilinogen, UA Negative  --   --     < > = values in this interval not displayed.      LIPIDS:  Recent Labs   Lab 07/27/20  1530 03/02/21  0959 12/17/21  1537 07/29/22  0827 08/26/22  0845 09/22/22  1630 09/24/22  1508 10/08/22  0305   TSH  --   --    < > 2.017   < > 0.939 0.739 0.772   HDL 75 56  --  57  --   --   --   --    Cholesterol 176 156  --  127  --   --   --   --    Triglycerides 125 84  --  63  --   --   --   --    LDL Cholesterol 76.0 83.2  --  57.4 L  --   --   --   --    HDL/Cholesterol Ratio 42.6 35.9  --  44.9  --   --   --   --    Non-HDL Cholesterol 101 100  --  70  --   --   --   --    Total Cholesterol/HDL Ratio 2.3 2.8  --  2.2  --   --   --   --     < > = values in this interval not displayed.     TSH:  Recent Labs   Lab 09/22/22  1630 09/24/22  1508 10/08/22  0305   TSH 0.939 0.739 0.772       A1C:  Recent Labs   Lab 05/04/22  0809 07/29/22  0827   Hemoglobin A1C 5.4 6.1 H       Imaging:  CTA Chest Non-Coronary (PE Studies)  Narrative: EXAMINATION:  CTA CHEST NON CORONARY (PE STUDIES)    CLINICAL HISTORY:  Pulmonary embolism (PE) suspected, high prob;    TECHNIQUE:  Low dose axial images,  sagittal and coronal reformations were obtained from the thoracic inlet to the lung bases following the IV administration of 100 mL of Omnipaque 350.  Contrast timing was optimized to evaluate the pulmonary arteries.    COMPARISON:  CT abdomen pelvis 09/24/2022    Chest radiograph 04/21/2020    FINDINGS:  The thoracic aorta maintains normal caliber, contour, and course without significant atherosclerotic calcification within its course.  There is no evidence of aneurysmal dilation or dissection. The heart is not enlarged and there is no evidence of pericardial effusion.The esophagus maintains a normal course and caliber.There is no axillary or mediastinal adenopathy.  Hilar contours are within normal limits.    Allowing for artifact from dense contrast bolus in the SVC, there are no filling defects within the pulmonary arteries to suggest pulmonary artery thromboembolism.    The trachea is midline in the proximal airways are patent.  Evaluation of pulmonary parenchyma is limited due to respiratory motion artifact.  There is dependent bibasilar atelectatic change.  No pleural fluid, focal consolidation or pneumothorax.  There is a small calcified right lower lobe pulmonary granuloma.    Visualized structures of the upper abdomen demonstrate a 1.3 cm hepatic hypodensity suggestive of a cyst and unchanged from prior CT abdomen and pelvis of 09/24/2022 visualized osseous structures are intact..  Impression: 1. No evidence of pulmonary artery embolus or other acute intrathoracic abnormality noting dependent bibasilar atelectasis.  2. Hepatic hypodensity suggestive of a cyst, unchanged from prior CT abdomen and pelvis.    Electronically signed by resident: Leanne Morris  Date:    10/08/2022  Time:    05:39    Electronically signed by: Tanya Stephens MD  Date:    10/08/2022  Time:    07:01  CT Head Without Contrast  Narrative: EXAMINATION:  CT HEAD WITHOUT CONTRAST    CLINICAL HISTORY:  Headache, new or worsening, neuro  deficit (Age 19-49y);    TECHNIQUE:  Low dose axial CT images obtained throughout the head without the use of intravenous contrast.  Axial, sagittal and coronal reconstructions were performed.    COMPARISON:  MRI 10/04/2022, head CT 10/03/2022    FINDINGS:  There is evolving postoperative change of transsphenoidal sellar/suprasellar mass resection with heterogeneous attenuation and packing material within the floor of the resection cavity.  There is presumed Gel-Foam within the resection cavity/region of sphenoidotomy similar to prior exam.  There is increased mucosal thickening, opacification in fluid appreciated within the inferior frontal sinuses, ethmoid air cells and maxillary sinuses.  The mastoid air cells are essentially clear.  There is no evidence of acute intracranial hemorrhage, hydrocephalus or midline shift.  The basal cisterns otherwise remain patent.  No acute calvarial abnormalities.  Impression: 1. Evolving postoperative change of sphenoidotomy/transsphenoidal resection of sellar/suprasellar mass with packing material/Gel-Foam in the region of the resection cavity/sphenoidotomy.  Please note evaluation for residual pituitary lesion is precluded on noncontrast CT exam.  No definite CT evidence of new acute intracranial abnormality.  Clinical correlation and further evaluation as warranted.  2. Intervally increased mucosal thickening/opacification and fluid within the paranasal sinuses as discussed above.    Electronically signed by resident: Leanne Morris  Date:    10/08/2022  Time:    05:21    Electronically signed by: Tanya Stephens MD  Date:    10/08/2022  Time:    06:54  X-Ray Chest AP Portable  Narrative: EXAMINATION:  XR CHEST AP PORTABLE    CLINICAL HISTORY:  chest pain;    TECHNIQUE:  Single frontal view of the chest was performed.    COMPARISON:  04/21/2021.    FINDINGS:  Heart and lungs  appear unchanged when allowing for differences in technique and positioning.  Impression: No acute  findings.    No significant change from prior study.    Electronically signed by: Alvin Amanda MD  Date:    10/08/2022  Time:    02:51      Assessment:       1. Pituitary macroadenoma with extrasellar extension    2. PUD (peptic ulcer disease)    3. Peripheral artery disease    4. Iron deficiency anemia, unspecified iron deficiency anemia type            Plan:       1. Pituitary macroadenoma with extrasellar extension  -     TSH; Future; Expected date: 10/10/2022  - has follow-up appointment with Neurosurgery tomorrow, ophthalmology in a few weeks, and endocrinologist follow.  - follow-up labs next visit.    2. PUD (peptic ulcer disease)  Overview:  9/8/20 EGD    Orders:  -     omeprazole (PRILOSEC) 40 MG capsule; Take 1 capsule (40 mg total) by mouth every morning.  Dispense: 30 capsule;  - had evaluation by GI arm home recommended to continue PPIs and follow-up.  She is usually followed by Dr. Rao outside [facility].  Noted with pyloric thickening, EGD considered. Tolerating diet and having bowel movements.    3. Peripheral artery disease  -     Ambulatory referral/consult to Vascular Medicine; Future; Expected date: 10/17/2022  -     CBC Auto Differential; Future; Expected date: 10/10/2022    4. Iron deficiency anemia, unspecified iron deficiency anemia type  -     Comprehensive Metabolic Panel; Future; Expected date: 10/10/2022     Health Maintenance Due   Topic Date Due    Pneumococcal Vaccines (Age 0-64) (1 - PCV) Never done    COVID-19 Vaccine (3 - Booster for Pfizer series) 01/01/2022    Influenza Vaccine (1) 09/01/2022      Transitional Care Note    Family and/or Caretaker present at visit?  Yes.  Diagnostic tests reviewed/disposition: I have reviewed all completed as well as pending diagnostic tests at the time of discharge.  Disease/illness education:  Hemodynamically stable.  Needs follow-up of her thyroid levels and vision.  Neuro surgery postop follow-up already scheduled.  She is concerned of mild  chronic conditions and pending evaluations which will be continued with follow-up appointments.  Home health/community services discussion/referrals: Patient does not have home health established from hospital visit.  They do not need home health.  If needed, we will set up home health for the patient.   Establishment or re-establishment of referral orders for community resources: No other necessary community resources.   Discussion with other health care providers:  Has follow-up appointments with Neurosurgery, Ophthalmology, and endocrinology is following labs.  Will follow-up pending evaluations with GI service and on follow-up appointment evaluate gynecology follow-up as previous findings may change and her menses will probably change which with resection of adenoma.  She is concerned of PAD history, statins, and further vascular changes with atypical chest pains. Will  consult vascular Medicine for recommendations.  Recent chest pain was atypical, has no GI changes of possible peptic ulcer disease and gastroenterology to follow .        Return to clinic as scheduled.    Sabiha Overton MD  [facility]  Disclaimer:  This note has been generated using voice-recognition software. There may be grammatical or spelling errors that have been missed during proof-reading

## 2022-10-11 ENCOUNTER — LAB VISIT (OUTPATIENT)
Dept: LAB | Facility: HOSPITAL | Age: 47
End: 2022-10-11
Payer: MEDICAID

## 2022-10-11 ENCOUNTER — OFFICE VISIT (OUTPATIENT)
Dept: NEUROSURGERY | Facility: CLINIC | Age: 47
End: 2022-10-11
Payer: MEDICAID

## 2022-10-11 ENCOUNTER — OFFICE VISIT (OUTPATIENT)
Dept: ENDOCRINOLOGY | Facility: CLINIC | Age: 47
End: 2022-10-11
Payer: MEDICAID

## 2022-10-11 VITALS
HEART RATE: 74 BPM | SYSTOLIC BLOOD PRESSURE: 117 MMHG | HEIGHT: 64 IN | WEIGHT: 162.25 LBS | BODY MASS INDEX: 27.7 KG/M2 | DIASTOLIC BLOOD PRESSURE: 64 MMHG

## 2022-10-11 DIAGNOSIS — Z86.018 S/P TRANSSPHENOIDAL SELECTIVE ADENOMECTOMY: Primary | ICD-10-CM

## 2022-10-11 DIAGNOSIS — Z98.890 S/P TRANSSPHENOIDAL SELECTIVE ADENOMECTOMY: Primary | ICD-10-CM

## 2022-10-11 DIAGNOSIS — D35.2 PITUITARY MACROADENOMA WITH EXTRASELLAR EXTENSION: Primary | ICD-10-CM

## 2022-10-11 DIAGNOSIS — E87.1 HYPONATREMIA: ICD-10-CM

## 2022-10-11 DIAGNOSIS — E03.8 OTHER SPECIFIED HYPOTHYROIDISM: ICD-10-CM

## 2022-10-11 DIAGNOSIS — E23.7 PITUITARY LESION: ICD-10-CM

## 2022-10-11 DIAGNOSIS — D35.2 PITUITARY MACROADENOMA WITH EXTRASELLAR EXTENSION: ICD-10-CM

## 2022-10-11 PROBLEM — H53.47 BITEMPORAL HEMIANOPIA: Status: RESOLVED | Noted: 2022-08-25 | Resolved: 2022-10-11

## 2022-10-11 LAB
ANION GAP SERPL CALC-SCNC: 9 MMOL/L (ref 8–16)
BUN SERPL-MCNC: 10 MG/DL (ref 6–20)
CALCIUM SERPL-MCNC: 9.1 MG/DL (ref 8.7–10.5)
CHLORIDE SERPL-SCNC: 98 MMOL/L (ref 95–110)
CO2 SERPL-SCNC: 27 MMOL/L (ref 23–29)
CREAT SERPL-MCNC: 0.8 MG/DL (ref 0.5–1.4)
EST. GFR  (NO RACE VARIABLE): >60 ML/MIN/1.73 M^2
GLUCOSE SERPL-MCNC: 88 MG/DL (ref 70–110)
POTASSIUM SERPL-SCNC: 4.2 MMOL/L (ref 3.5–5.1)
SODIUM SERPL-SCNC: 134 MMOL/L (ref 136–145)

## 2022-10-11 PROCEDURE — 1160F PR REVIEW ALL MEDS BY PRESCRIBER/CLIN PHARMACIST DOCUMENTED: ICD-10-PCS | Mod: CPTII,,, | Performed by: NEUROLOGICAL SURGERY

## 2022-10-11 PROCEDURE — 99999 PR PBB SHADOW E&M-EST. PATIENT-LVL I: CPT | Mod: PBBFAC,,, | Performed by: INTERNAL MEDICINE

## 2022-10-11 PROCEDURE — 3078F DIAST BP <80 MM HG: CPT | Mod: CPTII,,, | Performed by: NEUROLOGICAL SURGERY

## 2022-10-11 PROCEDURE — 99999 PR PBB SHADOW E&M-EST. PATIENT-LVL III: CPT | Mod: PBBFAC,,, | Performed by: NEUROLOGICAL SURGERY

## 2022-10-11 PROCEDURE — 3074F SYST BP LT 130 MM HG: CPT | Mod: CPTII,,, | Performed by: NEUROLOGICAL SURGERY

## 2022-10-11 PROCEDURE — 99213 OFFICE O/P EST LOW 20 MIN: CPT | Mod: PBBFAC,27 | Performed by: NEUROLOGICAL SURGERY

## 2022-10-11 PROCEDURE — 99211 OFF/OP EST MAY X REQ PHY/QHP: CPT | Mod: PBBFAC,25 | Performed by: INTERNAL MEDICINE

## 2022-10-11 PROCEDURE — 80048 BASIC METABOLIC PNL TOTAL CA: CPT | Performed by: STUDENT IN AN ORGANIZED HEALTH CARE EDUCATION/TRAINING PROGRAM

## 2022-10-11 PROCEDURE — 3044F HG A1C LEVEL LT 7.0%: CPT | Mod: CPTII,,, | Performed by: NEUROLOGICAL SURGERY

## 2022-10-11 PROCEDURE — 99214 PR OFFICE/OUTPT VISIT, EST, LEVL IV, 30-39 MIN: ICD-10-PCS | Mod: S$PBB,,, | Performed by: INTERNAL MEDICINE

## 2022-10-11 PROCEDURE — 3078F PR MOST RECENT DIASTOLIC BLOOD PRESSURE < 80 MM HG: ICD-10-PCS | Mod: CPTII,,, | Performed by: NEUROLOGICAL SURGERY

## 2022-10-11 PROCEDURE — 1111F DSCHRG MED/CURRENT MED MERGE: CPT | Mod: CPTII,,, | Performed by: INTERNAL MEDICINE

## 2022-10-11 PROCEDURE — 99999 PR PBB SHADOW E&M-EST. PATIENT-LVL III: ICD-10-PCS | Mod: PBBFAC,,, | Performed by: NEUROLOGICAL SURGERY

## 2022-10-11 PROCEDURE — 1159F MED LIST DOCD IN RCRD: CPT | Mod: CPTII,,, | Performed by: NEUROLOGICAL SURGERY

## 2022-10-11 PROCEDURE — 99214 OFFICE O/P EST MOD 30 MIN: CPT | Mod: S$PBB,,, | Performed by: INTERNAL MEDICINE

## 2022-10-11 PROCEDURE — 3074F PR MOST RECENT SYSTOLIC BLOOD PRESSURE < 130 MM HG: ICD-10-PCS | Mod: CPTII,,, | Performed by: NEUROLOGICAL SURGERY

## 2022-10-11 PROCEDURE — 36415 COLL VENOUS BLD VENIPUNCTURE: CPT | Performed by: STUDENT IN AN ORGANIZED HEALTH CARE EDUCATION/TRAINING PROGRAM

## 2022-10-11 PROCEDURE — 3044F HG A1C LEVEL LT 7.0%: CPT | Mod: CPTII,,, | Performed by: INTERNAL MEDICINE

## 2022-10-11 PROCEDURE — 99024 PR POST-OP FOLLOW-UP VISIT: ICD-10-PCS | Mod: ,,, | Performed by: NEUROLOGICAL SURGERY

## 2022-10-11 PROCEDURE — 3008F BODY MASS INDEX DOCD: CPT | Mod: CPTII,,, | Performed by: NEUROLOGICAL SURGERY

## 2022-10-11 PROCEDURE — 1160F RVW MEDS BY RX/DR IN RCRD: CPT | Mod: CPTII,,, | Performed by: NEUROLOGICAL SURGERY

## 2022-10-11 PROCEDURE — 3044F PR MOST RECENT HEMOGLOBIN A1C LEVEL <7.0%: ICD-10-PCS | Mod: CPTII,,, | Performed by: NEUROLOGICAL SURGERY

## 2022-10-11 PROCEDURE — 3008F PR BODY MASS INDEX (BMI) DOCUMENTED: ICD-10-PCS | Mod: CPTII,,, | Performed by: NEUROLOGICAL SURGERY

## 2022-10-11 PROCEDURE — 99024 POSTOP FOLLOW-UP VISIT: CPT | Mod: ,,, | Performed by: NEUROLOGICAL SURGERY

## 2022-10-11 PROCEDURE — 1111F PR DISCHARGE MEDS RECONCILED W/ CURRENT OUTPATIENT MED LIST: ICD-10-PCS | Mod: CPTII,,, | Performed by: INTERNAL MEDICINE

## 2022-10-11 PROCEDURE — 1159F PR MEDICATION LIST DOCUMENTED IN MEDICAL RECORD: ICD-10-PCS | Mod: CPTII,,, | Performed by: NEUROLOGICAL SURGERY

## 2022-10-11 PROCEDURE — 3044F PR MOST RECENT HEMOGLOBIN A1C LEVEL <7.0%: ICD-10-PCS | Mod: CPTII,,, | Performed by: INTERNAL MEDICINE

## 2022-10-11 PROCEDURE — 99999 PR PBB SHADOW E&M-EST. PATIENT-LVL I: ICD-10-PCS | Mod: PBBFAC,,, | Performed by: INTERNAL MEDICINE

## 2022-10-11 NOTE — ASSESSMENT & PLAN NOTE
Patient previously started on levothyroxine 75 mcg daily however this was reduced by her primary care provider as patient reported some side effects with this dose.  She was discharged home on 50 mcg daily and continues to take this.  We discussed that in the long run she may not need to be on thyroid hormone replacement however I suggest continuing the medication for now and reassessing labs at her next visit.  If FT4 is normal on low-dose levothyroxine with next blood draw can consider stopping medication and continuing to monitor labs.

## 2022-10-11 NOTE — Clinical Note
Yunior - please call patient with  and let her know that her sodium is still low but it has improved so I want her to do the 1 L fluid restriction but we need to repeat labs for a sodium check on Friday at [place], please schedule.  Karen matias

## 2022-10-11 NOTE — ASSESSMENT & PLAN NOTE
Status post transsphenoidal resection of large suprasellar mass.  UVA pathology pending but suspect nonfunctional adenoma given previous labs.    She will continue to follow-up with Dr. Ferrell and proceed with follow-up visual field testing 2-3 months after surgery.

## 2022-10-11 NOTE — PROGRESS NOTES
PITUITARY [facility] ENDOCRINOLOGY FOLLOW UP  10/11/2022     The patient was seen in the multidisciplinary pituitary clinic with Dr. Ferrell today.       Due to language barrier, an  was present during the history-taking and subsequent discussion with this patient.      The patient's last visit with me was on 9/6/2022.     Subjective:      CC: post-op follow up s/p TSR for pituitary macroadenoma causing vision loss    HPI:   Tessa Wynne is a 47 y.o. female who presents for evaluation of pituitary lesion compressing optic chiasm, found on imaging done after a syncopal episode.     Initial presentation:   Brain imaging done for syncope and HA.      Interval hx:  At her last visit (preop) she was started on levothyroxine 75 mcg daily for secondary hypothyroidism. This was reduced to 25 mcg daily by her primary care provider after she reported symptoms on 75 mcg.  D/c from hospital on levothyroxine 50 mcg daily.      Underwent TSR by Dr. Ferrell on 10/3/22 ([facility] path - pending).  The patient did not receive any perioperative glucocorticoids and had no evidence of adrenal insufficiency in the hospital. The patient did not develop diabetes insipidus but presented to the ED on 10/8 for severe HA, CP, and HTN, found to have Na of 129 (on d/c Na was 140).      Her primary care physician has referred her to Cardiology for further evaluation as she sometimes occasionally does have burning chest pain.    BMP today with Na improved to 134    Denies polyuria/polydipsia, nocturia x 2    Since before surgery has decreased taste and appetite.      Imaging:    10/4/22  Evolving postsurgical changes status post transsphenoidal hypophysectomy.  Nodular enhancement along the floor of the sella on the left is suspicious for residual neoplasm unless otherwise suggested clinically.  There is however no compression of the optic chiasm as noted on the preoperative study.     MRI 8/15/22  Sella: There is a large sellar lesion with  "suprasellar extension.  Lesion measures roughly 3.2 x 2.8 x 3.5 cm.  There is mass effect on the adjacent structures including the optic chiasm which is displaced superiorly as well as the bilateral A1 segments of the anterior cerebral arteries.  There is possible extension into the cavernous sinuses bilaterally best visualized on coronal T2 series 10.  The carotid flow voids are preserved.  There is bony remodeling of the adjacent clivus secondary to this lesion with preserved marrow signal.    Headache:    HA improved since ER visit, now mild    Vision change:   denies    Formal Visual burns:   HVF w/ Dr. Yan 8/25/22, per note:  " OCT with borderline RNFL thinning OD>OS. HVF with generalized depression OD (worse temporally) and temporal depression OS. Findings consistent with chiasmal compression." rtc in 3 month(s)    Pituitary labs: preop labs with with elevated prolactin of 138 (likely from stalk compression), secondary hypothyroidism, otherwise normal   Latest Reference Range & Units 08/26/22 08:45   Cortisol, 8 AM 4.30 - 22.40 ug/dL  4.30 - 22.40 ug/dL 13.70  12.80   ACTH 0 - 46 pg/mL 19   Somatomedin (IGF-I) 44 - 227 ng/mL 42 (L)   TSH 0.400 - 4.000 uIU/mL 2.402   Free T4 0.71 - 1.51 ng/dL 0.65 (L)   FSH See Text mIU/mL 10.72   Prolactin 5.2 - 26.5 ng/mL 138.0 (H)   (    Current symptoms:  Hyperprolactinemia:  []  breast tenderness [x]  nipple discharge []  Menstrual Irregularity []  Denies   Initially noticed blx nipple dc for a few years around time of menstrual cycle   Reports that after D&C in 2/2022 galactorrhea resolved    Lab Results   Component Value Date    PROLACTIN 138.0 (H) 08/26/2022     Thyroid:  Currently on levothyroxine 50 mcg daily   [x]  fatigue [x]  weight down 10 lbs since surgery []  temp intolerance  []  Denies    []  BM change []  skin/hair change [] palpitations []  tremor [x]  Denies    Lab Results   Component Value Date    TSH 0.772 10/08/2022    FREET4 0.65 (L) 08/26/2022     "   Growth Hormone Excess:  []  increase hand/foot size []  teeth spacing change    [x]  Denies    []  worse glycemic control []  joint pain     [x]  Denies    Last IGF-1:   Lab Results   Component Value Date    SOMATMDN 42 (L) 08/26/2022        Cushing's syndrome:   [x]  Easy bruising [x]  Weight gain  []  Worse glycemic control   []  HTN  []  Acne  []  Hirsutism  []  Striae  []  Menstrual change []  VTE   []  Fractures  []  Denies All    denies symptoms of adrenal insufficiency (no lightheadedness, N/V/abd pain, hypotension).        Gonadotrophs:     []  Irregular menses []  Postmenopausal  []  Decreased libido   []  ED   []  Denies    Previously regular then Irregular menses for 4 month(s) prior to d&c in 2/2022 (prior to that 3 month(s) of bleeding)  Repro hx: 2 children, no problems with conception  LMP: prior to D&C          Review of patient's allergies indicates:  No Known Allergies      Current Outpatient Medications:     calcium citrate/vitamin D3 (CITRACAL REGULAR ORAL), Take by mouth., Disp: , Rfl:     ferrous sulfate (IRON ORAL), Take by mouth., Disp: , Rfl:     gabapentin (NEURONTIN) 100 MG capsule, Take 1 capsule (100 mg total) by mouth 3 (three) times daily. for 14 days, Disp: 42 capsule, Rfl: 0    levothyroxine (SYNTHROID) 50 MCG tablet, Take 1 tablet (50 mcg total) by mouth before breakfast., Disp: 30 tablet, Rfl: 11    LIDOcaine (LIDODERM) 5 %, Place 1 patch onto the skin daily as needed (pain). Remove & Discard patch within 12 hours or as directed by MD, Disp: 15 patch, Rfl: 0    mv,calcium,min/iron/folic/vitK (ONE-A-DAY WOMEN'S COMPLETE ORAL), Take by mouth., Disp: , Rfl:     omeprazole (PRILOSEC) 40 MG capsule, Take 1 capsule (40 mg total) by mouth every morning., Disp: 30 capsule, Rfl: 11    oxyCODONE (ROXICODONE) 5 MG immediate release tablet, Take 1 tablet (5 mg total) by mouth every 6 (six) hours as needed for Pain (take as needed for pain/headaches)., Disp: 10 tablet, Rfl: 0    oxymetazoline  (AFRIN) 0.05 % nasal spray, 3 sprays by Nasal route every 2 (two) hours as needed (epistaxis)., Disp: 30 mL, Rfl: 0    sodium chloride (OCEAN) 0.65 % nasal spray, 2 sprays by Nasal route every 4 (four) hours while awake., Disp: 44 mL, Rfl: 0    ROS:  see HPI    Objective:   Physical Exam   LMP 01/15/2022   Wt Readings from Last 3 Encounters:   10/10/22 72.6 kg (160 lb 0.9 oz)   10/03/22 72.6 kg (160 lb 0.9 oz)   09/29/22 72.6 kg (160 lb 1.6 oz)   ]    Constitutional:  Pleasant,  in no acute distress.   HENT:   Eyes:     No scleral icterus.   Respiratory:   Effort normal   Neurological:  normal speech  Psych:  Normal mood and affect.      LABORATORY REVIEW:    Chemistry        Component Value Date/Time     (L) 10/08/2022 0305    K 4.3 10/08/2022 0305    CL 94 (L) 10/08/2022 0305    CO2 24 10/08/2022 0305    BUN 8 10/08/2022 0305    CREATININE 0.8 10/08/2022 0305    GLU 97 10/08/2022 0305        Component Value Date/Time    CALCIUM 9.5 10/08/2022 0305    ALKPHOS 61 10/08/2022 0305    AST 31 10/08/2022 0305    ALT 38 10/08/2022 0305    BILITOT 0.4 10/08/2022 0305    ESTGFRAFRICA >60 07/29/2022 0827    EGFRNONAA >60 07/29/2022 0827        MRI 8/15/22        Assessment/Plan:     Problem List Items Addressed This Visit          Unprioritized    Pituitary macroadenoma with extrasellar extension - Primary    Relevant Orders    Basic Metabolic Panel    T4, Free    TSH    Cortisol, 8AM    ACTH       Return to clinic  in 1 month with labs prior 8 am fasting in brenna Batista MD

## 2022-10-11 NOTE — ASSESSMENT & PLAN NOTE
Suspect resolving SIADH given new onset hyponatremia following transsphenoidal resection.  Will start 1 L fluid restriction but patient was given strict instructions to discontinue fluid restriction if she develops polyuria as she may transition to diabetes insipidus.  Will repeat BMP on Friday.

## 2022-10-11 NOTE — PROGRESS NOTES
CHIEF COMPLAINT:  2wk postop f/u  Hyponatremia    INTERVAL HISTORY (10/11/22):  Patient returns for routine 2 week postoperative follow-up s/p endonasal endoscopic transsphenoidal resection of pituitary tumor on 10/3/22 with Dr. Astorga of ENT.  Patient tolerated the procedure well without any perioperative complications.  She was discharged few days after surgery without evidence of DI or adrenal insufficiency.  She is neurologically stable.  Today she reports that her vision is improved as well as her energy levels.  She was recently seen in the emergency room for severe headache in the back of her head but she explains that that has gotten better since.  She also reports tiny amount of clear watery appearing drainage from her nose periodically.  She states that this could happen up to 4-5 times but is a small amount and not bloody.  She does acknowledge that her headaches are worse when she gets up from bed but it is not necessarily positional.  She is urinating 6-7 times per day and 2 times per night.  A recent BMP in the ED revealed hyponatremia to 129.  She will be seeing Dr. Batista today for further evaluation of SIADH versus DI.    HPI:  Tessa Wynne is a 47 y.o.  female with below PMH, who is referred for evaluation of pituitary macroadenoma.  Brain MRI obtained for syncopal episode and HA revealed large pituitary tumor.  She's had HA x 1 year (mainly occipital location).  Denies significant vision change however recent ophtho eval revealed evidence of chiasmal compression.    Elevated prolactin (138) and hypothyroidism.    See endo note for hormone eval    Review of patient's allergies indicates:  No Known Allergies    Past Medical History:   Diagnosis Date    Elevated fasting glucose     GERD (gastroesophageal reflux disease)     Hypothyroidism     PAD (peripheral artery disease)     Pituitary lesion 9/7/2022     Past Surgical History:   Procedure Laterality Date    CHOLECYSTECTOMY      ENDOMETRIAL  ABLATION N/A 2/8/2022    Procedure: ABLATION, ENDOMETRIUM;  Surgeon: Ariel Butler MD;  Location: [facility] OR;  Service: OB/GYN;  Laterality: N/A;    EXCISION, NEOPLASM, PITUITARY, TRANSSPHENOIDAL APPROACH, USING COMPUTER-ASSISTED NAVIGATION N/A 10/3/2022    Procedure: EXCISION, NEOPLASM, PITUITARY, TRANSSPHENOIDAL APPROACH, USING COMPUTER-ASSISTED NAVIGATION;  Surgeon: Ed Astorga MD;  [facility];  Service: ENT;  Laterality: N/A;    EXCISION, NEOPLASM, PITUITARY, TRANSSPHENOIDAL APPROACH, USING COMPUTER-ASSISTED NAVIGATION N/A 10/3/2022    Procedure: EXCISION, NEOPLASM, PITUITARY, TRANSSPHENOIDAL APPROACH, USING COMPUTER-ASSISTED NAVIGATION;  Surgeon: Miguel Ángel Ferrell DO;  [facility];  Service: Neurosurgery;  Laterality: N/A;    HYSTEROSCOPY WITH DILATION AND CURETTAGE OF UTERUS N/A 2/8/2022    Procedure: HYSTEROSCOPY, WITH DILATION AND CURETTAGE OF UTERUS;  Surgeon: Ariel Butler MD;  Location: [facility];  Service: OB/GYN;  Laterality: N/A;     Family History   Problem Relation Age of Onset    Diabetes Mother     Diabetes Mellitus Mother     Diabetes Father     Diabetes Mellitus Father     No Known Problems Sister     No Known Problems Sister     No Known Problems Brother     No Known Problems Brother     No Known Problems Daughter     No Known Problems Son     Colon cancer Neg Hx     Breast cancer Neg Hx     Ovarian cancer Neg Hx     Uterine cancer Neg Hx     Heart attack Neg Hx      Social History     Tobacco Use    Smoking status: Never    Smokeless tobacco: Never   Substance Use Topics    Alcohol use: No    Drug use: No        Review of Systems   Constitutional: Negative.    HENT:  Negative for congestion, ear discharge, ear pain, hearing loss, nosebleeds, sinus pain and tinnitus.    Eyes:  Negative for blurred vision, double vision, photophobia, pain, discharge and redness.   Respiratory: Negative.     Cardiovascular:  Negative for chest pain, palpitations, claudication  and leg swelling.   Gastrointestinal:  Negative for abdominal pain, blood in stool, constipation, diarrhea, melena and vomiting.   Genitourinary:  Negative for flank pain, frequency and urgency.   Musculoskeletal:  Negative for falls.   Skin: Negative.    Neurological:  Negative for dizziness, tingling, tremors, sensory change, speech change, focal weakness, seizures, loss of consciousness, weakness and headaches.   Endo/Heme/Allergies:  Does not bruise/bleed easily.   Psychiatric/Behavioral: Negative.       OBJECTIVE:   Vital Signs:  Pulse: 74 (10/11/22 1341)  BP: 117/64 (10/11/22 1341)    Physical Exam:  Constitutional: Patient sitting comfortably in chair. Appears well developed and well nourished.  Skin: Exposed areas are intact without abnormal markings, rashes or other lesions.  HEENT: Normocephalic. Normal conjunctivae.  Cardiovascular: Normal rate and regular rhythm.  Respiratory: Chest wall rises and falls symmetrically, without signs of respiratory distress.  Abdomen: Soft and non-tender.  Extremities: Warm and without edema. Calves supple, non-tender.  Psych/Behavior: Normal affect.    Neurological:    Mental status: Alert and oriented. Conversational and appropriate.       Cranial Nerves: VFF to confrontation. PERRL. EOMI without nystagmus. Facial STLT normal and symmetric. Strong, symmetric muscles of mastication. Facial strength full and symmetric. Hearing equal bilaterally to finger rub. Palate and uvula rise and fall normally in midline. Shoulder shrug 5/5 strength. Tongue midline.     Motor:    Upper:  Deltoids Triceps Biceps WE WF     R 5/5 5/5 5/5 5/5 5/5 5/5    L 5/5 5/5 5/5 5/5 5/5 5/5      Lower:  HF KE KF DF PF EHL    R 5/5 5/5 5/5 5/5 5/5 5/5    L 5/5 5/5 5/5 5/5 5/5 5/5     Sensory: Intact sensation to light touch in all extremities. Romberg negative.    Reflexes:          DTR: 2+ symmetrically throughout.     Rey's: Negative.     Babinski's: Negative.     Clonus:  Negative.    Cerebellar: Finger-to-nose and rapid alternating movements normal.     Gait stable    Diagnostic Results:  All imaging was independently reviewed by me.    BMP - Na 134 from 129    Postop MRI - GTR    MRI brain, dated 8/15/22:  1. 2.8 x 3.5 x 2.7 cm pit macroadenoma with compression of chiasm  2. Knosp 3 on R  3. Knosp 2 on L    ASSESSMENT/PLAN:     Problem List Items Addressed This Visit          Endocrine    S/P transsphenoidal selective adenomectomy - Primary     S/p TSA for resection of pituitary macroadenoma.      This tumor was likely nonfunctional (final path from [facility] is pending).  Surgery achieved gross total resection without spinal fluid leak.  She did not develop diabetes insipidus or adrenal insufficiency while inpatient however returns today with recent BMP showing hyponatremia and headache.  She also reports some scant clear drainage from her nose that she was able to capture on a tissue on tilt testing.  It is unclear if this is CSF.  I provided her with a sterile cup to collect the drainage so that we can send for beta 2 transferrin.  She will be seeing Dr. Batista of neuro endocrinology later today to help determine if the patient is in SIADH.    - F/u with Dr Astorga on 10/18  - F/u with Dr Batista today  - RTC in 1m    The patient understands and agrees with the plan of care. All questions were answered.    Time spent on this encounter: 60 minutes. This includes face-to-face time and non-face to face time preparing to see the patient (eg, review of tests), obtaining and/or reviewing separately obtained history, documenting clinical information in the electronic or other health record, independently interpreting results and communicating results to the patient/family/caregiver, or care coordinator.

## 2022-10-11 NOTE — PATIENT INSTRUCTIONS
limite herrera ingesta de líquidos a menos de 1 litro por día. volveremos a revisar herrera nivel de sodio el viernes para asegurarnos de que mejore. Si comienza a orinar en exceso con grandes volúmenes de orina cada 1 o 2 horas, deje de limitar disha líquidos, hágamelo saber de inmediato y bear agua solo cuando tenga sed.

## 2022-10-12 LAB
FINAL PATHOLOGIC DIAGNOSIS: NORMAL
GROSS: NORMAL
Lab: NORMAL
MICROSCOPIC EXAM: NORMAL
SUPPLEMENTAL DIAGNOSIS: NORMAL

## 2022-10-15 ENCOUNTER — HOSPITAL ENCOUNTER (EMERGENCY)
Facility: HOSPITAL | Age: 47
Discharge: HOME | End: 2022-10-15
Attending: EMERGENCY MEDICINE
Payer: MEDICAID

## 2022-10-15 VITALS
RESPIRATION RATE: 18 BRPM | OXYGEN SATURATION: 100 % | HEART RATE: 72 BPM | SYSTOLIC BLOOD PRESSURE: 128 MMHG | TEMPERATURE: 98 F | WEIGHT: 160 LBS | BODY MASS INDEX: 27.46 KG/M2 | DIASTOLIC BLOOD PRESSURE: 75 MMHG

## 2022-10-15 DIAGNOSIS — R51.9 INTRACTABLE EPISODIC HEADACHE, UNSPECIFIED HEADACHE TYPE: Primary | ICD-10-CM

## 2022-10-15 LAB
ALBUMIN SERPL BCP-MCNC: 4.2 G/DL (ref 3.5–5.2)
ALP SERPL-CCNC: 70 U/L (ref 55–135)
ALT SERPL W/O P-5'-P-CCNC: 24 U/L (ref 10–44)
ANION GAP SERPL CALC-SCNC: 12 MMOL/L (ref 8–16)
AST SERPL-CCNC: 20 U/L (ref 10–40)
BASOPHILS # BLD AUTO: 0.03 K/UL (ref 0–0.2)
BASOPHILS NFR BLD: 0.3 % (ref 0–1.9)
BILIRUB SERPL-MCNC: 0.3 MG/DL (ref 0.1–1)
BUN SERPL-MCNC: 8 MG/DL (ref 6–20)
CALCIUM SERPL-MCNC: 9 MG/DL (ref 8.7–10.5)
CHLORIDE SERPL-SCNC: 93 MMOL/L (ref 95–110)
CO2 SERPL-SCNC: 25 MMOL/L (ref 23–29)
CREAT SERPL-MCNC: 0.8 MG/DL (ref 0.5–1.4)
DIFFERENTIAL METHOD: ABNORMAL
EOSINOPHIL # BLD AUTO: 0.1 K/UL (ref 0–0.5)
EOSINOPHIL NFR BLD: 0.8 % (ref 0–8)
ERYTHROCYTE [DISTWIDTH] IN BLOOD BY AUTOMATED COUNT: 12.6 % (ref 11.5–14.5)
EST. GFR  (NO RACE VARIABLE): >60 ML/MIN/1.73 M^2
GLUCOSE SERPL-MCNC: 119 MG/DL (ref 70–110)
HCT VFR BLD AUTO: 36.4 % (ref 37–48.5)
HGB BLD-MCNC: 12.3 G/DL (ref 12–16)
IMM GRANULOCYTES # BLD AUTO: 0.06 K/UL (ref 0–0.04)
IMM GRANULOCYTES NFR BLD AUTO: 0.7 % (ref 0–0.5)
LYMPHOCYTES # BLD AUTO: 2.7 K/UL (ref 1–4.8)
LYMPHOCYTES NFR BLD: 31.2 % (ref 18–48)
MAGNESIUM SERPL-MCNC: 2 MG/DL (ref 1.6–2.6)
MCH RBC QN AUTO: 31.1 PG (ref 27–31)
MCHC RBC AUTO-ENTMCNC: 33.8 G/DL (ref 32–36)
MCV RBC AUTO: 92 FL (ref 82–98)
MONOCYTES # BLD AUTO: 0.6 K/UL (ref 0.3–1)
MONOCYTES NFR BLD: 7.1 % (ref 4–15)
NEUTROPHILS # BLD AUTO: 5.2 K/UL (ref 1.8–7.7)
NEUTROPHILS NFR BLD: 59.9 % (ref 38–73)
NRBC BLD-RTO: 0 /100 WBC
PLATELET # BLD AUTO: 244 K/UL (ref 150–450)
PMV BLD AUTO: 10 FL (ref 9.2–12.9)
POTASSIUM SERPL-SCNC: 3.5 MMOL/L (ref 3.5–5.1)
PROT SERPL-MCNC: 7.1 G/DL (ref 6–8.4)
RBC # BLD AUTO: 3.96 M/UL (ref 4–5.4)
SODIUM SERPL-SCNC: 130 MMOL/L (ref 136–145)
WBC # BLD AUTO: 8.71 K/UL (ref 3.9–12.7)

## 2022-10-15 PROCEDURE — 85025 COMPLETE CBC W/AUTO DIFF WBC: CPT

## 2022-10-15 PROCEDURE — 99284 PR EMERGENCY DEPT VISIT,LEVEL IV: ICD-10-PCS | Mod: ,,, | Performed by: EMERGENCY MEDICINE

## 2022-10-15 PROCEDURE — 96374 THER/PROPH/DIAG INJ IV PUSH: CPT

## 2022-10-15 PROCEDURE — 96361 HYDRATE IV INFUSION ADD-ON: CPT

## 2022-10-15 PROCEDURE — 63600175 PHARM REV CODE 636 W HCPCS

## 2022-10-15 PROCEDURE — 99285 EMERGENCY DEPT VISIT HI MDM: CPT | Mod: 25

## 2022-10-15 PROCEDURE — 99284 EMERGENCY DEPT VISIT MOD MDM: CPT | Mod: ,,, | Performed by: EMERGENCY MEDICINE

## 2022-10-15 PROCEDURE — 80053 COMPREHEN METABOLIC PANEL: CPT

## 2022-10-15 PROCEDURE — 83735 ASSAY OF MAGNESIUM: CPT

## 2022-10-15 RX ORDER — PROCHLORPERAZINE MALEATE 5 MG
10 TABLET ORAL
Status: COMPLETED | OUTPATIENT
Start: 2022-10-15 | End: 2022-10-15

## 2022-10-15 RX ORDER — DIPHENHYDRAMINE HYDROCHLORIDE 50 MG/ML
25 INJECTION INTRAMUSCULAR; INTRAVENOUS
Status: COMPLETED | OUTPATIENT
Start: 2022-10-15 | End: 2022-10-15

## 2022-10-15 RX ORDER — BUTALBITAL, ASPIRIN, CAFFEINE AND CODEINE PHOSPHATE 50; 325; 40; 30 MG/1; MG/1; MG/1; MG/1
1 CAPSULE ORAL EVERY 4 HOURS PRN
Qty: 10 CAPSULE | Refills: 0 | Status: ON HOLD | OUTPATIENT
Start: 2022-10-15 | End: 2022-10-19 | Stop reason: HOSPADM

## 2022-10-15 RX ADMIN — PROCHLORPERAZINE MALEATE 10 MG: 5 TABLET ORAL at 09:10

## 2022-10-15 RX ADMIN — SODIUM CHLORIDE, SODIUM LACTATE, POTASSIUM CHLORIDE, AND CALCIUM CHLORIDE 1000 ML: .6; .31; .03; .02 INJECTION, SOLUTION INTRAVENOUS at 09:10

## 2022-10-15 RX ADMIN — DIPHENHYDRAMINE HYDROCHLORIDE 25 MG: 50 INJECTION, SOLUTION INTRAMUSCULAR; INTRAVENOUS at 09:10

## 2022-10-16 NOTE — ED TRIAGE NOTES
Tessa Wynne, a 47 y.o. female presents to the ED w/ complaint of headache    Triage note: Patient presents to ED for headache. Had pituitary tumor taken out on 10/3. Took an oxycodone 5mg around 5pm. Patient headache started around 4pm without relief by medications.   Chief Complaint   Patient presents with    Headache     Occipital HA that began at 1600 today. Pituitary gland tumor removed on 10/13/22. Pt also feels dizzy and nauseous.     Review of patient's allergies indicates:  No Known Allergies  Past Medical History:   Diagnosis Date    Elevated fasting glucose     GERD (gastroesophageal reflux disease)     Hypothyroidism     PAD (peripheral artery disease)     Pituitary lesion 9/7/2022

## 2022-10-16 NOTE — ED PROVIDER NOTES
Encounter Date: 10/15/2022       History     Chief Complaint   Patient presents with    Headache     Occipital HA that began at 1600 today. Pituitary gland tumor removed on 10/13/22. Pt also feels dizzy and nauseous.     47-year-old female with a history of transsphenoidal hypophysectomy on 10/03 who is presenting with worsening headache that started around 4:00 p.m. this evening.  She says that she has had headaches that are similar to this in the past, but this is worse than any of her prior headaches.  She says that she took her oxycodone as prescribed and has not helped with headache.  She also says that she felt something coming down the back of her throat and she spitted out into a cup and brought in with her.  She denies any vision changes, focal weakness or gait instability.    The history is provided by the patient and the spouse. The history is limited by a language barrier. A  was used.   Review of patient's allergies indicates:  No Known Allergies  Past Medical History:   Diagnosis Date    Elevated fasting glucose     GERD (gastroesophageal reflux disease)     Hypothyroidism     PAD (peripheral artery disease)     Pituitary lesion 9/7/2022     Past Surgical History:   Procedure Laterality Date    CHOLECYSTECTOMY      ENDOMETRIAL ABLATION N/A 2/8/2022    Procedure: ABLATION, ENDOMETRIUM;  Surgeon: Ariel Butler MD;  Location: [facility] OR;  Service: OB/GYN;  Laterality: N/A;    EXCISION, NEOPLASM, PITUITARY, TRANSSPHENOIDAL APPROACH, USING COMPUTER-ASSISTED NAVIGATION N/A 10/3/2022    Procedure: EXCISION, NEOPLASM, PITUITARY, TRANSSPHENOIDAL APPROACH, USING COMPUTER-ASSISTED NAVIGATION;  Surgeon: Ed Astorga MD;  Location: [address];  Service: ENT;  Laterality: N/A;    EXCISION, NEOPLASM, PITUITARY, TRANSSPHENOIDAL APPROACH, USING COMPUTER-ASSISTED NAVIGATION N/A 10/3/2022    Procedure: EXCISION, NEOPLASM, PITUITARY, TRANSSPHENOIDAL APPROACH, USING COMPUTER-ASSISTED  NAVIGATION;  Surgeon: Miguel Ángel Ferrell DO;  Location: [address];  Service: Neurosurgery;  Laterality: N/A;    HYSTEROSCOPY WITH DILATION AND CURETTAGE OF UTERUS N/A 2/8/2022    Procedure: HYSTEROSCOPY, WITH DILATION AND CURETTAGE OF UTERUS;  Surgeon: Ariel Butler MD;  Location: [facility] OR;  Service: OB/GYN;  Laterality: N/A;     Family History   Problem Relation Age of Onset    Diabetes Mother     Diabetes Mellitus Mother     Diabetes Father     Diabetes Mellitus Father     No Known Problems Sister     No Known Problems Sister     No Known Problems Brother     No Known Problems Brother     No Known Problems Daughter     No Known Problems Son     Colon cancer Neg Hx     Breast cancer Neg Hx     Ovarian cancer Neg Hx     Uterine cancer Neg Hx     Heart attack Neg Hx      Social History     Tobacco Use    Smoking status: Never    Smokeless tobacco: Never   Substance Use Topics    Alcohol use: No    Drug use: No     Review of Systems   Constitutional:  Negative for chills and fever.   HENT:  Positive for postnasal drip. Negative for sore throat.    Eyes:  Negative for redness and visual disturbance.   Respiratory:  Negative for cough, choking and shortness of breath.    Cardiovascular:  Negative for chest pain and leg swelling.   Gastrointestinal:  Negative for abdominal pain and nausea.   Genitourinary:  Negative for dysuria.   Musculoskeletal:  Negative for back pain, gait problem, neck pain and neck stiffness.   Skin:  Negative for pallor and rash.   Neurological:  Positive for headaches. Negative for dizziness, tremors, seizures and weakness.   Hematological:  Does not bruise/bleed easily.   Psychiatric/Behavioral:  Negative for confusion and dysphoric mood.      Physical Exam     Initial Vitals [10/15/22 2012]   BP Pulse Resp Temp SpO2   (!) 168/79 82 16 97.8 °F (36.6 °C) 99 %      MAP       --         Physical Exam    Nursing note and vitals reviewed.  Constitutional: She appears well-developed and  well-nourished.   HENT:   Head: Normocephalic and atraumatic.   Mouth/Throat: Oropharynx is clear and moist. No oropharyngeal exudate.   Eyes: EOM are normal. Pupils are equal, round, and reactive to light.   Neck: Neck supple.   Normal range of motion.  Cardiovascular:  Normal rate, regular rhythm, S1 normal, normal heart sounds and normal pulses.           Pulmonary/Chest: Breath sounds normal. She has no wheezes. She has no rhonchi. She has no rales.   Abdominal: Abdomen is soft. Bowel sounds are normal. There is no abdominal tenderness. There is no rebound and no guarding.   Musculoskeletal:         General: No tenderness or edema. Normal range of motion.      Cervical back: Normal range of motion and neck supple.     Neurological: She is alert and oriented to person, place, and time. She has normal strength. She displays normal reflexes. No cranial nerve deficit. GCS score is 15. GCS eye subscore is 4. GCS verbal subscore is 5. GCS motor subscore is 6.   Cranial nerves 2-12 grossly intact   Skin: Skin is warm, dry and intact. Capillary refill takes less than 2 seconds.   Psychiatric: She has a normal mood and affect. Her speech is normal and behavior is normal. Judgment and thought content normal. Cognition and memory are normal.       ED Course   Procedures  Labs Reviewed   COMPREHENSIVE METABOLIC PANEL - Abnormal; Notable for the following components:       Result Value    Sodium 130 (*)     Chloride 93 (*)     Glucose 119 (*)     All other components within normal limits   CBC W/ AUTO DIFFERENTIAL - Abnormal; Notable for the following components:    RBC 3.96 (*)     Hematocrit 36.4 (*)     MCH 31.1 (*)     Immature Granulocytes 0.7 (*)     Immature Grans (Abs) 0.06 (*)     All other components within normal limits   MAGNESIUM          Imaging Results              CT Head Without Contrast (Final result)  Result time 10/15/22 22:41:23      Final result by Jose Manuel Alexis MD (10/15/22 22:41:23)                    Impression:      Findings consistent with recent postoperative transsphenoidal sella/suprasellar mass resection, expected postoperative findings are noted, and otherwise the intracranial appearance is stable when compared to the recent prior examination without evidence for superimposed acute intracranial process or significant interval detrimental change.    Paranasal sinus disease as above.      Electronically signed by: Jose Manuel Alexis  Date:    10/15/2022  Time:    22:41               Narrative:    EXAMINATION:  CT HEAD WITHOUT CONTRAST    CLINICAL HISTORY:  Headache, chronic, new features or increased frequency;    TECHNIQUE:  Low dose axial images were obtained through the head.  Coronal and sagittal reformations were also performed. Contrast was not administered.    COMPARISON:  CT examination of the brain without contrast October 8, 2022    FINDINGS:  As on the prior examination there are findings consistent with recent postoperative change of transsphenoidal sella/suprasellar mass resection.  This includes heterogeneous appearance of the sella, which may relate to packing material, as well as opacification of the sphenoid sinus, with appearance of may relate to air density and or packing material, additional paranasal sinus mucosal thickening and air-fluid levels particularly about the ethmoid air cells noted and the inferior right frontal sinus.    There is no additional evidence for acute intracranial process.  There is no additional evidence for intracranial mass, mass effect or midline shift.  There is no evidence for acute intracranial hemorrhage.  There is no hydrocephalus, gray-white differentiation appears appropriate.  There is appearance of low lying cerebellar tonsils appearing stable, the appearance of the CSF spaces at the skull base is appropriate and stable.    The mastoid air cells appear well aerated.  The visualized orbits appear intact.  The osseous structures otherwise appear  intact.                                       Medications   lactated ringers bolus 1,000 mL (1,000 mLs Intravenous New Bag 10/15/22 2148)   prochlorperazine tablet 10 mg (10 mg Oral Given 10/15/22 2147)   diphenhydrAMINE injection 25 mg (25 mg Intravenous Given 10/15/22 2147)     Medical Decision Making:   History:   Old Medical Records: I decided to obtain old medical records.  Old Records Summarized: records from clinic visits and records from previous admission(s).       <> Summary of Records: Prior records reviewed for past medical history and current medications  Initial Assessment:   47-year-old female in mild distress secondary to her headache.  Physical exam is non-focal.  Treated the patient's headache with a migraine headache and her basic labs and a CT scan.    Ddx: ICH, meningitis, sinus infection, CSF leak  Clinical Tests:   Lab Tests: Reviewed  Radiological Study: Reviewed  ED Management:  The patient says that she feels a lot better after receiving IV fluids and medications.  CT scan was stable from her previous scan.  I consulted Neurosurgery, who also looked at the scan and did not find anything concerning.  They said that the patient could try Fioricet and follow up with their clinic outpatient.  I provided the patient with a prescription and discussed reasons to come back to the emergency department and also provided them with the number for Neurosurgery is office and encouraged them to call them with any concerns.  Patient was discharged home in stable condition          Attending Attestation:   Physician Attestation Statement for Resident:  As the supervising MD   Physician Attestation Statement: I have personally seen and examined this patient.   I agree with the above history.  -:   As the supervising MD I agree with the above PE.     As the supervising MD I agree with the above treatment, course, plan, and disposition.                               Clinical Impression:   Final  diagnoses:  [R51.9] Episodic headache, unspecified headache type (Primary)      ED Disposition Condition    Discharge Stable          ED Prescriptions       Medication Sig Dispense Start Date End Date Auth. Provider    codeine-butalbital-ASA-caffeine (BUTALBITAL COMPOUND-CODEINE) 02--40 mg Cap Take 1 capsule by mouth every 4 (four) hours as needed. 10 capsule 10/15/2022 -- Tejas Jurado MD          Follow-up Information       Follow up With Specialties Details Why Contact Info Additional Information    Sabiha Overton MD Family Medicine  As needed [address]  [address]  [facility] [address]  [phone]       [facility] - Neurosurgery [facility] Neurosurgery   [address] [facility] [address] [phone] 8th Floor [facility] [place] Please park in [facility]. Check in desk is located in the lobby. Please take the C elevator to 8th floor which opens to the lobby.             Tejas Jurado MD  Resident  10/15/22 1946       Donna Morataya MD  10/16/22 [id number]

## 2022-10-17 ENCOUNTER — NURSE TRIAGE (OUTPATIENT)
Dept: ADMINISTRATIVE | Facility: CLINIC | Age: 47
End: 2022-10-17
Payer: MEDICAID

## 2022-10-17 ENCOUNTER — HOSPITAL ENCOUNTER (OUTPATIENT)
Facility: HOSPITAL | Age: 47
Discharge: HOME | DRG: 644 | End: 2022-10-19
Attending: EMERGENCY MEDICINE | Admitting: PSYCHIATRY & NEUROLOGY
Payer: MEDICAID

## 2022-10-17 ENCOUNTER — TELEPHONE (OUTPATIENT)
Dept: ENDOCRINOLOGY | Facility: HOSPITAL | Age: 47
End: 2022-10-17
Payer: MEDICAID

## 2022-10-17 DIAGNOSIS — E23.2 DIABETES INSIPIDUS: Primary | ICD-10-CM

## 2022-10-17 DIAGNOSIS — R07.9 CHEST PAIN: ICD-10-CM

## 2022-10-17 DIAGNOSIS — E87.1 HYPONATREMIA: ICD-10-CM

## 2022-10-17 DIAGNOSIS — G43.009 MIGRAINE WITHOUT AURA AND WITHOUT STATUS MIGRAINOSUS, NOT INTRACTABLE: ICD-10-CM

## 2022-10-17 DIAGNOSIS — R11.2 NAUSEA AND VOMITING: ICD-10-CM

## 2022-10-17 DIAGNOSIS — E87.1 ACUTE HYPONATREMIA: Primary | ICD-10-CM

## 2022-10-17 PROCEDURE — 96361 HYDRATE IV INFUSION ADD-ON: CPT

## 2022-10-17 PROCEDURE — 94761 N-INVAS EAR/PLS OXIMETRY MLT: CPT

## 2022-10-17 PROCEDURE — 99285 PR EMERGENCY DEPT VISIT,LEVEL V: ICD-10-PCS | Mod: ,,, | Performed by: EMERGENCY MEDICINE

## 2022-10-17 PROCEDURE — 12000002 HC ACUTE/MED SURGE SEMI-PRIVATE ROOM

## 2022-10-17 PROCEDURE — 96374 THER/PROPH/DIAG INJ IV PUSH: CPT

## 2022-10-17 PROCEDURE — 87502 INFLUENZA DNA AMP PROBE: CPT | Mod: 59

## 2022-10-17 PROCEDURE — G0378 HOSPITAL OBSERVATION PER HR: HCPCS

## 2022-10-17 PROCEDURE — 99285 EMERGENCY DEPT VISIT HI MDM: CPT | Mod: ,,, | Performed by: EMERGENCY MEDICINE

## 2022-10-17 PROCEDURE — 99285 EMERGENCY DEPT VISIT HI MDM: CPT | Mod: 25

## 2022-10-17 RX ORDER — DESMOPRESSIN ACETATE 0.1 MG/1
TABLET ORAL
Qty: 60 TABLET | Refills: 11 | Status: ON HOLD | OUTPATIENT
Start: 2022-10-17 | End: 2022-10-19 | Stop reason: HOSPADM

## 2022-10-17 RX ORDER — PROCHLORPERAZINE EDISYLATE 5 MG/ML
2.5 INJECTION INTRAMUSCULAR; INTRAVENOUS
Status: COMPLETED | OUTPATIENT
Start: 2022-10-18 | End: 2022-10-18

## 2022-10-17 NOTE — TELEPHONE ENCOUNTER
----- Message from Yunior Carey MA sent at 10/17/2022  4:41 PM CDT -----  Regarding: FW: Frequent Urination  Contact: [phone]    ----- Message -----  From: Trang Flor  Sent: 10/17/2022  12:52 PM CDT  To: L[facility] Mar[facility] Staff  Subject: Frequent Urination                               Pt is calling stating that DR. Batista wanted her to drink 1 liter of water a day and she is now using the restroom every hour. She was told to call the office if she was using the restroom excessively. Please call to discuss further [phone]

## 2022-10-17 NOTE — TELEPHONE ENCOUNTER
Dr. Bergeron and I discussed with patient:    She reports hourly urination overnight that has slowed down today.  It was clear last night but now with light yellow urine.  She is also having some dysuria so will need to be evaluated for UTI.      She was advised to discontinue fluid restriction and start ddavp 100 mcg as need for polyuria.  She was advised drink to thirst and take ddavp 1 tablet as needed up to 2 times a day if urinating large volumes hourly for more than 2 hours.      Will check BMP for Na tomorrow with UA.     1. Diabetes insipidus  [facility]  - desmopressin (DDAVP) 0.1 MG Tab; Take one tablet up to 2 times a day for increased urination.  Dispense: 60 tablet; Refill: 11  - Basic Metabolic Panel; Future  - Urinalysis; Future      Jimmy Batista MD

## 2022-10-18 PROBLEM — E27.40 ADRENAL INSUFFICIENCY: Status: ACTIVE | Noted: 2022-10-18

## 2022-10-18 PROBLEM — R11.14 BILIOUS VOMITING WITH NAUSEA: Status: ACTIVE | Noted: 2022-10-18

## 2022-10-18 LAB
ABO + RH BLD: NORMAL
ACTH PLAS-MCNC: 11 PG/ML (ref 0–46)
ALBUMIN SERPL BCP-MCNC: 3.6 G/DL (ref 3.5–5.2)
ALBUMIN SERPL BCP-MCNC: 3.8 G/DL (ref 3.5–5.2)
ALP SERPL-CCNC: 59 U/L (ref 55–135)
ALP SERPL-CCNC: 62 U/L (ref 55–135)
ALT SERPL W/O P-5'-P-CCNC: 18 U/L (ref 10–44)
ALT SERPL W/O P-5'-P-CCNC: 20 U/L (ref 10–44)
AMYLASE SERPL-CCNC: 94 U/L (ref 20–110)
ANION GAP SERPL CALC-SCNC: 11 MMOL/L (ref 8–16)
ANION GAP SERPL CALC-SCNC: 6 MMOL/L (ref 8–16)
ANION GAP SERPL CALC-SCNC: 8 MMOL/L (ref 8–16)
AST SERPL-CCNC: 19 U/L (ref 10–40)
AST SERPL-CCNC: 22 U/L (ref 10–40)
B-HCG UR QL: NEGATIVE
BACTERIA #/AREA URNS AUTO: NORMAL /HPF
BASOPHILS # BLD AUTO: 0.02 K/UL (ref 0–0.2)
BASOPHILS NFR BLD: 0.3 % (ref 0–1.9)
BILIRUB SERPL-MCNC: 0.3 MG/DL (ref 0.1–1)
BILIRUB SERPL-MCNC: 0.5 MG/DL (ref 0.1–1)
BILIRUB UR QL STRIP: NEGATIVE
BLD GP AB SCN CELLS X3 SERPL QL: NORMAL
BNP SERPL-MCNC: 22 PG/ML (ref 0–99)
BUN SERPL-MCNC: 7 MG/DL (ref 6–20)
BUN SERPL-MCNC: 9 MG/DL (ref 6–20)
BUN SERPL-MCNC: 9 MG/DL (ref 6–20)
CALCIUM SERPL-MCNC: 8.5 MG/DL (ref 8.7–10.5)
CALCIUM SERPL-MCNC: 9 MG/DL (ref 8.7–10.5)
CALCIUM SERPL-MCNC: 9.1 MG/DL (ref 8.7–10.5)
CHLORIDE SERPL-SCNC: 102 MMOL/L (ref 95–110)
CHLORIDE SERPL-SCNC: 105 MMOL/L (ref 95–110)
CHLORIDE SERPL-SCNC: 90 MMOL/L (ref 95–110)
CLARITY UR REFRACT.AUTO: CLEAR
CO2 SERPL-SCNC: 22 MMOL/L (ref 23–29)
CO2 SERPL-SCNC: 23 MMOL/L (ref 23–29)
CO2 SERPL-SCNC: 26 MMOL/L (ref 23–29)
COLOR UR AUTO: ABNORMAL
CORTIS SERPL-MCNC: 4 UG/DL (ref 4.3–22.4)
CREAT SERPL-MCNC: 0.7 MG/DL (ref 0.5–1.4)
CREAT SERPL-MCNC: 0.8 MG/DL (ref 0.5–1.4)
CREAT SERPL-MCNC: 0.9 MG/DL (ref 0.5–1.4)
CTP QC/QA: YES
DIFFERENTIAL METHOD: ABNORMAL
EOSINOPHIL # BLD AUTO: 0.1 K/UL (ref 0–0.5)
EOSINOPHIL NFR BLD: 1.2 % (ref 0–8)
ERYTHROCYTE [DISTWIDTH] IN BLOOD BY AUTOMATED COUNT: 12.6 % (ref 11.5–14.5)
EST. GFR  (NO RACE VARIABLE): >60 ML/MIN/1.73 M^2
GLUCOSE SERPL-MCNC: 120 MG/DL (ref 70–110)
GLUCOSE SERPL-MCNC: 87 MG/DL (ref 70–110)
GLUCOSE SERPL-MCNC: 95 MG/DL (ref 70–110)
GLUCOSE UR QL STRIP: NEGATIVE
HCT VFR BLD AUTO: 33.7 % (ref 37–48.5)
HGB BLD-MCNC: 11.7 G/DL (ref 12–16)
HGB UR QL STRIP: NEGATIVE
IMM GRANULOCYTES # BLD AUTO: 0.03 K/UL (ref 0–0.04)
IMM GRANULOCYTES NFR BLD AUTO: 0.4 % (ref 0–0.5)
INFLUENZA A, MOLECULAR: NEGATIVE
INFLUENZA B, MOLECULAR: NEGATIVE
KETONES UR QL STRIP: NEGATIVE
LEUKOCYTE ESTERASE UR QL STRIP: ABNORMAL
LIPASE SERPL-CCNC: 17 U/L (ref 4–60)
LYMPHOCYTES # BLD AUTO: 2.2 K/UL (ref 1–4.8)
LYMPHOCYTES NFR BLD: 28.3 % (ref 18–48)
MCH RBC QN AUTO: 31.1 PG (ref 27–31)
MCHC RBC AUTO-ENTMCNC: 34.7 G/DL (ref 32–36)
MCV RBC AUTO: 90 FL (ref 82–98)
MICROSCOPIC COMMENT: NORMAL
MONOCYTES # BLD AUTO: 0.6 K/UL (ref 0.3–1)
MONOCYTES NFR BLD: 8.2 % (ref 4–15)
NEUTROPHILS # BLD AUTO: 4.7 K/UL (ref 1.8–7.7)
NEUTROPHILS NFR BLD: 61.6 % (ref 38–73)
NITRITE UR QL STRIP: NEGATIVE
NRBC BLD-RTO: 0 /100 WBC
OSMOLALITY SERPL: 281 MOSM/KG (ref 275–295)
OSMOLALITY SERPL: 293 MOSM/KG (ref 275–295)
OSMOLALITY UR: 251 MOSM/KG (ref 50–1200)
PH UR STRIP: 7 [PH] (ref 5–8)
PLATELET # BLD AUTO: 220 K/UL (ref 150–450)
PMV BLD AUTO: 10.3 FL (ref 9.2–12.9)
POTASSIUM SERPL-SCNC: 4 MMOL/L (ref 3.5–5.1)
POTASSIUM SERPL-SCNC: 4 MMOL/L (ref 3.5–5.1)
POTASSIUM SERPL-SCNC: 4.2 MMOL/L (ref 3.5–5.1)
PROT SERPL-MCNC: 6.3 G/DL (ref 6–8.4)
PROT SERPL-MCNC: 6.6 G/DL (ref 6–8.4)
PROT UR QL STRIP: NEGATIVE
RBC # BLD AUTO: 3.76 M/UL (ref 4–5.4)
RBC #/AREA URNS AUTO: 2 /HPF (ref 0–4)
SARS-COV-2 RDRP RESP QL NAA+PROBE: NEGATIVE
SODIUM SERPL-SCNC: 123 MMOL/L (ref 136–145)
SODIUM SERPL-SCNC: 133 MMOL/L (ref 136–145)
SODIUM SERPL-SCNC: 137 MMOL/L (ref 136–145)
SODIUM UR-SCNC: 68 MMOL/L (ref 20–250)
SP GR UR STRIP: 1.01 (ref 1–1.03)
SPECIMEN SOURCE: NORMAL
SQUAMOUS #/AREA URNS AUTO: 1 /HPF
T4 FREE SERPL-MCNC: 0.85 NG/DL (ref 0.71–1.51)
TROPONIN I SERPL DL<=0.01 NG/ML-MCNC: 0.01 NG/ML (ref 0–0.03)
TSH SERPL DL<=0.005 MIU/L-ACNC: 2.97 UIU/ML (ref 0.4–4)
URN SPEC COLLECT METH UR: ABNORMAL
WBC # BLD AUTO: 7.69 K/UL (ref 3.9–12.7)
WBC #/AREA URNS AUTO: 3 /HPF (ref 0–5)

## 2022-10-18 PROCEDURE — 82533 TOTAL CORTISOL: CPT

## 2022-10-18 PROCEDURE — 85025 COMPLETE CBC W/AUTO DIFF WBC: CPT

## 2022-10-18 PROCEDURE — 96365 THER/PROPH/DIAG IV INF INIT: CPT

## 2022-10-18 PROCEDURE — 92610 EVALUATE SWALLOWING FUNCTION: CPT

## 2022-10-18 PROCEDURE — 63600175 PHARM REV CODE 636 W HCPCS

## 2022-10-18 PROCEDURE — 96361 HYDRATE IV INFUSION ADD-ON: CPT

## 2022-10-18 PROCEDURE — 93010 EKG 12-LEAD: ICD-10-PCS | Mod: ,,, | Performed by: INTERNAL MEDICINE

## 2022-10-18 PROCEDURE — 25000003 PHARM REV CODE 250: Performed by: PSYCHIATRY & NEUROLOGY

## 2022-10-18 PROCEDURE — 82150 ASSAY OF AMYLASE: CPT

## 2022-10-18 PROCEDURE — 93010 ELECTROCARDIOGRAM REPORT: CPT | Mod: ,,, | Performed by: INTERNAL MEDICINE

## 2022-10-18 PROCEDURE — 99223 PR INITIAL HOSPITAL CARE,LEVL III: ICD-10-PCS | Mod: ,,, | Performed by: PSYCHIATRY & NEUROLOGY

## 2022-10-18 PROCEDURE — 80048 BASIC METABOLIC PNL TOTAL CA: CPT | Mod: XB | Performed by: STUDENT IN AN ORGANIZED HEALTH CARE EDUCATION/TRAINING PROGRAM

## 2022-10-18 PROCEDURE — 63600175 PHARM REV CODE 636 W HCPCS: Performed by: INTERNAL MEDICINE

## 2022-10-18 PROCEDURE — 63600175 PHARM REV CODE 636 W HCPCS: Performed by: PSYCHIATRY & NEUROLOGY

## 2022-10-18 PROCEDURE — 96375 TX/PRO/DX INJ NEW DRUG ADDON: CPT

## 2022-10-18 PROCEDURE — U0002 COVID-19 LAB TEST NON-CDC: HCPCS

## 2022-10-18 PROCEDURE — 99223 PR INITIAL HOSPITAL CARE,LEVL III: ICD-10-PCS | Mod: ,,, | Performed by: INTERNAL MEDICINE

## 2022-10-18 PROCEDURE — 87502 INFLUENZA DNA AMP PROBE: CPT

## 2022-10-18 PROCEDURE — 82024 ASSAY OF ACTH: CPT

## 2022-10-18 PROCEDURE — 81025 URINE PREGNANCY TEST: CPT

## 2022-10-18 PROCEDURE — 84443 ASSAY THYROID STIM HORMONE: CPT

## 2022-10-18 PROCEDURE — 99223 1ST HOSP IP/OBS HIGH 75: CPT | Mod: ,,, | Performed by: INTERNAL MEDICINE

## 2022-10-18 PROCEDURE — 99223 1ST HOSP IP/OBS HIGH 75: CPT | Mod: ,,, | Performed by: PSYCHIATRY & NEUROLOGY

## 2022-10-18 PROCEDURE — 84300 ASSAY OF URINE SODIUM: CPT

## 2022-10-18 PROCEDURE — 84588 ASSAY OF VASOPRESSIN: CPT

## 2022-10-18 PROCEDURE — 97165 OT EVAL LOW COMPLEX 30 MIN: CPT

## 2022-10-18 PROCEDURE — 96366 THER/PROPH/DIAG IV INF ADDON: CPT

## 2022-10-18 PROCEDURE — 80053 COMPREHEN METABOLIC PANEL: CPT

## 2022-10-18 PROCEDURE — 99222 1ST HOSP IP/OBS MODERATE 55: CPT | Mod: 24,,, | Performed by: NEUROLOGICAL SURGERY

## 2022-10-18 PROCEDURE — 94761 N-INVAS EAR/PLS OXIMETRY MLT: CPT

## 2022-10-18 PROCEDURE — 25000003 PHARM REV CODE 250

## 2022-10-18 PROCEDURE — 63600175 PHARM REV CODE 636 W HCPCS: Performed by: NURSE PRACTITIONER

## 2022-10-18 PROCEDURE — 86901 BLOOD TYPING SEROLOGIC RH(D): CPT

## 2022-10-18 PROCEDURE — 81001 URINALYSIS AUTO W/SCOPE: CPT

## 2022-10-18 PROCEDURE — G0378 HOSPITAL OBSERVATION PER HR: HCPCS

## 2022-10-18 PROCEDURE — 84484 ASSAY OF TROPONIN QUANT: CPT

## 2022-10-18 PROCEDURE — 99222 PR INITIAL HOSPITAL CARE,LEVL II: ICD-10-PCS | Mod: 24,,, | Performed by: NEUROLOGICAL SURGERY

## 2022-10-18 PROCEDURE — 80053 COMPREHEN METABOLIC PANEL: CPT | Mod: 91

## 2022-10-18 PROCEDURE — 93005 ELECTROCARDIOGRAM TRACING: CPT

## 2022-10-18 PROCEDURE — 84439 ASSAY OF FREE THYROXINE: CPT

## 2022-10-18 PROCEDURE — 83880 ASSAY OF NATRIURETIC PEPTIDE: CPT

## 2022-10-18 PROCEDURE — 97161 PT EVAL LOW COMPLEX 20 MIN: CPT

## 2022-10-18 PROCEDURE — 83935 ASSAY OF URINE OSMOLALITY: CPT

## 2022-10-18 PROCEDURE — 97116 GAIT TRAINING THERAPY: CPT

## 2022-10-18 PROCEDURE — 83690 ASSAY OF LIPASE: CPT

## 2022-10-18 PROCEDURE — 83930 ASSAY OF BLOOD OSMOLALITY: CPT

## 2022-10-18 PROCEDURE — 83930 ASSAY OF BLOOD OSMOLALITY: CPT | Mod: 91 | Performed by: STUDENT IN AN ORGANIZED HEALTH CARE EDUCATION/TRAINING PROGRAM

## 2022-10-18 PROCEDURE — 96372 THER/PROPH/DIAG INJ SC/IM: CPT | Mod: 59

## 2022-10-18 PROCEDURE — 20000000 HC ICU ROOM

## 2022-10-18 RX ORDER — LEVOTHYROXINE SODIUM 50 UG/1
50 TABLET ORAL
Status: DISCONTINUED | OUTPATIENT
Start: 2022-10-18 | End: 2022-10-19 | Stop reason: HOSPADM

## 2022-10-18 RX ORDER — HEPARIN SODIUM 5000 [USP'U]/ML
5000 INJECTION, SOLUTION INTRAVENOUS; SUBCUTANEOUS EVERY 8 HOURS
Status: DISCONTINUED | OUTPATIENT
Start: 2022-10-18 | End: 2022-10-19 | Stop reason: HOSPADM

## 2022-10-18 RX ORDER — SODIUM,POTASSIUM PHOSPHATES 280-250MG
2 POWDER IN PACKET (EA) ORAL
Status: DISCONTINUED | OUTPATIENT
Start: 2022-10-18 | End: 2022-10-19

## 2022-10-18 RX ORDER — MAGNESIUM SULFATE HEPTAHYDRATE 40 MG/ML
2 INJECTION, SOLUTION INTRAVENOUS ONCE
Status: COMPLETED | OUTPATIENT
Start: 2022-10-18 | End: 2022-10-18

## 2022-10-18 RX ORDER — ALUMINUM HYDROXIDE, MAGNESIUM HYDROXIDE, AND SIMETHICONE 2400; 240; 2400 MG/30ML; MG/30ML; MG/30ML
30 SUSPENSION ORAL EVERY 6 HOURS PRN
Status: DISCONTINUED | OUTPATIENT
Start: 2022-10-18 | End: 2022-10-19 | Stop reason: HOSPADM

## 2022-10-18 RX ORDER — SODIUM CHLORIDE 0.9 % (FLUSH) 0.9 %
10 SYRINGE (ML) INJECTION
Status: DISCONTINUED | OUTPATIENT
Start: 2022-10-18 | End: 2022-10-19

## 2022-10-18 RX ORDER — DIPHENHYDRAMINE HYDROCHLORIDE 50 MG/ML
25 INJECTION INTRAMUSCULAR; INTRAVENOUS ONCE
Status: COMPLETED | OUTPATIENT
Start: 2022-10-18 | End: 2022-10-18

## 2022-10-18 RX ORDER — POLYETHYLENE GLYCOL 3350 17 G/17G
17 POWDER, FOR SOLUTION ORAL DAILY
Status: DISCONTINUED | OUTPATIENT
Start: 2022-10-18 | End: 2022-10-19 | Stop reason: HOSPADM

## 2022-10-18 RX ORDER — ACETAMINOPHEN 10 MG/ML
1000 INJECTION, SOLUTION INTRAVENOUS ONCE
Status: COMPLETED | OUTPATIENT
Start: 2022-10-18 | End: 2022-10-18

## 2022-10-18 RX ORDER — OXYCODONE HYDROCHLORIDE 5 MG/1
5 TABLET ORAL EVERY 6 HOURS PRN
Status: DISCONTINUED | OUTPATIENT
Start: 2022-10-18 | End: 2022-10-19 | Stop reason: HOSPADM

## 2022-10-18 RX ORDER — LANOLIN ALCOHOL/MO/W.PET/CERES
800 CREAM (GRAM) TOPICAL
Status: DISCONTINUED | OUTPATIENT
Start: 2022-10-18 | End: 2022-10-19

## 2022-10-18 RX ORDER — PANTOPRAZOLE SODIUM 40 MG/1
40 TABLET, DELAYED RELEASE ORAL DAILY
Status: DISCONTINUED | OUTPATIENT
Start: 2022-10-18 | End: 2022-10-19 | Stop reason: HOSPADM

## 2022-10-18 RX ORDER — ONDANSETRON 2 MG/ML
8 INJECTION INTRAMUSCULAR; INTRAVENOUS EVERY 8 HOURS PRN
Status: DISCONTINUED | OUTPATIENT
Start: 2022-10-18 | End: 2022-10-19 | Stop reason: HOSPADM

## 2022-10-18 RX ORDER — ACETAMINOPHEN 325 MG/1
650 TABLET ORAL EVERY 6 HOURS PRN
Status: DISCONTINUED | OUTPATIENT
Start: 2022-10-18 | End: 2022-10-19 | Stop reason: HOSPADM

## 2022-10-18 RX ADMIN — LEVOTHYROXINE SODIUM 50 MCG: 50 TABLET ORAL at 07:10

## 2022-10-18 RX ADMIN — SODIUM CHLORIDE 1000 ML: 0.9 INJECTION, SOLUTION INTRAVENOUS at 12:10

## 2022-10-18 RX ADMIN — ACETAMINOPHEN 1000 MG: 10 INJECTION INTRAVENOUS at 12:10

## 2022-10-18 RX ADMIN — ACETAMINOPHEN 650 MG: 325 TABLET ORAL at 11:10

## 2022-10-18 RX ADMIN — MAGNESIUM SULFATE 2 G: 2 INJECTION INTRAVENOUS at 12:10

## 2022-10-18 RX ADMIN — PROCHLORPERAZINE EDISYLATE 2.5 MG: 5 INJECTION INTRAMUSCULAR; INTRAVENOUS at 12:10

## 2022-10-18 RX ADMIN — DIPHENHYDRAMINE HYDROCHLORIDE 25 MG: 50 INJECTION, SOLUTION INTRAMUSCULAR; INTRAVENOUS at 11:10

## 2022-10-18 RX ADMIN — HYDROCORTISONE SODIUM SUCCINATE 100 MG: 100 INJECTION, POWDER, FOR SOLUTION INTRAMUSCULAR; INTRAVENOUS at 02:10

## 2022-10-18 RX ADMIN — HEPARIN SODIUM 5000 UNITS: 5000 INJECTION INTRAVENOUS; SUBCUTANEOUS at 02:10

## 2022-10-18 RX ADMIN — HEPARIN SODIUM 5000 UNITS: 5000 INJECTION INTRAVENOUS; SUBCUTANEOUS at 10:10

## 2022-10-18 RX ADMIN — SODIUM CHLORIDE 500 ML: 0.9 INJECTION, SOLUTION INTRAVENOUS at 11:10

## 2022-10-18 RX ADMIN — PANTOPRAZOLE SODIUM 40 MG: 40 TABLET, DELAYED RELEASE ORAL at 11:10

## 2022-10-18 NOTE — H&P
[facility] - Emergency Dept  Neurocritical Care  History & Physical    Admit Date: 10/17/2022  Service Date: 10/18/2022  Length of Stay: 0    Subjective:     Chief Complaint: Hyponatremia    History of Present Illness: Pt is a 47 y.o. 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 Of note, she has presented a couple times in the past 2 weeks with similar symptoms. She has felt general malaise and weakness and overall feels run down. She denies any focal neurologic symptoms, fever, syncope, or seizures. In the ED MRI brain with no acute findings and relatively stable compared to post op scan. Lab work notable for Na of 123 and patient given 1 L NS bolus. She will be admitted to [facility] for close neuro monitoring while raising serum Na.       Past Medical History:   Diagnosis Date    Elevated fasting glucose     GERD (gastroesophageal reflux disease)     Hypothyroidism     PAD (peripheral artery disease)     Pituitary lesion 9/7/2022     Past Surgical History:   Procedure Laterality Date    CHOLECYSTECTOMY      ENDOMETRIAL ABLATION N/A 2/8/2022    Procedure: ABLATION, ENDOMETRIUM;  Surgeon: Ariel Butler MD;  Location: [facility] OR;  Service: OB/GYN;  Laterality: N/A;    EXCISION, NEOPLASM, PITUITARY, TRANSSPHENOIDAL APPROACH, USING COMPUTER-ASSISTED NAVIGATION N/A 10/3/2022    Procedure: EXCISION, NEOPLASM, PITUITARY, TRANSSPHENOIDAL APPROACH, USING COMPUTER-ASSISTED NAVIGATION;  Surgeon: Ed Astorga MD;  Location: [facility] OR [address] [place] [address];  Service: ENT;  Laterality: N/A;    EXCISION, NEOPLASM, PITUITARY, TRANSSPHENOIDAL APPROACH, USING COMPUTER-ASSISTED NAVIGATION N/A 10/3/2022    Procedure: EXCISION, NEOPLASM, PITUITARY, TRANSSPHENOIDAL APPROACH, USING COMPUTER-ASSISTED NAVIGATION;  Surgeon: Miguel Ángel Ferrell DO;  Location: [facility] OR [address] [place] [address];  Service: Neurosurgery;  Laterality: N/A;    HYSTEROSCOPY WITH  DILATION AND CURETTAGE OF UTERUS N/A 2/8/2022    Procedure: HYSTEROSCOPY, WITH DILATION AND CURETTAGE OF UTERUS;  Surgeon: Ariel Butler MD;  Location: [facility];  Service: OB/GYN;  Laterality: N/A;      No current facility-administered medications on file prior to encounter.     Current Outpatient Medications on File Prior to Encounter   Medication Sig Dispense Refill    calcium citrate/vitamin D3 (CITRACAL REGULAR ORAL) Take by mouth.      codeine-butalbital-ASA-caffeine (BUTALBITAL COMPOUND-CODEINE) 96--40 mg Cap Take 1 capsule by mouth every 4 (four) hours as needed. 10 capsule 0    desmopressin (DDAVP) 0.1 MG Tab Take one tablet up to 2 times a day for increased urination. 60 tablet 11    ferrous sulfate (IRON ORAL) Take by mouth.      gabapentin (NEURONTIN) 100 MG capsule Take 1 capsule (100 mg total) by mouth 3 (three) times daily. for 14 days 42 capsule 0    levothyroxine (SYNTHROID) 50 MCG tablet Take 1 tablet (50 mcg total) by mouth before breakfast. 30 tablet 11    LIDOcaine (LIDODERM) 5 % Place 1 patch onto the skin daily as needed (pain). Remove & Discard patch within 12 hours or as directed by MD 15 patch 0    mv,calcium,min/iron/folic/vitK (ONE-A-DAY WOMEN'S COMPLETE ORAL) Take by mouth.      omeprazole (PRILOSEC) 40 MG capsule Take 1 capsule (40 mg total) by mouth every morning. 30 capsule 11    oxyCODONE (ROXICODONE) 5 MG immediate release tablet Take 1 tablet (5 mg total) by mouth every 6 (six) hours as needed for Pain (take as needed for pain/headaches). 10 tablet 0    oxymetazoline (AFRIN) 0.05 % nasal spray 3 sprays by Nasal route every 2 (two) hours as needed (epistaxis). 30 mL 0    sodium chloride (OCEAN) 0.65 % nasal spray 2 sprays by Nasal route every 4 (four) hours while awake. 44 mL 0      Allergies: Patient has no known allergies.    Family History   Problem Relation Age of Onset    Diabetes Mother     Diabetes Mellitus Mother     Diabetes Father     Diabetes  Mellitus Father     No Known Problems Sister     No Known Problems Sister     No Known Problems Brother     No Known Problems Brother     No Known Problems Daughter     No Known Problems Son     Colon cancer Neg Hx     Breast cancer Neg Hx     Ovarian cancer Neg Hx     Uterine cancer Neg Hx     Heart attack Neg Hx      Social History     Tobacco Use    Smoking status: Never    Smokeless tobacco: Never   Substance Use Topics    Alcohol use: No    Drug use: No     Review of Systems   Constitutional:  Positive for fatigue. Negative for chills and fever.   HENT:  Positive for ear pain.    Eyes:  Negative for photophobia.   Respiratory:  Negative for cough and wheezing.    Cardiovascular:  Negative for palpitations and leg swelling.   Gastrointestinal:  Positive for nausea and vomiting.   Endocrine: Positive for polyuria.   Genitourinary:  Negative for difficulty urinating and dysuria.   Musculoskeletal:  Negative for back pain and neck pain.   Skin:  Negative for rash.   Neurological:  Positive for headaches. Negative for seizures and weakness.   Psychiatric/Behavioral:  Negative for agitation, behavioral problems and confusion.    Objective:     Vitals:    Temp: 97.7 °F (36.5 °C)  Pulse: 62  BP: (!) 142/67  MAP (mmHg): 97  Resp: 18  SpO2: 100 %  O2 Device (Oxygen Therapy): room air    Temp  Min: 97.5 °F (36.4 °C)  Max: 98.4 °F (36.9 °C)  Pulse  Min: 56  Max: 69  BP  Min: 133/65  Max: 149/80  MAP (mmHg)  Min: 96  Max: 97  Resp  Min: 15  Max: 18  SpO2  Min: 98 %  Max: 100 %    10/17 0701 - 10/18 0700  In: 1000   Out: -            Physical Exam  Vitals and nursing note reviewed.   Constitutional:       General: She is not in acute distress.     Appearance: Normal appearance. She is normal weight.   HENT:      Head: Normocephalic and atraumatic.      Mouth/Throat:      Mouth: Mucous membranes are moist.   Eyes:      Extraocular Movements: Extraocular movements intact.      Conjunctiva/sclera: Conjunctivae  normal.      Pupils: Pupils are equal, round, and reactive to light.   Cardiovascular:      Rate and Rhythm: Regular rhythm. Bradycardia present.      Pulses: Normal pulses.   Pulmonary:      Effort: Pulmonary effort is normal. No respiratory distress.   Abdominal:      General: Abdomen is flat. There is no distension.      Palpations: Abdomen is soft.      Tenderness: There is no abdominal tenderness. There is no guarding.   Musculoskeletal:         General: No swelling or deformity. Normal range of motion.   Skin:     General: Skin is warm and dry.   Neurological:      General: No focal deficit present.      Mental Status: She is alert and oriented to person, place, and time. Mental status is at baseline.      Cranial Nerves: No cranial nerve deficit.      Sensory: No sensory deficit.      Motor: No weakness.   --sedation:none  --GCS:  15  --Mental Status: AAO x 4  --CN II-XII grossly intact.   --PERRL, EOMI  --Motor: ROWE with full 5/5 strength   --sensory: SILT    Psychiatric:         Mood and Affect: Mood normal.         Behavior: Behavior normal.         Today I personally reviewed pertinent medications, lines/drains/airways, imaging, cardiology results, laboratory results, microbiology results, notably:    MRI brain       Assessment/Plan:     Renal/  * Hyponatremia  Na 123 on admit with normal urine Na, urine osmol and spec gravity, unclear exact hyponatremia etiology, possible hypovolemic hyponatremia given N/V, possible iatrogenic from DDAVP use outpatient  -admit to [facility]   -NSGY following   -consult endocrine   -repeat Na pending post NS bolus in ED   -will eval if NS improves Na before doing FW restriction  -goal ~130, not over   -hold home DDAVP   -close neuro monitoring while correcting Na   -full hyponatremia workup pending      Endocrine  S/P transsphenoidal selective adenomectomy  See hyponatremia   -continue endocrine meds/labs per endocrine    Hypothyroidism  Continue synthroid    GI  Bilious  vomiting with nausea  Emesis workup   -KUB pending   -LFTs wnl   -amylase, lipase pending   -afebrile and normal WBC   -prn zofran         The patient is being Prophylaxed for:  Venous Thromboembolism with: Mechanical  Stress Ulcer with: Not Applicable   Ventilator Pneumonia with: not applicable    Activity Orders          Elevate HOB starting at 10/18 0546    Diet NPO: NPO starting at 10/18 0546        Full Code    April Naqvi PA-C  Neurocritical Care  [facility] - Emergency Dept

## 2022-10-18 NOTE — RESIDENT HANDOFF
Neuro Critical Care Transfer of Care note    Date of Admit: 10/17/2022  Date of Transfer / Stepdown: 10/18/2022    Brief History of Present Illness:      Tessa Wynne is a 47 y.o. female who  has a past medical history of Elevated fasting glucose, GERD (gastroesophageal reflux disease), Hypothyroidism, PAD (peripheral artery disease), and Pituitary lesion (9/7/2022).. The patient presented to [facility] on 10/17/2022 with a primary complaint of Multiple Complaints (Headache, nausea and vomiting. Reports surgical removal of tumor in brain on the 3rd with multiple hospital visits  since. Neuro intact in triage. Aox4, GCS 15.)      Hospital Course By Problem with Pertinent Findings:     1. Hyponatremia       -now 133  -consult endocrine   -hold home med DDAVP   -close neuro monitoring while correcting Na   -was given NS with improvement in Na (most likely hypovolemic)  -urine studies completed    2. Hypothyroidism  Continue home synthroid    3. GERD  Continue home pantoprazole  Added Maalox for current symptoms    4. N/v  PRN zofran, recommend sending pt rx outpatient to avoid recurrent trips to ED    5. Headache  Avoiding oxycodone  Inpatient (given all together, up to 2x/day):    -500 cc NS   -2g IV magnesium   -1g Tylenol   -25 mg IV benadryl    Outpatient (given all together, up to 2x/day):   -oral magnesium   -oral tylenol 1g   -oral benadryl 25mg        Consultants and Procedures:     Consultants:  endocrine    Procedures:    none    Transfer Information:     Diet:  regular    Physical Activity:  As tolerated    To Do / Pending Studies / Follow ups:  -endocrine f/u regarding management      Kiersten Greer  Neuro Crtical Care

## 2022-10-18 NOTE — SUBJECTIVE & OBJECTIVE
(Not in a hospital admission)      Review of patient's allergies indicates:  No Known Allergies    Past Medical History:   Diagnosis Date    Elevated fasting glucose     GERD (gastroesophageal reflux disease)     Hypothyroidism     PAD (peripheral artery disease)     Pituitary lesion 9/7/2022     Past Surgical History:   Procedure Laterality Date    CHOLECYSTECTOMY      ENDOMETRIAL ABLATION N/A 2/8/2022    Procedure: ABLATION, ENDOMETRIUM;  Surgeon: Ariel Butler MD;  Location: [facility];  Service: OB/GYN;  Laterality: N/A;    EXCISION, NEOPLASM, PITUITARY, TRANSSPHENOIDAL APPROACH, USING COMPUTER-ASSISTED NAVIGATION N/A 10/3/2022    Procedure: EXCISION, NEOPLASM, PITUITARY, TRANSSPHENOIDAL APPROACH, USING COMPUTER-ASSISTED NAVIGATION;  Surgeon: Ed Astorga MD;  Location: [facility] OR [address] [place] [address];  Service: ENT;  Laterality: N/A;    EXCISION, NEOPLASM, PITUITARY, TRANSSPHENOIDAL APPROACH, USING COMPUTER-ASSISTED NAVIGATION N/A 10/3/2022    Procedure: EXCISION, NEOPLASM, PITUITARY, TRANSSPHENOIDAL APPROACH, USING COMPUTER-ASSISTED NAVIGATION;  Surgeon: Miguel Ángel Ferrell DO;  Location: [facility] OR [address] [place] [address];  Service: Neurosurgery;  Laterality: N/A;    HYSTEROSCOPY WITH DILATION AND CURETTAGE OF UTERUS N/A 2/8/2022    Procedure: HYSTEROSCOPY, WITH DILATION AND CURETTAGE OF UTERUS;  Surgeon: Ariel Butler MD;  Location: [facility];  Service: OB/GYN;  Laterality: N/A;     Family History       Problem Relation (Age of Onset)    Diabetes Mother, Father    Diabetes Mellitus Mother, Father    No Known Problems Sister, Sister, Brother, Brother, Daughter, Son          Tobacco Use    Smoking status: Never    Smokeless tobacco: Never   Substance and Sexual Activity    Alcohol use: No    Drug use: No    Sexual activity: Yes     Partners: Male     Birth control/protection: OCP     Review of Systems   Constitutional:  Positive for fatigue. Negative for chills, fever and unexpected weight change.   HENT:  Negative for sore throat.     Eyes:  Positive for visual disturbance. Negative for photophobia.   Respiratory:  Negative for cough and shortness of breath.    Cardiovascular:  Negative for chest pain and palpitations.   Gastrointestinal:  Positive for nausea and vomiting. Negative for blood in stool, constipation and diarrhea.   Genitourinary:  Positive for frequency. Negative for difficulty urinating, dysuria and hematuria.   Musculoskeletal:  Negative for back pain and myalgias.   Skin:  Negative for pallor and rash.   Neurological:  Positive for headaches. Negative for seizures, facial asymmetry, speech difficulty, weakness and numbness.   Objective:     Weight: 72.6 kg (160 lb)  Body mass index is 27.46 kg/m².  Vital Signs (Most Recent):  Temp: 97.7 °F (36.5 °C) (10/18/22 0440)  Pulse: 62 (10/18/22 0433)  Resp: 18 (10/18/22 0433)  BP: (!) 142/67 (10/18/22 0433)  SpO2: 100 % (10/18/22 0433)   Vital Signs (24h Range):  Temp:  [97.5 °F (36.4 °C)-98.4 °F (36.9 °C)] 97.7 °F (36.5 °C)  Pulse:  [56-69] 62  Resp:  [15-18] 18  SpO2:  [98 %-100 %] 100 %  BP: (133-149)/(65-80) 142/67         Neurosurgery Physical Exam    General: well developed, well nourished, no acute distress  HEENT: normocephalic, atraumatic  CV: regular rate   Pulmonary: normal respirations, no signs of respiratory distress  Abdomen: soft, non-distended, not tender to palpation  Skin: Skin is warm, dry and intact.    Neurologic:   Mental Status: AO x3, no aphasia, no dysarthria   CN: PERRL, EOMI, VF with bitemporal defects on confrontation, sensation intact in V1-V3 distributions, eyebrow raise and grimace symmetric, tongue midline  Motor: full strength throughout, no pronator drift   Sensory: intact to light touch throughout    No nasal drainage upon leading forward    Significant Labs:  Recent Labs   Lab 10/18/22  0008   GLU 95   *   K 4.2   CL 90*   CO2 22*   BUN 9   CREATININE 0.8   CALCIUM 9.1     Recent Labs   Lab 10/18/22  0008   WBC 7.69   HGB 11.7*   HCT 33.7*         All pertinent labs from the last 24 hours have been reviewed.    Significant Diagnostics:  I have reviewed all pertinent imaging results/findings within the past 24 hours.  MRI 10/18  Impression:     No acute intracranial abnormalities.     Post transsphenoidal pituitary mass resection and sphenoidotomy with residual soft tissue at the sella, similar when compared with 10/04/2022.  Unclear if findings are related to packing material and/or residual mass on this noncontrast study.  Suggest correlation with operative findings and consider further evaluation with nonemergent contrast enhanced MRI as clinically warranted.     Similar to mildly worse fluid and soft tissue opacification of the sphenoid sinus and ethmoid air cells when compared with 10/04/2022.

## 2022-10-18 NOTE — ASSESSMENT & PLAN NOTE
Plan for follow up in Pituitary clinic with Dr. Batista and neurosurgery in the coming weeks which will need to be arranged.

## 2022-10-18 NOTE — TELEPHONE ENCOUNTER
This is the pt we tried to schedule last week but were unable to due to scheduling restrictions.     Anali can you try to schedule this pt 11/22 @ 1:30 with Dr. Batista in Pituitary? Also, pt has medicaid as her primary insurance.

## 2022-10-18 NOTE — PLAN OF CARE
Problem: Physical Therapy  Goal: Physical Therapy Goal  Description: Goals to be met by: 10/28/22     Patient will increase functional independence with mobility by performin. Supine to sit with [place]  2. Sit to supine with [place]  3. Sit to stand transfer with [place]  4. Gait  x 100 feet with [place].   5. Stand for 5 minutes with [place]   6. Lower extremity exercise program x10 reps per handout, with independence    Outcome: Ongoing, Progressing     Initial evaluation completed. Patient tolerated assessment well. Established POC and goals. Patient would continue to benefit from skilled PT services in order to improve functional mobility independence.     Yolanda Randle, PT, DPT  10/18/2022

## 2022-10-18 NOTE — ASSESSMENT & PLAN NOTE
S/p TNTS resection of pituitary macroadenoma  - on levothyroxine 50 mcg   - f/u TSH, free T4, 8 AM cortisol, ACTH

## 2022-10-18 NOTE — ASSESSMENT & PLAN NOTE
Continue home dose tamoxifen Emesis workup   -KUB pending   -LFTs wnl   -amylase, lipase pending   -afebrile and normal WBC   -prn zofran

## 2022-10-18 NOTE — ASSESSMENT & PLAN NOTE
- Overnight labs have been reviewed in the setting of her acute ER presentation with headache, nausea and vomiting.  Confirmed no outside steroids taken to block/shut off adrenal steroid production.  We would expect her cortisol level to be elevated more than 4 as overnight labs have shown.  In addition her ACTH was low normal which is inappropriate in her clinical condition and with her cortisol level.  This points towards a concern of secondary adrenal insufficiency.      - Review of MRI is limited as this was not a pituitary protocol study and no obvious abnormality is seen that would lead to this secondary AI type picture.  Her symptoms have improved with IV fluids but due to the concern and colelction of presenting symptoms she was given a dose of hydrocortisone  mg.      Plan:   We will transition her to more physiologic type dosing of Hydrocortisone at 20 mg in the AM and 10 mg in the PM starting tomorrow morning.  She can continue this during the hospital stay and can discharge on that regimen with plans for repeat testing and follow up outpatient in the pituitary clinic.      At discharge continue Hydrocortisone 20 mg in the morning (6-9 AM) and 10 mg in the afternoon/evening (3-6 PM).

## 2022-10-18 NOTE — PT/OT/SLP EVAL
Occupational Therapy   Evaluation    Co-eval with PT to have 2 skilled therapists present to safely assess pt's functional mobility.     Name: Tessa Wynne  MRN: [id number]  Admitting Diagnosis:  Hyponatremia  Recent Surgery: * No surgery found *      Recommendations:     Discharge Recommendations: home  Discharge Equipment Recommendations:  none  Barriers to discharge:  None    Assessment:     Tessa Wynne is a 47 y.o. female with a medical diagnosis of Hyponatremia.  Pt tolerated session well and without incident, ambulating a functional household distance in her room with CGA with HHA.  She's performing below her functional baseline and would continue to benefit from skilled acute OT services.  No post-acute OT needs are anticipated.  She presents with the following.  Performance deficits affecting function: weakness, impaired endurance, impaired self care skills, impaired functional mobility, gait instability, impaired balance.      Rehab Prognosis: Good; patient would benefit from acute skilled OT services to address these deficits and reach maximum level of function.       Plan:     Patient to be seen 3 x/week to address the above listed problems via self-care/home management, therapeutic exercises, therapeutic activities  Plan of Care Expires: 11/17/22  Plan of Care Reviewed with: patient    Subjective     Chief Complaint: weakness  Patient/Family Comments/goals: to get stronger and return to her PLOF    Occupational Profile:  Living Environment: Pt lives with her  and their 2 children (one an adult and the other 16) in a [facility] with [address].  She has a tub/shower combo.  Pt reports no recent falls.  Previous level of function: Independent with ADLs and mobility.  Pt was driving.  She works for a cleaning service.  Roles and Routines: mother, wife  Equipment Used at Home:  none  Assistance upon Discharge: Her family.      Pain/Comfort:  Pain Rating 1: 0/10  Pain Rating Post-Intervention 1:  0/10    Patients cultural, spiritual, Druze conflicts given the current situation: no    Objective:     Communicated with: nursing and PT prior to session.  Patient found HOB elevated with telemetry, peripheral IV, pulse ox (continuous), blood pressure cuff upon OT entry to room.    General Precautions: Standard, fall   Orthopedic Precautions:N/A   Braces: N/A  Respiratory Status: Room air    Occupational Performance:    Bed Mobility:    Patient completed Rolling/Turning to Right with stand by assistance  Patient completed Scooting/Bridging with stand by assistance  Patient completed Supine to Sit with stand by assistance    Functional Mobility/Transfers:  Patient completed Sit <> Stand Transfer from EOB x 1 trial with stand by assistance with no assistive device   Patient completed Bed <> Chair Transfer using Step Transfer technique with contact guard assistance with hand-held assist  Functional Mobility: Pt ambulated a functional household distance in her room with CGA with HHA.  She was mildly unsteady but had no overt LOB.    Activities of Daily Living:  Grooming: stand by assistance to wash her hands while standing at the bathroom sink    Cognitive/Visual Perceptual:  Cognitive/Psychosocial Skills:     -       Oriented to: Person, Place, Time, and Situation   -       Follows Commands/attention:Follows multistep  commands  -       Communication: clear/fluent    Physical Exam:  Sensation: Pt reports numbness in her hands and feet  Upper Extremity Range of Motion:     -       Right Upper Extremity: WFL  -       Left Upper Extremity: WFL  Upper Extremity Strength:    -       Right Upper Extremity: WFL  -       Left Upper Extremity: WFL   Strength:    -       Right Upper Extremity: WFL  -       Left Upper Extremity: WFL  Fine Motor Coordination:    -       Intact    AMPAC 6 Click ADL:  AMPAC Total Score: 19    Treatment & Education:  Pt edu on role of OT, POC, safety when performing self care tasks, benefit  of performing OOB activity, and safety when performing functional transfers and mobility.    - Self care tasks completed-- as noted above      Patient left up in chair with all lines intact, call button in reach, chair alarm on, and nursing notified    GOALS:   Multidisciplinary Problems       Occupational Therapy Goals          Problem: Occupational Therapy    Goal Priority Disciplines Outcome Interventions   Occupational Therapy Goal     OT, PT/OT Ongoing, Progressing    Description: Goals to be met by: 11/1/2022     Patient will increase functional independence with ADLs by performing:    UE Dressing with [place].  LE Dressing with Modified [place].  Grooming while standing at sink with Modified [place].  Toileting from toilet with Supervision for hygiene and clothing management.   Supine to sit with Modified [place].  Sit to stand transfer with Modified [place].  Toilet transfer to toilet with Supervision.                         History:     Past Medical History:   Diagnosis Date    Elevated fasting glucose     GERD (gastroesophageal reflux disease)     Hypothyroidism     PAD (peripheral artery disease)     Pituitary lesion 9/7/2022         Past Surgical History:   Procedure Laterality Date    CHOLECYSTECTOMY      ENDOMETRIAL ABLATION N/A 2/8/2022    Procedure: ABLATION, ENDOMETRIUM;  Surgeon: Ariel Butler MD;  Location: [facility] OR;  Service: OB/GYN;  Laterality: N/A;    EXCISION, NEOPLASM, PITUITARY, TRANSSPHENOIDAL APPROACH, USING COMPUTER-ASSISTED NAVIGATION N/A 10/3/2022    Procedure: EXCISION, NEOPLASM, PITUITARY, TRANSSPHENOIDAL APPROACH, USING COMPUTER-ASSISTED NAVIGATION;  Surgeon: Ed Astorga MD;  Location: [address];  Service: ENT;  Laterality: N/A;    EXCISION, NEOPLASM, PITUITARY, TRANSSPHENOIDAL APPROACH, USING COMPUTER-ASSISTED NAVIGATION N/A 10/3/2022    Procedure: EXCISION, NEOPLASM, PITUITARY, TRANSSPHENOIDAL APPROACH, USING COMPUTER-ASSISTED NAVIGATION;   Surgeon: Miguel Ángel Ferrell DO;  Location: [address];  Service: Neurosurgery;  Laterality: N/A;    HYSTEROSCOPY WITH DILATION AND CURETTAGE OF UTERUS N/A 2/8/2022    Procedure: HYSTEROSCOPY, WITH DILATION AND CURETTAGE OF UTERUS;  Surgeon: Ariel Butler MD;  Location: [facility];  Service: OB/GYN;  Laterality: N/A;       Time Tracking:     OT Date of Treatment: 10/18/22  OT Start Time: 1436  OT Stop Time: 1450  OT Total Time (min): 14 min    Billable Minutes:Evaluation 14 min    10/18/2022

## 2022-10-18 NOTE — ASSESSMENT & PLAN NOTE
Na 123 on admit with normal urine Na, urine osmol and spec gravity, unclear exact hyponatremia etiology, possible hypovolemic hyponatremia given N/V, possible iatrogenic from DDAVP use outpatient  -admit to [facility]   -NSGY following   -consult endocrine   -repeat Na pending post NS bolus in ED   -will eval if NS improves Na before doing FW restriction  -goal ~130, not over   -hold home DDAVP   -close neuro monitoring while correcting Na   -full hyponatremia workup pending

## 2022-10-18 NOTE — HPI
Pt is a 47 y.o. 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 Of note, she has presented a couple times in the past 2 weeks with similar symptoms. She has felt general malaise and weakness and overall feels run down. She denies any focal neurologic symptoms, fever, syncope, or seizures. In the ED MRI brain with no acute findings and relatively stable compared to post op scan. Lab work notable for Na of 123 and patient given 1 L NS bolus. She will be admitted to [facility] for close neuro monitoring while raising serum Na.

## 2022-10-18 NOTE — HPI
Tessa Wynne is a 47 old female with a history of pituitary macroadenoma now status post TSR w/ Dr. Ferrell on 10/3/2022 who presents to the ED with worsening HA, nausea and vomiting.  She has had 2 other prior ER visits with similar complaints but on this visit she was also found to have low serum sodium level.  There has been the complaint of polyuria/polydipsia over the last couple of nights and a phone call with endocrinology yesterday included instructions to stop fluid restriction and to start Desmopressin PRN up to twice daily for large urine volumes spanning more than 2 hours.  It is noted in chart review that her 7 day post-op sodium level was 134 although she did have an ER visit on day 5 post op with sodium level of 129.  Since that time her serum sodium has maintained above 130 up until the overnight labs on presentation to the ER where sodium level was found to be 123.      In the ER her serum sodium was treated with 1.5 liter normal saline fluid bolus with repeat sodium level 4 hours at 133.  Remaining labs were mostly unremarkable.  They did peform a serum cortisol and ACTH level at 4am as seen below.  MRI of the brain was performed which was reported as similar to post-op scan earlier in the month.  There was some mild sphenoid and ethmoid opacification seen.          Pituitary Adenoma Pathology: 10/03/2022  GONADOTROPH ADENOMA, IMMUNOREACTIVE FOR ALPHA-SUBUNIT AND SF1    Regarding Hyponatremia:    Confirmed that patient has not taken any DDAVP doses prior to this hospitalization.  She had been having nausea and vomiting just prior to ER visit.  She has has had more issues with polyuria and polydipsia recently.  There has been some burning with urination as well but this has since subsided.  She had a headache overnight prompting her ER visit and noted a BP at home of 150 systolic.  There was some dizziness noted at the time.     -  Lab Results   Component Value Date    SPECGRAV 1.010 10/18/2022     "SPECGRAV 1.010 10/05/2022    SPECGRAV 1.010 10/05/2022    SPECGRAV 1.010 10/05/2022    LABOSMO 281 10/18/2022     (L) 10/18/2022     (L) 10/18/2022     (L) 10/15/2022    OSMOLALITYUR 251 10/18/2022    SODIUMURR 68 10/18/2022    TSH 2.966 10/18/2022    TSH 0.772 10/08/2022    FREET4 0.85 10/18/2022    FREET4 0.65 (L) 08/26/2022          With regards to adrenal insufficiency:     She denies any steroids at home of any kind.  As above has had nausea and vomiting with pre-admission symptoms starting around 8pm.  This improved with IV fluids and headache also improved.  Given her symptoms overnight with nausea, vomiting and headache her Cortisol and ACTH levels were drawn.  ACTH inappropriately low normal in that setting.       -  Lab Results   Component Value Date    K 4.0 10/18/2022    K 4.0 10/18/2022     10/18/2022     (L) 10/18/2022    CREATININE 0.9 10/18/2022    CREATININE 0.7 10/18/2022    ACTH 11 10/18/2022    ACTH 19 08/26/2022     /61   Pulse 69   Temp 97.8 °F (36.6 °C) (Oral)   Resp 17   Ht 5' 4" (1.626 m)   Wt 72.6 kg (160 lb)   LMP 01/15/2022   SpO2 99%   Breastfeeding No   BMI 27.46 kg/m²       - No presence of chronic steroids, exposure to checkpoint inhibitors, autoimmune disease, or infiltrative disease  No   Yes  [x]    []   Chronic Steroids  [x]    []   Check point inhibitor exposure  [x]    []   Autoimmune diseases  [x]    []   Infiltrative diseases    - Symptoms: nausea/vomiting, abdominal pain  - No evidence of hypotension, orthostatic hypotension, or hypoglycemia  No   Yes  [x]    []   Hypotension (see vitals below)  [x]    []   Orthostatic hypotension  []    [x]   Nausea/Vomiting  []    [x]   Abdominal pain  [x]    []   Hypoglycemia   "

## 2022-10-18 NOTE — ED NOTES
Pt care assumed. Report received by SHONNA Thompson. Pt lying in stretcher in low and locked position and side rails raised x2. Call light, pt's belongings, and bedside table within pt's reach. Pt on continuous cardiac monitoring, pulse oximetry, and BP cycling every 30 minutes. Pt in NAD and verbalized no needs at this time. Family member x1 at bedside.

## 2022-10-18 NOTE — SUBJECTIVE & OBJECTIVE
Past Medical History:   Diagnosis Date    Elevated fasting glucose     GERD (gastroesophageal reflux disease)     Hypothyroidism     PAD (peripheral artery disease)     Pituitary lesion 9/7/2022     Past Surgical History:   Procedure Laterality Date    CHOLECYSTECTOMY      ENDOMETRIAL ABLATION N/A 2/8/2022    Procedure: ABLATION, ENDOMETRIUM;  Surgeon: Ariel Butler MD;  Location: [facility] OR;  Service: OB/GYN;  Laterality: N/A;    EXCISION, NEOPLASM, PITUITARY, TRANSSPHENOIDAL APPROACH, USING COMPUTER-ASSISTED NAVIGATION N/A 10/3/2022    Procedure: EXCISION, NEOPLASM, PITUITARY, TRANSSPHENOIDAL APPROACH, USING COMPUTER-ASSISTED NAVIGATION;  Surgeon: Ed Astorga MD;  Location: [facility] OR [address] [place] [address];  Service: ENT;  Laterality: N/A;    EXCISION, NEOPLASM, PITUITARY, TRANSSPHENOIDAL APPROACH, USING COMPUTER-ASSISTED NAVIGATION N/A 10/3/2022    Procedure: EXCISION, NEOPLASM, PITUITARY, TRANSSPHENOIDAL APPROACH, USING COMPUTER-ASSISTED NAVIGATION;  Surgeon: Miguel Ángel Ferrell DO;  Location: [facility] OR [address] [place] [address];  Service: Neurosurgery;  Laterality: N/A;    HYSTEROSCOPY WITH DILATION AND CURETTAGE OF UTERUS N/A 2/8/2022    Procedure: HYSTEROSCOPY, WITH DILATION AND CURETTAGE OF UTERUS;  Surgeon: Ariel Butler MD;  Location: [facility] OR;  Service: OB/GYN;  Laterality: N/A;      No current facility-administered medications on file prior to encounter.     Current Outpatient Medications on File Prior to Encounter   Medication Sig Dispense Refill    calcium citrate/vitamin D3 (CITRACAL REGULAR ORAL) Take by mouth.      codeine-butalbital-ASA-caffeine (BUTALBITAL COMPOUND-CODEINE) 29--40 mg Cap Take 1 capsule by mouth every 4 (four) hours as needed. 10 capsule 0    desmopressin (DDAVP) 0.1 MG Tab Take one tablet up to 2 times a day for increased urination. 60 tablet 11    ferrous sulfate (IRON ORAL) Take by mouth.      gabapentin (NEURONTIN) 100 MG capsule Take 1 capsule (100 mg total) by mouth 3 (three) times daily. for 14  days 42 capsule 0    levothyroxine (SYNTHROID) 50 MCG tablet Take 1 tablet (50 mcg total) by mouth before breakfast. 30 tablet 11    LIDOcaine (LIDODERM) 5 % Place 1 patch onto the skin daily as needed (pain). Remove & Discard patch within 12 hours or as directed by MD 15 patch 0    mv,calcium,min/iron/folic/vitK (ONE-A-DAY WOMEN'S COMPLETE ORAL) Take by mouth.      omeprazole (PRILOSEC) 40 MG capsule Take 1 capsule (40 mg total) by mouth every morning. 30 capsule 11    oxyCODONE (ROXICODONE) 5 MG immediate release tablet Take 1 tablet (5 mg total) by mouth every 6 (six) hours as needed for Pain (take as needed for pain/headaches). 10 tablet 0    oxymetazoline (AFRIN) 0.05 % nasal spray 3 sprays by Nasal route every 2 (two) hours as needed (epistaxis). 30 mL 0    sodium chloride (OCEAN) 0.65 % nasal spray 2 sprays by Nasal route every 4 (four) hours while awake. 44 mL 0      Allergies: Patient has no known allergies.    Family History   Problem Relation Age of Onset    Diabetes Mother     Diabetes Mellitus Mother     Diabetes Father     Diabetes Mellitus Father     No Known Problems Sister     No Known Problems Sister     No Known Problems Brother     No Known Problems Brother     No Known Problems Daughter     No Known Problems Son     Colon cancer Neg Hx     Breast cancer Neg Hx     Ovarian cancer Neg Hx     Uterine cancer Neg Hx     Heart attack Neg Hx      Social History     Tobacco Use    Smoking status: Never    Smokeless tobacco: Never   Substance Use Topics    Alcohol use: No    Drug use: No     Review of Systems   Constitutional:  Positive for fatigue. Negative for chills and fever.   HENT:  Positive for ear pain.    Eyes:  Negative for photophobia.   Respiratory:  Negative for cough and wheezing.    Cardiovascular:  Negative for palpitations and leg swelling.   Gastrointestinal:  Positive for nausea and vomiting.   Endocrine: Positive for polyuria.   Genitourinary:  Negative for difficulty urinating and  dysuria.   Musculoskeletal:  Negative for back pain and neck pain.   Skin:  Negative for rash.   Neurological:  Positive for headaches. Negative for seizures and weakness.   Psychiatric/Behavioral:  Negative for agitation, behavioral problems and confusion.    Objective:     Vitals:    Temp: 97.7 °F (36.5 °C)  Pulse: 62  BP: (!) 142/67  MAP (mmHg): 97  Resp: 18  SpO2: 100 %  O2 Device (Oxygen Therapy): room air    Temp  Min: 97.5 °F (36.4 °C)  Max: 98.4 °F (36.9 °C)  Pulse  Min: 56  Max: 69  BP  Min: 133/65  Max: 149/80  MAP (mmHg)  Min: 96  Max: 97  Resp  Min: 15  Max: 18  SpO2  Min: 98 %  Max: 100 %    10/17 0701 - 10/18 0700  In: 1000   Out: -            Physical Exam  Vitals and nursing note reviewed.   Constitutional:       General: She is not in acute distress.     Appearance: Normal appearance. She is normal weight.   HENT:      Head: Normocephalic and atraumatic.      Mouth/Throat:      Mouth: Mucous membranes are moist.   Eyes:      Extraocular Movements: Extraocular movements intact.      Conjunctiva/sclera: Conjunctivae normal.      Pupils: Pupils are equal, round, and reactive to light.   Cardiovascular:      Rate and Rhythm: Regular rhythm. Bradycardia present.      Pulses: Normal pulses.   Pulmonary:      Effort: Pulmonary effort is normal. No respiratory distress.   Abdominal:      General: Abdomen is flat. There is no distension.      Palpations: Abdomen is soft.      Tenderness: There is no abdominal tenderness. There is no guarding.   Musculoskeletal:         General: No swelling or deformity. Normal range of motion.   Skin:     General: Skin is warm and dry.   Neurological:      General: No focal deficit present.      Mental Status: She is alert and oriented to person, place, and time. Mental status is at baseline.      Cranial Nerves: No cranial nerve deficit.      Sensory: No sensory deficit.      Motor: No weakness.   Psychiatric:         Mood and Affect: Mood normal.         Behavior: Behavior  normal.         Today I personally reviewed pertinent medications, lines/drains/airways, imaging, cardiology results, laboratory results, microbiology results, notably:    MRI brain

## 2022-10-18 NOTE — PLAN OF CARE
Problem: Occupational Therapy  Goal: Occupational Therapy Goal  Description: Goals to be met by: 11/1/2022     Patient will increase functional independence with ADLs by performing:    UE Dressing with [place].  LE Dressing with Modified [place].  Grooming while standing at sink with Modified [place].  Toileting from toilet with Supervision for hygiene and clothing management.   Supine to sit with Modified [place].  Sit to stand transfer with Modified [place].  Toilet transfer to toilet with Supervision.    Outcome: Ongoing, Progressing     Pt evaluated and OT goals established.

## 2022-10-18 NOTE — ASSESSMENT & PLAN NOTE
- Etiology of this hyponatremia remains uncertain at this time, and on exam patient appears euvolemic.  Labs do show some concern for secondary adrenal insufficiency despite being normal status post TSR on 10/03/2022, and this may be playing a partial role in low sodium level.  She has had sodium levels checked fairly frequently at least once weekly since her discharge from the hospital and this sudden drop to 123 on overnight labs is unusual.  Possibility of abnormal lab draw or drawing blood sample off of line used with residual saline in the tubing.    Treatment:  - Now status post total of 1.5 liters of normal saline given overnight with improvement in sodium level of 10 points from 123 to 133 early this AM.(If SIADH we would not typically expect improvement in sodium level in someone who is euvolemic like this patient)  - Sodium level has since improved further to 137 on latest lab  - Patient can continue to drink to her thirst for now, no further intervention planned at this moment

## 2022-10-18 NOTE — PLAN OF CARE
[facility] Care Plan    POC reviewed with Tessa Wynne and spouse at 1100. Pt verbalized understanding. Questions and concerns addressed. No acute events today. Pt progressing toward goals. Will continue to monitor. See below and flowsheets for full assessment and VS info.   - NS 500mL bolus given along with IV dose benadryl, tylenol, and magnesium         Is this a stroke patient? no    Neuro:  [place] Coma Scale  Best Eye Response: 4-->(E4) spontaneous  Best Motor Response: 6-->(M6) obeys commands  Best Verbal Response: 5-->(V5) oriented  [employer] Coma Scale Score: 15  Assessment Qualifiers: no eye obstruction present        24 hr Temp:  [97.5 °F (36.4 °C)-98.4 °F (36.9 °C)]     CV:   Rhythm: normal sinus rhythm, sinus bradycardia  BP goals:   SBP < 180  MAP > 65    Resp:   O2 Device (Oxygen Therapy): room air       Plan: N/A    GI/:     Diet/Nutrition Received: regular  Last Bowel Movement: 10/17/22       Intake/Output Summary (Last 24 hours) at 10/18/2022 1534  Last data filed at 10/18/2022 1505  Gross per 24 hour   Intake 1666.35 ml   Output 450 ml   Net 1216.35 ml          Labs/Accuchecks:  Recent Labs   Lab 10/18/22  0008   WBC 7.69   RBC 3.76*   HGB 11.7*   HCT 33.7*         Recent Labs   Lab 10/18/22  0430 10/18/22  1404   * 137   K 4.0 4.0   CO2 23 26    105   BUN 7 9   CREATININE 0.7 0.9   ALKPHOS 59  --    ALT 18  --    AST 19  --    BILITOT 0.5  --     No results for input(s): PROTIME, INR, APTT, HEPANTIXA in the last 168 hours.   Recent Labs   Lab 10/18/22  0008   TROPONINI 0.010       Electrolytes: N/A - electrolytes WDL  Accuchecks: none    Gtts:      LDA/Wounds:  Lines/Drains/Airways       Peripheral Intravenous Line  Duration                  Peripheral IV - Single Lumen 10/18/22 0010 20 G Right Antecubital <1 day                  Wounds: No  Wound care consulted: No

## 2022-10-18 NOTE — TELEPHONE ENCOUNTER
" calling states pt having Chest pain and headache, x3 hours,  states pt took "a couple of aspirin a couple of hours ago", per care advice, advised to call  now. Pt seen in ER x2 days ago,  states was told to call nurse before going to ER to make sure it is a real emergency, Reiterated care advice to call  now.  verbalizes understanding.   Reason for Disposition   [1] Chest pain lasts > 5 minutes AND [2] age > 44    Additional Information   Negative: SEVERE difficulty breathing (e.g., struggling for each breath, speaks in single words)   Negative: Difficult to awaken or acting confused (e.g., disoriented, slurred speech)   Negative: Shock suspected (e.g., cold/pale/clammy skin, too weak to stand, low BP, rapid pulse)   Negative: Passed out (i.e., lost consciousness, collapsed and was not responding)    Protocols used: Chest Pain-A-AH    "

## 2022-10-18 NOTE — CONSULTS
[facility] - Emergency Dept  Neurosurgery  Consult Note    Consults  Subjective:     Chief Complaint/Reason for Admission: hyponatremia    History of Present Illness: Ms. Wynne is a 47F s/p TNTS resection of pituitary macroadenoma w/ Dr. Ferrell on 10/3 who presents to the ED with worsening HA, nausea, vomiting. Since her surgery, she has presented to the ED twice previously with similar symptoms, however she states this is the worst she has felt. Posterior headache is worse with sitting or standing up vs laying down, pt states she initially had clear drainage from her nose after her surgery, but it resolved before she was able to take a sample. She also sometimes has a sour taste in her mouth, which is new since surgery. Over the last couple of nights, pt has been urinating frequently, despite restricting her water intake to 1L/day over approximately the past week. She denies any focal weakness, numbness, or vision changes since surgery (had bitemporal visual field defects R>L pre op).     In the ED, Na found to be 123. Pt given 1 L NS bolus. Pt had also described an episode of chest pain and SOB yesterday, sx have since resolved and trop and BNP wnl. MRI brain shows no ICH, edema, or mass effect, relatively unchanged residual soft tissue as compared to 10/4 post op scan.       (Not in a hospital admission)      Review of patient's allergies indicates:  No Known Allergies    Past Medical History:   Diagnosis Date    Elevated fasting glucose     GERD (gastroesophageal reflux disease)     Hypothyroidism     PAD (peripheral artery disease)     Pituitary lesion 9/7/2022     Past Surgical History:   Procedure Laterality Date    CHOLECYSTECTOMY      ENDOMETRIAL ABLATION N/A 2/8/2022    Procedure: ABLATION, ENDOMETRIUM;  Surgeon: Ariel Butler MD;  Location: [facility];  Service: OB/GYN;  Laterality: N/A;    EXCISION, NEOPLASM, PITUITARY, TRANSSPHENOIDAL APPROACH, USING COMPUTER-ASSISTED NAVIGATION N/A 10/3/2022     Procedure: EXCISION, NEOPLASM, PITUITARY, TRANSSPHENOIDAL APPROACH, USING COMPUTER-ASSISTED NAVIGATION;  Surgeon: Ed Astorga MD;  Location: [facility] OR [address] [place] [address];  Service: ENT;  Laterality: N/A;    EXCISION, NEOPLASM, PITUITARY, TRANSSPHENOIDAL APPROACH, USING COMPUTER-ASSISTED NAVIGATION N/A 10/3/2022    Procedure: EXCISION, NEOPLASM, PITUITARY, TRANSSPHENOIDAL APPROACH, USING COMPUTER-ASSISTED NAVIGATION;  Surgeon: Miguel Ángel Ferrell DO;  [facility];  Service: Neurosurgery;  Laterality: N/A;    HYSTEROSCOPY WITH DILATION AND CURETTAGE OF UTERUS N/A 2/8/2022    Procedure: HYSTEROSCOPY, WITH DILATION AND CURETTAGE OF UTERUS;  Surgeon: Ariel Butler MD;  Location: [facility] OR;  Service: OB/GYN;  Laterality: N/A;     Family History       Problem Relation (Age of Onset)    Diabetes Mother, Father    Diabetes Mellitus Mother, Father    No Known Problems Sister, Sister, Brother, Brother, Daughter, Son          Tobacco Use    Smoking status: Never    Smokeless tobacco: Never   Substance and Sexual Activity    Alcohol use: No    Drug use: No    Sexual activity: Yes     Partners: Male     Birth control/protection: OCP     Review of Systems   Constitutional:  Positive for fatigue. Negative for chills, fever and unexpected weight change.   HENT:  Negative for sore throat.    Eyes:  Positive for visual disturbance. Negative for photophobia.   Respiratory:  Negative for cough and shortness of breath.    Cardiovascular:  Negative for chest pain and palpitations.   Gastrointestinal:  Positive for nausea and vomiting. Negative for blood in stool, constipation and diarrhea.   Genitourinary:  Positive for frequency. Negative for difficulty urinating, dysuria and hematuria.   Musculoskeletal:  Negative for back pain and myalgias.   Skin:  Negative for pallor and rash.   Neurological:  Positive for headaches. Negative for seizures, facial asymmetry, speech difficulty, weakness and numbness.   Objective:      Weight: 72.6 kg (160 lb)  Body mass index is 27.46 kg/m².  Vital Signs (Most Recent):  Temp: 97.7 °F (36.5 °C) (10/18/22 0440)  Pulse: 62 (10/18/22 0433)  Resp: 18 (10/18/22 0433)  BP: (!) 142/67 (10/18/22 0433)  SpO2: 100 % (10/18/22 0433)   Vital Signs (24h Range):  Temp:  [97.5 °F (36.4 °C)-98.4 °F (36.9 °C)] 97.7 °F (36.5 °C)  Pulse:  [56-69] 62  Resp:  [15-18] 18  SpO2:  [98 %-100 %] 100 %  BP: (133-149)/(65-80) 142/67         Neurosurgery Physical Exam    General: well developed, well nourished, no acute distress  HEENT: normocephalic, atraumatic  CV: regular rate   Pulmonary: normal respirations, no signs of respiratory distress  Abdomen: soft, non-distended, not tender to palpation  Skin: Skin is warm, dry and intact.    Neurologic:   Mental Status: AO x3, no aphasia, no dysarthria   CN: PERRL, EOMI, VF with bitemporal defects on confrontation, sensation intact in V1-V3 distributions, eyebrow raise and grimace symmetric, tongue midline  Motor: full strength throughout, no pronator drift   Sensory: intact to light touch throughout    No nasal drainage upon leading forward    Significant Labs:  Recent Labs   Lab 10/18/22  0008   GLU 95   *   K 4.2   CL 90*   CO2 22*   BUN 9   CREATININE 0.8   CALCIUM 9.1     Recent Labs   Lab 10/18/22  0008   WBC 7.69   HGB 11.7*   HCT 33.7*        All pertinent labs from the last 24 hours have been reviewed.    Significant Diagnostics:  I have reviewed all pertinent imaging results/findings within the past 24 hours.  MRI 10/18  Impression:     No acute intracranial abnormalities.     Post transsphenoidal pituitary mass resection and sphenoidotomy with residual soft tissue at the sella, similar when compared with 10/04/2022.  Unclear if findings are related to packing material and/or residual mass on this noncontrast study.  Suggest correlation with operative findings and consider further evaluation with nonemergent contrast enhanced MRI as clinically  warranted.     Similar to mildly worse fluid and soft tissue opacification of the sphenoid sinus and ethmoid air cells when compared with 10/04/2022.    Assessment/Plan:     S/P transsphenoidal selective adenomectomy  S/p TNTS resection of pituitary macroadenoma  - on levothyroxine 50 mcg   - f/u TSH, free T4, 8 AM cortisol, ACTH    Hyponatremia  Most likely 2/2 SIADH in post op setting, s/p 1L NS bolus  - recommend ICU admission  - fluid restriction until labs result  - f/u serum osm, urine osm, urine Na, UA spec grav  - check Na q6h, correct slowly  - strict Is/Os  - endocrine consult     Hypothyroidism  (see s/p transsphenoidal)    Pituitary macroadenoma with extrasellar extension  (see s/p transsphenoidal)       Kendy Pyle MD  Neurosurgery  [facility] - Emergency Dept

## 2022-10-18 NOTE — NURSING
Patient arrived to [facility] from ED    Type of stroke/diagnosis:  Hyponatremia    Current symptoms: H/A, N/V    Skin assessment done: Yes    Wounds noted: none    [place] Completed? Yes    Patient Belongings on Admit: Kelly tennis shoes, blue sports bra, grey socks, black shirt, blue jacket    NCC notified: April ADORNO

## 2022-10-18 NOTE — SUBJECTIVE & OBJECTIVE
Interval HPI:   Overnight events: Admitted with concern of low sodium and now status post 1.5 liters IV NS.  Improvement in headache, nausea and vomiting.  Sitting up in chair without current complaints.    Eating:    Yes  Nausea: No  Hypoglycemia and intervention: No  Fever: No      ROS:  As above    Labs Reviewed and Include:  BASELINE Creatinine:   [unfilled]  [unfilled]  [unfilled]  Recent Labs   Lab 10/18/22  0430 10/18/22  1404   GLU 87 120*   CALCIUM 8.5* 9.0   ALBUMIN 3.6  --    PROT 6.3  --    * 137   K 4.0 4.0   CO2 23 26    105   BUN 7 9   CREATININE 0.7 0.9   ALKPHOS 59  --    ALT 18  --    AST 19  --    BILITOT 0.5  --      Lab Results   Component Value Date    HGBA1C 6.1 (H) 07/29/2022       Nutritional status:   Body mass index is 27.46 kg/m².  Lab Results   Component Value Date    ALBUMIN 3.6 10/18/2022    ALBUMIN 3.8 10/18/2022    ALBUMIN 4.2 10/15/2022     No results found for: PREALBUMIN    Estimated Creatinine Clearance: 74.7 mL/min (based on SCr of 0.9 mg/dL).         PHYSICAL EXAMINATION:  Vitals:    10/18/22 1305   BP: (!) 93/53   Pulse: (!) 59   Resp: 17   Temp:      Body mass index is 27.46 kg/m².    Physical Exam  Constitutional:       Appearance: Normal appearance.   HENT:      Head: Normocephalic and atraumatic.   Eyes:      Extraocular Movements: Extraocular movements intact.   Cardiovascular:      Rate and Rhythm: Normal rate and regular rhythm.      Pulses: Normal pulses.   Pulmonary:      Effort: Pulmonary effort is normal.      Breath sounds: Normal breath sounds.   Abdominal:      General: Abdomen is flat.      Palpations: Abdomen is soft.      Comments: Minimal tenderness near umbilicus on deep palpation   Musculoskeletal:         General: No swelling.   Skin:     General: Skin is dry.   Neurological:      General: No focal deficit present.      Mental Status: She is alert and oriented to person, place, and time.   Psychiatric:         Mood and Affect: Mood normal.          Behavior: Behavior normal.

## 2022-10-18 NOTE — ED PROVIDER NOTES
Encounter Date: 10/17/2022       History     Chief Complaint   Patient presents with    Multiple Complaints     Headache, nausea and vomiting. Reports surgical removal of tumor in brain on the 3rd with multiple hospital visits  since. Neuro intact in triage. Aox4, GCS 15.     47-year-old female with a past medical history of transsphenoidal hypophysectomy, hypothyroidism and PAD presents the ED with  at bedside with multiple complaints that onset 2 days ago.  She reports a 4/10 severity headache, nausea, 5 episodes of bilious vomiting, chest pressure, ear pressure and shortness of breath that is currently resolved.  Patient presented to the emergency department on 10/15 with similar complaints and she reported that the medication she received during that visit helped her symptoms.  CT from previous visit did not show any acute intracranial pathology.  Patient denies nasal drainage, abdominal pain, fever, chills, dysuria and hematuria.  No other complaints at this time.    The history is provided by the patient and the spouse.   Review of patient's allergies indicates:  No Known Allergies  Past Medical History:   Diagnosis Date    Elevated fasting glucose     GERD (gastroesophageal reflux disease)     Hypothyroidism     PAD (peripheral artery disease)     Pituitary lesion 9/7/2022     Past Surgical History:   Procedure Laterality Date    CHOLECYSTECTOMY      ENDOMETRIAL ABLATION N/A 2/8/2022    Procedure: ABLATION, ENDOMETRIUM;  Surgeon: Ariel Butler MD;  Location: [facility] OR;  Service: OB/GYN;  Laterality: N/A;    EXCISION, NEOPLASM, PITUITARY, TRANSSPHENOIDAL APPROACH, USING COMPUTER-ASSISTED NAVIGATION N/A 10/3/2022    Procedure: EXCISION, NEOPLASM, PITUITARY, TRANSSPHENOIDAL APPROACH, USING COMPUTER-ASSISTED NAVIGATION;  Surgeon: Ed Astorga MD;  Location: [address];  Service: ENT;  Laterality: N/A;    EXCISION, NEOPLASM, PITUITARY, TRANSSPHENOIDAL APPROACH, USING COMPUTER-ASSISTED  NAVIGATION N/A 10/3/2022    Procedure: EXCISION, NEOPLASM, PITUITARY, TRANSSPHENOIDAL APPROACH, USING COMPUTER-ASSISTED NAVIGATION;  Surgeon: Miguel Ángel Ferrell DO;  Location: [facility] OR [address] [place] [address];  Service: Neurosurgery;  Laterality: N/A;    HYSTEROSCOPY WITH DILATION AND CURETTAGE OF UTERUS N/A 2/8/2022    Procedure: HYSTEROSCOPY, WITH DILATION AND CURETTAGE OF UTERUS;  Surgeon: Ariel Butler MD;  Location: [facility];  Service: OB/GYN;  Laterality: N/A;     Family History   Problem Relation Age of Onset    Diabetes Mother     Diabetes Mellitus Mother     Diabetes Father     Diabetes Mellitus Father     No Known Problems Sister     No Known Problems Sister     No Known Problems Brother     No Known Problems Brother     No Known Problems Daughter     No Known Problems Son     Colon cancer Neg Hx     Breast cancer Neg Hx     Ovarian cancer Neg Hx     Uterine cancer Neg Hx     Heart attack Neg Hx      Social History     Tobacco Use    Smoking status: Never    Smokeless tobacco: Never   Substance Use Topics    Alcohol use: No    Drug use: No     Review of Systems   Constitutional:  Negative for activity change, chills and fever.   HENT:  Positive for ear pain (Pressure). Negative for congestion and sore throat.    Respiratory:  Positive for shortness of breath. Negative for stridor.    Cardiovascular:  Positive for chest pain (Pressure). Negative for palpitations.   Gastrointestinal:  Positive for nausea and vomiting (Bilious x5). Negative for abdominal pain.   Genitourinary:  Negative for dysuria and urgency.   Musculoskeletal:  Negative for back pain.   Skin:  Negative for rash.   Allergic/Immunologic: Negative for environmental allergies, food allergies and immunocompromised state.   Neurological:  Positive for headaches (Crown of head). Negative for dizziness, syncope and weakness.   Hematological:  Does not bruise/bleed easily.     Physical Exam     Initial Vitals [10/17/22 2232]   BP Pulse Resp Temp SpO2   (!) 149/80  69 18 98.4 °F (36.9 °C) 98 %      MAP       --         Physical Exam    Nursing note and vitals reviewed.  Constitutional: Vital signs are normal. She appears well-developed and well-nourished. She is not diaphoretic. She appears distressed (Uncomfortable).   HENT:   Head: Normocephalic and atraumatic.   Right Ear: External ear normal.   Left Ear: External ear normal.   Eyes: EOM are normal. Right eye exhibits no discharge. Left eye exhibits no discharge.   Neck: Trachea normal. Neck supple. No thyroid mass present.   Cardiovascular:  Normal rate, regular rhythm, normal heart sounds and intact distal pulses.     Exam reveals no gallop and no friction rub.       No murmur heard.  Pulmonary/Chest: Breath sounds normal. No respiratory distress. She has no wheezes. She has no rhonchi. She has no rales.   Abdominal: Abdomen is soft. Bowel sounds are normal. She exhibits no distension. There is no abdominal tenderness.   Soft and nontender. There is no rebound and no guarding.   Musculoskeletal:         General: Edema (Mild edema to the mid tibia bilaterally.) present. No tenderness.      Cervical back: Neck supple.     Neurological: She is alert and oriented to person, place, and time. She has normal strength. No cranial nerve deficit or sensory deficit. GCS score is 15. GCS eye subscore is 4. GCS verbal subscore is 5. GCS motor subscore is 6.   Skin: Skin is warm and dry. Capillary refill takes less than 2 seconds. No rash noted.   Psychiatric: She has a normal mood and affect.       ED Course   Procedures  Labs Reviewed   CBC W/ AUTO DIFFERENTIAL - Abnormal; Notable for the following components:       Result Value    RBC 3.76 (*)     Hemoglobin 11.7 (*)     Hematocrit 33.7 (*)     MCH 31.1 (*)     All other components within normal limits   COMPREHENSIVE METABOLIC PANEL - Abnormal; Notable for the following components:    Sodium 123 (*)     Chloride 90 (*)     CO2 22 (*)     All other components within normal limits    URINALYSIS, REFLEX TO URINE CULTURE - Abnormal; Notable for the following components:    Leukocytes, UA 1+ (*)     All other components within normal limits    Narrative:     Specimen Source->Urine   CORTISOL, 8AM - Abnormal; Notable for the following components:    Cortisol, 8 AM 4.00 (*)     All other components within normal limits   INFLUENZA A & B BY MOLECULAR   B-TYPE NATRIURETIC PEPTIDE   TROPONIN I   SARS-COV-2 RNA AMPLIFICATION, QUAL   SODIUM, URINE, RANDOM    Narrative:     Specimen Source->Urine   OSMOLALITY, URINE RANDOM    Narrative:     Specimen Source->Urine   OSMOLALITY, SERUM   TSH   T4, FREE   URINALYSIS MICROSCOPIC    Narrative:     Specimen Source->Urine   AMYLASE   CBC W/ AUTO DIFFERENTIAL   COMPREHENSIVE METABOLIC PANEL   CORTISOL, 8AM   LIPASE   SODIUM   ACTH   ANTIDIURETIC HORMONE   COMPREHENSIVE METABOLIC PANEL   LIPASE   AMYLASE   POCT URINE PREGNANCY   TYPE & SCREEN   ISTAT CHEM8     EKG Readings: (Independently Interpreted)   Initial Reading: No STEMI. Rhythm: Sinus Bradycardia. Heart Rate: 58. Ectopy: No Ectopy. Conduction: Normal. ST Segments: Normal ST Segments. T Waves Flipped: III. Axis: Normal.   New T-wave inversion present in lead 3 when compared to previous EKG on 10/08/2022.     Imaging Results              MRI Brain Without Contrast (Final result)  Result time 10/18/22 02:02:26      Final result by Flip Hedrick MD (10/18/22 02:02:26)                   Impression:      No acute intracranial abnormalities.    Post transsphenoidal pituitary mass resection and sphenoidotomy with residual soft tissue at the sella, similar when compared with 10/04/2022.  Unclear if findings are related to packing material and/or residual mass on this noncontrast study.  Suggest correlation with operative findings and consider further evaluation with nonemergent contrast enhanced MRI as clinically warranted.    Similar to mildly worse fluid and soft tissue opacification of the sphenoid sinus  and ethmoid air cells when compared with 10/04/2022.    Electronically signed by resident: Isidro Ortega  Date:    10/18/2022  Time:    01:24    Electronically signed by: Flip Hedrick MD  Date:    10/18/2022  Time:    02:02               Narrative:    EXAMINATION:  MRI BRAIN WITHOUT CONTRAST    CLINICAL HISTORY:  Headache, chronic, new features or increased frequency;s/p transphenoidal surgery;.    TECHNIQUE:  Multiplanar multisequence MR imaging of the brain was performed without contrast.    COMPARISON:  CT head 10/15/2022, MRI pituitary 10/04/2022, MRI brain 08/15/2022    FINDINGS:  Post transsphenoidal pituitary mass resection.  T2 hyperintense fluid in the sphenoid sinus and posterior ethmoid air cells, similar to mildly worse when compared with the prior study.  Residual soft tissue fullness at the sella which overall has a similar appearance when compared with 10/04/2022 MRI allowing for absence of IV contrast.  There is decreased mass effect from pituitary mass when compared with preoperative MRI dated 08/15/2022, consistent with history of mass resection.    No extra-axial blood or fluid collections.    No diffusion restriction to suggest acute infarct. No areas of gradient susceptibility to suggest intraparenchymal hemorrhage.  No new mass lesion or edema.    Ventricles and sulci are normal in size and midline for age without evidence of hydrocephalus.    Normal T2 skull base vascular flow voids are preserved.    Bone marrow signal intensity is within normal limits.                                       X-Ray Chest AP Portable (Final result)  Result time 10/18/22 00:36:31      Final result by Jose Manuel Alexis MD (10/18/22 00:36:31)                   Impression:      Mild diminished depth of inspiration, there is no additional radiographic evidence for superimposed acute intrathoracic process.      Electronically signed by: Jose Manuel Alexis  Date:    10/18/2022  Time:    00:36               Narrative:     EXAMINATION:  XR CHEST AP PORTABLE    CLINICAL HISTORY:  Chest pain, unspecified    TECHNIQUE:  Single frontal view of the chest was performed.    COMPARISON:  Chest radiograph October 8, 2022    FINDINGS:  Single portable chest view is submitted.  There is mild diminished depth of inspiration and minimal rotation, when accounting for these factors the cardiomediastinal silhouette appears appropriate.  Accentuation of pulmonary bronchovascular markings consistent with mild diminished depth of inspiration noted.    There is no evidence for confluent infiltrate or consolidation, significant pleural effusion or pneumothorax.  The visualized osseous structures appear intact.  Chronic change noted.  Surgical clips of the right upper quadrant may relate to prior cholecystectomy.                                       Medications   sodium chloride 0.9% flush 10 mL (has no administration in time range)   polyethylene glycol packet 17 g (has no administration in time range)   levothyroxine tablet 50 mcg (has no administration in time range)   prochlorperazine injection Soln 2.5 mg (2.5 mg Intravenous Given 10/18/22 0022)   sodium chloride 0.9% bolus 1,000 mL (0 mLs Intravenous Stopped 10/18/22 0414)     Medical Decision Making:   Initial Assessment:   47-year-old female who appears to be uncomfortable presents to the ED with  at bedside with multiple complaints.  ABC's intact.  Physical exam is grossly unremarkable.  Differential Diagnosis:   ICH    Electrolyte abnormality  Anemia  ED Management:  See ED course.          Attending Attestation:   Physician Attestation Statement for Resident:  As the supervising MD   Physician Attestation Statement: I have personally seen and examined this patient.   I agree with the above history.  -:   As the supervising MD I agree with the above PE.     As the supervising MD I agree with the above treatment, course, plan, and disposition.                  ED Course as of 10/18/22  0608   Tue Oct 18, 2022   0119 Following initial evaluation I gave the patient 2.5 mg of IV Compazine and a 1 L bolus of normal saline for symptomatic control.  I will re-evaluate the patient following laboratory results. [BG]   0228 Sodium(!): 123  Based on previous notes it appears that the patient was diagnosed with diabetes insipidus and was prescribed DDAVP today, however, the patient has not taken a dose today. [BG]   0229 MRI Brain Without Contrast  MRI shows mildly worse fluid and soft tissue opacification of the sphenoid sinus and ethmoid air cells. [BG]   0229 In context of patient's continued worsening symptoms and hyponatremia I consulted Neurosurgery and they will come down to see the patient. [BG]   0416 Neurosurgery came to see the patient and they recommended that I order UA, urine Na, urine osm, serum osm, pituitary panel, am cortisol.  Discussed the case with neuro critical care and they will come down to see the patient. [BG]   0607 She will be admitted as an inpatient to neuro critical care for acute hyponatremia to Dr. Vipul Arshad. [BG]      ED Course User Index  [BG] Shameka Camacho MD                 Clinical Impression:   Final diagnoses:  [R11.2] Nausea and vomiting  [R07.9] Chest pain  [E87.1] Acute hyponatremia (Primary)        ED Disposition Condition    Admit Stable                Shameka Camacho MD  Resident  10/18/22 0608       Donna Morataya MD  10/18/22 0617

## 2022-10-18 NOTE — ED TRIAGE NOTES
Tessa Wynne, a 47 y.o. female presents to the ED w/ complaint of HA and vomiting starting yesterday. Pt concerned due to recent pituitary mass removal on 10/3. Patient has been seen for similar symptoms twice since surgery. States pressure on chest and sob due to frequent vomiting.    Triage note:  Chief Complaint   Patient presents with    Multiple Complaints     Headache, nausea and vomiting. Reports surgical removal of tumor in brain on the 3rd with multiple hospital visits  since. Neuro intact in triage. Aox4, GCS 15.     Review of patient's allergies indicates:  No Known Allergies  Past Medical History:   Diagnosis Date    Elevated fasting glucose     GERD (gastroesophageal reflux disease)     Hypothyroidism     PAD (peripheral artery disease)     Pituitary lesion 9/7/2022

## 2022-10-18 NOTE — HPI
Ms. Wynne is a 47F s/p TNTS resection of pituitary macroadenoma w/ Dr. Ferrell on 10/3 who presents to the ED with worsening HA, nausea, vomiting. Since her surgery, she has presented to the ED twice previously with similar symptoms, however she states this is the worst she has felt. Posterior headache is worse with sitting or standing up vs laying down, pt states she initially had clear drainage from her nose after her surgery, but it resolved before she was able to take a sample. She also sometimes has a sour taste in her mouth, which is new since surgery. Over the last couple of nights, pt has been urinating frequently, despite restricting her water intake to 1L/day over approximately the past week. She denies any focal weakness, numbness, or vision changes since surgery (had bitemporal visual field defects R>L pre op).     In the ED, Na found to be 123. Pt given 1 L NS bolus. Pt had also described an episode of chest pain and SOB yesterday, sx have since resolved and trop and BNP wnl. MRI brain shows no ICH, edema, or mass effect, relatively unchanged residual soft tissue as compared to 10/4 post op scan.

## 2022-10-18 NOTE — CONSULTS
[facility]  Endocrinology  Consult Note    Consult Requested by: Vipul Arshad DO   Reason for admit: Hyponatremia    HISTORY OF PRESENT ILLNESS:  Tessa Wynne is a 47 old female with a history of pituitary macroadenoma now status post TSR w/ Dr. Ferrell on 10/3/2022 who presents to the ED with worsening HA, nausea and vomiting.  She has had 2 other prior ER visits with similar complaints but on this visit she was also found to have low serum sodium level.  There has been the complaint of polyuria/polydipsia over the last couple of nights and a phone call with endocrinology yesterday included instructions to stop fluid restriction and to start Desmopressin PRN up to twice daily for large urine volumes spanning more than 2 hours.  It is noted in chart review that her 7 day post-op sodium level was 134 although she did have an ER visit on day 5 post op with sodium level of 129.  Since that time her serum sodium has maintained above 130 up until the overnight labs on presentation to the ER where sodium level was found to be 123.      In the ER her serum sodium was treated with 1.5 liter normal saline fluid bolus with repeat sodium level 4 hours at 133.  Remaining labs were mostly unremarkable.  They did peform a serum cortisol and ACTH level at 4am as seen below.  MRI of the brain was performed which was reported as similar to post-op scan earlier in the month.  There was some mild sphenoid and ethmoid opacification seen.          Pituitary Adenoma Pathology: 10/03/2022  GONADOTROPH ADENOMA, IMMUNOREACTIVE FOR ALPHA-SUBUNIT AND SF1    Regarding Hyponatremia:    Confirmed that patient has not taken any DDAVP doses prior to this hospitalization.  She had been having nausea and vomiting just prior to ER visit.  She has has had more issues with polyuria and polydipsia recently.  There has been some burning with urination as well but this has since subsided.  She had a headache overnight prompting her  "ER visit and noted a BP at home of 150 systolic.  There was some dizziness noted at the time.     -  Lab Results   Component Value Date    SPECGRAV 1.010 10/18/2022    SPECGRAV 1.010 10/05/2022    SPECGRAV 1.010 10/05/2022    SPECGRAV 1.010 10/05/2022    LABOSMO 281 10/18/2022     (L) 10/18/2022     (L) 10/18/2022     (L) 10/15/2022    OSMOLALITYUR 251 10/18/2022    SODIUMURR 68 10/18/2022    TSH 2.966 10/18/2022    TSH 0.772 10/08/2022    FREET4 0.85 10/18/2022    FREET4 0.65 (L) 08/26/2022          With regards to adrenal insufficiency:     She denies any steroids at home of any kind.  As above has had nausea and vomiting with pre-admission symptoms starting around 8pm.  This improved with IV fluids and headache also improved.  Given her symptoms overnight with nausea, vomiting and headache her Cortisol and ACTH levels were drawn.  ACTH inappropriately low normal in that setting.       -  Lab Results   Component Value Date    K 4.0 10/18/2022    K 4.0 10/18/2022     10/18/2022     (L) 10/18/2022    CREATININE 0.9 10/18/2022    CREATININE 0.7 10/18/2022    ACTH 11 10/18/2022    ACTH 19 08/26/2022     /61   Pulse 69   Temp 97.8 °F (36.6 °C) (Oral)   Resp 17   Ht 5' 4" (1.626 m)   Wt 72.6 kg (160 lb)   LMP 01/15/2022   SpO2 99%   Breastfeeding No   BMI 27.46 kg/m²       - No presence of chronic steroids, exposure to checkpoint inhibitors, autoimmune disease, or infiltrative disease  No   Yes  [x]    []   Chronic Steroids  [x]    []   Check point inhibitor exposure  [x]    []   Autoimmune diseases  [x]    []   Infiltrative diseases    - Symptoms: nausea/vomiting, abdominal pain  - No evidence of hypotension, orthostatic hypotension, or hypoglycemia  No   Yes  [x]    []   Hypotension (see vitals below)  [x]    []   Orthostatic hypotension  []    [x]   Nausea/Vomiting  []    [x]   Abdominal pain  [x]    []   Hypoglycemia       Medications and/or Treatments Impacting Glycemic " Control:  Immunotherapy:    Immunosuppressants     None        Steroids:   Hormones (From admission, onward)    None        Pressors:    Autonomic Drugs (From admission, onward)    None          Medications Prior to Admission   Medication Sig Dispense Refill Last Dose    calcium citrate/vitamin D3 (CITRACAL REGULAR ORAL) Take by mouth.       codeine-butalbital-ASA-caffeine (BUTALBITAL COMPOUND-CODEINE) 34--40 mg Cap Take 1 capsule by mouth every 4 (four) hours as needed. 10 capsule 0     desmopressin (DDAVP) 0.1 MG Tab Take one tablet up to 2 times a day for increased urination. 60 tablet 11     ferrous sulfate (IRON ORAL) Take by mouth.       gabapentin (NEURONTIN) 100 MG capsule Take 1 capsule (100 mg total) by mouth 3 (three) times daily. for 14 days 42 capsule 0     levothyroxine (SYNTHROID) 50 MCG tablet Take 1 tablet (50 mcg total) by mouth before breakfast. 30 tablet 11     LIDOcaine (LIDODERM) 5 % Place 1 patch onto the skin daily as needed (pain). Remove & Discard patch within 12 hours or as directed by MD 15 patch 0     mv,calcium,min/iron/folic/vitK (ONE-A-DAY WOMEN'S COMPLETE ORAL) Take by mouth.       omeprazole (PRILOSEC) 40 MG capsule Take 1 capsule (40 mg total) by mouth every morning. 30 capsule 11     oxyCODONE (ROXICODONE) 5 MG immediate release tablet Take 1 tablet (5 mg total) by mouth every 6 (six) hours as needed for Pain (take as needed for pain/headaches). 10 tablet 0     oxymetazoline (AFRIN) 0.05 % nasal spray 3 sprays by Nasal route every 2 (two) hours as needed (epistaxis). 30 mL 0     sodium chloride (OCEAN) 0.65 % nasal spray 2 sprays by Nasal route every 4 (four) hours while awake. 44 mL 0        Current Facility-Administered Medications   Medication Dose Route Frequency Provider Last Rate Last Admin    aluminum & magnesium hydroxide-simethicone 400-400-40 mg/5 mL suspension 30 mL  30 mL Oral Q6H PRN Vipul Arshad DO        heparin (porcine) injection 5,000  Units  5,000 Units Subcutaneous Q8H Myah DESTINY Salmon NP   5,000 Units at 10/18/22 1428    levothyroxine tablet 50 mcg  50 mcg Oral Before breakfast Judy Rockwell PA-C   50 mcg at 10/18/22 0759    magnesium oxide tablet 800 mg  800 mg Oral PRN April GLisa TrevizopiccoTILA trevizo-AGUSTIN        magnesium oxide tablet 800 mg  800 mg Oral PRN April GLisa TrevizopiccoLAURYN trevizo        ondansetron injection 8 mg  8 mg Intravenous Q8H PRN April G. Lopiccolo, PA-C        pantoprazole EC tablet 40 mg  40 mg Oral Daily Vipul DIXON JeancarlosDO   40 mg at 10/18/22 1122    polyethylene glycol packet 17 g  17 g Oral Daily Judy Rockwell PA-C        potassium bicarbonate disintegrating tablet 35 mEq  35 mEq Oral PRN April G. Lopiccolo, PA-C        potassium bicarbonate disintegrating tablet 50 mEq  50 mEq Oral PRN April G. Lopiccolo, PA-C        potassium bicarbonate disintegrating tablet 60 mEq  60 mEq Oral PRN April G. Lopiccolo, PA-C        potassium, sodium phosphates 280-160-250 mg packet 2 packet  2 packet Oral PRN April G. Lopiccolo, PA-C        potassium, sodium phosphates 280-160-250 mg packet 2 packet  2 packet Oral PRN April G. Lopiccolo, PA-C        potassium, sodium phosphates 280-160-250 mg packet 2 packet  2 packet Oral PRN April G. Lopiccolo, PA-C        sodium chloride 0.9% flush 10 mL  10 mL Intravenous PRN Judy Rockwell PA-C           Interval HPI:   Overnight events: Admitted with concern of low sodium and now status post 1.5 liters IV NS.  Improvement in headache, nausea and vomiting.  Sitting up in chair without current complaints.    Eating:    Yes  Nausea: No  Hypoglycemia and intervention: No  Fever: No      ROS:  As above    Labs Reviewed and Include:  BASELINE Creatinine:   [unfilled]  [unfilled]  [unfilled]  Recent Labs   Lab 10/18/22  0430 10/18/22  1404   GLU 87 120*   CALCIUM 8.5* 9.0   ALBUMIN 3.6  --    PROT 6.3  --    * 137   K 4.0 4.0   CO2 23 26    105   BUN 7 9   CREATININE 0.7  0.9   ALKPHOS 59  --    ALT 18  --    AST 19  --    BILITOT 0.5  --      Lab Results   Component Value Date    HGBA1C 6.1 (H) 07/29/2022       Nutritional status:   Body mass index is 27.46 kg/m².  Lab Results   Component Value Date    ALBUMIN 3.6 10/18/2022    ALBUMIN 3.8 10/18/2022    ALBUMIN 4.2 10/15/2022     No results found for: PREALBUMIN    Estimated Creatinine Clearance: 74.7 mL/min (based on SCr of 0.9 mg/dL).         PHYSICAL EXAMINATION:  Vitals:    10/18/22 1305   BP: (!) 93/53   Pulse: (!) 59   Resp: 17   Temp:      Body mass index is 27.46 kg/m².    Physical Exam  Constitutional:       Appearance: Normal appearance.   HENT:      Head: Normocephalic and atraumatic.   Eyes:      Extraocular Movements: Extraocular movements intact.   Cardiovascular:      Rate and Rhythm: Normal rate and regular rhythm.      Pulses: Normal pulses.   Pulmonary:      Effort: Pulmonary effort is normal.      Breath sounds: Normal breath sounds.   Abdominal:      General: Abdomen is flat.      Palpations: Abdomen is soft.      Comments: Minimal tenderness near umbilicus on deep palpation   Musculoskeletal:         General: No swelling.   Skin:     General: Skin is dry.   Neurological:      General: No focal deficit present.      Mental Status: She is alert and oriented to person, place, and time.   Psychiatric:         Mood and Affect: Mood normal.         Behavior: Behavior normal.     .     ASSESSMENT and PLAN:    * Hyponatremia  - Etiology of this hyponatremia remains uncertain at this time, and on exam patient appears euvolemic.  Labs do show some concern for secondary adrenal insufficiency despite being normal status post TSR on 10/03/2022, and this may be playing a partial role in low sodium level.  She has had sodium levels checked fairly frequently at least once weekly since her discharge from the hospital and this sudden drop to 123 on overnight labs is unusual.  Possibility of abnormal lab draw or drawing blood  sample off of line used with residual saline in the tubing.    Treatment:  - Now status post total of 1.5 liters of normal saline given overnight with improvement in sodium level of 10 points from 123 to 133 early this AM.(If SIADH we would not typically expect improvement in sodium level in someone who is euvolemic like this patient)  - Sodium level has since improved further to 137 on latest lab  - Patient can continue to drink to her thirst for now, no further intervention planned at this moment      Adrenal insufficiency  - Overnight labs have been reviewed in the setting of her acute ER presentation with headache, nausea and vomiting.  Confirmed no outside steroids taken to block/shut off adrenal steroid production.  We would expect her cortisol level to be elevated more than 4 as overnight labs have shown.  In addition her ACTH was low normal which is inappropriate in her clinical condition and with her cortisol level.  This points towards a concern of secondary adrenal insufficiency.      - Review of MRI is limited as this was not a pituitary protocol study and no obvious abnormality is seen that would lead to this secondary AI type picture.  Her symptoms have improved with IV fluids but due to the concern and colelction of presenting symptoms she was given a dose of hydrocortisone  mg.      Plan:   We will transition her to more physiologic type dosing of Hydrocortisone at 20 mg in the AM and 10 mg in the PM starting tomorrow morning.  She can continue this during the hospital stay and can discharge on that regimen with plans for repeat testing and follow up outpatient in the pituitary clinic.      At discharge continue Hydrocortisone 20 mg in the morning (6-9 AM) and 10 mg in the afternoon/evening (3-6 PM).        Bilious vomiting with nausea  Improvement seen with correction of hyponatremia.       S/P transsphenoidal selective adenomectomy  Plan for follow up in Pituitary clinic with Dr. Batista and  neurosurgery in the coming weeks which will need to be arranged.            Carlos Sanches, DO  Endocrinology  [facility]

## 2022-10-18 NOTE — PT/OT/SLP EVAL
Physical Therapy Evaluation  Co-Evaluation with OT    Patient Name:  Tessa Wynne   MRN:  [id number]    Co-treatment performed due to patient's multiple deficits requiring two skilled therapists to appropriately and safely assess patient's strength and endurance while facilitating functional tasks in addition to accommodating for patient's activity tolerance.     Recommendations:     Discharge Recommendations:  home   Discharge Equipment Recommendations: none   Barriers to discharge: Increased skilled assistance needed; fall risk     Assessment:     Tessa Wynne is a 47 y.o. female admitted with a medical diagnosis of Hyponatremia.  She presents with the following impairments/functional limitations:  weakness, impaired endurance, impaired self care skills, impaired functional mobility, gait instability, impaired balance . Patient tolerated session well. Patient reports feeling safe to d/c home when medically stable.  Patient will benefit from PT services to address the mentioned deficits in order to promote an improve functional mobility status. Patient is currently functioning below PLOF. Upon d/c, recommendation home  for Tessa Wynne in order to progress towards an improved level of functional mobility independence.     Rehab Prognosis: Good; patient would benefit from acute skilled PT services to address these deficits and reach maximum level of function.    Recent Surgery: * No surgery found *      Plan:     During this hospitalization, patient to be seen 3 x/week to address the identified rehab impairments via gait training, therapeutic activities, therapeutic exercises, neuromuscular re-education and progress toward the following goals:    Plan of Care Expires:  11/17/22    History:     Past Medical History:   Diagnosis Date    Elevated fasting glucose     GERD (gastroesophageal reflux disease)     Hypothyroidism     PAD (peripheral artery disease)     Pituitary lesion 9/7/2022       Past Surgical  "History:   Procedure Laterality Date    CHOLECYSTECTOMY      ENDOMETRIAL ABLATION N/A 2/8/2022    Procedure: ABLATION, ENDOMETRIUM;  Surgeon: Ariel Butler MD;  [facility];  Service: OB/GYN;  Laterality: N/A;    EXCISION, NEOPLASM, PITUITARY, TRANSSPHENOIDAL APPROACH, USING COMPUTER-ASSISTED NAVIGATION N/A 10/3/2022    Procedure: EXCISION, NEOPLASM, PITUITARY, TRANSSPHENOIDAL APPROACH, USING COMPUTER-ASSISTED NAVIGATION;  Surgeon: Ed Astorga MD;  [facility];  Service: ENT;  Laterality: N/A;    EXCISION, NEOPLASM, PITUITARY, TRANSSPHENOIDAL APPROACH, USING COMPUTER-ASSISTED NAVIGATION N/A 10/3/2022    Procedure: EXCISION, NEOPLASM, PITUITARY, TRANSSPHENOIDAL APPROACH, USING COMPUTER-ASSISTED NAVIGATION;  Surgeon: Miguel Ángel Ferrell DO;  [facility];  Service: Neurosurgery;  Laterality: N/A;    HYSTEROSCOPY WITH DILATION AND CURETTAGE OF UTERUS N/A 2/8/2022    Procedure: HYSTEROSCOPY, WITH DILATION AND CURETTAGE OF UTERUS;  Surgeon: Ariel Butler MD;  Location: [facility];  Service: OB/GYN;  Laterality: N/A;       Subjective     Chief Complaint: none   Patient/Family Comments/goals: "My  has been helping me."  Pain/Comfort:  Pain Rating 1: 0/10  Pain Rating Post-Intervention 1: 0/10    Patients cultural, spiritual, Adventist conflicts given the current situation: no    Living Environment:  Patient's living environment is as follows:  Home type home    1 or 2 stories  single-story    Number of CHRISTIAN/ rails 0 CHRISTIAN    AD used?/Owned?  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 Yes - but not currently    Individuals living with patient:  Spouse, 2 kids (22 and 16)    Hobbies/Roles: None stated    Prior to admission, patients level of function was (I) for ADLs and mobility; over the last 2 weeks has been needing help from her spouse. Patient reports 0 falls. Equipment used at home: none.  DME owned (not currently used): none. "  Upon discharge, patient will have assistance from spouse.  Objective:   Communicated with RN prior to session.  Patient found HOB elevated with telemetry, peripheral IV, pulse ox (continuous)  upon PT entry to room. See below for detailed functional assessment. Patient agreeable to participate in initial evaluation.    General Precautions: Standard, fall   Orthopedic Precautions:N/A   Braces: N/A     Vitals:    10/18/22 1405   BP: 97/61   Pulse: 63   Resp: 17   Temp:        Exams:  Cognitive Exam:  Patient is oriented to Person, Place, Time, Situation  Patient follows 100% of one -step commands   Fine Motor Coordination:  Test:   Comments    Rapid ankle DF/PF  Intact     Postural Exam:  Patient presented with the following abnormalities:    -       Rounded shoulders  -       Forward head  Sensation:    LEFT LE: Intact light touch to BLEs RIGHT LE: Intact light touch to BLEs     ROM and Strength   Right Lower Extremity  Left Lower Extremity    Hip Flexion (Iliopsoas)  WFL WFL   Knee Extension (Quadriceps) WFL WFL   Knee Flexion (Hamstrings) WFL WFL   Ankle DF (Ant. Tib) WFL WFL   Ankle PF (Gastrocnemius)  WFL WFL     Functional Mobility:  Bed Mobility:    Rolling Right: stand by assistance  Scooting: stand by assistance  Supine to Sit: stand by assistance  Transfers:     Sit to Stand:  stand by assistance with no AD  Gait: 15ft + 35ft with CGA and HHA    decreased speed, swing-through gait pattern , forward flexed posture, increased cervical flexion , and no overt LOB     Balance:    Level of assist   Static Sitting  (I)   Dynamic Sitting  (I)   Static Standing  SBA   Dynamic Standing  CGA     Therapeutic Activities and Education:  -Patient educated on the role and goal of PT services during acute care LOS. Question and concerns acknowledged and answered as appropriate.   -Will continue to educated as needed.      - Educated on the importance of OOB mobility within safe range in order to decrease adverse effects of  prolonged bedrest.        - Patient encouraged to get OOBTC 3x daily with assistance  -White board updated in patients room to reflect level of assistance needed with nursing.       - Patient is clear to ambulate to/from bathroom with RN/PCT, assist x1  for OOB mobility with RN.     AM-PAC 6 CLICK MOBILITY  Total Score:20     Patient left up in chair with all lines intact, call button in reach, chair alarm on, and RN notified.  White board updated in patient room to reflect level of function with nursing.     GOALS:   Multidisciplinary Problems       Physical Therapy Goals          Problem: Physical Therapy    Goal Priority Disciplines Outcome Goal Variances Interventions   Physical Therapy Goal     PT, PT/OT Ongoing, Progressing     Description: Goals to be met by: 10/28/22     Patient will increase functional independence with mobility by performin. Supine to sit with Thayer  2. Sit to supine with Thayer  3. Sit to stand transfer with Thayer  4. Gait  x 100 feet with Thayer.   5. Stand for 5 minutes with Thayer   6. Lower extremity exercise program x10 reps per handout, with independence                         Time Tracking:     PT Received On: 10/18/22  PT Start Time: 1434     PT Stop Time: 1450  PT Total Time (min): 16 min     Billable Minutes: Evaluation 8 and Gait Training 8      Yolanda Randle, PT  10/18/2022

## 2022-10-18 NOTE — PLAN OF CARE
[facility]  Initial Discharge Assessment       Primary Care Provider: Sabiha Overton MD    Admission Diagnosis: Acute hyponatremia [E87.1]  Hyponatremia [E87.1]  Nausea and vomiting [R11.2]  Chest pain [R07.9]    Admission Date: 10/17/2022  Expected Discharge Date: 10/20/2022    Discharge Barriers Identified: None    Payor: MEDICAID / Plan: [employer] BLUE [place] [place] / Product Type: Managed Medicaid /     Extended Emergency Contact Information  Primary Emergency Contact: Cy Wynne  Mobile Phone: [phone]  Relation: Son    Discharge Plan A: Home  Discharge Plan B: Home      [id number] - [place], [place] - [address] AT [facility] [address]  [address] [place] [address]  Phone: [phone] Fax: [phone]    Past Medical History:   Diagnosis Date    Elevated fasting glucose     GERD (gastroesophageal reflux disease)     Hypothyroidism     PAD (peripheral artery disease)     Pituitary lesion 9/7/2022     CM met with patient in room for Discharge Planning Assessment.  Patient is able to answer questions.  Per patient, she lives with Zafar Wynne () and 2 kids in a single story house with 0 step(s) to enter.   Per patient, she was independent with ADLS and used no dme for ambulation.  Patient will have assistance from her  upon discharge.   Discharge Planning Booklet given to patient/family and discussed.  All questions addressed.  CM will follow for needs.    Initial Assessment (most recent)       Adult Discharge Assessment - 10/18/22 1502          Discharge Assessment    Assessment Type Discharge Planning Assessment     Confirmed/corrected address, phone number and insurance Yes     Confirmed Demographics Correct on Facesheet     Source of Information patient     Communicated TIMOTHY with patient/caregiver Date not available/Unable to determine     Reason For Admission Hyponatremia     Lives With spouse     Facility Arrived From:  Home     Do you expect to return to your current living situation? Yes     Do you have help at home or someone to help you manage your care at home? Yes     Who are your caregiver(s) and their phone number(s)? Zafar Wynne ([facility]) [phone]     Prior to hospitilization cognitive status: Alert/Oriented     Current cognitive status: Alert/Oriented     Walking or Climbing Stairs Difficulty none     Dressing/Bathing Difficulty none     Home Layout Able to live on 1st floor     Equipment Currently Used at Home none     Readmission within 30 days? Yes     Patient currently being followed by outpatient case management? No     Do you currently have service(s) that help you manage your care at home? No     Do you take prescription medications? Yes     Do you have prescription coverage? Yes     Coverage Healthy Blue     Do you have any problems affording any of your prescribed medications? No     Is the patient taking medications as prescribed? yes     Who is going to help you get home at discharge? Zafar Wynne ()     How do you get to doctors appointments? family or friend will provide     Are you on dialysis? No     Do you take coumadin? No     Discharge Plan A Home     Discharge Plan B Home     Discharge Plan discussed with: Patient     Discharge Barriers Identified None        Relationship/Environment    Name(s) of Who Lives With Patient Zafar Wynne ()                     Readmission Assessment (most recent)       Readmission Assessment - 10/18/22 1505          Readmission    Why were you hospitalized in the last 30 days? Pituitary Macroadenoma     Why were you readmitted? Alarmed about signs/symptoms     When you left the hospital how did you feel? ok     When you left the hospital where did you go? Home with Family     Did patient/caregiver refused recommended DC plan? No     Tell me about what happened between when you left the hospital and the day you returned. SOB and vomiting     Did you  try to manage your symptoms your self? No     Did you call anyone? Yes     Who did you call? On Call Nurse     Did you try to see or did see a doctor or nurse before you came? No     Why? was told by on call nurse to go to the ED     Did you have  a follow-up appointment on discharge? Yes     Was this a planned readmission? No                    Mary Pruett RN, CCRN-K, [facility]  Neuro-Critical Care   [phone] [phone]

## 2022-10-18 NOTE — PT/OT/SLP EVAL
Speech Language Pathology Evaluation  Bedside Swallow  Discharge Summary    Patient Name:  Tessa Wynne   MRN:  [id number]  Admitting Diagnosis: Hyponatremia    Recommendations:                 General Recommendations:  Follow-up not indicated  Diet recommendations:  Regular, Thin   Aspiration Precautions: Standard aspiration precautions   General Precautions: Standard,    Communication strategies:  none    History:     Past Medical History:   Diagnosis Date    Elevated fasting glucose     GERD (gastroesophageal reflux disease)     Hypothyroidism     PAD (peripheral artery disease)     Pituitary lesion 9/7/2022       Past Surgical History:   Procedure Laterality Date    CHOLECYSTECTOMY      ENDOMETRIAL ABLATION N/A 2/8/2022    Procedure: ABLATION, ENDOMETRIUM;  Surgeon: Ariel Butler MD;  Location: [facility];  Service: OB/GYN;  Laterality: N/A;    EXCISION, NEOPLASM, PITUITARY, TRANSSPHENOIDAL APPROACH, USING COMPUTER-ASSISTED NAVIGATION N/A 10/3/2022    Procedure: EXCISION, NEOPLASM, PITUITARY, TRANSSPHENOIDAL APPROACH, USING COMPUTER-ASSISTED NAVIGATION;  Surgeon: Ed Astorga MD;  Location: [facility] OR [address] [place] [address];  Service: ENT;  Laterality: N/A;    EXCISION, NEOPLASM, PITUITARY, TRANSSPHENOIDAL APPROACH, USING COMPUTER-ASSISTED NAVIGATION N/A 10/3/2022    Procedure: EXCISION, NEOPLASM, PITUITARY, TRANSSPHENOIDAL APPROACH, USING COMPUTER-ASSISTED NAVIGATION;  Surgeon: Miguel Ángel Ferrell DO;  [facility];  Service: Neurosurgery;  Laterality: N/A;    HYSTEROSCOPY WITH DILATION AND CURETTAGE OF UTERUS N/A 2/8/2022    Procedure: HYSTEROSCOPY, WITH DILATION AND CURETTAGE OF UTERUS;  Surgeon: Ariel Butler MD;  Location: [facility];  Service: OB/GYN;  Laterality: N/A;         Subjective     Patient awake and alert.     Pain/Comfort:   No c/o pain    Respiratory Status: Room air    Objective:     Oral Musculature Evaluation  Oral Musculature: WFL  Dentition: present and adequate  Secretion Management:  adequate  Mucosal Quality: good  Mandibular Strength and Mobility: WFL  Oral Labial Strength and Mobility: WFL  Voice Prior to PO Intake: clear, strong    Bedside Swallow Eval:   Consistencies Assessed:  Thin liquids via straw sips x9  Solids x4      Oral Phase:   WFL    Pharyngeal Phase:   no overt clinical signs/symptoms of aspiration  no overt clinical signs/symptoms of pharyngeal dysphagia    Compensatory Strategies  None    Assessment:     Tessa Wynne is a 47 y.o. female with an SLP diagnosis of Dysphagia.  She presents with no further acute speech therapy needs at this time.    Plan:     Patient to be seen:  4 x/week   Plan of Care expires:     Plan of Care reviewed with:  patient   SLP Follow-Up:  Yes       Discharge recommendations:  other (see comments)   Barriers to Discharge:  None    Time Tracking:     SLP Treatment Date:   10/18/22  Speech Start Time:  0820  Speech Stop Time:  0829     Speech Total Time (min):  9 min    Billable Minutes: Eval Swallow and Oral Function 9    10/18/2022

## 2022-10-18 NOTE — ASSESSMENT & PLAN NOTE
Most likely 2/2 SIADH in post op setting, s/p 1L NS bolus  - recommend ICU admission  - fluid restriction until labs result  - f/u serum osm, urine osm, urine Na, UA spec grav  - check Na q6h, correct slowly  - strict Is/Os  - endocrine consult

## 2022-10-18 NOTE — PLAN OF CARE
10/18/22 1709   Post-Acute Status   Post-Acute Authorization Placement;Other   Post-Acute Placement Status Pending medical clearance/testing   Other Status No Post-Acute Service Needs     Pily Rubio LCSW  Neurocritical Care   [facility]  [id number]

## 2022-10-19 ENCOUNTER — TELEPHONE (OUTPATIENT)
Dept: ENDOCRINOLOGY | Facility: HOSPITAL | Age: 47
End: 2022-10-19
Payer: MEDICAID

## 2022-10-19 VITALS
OXYGEN SATURATION: 99 % | WEIGHT: 161.63 LBS | HEIGHT: 64 IN | HEART RATE: 60 BPM | BODY MASS INDEX: 27.59 KG/M2 | SYSTOLIC BLOOD PRESSURE: 112 MMHG | TEMPERATURE: 98 F | RESPIRATION RATE: 20 BRPM | DIASTOLIC BLOOD PRESSURE: 62 MMHG

## 2022-10-19 DIAGNOSIS — E87.1 HYPONATREMIA: Primary | ICD-10-CM

## 2022-10-19 PROBLEM — G43.909 MIGRAINE: Status: ACTIVE | Noted: 2022-10-19

## 2022-10-19 LAB
ALBUMIN SERPL BCP-MCNC: 4.1 G/DL (ref 3.5–5.2)
ALP SERPL-CCNC: 71 U/L (ref 55–135)
ALT SERPL W/O P-5'-P-CCNC: 23 U/L (ref 10–44)
ANION GAP SERPL CALC-SCNC: 7 MMOL/L (ref 8–16)
AST SERPL-CCNC: 21 U/L (ref 10–40)
BASOPHILS # BLD AUTO: 0.02 K/UL (ref 0–0.2)
BASOPHILS NFR BLD: 0.2 % (ref 0–1.9)
BILIRUB SERPL-MCNC: 0.3 MG/DL (ref 0.1–1)
BUN SERPL-MCNC: 13 MG/DL (ref 6–20)
CALCIUM SERPL-MCNC: 9.7 MG/DL (ref 8.7–10.5)
CHLORIDE SERPL-SCNC: 105 MMOL/L (ref 95–110)
CO2 SERPL-SCNC: 25 MMOL/L (ref 23–29)
CREAT SERPL-MCNC: 0.9 MG/DL (ref 0.5–1.4)
DIFFERENTIAL METHOD: NORMAL
EOSINOPHIL # BLD AUTO: 0 K/UL (ref 0–0.5)
EOSINOPHIL NFR BLD: 0.5 % (ref 0–8)
ERYTHROCYTE [DISTWIDTH] IN BLOOD BY AUTOMATED COUNT: 13.1 % (ref 11.5–14.5)
EST. GFR  (NO RACE VARIABLE): >60 ML/MIN/1.73 M^2
GLUCOSE SERPL-MCNC: 99 MG/DL (ref 70–110)
HCT VFR BLD AUTO: 40.4 % (ref 37–48.5)
HGB BLD-MCNC: 13.4 G/DL (ref 12–16)
IMM GRANULOCYTES # BLD AUTO: 0.02 K/UL (ref 0–0.04)
IMM GRANULOCYTES NFR BLD AUTO: 0.2 % (ref 0–0.5)
LYMPHOCYTES # BLD AUTO: 2.2 K/UL (ref 1–4.8)
LYMPHOCYTES NFR BLD: 26.4 % (ref 18–48)
MAGNESIUM SERPL-MCNC: 2.3 MG/DL (ref 1.6–2.6)
MCH RBC QN AUTO: 30.7 PG (ref 27–31)
MCHC RBC AUTO-ENTMCNC: 33.2 G/DL (ref 32–36)
MCV RBC AUTO: 92 FL (ref 82–98)
MONOCYTES # BLD AUTO: 0.5 K/UL (ref 0.3–1)
MONOCYTES NFR BLD: 5.8 % (ref 4–15)
NEUTROPHILS # BLD AUTO: 5.6 K/UL (ref 1.8–7.7)
NEUTROPHILS NFR BLD: 66.9 % (ref 38–73)
NRBC BLD-RTO: 0 /100 WBC
PHOSPHATE SERPL-MCNC: 4.3 MG/DL (ref 2.7–4.5)
PLATELET # BLD AUTO: 285 K/UL (ref 150–450)
PMV BLD AUTO: 10.7 FL (ref 9.2–12.9)
POTASSIUM SERPL-SCNC: 4.1 MMOL/L (ref 3.5–5.1)
PROT SERPL-MCNC: 7.3 G/DL (ref 6–8.4)
RBC # BLD AUTO: 4.37 M/UL (ref 4–5.4)
SODIUM SERPL-SCNC: 137 MMOL/L (ref 136–145)
WBC # BLD AUTO: 8.4 K/UL (ref 3.9–12.7)

## 2022-10-19 PROCEDURE — 99233 SBSQ HOSP IP/OBS HIGH 50: CPT | Mod: ,,, | Performed by: PSYCHIATRY & NEUROLOGY

## 2022-10-19 PROCEDURE — 25000003 PHARM REV CODE 250

## 2022-10-19 PROCEDURE — 85025 COMPLETE CBC W/AUTO DIFF WBC: CPT

## 2022-10-19 PROCEDURE — 63600175 PHARM REV CODE 636 W HCPCS: Performed by: NURSE PRACTITIONER

## 2022-10-19 PROCEDURE — 83735 ASSAY OF MAGNESIUM: CPT

## 2022-10-19 PROCEDURE — 96372 THER/PROPH/DIAG INJ SC/IM: CPT

## 2022-10-19 PROCEDURE — 94761 N-INVAS EAR/PLS OXIMETRY MLT: CPT

## 2022-10-19 PROCEDURE — 97116 GAIT TRAINING THERAPY: CPT

## 2022-10-19 PROCEDURE — 80053 COMPREHEN METABOLIC PANEL: CPT

## 2022-10-19 PROCEDURE — 84100 ASSAY OF PHOSPHORUS: CPT

## 2022-10-19 PROCEDURE — 63600175 PHARM REV CODE 636 W HCPCS

## 2022-10-19 PROCEDURE — 99233 PR SUBSEQUENT HOSPITAL CARE,LEVL III: ICD-10-PCS | Mod: ,,, | Performed by: PSYCHIATRY & NEUROLOGY

## 2022-10-19 PROCEDURE — G0378 HOSPITAL OBSERVATION PER HR: HCPCS

## 2022-10-19 RX ORDER — LANOLIN ALCOHOL/MO/W.PET/CERES
400 CREAM (GRAM) TOPICAL 2 TIMES DAILY PRN
Qty: 30 TABLET | Refills: 0 | Status: SHIPPED | OUTPATIENT
Start: 2022-10-19 | End: 2022-12-13

## 2022-10-19 RX ORDER — DIPHENHYDRAMINE HCL 50 MG
50 CAPSULE ORAL EVERY 12 HOURS PRN
Qty: 30 CAPSULE | Refills: 0 | Status: SHIPPED | OUTPATIENT
Start: 2022-10-19 | End: 2022-12-13

## 2022-10-19 RX ORDER — HYDROCORTISONE 5 MG/1
5 TABLET ORAL DAILY
Qty: 30 TABLET | Refills: 0 | Status: ON HOLD | OUTPATIENT
Start: 2022-10-19 | End: 2022-10-26 | Stop reason: HOSPADM

## 2022-10-19 RX ORDER — HYDROCORTISONE 10 MG/1
20 TABLET ORAL EVERY MORNING
Status: DISCONTINUED | OUTPATIENT
Start: 2022-10-19 | End: 2022-10-19 | Stop reason: HOSPADM

## 2022-10-19 RX ORDER — HYDROCORTISONE 10 MG/1
10 TABLET ORAL EVERY MORNING
Qty: 30 TABLET | Refills: 0 | Status: ON HOLD | OUTPATIENT
Start: 2022-10-20 | End: 2022-10-26 | Stop reason: HOSPADM

## 2022-10-19 RX ORDER — PANTOPRAZOLE SODIUM 40 MG/1
40 TABLET, DELAYED RELEASE ORAL DAILY
Qty: 30 TABLET | Refills: 11 | Status: CANCELLED | OUTPATIENT
Start: 2022-10-20 | End: 2023-10-20

## 2022-10-19 RX ORDER — ACETAMINOPHEN 325 MG/1
650 TABLET ORAL EVERY 6 HOURS PRN
Qty: 30 TABLET | Refills: 0 | Status: SHIPPED | OUTPATIENT
Start: 2022-10-19 | End: 2022-12-13

## 2022-10-19 RX ORDER — ONDANSETRON 4 MG/1
4 TABLET, ORALLY DISINTEGRATING ORAL EVERY 8 HOURS PRN
Qty: 24 TABLET | Refills: 0 | Status: SHIPPED | OUTPATIENT
Start: 2022-10-19 | End: 2022-11-22

## 2022-10-19 RX ORDER — HYDROCORTISONE 10 MG/1
10 TABLET ORAL DAILY
Status: DISCONTINUED | OUTPATIENT
Start: 2022-10-19 | End: 2022-10-19 | Stop reason: HOSPADM

## 2022-10-19 RX ADMIN — ACETAMINOPHEN 650 MG: 325 TABLET ORAL at 11:10

## 2022-10-19 RX ADMIN — HYDROCORTISONE 20 MG: 10 TABLET ORAL at 09:10

## 2022-10-19 RX ADMIN — LEVOTHYROXINE SODIUM 50 MCG: 50 TABLET ORAL at 06:10

## 2022-10-19 RX ADMIN — HEPARIN SODIUM 5000 UNITS: 5000 INJECTION INTRAVENOUS; SUBCUTANEOUS at 06:10

## 2022-10-19 RX ADMIN — POLYETHYLENE GLYCOL 3350 17 G: 17 POWDER, FOR SOLUTION ORAL at 09:10

## 2022-10-19 RX ADMIN — PANTOPRAZOLE SODIUM 40 MG: 40 TABLET, DELAYED RELEASE ORAL at 09:10

## 2022-10-19 NOTE — ASSESSMENT & PLAN NOTE
Na 123 on admit with normal urine Na, urine osmol and spec gravity, unclear exact hyponatremia etiology, possible hypovolemic hyponatremia given N/V, possible iatrogenic from DDAVP use outpatient  -admit to [facility]   -NSGY following   -consult endocrine   -repeat Na pending post NS bolus in ED   -will eval if NS improves Na before doing FW restriction  -goal ~130, not over   -hold home DDAVP   -close neuro monitoring while correcting Na   -full hyponatremia workup not concerning for SIADH

## 2022-10-19 NOTE — DISCHARGE SUMMARY
[facility] - Neuro Critical Care  Neurosurgery  Discharge Summary      Patient Name: Tessa Wynne  MRN: [id number]  Admission Date: 10/17/2022  Hospital Length of Stay: 1 days  Discharge Date and Time:  10/19/2022 3:35 PM  Attending Physician: Vipul Arshad DO   Discharging Provider: Marilu Harrington MD  Primary Care Provider: Sabiha Overton MD    HPI:   Ms. Wynne is a 47F s/p TNTS resection of pituitary macroadenoma w/ Dr. Ferrell on 10/3 who presents to the ED with worsening HA, nausea, vomiting. Since her surgery, she has presented to the ED twice previously with similar symptoms, however she states this is the worst she has felt. Posterior headache is worse with sitting or standing up vs laying down, pt states she initially had clear drainage from her nose after her surgery, but it resolved before she was able to take a sample. She also sometimes has a sour taste in her mouth, which is new since surgery. Over the last couple of nights, pt has been urinating frequently, despite restricting her water intake to 1L/day over approximately the past week. She denies any focal weakness, numbness, or vision changes since surgery (had bitemporal visual field defects R>L pre op).     In the ED, Na found to be 123. Pt given 1 L NS bolus. Pt had also described an episode of chest pain and SOB yesterday, sx have since resolved and trop and BNP wnl. MRI brain shows no ICH, edema, or mass effect, relatively unchanged residual soft tissue as compared to 10/4 post op scan.       * No surgery found *     Hospital Course: Patient was admitted for hyponatremia on 10/18/22 and was monitored in the neuro-ICU setting. The patient was kept on appropriate DVT prophylaxis during the course of admission. At the time of discharge, the patient was tolerating PO intake without N/V, dysphagia, denied bowel or bladder dysfunction, denied new neurological symptoms, and reported pain controlled with current regimen. The patient will follow up in  clinic as indicated in discharge instructions. All questions were answered and continued treatment/wound care instructions were discussed in detail prior to discharge.        Goals of Care Treatment Preferences:  Code Status: Full Code      Consults:   Consults (From admission, onward)        Status Ordering Provider     Inpatient consult to Endocrinology  Once        Provider:  Jimmy Batista MD    Completed GRADY CHAVEZ            Pending Diagnostic Studies:     Procedure Component Value Units Date/Time    Arginine vasopressin hormone [[id number]] Collected: 10/18/22 0430    Order Status: Sent Lab Status: In process Updated: 10/18/22 0446    Specimen: Blood         Final Active Diagnoses:    Diagnosis Date Noted POA    PRINCIPAL PROBLEM:  Hyponatremia [E87.1] 10/11/2022 Yes    Migraine [G43.909] 10/19/2022 Yes    Bilious vomiting with nausea [R11.14] 10/18/2022 Yes    Adrenal insufficiency [E27.40] 10/18/2022 Unknown    S/P transsphenoidal selective adenomectomy [Z98.890, Z86.018] 10/11/2022 Not Applicable    Hypothyroidism [E03.9] 09/05/2022 Yes    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 Schedule an appointment as soon as possible for a visit in 1 week(s).    Specialty: Family Medicine  Why: Hospital follow up.  A nurse will call you with an appointment.  If you do not receive a call by the end of this week, please call to schedule.  Contact information:  [address] [place] [address] [phone]                       Patient Instructions:      Notify your health care provider if you experience any of the following:  increased confusion or weakness     Notify your health care provider if you experience any of the following:  persistent dizziness, light-headedness, or visual  disturbances     Notify your health care provider if you experience any of the following:  worsening rash     Notify your health care provider if you experience any of the following:  severe persistent headache     Notify your health care provider if you experience any of the following:  difficulty breathing or increased cough     Notify your health care provider if you experience any of the following:  redness, tenderness, or signs of infection (pain, swelling, redness, odor or green/yellow discharge around incision site)     Notify your health care provider if you experience any of the following:  severe uncontrolled pain     Notify your health care provider if you experience any of the following:  persistent nausea and vomiting or diarrhea     Notify your health care provider if you experience any of the following:  temperature >100.4     Medications:  Reconciled Home Medications:      Medication List      START taking these medications    acetaminophen 325 MG tablet  Commonly known as: TYLENOL  Take 2 tablets (650 mg total) by mouth every 6 (six) hours as needed (Headaches (take with magnesium and benadryl)).     diphenhydrAMINE 50 MG capsule  Commonly known as: BENADRYL  Take 1 capsule (50 mg total) by mouth every 12 (twelve) hours as needed (Headaches (take with tylenol and magnesium)).     * hydrocortisone 5 MG Tab  Commonly known as: CORTEF  Take 1 tablet (5 mg total) by mouth once daily. Take daily around 3 to 4pm     * hydrocortisone 10 MG Tab  Commonly known as: CORTEF  Take 1 tablet (10 mg total) by mouth every morning.  Start taking on: October 20, 2022     magnesium oxide 400 mg (241.3 mg magnesium) tablet  Commonly known as: MAG-OX  Take 1 tablet (400 mg total) by mouth 2 (two) times daily as needed (Headaches (take with tylenol and benadryl)).     ondansetron 4 MG Tbdl  Commonly known as: ZOFRAN-ODT  Dissolve 1 tablet (4 mg total) by mouth every 8 (eight) hours as needed (nausea & vomiting).          * This list has 2 medication(s) that are the same as other medications prescribed for you. Read the directions carefully, and ask your doctor or other care provider to review them with you.            CONTINUE taking these medications    CITRACAL REGULAR ORAL  Take by mouth.     DEEP SEA NASAL 0.65 % nasal spray  Generic drug: sodium chloride  2 sprays by Nasal route every 4 (four) hours while awake.     IRON ORAL  Take by mouth.     levothyroxine 50 MCG tablet  Commonly known as: SYNTHROID  Flint 1 tableta (50 mg en total) por vía oral antes del desayuno.  (Take 1 tablet (50 mcg total) by mouth before breakfast.)     NASAL DECONGESTANT (OXYMETAZL) 0.05 % nasal spray  Generic drug: oxymetazoline  3 sprays by Nasal route every 2 (two) hours as needed (epistaxis).     omeprazole 40 MG capsule  Commonly known as: PRILOSEC  Take 1 capsule (40 mg total) by mouth every morning.     ONE-A-DAY WOMEN'S COMPLETE ORAL  Take by mouth.     oxyCODONE 5 MG immediate release tablet  Commonly known as: ROXICODONE  Take 1 tablet (5 mg total) by mouth every 6 (six) hours as needed for Pain (take as needed for pain/headaches).        STOP taking these medications    codeine-butalbital-ASA-caffeine 39--40 mg Cap  Commonly known as: BUTALBITAL COMPOUND-CODEINE     desmopressin 0.1 MG Tab  Commonly known as: DDAVP     gabapentin 100 MG capsule  Commonly known as: NEURONTIN     LIDOcaine 5 %  Commonly known as: LIDODERM            Marilu Harrington MD  Neurosurgery  [facility] - Neuro Critical Care

## 2022-10-19 NOTE — PLAN OF CARE
Endocrinology Plan of Care:    Consulted for hyponatremia    Sodium levels have improved for this patient only with IV fluids on admission.  This AM sodium level is stable and in the normal range.  Outpatient labs were previously concerning for SIADH but in this instance we would not expect improvement in sodium with IV fluids on admission if that were the case.  Still suspect lab error may have been a possibility.  Regardless in the work up there was a concern for adrenal insufficiency with cortisol and ACTH labs.  She was given single dose of hydrocortisone IV and has been switched to oral dosing.  Now at 20 mg in the AM and 10 mg in the PM.  We would suggest switching dose to 10 mg in the AM and 5 mg in the PM on discharge from the hospital.  Given her sodium is stable and vitals are good she should be stable from discharge from endocrine standpoint with plans for follow up in the pituitary clinic with endocrinology and neurosurgery for additional testing and evaluation.      Reach out to endocrine with any questions prior to discharge otherwise outpatient follow up will be arranged     Latest Reference Range & Units 10/15/22 21:42 10/18/22 00:08 10/18/22 04:30 10/18/22 14:04 10/19/22 03:10   Sodium 136 - 145 mmol/L 130 (L) 123 (L) 133 (L) 137 137   Cortisol, 8 AM 4.30 - 22.40 ug/dL   4.00 (L)     ACTH 0 - 46 pg/mL   11     (L): Data is abnormally low    Nicholas Manuel DO [facility], [address]th [address]  [address] [place], [place], [address]    Office: [phone]  Fax: [phone]

## 2022-10-19 NOTE — PLAN OF CARE
[facility] - Neuro Critical Care  Discharge Final Note    Primary Care Provider: Sabiha Overton MD    Expected Discharge Date: 10/19/2022    Patient discharged to home with no post acute needs per MD.  PCP has no available appointment times until December 2022.  PCP's office will call patient with an appointment.     CM sent a message to PCP's office to schedule a follow up.  Response received:  Recipient Status At By   Brooklynn Landis Staff Done 10/20/2022  9:50 AM Indy Falcon MA       Final Discharge Note (most recent)       Final Note - 10/19/22 1627          Final Note    Assessment Type Final Discharge Note     Anticipated Discharge Disposition Home or Self Care     What phone number can be called within the next 1-3 days to see how you are doing after discharge? [phone]                              Contact Info       Sabiha Overton MD   Specialty: Family Medicine   Relationship: PCP - General    [address]  [place] [address]   Phone: [phone]       Next Steps: Schedule an appointment as soon as possible for a visit in 1 week(s)    Instructions: Hospital follow up.  A nurse will call you with an appointment.  If you do not receive a call by the end of this week, please call to schedule.          Mary Pruett, RN, CCRN-K, [facility]  Neuro-Critical Care   X [phone]

## 2022-10-19 NOTE — PROGRESS NOTES
[facility] Neuro Critical Care  Neurocritical Care  Progress Note    Admit Date: 10/17/2022  Service Date: 10/19/2022  Length of Stay: 1    Subjective:     Chief Complaint: Hyponatremia    History of Present Illness: Pt is a 47 y.o. 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 Of note, she has presented a couple times in the past 2 weeks with similar symptoms. She has felt general malaise and weakness and overall feels run down. She denies any focal neurologic symptoms, fever, syncope, or seizures. In the ED MRI brain with no acute findings and relatively stable compared to post op scan. Lab work notable for Na of 123 and patient given 1 L NS bolus. She will be admitted to [facility] for close neuro monitoring while raising serum Na.       Hospital Course: 10/19: Na improved, 137. Patient feeling much better. Still complaining of HA.       Interval History:  NAEON. Patient placed on hydrocortisone d/t concern of secondary adrenal insufficiency. Migraine cocktail improving headache.     Review of Systems   Constitutional:  Positive for fatigue. Negative for chills and fever.   HENT:  Negative for nosebleeds and postnasal drip.    Eyes:  Negative for photophobia.   Respiratory:  Negative for cough and wheezing.    Cardiovascular:  Negative for palpitations and leg swelling.   Gastrointestinal:  Positive for nausea and vomiting.   Endocrine: Positive for polyuria.   Genitourinary:  Negative for difficulty urinating and dysuria.   Musculoskeletal:  Negative for back pain and neck pain.   Skin:  Negative for rash.   Neurological:  Positive for headaches. Negative for seizures and weakness.   Psychiatric/Behavioral:  Negative for agitation, behavioral problems and confusion.      Objective:     Vitals:  Temp: 97.8 °F (36.6 °C)  Pulse: 62  Rhythm: normal sinus rhythm  BP: 110/60  MAP (mmHg): 80  Resp: 20  SpO2: 100  %  O2 Device (Oxygen Therapy): room air    Temp  Min: 97.6 °F (36.4 °C)  Max: 98.1 °F (36.7 °C)  Pulse  Min: 53  Max: 78  BP  Min: 94/59  Max: 117/61  MAP (mmHg)  Min: 69  Max: 87  Resp  Min: 16  Max: 20  SpO2  Min: 96 %  Max: 100 %    10/18 0701 - 10/19 0700  In: 666.4 [P.O.:25; I.V.:46]  Out: 450 [Urine:450]   Unmeasured Output  Urine Occurrence: 1  Stool Occurrence: 0       Physical Exam  Vitals and nursing note reviewed.   Constitutional:       General: She is not in acute distress.     Appearance: Normal appearance. She is normal weight.   HENT:      Head: Normocephalic and atraumatic.      Mouth/Throat:      Mouth: Mucous membranes are moist.   Eyes:      Extraocular Movements: Extraocular movements intact.      Conjunctiva/sclera: Conjunctivae normal.      Pupils: Pupils are equal, round, and reactive to light.   Cardiovascular:      Rate and Rhythm: Regular rhythm. Bradycardia present.      Pulses: Normal pulses.   Pulmonary:      Effort: Pulmonary effort is normal. No respiratory distress.   Abdominal:      General: Abdomen is flat. There is no distension.      Palpations: Abdomen is soft.      Tenderness: There is no abdominal tenderness. There is no guarding.   Musculoskeletal:         General: No swelling or deformity. Normal range of motion.   Skin:     General: Skin is warm and dry.   Neurological:      General: No focal deficit present.      Mental Status: She is alert and oriented to person, place, and time. Mental status is at baseline.      Cranial Nerves: No cranial nerve deficit.      Sensory: No sensory deficit.      Motor: No weakness.   Psychiatric:         Mood and Affect: Mood normal.         Behavior: Behavior normal.       Medications:  Continuous Scheduledheparin (porcine), 5,000 Units, Q8H  hydrocortisone, 20 mg, QAM   And  hydrocortisone, 10 mg, Daily  levothyroxine, 50 mcg, Before breakfast  pantoprazole, 40 mg, Daily  polyethylene glycol, 17 g, Daily    PRNacetaminophen, 650 mg, Q6H  PRN  aluminum & magnesium hydroxide-simethicone, 30 mL, Q6H PRN  ondansetron, 8 mg, Q8H PRN  oxyCODONE, 5 mg, Q6H PRN        Diet  Diet Adult Regular (IDDSI Level 7)  Diet Adult Regular (IDDSI Level 7)          Assessment/Plan:     Neuro  Migraine  Migraine cocktail:  -magnesium, tylenol, benadryl    Renal/  * Hyponatremia  Na 123 on admit with normal urine Na, urine osmol and spec gravity, unclear exact hyponatremia etiology, possible hypovolemic hyponatremia given N/V, possible iatrogenic from DDAVP use outpatient  -admit to [facility]   -NSGY following   -consult endocrine   -repeat Na pending post NS bolus in ED   -will eval if NS improves Na before doing FW restriction  -goal ~130, not over   -hold home DDAVP   -close neuro monitoring while correcting Na   -full hyponatremia workup not concerning for SIADH      Endocrine  Adrenal insufficiency  Hydrocortisone 20 mg morning and 10 mg evening    S/P transsphenoidal selective adenomectomy  See hyponatremia   -continue endocrine meds/labs per endocrine    Hypothyroidism  Continue synthroid    GI  Bilious vomiting with nausea  Emesis workup   -KUB pending   -LFTs wnl   -amylase, lipase pending   -afebrile and normal WBC   -prn zofran           The patient is being Prophylaxed for:  Venous Thromboembolism with: Chemical  Stress Ulcer with: PPI  Ventilator Pneumonia with: not applicable    Activity Orders          Diet Adult Regular (IDDSI Level 7): Regular starting at 10/18 1048    Elevate HOB starting at 10/18 0546        Full Code    Kiersten Greer MD  Neurocritical Care  [facility] - Neuro Critical Care

## 2022-10-19 NOTE — PT/OT/SLP PROGRESS
"Physical Therapy Treatment/D/C PT Summary    Patient Name:  Tessa Wynne   MRN:  [id number]    Recommendations:     Discharge Recommendations:  home   Discharge Equipment Recommendations: none   Barriers to discharge: None    Assessment:     Tessa Wynne is a 47 y.o. female admitted with a medical diagnosis of Hyponatremia.  PT D/Adebayo due to pt independent with mobility. PT educated pt in safety with mobility upon D/C, she expressed understanding.      Recent Surgery: * No surgery found *      Plan:       (D/C PT due to pt being D/Adebayo to home)   Plan of Care Expires:  11/17/22    Subjective   "I am feeling better"    Pain/Comfort:  Pain Rating 1: 0/10  Pain Rating Post-Intervention 1: 0/10      Objective:     Communicated with nurse prior to session.  Patient found HOB elevated with blood pressure cuff, peripheral IV, telemetry upon PT entry to room.     General Precautions: Standard, fall   Orthopedic Precautions:N/A   Braces: N/A  Respiratory Status: Room air     Functional Mobility:  Bed Mobility:     Supine to Sit: independence  Sit to Supine: independence  Transfers:     Sit to Stand:  independence with no AD  Gait: 150ft with no AD with independence. Pt performed standing balance activities: picked up object off floor, high knee gait, single leg stance, and ambulated backwards with no instability.    AM-PAC 6 CLICK MOBILITY  Turning over in bed (including adjusting bedclothes, sheets and blankets)?: 4  Sitting down on and standing up from a chair with arms (e.g., wheelchair, bedside commode, etc.): 4  Moving from lying on back to sitting on the side of the bed?: 4  Moving to and from a bed to a chair (including a wheelchair)?: 4  Need to walk in hospital room?: 4  Climbing 3-5 steps with a railing?: 4  Basic Mobility Total Score: 24     Patient left head of bed elevated with all lines intact, call button in reach, nurse notified, and  present..    GOALS:   Multidisciplinary Problems       Physical " Therapy Goals       Not on file              Multidisciplinary Problems (Resolved)          Problem: Physical Therapy    Goal Priority Disciplines Outcome Goal Variances Interventions   Physical Therapy Goal   (Resolved)     PT, PT/OT Met     Description: Goals to be met by: 10/28/22     Patient will increase functional independence with mobility by performin. Supine to sit with Sikes-met 10/19/22  2. Sit to supine with Sikes-met 10/19/22  3. Sit to stand transfer with Sikes-met 10/19/22  4. Gait  x 100 feet with Sikes. -met 10/19/22  5. Stand for 5 minutes with Sikes   6. Lower extremity exercise program x10 reps per handout, with independence                         Time Tracking:     PT Received On: 10/19/22  PT Start Time: 1349     PT Stop Time: 1400  PT Total Time (min): 11 min     Billable Minutes: Gait Training 11    Treatment Type: Treatment  PT/PTA: PT     PTA Visit Number: 0     10/19/2022

## 2022-10-19 NOTE — PLAN OF CARE
Discharge orders per physician. AVS printed and reviewed at bedside with patient. All follow up appointments and medications reviewed at this time. All patient teaching demonstrated and patient verbalized understanding with teachback method. All care plans and education resolved per adequate discharge. All peripheral IVs/LDAs removed at this time. Transport requested with wheelchair to bedside. Patient successfully discharged.

## 2022-10-19 NOTE — HOSPITAL COURSE
Patient was admitted for hyponatremia on 10/18/22 and was monitored in the neuro-ICU setting. The patient was kept on appropriate DVT prophylaxis during the course of admission. At the time of discharge, the patient was tolerating PO intake without N/V, dysphagia, denied bowel or bladder dysfunction, denied new neurological symptoms, and reported pain controlled with current regimen. The patient will follow up in clinic as indicated in discharge instructions. All questions were answered and continued treatment/wound care instructions were discussed in detail prior to discharge.

## 2022-10-19 NOTE — PLAN OF CARE
Problem: Physical Therapy  Goal: Physical Therapy Goal  Description: Goals to be met by: 10/28/22     Patient will increase functional independence with mobility by performin. Supine to sit with [place]-met 10/19/22  2. Sit to supine with [place]-met 10/19/22  3. Sit to stand transfer with [place]-met 10/19/22  4. Gait  x 100 feet with [place]. -met 10/19/22  5. Stand for 5 minutes with [place]   6. Lower extremity exercise program x10 reps per handout, with independence    Outcome: Met   PT D/Adebayo due to pt independent with mobility.   Leora Vargas PT 10/19/22

## 2022-10-19 NOTE — ASSESSMENT & PLAN NOTE
Emesis workup   -KUB pending   -LFTs wnl   -amylase, lipase pending   -afebrile and normal WBC   -prn zofran

## 2022-10-19 NOTE — DISCHARGE INSTRUCTIONS
If you experience headache, please take magnesium, Tylenol, and benadryl all at the same time. You have a prescription for Zofran (or ondansetron) for your nausea. Please attend your follow up appointments with Neurosurgery and Endocrinology and take your hydrocortisone at the correct time.

## 2022-10-19 NOTE — SUBJECTIVE & OBJECTIVE
Interval History:  NAEON. Patient placed on hydrocortisone d/t concern of secondary adrenal insufficiency. Migraine cocktail improving headache.     Review of Systems   Constitutional:  Positive for fatigue. Negative for chills and fever.   HENT:  Negative for nosebleeds and postnasal drip.    Eyes:  Negative for photophobia.   Respiratory:  Negative for cough and wheezing.    Cardiovascular:  Negative for palpitations and leg swelling.   Gastrointestinal:  Positive for nausea and vomiting.   Endocrine: Positive for polyuria.   Genitourinary:  Negative for difficulty urinating and dysuria.   Musculoskeletal:  Negative for back pain and neck pain.   Skin:  Negative for rash.   Neurological:  Positive for headaches. Negative for seizures and weakness.   Psychiatric/Behavioral:  Negative for agitation, behavioral problems and confusion.      Objective:     Vitals:  Temp: 97.8 °F (36.6 °C)  Pulse: 62  Rhythm: normal sinus rhythm  BP: 110/60  MAP (mmHg): 80  Resp: 20  SpO2: 100 %  O2 Device (Oxygen Therapy): room air    Temp  Min: 97.6 °F (36.4 °C)  Max: 98.1 °F (36.7 °C)  Pulse  Min: 53  Max: 78  BP  Min: 94/59  Max: 117/61  MAP (mmHg)  Min: 69  Max: 87  Resp  Min: 16  Max: 20  SpO2  Min: 96 %  Max: 100 %    10/18 0701 - 10/19 0700  In: 666.4 [P.O.:25; I.V.:46]  Out: 450 [Urine:450]   Unmeasured Output  Urine Occurrence: 1  Stool Occurrence: 0       Physical Exam  Vitals and nursing note reviewed.   Constitutional:       General: She is not in acute distress.     Appearance: Normal appearance. She is normal weight.   HENT:      Head: Normocephalic and atraumatic.      Mouth/Throat:      Mouth: Mucous membranes are moist.   Eyes:      Extraocular Movements: Extraocular movements intact.      Conjunctiva/sclera: Conjunctivae normal.      Pupils: Pupils are equal, round, and reactive to light.   Cardiovascular:      Rate and Rhythm: Regular rhythm. Bradycardia present.      Pulses: Normal pulses.   Pulmonary:      Effort:  Pulmonary effort is normal. No respiratory distress.   Abdominal:      General: Abdomen is flat. There is no distension.      Palpations: Abdomen is soft.      Tenderness: There is no abdominal tenderness. There is no guarding.   Musculoskeletal:         General: No swelling or deformity. Normal range of motion.   Skin:     General: Skin is warm and dry.   Neurological:      General: No focal deficit present.      Mental Status: She is alert and oriented to person, place, and time. Mental status is at baseline.      Cranial Nerves: No cranial nerve deficit.      Sensory: No sensory deficit.      Motor: No weakness.   Psychiatric:         Mood and Affect: Mood normal.         Behavior: Behavior normal.       Medications:  Continuous Scheduledheparin (porcine), 5,000 Units, Q8H  hydrocortisone, 20 mg, QAM   And  hydrocortisone, 10 mg, Daily  levothyroxine, 50 mcg, Before breakfast  pantoprazole, 40 mg, Daily  polyethylene glycol, 17 g, Daily    PRNacetaminophen, 650 mg, Q6H PRN  aluminum & magnesium hydroxide-simethicone, 30 mL, Q6H PRN  ondansetron, 8 mg, Q8H PRN  oxyCODONE, 5 mg, Q6H PRN        Diet  Diet Adult Regular (IDDSI Level 7)  Diet Adult Regular (IDDSI Level 7)

## 2022-10-20 ENCOUNTER — TELEPHONE (OUTPATIENT)
Dept: CARDIOLOGY | Facility: CLINIC | Age: 47
End: 2022-10-20
Payer: MEDICAID

## 2022-10-20 ENCOUNTER — TELEPHONE (OUTPATIENT)
Dept: FAMILY MEDICINE | Facility: CLINIC | Age: 47
End: 2022-10-20
Payer: MEDICAID

## 2022-10-20 NOTE — TELEPHONE ENCOUNTER
----- Message from Wilfredo Trotter sent at 10/19/2022 11:28 AM CDT -----  Contact: [phone]  Who Called: pt    Regarding: schedule follow up hospital visit , discharge date is  today, has to be seen within a week     Would the patient rather a call back or a response via [email or web address]? Call back    Best Call Back Number: [phone]    Additional Information: n/a

## 2022-10-24 ENCOUNTER — TELEPHONE (OUTPATIENT)
Dept: NEUROSURGERY | Facility: CLINIC | Age: 47
End: 2022-10-24
Payer: MEDICAID

## 2022-10-24 ENCOUNTER — HOSPITAL ENCOUNTER (INPATIENT)
Facility: HOSPITAL | Age: 47
LOS: 2 days | Discharge: HOME | DRG: 641 | End: 2022-10-26
Attending: EMERGENCY MEDICINE | Admitting: INTERNAL MEDICINE
Payer: MEDICAID

## 2022-10-24 ENCOUNTER — TELEPHONE (OUTPATIENT)
Dept: FAMILY MEDICINE | Facility: CLINIC | Age: 47
End: 2022-10-24
Payer: MEDICAID

## 2022-10-24 DIAGNOSIS — E87.1 HYPONATREMIA: Primary | ICD-10-CM

## 2022-10-24 DIAGNOSIS — M79.669 PAIN AND SWELLING OF LOWER LEG, UNSPECIFIED LATERALITY: Primary | ICD-10-CM

## 2022-10-24 DIAGNOSIS — R51.9 ACUTE NONINTRACTABLE HEADACHE, UNSPECIFIED HEADACHE TYPE: ICD-10-CM

## 2022-10-24 DIAGNOSIS — M79.89 PAIN AND SWELLING OF LOWER LEG, UNSPECIFIED LATERALITY: Primary | ICD-10-CM

## 2022-10-24 DIAGNOSIS — M79.10 MYALGIA, UNSPECIFIED SITE: ICD-10-CM

## 2022-10-24 LAB
ALBUMIN SERPL BCP-MCNC: 4.4 G/DL (ref 3.5–5.2)
ALP SERPL-CCNC: 69 U/L (ref 55–135)
ALT SERPL W/O P-5'-P-CCNC: 29 U/L (ref 10–44)
ANION GAP SERPL CALC-SCNC: 8 MMOL/L (ref 8–16)
AST SERPL-CCNC: 23 U/L (ref 10–40)
BASOPHILS # BLD AUTO: 0.02 K/UL (ref 0–0.2)
BASOPHILS NFR BLD: 0.3 % (ref 0–1.9)
BILIRUB SERPL-MCNC: 0.4 MG/DL (ref 0.1–1)
BUN SERPL-MCNC: 12 MG/DL (ref 6–20)
CALCIUM SERPL-MCNC: 9.6 MG/DL (ref 8.7–10.5)
CHLORIDE SERPL-SCNC: 92 MMOL/L (ref 95–110)
CO2 SERPL-SCNC: 27 MMOL/L (ref 23–29)
CREAT SERPL-MCNC: 0.8 MG/DL (ref 0.5–1.4)
DIFFERENTIAL METHOD: ABNORMAL
EOSINOPHIL # BLD AUTO: 0.1 K/UL (ref 0–0.5)
EOSINOPHIL NFR BLD: 0.9 % (ref 0–8)
ERYTHROCYTE [DISTWIDTH] IN BLOOD BY AUTOMATED COUNT: 12.6 % (ref 11.5–14.5)
EST. GFR  (NO RACE VARIABLE): >60 ML/MIN/1.73 M^2
GLUCOSE SERPL-MCNC: 115 MG/DL (ref 70–110)
HCT VFR BLD AUTO: 38.3 % (ref 37–48.5)
HGB BLD-MCNC: 12.9 G/DL (ref 12–16)
IMM GRANULOCYTES # BLD AUTO: 0.04 K/UL (ref 0–0.04)
IMM GRANULOCYTES NFR BLD AUTO: 0.6 % (ref 0–0.5)
INFLUENZA A, MOLECULAR: NEGATIVE
INFLUENZA B, MOLECULAR: NEGATIVE
LYMPHOCYTES # BLD AUTO: 2.3 K/UL (ref 1–4.8)
LYMPHOCYTES NFR BLD: 33 % (ref 18–48)
MCH RBC QN AUTO: 30.1 PG (ref 27–31)
MCHC RBC AUTO-ENTMCNC: 33.7 G/DL (ref 32–36)
MCV RBC AUTO: 90 FL (ref 82–98)
MONOCYTES # BLD AUTO: 0.5 K/UL (ref 0.3–1)
MONOCYTES NFR BLD: 6.6 % (ref 4–15)
NEUTROPHILS # BLD AUTO: 4.1 K/UL (ref 1.8–7.7)
NEUTROPHILS NFR BLD: 58.6 % (ref 38–73)
NRBC BLD-RTO: 0 /100 WBC
PLATELET # BLD AUTO: 321 K/UL (ref 150–450)
PMV BLD AUTO: 9.9 FL (ref 9.2–12.9)
POTASSIUM SERPL-SCNC: 4.4 MMOL/L (ref 3.5–5.1)
PROT SERPL-MCNC: 7.5 G/DL (ref 6–8.4)
RBC # BLD AUTO: 4.28 M/UL (ref 4–5.4)
SODIUM SERPL-SCNC: 127 MMOL/L (ref 136–145)
SPECIMEN SOURCE: NORMAL
WBC # BLD AUTO: 7.02 K/UL (ref 3.9–12.7)

## 2022-10-24 PROCEDURE — 99284 PR EMERGENCY DEPT VISIT,LEVEL IV: ICD-10-PCS | Mod: ,,, | Performed by: EMERGENCY MEDICINE

## 2022-10-24 PROCEDURE — 63600175 PHARM REV CODE 636 W HCPCS: Performed by: PHYSICIAN ASSISTANT

## 2022-10-24 PROCEDURE — 25000003 PHARM REV CODE 250: Performed by: PHYSICIAN ASSISTANT

## 2022-10-24 PROCEDURE — 63600175 PHARM REV CODE 636 W HCPCS: Performed by: EMERGENCY MEDICINE

## 2022-10-24 PROCEDURE — 83036 HEMOGLOBIN GLYCOSYLATED A1C: CPT | Performed by: PHYSICIAN ASSISTANT

## 2022-10-24 PROCEDURE — 99285 EMERGENCY DEPT VISIT HI MDM: CPT | Mod: 25

## 2022-10-24 PROCEDURE — 87502 INFLUENZA DNA AMP PROBE: CPT | Performed by: EMERGENCY MEDICINE

## 2022-10-24 PROCEDURE — 96361 HYDRATE IV INFUSION ADD-ON: CPT

## 2022-10-24 PROCEDURE — 96375 TX/PRO/DX INJ NEW DRUG ADDON: CPT

## 2022-10-24 PROCEDURE — 85025 COMPLETE CBC W/AUTO DIFF WBC: CPT | Performed by: PHYSICIAN ASSISTANT

## 2022-10-24 PROCEDURE — 99284 EMERGENCY DEPT VISIT MOD MDM: CPT | Mod: ,,, | Performed by: EMERGENCY MEDICINE

## 2022-10-24 PROCEDURE — 96365 THER/PROPH/DIAG IV INF INIT: CPT

## 2022-10-24 PROCEDURE — 12000002 HC ACUTE/MED SURGE SEMI-PRIVATE ROOM

## 2022-10-24 PROCEDURE — 87502 INFLUENZA DNA AMP PROBE: CPT

## 2022-10-24 PROCEDURE — 96366 THER/PROPH/DIAG IV INF ADDON: CPT

## 2022-10-24 PROCEDURE — 80053 COMPREHEN METABOLIC PANEL: CPT | Performed by: PHYSICIAN ASSISTANT

## 2022-10-24 RX ORDER — PROCHLORPERAZINE EDISYLATE 5 MG/ML
10 INJECTION INTRAMUSCULAR; INTRAVENOUS
Status: COMPLETED | OUTPATIENT
Start: 2022-10-24 | End: 2022-10-24

## 2022-10-24 RX ORDER — DIPHENHYDRAMINE HYDROCHLORIDE 50 MG/ML
12.5 INJECTION INTRAMUSCULAR; INTRAVENOUS
Status: COMPLETED | OUTPATIENT
Start: 2022-10-24 | End: 2022-10-24

## 2022-10-24 RX ORDER — MAGNESIUM SULFATE HEPTAHYDRATE 40 MG/ML
2 INJECTION, SOLUTION INTRAVENOUS
Status: COMPLETED | OUTPATIENT
Start: 2022-10-24 | End: 2022-10-24

## 2022-10-24 RX ORDER — DEXAMETHASONE SODIUM PHOSPHATE 4 MG/ML
8 INJECTION, SOLUTION INTRA-ARTICULAR; INTRALESIONAL; INTRAMUSCULAR; INTRAVENOUS; SOFT TISSUE
Status: COMPLETED | OUTPATIENT
Start: 2022-10-24 | End: 2022-10-24

## 2022-10-24 RX ADMIN — PROCHLORPERAZINE EDISYLATE 10 MG: 5 INJECTION INTRAMUSCULAR; INTRAVENOUS at 08:10

## 2022-10-24 RX ADMIN — MAGNESIUM SULFATE 2 G: 2 INJECTION INTRAVENOUS at 09:10

## 2022-10-24 RX ADMIN — SODIUM CHLORIDE 250 ML: 0.9 INJECTION, SOLUTION INTRAVENOUS at 08:10

## 2022-10-24 RX ADMIN — DEXAMETHASONE SODIUM PHOSPHATE 8 MG: 4 INJECTION INTRA-ARTICULAR; INTRALESIONAL; INTRAMUSCULAR; INTRAVENOUS; SOFT TISSUE at 09:10

## 2022-10-24 RX ADMIN — DIPHENHYDRAMINE HYDROCHLORIDE 12.5 MG: 50 INJECTION, SOLUTION INTRAMUSCULAR; INTRAVENOUS at 09:10

## 2022-10-24 NOTE — Clinical Note
Diagnosis: Myalgia, unspecified site [[id number]]   Admitting Provider:: SENTHIL HAMILTON [[id number]]   Future Attending Provider: SENTHIL HAMILTON [[id number]]   Reason for IP Medical Treatment  (Clinical interventions that can only be accomplished in the IP setting? ) :: hyponatremia   Estimated Length of Stay:: 2 midnights   I certify that Inpatient services for greater than or equal to 2 midnights are medically necessary:: Yes   Plans for Post-Acute care--if anticipated (pick the single best option):: A. No post acute care anticipated at this time

## 2022-10-24 NOTE — TELEPHONE ENCOUNTER
ALONDRA pt's , got her appt with Dr. Ferrell for tomorrow 10/25/22 at 9:00 am. Pt's  happy with call and did not have any further questions at this time.    ----- Message from Malou Lopez sent at 10/24/2022  4:41 PM CDT -----  Regarding: PT  req return call  Contact: PT    The  of Tessa Wynne MRN [id number] he is wanting to talk to someone to find out what he needs to do he states his wife is having nausea, stomach pain, head pain and tingling.    states he is concerned and does not how long he has to wait and does know how he is going  to handle this.   He says he called this morning and left a message and no one called him back and states it is now urgent   Did advise patient that if he felt this were really bad and urgent he maybe should take her to the ER     Please reach out to patients  @#  [phone]

## 2022-10-24 NOTE — TELEPHONE ENCOUNTER
----- Message from Leeann Multani sent at 10/24/2022  8:03 AM CDT -----  Regarding: call back  Contact: [phone]  Type:  Same Day Appointment Request    Caller is requesting a same day appointment.  Caller declined first available appointment listed below.    Name of Caller: PT   When is the first available appointment? November   Symptoms: having trouble sleeping feels like numbness on her head patient had surgery 3 weeks ago   Best Call Back Number: [phone]  Additional Information:

## 2022-10-25 ENCOUNTER — TELEPHONE (OUTPATIENT)
Dept: ENDOCRINOLOGY | Facility: CLINIC | Age: 47
End: 2022-10-25
Payer: MEDICAID

## 2022-10-25 LAB
ABO + RH BLD: NORMAL
ALBUMIN SERPL BCP-MCNC: 4.1 G/DL (ref 3.5–5.2)
ALP SERPL-CCNC: 66 U/L (ref 55–135)
ALT SERPL W/O P-5'-P-CCNC: 28 U/L (ref 10–44)
AMYLASE SERPL-CCNC: 98 U/L (ref 20–110)
ANION GAP SERPL CALC-SCNC: 11 MMOL/L (ref 8–16)
ASCENDING AORTA: 3.16 CM
AST SERPL-CCNC: 21 U/L (ref 10–40)
AV INDEX (PROSTH): 1.21
AV MEAN GRADIENT: 2 MMHG
AV PEAK GRADIENT: 4 MMHG
AV VALVE AREA: 3.88 CM2
AV VELOCITY RATIO: 0.86
BACTERIA #/AREA URNS AUTO: NORMAL /HPF
BACTERIA #/AREA URNS AUTO: NORMAL /HPF
BASOPHILS # BLD AUTO: 0.01 K/UL (ref 0–0.2)
BASOPHILS NFR BLD: 0.1 % (ref 0–1.9)
BILIRUB SERPL-MCNC: 0.5 MG/DL (ref 0.1–1)
BILIRUB UR QL STRIP: NEGATIVE
BLD GP AB SCN CELLS X3 SERPL QL: NORMAL
BSA FOR ECHO PROCEDURE: 1.81 M2
BUN SERPL-MCNC: 10 MG/DL (ref 6–20)
CALCIUM SERPL-MCNC: 9.2 MG/DL (ref 8.7–10.5)
CHLORIDE SERPL-SCNC: 99 MMOL/L (ref 95–110)
CLARITY UR REFRACT.AUTO: CLEAR
CO2 SERPL-SCNC: 21 MMOL/L (ref 23–29)
COLOR UR AUTO: ABNORMAL
COLOR UR AUTO: ABNORMAL
COLOR UR AUTO: COLORLESS
COLOR UR AUTO: COLORLESS
CORTIS SERPL-MCNC: 1.1 UG/DL (ref 4.3–22.4)
CREAT SERPL-MCNC: 0.8 MG/DL (ref 0.5–1.4)
CRP SERPL-MCNC: 1.1 MG/L (ref 0–8.2)
CV ECHO LV RWT: 0.47 CM
DIFFERENTIAL METHOD: ABNORMAL
DOP CALC AO PEAK VEL: 0.97 M/S
DOP CALC AO VTI: 15.42 CM
DOP CALC LVOT AREA: 3.2 CM2
DOP CALC LVOT DIAMETER: 2.02 CM
DOP CALC LVOT PEAK VEL: 0.83 M/S
DOP CALC LVOT STROKE VOLUME: 59.9 CM3
DOP CALCLVOT PEAK VEL VTI: 18.7 CM
E WAVE DECELERATION TIME: 266.38 MSEC
E/A RATIO: 0.87
E/E' RATIO: 6.24 M/S
ECHO LV POSTERIOR WALL: 0.9 CM (ref 0.6–1.1)
EJECTION FRACTION: 60 %
EOSINOPHIL # BLD AUTO: 0 K/UL (ref 0–0.5)
EOSINOPHIL NFR BLD: 0 % (ref 0–8)
ERYTHROCYTE [DISTWIDTH] IN BLOOD BY AUTOMATED COUNT: 12.7 % (ref 11.5–14.5)
ERYTHROCYTE [SEDIMENTATION RATE] IN BLOOD BY PHOTOMETRIC METHOD: 15 MM/HR (ref 0–36)
EST. GFR  (NO RACE VARIABLE): >60 ML/MIN/1.73 M^2
ESTIMATED AVG GLUCOSE: 108 MG/DL (ref 68–131)
FRACTIONAL SHORTENING: 38 % (ref 28–44)
GLUCOSE SERPL-MCNC: 143 MG/DL (ref 70–110)
GLUCOSE UR QL STRIP: NEGATIVE
HBA1C MFR BLD: 5.4 % (ref 4–5.6)
HCG INTACT+B SERPL-ACNC: <2.4 MIU/ML
HCT VFR BLD AUTO: 36.7 % (ref 37–48.5)
HGB BLD-MCNC: 12.8 G/DL (ref 12–16)
HGB UR QL STRIP: ABNORMAL
HGB UR QL STRIP: NEGATIVE
HYPOCHROMIA BLD QL SMEAR: ABNORMAL
IMM GRANULOCYTES # BLD AUTO: 0.04 K/UL (ref 0–0.04)
IMM GRANULOCYTES NFR BLD AUTO: 0.6 % (ref 0–0.5)
INTERVENTRICULAR SEPTUM: 0.8 CM (ref 0.6–1.1)
KETONES UR QL STRIP: NEGATIVE
LA MAJOR: 4.14 CM
LA MINOR: 3.71 CM
LA WIDTH: 2.77 CM
LEFT ATRIUM SIZE: 2.4 CM
LEFT ATRIUM VOLUME INDEX: 12.4 ML/M2
LEFT ATRIUM VOLUME: 22.11 CM3
LEFT INTERNAL DIMENSION IN SYSTOLE: 2.38 CM (ref 2.1–4)
LEFT VENTRICLE DIASTOLIC VOLUME INDEX: 35.87 ML/M2
LEFT VENTRICLE DIASTOLIC VOLUME: 63.85 ML
LEFT VENTRICLE MASS INDEX: 54 G/M2
LEFT VENTRICLE SYSTOLIC VOLUME INDEX: 11.1 ML/M2
LEFT VENTRICLE SYSTOLIC VOLUME: 19.79 ML
LEFT VENTRICULAR INTERNAL DIMENSION IN DIASTOLE: 3.85 CM (ref 3.5–6)
LEFT VENTRICULAR MASS: 95.35 G
LEUKOCYTE ESTERASE UR QL STRIP: ABNORMAL
LEUKOCYTE ESTERASE UR QL STRIP: ABNORMAL
LEUKOCYTE ESTERASE UR QL STRIP: NEGATIVE
LEUKOCYTE ESTERASE UR QL STRIP: NEGATIVE
LIPASE SERPL-CCNC: 21 U/L (ref 4–60)
LV LATERAL E/E' RATIO: 4.82 M/S
LV SEPTAL E/E' RATIO: 8.83 M/S
LYMPHOCYTES # BLD AUTO: 1.1 K/UL (ref 1–4.8)
LYMPHOCYTES NFR BLD: 14.8 % (ref 18–48)
MCH RBC QN AUTO: 31.2 PG (ref 27–31)
MCHC RBC AUTO-ENTMCNC: 34.9 G/DL (ref 32–36)
MCV RBC AUTO: 90 FL (ref 82–98)
MICROSCOPIC COMMENT: NORMAL
MICROSCOPIC COMMENT: NORMAL
MONOCYTES # BLD AUTO: 0.1 K/UL (ref 0.3–1)
MONOCYTES NFR BLD: 1.1 % (ref 4–15)
MV PEAK A VEL: 0.61 M/S
MV PEAK E VEL: 0.53 M/S
MV STENOSIS PRESSURE HALF TIME: 77.25 MS
MV VALVE AREA P 1/2 METHOD: 2.85 CM2
NEUTROPHILS # BLD AUTO: 5.9 K/UL (ref 1.8–7.7)
NEUTROPHILS NFR BLD: 83.4 % (ref 38–73)
NITRITE UR QL STRIP: NEGATIVE
NRBC BLD-RTO: 0 /100 WBC
OSMOLALITY SERPL: 291 MOSM/KG (ref 275–295)
OSMOLALITY UR: 235 MOSM/KG (ref 50–1200)
PH UR STRIP: 6 [PH] (ref 5–8)
PH UR STRIP: 6 [PH] (ref 5–8)
PH UR STRIP: 8 [PH] (ref 5–8)
PH UR STRIP: >8 [PH] (ref 5–8)
PLATELET # BLD AUTO: 310 K/UL (ref 150–450)
PLATELET BLD QL SMEAR: ABNORMAL
PMV BLD AUTO: 9.6 FL (ref 9.2–12.9)
POTASSIUM SERPL-SCNC: 4.3 MMOL/L (ref 3.5–5.1)
PROT SERPL-MCNC: 7.1 G/DL (ref 6–8.4)
PROT UR QL STRIP: NEGATIVE
RA MAJOR: 3.64 CM
RA PRESSURE: 3 MMHG
RA WIDTH: 3.5 CM
RBC # BLD AUTO: 4.1 M/UL (ref 4–5.4)
RBC #/AREA URNS AUTO: 1 /HPF (ref 0–4)
RBC #/AREA URNS AUTO: 1 /HPF (ref 0–4)
RIGHT VENTRICULAR END-DIASTOLIC DIMENSION: 2.99 CM
SINUS: 2.4 CM
SODIUM SERPL-SCNC: 131 MMOL/L (ref 136–145)
SODIUM SERPL-SCNC: 131 MMOL/L (ref 136–145)
SODIUM SERPL-SCNC: 140 MMOL/L (ref 136–145)
SODIUM SERPL-SCNC: 140 MMOL/L (ref 136–145)
SODIUM SERPL-SCNC: 141 MMOL/L (ref 136–145)
SODIUM UR-SCNC: 66 MMOL/L (ref 20–250)
SP GR UR STRIP: 1 (ref 1–1.03)
SP GR UR STRIP: 1 (ref 1–1.03)
SP GR UR STRIP: 1.01 (ref 1–1.03)
SP GR UR STRIP: 1.01 (ref 1–1.03)
SQUAMOUS #/AREA URNS AUTO: 0 /HPF
SQUAMOUS #/AREA URNS AUTO: 1 /HPF
STJ: 2.79 CM
T4 FREE SERPL-MCNC: 0.9 NG/DL (ref 0.71–1.51)
TDI LATERAL: 0.11 M/S
TDI SEPTAL: 0.06 M/S
TDI: 0.09 M/S
TSH SERPL DL<=0.005 MIU/L-ACNC: 0.68 UIU/ML (ref 0.4–4)
URN SPEC COLLECT METH UR: ABNORMAL
WBC # BLD AUTO: 7.09 K/UL (ref 3.9–12.7)
WBC #/AREA URNS AUTO: 0 /HPF (ref 0–5)
WBC #/AREA URNS AUTO: 1 /HPF (ref 0–5)

## 2022-10-25 PROCEDURE — 25500020 PHARM REV CODE 255: Performed by: INTERNAL MEDICINE

## 2022-10-25 PROCEDURE — 99024 PR POST-OP FOLLOW-UP VISIT: ICD-10-PCS | Mod: ,,,

## 2022-10-25 PROCEDURE — 85025 COMPLETE CBC W/AUTO DIFF WBC: CPT

## 2022-10-25 PROCEDURE — 82024 ASSAY OF ACTH: CPT

## 2022-10-25 PROCEDURE — 81003 URINALYSIS AUTO W/O SCOPE: CPT | Mod: 91

## 2022-10-25 PROCEDURE — 84702 CHORIONIC GONADOTROPIN TEST: CPT

## 2022-10-25 PROCEDURE — 86850 RBC ANTIBODY SCREEN: CPT

## 2022-10-25 PROCEDURE — 11000001 HC ACUTE MED/SURG PRIVATE ROOM

## 2022-10-25 PROCEDURE — 84295 ASSAY OF SERUM SODIUM: CPT | Mod: 91

## 2022-10-25 PROCEDURE — 99223 PR INITIAL HOSPITAL CARE,LEVL III: ICD-10-PCS | Mod: ,,, | Performed by: INTERNAL MEDICINE

## 2022-10-25 PROCEDURE — A9585 GADOBUTROL INJECTION: HCPCS | Performed by: INTERNAL MEDICINE

## 2022-10-25 PROCEDURE — 80053 COMPREHEN METABOLIC PANEL: CPT

## 2022-10-25 PROCEDURE — 84443 ASSAY THYROID STIM HORMONE: CPT

## 2022-10-25 PROCEDURE — 25000003 PHARM REV CODE 250

## 2022-10-25 PROCEDURE — 99291 PR CRITICAL CARE, E/M 30-74 MINUTES: ICD-10-PCS | Mod: ,,,

## 2022-10-25 PROCEDURE — 94761 N-INVAS EAR/PLS OXIMETRY MLT: CPT

## 2022-10-25 PROCEDURE — 99291 CRITICAL CARE FIRST HOUR: CPT | Mod: ,,,

## 2022-10-25 PROCEDURE — 82150 ASSAY OF AMYLASE: CPT | Performed by: INTERNAL MEDICINE

## 2022-10-25 PROCEDURE — 63600175 PHARM REV CODE 636 W HCPCS

## 2022-10-25 PROCEDURE — 84295 ASSAY OF SERUM SODIUM: CPT | Mod: 91 | Performed by: STUDENT IN AN ORGANIZED HEALTH CARE EDUCATION/TRAINING PROGRAM

## 2022-10-25 PROCEDURE — 83930 ASSAY OF BLOOD OSMOLALITY: CPT

## 2022-10-25 PROCEDURE — 84439 ASSAY OF FREE THYROXINE: CPT

## 2022-10-25 PROCEDURE — 83690 ASSAY OF LIPASE: CPT | Performed by: INTERNAL MEDICINE

## 2022-10-25 PROCEDURE — 86140 C-REACTIVE PROTEIN: CPT | Performed by: INTERNAL MEDICINE

## 2022-10-25 PROCEDURE — 81001 URINALYSIS AUTO W/SCOPE: CPT | Mod: 91

## 2022-10-25 PROCEDURE — 82533 TOTAL CORTISOL: CPT

## 2022-10-25 PROCEDURE — 99024 POSTOP FOLLOW-UP VISIT: CPT | Mod: ,,,

## 2022-10-25 PROCEDURE — 84300 ASSAY OF URINE SODIUM: CPT

## 2022-10-25 PROCEDURE — 83935 ASSAY OF URINE OSMOLALITY: CPT

## 2022-10-25 PROCEDURE — 85652 RBC SED RATE AUTOMATED: CPT

## 2022-10-25 PROCEDURE — 99223 1ST HOSP IP/OBS HIGH 75: CPT | Mod: ,,, | Performed by: INTERNAL MEDICINE

## 2022-10-25 PROCEDURE — 25000003 PHARM REV CODE 250: Performed by: STUDENT IN AN ORGANIZED HEALTH CARE EDUCATION/TRAINING PROGRAM

## 2022-10-25 RX ORDER — LANOLIN ALCOHOL/MO/W.PET/CERES
800 CREAM (GRAM) TOPICAL
Status: DISCONTINUED | OUTPATIENT
Start: 2022-10-25 | End: 2022-10-26

## 2022-10-25 RX ORDER — GADOBUTROL 604.72 MG/ML
4 INJECTION INTRAVENOUS
Status: COMPLETED | OUTPATIENT
Start: 2022-10-25 | End: 2022-10-25

## 2022-10-25 RX ORDER — SODIUM,POTASSIUM PHOSPHATES 280-250MG
2 POWDER IN PACKET (EA) ORAL
Status: DISCONTINUED | OUTPATIENT
Start: 2022-10-25 | End: 2022-10-26

## 2022-10-25 RX ORDER — ACETAMINOPHEN 325 MG/1
650 TABLET ORAL EVERY 6 HOURS PRN
Status: DISCONTINUED | OUTPATIENT
Start: 2022-10-25 | End: 2022-10-26 | Stop reason: HOSPADM

## 2022-10-25 RX ORDER — HYDROCORTISONE 10 MG/1
10 TABLET ORAL 2 TIMES DAILY
Status: DISCONTINUED | OUTPATIENT
Start: 2022-10-25 | End: 2022-10-25

## 2022-10-25 RX ORDER — SODIUM CHLORIDE 0.9 % (FLUSH) 0.9 %
10 SYRINGE (ML) INJECTION
Status: DISCONTINUED | OUTPATIENT
Start: 2022-10-25 | End: 2022-10-26 | Stop reason: HOSPADM

## 2022-10-25 RX ORDER — ONDANSETRON 2 MG/ML
4 INJECTION INTRAMUSCULAR; INTRAVENOUS EVERY 8 HOURS PRN
Status: DISCONTINUED | OUTPATIENT
Start: 2022-10-25 | End: 2022-10-26 | Stop reason: HOSPADM

## 2022-10-25 RX ORDER — HYOSCYAMINE SULFATE 0.12 MG/1
0.12 TABLET SUBLINGUAL 4 TIMES DAILY PRN
COMMUNITY
Start: 2022-10-12 | End: 2022-11-22

## 2022-10-25 RX ORDER — LEVOTHYROXINE SODIUM 50 UG/1
50 TABLET ORAL
Status: DISCONTINUED | OUTPATIENT
Start: 2022-10-25 | End: 2022-10-26 | Stop reason: HOSPADM

## 2022-10-25 RX ORDER — HYDROCORTISONE 5 MG/1
10 TABLET ORAL EVERY EVENING
Status: DISCONTINUED | OUTPATIENT
Start: 2022-10-28 | End: 2022-10-26 | Stop reason: HOSPADM

## 2022-10-25 RX ORDER — FLUDROCORTISONE ACETATE 0.1 MG/1
100 TABLET ORAL 2 TIMES DAILY
Status: DISCONTINUED | OUTPATIENT
Start: 2022-10-25 | End: 2022-10-25

## 2022-10-25 RX ORDER — SODIUM CHLORIDE 9 MG/ML
INJECTION, SOLUTION INTRAVENOUS CONTINUOUS
Status: DISCONTINUED | OUTPATIENT
Start: 2022-10-25 | End: 2022-10-26 | Stop reason: HOSPADM

## 2022-10-25 RX ORDER — HYDROCORTISONE 20 MG/1
20 TABLET ORAL DAILY
Status: DISCONTINUED | OUTPATIENT
Start: 2022-10-28 | End: 2022-10-26 | Stop reason: HOSPADM

## 2022-10-25 RX ORDER — PANTOPRAZOLE SODIUM 20 MG/1
40 TABLET, DELAYED RELEASE ORAL DAILY
Status: DISCONTINUED | OUTPATIENT
Start: 2022-10-25 | End: 2022-10-26 | Stop reason: HOSPADM

## 2022-10-25 RX ADMIN — FLUDROCORTISONE ACETATE 100 MCG: 0.1 TABLET ORAL at 01:10

## 2022-10-25 RX ADMIN — GADOBUTROL 4 ML: 604.72 INJECTION INTRAVENOUS at 01:10

## 2022-10-25 RX ADMIN — SODIUM CHLORIDE TAB 1 GM 2 G: 1 TAB at 10:10

## 2022-10-25 RX ADMIN — PANTOPRAZOLE SODIUM 40 MG: 20 TABLET, DELAYED RELEASE ORAL at 08:10

## 2022-10-25 RX ADMIN — SODIUM CHLORIDE: 0.9 INJECTION, SOLUTION INTRAVENOUS at 01:10

## 2022-10-25 RX ADMIN — ACETAMINOPHEN 650 MG: 325 TABLET ORAL at 11:10

## 2022-10-25 RX ADMIN — HUMAN ALBUMIN MICROSPHERES AND PERFLUTREN 0.66 MG: 10; .22 INJECTION, SOLUTION INTRAVENOUS at 08:10

## 2022-10-25 RX ADMIN — LEVOTHYROXINE SODIUM 50 MCG: 50 TABLET ORAL at 06:10

## 2022-10-25 RX ADMIN — HYDROCORTISONE 50 MG: 20 TABLET ORAL at 08:10

## 2022-10-25 RX ADMIN — IOHEXOL 75 ML: 350 INJECTION, SOLUTION INTRAVENOUS at 01:10

## 2022-10-25 RX ADMIN — HYDROCORTISONE 10 MG: 10 TABLET ORAL at 01:10

## 2022-10-25 RX ADMIN — HYDROCORTISONE SODIUM SUCCINATE 100 MG: 100 INJECTION, POWDER, FOR SOLUTION INTRAMUSCULAR; INTRAVENOUS at 02:10

## 2022-10-25 NOTE — ASSESSMENT & PLAN NOTE
Resent resection 10/3  MRI pituitary pending, consider neurosurgery consult if warranted based on imaging  See hyponatremia

## 2022-10-25 NOTE — CONSULTS
[facility] - Emergency Dept  Neurosurgery  Consult Note    Inpatient consult to Neurosurgery  Consult performed by: Fabiola Michael PA-C  Consult ordered by: Kendy Pyle MD        Subjective:     Chief Complaint/Reason for Admission: hyponatremia    History of Present Illness: 47 y.o. female with GERD, Hypothyroidism, PAD and pituitary lesion status transphenoidal resection on 10/3/22 with Dr. Ferrell. She initially presented to the ED yesterday with diffuse body aches and nausea for the past day. Na was 127 which was down from 127 on 10/19. She has had another prior admission with similar symptoms following her pituitary resection, and she feels her symptoms have been worsening. She also endorses abdominal pain and diffuse headache, but denies dizziness, vomiting, vision problems or any focal neurological deficit. She was admitted to [facility] for hyponatremia and vomiting. Stepped down to NSGY. Endocrine consulted.       (Not in a hospital admission)      Review of patient's allergies indicates:  No Known Allergies    Past Medical History:   Diagnosis Date    Elevated fasting glucose     GERD (gastroesophageal reflux disease)     Hypothyroidism     PAD (peripheral artery disease)     Pituitary lesion 9/7/2022     Past Surgical History:   Procedure Laterality Date    CHOLECYSTECTOMY      ENDOMETRIAL ABLATION N/A 2/8/2022    Procedure: ABLATION, ENDOMETRIUM;  Surgeon: Ariel Butler MD;  Location: [facility] OR;  Service: OB/GYN;  Laterality: N/A;    EXCISION, NEOPLASM, PITUITARY, TRANSSPHENOIDAL APPROACH, USING COMPUTER-ASSISTED NAVIGATION N/A 10/3/2022    Procedure: EXCISION, NEOPLASM, PITUITARY, TRANSSPHENOIDAL APPROACH, USING COMPUTER-ASSISTED NAVIGATION;  Surgeon: Ed Astorga MD;  Location: [address];  Service: ENT;  Laterality: N/A;    EXCISION, NEOPLASM, PITUITARY, TRANSSPHENOIDAL APPROACH, USING COMPUTER-ASSISTED NAVIGATION N/A 10/3/2022    Procedure: EXCISION, NEOPLASM, PITUITARY,  TRANSSPHENOIDAL APPROACH, USING COMPUTER-ASSISTED NAVIGATION;  Surgeon: Miguel Ángel Ferrell DO;  Location: [facility] OR [address] [place] [address];  Service: Neurosurgery;  Laterality: N/A;    HYSTEROSCOPY WITH DILATION AND CURETTAGE OF UTERUS N/A 2/8/2022    Procedure: HYSTEROSCOPY, WITH DILATION AND CURETTAGE OF UTERUS;  Surgeon: Ariel Butler MD;  Location: [facility] OR;  Service: OB/GYN;  Laterality: N/A;     Family History       Problem Relation (Age of Onset)    Diabetes Mother, Father    Diabetes Mellitus Mother, Father    No Known Problems Sister, Sister, Brother, Brother, Daughter, Son          Tobacco Use    Smoking status: Never    Smokeless tobacco: Never   Substance and Sexual Activity    Alcohol use: No    Drug use: No    Sexual activity: Yes     Partners: Male     Birth control/protection: OCP     Review of Systems  Objective:     Weight: 72.6 kg (160 lb)  Body mass index is 27.46 kg/m².  Vital Signs (Most Recent):  Temp: 97.4 °F (36.3 °C) (10/25/22 0754)  Pulse: 73 (10/25/22 1402)  Resp: (!) 22 (10/25/22 1402)  BP: 106/60 (10/25/22 1402)  SpO2: 98 % (10/25/22 1402)   Vital Signs (24h Range):  Temp:  [97.4 °F (36.3 °C)-98.2 °F (36.8 °C)] 97.4 °F (36.3 °C)  Pulse:  [57-78] 73  Resp:  [13-22] 22  SpO2:  [96 %-100 %] 98 %  BP: ()/(55-80) 106/60                          Urethral Catheter 10/25/22 0653 Straight-tip 16 Fr. (Active)         Neurosurgery Physical Exam  General: Well developed, well nourished, not in acute distress.   Head: Normocephalic, atraumatic.  Neck: Full ROM.   Neurologic: AxO4. Thought content appropriate.  GCS:15  Language: No aphasia.  Speech: No dysarthria.  Cranial nerves: Face symmetric, tongue midline, CN II-XII grossly intact.   Eyes: Pupils equal, round, reactive to light with accomodation, EOMI. Unable to appreciate optic disc.   Pulmonary: Normal respirations, no signs of respiratory distress.  Abdomen: Soft, non-distended, mildly tender to palpation.  Sensory: Intact to light touch  throughout.  Motor Strength: Moves all extremities spontaneously with good tone. Full strength upper and lower extremities. No abnormal movements seen.   Pronator Drift: No drift noted.  Finger-to-nose: Intact bilaterally.  Vascular: Pulses 2+ and symmetric radial and dorsalis pedis. No LE edema.   Skin: Skin is warm, dry and intact.    Significant Labs:  Recent Labs   Lab 10/24/22  2025 10/25/22  0234 10/25/22  0606 10/25/22  1214   * 143*  --   --    * 131* 131* 141   K 4.4 4.3  --   --    CL 92* 99  --   --    CO2 27 21*  --   --    BUN 12 10  --   --    CREATININE 0.8 0.8  --   --    CALCIUM 9.6 9.2  --   --      Recent Labs   Lab 10/24/22  2025 10/25/22  0234   WBC 7.02 7.09   HGB 12.9 12.8   HCT 38.3 36.7*    310     No results for input(s): LABPT, INR, APTT in the last 48 hours.  Microbiology Results (last 7 days)       Procedure Component Value Units Date/Time    Influenza A & B by Molecular [[id number]] Collected: 10/24/22 2126    Order Status: Completed Specimen: Nasopharyngeal Swab Updated: 10/24/22 2232     Influenza A, Molecular Negative     Influenza B, Molecular Negative     Flu A & B Source NP          All pertinent labs from the last 24 hours have been reviewed.    Significant Diagnostics:  I have reviewed all pertinent imaging results/findings within the past 24 hours.    Assessment/Plan:     Pituitary macroadenoma with extrasellar extension  Tessa Wynne is a 47 y.o. female s/p TSR on 10/3 with Dr. Ferrell presents with headaches, fatigue, appetite changes and nausea/vomiting.     - Step down to nsgy service floor.  - Q4hr neurochecks.   - Endocrine consulted, appreciate recs.   - Hyponatremia: Salt tabs 2G TID started per NCC. Na 141 today.   - MRI Pituitary w/wo contrast done and reviewed.   - Please call nsgy with any change in exam or status.     D/w Dr. Ferrell          Thank you for your consult. I will follow-up with patient. Please contact us if you have any additional  questions.    Fabiola Michael PA-C  Neurosurgery  [facility] - Emergency Dept

## 2022-10-25 NOTE — HPI
47 y.o. female with hypothyroidism, GERD, PAD, appendicitis, and pituitary adenoma status post resection on 10/03/2022 who presents with hyponatremia, headache, abdominal pain, vomiting, and diffuse body aches for 24 hours prior to admission.     - Endocrinology consulted for hyponatremia and concern for adrenal insufficiency    Regarding pituitary adenoma    - Status post TSR by Dr. Ferrell on 10/3/22 ([facility] path - GONADOTROPH ADENOMA, IMMUNOREACTIVE FOR ALPHA-SUBUNIT AND SF1).                  MRI 8/15/22: Sella: There is a large sellar lesion with suprasellar extension.  Lesion measures roughly 3.2 x 2.8 x 3.5 cm.  There is mass effect on the adjacent structures including the optic chiasm which is displaced superiorly as well as the bilateral A1 segments of the anterior cerebral arteries.  There is possible extension into the cavernous sinuses bilaterally best visualized on coronal T2 series 10.  The carotid flow voids are preserved. There is bony remodeling of the adjacent clivus secondary to this lesion with preserved marrow signal        MRI 10/25/2022: Sella: Evolving operative change status post transsphenoidal approach sellar/suprasellar mass resection. There is continued heterogeneous signal predominantly hypoenhancing material along the posterior sphenoid sinus extending to the sella felt to represent postoperative packing material. There is thin marginated enhancement about this which may be reactive in granulation tissue.  There is continued somewhat lobular enhancing material along the postoperative floor of the sella which may in part represent residual pituitary tissue though remains concerning for component of residual lesion. The infundibulum is deviated to the right. Compared to most recent prior the nodular enhancing material is similar measuring approximately 1.6 cm AP x 1.4 cm TV and 0.7 cm craniocaudal. There is no significant mass effect on the suprasellar neurovascular structures. Impression: MR  "sella: Evolving operative change from transsphenoidal sellar/suprasellar mass resection.  There is continued somewhat lobular hypoenhancing material within the sella remains concerning for possible residual lesion and residual pituitary tissue.  No evidence for significant mass effect on the suprasellar neurovascular structures or suprasellar extension. MRI brain: Otherwise unremarkable MRI of the brain specifically without evidence for hydrocephalus or new parenchymal signal abnormality          Formal Visual burns: HVF w/ Dr. Yan 8/25/22, per note:" OCT with borderline RNFL thinning OD>OS. HVF with generalized depression OD (worse temporally) and temporal depression OS. Findings consistent with chiasmal compression." Return to clinic in 3 month(s)    Lab Results   Component Value Date    PROLACTIN 138.0 (H) 08/26/2022    TSH 0.675 10/25/2022    TSH 2.966 10/18/2022    TSH 0.772 10/08/2022    TSH 0.739 09/24/2022    FREET4 0.90 10/25/2022    FREET4 0.85 10/18/2022    FREET4 0.65 (L) 08/26/2022    ACTH 11 10/18/2022    ACTH 19 08/26/2022    FSH 10.72 08/26/2022    SOMATMDN 42 (L) 08/26/2022     10/26/2022     10/26/2022     10/26/2022     10/25/2022    K 3.7 10/26/2022    K 4.3 10/25/2022    K 4.4 10/24/2022    K 4.1 10/19/2022    CALCIUM 9.7 10/26/2022    CALCIUM 9.2 10/25/2022    CALCIUM 9.6 10/24/2022    CALCIUM 9.7 10/19/2022    ALBUMIN 3.9 10/26/2022    ALBUMIN 4.1 10/25/2022    ALBUMIN 4.4 10/24/2022    ALBUMIN 4.1 10/19/2022    EGFRNORACEVR >60.0 10/26/2022    EGFRNORACEVR >60.0 10/25/2022    EGFRNORACEVR >60.0 10/24/2022    EGFRNORACEVR >60.0 10/19/2022     With regards to concern for adrenal insufficiency:   - Patient reports hypotension, nausea/vomiting, and abdominal pain for 2-3 days prior to presentation  - Patient already given stress dose hydrocortisone 100 mg; does report significant symptomatic improvement afterwards  - Patient was reportedly adherent with home " "hydrocortisone 10/5 and did not miss any doses  /70   Pulse 79   Temp 98 °F (36.7 °C) (Oral)   Resp 20   Ht 5' 4" (1.626 m)   Wt 72.6 kg (160 lb)   LMP 01/15/2022   SpO2 96%   Breastfeeding No   BMI 27.46 kg/m²     Regarding Hyponatremia:  - Recurring issue.  This time thought to be secondary to regular administration of DDAVP at home.  - Patient was taking DDAVP 0.1 mcg twice daily at home with last dose approximately 5:00 p.m. the evening prior to presentation  Lab Results   Component Value Date    SPECGRAV 1.015 10/26/2022    SPECGRAV 1.010 10/26/2022    SPECGRAV 1.015 10/25/2022    SPECGRAV 1.015 10/25/2022    LABOSMO 291 10/25/2022    LABOSMO 293 10/18/2022    LABOSMO 281 10/18/2022     10/26/2022     10/26/2022     10/26/2022    OSMOLALITYUR 235 10/25/2022    OSMOLALITYUR 251 10/18/2022    SODIUMURR 66 10/25/2022    SODIUMURR 68 10/18/2022    TSH 0.675 10/25/2022    TSH 2.966 10/18/2022    FREET4 0.90 10/25/2022    FREET4 0.85 10/18/2022      "

## 2022-10-25 NOTE — TELEPHONE ENCOUNTER
----- Message from Lucy Agosto MA sent at 10/24/2022  6:39 PM CDT -----  Regarding: FW: appt    ----- Message -----  From: Tremontana Chevalier  Sent: 10/24/2022  10:17 AM CDT  To: L[facility] Staff  Subject: appt                                             CAROL FLOWER calling regarding Appointment Access  (message) for # Caller Zafar wanting to have a follow up appt due to headaches, thinks medication may be an affect. Would like to see Dr. NICOLE Batista. No avail appts in epic. Call pt @ [phone]

## 2022-10-25 NOTE — ED NOTES
Tessa Wynne, a 47 y.o. female presents to the ED w/ complaint of headache, pain in side of neck bilaterally, and lower abdominal pain post pituitary mass removal x3 weeks ago. States was discharged 10/16. States when headache increases she begins to lose vision.  at bedside. AAOx4. Pupils 3mm with PERRLA bilaterally. Neck is tender to palpation on left and right. Abdomen is tender to palpation denies blood in stool.     Triage note:  Chief Complaint   Patient presents with    Headache     Headache, abdominal pain, nausea since last night. Pt had pituitary mass removal 3 weeks ago.      Review of patient's allergies indicates:  No Known Allergies  Past Medical History:   Diagnosis Date    Elevated fasting glucose     GERD (gastroesophageal reflux disease)     Hypothyroidism     PAD (peripheral artery disease)     Pituitary lesion 9/7/2022

## 2022-10-25 NOTE — HPI
47 y.o. female with GERD, hypothyroidism, peripheral artery disease, appendicitis, and pituitary lesion status post resection on 10/3 who presents to [facility] for hyponatremia and vomiting. She initially presented to the ED with diffuse body aches and nausea for the past day. Na was 127 which was down from 127 on 10/19. She has had another prior admission with similar symptoms following her pituitary resection, and she feels her symptoms have been worsening. She also endorses abdominal pain and diffuse headache, but denies dizziness, vomiting, vision problems or any focal neurological deficit. Of note, she was diagnosed with acute appendicitis on in September of this year, for which she was treated with antibiotics.     She will be admitted management of hyponatremia and a higher level of care.

## 2022-10-25 NOTE — H&P
[facility] - Emergency Dept  Neurocritical Care  History & Physical    Admit Date: 10/24/2022  Service Date: 10/25/2022  Length of Stay: 0    Subjective:     Chief Complaint: Hyponatremia    History of Present Illness: 47 y.o. female with GERD, hypothyroidism, peripheral artery disease, appendicitis, and pituitary lesion status post resection on 10/3 who presents to [facility] for hyponatremia and vomiting. She initially presented to the ED with diffuse body aches and nausea for the past day. Na was 127 which was down from 127 on 10/19. She has had another prior admission with similar symptoms following her pituitary resection, and she feels her symptoms have been worsening. She also endorses abdominal pain and diffuse headache, but denies dizziness, vomiting, vision problems or any focal neurological deficit. Of note, she was diagnosed with acute appendicitis on in September of this year, for which she was treated with antibiotics.     She will be admitted management of hyponatremia and a higher level of care.       Past Medical History:   Diagnosis Date    Elevated fasting glucose     GERD (gastroesophageal reflux disease)     Hypothyroidism     PAD (peripheral artery disease)     Pituitary lesion 9/7/2022     Past Surgical History:   Procedure Laterality Date    CHOLECYSTECTOMY      ENDOMETRIAL ABLATION N/A 2/8/2022    Procedure: ABLATION, ENDOMETRIUM;  Surgeon: Ariel Butler MD;  Location: [facility] OR;  Service: OB/GYN;  Laterality: N/A;    EXCISION, NEOPLASM, PITUITARY, TRANSSPHENOIDAL APPROACH, USING COMPUTER-ASSISTED NAVIGATION N/A 10/3/2022    Procedure: EXCISION, NEOPLASM, PITUITARY, TRANSSPHENOIDAL APPROACH, USING COMPUTER-ASSISTED NAVIGATION;  Surgeon: Ed Astorga MD;  Location: [address];  Service: ENT;  Laterality: N/A;    EXCISION, NEOPLASM, PITUITARY, TRANSSPHENOIDAL APPROACH, USING COMPUTER-ASSISTED NAVIGATION N/A 10/3/2022    Procedure: EXCISION, NEOPLASM, PITUITARY, TRANSSPHENOIDAL  APPROACH, USING COMPUTER-ASSISTED NAVIGATION;  Surgeon: Miguel Ángel Ferrell DO;  Location: [facility] OR [address] [place] [address];  Service: Neurosurgery;  Laterality: N/A;    HYSTEROSCOPY WITH DILATION AND CURETTAGE OF UTERUS N/A 2/8/2022    Procedure: HYSTEROSCOPY, WITH DILATION AND CURETTAGE OF UTERUS;  Surgeon: Ariel Butler MD;  Location: [facility] OR;  Service: OB/GYN;  Laterality: N/A;      No current facility-administered medications on file prior to encounter.     Current Outpatient Medications on File Prior to Encounter   Medication Sig Dispense Refill    acetaminophen (TYLENOL) 325 MG tablet Take 2 tablets (650 mg total) by mouth every 6 (six) hours as needed (Headaches (take with magnesium and benadryl)). 30 tablet 0    calcium citrate/vitamin D3 (CITRACAL REGULAR ORAL) Take by mouth.      diphenhydrAMINE (BENADRYL) 50 MG capsule Take 1 capsule (50 mg total) by mouth every 12 (twelve) hours as needed (Headaches (take with tylenol and magnesium)). 30 capsule 0    ferrous sulfate (IRON ORAL) Take by mouth.      hydrocortisone (CORTEF) 10 MG Tab Take 1 tablet (10 mg total) by mouth every morning. 30 tablet 0    hydrocortisone (CORTEF) 5 MG Tab Take 1 tablet (5 mg total) by mouth once daily. Take daily around 3 to 4pm 30 tablet 0    levothyroxine (SYNTHROID) 50 MCG tablet Take 1 tablet (50 mcg total) by mouth before breakfast. 30 tablet 11    magnesium oxide (MAG-OX) 400 mg (241.3 mg magnesium) tablet Take 1 tablet (400 mg total) by mouth 2 (two) times daily as needed (Headaches (take with tylenol and benadryl)). 30 tablet 0    mv,calcium,min/iron/folic/vitK (ONE-A-DAY WOMEN'S COMPLETE ORAL) Take by mouth.      omeprazole (PRILOSEC) 40 MG capsule Take 1 capsule (40 mg total) by mouth every morning. 30 capsule 11    ondansetron (ZOFRAN-ODT) 4 MG TbDL Dissolve 1 tablet (4 mg total) by mouth every 8 (eight) hours as needed (nausea & vomiting). 24 tablet 0    oxyCODONE (ROXICODONE) 5 MG immediate release tablet Take 1 tablet  (5 mg total) by mouth every 6 (six) hours as needed for Pain (take as needed for pain/headaches). 10 tablet 0    oxymetazoline (AFRIN) 0.05 % nasal spray 3 sprays by Nasal route every 2 (two) hours as needed (epistaxis). 30 mL 0    sodium chloride (OCEAN) 0.65 % nasal spray 2 sprays by Nasal route every 4 (four) hours while awake. 44 mL 0      Allergies: Patient has no known allergies.    Family History   Problem Relation Age of Onset    Diabetes Mother     Diabetes Mellitus Mother     Diabetes Father     Diabetes Mellitus Father     No Known Problems Sister     No Known Problems Sister     No Known Problems Brother     No Known Problems Brother     No Known Problems Daughter     No Known Problems Son     Colon cancer Neg Hx     Breast cancer Neg Hx     Ovarian cancer Neg Hx     Uterine cancer Neg Hx     Heart attack Neg Hx      Social History     Tobacco Use    Smoking status: Never    Smokeless tobacco: Never   Substance Use Topics    Alcohol use: No    Drug use: No     Review of Systems   Constitutional:  Positive for fatigue. Negative for chills and fever.   HENT:  Negative for postnasal drip, rhinorrhea and trouble swallowing.    Eyes:  Negative for photophobia, pain and visual disturbance.   Respiratory:  Negative for cough, shortness of breath and stridor.    Cardiovascular:  Negative for chest pain, palpitations and leg swelling.   Gastrointestinal:  Positive for abdominal pain and nausea. Negative for constipation, diarrhea and vomiting.   Endocrine: Negative for polydipsia, polyphagia and polyuria.   Musculoskeletal:  Negative for back pain, neck pain and neck stiffness.   Neurological:  Positive for headaches. Negative for dizziness, tremors, seizures, facial asymmetry, speech difficulty, weakness, light-headedness and numbness.   Objective:     Vitals:    Temp: 98.2 °F (36.8 °C)  Pulse: (!) 57  BP: 133/67  MAP (mmHg): 91  Resp: 16  SpO2: 98 %  O2 Device (Oxygen Therapy): room  air    Temp  Min: 98.2 °F (36.8 °C)  Max: 98.2 °F (36.8 °C)  Pulse  Min: 57  Max: 78  BP  Min: 127/70  Max: 138/79  MAP (mmHg)  Min: 91  Max: 103  Resp  Min: 16  Max: 18  SpO2  Min: 96 %  Max: 100 %    10/24 0701 - 10/25 0700  In: 300 [I.V.:50]  Out: -            Physical Exam  Constitutionally: Patient resting comfortably. No acute distress.   HEENT: normocephalic, atraumatic. Extraocular movement intact. Anicteric, no conjunctival injection.  Neck: No tracheal deviation. No gross lymphadenopathy.  CV: regular rate, extremities well perfused.  Pulm: No respiratory distress on room air. Equal chest rise bilaterally.  Abdomen: No appreciable distention or ascites. Tenderness to RUQ, RLQ. No rebound tenderness. Negative Rovsing sign.   MSK: No obvious deformities  Skin: No appreciable rashes or jaundice.      Neurologic:  GCS15  AAOx3  PERRL, EOMI  Pupils brisk  SILT  Strength 5/5 throughout     Today I personally reviewed pertinent medications, lines/drains/airways, imaging, cardiology results, laboratory results, microbiology results, notably:          Assessment/Plan:     Renal/  * Hyponatremia  48 y/o with recent history of pituitary adenoma resection presents with nausea, headache, and hyponatremia of 127. Na down from 137 on previous admission (10/19).    -Admit to [facility]  -q1h neuro checks, vital checks  -Daily CBC, CMP, mag, phos  -q6h Na, UA  -Normal saline  -SBP <160  -SCDs, hold DVT chemoppx until confirming lack of surgical need on imaging  -MRI pituitary pending  -PT/OT/SLP      Endocrine  Adrenal insufficiency  Consider endocrine consult  8 am cortisol pending    Hypothyroidism  Endocrine following on previous admission  Resume home synthroid    Pituitary macroadenoma with extrasellar extension  Resent resection 10/3  MRI pituitary pending, consider neurosurgery consult if warranted based on imaging  See hyponatremia    GI  Appendicitis  History of appendicitis in 9/2022  RUQ, RLQ pain on exam with  nausea  No elevated WBC, afebrile  CT a/p with contrast pending  Amylase, lipase, ESR, CRP, beta hcg, UA pending    PUD (peptic ulcer disease)  prilosec          The patient is being Prophylaxed for:  Venous Thromboembolism with: Mechanical  Stress Ulcer with: PPI  Ventilator Pneumonia with: none    Activity Orders          Turn patient starting at 10/25 0200    Elevate HOB starting at 10/25 0019    Diet NPO: NPO starting at 10/25 0019        Full Code     Critical condition in that Patient has a condition that poses threat to life and bodily function: Hyponatremia s/p pituitary adenoma resection, abdominal pain and nausea with history of appendicitis     45 minutes of Critical care time was spent personally by me on the following activities: development of treatment plan with patient or surrogate and bedside caregivers, discussions with consultants, evaluation of patient's response to treatment, examination of patient, ordering and performing treatments and interventions, ordering and review of laboratory studies, ordering and review of radiographic studies, pulse oximetry, antibiotic titration if applicable, vasopressor titration if applicable, re-evaluation of patient's condition. This critical care time did not overlap with that of any other provider or involve time for any procedures. There is high probability for acute neurological change leading to clinical and possibly life-threatening deterioration requiring highest level of physician preparedness for urgent intervention.    Manju Paz PA-C  Neurocritical Care  [facility] - Emergency Dept

## 2022-10-25 NOTE — ASSESSMENT & PLAN NOTE
- Initial labs not strongly suggestive adrenal insufficiency though patient did experience significant improvement after stress dose hydrocortisone 100 mg    - Recommend tapering steroids with hydrocortisone 50 mg p.o. every 8 hours through tomorrow, then 50 mg every 12 hours the next day, then hydrocortisone 20 mg in the morning and 10 mg in the evening which should be discharge dose with close endocrine follow-up

## 2022-10-25 NOTE — CONSULTS
[facility] - Emergency Dept  Endocrinology  Consult Note    Consult Requested by: Miguel Ángel Ferrell DO   Reason for admit: Hyponatremia    HISTORY OF PRESENT ILLNESS:  47 y.o. female with hypothyroidism, GERD, PAD, appendicitis, and pituitary adenoma status post resection on 10/03/2022 who presents with hyponatremia, headache, abdominal pain, vomiting, and diffuse body aches for 24 hours prior to admission.     - Endocrinology consulted for hyponatremia and concern for adrenal insufficiency    Regarding pituitary adenoma    - Status post TSR by Dr. Ferrell on 10/3/22 ([facility] path - GONADOTROPH ADENOMA, IMMUNOREACTIVE FOR ALPHA-SUBUNIT AND SF1).                  MRI 8/15/22  Sella: There is a large sellar lesion with suprasellar extension.  Lesion measures roughly 3.2 x 2.8 x 3.5 cm.  There is mass effect on the adjacent structures including the optic chiasm which is displaced superiorly as well as the bilateral A1 segments of the anterior cerebral arteries.  There is possible extension into the cavernous sinuses bilaterally best visualized on coronal T2 series 10.  The carotid flow voids are preserved.  There is bony remodeling of the adjacent clivus secondary to this lesion with preserved marrow signal.        MRI 10/25/2022  Sella: Evolving operative change status post transsphenoidal approach sellar/suprasellar mass resection. There is continued heterogeneous signal predominantly hypoenhancing material along the posterior sphenoid sinus extending to the sella felt to represent postoperative packing material. There is thin marginated enhancement about this which may be reactive in granulation tissue.  There is continued somewhat lobular enhancing material along the postoperative floor of the sella which may in part represent residual pituitary tissue though remains concerning for component of residual lesion. The infundibulum is deviated to the right. Compared to most recent prior the nodular enhancing material is similar  "measuring approximately 1.6 cm AP x 1.4 cm TV and 0.7 cm craniocaudal. There is no significant mass effect on the suprasellar neurovascular structures. Impression: MR sella: Evolving operative change from transsphenoidal sellar/suprasellar mass resection.  There is continued somewhat lobular hypoenhancing material within the sella remains concerning for possible residual lesion and residual pituitary tissue.  No evidence for significant mass effect on the suprasellar neurovascular structures or suprasellar extension. MRI brain: Otherwise unremarkable MRI of the brain specifically without evidence for hydrocephalus or new parenchymal signal abnormality          Formal Visual burns: HVF w/ Dr. Yan 8/25/22, per note:" OCT with borderline RNFL thinning OD>OS. HVF with generalized depression OD (worse temporally) and temporal depression OS. Findings consistent with chiasmal compression." Return to clinic in 3 month(s)    Lab Results   Component Value Date    PROLACTIN 138.0 (H) 08/26/2022     Lab Results   Component Value Date    TSH 0.675 10/25/2022    FREET4 0.90 10/25/2022       Lab Results   Component Value Date    SOMATMDN 42 (L) 08/26/2022     Lab Results   Component Value Date    PROLACTIN 138.0 (H) 08/26/2022    TSH 0.675 10/25/2022    TSH 2.966 10/18/2022    TSH 0.772 10/08/2022    TSH 0.739 09/24/2022    FREET4 0.90 10/25/2022    FREET4 0.85 10/18/2022    FREET4 0.65 (L) 08/26/2022    ACTH 11 10/18/2022    ACTH 19 08/26/2022    FSH 10.72 08/26/2022    SOMATMDN 42 (L) 08/26/2022     10/25/2022     (L) 10/25/2022     (L) 10/25/2022     (L) 10/24/2022    K 4.3 10/25/2022    K 4.4 10/24/2022    K 4.1 10/19/2022    K 4.0 10/18/2022    CALCIUM 9.2 10/25/2022    CALCIUM 9.6 10/24/2022    CALCIUM 9.7 10/19/2022    CALCIUM 9.0 10/18/2022    ALBUMIN 4.1 10/25/2022    ALBUMIN 4.4 10/24/2022    ALBUMIN 4.1 10/19/2022    ALBUMIN 3.6 10/18/2022    EGFRNORACEVR >60.0 10/25/2022    EGFRNORACEVR >60.0 " "10/24/2022    EGFRNORACEVR >60.0 10/19/2022    EGFRNORACEVR >60.0 10/18/2022     With regards to concern for adrenal insufficiency:   - Patient already given stress dose hydrocortisone 100 mg; does report significant symptomatic improvement afterwards    Lab Results   Component Value Date    K 4.3 10/25/2022    K 4.4 10/24/2022     10/25/2022     (L) 10/25/2022    CREATININE 0.8 10/25/2022    CREATININE 0.8 10/24/2022    ACTH 11 10/18/2022    ACTH 19 08/26/2022     /65   Pulse 75   Temp 97.4 °F (36.3 °C) (Oral)   Resp 13   Ht 5' 4" (1.626 m)   Wt 72.6 kg (160 lb)   LMP 01/15/2022   SpO2 97%   BMI 27.46 kg/m²   - Symptoms: hypotension, nausea/vomiting, abdominal pain    Regarding Hyponatremia:  - Recurring issue.  This time thought to be secondary to regular administration of DDAVP at home.  - Patient was taking DDAVP 0.1 mcg twice daily at home with last dose approximately 5:00 p.m. the evening prior to presentation  Lab Results   Component Value Date    SPECGRAV 1.015 10/25/2022    SPECGRAV 1.005 10/25/2022    SPECGRAV 1.005 10/25/2022    SPECGRAV 1.010 10/18/2022    LABOSMO 291 10/25/2022    LABOSMO 293 10/18/2022    LABOSMO 281 10/18/2022     10/25/2022     (L) 10/25/2022     (L) 10/25/2022    OSMOLALITYUR 235 10/25/2022    OSMOLALITYUR 251 10/18/2022    SODIUMURR 66 10/25/2022    SODIUMURR 68 10/18/2022    TSH 0.675 10/25/2022    TSH 2.966 10/18/2022    FREET4 0.90 10/25/2022    FREET4 0.85 10/18/2022          Medications and/or Treatments Impacting Glycemic Control:  Immunotherapy:    Immunosuppressants     None        Steroids:   Hormones (From admission, onward)    Start     Stop Route Frequency Ordered    10/25/22 1200  fludrocortisone tablet 100 mcg         -- Oral 2 times daily 10/25/22 8742        Pressors:    Autonomic Drugs (From admission, onward)    None          (Not in a hospital admission)      Current Facility-Administered Medications   Medication Dose " Route Frequency Provider Last Rate Last Admin    0.9%  NaCl infusion   Intravenous Continuous Manju Paz PA-C 100 mL/hr at 10/25/22 0813 Rate Change at 10/25/22 0813    acetaminophen tablet 650 mg  650 mg Oral Q6H PRN Manju Paz PA-C        fludrocortisone tablet 100 mcg  100 mcg Oral BID Kendy Pyle MD   100 mcg at 10/25/22 1340    levothyroxine tablet 50 mcg  50 mcg Oral Before breakfast Manju Paz PA-C   50 mcg at 10/25/22 0600    magnesium oxide tablet 800 mg  800 mg Oral PRN Manju Paz PA-C        magnesium oxide tablet 800 mg  800 mg Oral PRN Manju aPz PA-C        ondansetron injection 4 mg  4 mg Intravenous Q8H PRN Manju Paz PA-C        pantoprazole EC tablet 40 mg  40 mg Oral Daily Manju Paz PA-C   40 mg at 10/25/22 0813    potassium bicarbonate disintegrating tablet 35 mEq  35 mEq Oral PRN Manju Paz PA-C        potassium bicarbonate disintegrating tablet 50 mEq  50 mEq Oral PRN Manju Paz PA-C        potassium bicarbonate disintegrating tablet 60 mEq  60 mEq Oral PRN Manuj Paz PA-C        potassium, sodium phosphates 280-160-250 mg packet 2 packet  2 packet Oral PRN TILA Solis-C        potassium, sodium phosphates 280-160-250 mg packet 2 packet  2 packet Oral PRN TILA Solis-AGUSTIN        potassium, sodium phosphates 280-160-250 mg packet 2 packet  2 packet Oral PRN Manju Paz PA-C        sodium chloride 0.9% flush 10 mL  10 mL Intravenous PRN Manju Paz PA-C         Current Outpatient Medications   Medication Sig Dispense Refill    acetaminophen (TYLENOL) 325 MG tablet Take 2 tablets (650 mg total) by mouth every 6 (six) hours as needed (Headaches (take with magnesium and benadryl)). 30 tablet 0    calcium citrate/vitamin D3 (CITRACAL REGULAR ORAL) Take by mouth.      diphenhydrAMINE (BENADRYL) 50 MG capsule Take 1 capsule (50 mg total) by mouth every 12 (twelve) hours  as needed (Headaches (take with tylenol and magnesium)). 30 capsule 0    ferrous sulfate (IRON ORAL) Take by mouth.      hydrocortisone (CORTEF) 10 MG Tab Take 1 tablet (10 mg total) by mouth every morning. 30 tablet 0    hydrocortisone (CORTEF) 5 MG Tab Take 1 tablet (5 mg total) by mouth once daily. Take daily around 3 to 4pm 30 tablet 0    hyoscyamine (LEVSIN/SL) 0.125 mg Subl Take 0.125 mg by mouth 4 (four) times daily as needed.      levothyroxine (SYNTHROID) 50 MCG tablet Take 1 tablet (50 mcg total) by mouth before breakfast. 30 tablet 11    magnesium oxide (MAG-OX) 400 mg (241.3 mg magnesium) tablet Take 1 tablet (400 mg total) by mouth 2 (two) times daily as needed (Headaches (take with tylenol and benadryl)). 30 tablet 0    mv,calcium,min/iron/folic/vitK (ONE-A-DAY WOMEN'S COMPLETE ORAL) Take by mouth.      omeprazole (PRILOSEC) 40 MG capsule Take 1 capsule (40 mg total) by mouth every morning. 30 capsule 11    ondansetron (ZOFRAN-ODT) 4 MG TbDL Dissolve 1 tablet (4 mg total) by mouth every 8 (eight) hours as needed (nausea & vomiting). 24 tablet 0    oxyCODONE (ROXICODONE) 5 MG immediate release tablet Take 1 tablet (5 mg total) by mouth every 6 (six) hours as needed for Pain (take as needed for pain/headaches). 10 tablet 0    oxymetazoline (AFRIN) 0.05 % nasal spray 3 sprays by Nasal route every 2 (two) hours as needed (epistaxis). 30 mL 0    sodium chloride (OCEAN) 0.65 % nasal spray 2 sprays by Nasal route every 4 (four) hours while awake. 44 mL 0       Interval HPI:   Overnight events:  No acute events reported overnight    PMH, PSH, FH, SH updated and reviewed     ROS:  Review of Systems   Constitutional:  Positive for fatigue.   Gastrointestinal:  Positive for nausea and vomiting.   Neurological:  Positive for light-headedness and headaches.     Labs Reviewed and Include:  BASELINE Creatinine:   [unfilled]  [unfilled]  [unfilled]  Recent Labs   Lab 10/25/22  0234 10/25/22  0606  10/25/22  1214   *  --   --    CALCIUM 9.2  --   --    ALBUMIN 4.1  --   --    PROT 7.1  --   --    *   < > 141   K 4.3  --   --    CO2 21*  --   --    CL 99  --   --    BUN 10  --   --    CREATININE 0.8  --   --    ALKPHOS 66  --   --    ALT 28  --   --    AST 21  --   --    BILITOT 0.5  --   --     < > = values in this interval not displayed.     Lab Results   Component Value Date    HGBA1C 5.4 10/24/2022       Nutritional status:   Body mass index is 27.46 kg/m².  Lab Results   Component Value Date    ALBUMIN 4.1 10/25/2022    ALBUMIN 4.4 10/24/2022    ALBUMIN 4.1 10/19/2022     No results found for: PREALBUMIN    Estimated Creatinine Clearance: 84 mL/min (based on SCr of 0.8 mg/dL).    POCT Glucose results:    Current Insulin Regimen:       PHYSICAL EXAMINATION:  Vitals:    10/25/22 1501   BP: 111/60   Pulse:    Resp:    Temp:      Body mass index is 27.46 kg/m².    Physical Exam  Constitutional:       Appearance: Normal appearance.   HENT:      Head:      Comments: Visual fields grossly intact  Pulmonary:      Effort: Pulmonary effort is normal.   Neurological:      General: No focal deficit present.      Mental Status: She is alert and oriented to person, place, and time.   Psychiatric:         Mood and Affect: Mood normal.         Behavior: Behavior normal.     .     ASSESSMENT and PLAN:    * Hyponatremia  - Patient presents with hyponatremia rapidly correcting after presentation  - Thought to be secondary to DDAVP use at home; patient was taking 0.1 mg twice daily at home    - With rapid correction of sodium since presentation, recommend stopping sodium chloride tablets and continuing close monitoring for recurrent diabetes insipidus with q6h Urinalysis (urine specific gravity), strict monitoring of intake/output hourly, and if urine output greater than 250 ml/hr for over 2 hours, draw BMP, serum Osm, urine Osm, urine sodium, and urinalysis stat and notify primary team and endocrine on-call    -  Give desmopressin (DDAVP 10 mcg intranasal x1 dose) only if:  - Urine output greater than 250cc for two or more consecutive hours  - and Na greater than 145   - and low urinary specific gravity less than 1.010  - and inability of the patient to match fluid intake with UOP  - Please page endocrine if questions    - If patient is alert and has intact thirst response, encourage them to drink to thirst. Contact neurosurgery prior to giving DDAVP    - Will require outpatient CMP 7-10 days post-discharge to monitor sodium level    Adrenal insufficiency  - Initial labs not strongly suggestive adrenal insufficiency though patient did experience significant improvement after stress dose hydrocortisone 100 mg    - Recommend tapering steroids with hydrocortisone 50 mg p.o. every 8 hours through tomorrow, then 50 mg every 12 hours the next day, then hydrocortisone 20 mg in the morning and 10 mg in the evening which should be discharge dose with close endocrine follow-up    Pituitary macroadenoma with extrasellar extension  - Neurosurgery on board, repeat pituitary MRI did not show concerning structural changes    Hypothyroidism  - Free T4 at 0.9 on admission    - Recommend continuing levothyroxine 50 mcg daily      Tan Seals DO  [facility], [address]th Floor  [address] [place], [address]    Office: [phone]  Fax: [phone]    Disclaimer: This note has been generated using voice-recognition software. There may be typographical errors that have been missed during proof-reading.    The above history labs imaging impression and plan were discussed with attending physician who is in agreement and also took part in this patient's care.  I personally reviewed all of the patients available medications, labs, imaging, vitals, allergies, medical history.

## 2022-10-25 NOTE — HPI
47 y.o. female with GERD, Hypothyroidism, PAD and pituitary lesion status transphenoidal resection on 10/3/22 with Dr. Ferrell. She initially presented to the ED yesterday with diffuse body aches and nausea for the past day. Na was 127 which was down from 127 on 10/19. She has had another prior admission with similar symptoms following her pituitary resection, and she feels her symptoms have been worsening. She also endorses abdominal pain and diffuse headache, but denies dizziness, vomiting, vision problems or any focal neurological deficit. She was admitted to [facility] for hyponatremia and vomiting. Stepped down to NSGY. Endocrine consulted.

## 2022-10-25 NOTE — TELEPHONE ENCOUNTER
Spoke with patient who come into clinic yesterday not feeling while . Patient has been unable to speak to anyone in the office , patient called nurse on call and due to her symptoms she had been advised to go to Emergency room. Patient is currently now admitted at [facility]

## 2022-10-25 NOTE — SUBJECTIVE & OBJECTIVE
(Not in a hospital admission)      Review of patient's allergies indicates:  No Known Allergies    Past Medical History:   Diagnosis Date    Elevated fasting glucose     GERD (gastroesophageal reflux disease)     Hypothyroidism     PAD (peripheral artery disease)     Pituitary lesion 9/7/2022     Past Surgical History:   Procedure Laterality Date    CHOLECYSTECTOMY      ENDOMETRIAL ABLATION N/A 2/8/2022    Procedure: ABLATION, ENDOMETRIUM;  Surgeon: Ariel Butler MD;  Location: [facility];  Service: OB/GYN;  Laterality: N/A;    EXCISION, NEOPLASM, PITUITARY, TRANSSPHENOIDAL APPROACH, USING COMPUTER-ASSISTED NAVIGATION N/A 10/3/2022    Procedure: EXCISION, NEOPLASM, PITUITARY, TRANSSPHENOIDAL APPROACH, USING COMPUTER-ASSISTED NAVIGATION;  Surgeon: Ed Astorga MD;  [facility];  Service: ENT;  Laterality: N/A;    EXCISION, NEOPLASM, PITUITARY, TRANSSPHENOIDAL APPROACH, USING COMPUTER-ASSISTED NAVIGATION N/A 10/3/2022    Procedure: EXCISION, NEOPLASM, PITUITARY, TRANSSPHENOIDAL APPROACH, USING COMPUTER-ASSISTED NAVIGATION;  Surgeon: Miguel Ángel Ferrell DO;  [facility];  Service: Neurosurgery;  Laterality: N/A;    HYSTEROSCOPY WITH DILATION AND CURETTAGE OF UTERUS N/A 2/8/2022    Procedure: HYSTEROSCOPY, WITH DILATION AND CURETTAGE OF UTERUS;  Surgeon: Ariel Butler MD;  Location: [facility];  Service: OB/GYN;  Laterality: N/A;     Family History       Problem Relation (Age of Onset)    Diabetes Mother, Father    Diabetes Mellitus Mother, Father    No Known Problems Sister, Sister, Brother, Brother, Daughter, Son          Tobacco Use    Smoking status: Never    Smokeless tobacco: Never   Substance and Sexual Activity    Alcohol use: No    Drug use: No    Sexual activity: Yes     Partners: Male     Birth control/protection: OCP     Review of Systems  Objective:     Weight: 72.6 kg (160 lb)  Body mass index is 27.46 kg/m².  Vital Signs (Most Recent):  Temp: 97.4 °F (36.3 °C) (10/25/22  0754)  Pulse: 73 (10/25/22 1402)  Resp: (!) 22 (10/25/22 1402)  BP: 106/60 (10/25/22 1402)  SpO2: 98 % (10/25/22 1402)   Vital Signs (24h Range):  Temp:  [97.4 °F (36.3 °C)-98.2 °F (36.8 °C)] 97.4 °F (36.3 °C)  Pulse:  [57-78] 73  Resp:  [13-22] 22  SpO2:  [96 %-100 %] 98 %  BP: ()/(55-80) 106/60                          Urethral Catheter 10/25/22 0653 Straight-tip 16 Fr. (Active)         Neurosurgery Physical Exam  General: Well developed, well nourished, not in acute distress.   Head: Normocephalic, atraumatic.  Neck: Full ROM.   Neurologic: AxO4. Thought content appropriate.  GCS:15  Language: No aphasia.  Speech: No dysarthria.  Cranial nerves: Face symmetric, tongue midline, CN II-XII grossly intact.   Eyes: Pupils equal, round, reactive to light with accomodation, EOMI. Unable to appreciate optic disc.   Pulmonary: Normal respirations, no signs of respiratory distress.  Abdomen: Soft, non-distended, mildly tender to palpation.  Sensory: Intact to light touch throughout.  Motor Strength: Moves all extremities spontaneously with good tone. Full strength upper and lower extremities. No abnormal movements seen.   Pronator Drift: No drift noted.  Finger-to-nose: Intact bilaterally.  Vascular: Pulses 2+ and symmetric radial and dorsalis pedis. No LE edema.   Skin: Skin is warm, dry and intact.    Significant Labs:  Recent Labs   Lab 10/24/22  2025 10/25/22  0234 10/25/22  0606 10/25/22  1214   * 143*  --   --    * 131* 131* 141   K 4.4 4.3  --   --    CL 92* 99  --   --    CO2 27 21*  --   --    BUN 12 10  --   --    CREATININE 0.8 0.8  --   --    CALCIUM 9.6 9.2  --   --      Recent Labs   Lab 10/24/22  2025 10/25/22  0234   WBC 7.02 7.09   HGB 12.9 12.8   HCT 38.3 36.7*    310     No results for input(s): LABPT, INR, APTT in the last 48 hours.  Microbiology Results (last 7 days)       Procedure Component Value Units Date/Time    Influenza A & B by Molecular [[id number]] Collected: 10/24/22  2126    Order Status: Completed Specimen: Nasopharyngeal Swab Updated: 10/24/22 2232     Influenza A, Molecular Negative     Influenza B, Molecular Negative     Flu A & B Source NP          All pertinent labs from the last 24 hours have been reviewed.    Significant Diagnostics:  I have reviewed all pertinent imaging results/findings within the past 24 hours.

## 2022-10-25 NOTE — ED NOTES
Pt AAO x 4 and RR even/ unlabored. Pt resting in ED bed low/ locked in NAD. Pt reports pain 2/10. Family at the bedside.

## 2022-10-25 NOTE — CONSULTS
Inpatient consult to Physical Medicine Rehab  Consult performed by: Karey Ingram NP  Consult ordered by: Manju Paz PA-C      Consult received.  Reviewed patient history and current admission.  PM&R following.  Karey Ingram NP  Physical Medicine & Rehabilitation   10/25/2022

## 2022-10-25 NOTE — SUBJECTIVE & OBJECTIVE
Interval HPI:   Overnight events:  No acute events reported overnight    PMH, PSH, FH, SH updated and reviewed     ROS:  Review of Systems   Constitutional:  Positive for fatigue.   Gastrointestinal:  Positive for nausea and vomiting.   Neurological:  Positive for light-headedness and headaches.     Labs Reviewed and Include:  BASELINE Creatinine:   [unfilled]  [unfilled]  @[facility]@  Recent Labs   Lab 10/25/22  0234 10/25/22  0606 10/25/22  1214   *  --   --    CALCIUM 9.2  --   --    ALBUMIN 4.1  --   --    PROT 7.1  --   --    *   < > 141   K 4.3  --   --    CO2 21*  --   --    CL 99  --   --    BUN 10  --   --    CREATININE 0.8  --   --    ALKPHOS 66  --   --    ALT 28  --   --    AST 21  --   --    BILITOT 0.5  --   --     < > = values in this interval not displayed.     Lab Results   Component Value Date    HGBA1C 5.4 10/24/2022       Nutritional status:   Body mass index is 27.46 kg/m².  Lab Results   Component Value Date    ALBUMIN 4.1 10/25/2022    ALBUMIN 4.4 10/24/2022    ALBUMIN 4.1 10/19/2022     No results found for: PREALBUMIN    Estimated Creatinine Clearance: 84 mL/min (based on SCr of 0.8 mg/dL).    POCT Glucose results:    Current Insulin Regimen:       PHYSICAL EXAMINATION:  Vitals:    10/25/22 1501   BP: 111/60   Pulse:    Resp:    Temp:      Body mass index is 27.46 kg/m².    Physical Exam  Constitutional:       Appearance: Normal appearance.   HENT:      Head:      Comments: Visual fields grossly intact  Pulmonary:      Effort: Pulmonary effort is normal.   Neurological:      General: No focal deficit present.      Mental Status: She is alert and oriented to person, place, and time.   Psychiatric:         Mood and Affect: Mood normal.         Behavior: Behavior normal.

## 2022-10-25 NOTE — SUBJECTIVE & OBJECTIVE
Past Medical History:   Diagnosis Date    Elevated fasting glucose     GERD (gastroesophageal reflux disease)     Hypothyroidism     PAD (peripheral artery disease)     Pituitary lesion 9/7/2022     Past Surgical History:   Procedure Laterality Date    CHOLECYSTECTOMY      ENDOMETRIAL ABLATION N/A 2/8/2022    Procedure: ABLATION, ENDOMETRIUM;  Surgeon: Ariel Butler MD;  Location: [facility] OR;  Service: OB/GYN;  Laterality: N/A;    EXCISION, NEOPLASM, PITUITARY, TRANSSPHENOIDAL APPROACH, USING COMPUTER-ASSISTED NAVIGATION N/A 10/3/2022    Procedure: EXCISION, NEOPLASM, PITUITARY, TRANSSPHENOIDAL APPROACH, USING COMPUTER-ASSISTED NAVIGATION;  Surgeon: Ed Astorga MD;  [facility];  Service: ENT;  Laterality: N/A;    EXCISION, NEOPLASM, PITUITARY, TRANSSPHENOIDAL APPROACH, USING COMPUTER-ASSISTED NAVIGATION N/A 10/3/2022    Procedure: EXCISION, NEOPLASM, PITUITARY, TRANSSPHENOIDAL APPROACH, USING COMPUTER-ASSISTED NAVIGATION;  Surgeon: Miguel Ángel Ferrell DO;  [facility];  Service: Neurosurgery;  Laterality: N/A;    HYSTEROSCOPY WITH DILATION AND CURETTAGE OF UTERUS N/A 2/8/2022    Procedure: HYSTEROSCOPY, WITH DILATION AND CURETTAGE OF UTERUS;  Surgeon: Ariel Butler MD;  Location: [facility] OR;  Service: OB/GYN;  Laterality: N/A;      No current facility-administered medications on file prior to encounter.     Current Outpatient Medications on File Prior to Encounter   Medication Sig Dispense Refill    acetaminophen (TYLENOL) 325 MG tablet Take 2 tablets (650 mg total) by mouth every 6 (six) hours as needed (Headaches (take with magnesium and benadryl)). 30 tablet 0    calcium citrate/vitamin D3 (CITRACAL REGULAR ORAL) Take by mouth.      diphenhydrAMINE (BENADRYL) 50 MG capsule Take 1 capsule (50 mg total) by mouth every 12 (twelve) hours as needed (Headaches (take with tylenol and magnesium)). 30 capsule 0    ferrous sulfate (IRON ORAL) Take by mouth.      hydrocortisone (CORTEF)  10 MG Tab Take 1 tablet (10 mg total) by mouth every morning. 30 tablet 0    hydrocortisone (CORTEF) 5 MG Tab Take 1 tablet (5 mg total) by mouth once daily. Take daily around 3 to 4pm 30 tablet 0    levothyroxine (SYNTHROID) 50 MCG tablet Take 1 tablet (50 mcg total) by mouth before breakfast. 30 tablet 11    magnesium oxide (MAG-OX) 400 mg (241.3 mg magnesium) tablet Take 1 tablet (400 mg total) by mouth 2 (two) times daily as needed (Headaches (take with tylenol and benadryl)). 30 tablet 0    mv,calcium,min/iron/folic/vitK (ONE-A-DAY WOMEN'S COMPLETE ORAL) Take by mouth.      omeprazole (PRILOSEC) 40 MG capsule Take 1 capsule (40 mg total) by mouth every morning. 30 capsule 11    ondansetron (ZOFRAN-ODT) 4 MG TbDL Dissolve 1 tablet (4 mg total) by mouth every 8 (eight) hours as needed (nausea & vomiting). 24 tablet 0    oxyCODONE (ROXICODONE) 5 MG immediate release tablet Take 1 tablet (5 mg total) by mouth every 6 (six) hours as needed for Pain (take as needed for pain/headaches). 10 tablet 0    oxymetazoline (AFRIN) 0.05 % nasal spray 3 sprays by Nasal route every 2 (two) hours as needed (epistaxis). 30 mL 0    sodium chloride (OCEAN) 0.65 % nasal spray 2 sprays by Nasal route every 4 (four) hours while awake. 44 mL 0      Allergies: Patient has no known allergies.    Family History   Problem Relation Age of Onset    Diabetes Mother     Diabetes Mellitus Mother     Diabetes Father     Diabetes Mellitus Father     No Known Problems Sister     No Known Problems Sister     No Known Problems Brother     No Known Problems Brother     No Known Problems Daughter     No Known Problems Son     Colon cancer Neg Hx     Breast cancer Neg Hx     Ovarian cancer Neg Hx     Uterine cancer Neg Hx     Heart attack Neg Hx      Social History     Tobacco Use    Smoking status: Never    Smokeless tobacco: Never   Substance Use Topics    Alcohol use: No    Drug use: No     Review of Systems   Constitutional:  Positive for fatigue.  Negative for chills and fever.   HENT:  Negative for postnasal drip, rhinorrhea and trouble swallowing.    Eyes:  Negative for photophobia, pain and visual disturbance.   Respiratory:  Negative for cough, shortness of breath and stridor.    Cardiovascular:  Negative for chest pain, palpitations and leg swelling.   Gastrointestinal:  Positive for abdominal pain and nausea. Negative for constipation, diarrhea and vomiting.   Endocrine: Negative for polydipsia, polyphagia and polyuria.   Musculoskeletal:  Negative for back pain, neck pain and neck stiffness.   Neurological:  Positive for headaches. Negative for dizziness, tremors, seizures, facial asymmetry, speech difficulty, weakness, light-headedness and numbness.   Objective:     Vitals:    Temp: 98.2 °F (36.8 °C)  Pulse: (!) 57  BP: 133/67  MAP (mmHg): 91  Resp: 16  SpO2: 98 %  O2 Device (Oxygen Therapy): room air    Temp  Min: 98.2 °F (36.8 °C)  Max: 98.2 °F (36.8 °C)  Pulse  Min: 57  Max: 78  BP  Min: 127/70  Max: 138/79  MAP (mmHg)  Min: 91  Max: 103  Resp  Min: 16  Max: 18  SpO2  Min: 96 %  Max: 100 %    10/24 0701 - 10/25 0700  In: 300 [I.V.:50]  Out: -            Physical Exam  Constitutionally: Patient resting comfortably. No acute distress.   HEENT: normocephalic, atraumatic. Extraocular movement intact. Anicteric, no conjunctival injection.  Neck: No tracheal deviation. No gross lymphadenopathy.  CV: regular rate, extremities well perfused.  Pulm: No respiratory distress on room air. Equal chest rise bilaterally.  Abdomen: No appreciable distention or ascites. Tenderness to RUQ, RLQ. No rebound tenderness. Negative Rovsing sign.   MSK: No obvious deformities  Skin: No appreciable rashes or jaundice.      Neurologic:  GCS15  AAOx3  PERRL, EOMI  Pupils brisk  SILT  Strength 5/5 throughout     Today I personally reviewed pertinent medications, lines/drains/airways, imaging, cardiology results, laboratory results, microbiology results, notably:

## 2022-10-25 NOTE — ASSESSMENT & PLAN NOTE
48 y/o with recent history of pituitary adenoma resection presents with nausea, headache, and hyponatremia of 127. Na down from 137 on previous admission (10/19).    -Admit to NCC  -q1h neuro checks, vital checks  -Daily CBC, CMP, mag, phos  -q6h Na, UA  -Normal saline  -SBP <160  -SCDs, hold DVT chemoppx until confirming lack of surgical need on imaging  -MRI pituitary pending  -PT/OT/SLP

## 2022-10-25 NOTE — ASSESSMENT & PLAN NOTE
Tessa Wynne is a 47 y.o. female s/p TSR on 10/3 with Dr. Ferrell presents with headaches, fatigue, appetite changes and nausea/vomiting.     - Step down to nsgy service floor.  - Q4hr neurochecks.   - Endocrine consulted, appreciate recs.   - Hyponatremia: Salt tabs 2G TID started per NCC. Na 141 today.   - MRI Pituitary w/wo contrast done and reviewed.   - Please call nsgy with any change in exam or status.     D/w Dr. Ferrell

## 2022-10-25 NOTE — ASSESSMENT & PLAN NOTE
History of appendicitis in 9/2022  RUQ, RLQ pain on exam with nausea  No elevated WBC, afebrile  CT a/p with contrast pending  Amylase, lipase, ESR, CRP, beta hcg, UA pending

## 2022-10-25 NOTE — ASSESSMENT & PLAN NOTE
- Patient presents with hyponatremia rapidly correcting after presentation  - Thought to be secondary to DDAVP use at home; patient was taking 0.1 mg twice daily at home    - With rapid correction of sodium since presentation, recommend stopping sodium chloride tablets and continuing close monitoring for recurrent diabetes insipidus with q6h Urinalysis (urine specific gravity), strict monitoring of intake/output hourly, and if urine output greater than 250 ml/hr for over 2 hours, draw BMP, serum Osm, urine Osm, urine sodium, and urinalysis stat and notify primary team and endocrine on-call    - Give desmopressin (DDAVP 10 mcg intranasal x1 dose) only if:  - Urine output greater than 250cc for two or more consecutive hours  - and Na greater than 145   - and low urinary specific gravity less than 1.010  - and inability of the patient to match fluid intake with UOP  - Please page endocrine if questions    - If patient is alert and has intact thirst response, encourage them to drink to thirst. Contact neurosurgery prior to giving DDAVP    - Will require outpatient CMP 7-10 days post-discharge to monitor sodium level

## 2022-10-25 NOTE — ED PROVIDER NOTES
Encounter Date: 10/24/2022       History     Chief Complaint   Patient presents with    Headache     Headache, abdominal pain, nausea since last night. Pt had pituitary mass removal 3 weeks ago.      47 y.o. female with GERD, hypothyroid, peripheral artery disease, pituitary lesion status post resection presents for diffuse body aches and nausea for the past day. She has had similar symptoms in the past since her pituitary resection. Her symptoms have been worsening. There are no clear alleviating factors. She states that the pain is diffuse and not worse in any particular area. She does report a diffuse headache. She has not had associated vomiting.     The history is provided by the patient and medical records. A  was used.   Review of patient's allergies indicates:  No Known Allergies  Past Medical History:   Diagnosis Date    Elevated fasting glucose     GERD (gastroesophageal reflux disease)     Hypothyroidism     PAD (peripheral artery disease)     Pituitary lesion 9/7/2022     Past Surgical History:   Procedure Laterality Date    CHOLECYSTECTOMY      ENDOMETRIAL ABLATION N/A 2/8/2022    Procedure: ABLATION, ENDOMETRIUM;  Surgeon: Ariel Butler MD;  Location: [facility] OR;  Service: OB/GYN;  Laterality: N/A;    EXCISION, NEOPLASM, PITUITARY, TRANSSPHENOIDAL APPROACH, USING COMPUTER-ASSISTED NAVIGATION N/A 10/3/2022    Procedure: EXCISION, NEOPLASM, PITUITARY, TRANSSPHENOIDAL APPROACH, USING COMPUTER-ASSISTED NAVIGATION;  Surgeon: Ed Astorga MD;  Location: [facility] OR [address] [place] [address];  Service: ENT;  Laterality: N/A;    EXCISION, NEOPLASM, PITUITARY, TRANSSPHENOIDAL APPROACH, USING COMPUTER-ASSISTED NAVIGATION N/A 10/3/2022    Procedure: EXCISION, NEOPLASM, PITUITARY, TRANSSPHENOIDAL APPROACH, USING COMPUTER-ASSISTED NAVIGATION;  Surgeon: Miguel Ángel Ferrell DO;  Location: [facility] OR [address] [place] [address];  Service: Neurosurgery;  Laterality: N/A;    HYSTEROSCOPY WITH DILATION AND CURETTAGE OF UTERUS N/A 2/8/2022     Procedure: HYSTEROSCOPY, WITH DILATION AND CURETTAGE OF UTERUS;  Surgeon: Ariel Butler MD;  Location: [facility] OR;  Service: OB/GYN;  Laterality: N/A;     Family History   Problem Relation Age of Onset    Diabetes Mother     Diabetes Mellitus Mother     Diabetes Father     Diabetes Mellitus Father     No Known Problems Sister     No Known Problems Sister     No Known Problems Brother     No Known Problems Brother     No Known Problems Daughter     No Known Problems Son     Colon cancer Neg Hx     Breast cancer Neg Hx     Ovarian cancer Neg Hx     Uterine cancer Neg Hx     Heart attack Neg Hx      Social History     Tobacco Use    Smoking status: Never    Smokeless tobacco: Never   Substance Use Topics    Alcohol use: No    Drug use: No     Review of Systems   Constitutional:  Negative for chills and fever.   HENT:  Negative for rhinorrhea and sore throat.    Eyes:  Negative for photophobia and visual disturbance.   Respiratory:  Negative for cough, chest tightness and shortness of breath.    Cardiovascular:  Negative for chest pain and palpitations.   Gastrointestinal:  Positive for nausea. Negative for vomiting.   Genitourinary:  Negative for difficulty urinating and dysuria.   Musculoskeletal:  Positive for myalgias. Negative for back pain.   Skin:  Negative for pallor and rash.   Neurological:  Positive for headaches. Negative for dizziness, weakness and numbness.   Psychiatric/Behavioral:  Negative for agitation and confusion. The patient is nervous/anxious.      Physical Exam     Initial Vitals [10/24/22 1911]   BP Pulse Resp Temp SpO2   133/79 78 16 98.2 °F (36.8 °C) 99 %      MAP       --         Physical Exam    Nursing note and vitals reviewed.  Constitutional:   Alert, in moderate distress, anxious appearing   HENT:   Head: Normocephalic and atraumatic.   Mouth/Throat: Oropharynx is clear and moist.   Eyes: Conjunctivae and EOM are normal.   Cardiovascular:  Normal rate, regular rhythm and intact distal  pulses.           Pulmonary/Chest: Breath sounds normal. No stridor. No respiratory distress.   Abdominal: Abdomen is soft. She exhibits no distension. There is no abdominal tenderness.   Musculoskeletal:         General: No edema.     Neurological: She is alert and oriented to person, place, and time.   In acute distress, rolling around the bed   Skin: Skin is warm and dry. No rash noted.   Psychiatric: She has a normal mood and affect. Thought content normal.       ED Course   Procedures  Labs Reviewed   CBC W/ AUTO DIFFERENTIAL - Abnormal; Notable for the following components:       Result Value    Immature Granulocytes 0.6 (*)     All other components within normal limits   COMPREHENSIVE METABOLIC PANEL - Abnormal; Notable for the following components:    Sodium 127 (*)     Chloride 92 (*)     Glucose 115 (*)     All other components within normal limits   INFLUENZA A & B BY MOLECULAR   LIPASE          Imaging Results    None          Medications   prochlorperazine injection Soln 10 mg (10 mg Intravenous Given 10/24/22 2030)   sodium chloride 0.9% bolus 250 mL (0 mLs Intravenous Stopped 10/24/22 2106)   magnesium sulfate 2g in water 50mL IVPB (premix) (0 g Intravenous Stopped 10/24/22 2315)   dexAMETHasone injection 8 mg (8 mg Intravenous Given 10/24/22 2130)   diphenhydrAMINE injection 12.5 mg (12.5 mg Intravenous Given 10/24/22 2130)     Medical Decision Making:   History:   Old Medical Records: I decided to obtain old medical records.  Old Records Summarized: records from clinic visits and records from previous admission(s).  Initial Assessment:   47 y.o. female with GERD, hypothyroid, peripheral artery disease, pituitary lesion status post resection presents for diffuse body aches, headaches, and nausea for the past day  Patient's symptoms are severe and she is restless in the bed, they feels similar to her previous presentation  No focal deficits on exam  Differentials include symptomatic hyponatremia,  viral illness, metabolic derangement, postop complication, less likely appendicitis  No focal deficits on exam, imaging was obtained during previous presentation, unremarkable, overall less consistent with postop bleeding or intracranial hemorrhage  Compazine ordered for symptoms initially, however she states her symptoms worsened, she appears losses, concerning for akathisia, Benadryl, magnesium, Decadron ordered  Clinical Tests:   Lab Tests: Ordered and Reviewed          Attending Attestation:   Physician Attestation Statement for Resident:  As the supervising MD   Physician Attestation Statement: I have personally seen and examined this patient.   I agree with the above history.  -:   As the supervising MD I agree with the above PE.     As the supervising MD I agree with the above treatment, course, plan, and disposition.                  ED Course as of 10/25/22 0019   Mon Oct 24, 2022   2320 Discussed with neurocritical care, who will evaluate patient. Patient care handed off to oncoming team pending final dispo [OK]      ED Course User Index  [OK] Nilesh Mendieta MD                 Clinical Impression:   Final diagnoses:  [M79.10] Myalgia, unspecified site  [R51.9] Acute nonintractable headache, unspecified headache type  [E87.1] Hyponatremia (Primary)        ED Disposition Condition    Admit Stable                        Nielsh Mendieta MD  Resident  10/25/22 0019       Donna Morataya MD  10/27/22 [id number]

## 2022-10-26 VITALS
SYSTOLIC BLOOD PRESSURE: 102 MMHG | OXYGEN SATURATION: 97 % | HEIGHT: 64 IN | DIASTOLIC BLOOD PRESSURE: 66 MMHG | WEIGHT: 160.06 LBS | BODY MASS INDEX: 27.33 KG/M2 | TEMPERATURE: 98 F | HEART RATE: 73 BPM | RESPIRATION RATE: 18 BRPM

## 2022-10-26 LAB
ALBUMIN SERPL BCP-MCNC: 3.9 G/DL (ref 3.5–5.2)
ALP SERPL-CCNC: 66 U/L (ref 55–135)
ALT SERPL W/O P-5'-P-CCNC: 27 U/L (ref 10–44)
ANION GAP SERPL CALC-SCNC: 10 MMOL/L (ref 8–16)
AST SERPL-CCNC: 16 U/L (ref 10–40)
BASOPHILS # BLD AUTO: 0.02 K/UL (ref 0–0.2)
BASOPHILS NFR BLD: 0.1 % (ref 0–1.9)
BILIRUB SERPL-MCNC: 0.3 MG/DL (ref 0.1–1)
BILIRUB UR QL STRIP: NEGATIVE
BILIRUB UR QL STRIP: NEGATIVE
BUN SERPL-MCNC: 15 MG/DL (ref 6–20)
CALCIUM SERPL-MCNC: 9.7 MG/DL (ref 8.7–10.5)
CHLORIDE SERPL-SCNC: 106 MMOL/L (ref 95–110)
CLARITY UR REFRACT.AUTO: CLEAR
CLARITY UR REFRACT.AUTO: CLEAR
CO2 SERPL-SCNC: 24 MMOL/L (ref 23–29)
COLOR UR AUTO: COLORLESS
COLOR UR AUTO: YELLOW
CREAT SERPL-MCNC: 0.8 MG/DL (ref 0.5–1.4)
DIFFERENTIAL METHOD: ABNORMAL
EOSINOPHIL # BLD AUTO: 0 K/UL (ref 0–0.5)
EOSINOPHIL NFR BLD: 0 % (ref 0–8)
ERYTHROCYTE [DISTWIDTH] IN BLOOD BY AUTOMATED COUNT: 13.3 % (ref 11.5–14.5)
EST. GFR  (NO RACE VARIABLE): >60 ML/MIN/1.73 M^2
GLUCOSE SERPL-MCNC: 101 MG/DL (ref 70–110)
GLUCOSE UR QL STRIP: NEGATIVE
GLUCOSE UR QL STRIP: NEGATIVE
HCT VFR BLD AUTO: 37.8 % (ref 37–48.5)
HGB BLD-MCNC: 12.7 G/DL (ref 12–16)
HGB UR QL STRIP: ABNORMAL
HGB UR QL STRIP: ABNORMAL
IMM GRANULOCYTES # BLD AUTO: 0.08 K/UL (ref 0–0.04)
IMM GRANULOCYTES NFR BLD AUTO: 0.5 % (ref 0–0.5)
KETONES UR QL STRIP: NEGATIVE
KETONES UR QL STRIP: NEGATIVE
LEUKOCYTE ESTERASE UR QL STRIP: NEGATIVE
LEUKOCYTE ESTERASE UR QL STRIP: NEGATIVE
LYMPHOCYTES # BLD AUTO: 2.4 K/UL (ref 1–4.8)
LYMPHOCYTES NFR BLD: 15.6 % (ref 18–48)
MAGNESIUM SERPL-MCNC: 2.2 MG/DL (ref 1.6–2.6)
MCH RBC QN AUTO: 30.6 PG (ref 27–31)
MCHC RBC AUTO-ENTMCNC: 33.6 G/DL (ref 32–36)
MCV RBC AUTO: 91 FL (ref 82–98)
MONOCYTES # BLD AUTO: 1.1 K/UL (ref 0.3–1)
MONOCYTES NFR BLD: 7.3 % (ref 4–15)
NEUTROPHILS # BLD AUTO: 11.6 K/UL (ref 1.8–7.7)
NEUTROPHILS NFR BLD: 76.5 % (ref 38–73)
NITRITE UR QL STRIP: NEGATIVE
NITRITE UR QL STRIP: NEGATIVE
NRBC BLD-RTO: 0 /100 WBC
PH UR STRIP: 6 [PH] (ref 5–8)
PH UR STRIP: 7 [PH] (ref 5–8)
PHOSPHATE SERPL-MCNC: 3.7 MG/DL (ref 2.7–4.5)
PLATELET # BLD AUTO: 319 K/UL (ref 150–450)
PMV BLD AUTO: 10 FL (ref 9.2–12.9)
POTASSIUM SERPL-SCNC: 3.7 MMOL/L (ref 3.5–5.1)
PROT SERPL-MCNC: 6.9 G/DL (ref 6–8.4)
PROT UR QL STRIP: NEGATIVE
PROT UR QL STRIP: NEGATIVE
RBC # BLD AUTO: 4.15 M/UL (ref 4–5.4)
SODIUM SERPL-SCNC: 137 MMOL/L (ref 136–145)
SODIUM SERPL-SCNC: 138 MMOL/L (ref 136–145)
SODIUM SERPL-SCNC: 140 MMOL/L (ref 136–145)
SODIUM SERPL-SCNC: 143 MMOL/L (ref 136–145)
SP GR UR STRIP: 1.01 (ref 1–1.03)
SP GR UR STRIP: 1.01 (ref 1–1.03)
URN SPEC COLLECT METH UR: ABNORMAL
URN SPEC COLLECT METH UR: ABNORMAL
WBC # BLD AUTO: 15.16 K/UL (ref 3.9–12.7)

## 2022-10-26 PROCEDURE — 83735 ASSAY OF MAGNESIUM: CPT

## 2022-10-26 PROCEDURE — 84100 ASSAY OF PHOSPHORUS: CPT

## 2022-10-26 PROCEDURE — 85025 COMPLETE CBC W/AUTO DIFF WBC: CPT

## 2022-10-26 PROCEDURE — 84295 ASSAY OF SERUM SODIUM: CPT

## 2022-10-26 PROCEDURE — 97165 OT EVAL LOW COMPLEX 30 MIN: CPT

## 2022-10-26 PROCEDURE — 97530 THERAPEUTIC ACTIVITIES: CPT

## 2022-10-26 PROCEDURE — 94761 N-INVAS EAR/PLS OXIMETRY MLT: CPT

## 2022-10-26 PROCEDURE — 99024 POSTOP FOLLOW-UP VISIT: CPT | Mod: ,,,

## 2022-10-26 PROCEDURE — 99232 SBSQ HOSP IP/OBS MODERATE 35: CPT | Mod: ,,, | Performed by: INTERNAL MEDICINE

## 2022-10-26 PROCEDURE — 81003 URINALYSIS AUTO W/O SCOPE: CPT

## 2022-10-26 PROCEDURE — 99232 PR SUBSEQUENT HOSPITAL CARE,LEVL II: ICD-10-PCS | Mod: ,,, | Performed by: INTERNAL MEDICINE

## 2022-10-26 PROCEDURE — 81003 URINALYSIS AUTO W/O SCOPE: CPT | Mod: 91

## 2022-10-26 PROCEDURE — 80053 COMPREHEN METABOLIC PANEL: CPT

## 2022-10-26 PROCEDURE — 99024 PR POST-OP FOLLOW-UP VISIT: ICD-10-PCS | Mod: ,,,

## 2022-10-26 PROCEDURE — 25000003 PHARM REV CODE 250: Performed by: STUDENT IN AN ORGANIZED HEALTH CARE EDUCATION/TRAINING PROGRAM

## 2022-10-26 PROCEDURE — 25000003 PHARM REV CODE 250

## 2022-10-26 PROCEDURE — 36415 COLL VENOUS BLD VENIPUNCTURE: CPT

## 2022-10-26 PROCEDURE — 84295 ASSAY OF SERUM SODIUM: CPT | Mod: 91

## 2022-10-26 PROCEDURE — 97161 PT EVAL LOW COMPLEX 20 MIN: CPT

## 2022-10-26 RX ORDER — HYDROCORTISONE 10 MG/1
10 TABLET ORAL DAILY
Qty: 30 TABLET | Refills: 0 | Status: SHIPPED | OUTPATIENT
Start: 2022-10-26 | End: 2022-11-25

## 2022-10-26 RX ORDER — HYDROCORTISONE 5 MG/1
5 TABLET ORAL DAILY
Qty: 60 TABLET | Refills: 0 | Status: SHIPPED | OUTPATIENT
Start: 2022-10-26 | End: 2022-12-13

## 2022-10-26 RX ADMIN — LEVOTHYROXINE SODIUM 50 MCG: 50 TABLET ORAL at 06:10

## 2022-10-26 RX ADMIN — HYDROCORTISONE 50 MG: 20 TABLET ORAL at 11:10

## 2022-10-26 RX ADMIN — PANTOPRAZOLE SODIUM 40 MG: 20 TABLET, DELAYED RELEASE ORAL at 11:10

## 2022-10-26 NOTE — PLAN OF CARE
Recommendations     1. Continue current regular diet- encourage adequate PO intake.      2. Add Boost Plus TID.   - If PO intake 50%, decreased to BID.     3. Consider adding appetite stimulant if decreased appetite continues.      4. RD following.     Goals: Will meet % EEN/EPN by next RD f/u.  Nutrition Goal Status: new  Communication of RD Recs:  (POC)    Mkiaela Walls PL-LDN

## 2022-10-26 NOTE — PROGRESS NOTES
Jose Price - Neurosurgery ([facility])  Endocrinology  Progress Note    Admit Date: 10/24/2022     47 y.o. female with hypothyroidism, GERD, PAD, appendicitis, and pituitary adenoma status post resection on 10/03/2022 who presents with hyponatremia, headache, abdominal pain, vomiting, and diffuse body aches for 24 hours prior to admission.     - Endocrinology consulted for hyponatremia and concern for adrenal insufficiency    Regarding pituitary adenoma    - Status post TSR by Dr. Ferrell on 10/3/22 ([facility] path - GONADOTROPH ADENOMA, IMMUNOREACTIVE FOR ALPHA-SUBUNIT AND SF1).                  MRI 8/15/22: Sella: There is a large sellar lesion with suprasellar extension.  Lesion measures roughly 3.2 x 2.8 x 3.5 cm.  There is mass effect on the adjacent structures including the optic chiasm which is displaced superiorly as well as the bilateral A1 segments of the anterior cerebral arteries.  There is possible extension into the cavernous sinuses bilaterally best visualized on coronal T2 series 10.  The carotid flow voids are preserved. There is bony remodeling of the adjacent clivus secondary to this lesion with preserved marrow signal        MRI 10/25/2022: Sella: Evolving operative change status post transsphenoidal approach sellar/suprasellar mass resection. There is continued heterogeneous signal predominantly hypoenhancing material along the posterior sphenoid sinus extending to the sella felt to represent postoperative packing material. There is thin marginated enhancement about this which may be reactive in granulation tissue.  There is continued somewhat lobular enhancing material along the postoperative floor of the sella which may in part represent residual pituitary tissue though remains concerning for component of residual lesion. The infundibulum is deviated to the right. Compared to most recent prior the nodular enhancing material is similar measuring approximately 1.6 cm AP x 1.4 cm TV and 0.7 cm craniocaudal.  "There is no significant mass effect on the suprasellar neurovascular structures. Impression: MR sella: Evolving operative change from transsphenoidal sellar/suprasellar mass resection.  There is continued somewhat lobular hypoenhancing material within the sella remains concerning for possible residual lesion and residual pituitary tissue.  No evidence for significant mass effect on the suprasellar neurovascular structures or suprasellar extension. MRI brain: Otherwise unremarkable MRI of the brain specifically without evidence for hydrocephalus or new parenchymal signal abnormality          Formal Visual burns: HVF w/ Dr. Yan 8/25/22, per note:" OCT with borderline RNFL thinning OD>OS. HVF with generalized depression OD (worse temporally) and temporal depression OS. Findings consistent with chiasmal compression." Return to clinic in 3 month(s)    Lab Results   Component Value Date    PROLACTIN 138.0 (H) 08/26/2022    TSH 0.675 10/25/2022    TSH 2.966 10/18/2022    TSH 0.772 10/08/2022    TSH 0.739 09/24/2022    FREET4 0.90 10/25/2022    FREET4 0.85 10/18/2022    FREET4 0.65 (L) 08/26/2022    ACTH 11 10/18/2022    ACTH 19 08/26/2022    FSH 10.72 08/26/2022    SOMATMDN 42 (L) 08/26/2022     10/26/2022     10/26/2022     10/26/2022     10/25/2022    K 3.7 10/26/2022    K 4.3 10/25/2022    K 4.4 10/24/2022    K 4.1 10/19/2022    CALCIUM 9.7 10/26/2022    CALCIUM 9.2 10/25/2022    CALCIUM 9.6 10/24/2022    CALCIUM 9.7 10/19/2022    ALBUMIN 3.9 10/26/2022    ALBUMIN 4.1 10/25/2022    ALBUMIN 4.4 10/24/2022    ALBUMIN 4.1 10/19/2022    EGFRNORACEVR >60.0 10/26/2022    EGFRNORACEVR >60.0 10/25/2022    EGFRNORACEVR >60.0 10/24/2022    EGFRNORACEVR >60.0 10/19/2022     With regards to concern for adrenal insufficiency:   - Patient reports hypotension, nausea/vomiting, and abdominal pain for 2-3 days prior to presentation  - Patient already given stress dose hydrocortisone 100 mg; does report " "significant symptomatic improvement afterwards  - Patient was reportedly adherent with home hydrocortisone 10/5 and did not miss any doses  /70   Pulse 79   Temp 98 °F (36.7 °C) (Oral)   Resp 20   Ht 5' 4" (1.626 m)   Wt 72.6 kg (160 lb)   LMP 01/15/2022   SpO2 96%   Breastfeeding No   BMI 27.46 kg/m²     Regarding Hyponatremia:  - Recurring issue.  This time thought to be secondary to regular administration of DDAVP at home.  - Patient was taking DDAVP 0.1 mcg twice daily at home with last dose approximately 5:00 p.m. the evening prior to presentation  Lab Results   Component Value Date    SPECGRAV 1.015 10/26/2022    SPECGRAV 1.010 10/26/2022    SPECGRAV 1.015 10/25/2022    SPECGRAV 1.015 10/25/2022    LABOSMO 291 10/25/2022    LABOSMO 293 10/18/2022    LABOSMO 281 10/18/2022     10/26/2022     10/26/2022     10/26/2022    OSMOLALITYUR 235 10/25/2022    OSMOLALITYUR 251 10/18/2022    SODIUMURR 66 10/25/2022    SODIUMURR 68 10/18/2022    TSH 0.675 10/25/2022    TSH 2.966 10/18/2022    FREET4 0.90 10/25/2022    FREET4 0.85 10/18/2022          Interval HPI:   Overnight events: No acute events reported overnight    /70   Pulse 79   Temp 98 °F (36.7 °C) (Oral)   Resp 20   Ht 5' 4" (1.626 m)   Wt 72.6 kg (160 lb)   LMP 01/15/2022   SpO2 96%   Breastfeeding No   BMI 27.46 kg/m²     Labs Reviewed and Include    Recent Labs   Lab 10/26/22  0553      CALCIUM 9.7   ALBUMIN 3.9   PROT 6.9     140   K 3.7   CO2 24      BUN 15   CREATININE 0.8   ALKPHOS 66   ALT 27   AST 16   BILITOT 0.3     Lab Results   Component Value Date    WBC 15.16 (H) 10/26/2022    HGB 12.7 10/26/2022    HCT 37.8 10/26/2022    MCV 91 10/26/2022     10/26/2022     Recent Labs   Lab 10/25/22  1214   TSH 0.675   FREET4 0.90     Lab Results   Component Value Date    HGBA1C 5.4 10/24/2022       Nutritional status:   Body mass index is 27.46 kg/m².  Lab Results   Component Value Date "    ALBUMIN 3.9 10/26/2022    ALBUMIN 4.1 10/25/2022    ALBUMIN 4.4 10/24/2022     No results found for: PREALBUMIN    Estimated Creatinine Clearance: 84 mL/min (based on SCr of 0.8 mg/dL).    Accu-Checks  No results for input(s): POCTGLUCOSE in the last 72 hours.    Current Medications and/or Treatments Impacting Glycemic Control  Immunotherapy:    Immunosuppressants       None          Steroids:   Hormones (From admission, onward)      Start     Stop Route Frequency Ordered    10/28/22 1800  hydrocortisone tablet 10 mg         -- Oral Every evening 10/25/22 1651    10/28/22 0900  hydrocortisone tablet 20 mg         -- Oral Daily 10/25/22 1651    10/27/22 0900  hydrocortisone tablet 50 mg         10/28 0859 Oral 2 times daily 10/25/22 1651    10/25/22 2100  hydrocortisone tablet 50 mg         10/27 0859 Oral 3 times daily 10/25/22 1651          Pressors:    Autonomic Drugs (From admission, onward)      None          Hyperglycemia/Diabetes Medications:   Antihyperglycemics (From admission, onward)      None            ASSESSMENT and PLAN    * Hyponatremia  - Patient presents with hyponatremia rapidly correcting after presentation  - Thought to be secondary to DDAVP use at home; patient was taking 0.1 mg twice daily at home  - Given normal sodium trend to date, recommend stopping DDAVP, patient should continue to drink to thirst    - Recommend obtaining BMP Monday 10/31/2022 to monitor sodium level  - Will schedule follow-up in pituitary clinic 11/22/2022 for reassessment    Adrenal insufficiency  - Recommend changing steroids to hydrocortisone 10/5 starting tomorrow, no need for taper    Hypothyroidism  - Free T4 at 0.9 on admission    - Recommend continuing levothyroxine 50 mcg daily    Pituitary macroadenoma with extrasellar extension  - Neurosurgery on board, repeat pituitary MRI did not show concerning structural changes      Tan Seals DO  [facility], 6th Floor  [address] [place]  [place], [place], [address]    Office: [phone]  Fax: [phone]    Disclaimer: This note has been generated using voice-recognition software. There may be typographical errors that have been missed during proof-reading.    The above history labs imaging impression and plan were discussed with attending physician who is in agreement and also took part in this patient's care.  I personally reviewed all of the patients available medications, labs, imaging, vitals, allergies, medical history.

## 2022-10-26 NOTE — NURSING
2309: Pt complaint of numbness and tingling to body and now just to lower extremities. Also complaint of headache. Notified team of complaint. No new orders at this time.

## 2022-10-26 NOTE — PLAN OF CARE
Problem: Adult Inpatient Plan of Care  Goal: Plan of Care Review  Outcome: Ongoing, Progressing  Goal: Patient-Specific Goal (Individualized)  Outcome: Ongoing, Progressing     Problem: Infection  Goal: Absence of Infection Signs and Symptoms  Outcome: Ongoing, Progressing     Problem: Adjustment to Illness (Stroke, Hemorrhagic)  Goal: Optimal Coping  Outcome: Ongoing, Progressing     Problem: Pain (Stroke, Hemorrhagic)  Goal: Acceptable Pain Control  Outcome: Ongoing, Progressing       No acute events during shift. UA Q6. VSS. Pt resting comfortably in bed, no complaints at this time.

## 2022-10-26 NOTE — PLAN OF CARE
Admit Date: 10/24/2022  8:22 PM   Admission diagnosis: Hyponatremia [E87.1]  Acute nonintractable headache, unspecified headache type [R51.9]  Myalgia, unspecified site [M79.10]   PCP: Sabiha Overton MD  Insurance: Payor: MEDICAID / Plan: [place] / Product Type: Managed Medicaid /    Extended Emergency Contact Information  Primary Emergency Contact: Cy Wynne  Mobile Phone: [phone]  Relation: Son  Secondary Emergency Contact: Zafar Wynne  Address: [address]           [place] [address] [address]  Home Phone: [phone]  Mobile Phone: [phone]  Relation: Spouse     [id number] - [place] - [address] [facility] [address]  [address] [address]  Phone: [phone] Fax: [phone]     Past Medical History:   Diagnosis Date    Elevated fasting glucose     GERD (gastroesophageal reflux disease)     Hypothyroidism     PAD (peripheral artery disease)     Pituitary lesion 9/7/2022       10/26/22 1159   Discharge Assessment   Assessment Type Discharge Planning Assessment   Confirmed/corrected address, phone number and insurance Yes   Confirmed Demographics Correct on Facesheet   Source of Information patient   Communicated TIMOTHY with patient/caregiver Date not available/Unable to determine   Lives With spouse   Do you expect to return to your current living situation? Yes   Do you have help at home or someone to help you manage your care at home? Yes   Who are your caregiver(s) and their phone number(s)? pt's    Walking or Climbing Stairs Difficulty none   Dressing/Bathing Difficulty none   Home Layout Able to live on 1st floor   Equipment Currently Used at Home none   Readmission within 30 days? No   Do you have any problems affording any of your prescribed medications? No   Who is going to help you get home at discharge?  Zafar   How do you get to doctors appointments? family or friend will  provide   Are you on dialysis? No   Do you take coumadin? No   Discharge Plan A Home   Discharge Plan B Home with family   Discharge Plan discussed with: Patient   Discharge Barriers Identified Underinsured   Relationship/Environment   Name(s) of Who Lives With Patient  Zafar     Romansh : Ty #[id number]   met with patient and her  at bedside to discuss CM role and to assess for needs at discharge.  Patient agreed to CM interview.  Verified , contact information, address, and insurance from facesheet.   Patient reported living with her  in a one story home with 0 step(s) to enter.  Prior to hospital admission, patient was driving and did not use assistive devices for mobility, however she reported that her  helps with ADLs of dressing, bathing, helping her out of bed in the mornings, and meal preparation. Patient also stated that she does not attend a coumadin clinic and is not on dialysis.  Transportation home upon discharge from the hospital will be provided by pt's .     Durable medical equipment: none    All questions addressed.  Case management will continue to follow and assist with discharge needs as needed.    Karen Montgomery RN Case Manager  [facility]  10/26/2022

## 2022-10-26 NOTE — NURSING
PT A&O, VSS, IV and lainez removed, reviewed all dc paperwork and medication regimen with the patient, indicated understanding

## 2022-10-26 NOTE — CONSULTS
Jose Price - Neurosurgery ([facility])  Adult Nutrition  Consult Note    SUMMARY     Recommendations    1. Continue current regular diet- encourage adequate PO intake.     2. Add Boost Plus TID.   - If PO intake 50%, decreased to BID.    3. Consider adding appetite stimulant if decreased appetite continues.     4. RD following.    Goals: Will meet % EEN/EPN by next RD f/u.  Nutrition Goal Status: new  Communication of RD Recs:  (POC)    Assessment and Plan    Nutrition Problem  Inadequate protein/energy intake    Related to (etiology):   Decreased appetite x 2-3 months     Signs and Symptoms (as evidenced by):   PO intake 25%    Interventions/Recommendations (treatment strategy):  Collaboration of nutrition care with other providers  Commercial beverage   Encourage adequate PO intake    Nutrition Diagnosis Status:   New    Malnutrition Assessment    Orbital Region (Subcutaneous Fat Loss): well nourished  Upper Arm Region (Subcutaneous Fat Loss): well nourished   [place] Region (Muscle Loss): mild depletion  Clavicle Bone Region (Muscle Loss): mild depletion  Acromion Bone Region (Muscle Loss): well nourished  Dorsal Hand (Muscle Loss): well nourished     Reason for Assessment    Reason For Assessment: consult  Diagnosis:  (Hyponatremia)  Relevant Medical History: S/p transsphenoidal selective adenomectomy (10/3/22), adrenal insufficiency, PUD  Interdisciplinary Rounds: did not attend  General Information Comments: RD consulted to assess dietary needs. Spoke with pt at bedside. States intake PTA poor since sx on 10/3 and prior to sx (estimating 2-3 months total). States intake now continues to be poor with 25% PO intake and continued decreased appetite. No issues with n/v/d/c/chewing/swallowing. States #. Per chart review, wt fluctuates b/w 160-167# but mostly remains stable with last time pt weighing 167# on 9/20 - indicates 4.2% wt loss x 1 month. NFPE completed 10/26 and found mild fat wasting in temple  "and clavicular areas but all other areas remain well-nourished. Unable to dxn with malnutrition at this time. Pt at risk for developing malnutrition if poor PO intake and wt loss continues. LBM 10/24.  Nutrition Discharge Planning: Adequate PO intake    Nutrition Risk Screen    Nutrition Risk Screen: no indicators present    Nutrition/Diet History    Spiritual, Cultural Beliefs, Alevism Practices, Values that Affect Care: no  Food Allergies: NKFA  Factors Affecting Nutritional Intake: decreased appetite    Anthropometrics    Temp: 98 °F (36.7 °C)  Height: 5' 4" (162.6 cm)  Height (inches): 64 in  Weight: 72.6 kg (160 lb 0.9 oz)  Weight (lb): 160.06 lb  Ideal Body Weight (IBW), Female: 120 lb  % Ideal Body Weight, Female (lb): 133.38 %  BMI (Calculated): 27.5  BMI Grade: 25 - 29.9 - overweight     Lab/Procedures/Meds    Pertinent Labs Reviewed: reviewed  Pertinent Labs Comments: -  Pertinent Medications Reviewed: reviewed  Pertinent Medications Comments: levothyroxine, pantoprazole    Estimated/Assessed Needs    Weight Used For Calorie Calculations: 72.6 kg (160 lb 0.9 oz)  Energy Calorie Requirements (kcal): 1475 kcals  Energy Need Method: [place]-St Jeor (MSJ x 1.1 PAL)  Protein Requirements: 65-73 g (0.9-1.0 g/kg)  Weight Used For Protein Calculations: 72.6 kg (160 lb 0.9 oz)  Fluid Requirements (mL): 1 ml or fluid per MD  Estimated Fluid Requirement Method: RDA Method  RDA Method (mL): 1475     Nutrition Prescription Ordered    Current Diet Order: Regular    Evaluation of Received Nutrient/Fluid Intake    I/O: -1.3 L since admit  Energy Calories Required: not meeting needs  Protein Required: not meeting needs  Fluid Required: not meeting needs  Tolerance: tolerating  % Intake of Estimated Energy Needs: 0 - 25 %  % Meal Intake: 0 - 25 %    Nutrition Risk    Level of Risk/Frequency of Follow-up:  (1 time/week)     Monitor and Evaluation    Food and Nutrient Intake: energy intake, food and beverage intake  Food " and Nutrient Adminstration: diet order  Knowledge/Beliefs/Attitudes: food and nutrition knowledge/skill, beliefs and attitudes  Physical Activity and Function: nutrition-related ADLs and IADLs  Anthropometric Measurements: height/length, weight, weight change, body mass index  Biochemical Data, Medical Tests and Procedures: electrolyte and renal panel, gastrointestinal profile, glucose/endocrine profile, inflammatory profile, lipid profile  Nutrition-Focused Physical Findings: overall appearance     Nutrition Follow-Up    RD Follow-up?: Yes    Mikaela GUALLPA

## 2022-10-26 NOTE — SUBJECTIVE & OBJECTIVE
"Interval HPI:   Overnight events: No acute events reported overnight    /70   Pulse 79   Temp 98 °F (36.7 °C) (Oral)   Resp 20   Ht 5' 4" (1.626 m)   Wt 72.6 kg (160 lb)   LMP 01/15/2022   SpO2 96%   Breastfeeding No   BMI 27.46 kg/m²     Labs Reviewed and Include    Recent Labs   Lab 10/26/22  0553      CALCIUM 9.7   ALBUMIN 3.9   PROT 6.9     140   K 3.7   CO2 24      BUN 15   CREATININE 0.8   ALKPHOS 66   ALT 27   AST 16   BILITOT 0.3     Lab Results   Component Value Date    WBC 15.16 (H) 10/26/2022    HGB 12.7 10/26/2022    HCT 37.8 10/26/2022    MCV 91 10/26/2022     10/26/2022     Recent Labs   Lab 10/25/22  1214   TSH 0.675   FREET4 0.90     Lab Results   Component Value Date    HGBA1C 5.4 10/24/2022       Nutritional status:   Body mass index is 27.46 kg/m².  Lab Results   Component Value Date    ALBUMIN 3.9 10/26/2022    ALBUMIN 4.1 10/25/2022    ALBUMIN 4.4 10/24/2022     No results found for: PREALBUMIN    Estimated Creatinine Clearance: 84 mL/min (based on SCr of 0.8 mg/dL).    Accu-Checks  No results for input(s): POCTGLUCOSE in the last 72 hours.    Current Medications and/or Treatments Impacting Glycemic Control  Immunotherapy:    Immunosuppressants       None          Steroids:   Hormones (From admission, onward)      Start     Stop Route Frequency Ordered    10/28/22 1800  hydrocortisone tablet 10 mg         -- Oral Every evening 10/25/22 1651    10/28/22 0900  hydrocortisone tablet 20 mg         -- Oral Daily 10/25/22 1651    10/27/22 0900  hydrocortisone tablet 50 mg         10/28 0859 Oral 2 times daily 10/25/22 1651    10/25/22 2100  hydrocortisone tablet 50 mg         10/27 0859 Oral 3 times daily 10/25/22 1651          Pressors:    Autonomic Drugs (From admission, onward)      None          Hyperglycemia/Diabetes Medications:   Antihyperglycemics (From admission, onward)      None          "

## 2022-10-26 NOTE — DISCHARGE SUMMARY
Jose Price - Neurosurgery ([facility])  Neurosurgery  Discharge Summary      Patient Name: Tessa Wynne  MRN: [id number]  Admission Date: 10/24/2022  Hospital Length of Stay: 1 days  Discharge Date and Time: No discharge date for patient encounter.  Attending Physician: Miguel Ángel Ferrell DO   Discharging Provider: Fabiola Michael PA-C  Primary Care Provider: Sabiha Overton MD    HPI:   47 y.o. female with GERD, Hypothyroidism, PAD and pituitary lesion status transphenoidal resection on 10/3/22 with Dr. Ferrell. She initially presented to the ED yesterday with diffuse body aches and nausea for the past day. Na was 127 which was down from 127 on 10/19. She has had another prior admission with similar symptoms following her pituitary resection, and she feels her symptoms have been worsening. She also endorses abdominal pain and diffuse headache, but denies dizziness, vomiting, vision problems or any focal neurological deficit. She was admitted to [facility] for hyponatremia and vomiting. Stepped down to NSGY. Endocrine consulted.       * No surgery found *     Hospital Course:  NAEON. Neuro stable. Endocrine recs dc DDAVP and salt tabs. Hyponatremia likely due to DDAVP. Patient instructed to drink to thirst. Patient will obtain a BMP this Monday to monitor sodium. Continue with Cortef 10 MG in the mornings and 5 MG in the afternoon. Patient is medically stable for discharge to home. Activity recommendations reviewed. Plan of care discussed with patient and she voiced understanding. All her questions were answered. Follow-up in Neurosurgery clinic and Endocrine clinic to be arranged.     Neurosurgery Physical Exam  General: Well developed, well nourished, not in acute distress.   Head: Normocephalic, atraumatic.  Neck: Full ROM.   Neurologic: AxO4. Thought content appropriate.  GCS:15  Language: No aphasia.  Speech: No dysarthria.  Cranial nerves: Face symmetric, tongue midline, CN II-XII grossly intact.   Eyes: Pupils equal, round,  reactive to light with accomodation, EOMI. Unable to appreciate optic disc.   Pulmonary: Normal respirations, no signs of respiratory distress.  Abdomen: Soft, non-distended, mildly tender to palpation.  Sensory: Intact to light touch throughout.  Motor Strength: Moves all extremities spontaneously with good tone. Full strength upper and lower extremities. No abnormal movements seen.   Pronator Drift: No drift noted.  Finger-to-nose: Intact bilaterally.  Vascular: Pulses 2+ and symmetric radial and dorsalis pedis. No LE edema.   Skin: Skin is warm, dry and intact.    Goals of Care Treatment Preferences:  Code Status: Full Code      Consults:   Consults (From admission, onward)        Status Ordering Provider     Inpatient consult to Endocrinology  Once        Provider:  (Not yet assigned)    Completed KATELIN MOJICA     Inpatient consult to Neurosurgery  Once        Provider:  (Not yet assigned)    Completed KATELIN MOJICA     Inpatient consult to Physical Medicine Rehab  Once        Provider:  (Not yet assigned)    Completed DAVID HERNDON     Inpatient consult to Registered Dietitian/Nutritionist  Once        Provider:  (Not yet assigned)    Completed DAVID HERNDON     IP consult to case management/social work  Once        Provider:  (Not yet assigned)    Acknowledged DAVID HERNDON     Inpatient consult to Neuro ICU  Once        Provider:  (Not yet assigned)    Acknowledged LESLIE VELEZ          Significant Diagnostic Studies: Labs:   CMP   Recent Labs   Lab 10/24/22  2025 10/25/22  0234 10/25/22  0606 10/26/22  0003 10/26/22  0553 10/26/22  1153   * 131*   < > 137 138  140 143   K 4.4 4.3  --   --  3.7  --    CL 92* 99  --   --  106  --    CO2 27 21*  --   --  24  --    * 143*  --   --  101  --    BUN 12 10  --   --  15  --    CREATININE 0.8 0.8  --   --  0.8  --    CALCIUM 9.6 9.2  --   --  9.7  --    PROT 7.5 7.1  --   --  6.9  --    ALBUMIN 4.4 4.1  --   --  3.9  --     BILITOT 0.4 0.5  --   --  0.3  --    ALKPHOS 69 66  --   --  66  --    AST 23 21  --   --  16  --    ALT 29 28  --   --  27  --    ANIONGAP 8 11  --   --  10  --     < > = values in this interval not displayed.   , CBC   Recent Labs   Lab 10/24/22  2025 10/25/22  0234 10/26/22  0553   WBC 7.02 7.09 15.16*   HGB 12.9 12.8 12.7   HCT 38.3 36.7* 37.8    310 319    and All labs within the past 24 hours have been reviewed  Radiology: MRI: pituitary    Pending Diagnostic Studies:     Procedure Component Value Units Date/Time    ACTH [[id number]] Collected: 10/25/22 1214    Order Status: Sent Lab Status: In process Updated: 10/25/22 1222    Specimen: Blood         Final Active Diagnoses:    Diagnosis Date Noted POA    PRINCIPAL PROBLEM:  Hyponatremia [E87.1] 10/11/2022 Yes    Adrenal insufficiency [E27.40] 10/18/2022 Yes    Appendicitis [K37] 09/19/2022 Yes    Hypothyroidism [E03.9] 09/05/2022 Yes    Pituitary macroadenoma with extrasellar extension [D35.2] 08/25/2022 Yes    PUD (peptic ulcer disease) [K27.9] 09/14/2020 Yes      Problems Resolved During this Admission:      Discharged Condition: good     Disposition: Home or Self Care    Follow Up:    Patient Instructions:      Notify your health care provider if you experience any of the following:  temperature >100.4     Notify your health care provider if you experience any of the following:  persistent nausea and vomiting or diarrhea     Notify your health care provider if you experience any of the following:  severe uncontrolled pain     Notify your health care provider if you experience any of the following:  redness, tenderness, or signs of infection (pain, swelling, redness, odor or green/yellow discharge around incision site)     Notify your health care provider if you experience any of the following:  difficulty breathing or increased cough     Notify your health care provider if you experience any of the following:  severe persistent headache      Notify your health care provider if you experience any of the following:  worsening rash     Notify your health care provider if you experience any of the following:  persistent dizziness, light-headedness, or visual disturbances     Notify your health care provider if you experience any of the following:  increased confusion or weakness     Activity as tolerated     Medications:  Reconciled Home Medications:      Medication List      CHANGE how you take these medications    * hydrocortisone 10 MG Tab  Commonly known as: CORTEF  Take 1 tablet (10 mg total) by mouth once daily. In the morning.  What changed:   · when to take this  · additional instructions     * hydrocortisone 5 MG Tab  Commonly known as: CORTEF  Take 1 tablet (5 mg total) by mouth once daily. In the afternoon around 4 pm.  What changed: additional instructions         * This list has 2 medication(s) that are the same as other medications prescribed for you. Read the directions carefully, and ask your doctor or other care provider to review them with you.            CONTINUE taking these medications    acetaminophen 325 MG tablet  Commonly known as: TYLENOL  Take 2 tablets (650 mg total) by mouth every 6 (six) hours as needed (Headaches (take with magnesium and benadryl)).     BANOPHEN 50 MG capsule  Generic drug: diphenhydrAMINE  Take 1 capsule (50 mg total) by mouth every 12 (twelve) hours as needed (Headaches (take with tylenol and magnesium)).     CITRACAL REGULAR ORAL  Take by mouth.     DEEP SEA NASAL 0.65 % nasal spray  Generic drug: sodium chloride  2 sprays by Nasal route every 4 (four) hours while awake.     hyoscyamine 0.125 mg Subl  Commonly known as: LEVSIN/SL  Take 0.125 mg by mouth 4 (four) times daily as needed.     IRON ORAL  Take by mouth.     levothyroxine 50 MCG tablet  Commonly known as: SYNTHROID  [place] 1 tableta (50 mg en total) por vía oral antes del desayuno.  (Take 1 tablet (50 mcg total) by mouth before breakfast.)      magnesium oxide 400 mg (241.3 mg magnesium) tablet  Commonly known as: MAG-OX  Take 1 tablet (400 mg total) by mouth 2 (two) times daily as needed (Headaches (take with tylenol and benadryl)).     NASAL DECONGESTANT (OXYMETAZL) 0.05 % nasal spray  Generic drug: oxymetazoline  3 sprays by Nasal route every 2 (two) hours as needed (epistaxis).     omeprazole 40 MG capsule  Commonly known as: PRILOSEC  Take 1 capsule (40 mg total) by mouth every morning.     ondansetron 4 MG Tbdl  Commonly known as: ZOFRAN-ODT  Dissolve 1 tablet (4 mg total) by mouth every 8 (eight) hours as needed (nausea & vomiting).     ONE-A-DAY WOMEN'S COMPLETE ORAL  Take by mouth.     oxyCODONE 5 MG immediate release tablet  Commonly known as: ROXICODONE  Take 1 tablet (5 mg total) by mouth every 6 (six) hours as needed for Pain (take as needed for pain/headaches).            Fabiola Michael PA-C  Neurosurgery  [facility] - Neurosurgery [facility])

## 2022-10-26 NOTE — PLAN OF CARE
TAYLOR unable to schedule hospital follow up appointment because schedule is full.    Dr. Overton's office will call to follow up with patient.    TAYLOR Alfred  [phone]

## 2022-10-26 NOTE — ASSESSMENT & PLAN NOTE
- Patient presents with hyponatremia rapidly correcting after presentation  - Thought to be secondary to DDAVP use at home; patient was taking 0.1 mg twice daily at home  - Given normal sodium trend to date, recommend stopping DDAVP, patient should continue to drink to thirst    - Recommend obtaining BMP Monday 10/31/2022 to monitor sodium level  - Will schedule follow-up in pituitary clinic 11/22/2022 for reassessment

## 2022-10-26 NOTE — PT/OT/SLP EVAL
"Physical Therapy Evaluation and Discharge Note    Patient Name:  Tessa Wynne   MRN:  [id number]    Recommendations:     Discharge Recommendations:  home   Discharge Equipment Recommendations: none   Barriers to discharge: None    Assessment:     Tessa Wynne is a 47 y.o. female admitted with a medical diagnosis of Hyponatremia.  At this time, patient is functioning at their prior level of function and does not require further acute PT services.     Recent Surgery: * No surgery found *      Plan:     During this hospitalization, patient does not require further acute PT services.  Please re-consult if situation changes.      Subjective     Chief Complaint: Hyponatremia   Patient/Family Comments/goals: "I walk a little slower at home, I'm feeling much better now"  Pain/Comfort:  Pain Rating 1: 0/10  Pain Rating Post-Intervention 1: 0/10    Patients cultural, spiritual, Gnosticism conflicts given the current situation: no    Living Environment:  Pt lives with spouse and 2 children in [facility] w/ 0 [facility].  Prior to admission, patients level of function was (I) with functional mobility and ADLs using no DME.  Equipment used at home: none.  DME owned (not currently used): none.  Upon discharge, patient will have assistance from Spouse and children.    Objective:     Communicated with RN prior to session.  Patient found HOB elevated with bed alarm, peripheral IV, telemetry, lainez catheter upon PT entry to room.    General Precautions: Standard, aspiration, fall   Orthopedic Precautions:N/A   Braces: N/A   Respiratory Status: Room air    Exams:  Cognitive Exam:  Patient is oriented to Person, Place, Time, and Situation  Gross Motor Coordination:  WFL  Postural Exam:  Patient presented with the following abnormalities:    -       Rounded shoulders  Sensation:    -       Intact  light/touch B LEs  RLE ROM: WNL  RLE Strength: WFL  LLE ROM: WNL  LLE Strength: WFL    Functional Mobility:  Bed Mobility:     Supine to Sit: " independence  Gait:   Pt received gait training in hallway ~90' with supervision and no AD. Pt demos a steady, reciprocal gait with no LOB.   Stairs:    Pt ascended/descended 3 stairs with left handrail and vSupervision    AM-PAC 6 CLICK MOBILITY  Total Score:24       Treatment and Education:   Pt assisted with functional mobility as noted above  Discussed d/c from acute PT services, pt agreeable    AM-PAC 6 CLICK MOBILITY  Total Score:24     Patient left up in chair with all lines intact, call button in reach, chair alarm on, and RN notified.    GOALS:   Multidisciplinary Problems       Physical Therapy Goals       Not on file              Multidisciplinary Problems (Resolved)          Problem: Physical Therapy    Goal Priority Disciplines Outcome Goal Variances Interventions   Physical Therapy Goal   (Resolved)     PT, PT/OT Met     Description: No goals established at this time, see conrad 10/26                       History:     Past Medical History:   Diagnosis Date    Elevated fasting glucose     GERD (gastroesophageal reflux disease)     Hypothyroidism     PAD (peripheral artery disease)     Pituitary lesion 9/7/2022       Past Surgical History:   Procedure Laterality Date    CHOLECYSTECTOMY      ENDOMETRIAL ABLATION N/A 2/8/2022    Procedure: ABLATION, ENDOMETRIUM;  Surgeon: Ariel Butler MD;  Location: [facility] OR;  Service: OB/GYN;  Laterality: N/A;    EXCISION, NEOPLASM, PITUITARY, TRANSSPHENOIDAL APPROACH, USING COMPUTER-ASSISTED NAVIGATION N/A 10/3/2022    Procedure: EXCISION, NEOPLASM, PITUITARY, TRANSSPHENOIDAL APPROACH, USING COMPUTER-ASSISTED NAVIGATION;  Surgeon: Ed Astorga MD;  Location: [facility] OR [address] [place] [address];  Service: ENT;  Laterality: N/A;    EXCISION, NEOPLASM, PITUITARY, TRANSSPHENOIDAL APPROACH, USING COMPUTER-ASSISTED NAVIGATION N/A 10/3/2022    Procedure: EXCISION, NEOPLASM, PITUITARY, TRANSSPHENOIDAL APPROACH, USING COMPUTER-ASSISTED NAVIGATION;  Surgeon: Miguel Ángel Ferrell DO;  Location:  [facility] OR 2ND FLR;  Service: Neurosurgery;  Laterality: N/A;    HYSTEROSCOPY WITH DILATION AND CURETTAGE OF UTERUS N/A 2/8/2022    Procedure: HYSTEROSCOPY, WITH DILATION AND CURETTAGE OF UTERUS;  Surgeon: Ariel Butler MD;  Location: [facility];  Service: OB/GYN;  Laterality: N/A;       Time Tracking:     PT Received On: 10/26/22  PT Start Time: 0854     PT Stop Time: 0908  PT Total Time (min): 14 min     Billable Minutes: Evaluation 14      10/26/2022

## 2022-10-26 NOTE — PT/OT/SLP EVAL
"Occupational Therapy   Evaluation and Discharge Note    Name: Tessa Wynne  MRN: [id number]  Admitting Diagnosis:  Hyponatremia   Recent Surgery: * No surgery found *      Recommendations:     Discharge Recommendations: home  Discharge Equipment Recommendations:  none  Barriers to discharge:  None    Assessment:     Tessa Wynne is a 47 y.o. female with a medical diagnosis of Hyponatremia. At this time, patient is functioning at their prior level of function and does not require further acute OT services.     Plan:     During this hospitalization, patient does not require further acute OT services.  Please re-consult if situation changes.    Plan of Care Reviewed with: patient    Subjective     Patient:  "Last Monday I didn't feel well; headache, vomiting and belly pain.  This Monday a similar thing happened."     Occupational Profile:  Patient resides in [place] with her  and 2 children ages 23 and 16 in one story home with no steps to enter.  Patient is right handed.  PTA patient independent with ADLs including driving.  Works:  Cleaning services.  Hobbies: Reading.    Pain/Comfort:  Pain Rating 1: 0/10  Pain Rating Post-Intervention 1: 0/10    Patients cultural, spiritual, Jain conflicts given the current situation: no    Objective:     Communicated with: Nurse prior to session.  Patient found supine with bed alarm, peripheral IV, telemetry, lainez catheter upon OT entry to room.    General Precautions: Standard, aspiration, fall   Orthopedic Precautions:N/A   Braces: N/A  Respiratory Status: Room air     Occupational Performance:    Bed Mobility:    Patient completed Rolling/Turning to Left with  independence  Patient completed Rolling/Turning to Right with independence  Patient completed Scooting/Bridging with independence  Patient completed Supine to Sit with independence  Patient completed Sit to Supine with independence    Functional Mobility/Transfers:  Patient completed Sit <> Stand " Transfer with modified independence  with  no assistive device   Patient completed Bed <> Chair Transfer using Stand Pivot technique with modified independence with no assistive device    Activities of Daily Living:  Grooming: modified independence while standing  Upper Body Dressing: modified independence    Lower Body Dressing: modified independence      Cognitive/Visual Perceptual:  Cognitive/Psychosocial Skills:     -       Oriented to: Person, Place, Time, and Situation   -       Follows Commands/attention:Follows two-step commands  -       Communication: clear/fluent    Physical Exam:  Postural examination/scapula alignment:    -       Rounded shoulders  Upper Extremity Range of Motion:     -       Right Upper Extremity: WNL  -       Left Upper Extremity: WNL  Upper Extremity Strength:    -       Right Upper Extremity: WNL  -       Left Upper Extremity: WNL    AMPAC 6 Click ADL:  AMPAC Total Score: 24    Treatment & Education:  Patient alert and oriented x 3; able to follow 4/4 one step commands.  Patient attentive and interactive throughout the session.     Patient left supine with all lines intact, call button in reach, and bed alarm on    GOALS:   Multidisciplinary Problems       Occupational Therapy Goals       Not on file              Multidisciplinary Problems (Resolved)          Problem: Occupational Therapy    Goal Priority Disciplines Outcome Interventions   Occupational Therapy Goal   (Resolved)     OT, PT/OT Met    Description: Goals not set 2* no further OT needed.                       History:     Past Medical History:   Diagnosis Date    Elevated fasting glucose     GERD (gastroesophageal reflux disease)     Hypothyroidism     PAD (peripheral artery disease)     Pituitary lesion 9/7/2022         Past Surgical History:   Procedure Laterality Date    CHOLECYSTECTOMY      ENDOMETRIAL ABLATION N/A 2/8/2022    Procedure: ABLATION, ENDOMETRIUM;  Surgeon: Ariel Butler MD;  Location: [facility] OR;   Service: OB/GYN;  Laterality: N/A;    EXCISION, NEOPLASM, PITUITARY, TRANSSPHENOIDAL APPROACH, USING COMPUTER-ASSISTED NAVIGATION N/A 10/3/2022    Procedure: EXCISION, NEOPLASM, PITUITARY, TRANSSPHENOIDAL APPROACH, USING COMPUTER-ASSISTED NAVIGATION;  Surgeon: Ed Astorga MD;  Location: [facility] OR [address] [place] [address];  Service: ENT;  Laterality: N/A;    EXCISION, NEOPLASM, PITUITARY, TRANSSPHENOIDAL APPROACH, USING COMPUTER-ASSISTED NAVIGATION N/A 10/3/2022    Procedure: EXCISION, NEOPLASM, PITUITARY, TRANSSPHENOIDAL APPROACH, USING COMPUTER-ASSISTED NAVIGATION;  Surgeon: Miguel Ángel Ferrell DO;  Location: [facility] OR [address] [place] [address];  Service: Neurosurgery;  Laterality: N/A;    HYSTEROSCOPY WITH DILATION AND CURETTAGE OF UTERUS N/A 2/8/2022    Procedure: HYSTEROSCOPY, WITH DILATION AND CURETTAGE OF UTERUS;  Surgeon: Ariel Butler MD;  Location: [facility];  Service: OB/GYN;  Laterality: N/A;       Time Tracking:     OT Date of Treatment: 10/26/22  OT Start Time: 0528  OT Stop Time: 0541  OT Total Time (min): 13 min    Billable Minutes:Evaluation 5  Therapeutic Activity 8    10/26/2022

## 2022-10-26 NOTE — PLAN OF CARE
Problem: Physical Therapy  Goal: Physical Therapy Goal  Description: No goals established at this time, see conrad 10/26  Outcome: Met

## 2022-10-26 NOTE — DISCHARGE INSTRUCTIONS
Neurosurgery Patient Information    -Take all medications as prescribed:     -Hydrocortisone (Cortef) 10 MG every morning.      -Hydrocortisone (Cortef) 5 MG every afternoon around 4 PM.      -Discontinue use of DDAVP. Drink only to thirst.      -Continue your other home medications as prescribed.   -Do not consume any alcoholic beverages until released by your Neurosurgeon  -Do not perform any excessive bending over or leaning forward as this is a fall hazard.  -Do not perform any heavy lifting or lifting more than 10 lbs from the ground level as this is a fall hazard.    Contact the Neurosurgery Office immediately if:  -If you begin to notice any neurologic changes such as:           -Sudden onset of lethargy or sleepiness           -Sudden confusion, trouble speaking, or understanding            -Sudden trouble seeing in one or both eyes            -Sudden trouble walking, dizziness, loss of coordination            -Sudden severe headache with no known cause            -Sudden onset of numbness or weakness       Miscellaneous:  -Follow up with Neurosurgery on 11/22/22 with Dr. Ferrell.  -Follow up in Endocrine clinic. An appointment will be made for you.   -You must obtain a lab draw called a Basic Metabolic Panel this Monday 10/31/22 to monitor your sodium levels. We will schedule an appointment for you.      Neurosurgery Office: [phone]

## 2022-10-26 NOTE — PLAN OF CARE
OT evaluation completed.  Problem: Occupational Therapy  Goal: Occupational Therapy Goal  Description: Goals not set 2* no further OT needed.  Outcome: Met

## 2022-10-27 ENCOUNTER — PATIENT MESSAGE (OUTPATIENT)
Dept: NEUROSURGERY | Facility: CLINIC | Age: 47
End: 2022-10-27
Payer: MEDICAID

## 2022-10-27 ENCOUNTER — TELEPHONE (OUTPATIENT)
Dept: FAMILY MEDICINE | Facility: CLINIC | Age: 47
End: 2022-10-27
Payer: MEDICAID

## 2022-10-27 LAB — VASOPRESSIN SERPL-MCNC: <0.5 PG/ML (ref 0–6.9)

## 2022-10-27 NOTE — TELEPHONE ENCOUNTER
----- Message from Lashon Oliveira MA sent at 10/26/2022  3:53 PM CDT -----  Contact: [phone]    ----- Message -----  From: Wilfredo Trotter  Sent: 10/26/2022  11:38 AM CDT  To: Brookly[facility] Landis Staff    Who Called: PT  Regarding: schedule hospital follow up, discharge date is today /26/2022   Would the patient rather a call back or a response via [facility]? Call back  Best Call Back Number: [phone]  Additional Information: n/a

## 2022-10-28 ENCOUNTER — HOSPITAL ENCOUNTER (OUTPATIENT)
Dept: RADIOLOGY | Facility: OTHER | Age: 47
Discharge: HOME | End: 2022-10-28
Attending: SURGERY
Payer: MEDICAID

## 2022-10-28 DIAGNOSIS — M79.669 PAIN AND SWELLING OF LOWER LEG, UNSPECIFIED LATERALITY: ICD-10-CM

## 2022-10-28 DIAGNOSIS — M79.89 PAIN AND SWELLING OF LOWER LEG, UNSPECIFIED LATERALITY: ICD-10-CM

## 2022-10-28 LAB — ACTH PLAS-MCNC: 7 PG/ML (ref 0–46)

## 2022-10-28 PROCEDURE — 93970 EXTREMITY STUDY: CPT | Mod: 26,,, | Performed by: RADIOLOGY

## 2022-10-28 PROCEDURE — 93970 EXTREMITY STUDY: CPT | Mod: TC

## 2022-10-28 PROCEDURE — 93970 US LOWER EXTREMITY VEINS BILATERAL INSUFFICIENCY: ICD-10-PCS | Mod: 26,,, | Performed by: RADIOLOGY

## 2022-10-31 ENCOUNTER — LAB VISIT (OUTPATIENT)
Dept: LAB | Facility: HOSPITAL | Age: 47
End: 2022-10-31
Payer: MEDICAID

## 2022-10-31 ENCOUNTER — OFFICE VISIT (OUTPATIENT)
Dept: GASTROENTEROLOGY | Facility: CLINIC | Age: 47
End: 2022-10-31
Payer: MEDICAID

## 2022-10-31 VITALS
WEIGHT: 160.69 LBS | HEART RATE: 80 BPM | DIASTOLIC BLOOD PRESSURE: 65 MMHG | SYSTOLIC BLOOD PRESSURE: 97 MMHG | BODY MASS INDEX: 27.43 KG/M2 | HEIGHT: 64 IN

## 2022-10-31 DIAGNOSIS — E87.1 HYPONATREMIA: ICD-10-CM

## 2022-10-31 DIAGNOSIS — R10.84 GENERALIZED ABDOMINAL PAIN: Primary | ICD-10-CM

## 2022-10-31 DIAGNOSIS — R19.8 IRREGULAR BOWEL HABITS: ICD-10-CM

## 2022-10-31 LAB
ANION GAP SERPL CALC-SCNC: 12 MMOL/L (ref 8–16)
BUN SERPL-MCNC: 12 MG/DL (ref 6–20)
CALCIUM SERPL-MCNC: 9.4 MG/DL (ref 8.7–10.5)
CHLORIDE SERPL-SCNC: 105 MMOL/L (ref 95–110)
CO2 SERPL-SCNC: 25 MMOL/L (ref 23–29)
CREAT SERPL-MCNC: 1 MG/DL (ref 0.5–1.4)
EST. GFR  (NO RACE VARIABLE): >60 ML/MIN/1.73 M^2
GLUCOSE SERPL-MCNC: 87 MG/DL (ref 70–110)
POTASSIUM SERPL-SCNC: 3.9 MMOL/L (ref 3.5–5.1)
SODIUM SERPL-SCNC: 142 MMOL/L (ref 136–145)

## 2022-10-31 PROCEDURE — 80048 BASIC METABOLIC PNL TOTAL CA: CPT

## 2022-10-31 PROCEDURE — 3044F HG A1C LEVEL LT 7.0%: CPT | Mod: CPTII,,, | Performed by: NURSE PRACTITIONER

## 2022-10-31 PROCEDURE — 3008F PR BODY MASS INDEX (BMI) DOCUMENTED: ICD-10-PCS | Mod: CPTII,,, | Performed by: NURSE PRACTITIONER

## 2022-10-31 PROCEDURE — 1160F RVW MEDS BY RX/DR IN RCRD: CPT | Mod: CPTII,,, | Performed by: NURSE PRACTITIONER

## 2022-10-31 PROCEDURE — 1111F DSCHRG MED/CURRENT MED MERGE: CPT | Mod: CPTII,,, | Performed by: NURSE PRACTITIONER

## 2022-10-31 PROCEDURE — 99214 OFFICE O/P EST MOD 30 MIN: CPT | Mod: PBBFAC,PO | Performed by: NURSE PRACTITIONER

## 2022-10-31 PROCEDURE — 99214 OFFICE O/P EST MOD 30 MIN: CPT | Mod: S$PBB,,, | Performed by: NURSE PRACTITIONER

## 2022-10-31 PROCEDURE — 1159F PR MEDICATION LIST DOCUMENTED IN MEDICAL RECORD: ICD-10-PCS | Mod: CPTII,,, | Performed by: NURSE PRACTITIONER

## 2022-10-31 PROCEDURE — 99999 PR PBB SHADOW E&M-EST. PATIENT-LVL IV: CPT | Mod: PBBFAC,,, | Performed by: NURSE PRACTITIONER

## 2022-10-31 PROCEDURE — 99214 PR OFFICE/OUTPT VISIT, EST, LEVL IV, 30-39 MIN: ICD-10-PCS | Mod: S$PBB,,, | Performed by: NURSE PRACTITIONER

## 2022-10-31 PROCEDURE — 3008F BODY MASS INDEX DOCD: CPT | Mod: CPTII,,, | Performed by: NURSE PRACTITIONER

## 2022-10-31 PROCEDURE — 36415 COLL VENOUS BLD VENIPUNCTURE: CPT

## 2022-10-31 PROCEDURE — 99999 PR PBB SHADOW E&M-EST. PATIENT-LVL IV: ICD-10-PCS | Mod: PBBFAC,,, | Performed by: NURSE PRACTITIONER

## 2022-10-31 PROCEDURE — 1159F MED LIST DOCD IN RCRD: CPT | Mod: CPTII,,, | Performed by: NURSE PRACTITIONER

## 2022-10-31 PROCEDURE — 3078F DIAST BP <80 MM HG: CPT | Mod: CPTII,,, | Performed by: NURSE PRACTITIONER

## 2022-10-31 PROCEDURE — 3078F PR MOST RECENT DIASTOLIC BLOOD PRESSURE < 80 MM HG: ICD-10-PCS | Mod: CPTII,,, | Performed by: NURSE PRACTITIONER

## 2022-10-31 PROCEDURE — 3044F PR MOST RECENT HEMOGLOBIN A1C LEVEL <7.0%: ICD-10-PCS | Mod: CPTII,,, | Performed by: NURSE PRACTITIONER

## 2022-10-31 PROCEDURE — 1160F PR REVIEW ALL MEDS BY PRESCRIBER/CLIN PHARMACIST DOCUMENTED: ICD-10-PCS | Mod: CPTII,,, | Performed by: NURSE PRACTITIONER

## 2022-10-31 PROCEDURE — 3074F PR MOST RECENT SYSTOLIC BLOOD PRESSURE < 130 MM HG: ICD-10-PCS | Mod: CPTII,,, | Performed by: NURSE PRACTITIONER

## 2022-10-31 PROCEDURE — 1111F PR DISCHARGE MEDS RECONCILED W/ CURRENT OUTPATIENT MED LIST: ICD-10-PCS | Mod: CPTII,,, | Performed by: NURSE PRACTITIONER

## 2022-10-31 PROCEDURE — 3074F SYST BP LT 130 MM HG: CPT | Mod: CPTII,,, | Performed by: NURSE PRACTITIONER

## 2022-10-31 RX ORDER — DICYCLOMINE HYDROCHLORIDE 20 MG/1
20 TABLET ORAL 3 TIMES DAILY PRN
Qty: 60 TABLET | Refills: 2 | Status: SHIPPED | OUTPATIENT
Start: 2022-10-31 | End: 2024-02-20

## 2022-10-31 NOTE — PATIENT INSTRUCTIONS
- OTC probiotic daily  - Dicyclomine 20 mg three times per day as needed for abdominal pain. Take at least once daily.

## 2022-11-01 ENCOUNTER — LAB VISIT (OUTPATIENT)
Dept: LAB | Facility: HOSPITAL | Age: 47
End: 2022-11-01
Attending: INTERNAL MEDICINE
Payer: MEDICAID

## 2022-11-01 ENCOUNTER — OFFICE VISIT (OUTPATIENT)
Dept: FAMILY MEDICINE | Facility: CLINIC | Age: 47
End: 2022-11-01
Payer: MEDICAID

## 2022-11-01 VITALS
HEIGHT: 64 IN | SYSTOLIC BLOOD PRESSURE: 128 MMHG | OXYGEN SATURATION: 99 % | BODY MASS INDEX: 26.95 KG/M2 | DIASTOLIC BLOOD PRESSURE: 64 MMHG | WEIGHT: 157.88 LBS | HEART RATE: 72 BPM

## 2022-11-01 DIAGNOSIS — D35.2 PITUITARY MACROADENOMA WITH EXTRASELLAR EXTENSION: ICD-10-CM

## 2022-11-01 DIAGNOSIS — E87.1 HYPONATREMIA: ICD-10-CM

## 2022-11-01 DIAGNOSIS — M79.609 PAIN IN EXTREMITY, UNSPECIFIED EXTREMITY: Primary | ICD-10-CM

## 2022-11-01 DIAGNOSIS — I73.9 PERIPHERAL ARTERY DISEASE: ICD-10-CM

## 2022-11-01 DIAGNOSIS — M79.7 FIBROMYALGIA: ICD-10-CM

## 2022-11-01 DIAGNOSIS — E27.40 ADRENAL INSUFFICIENCY: ICD-10-CM

## 2022-11-01 DIAGNOSIS — Z86.018 S/P TRANSSPHENOIDAL SELECTIVE ADENOMECTOMY: ICD-10-CM

## 2022-11-01 DIAGNOSIS — E03.9 HYPOTHYROIDISM, UNSPECIFIED TYPE: ICD-10-CM

## 2022-11-01 DIAGNOSIS — Z98.890 S/P TRANSSPHENOIDAL SELECTIVE ADENOMECTOMY: ICD-10-CM

## 2022-11-01 LAB
BILIRUB UR QL STRIP: NEGATIVE
CLARITY UR: CLEAR
COLOR UR: COLORLESS
GLUCOSE UR QL STRIP: NEGATIVE
HGB UR QL STRIP: ABNORMAL
KETONES UR QL STRIP: NEGATIVE
LEUKOCYTE ESTERASE UR QL STRIP: NEGATIVE
NITRITE UR QL STRIP: NEGATIVE
PH UR STRIP: 6 [PH] (ref 5–8)
PROT UR QL STRIP: NEGATIVE
SP GR UR STRIP: 1.01 (ref 1–1.03)
URN SPEC COLLECT METH UR: ABNORMAL
UROBILINOGEN UR STRIP-ACNC: NEGATIVE EU/DL

## 2022-11-01 PROCEDURE — 1111F DSCHRG MED/CURRENT MED MERGE: CPT | Mod: CPTII,,, | Performed by: INTERNAL MEDICINE

## 2022-11-01 PROCEDURE — 1160F PR REVIEW ALL MEDS BY PRESCRIBER/CLIN PHARMACIST DOCUMENTED: ICD-10-PCS | Mod: CPTII,,, | Performed by: INTERNAL MEDICINE

## 2022-11-01 PROCEDURE — 3074F SYST BP LT 130 MM HG: CPT | Mod: CPTII,,, | Performed by: INTERNAL MEDICINE

## 2022-11-01 PROCEDURE — 99214 OFFICE O/P EST MOD 30 MIN: CPT | Mod: PBBFAC,PO | Performed by: INTERNAL MEDICINE

## 2022-11-01 PROCEDURE — 99999 PR PBB SHADOW E&M-EST. PATIENT-LVL IV: CPT | Mod: PBBFAC,,, | Performed by: INTERNAL MEDICINE

## 2022-11-01 PROCEDURE — 3008F BODY MASS INDEX DOCD: CPT | Mod: CPTII,,, | Performed by: INTERNAL MEDICINE

## 2022-11-01 PROCEDURE — 1159F PR MEDICATION LIST DOCUMENTED IN MEDICAL RECORD: ICD-10-PCS | Mod: CPTII,,, | Performed by: INTERNAL MEDICINE

## 2022-11-01 PROCEDURE — 3078F PR MOST RECENT DIASTOLIC BLOOD PRESSURE < 80 MM HG: ICD-10-PCS | Mod: CPTII,,, | Performed by: INTERNAL MEDICINE

## 2022-11-01 PROCEDURE — 1159F MED LIST DOCD IN RCRD: CPT | Mod: CPTII,,, | Performed by: INTERNAL MEDICINE

## 2022-11-01 PROCEDURE — 99215 OFFICE O/P EST HI 40 MIN: CPT | Mod: S$PBB,,, | Performed by: INTERNAL MEDICINE

## 2022-11-01 PROCEDURE — 3044F HG A1C LEVEL LT 7.0%: CPT | Mod: CPTII,,, | Performed by: INTERNAL MEDICINE

## 2022-11-01 PROCEDURE — 1111F PR DISCHARGE MEDS RECONCILED W/ CURRENT OUTPATIENT MED LIST: ICD-10-PCS | Mod: CPTII,,, | Performed by: INTERNAL MEDICINE

## 2022-11-01 PROCEDURE — 81003 URINALYSIS AUTO W/O SCOPE: CPT | Performed by: STUDENT IN AN ORGANIZED HEALTH CARE EDUCATION/TRAINING PROGRAM

## 2022-11-01 PROCEDURE — 1160F RVW MEDS BY RX/DR IN RCRD: CPT | Mod: CPTII,,, | Performed by: INTERNAL MEDICINE

## 2022-11-01 PROCEDURE — 3074F PR MOST RECENT SYSTOLIC BLOOD PRESSURE < 130 MM HG: ICD-10-PCS | Mod: CPTII,,, | Performed by: INTERNAL MEDICINE

## 2022-11-01 PROCEDURE — 3044F PR MOST RECENT HEMOGLOBIN A1C LEVEL <7.0%: ICD-10-PCS | Mod: CPTII,,, | Performed by: INTERNAL MEDICINE

## 2022-11-01 PROCEDURE — 3078F DIAST BP <80 MM HG: CPT | Mod: CPTII,,, | Performed by: INTERNAL MEDICINE

## 2022-11-01 PROCEDURE — 99215 PR OFFICE/OUTPT VISIT, EST, LEVL V, 40-54 MIN: ICD-10-PCS | Mod: S$PBB,,, | Performed by: INTERNAL MEDICINE

## 2022-11-01 PROCEDURE — 3008F PR BODY MASS INDEX (BMI) DOCUMENTED: ICD-10-PCS | Mod: CPTII,,, | Performed by: INTERNAL MEDICINE

## 2022-11-01 PROCEDURE — 99999 PR PBB SHADOW E&M-EST. PATIENT-LVL IV: ICD-10-PCS | Mod: PBBFAC,,, | Performed by: INTERNAL MEDICINE

## 2022-11-01 RX ORDER — THYROID 15 MG/1
15 TABLET ORAL
Qty: 30 TABLET | Refills: 6 | Status: SHIPPED | OUTPATIENT
Start: 2022-11-01 | End: 2022-11-22

## 2022-11-01 NOTE — PROGRESS NOTES
Subjective:       Patient ID: Tessa Wynne is a 47 y.o. female.    Chief Complaint: Hospital Follow Up      HPI  Tessa Wynne is a 47 y.o. female with chronic conditions of mild PAD, recently found with pituitary macroadenoma, hypothyroidism who presents today for hospital follow-up.    Lip lesion was a needed and 10/24/2022 due to diffuse body aches, nausea, and vomiting.  His sodium was down and no focalizing signs considering recent surgery.  She was admitted for further management.  DDAVP was discontinued and she was started on a fluid restriction.  She was started on hydrocortisone and remain on the levothyroxine.    During this visit brought multiple complains that has not been new.  Still feels a T and complains of multiple pains to her extremities that she feels is from the veins, pulling sensation along the extremity with no discoloration.  Mild swelling of the extremities but not using the compression stockings as she does not tolerate them.  She associates this pain to the levothyroxine and wants to stop it.  The symptoms were not modified when the strength was changed.  Acknowledges that low-sodium could have been part of her symptoms but has improved.  She is followed by endocrinology neuro surgery.  Does not think is associated to anxiety even though she looks anxious.  Has history of fibromyalgia.    Her NASIMA look within normal limits but there was mild change with exertion.  Has appointment with vascular Medicine.    She was seen by GI service and given trial of Bentyl, probiotics with follow-up.    Health Maintenance:  Health Maintenance   Topic Date Due    Mammogram  01/04/2023    Lipid Panel  07/29/2027    TETANUS VACCINE  11/13/2028    Hepatitis C Screening  Completed       Review of Systems   Constitutional:  Positive for fatigue.   Eyes:  Negative for visual disturbance.   Respiratory: Negative.     Cardiovascular:  Positive for claudication.   Gastrointestinal:  Positive for abdominal  pain, diarrhea, nausea and vomiting.   Genitourinary: Negative.    Musculoskeletal:  Positive for leg pain and myalgias (But feels is more associated to her veins hurting).   Neurological:  Positive for weakness and headaches.    Past Medical History:   Diagnosis Date    Elevated fasting glucose     GERD (gastroesophageal reflux disease)     Hypothyroidism     PAD (peripheral artery disease)     Pituitary lesion 9/7/2022       Past Surgical History:   Procedure Laterality Date    CHOLECYSTECTOMY      ENDOMETRIAL ABLATION N/A 2/8/2022    Procedure: ABLATION, ENDOMETRIUM;  Surgeon: Ariel Butler MD;  Location: [facility];  Service: OB/GYN;  Laterality: N/A;    EXCISION, NEOPLASM, PITUITARY, TRANSSPHENOIDAL APPROACH, USING COMPUTER-ASSISTED NAVIGATION N/A 10/3/2022    Procedure: EXCISION, NEOPLASM, PITUITARY, TRANSSPHENOIDAL APPROACH, USING COMPUTER-ASSISTED NAVIGATION;  Surgeon: Ed Astorga MD;  Location: [facility] OR [address] [place] [address];  Service: ENT;  Laterality: N/A;    EXCISION, NEOPLASM, PITUITARY, TRANSSPHENOIDAL APPROACH, USING COMPUTER-ASSISTED NAVIGATION N/A 10/3/2022    Procedure: EXCISION, NEOPLASM, PITUITARY, TRANSSPHENOIDAL APPROACH, USING COMPUTER-ASSISTED NAVIGATION;  Surgeon: Miguel Ángel Ferrell DO;  Location: [facility] OR [address] [place] [address];  Service: Neurosurgery;  Laterality: N/A;    HYSTEROSCOPY WITH DILATION AND CURETTAGE OF UTERUS N/A 2/8/2022    Procedure: HYSTEROSCOPY, WITH DILATION AND CURETTAGE OF UTERUS;  Surgeon: Ariel Butler MD;  Location: [facility];  Service: OB/GYN;  Laterality: N/A;       Family History   Problem Relation Age of Onset    Diabetes Mother     Diabetes Mellitus Mother     Diabetes Father     Diabetes Mellitus Father     No Known Problems Sister     No Known Problems Sister     No Known Problems Brother     No Known Problems Brother     No Known Problems Daughter     No Known Problems Son     Colon cancer Neg Hx     Breast cancer Neg Hx     Ovarian cancer Neg Hx     Uterine cancer Neg Hx      Heart attack Neg Hx        Social History     Socioeconomic History    Marital status:     Number of children: 2   Occupational History    Occupation: cleaning housing    Tobacco Use    Smoking status: Never    Smokeless tobacco: Never   Substance and Sexual Activity    Alcohol use: No    Drug use: No    Sexual activity: Yes     Partners: Male     Birth control/protection: OCP   Social History Narrative    Patient is originally from [place] [id number]         School at ; high school         College/university ;         Working ; [employer]                 Children    joban - 21    lidnsey - 13         Lives with     daught and son and husabd        Diet/Exericse           Current Outpatient Medications   Medication Sig Dispense Refill    acetaminophen (TYLENOL) 325 MG tablet Take 2 tablets (650 mg total) by mouth every 6 (six) hours as needed (Headaches (take with magnesium and benadryl)). 30 tablet 0    calcium citrate/vitamin D3 (CITRACAL REGULAR ORAL) Take by mouth.      dicyclomine (BENTYL) 20 mg tablet Take 1 tablet (20 mg total) by mouth 3 (three) times daily as needed (abdominal pain). 60 tablet 2    diphenhydrAMINE (BENADRYL) 50 MG capsule Take 1 capsule (50 mg total) by mouth every 12 (twelve) hours as needed (Headaches (take with tylenol and magnesium)). 30 capsule 0    ferrous sulfate (IRON ORAL) Take by mouth.      hydrocortisone (CORTEF) 10 MG Tab Take 1 tablet (10 mg total) by mouth once daily. In the morning. 30 tablet 0    hydrocortisone (CORTEF) 5 MG Tab Take 1 tablet (5 mg total) by mouth once daily. In the afternoon around 4 pm. 60 tablet 0    hyoscyamine (LEVSIN/SL) 0.125 mg Subl Take 0.125 mg by mouth 4 (four) times daily as needed.      magnesium oxide (MAG-OX) 400 mg (241.3 mg magnesium) tablet Take 1 tablet (400 mg total) by mouth 2 (two) times daily as needed (Headaches (take with tylenol and benadryl)). 30 tablet 0    mv,calcium,min/iron/folic/vitK (ONE-A-DAY WOMEN'S COMPLETE ORAL) Take  by mouth.      omeprazole (PRILOSEC) 40 MG capsule Take 1 capsule (40 mg total) by mouth every morning. 30 capsule 11    ondansetron (ZOFRAN-ODT) 4 MG TbDL Dissolve 1 tablet (4 mg total) by mouth every 8 (eight) hours as needed (nausea & vomiting). (Patient not taking: Reported on 11/3/2022) 24 tablet 0    oxyCODONE (ROXICODONE) 5 MG immediate release tablet Take 1 tablet (5 mg total) by mouth every 6 (six) hours as needed for Pain (take as needed for pain/headaches). (Patient not taking: Reported on 11/2/2022) 10 tablet 0    oxymetazoline (AFRIN) 0.05 % nasal spray 3 sprays by Nasal route every 2 (two) hours as needed (epistaxis). (Patient not taking: Reported on 11/2/2022) 30 mL 0    sodium chloride (OCEAN) 0.65 % nasal spray 2 sprays by Nasal route every 4 (four) hours while awake. (Patient not taking: Reported on 11/2/2022) 44 mL 0    thyroid, pork, (ARMOUR THYROID) 15 mg Tab Take 1 tablet (15 mg total) by mouth before breakfast. 30 tablet 6     No current facility-administered medications for this visit.       Review of patient's allergies indicates:  No Known Allergies      Objective:       Last 3 sets of Vitals    Vitals - 1 value per visit 11/1/2022 11/2/2022 11/3/2022   SYSTOLIC 128 115 128   DIASTOLIC 64 66 64   Pulse 72 88 69   Temp - 97.3 -   Resp - - -   SPO2 99 - 98   Weight (lb) 157.85 - 158.6   Weight (kg) 71.6 - 71.94   Height 64 - 64   BMI (Calculated) 27.1 - 27.2   VISIT REPORT - - -   Pain Score  - - -   Some recent data might be hidden   Physical Exam  Constitutional:       General: She is not in acute distress.  HENT:      Head: Normocephalic.      Right Ear: Tympanic membrane, ear canal and external ear normal.      Left Ear: Tympanic membrane, ear canal and external ear normal.      Nose: Nose normal.      Mouth/Throat:      Mouth: Mucous membranes are moist.   Eyes:      General: No scleral icterus.     Extraocular Movements: Extraocular movements intact.      Conjunctiva/sclera:  Conjunctivae normal.   Neck:      Vascular: No carotid bruit.      Comments: No goiter.  Cardiovascular:      Rate and Rhythm: Normal rate and regular rhythm.      Pulses: Normal pulses.      Heart sounds: Normal heart sounds.   Pulmonary:      Effort: Pulmonary effort is normal.      Breath sounds: Normal breath sounds.   Abdominal:      General: Bowel sounds are normal. There is no distension.      Palpations: Abdomen is soft. There is no mass.      Tenderness: There is no abdominal tenderness.      Comments: Discomfort to palpation, diffuse.   Musculoskeletal:         General: No swelling.   Lymphadenopathy:      Cervical: No cervical adenopathy.   Skin:     General: Skin is warm and dry.   Neurological:      General: No focal deficit present.      Mental Status: She is alert and oriented to person, place, and time.      Motor: No weakness.      Deep Tendon Reflexes: Reflexes normal.   Psychiatric:         Mood and Affect: Mood normal.         Behavior: Behavior normal.         CBC:  Recent Labs   Lab 10/24/22  2025 10/25/22  0234 10/26/22  0553   WBC 7.02 7.09 15.16 H   RBC 4.28 4.10 4.15   Hemoglobin 12.9 12.8 12.7   Hematocrit 38.3 36.7 L 37.8   Platelets 321 310 319   MCV 90 90 91   MCH 30.1 31.2 H 30.6   MCHC 33.7 34.9 33.6     CMP:  Recent Labs   Lab 10/24/22  2025 10/25/22  0234 10/25/22  0606 10/26/22  0553 10/26/22  1153 10/31/22  0800   Glucose 115 H 143 H  --  101  --  87   Calcium 9.6 9.2  --  9.7  --  9.4   Albumin 4.4 4.1  --  3.9  --   --    Total Protein 7.5 7.1  --  6.9  --   --    Sodium 127 L 131 L   < > 138  140   < > 142   Potassium 4.4 4.3  --  3.7  --  3.9   CO2 27 21 L  --  24  --  25   Chloride 92 L 99  --  106  --  105   BUN 12 10  --  15  --  12   Creatinine 0.8 0.8  --  0.8  --  1.0   Alkaline Phosphatase 69 66  --  66  --   --    ALT 29 28  --  27  --   --    AST 23 21  --  16  --   --    Total Bilirubin 0.4 0.5  --  0.3  --   --     < > = values in this interval not displayed.      URINALYSIS:  Recent Labs   Lab 10/25/22  0657 10/25/22  1057 11/01/22  1029   Color, UA Straw   < > Colorless A   Specific Gravity, UA 1.005   < > 1.010   pH, UA >8.0 A   < > 6.0   Protein, UA Negative   < > Negative   Bacteria Rare  --   --    Nitrite, UA Negative   < > Negative   Leukocytes, UA 1+ A   < > Negative   Urobilinogen, UA  --   --  Negative    < > = values in this interval not displayed.      LIPIDS:  Recent Labs   Lab 07/27/20  1530 03/02/21  0959 12/17/21  1537 07/29/22  0827 08/26/22  0845 10/08/22  0305 10/18/22  0430 10/25/22  1214   TSH  --   --    < > 2.017   < > 0.772 2.966 0.675   HDL 75 56  --  57  --   --   --   --    Cholesterol 176 156  --  127  --   --   --   --    Triglycerides 125 84  --  63  --   --   --   --    LDL Cholesterol 76.0 83.2  --  57.4 L  --   --   --   --    HDL/Cholesterol Ratio 42.6 35.9  --  44.9  --   --   --   --    Non-HDL Cholesterol 101 100  --  70  --   --   --   --    Total Cholesterol/HDL Ratio 2.3 2.8  --  2.2  --   --   --   --     < > = values in this interval not displayed.     TSH:  Recent Labs   Lab 10/08/22  0305 10/18/22  0430 10/25/22  1214   TSH 0.772 2.966 0.675       A1C:  Recent Labs   Lab 05/04/22  0809 07/29/22  0827 10/24/22  2025   Hemoglobin A1C 5.4 6.1 H 5.4       Imaging:  US Lower Extremity Veins Bilateral Insuf  Narrative: EXAMINATION:  US LOWER EXTREMITY VEINS BILATERAL INSUFFICIENCY    CLINICAL HISTORY:  suspected venous insuff;  Pain in unspecified lower leg    TECHNIQUE:  Bilateral lower extremity venous Doppler exam including Valsalva or standing maneuvers for evaluation of venous insufficiency of the superficial systems bilaterally.  Reflux times greater than 500 ms were considered indicative of superficial system reflux.    COMPARISON:  None    FINDINGS:  Deep venous system:    There is evidence of flow, compressibility and augmentation within bilateral common femoral, femoral, and popliteal veins.  Flow is seen within bilateral  peroneal, posterior tibial and anterior tibial veins.    Superficial venous system:    Right leg:    Reflux times right greater saphenous vein up to 3062 milliseconds.    The maximal diameter within the right greater saphenous vein is 10 mm.    The maximal diameter within the right lesser saphenous vein is 5mm.    Left leg:    Reflux times in the greater saphenous vein up to 1700 milliseconds    The maximal diameter within the left greater saphenous vein is 7 mm.    The maximal diameter within the left lesser saphenous vein is 3mm.  Impression: 1.  No evidence of deep venous thrombosis in either lower extremity.    2.  Hemodynamically significant venous reflux right and left greater saphenous veins.    Electronically signed by: Manju Grimaldo  Date:    10/28/2022  Time:    14:21      Assessment:       1. Pain in extremity, unspecified extremity    2. Peripheral artery disease    3. Fibromyalgia    4. S/P transsphenoidal selective adenomectomy    5. Pituitary macroadenoma with extrasellar extension    6. Hypothyroidism, unspecified type    7. Adrenal insufficiency              Plan:       1. Pain in extremity, unspecified extremity  -     CK; Future; Expected date: 11/01/2022  - no edema noted could be associated to some of her medications.  - describes claudication like symptoms and has appointment with vascular Medicine.    2. Peripheral artery disease   - as above    3. Fibromyalgia   - with consider Cymbalta but patient would like to complete workup.    4. S/P transsphenoidal selective adenomectomy   - DDAVP was discontinued.  On hydrocortisone and thyroid medication.  Diagnostic imaging has been stable.  Has appointment with neuro surgery and Endocrinology.   - has scheduled labs to follow hyponatremia.  Following fluid restriction.    5. Pituitary macroadenoma with extrasellar extension   - As above   - Menses have not restarted.    6. Hypothyroidism, unspecified type  Overview:  Secondary hypothyroidism due to  pituitary mass.  Can not use TSH for titration of levothyroxine dose but will aim to keep free T4 in the upper half of the normal range.    Orders:  -     thyroid, pork, (ARMOUR THYROID) 15 mg Tab; Take 1 tablet (15 mg total) by mouth before breakfast.  Dispense: 30 tablet; Refill: 6  -     TSH; Future; Expected date: 11/01/2022  -     T4, FREE; Future; Expected date: 11/01/2022  -     T3; Future; Expected date: 11/01/2022     - noted Endocrinology recommendations regarding TSH.  Patient feels her extremity pains are worse since she is taking levothyroxine and we will try [place] Thyroid.  Will notify endocrinology.    7. Adrenal insufficiency   - on hydrocortisone.     Health Maintenance Due   Topic Date Due    Pneumococcal Vaccines (Age 0-64) (1 - PCV) Never done    COVID-19 Vaccine (3 - Booster for Pfizer series) 01/01/2022    Influenza Vaccine (1) 09/01/2022    Mammogram  01/04/2023        Transitional Care Note    Family and/or Caretaker present at visit?  Yes.  Diagnostic tests reviewed/disposition: I have reviewed all completed as well as pending diagnostic tests at the time of discharge.  Disease/illness education:  On fluid restriction and to decreased water.  Most of the conversation was related to her concerns with extremity pains and thyroid medication.  Home health/community services discussion/referrals: Patient has home health established at home .   Establishment or re-establishment of referral orders for community resources:  Has appointment with consultants including neuro surgery, Endocrinology, and vascular Medicine. .   Discussion with other health care providers:  Will notify endocrinology of medication changes. .          Return to clinic as scheduled.    Sabiha Overton MD  [facility]  Disclaimer:  This note has been generated using voice-recognition software. There may be grammatical or spelling errors that have been missed during proof-reading

## 2022-11-02 ENCOUNTER — OFFICE VISIT (OUTPATIENT)
Dept: VASCULAR SURGERY | Facility: CLINIC | Age: 47
End: 2022-11-02
Payer: MEDICAID

## 2022-11-02 VITALS — DIASTOLIC BLOOD PRESSURE: 66 MMHG | SYSTOLIC BLOOD PRESSURE: 115 MMHG | HEART RATE: 88 BPM | TEMPERATURE: 97 F

## 2022-11-02 DIAGNOSIS — I87.2 VENOUS INSUFFICIENCY OF BOTH LOWER EXTREMITIES: Primary | ICD-10-CM

## 2022-11-02 PROCEDURE — 99204 PR OFFICE/OUTPT VISIT, NEW, LEVL IV, 45-59 MIN: ICD-10-PCS | Mod: S$GLB,,, | Performed by: SURGERY

## 2022-11-02 PROCEDURE — 99204 OFFICE O/P NEW MOD 45 MIN: CPT | Mod: S$GLB,,, | Performed by: SURGERY

## 2022-11-02 PROCEDURE — 3078F DIAST BP <80 MM HG: CPT | Mod: CPTII,S$GLB,, | Performed by: SURGERY

## 2022-11-02 PROCEDURE — 3044F HG A1C LEVEL LT 7.0%: CPT | Mod: CPTII,S$GLB,, | Performed by: SURGERY

## 2022-11-02 PROCEDURE — 3074F PR MOST RECENT SYSTOLIC BLOOD PRESSURE < 130 MM HG: ICD-10-PCS | Mod: CPTII,S$GLB,, | Performed by: SURGERY

## 2022-11-02 PROCEDURE — 1111F DSCHRG MED/CURRENT MED MERGE: CPT | Mod: CPTII,S$GLB,, | Performed by: SURGERY

## 2022-11-02 PROCEDURE — 3044F PR MOST RECENT HEMOGLOBIN A1C LEVEL <7.0%: ICD-10-PCS | Mod: CPTII,S$GLB,, | Performed by: SURGERY

## 2022-11-02 PROCEDURE — 3078F PR MOST RECENT DIASTOLIC BLOOD PRESSURE < 80 MM HG: ICD-10-PCS | Mod: CPTII,S$GLB,, | Performed by: SURGERY

## 2022-11-02 PROCEDURE — 3074F SYST BP LT 130 MM HG: CPT | Mod: CPTII,S$GLB,, | Performed by: SURGERY

## 2022-11-02 PROCEDURE — 1111F PR DISCHARGE MEDS RECONCILED W/ CURRENT OUTPATIENT MED LIST: ICD-10-PCS | Mod: CPTII,S$GLB,, | Performed by: SURGERY

## 2022-11-02 NOTE — PROGRESS NOTES
Messi Schaefer MD, RPVI                                 [facility]                           [facility]                             11/02/2022    HPI:  Tessa Wynne is a 47 y.o. female with   Patient Active Problem List   Diagnosis    Risk for coronary artery disease less than 10% in next 10 years    Heart palpitations    Hyperglycemia    Fibromyalgia    PUD (peptic ulcer disease)    Menorrhagia    Pituitary macroadenoma with extrasellar extension    Hypothyroidism    Appendicitis    Peripheral artery disease    Hyponatremia    S/P transsphenoidal selective adenomectomy    Bilious vomiting with nausea    Adrenal insufficiency    Migraine    being managed by PCP and specialists who is here today for evaluation of BLE edema.  Patient states location is BLE occurring for yrs.  Associated signs and symptoms include pain.  Quality is heavy and severity is 6/10.  Symptoms began yrs ago.  Alleviating factors include elevation.  Worsening factors include dependency.  Patient has not been wearing compression stockings for greater than 3 months.  No FH of venous disease.  Symptoms do limit patient's functional status and daily activities.  no DVT history.  no venous interventions.  no low sodium diet.  no excessive water intake.    Migraine with aura: no  PFO/ASD/right to left shunt: no  Pregnant: no  Breastfeeding: no    no MI  no Stroke  Tobacco use: no    Past Medical History:   Diagnosis Date    Elevated fasting glucose     GERD (gastroesophageal reflux disease)     Hypothyroidism     PAD (peripheral artery disease)     Pituitary lesion 9/7/2022     Past Surgical History:   Procedure Laterality Date    CHOLECYSTECTOMY      ENDOMETRIAL ABLATION N/A 2/8/2022    Procedure: ABLATION, ENDOMETRIUM;  Surgeon: Ariel Butler MD;  Location: [facility] OR;  Service: OB/GYN;  Laterality: N/A;    EXCISION, NEOPLASM, PITUITARY, TRANSSPHENOIDAL APPROACH, USING COMPUTER-ASSISTED NAVIGATION N/A  10/3/2022    Procedure: EXCISION, NEOPLASM, PITUITARY, TRANSSPHENOIDAL APPROACH, USING COMPUTER-ASSISTED NAVIGATION;  Surgeon: Ed Astorga MD;  Location: [facility] OR [address] [place] [address];  Service: ENT;  Laterality: N/A;    EXCISION, NEOPLASM, PITUITARY, TRANSSPHENOIDAL APPROACH, USING COMPUTER-ASSISTED NAVIGATION N/A 10/3/2022    Procedure: EXCISION, NEOPLASM, PITUITARY, TRANSSPHENOIDAL APPROACH, USING COMPUTER-ASSISTED NAVIGATION;  Surgeon: Miguel Ángel Ferrell DO;  [facility];  Service: Neurosurgery;  Laterality: N/A;    HYSTEROSCOPY WITH DILATION AND CURETTAGE OF UTERUS N/A 2/8/2022    Procedure: HYSTEROSCOPY, WITH DILATION AND CURETTAGE OF UTERUS;  Surgeon: Ariel Butler MD;  Location: [facility];  Service: OB/GYN;  Laterality: N/A;     Family History   Problem Relation Age of Onset    Diabetes Mother     Diabetes Mellitus Mother     Diabetes Father     Diabetes Mellitus Father     No Known Problems Sister     No Known Problems Sister     No Known Problems Brother     No Known Problems Brother     No Known Problems Daughter     No Known Problems Son     Colon cancer Neg Hx     Breast cancer Neg Hx     Ovarian cancer Neg Hx     Uterine cancer Neg Hx     Heart attack Neg Hx      Social History     Socioeconomic History    Marital status:     Number of children: 2   Occupational History    Occupation: cleaning housing    Tobacco Use    Smoking status: Never    Smokeless tobacco: Never   Substance and Sexual Activity    Alcohol use: No    Drug use: No    Sexual activity: Yes     Partners: Male     Birth control/protection: OCP   Social History Narrative    Patient is originally from [place] [id number]         School at ; high school         College/university ;         Working ; [employer]                 Children    joban - 21    lidnsey - 13         Lives with     daught and son and [facility]        Diet/Exericse           Current Outpatient Medications:     acetaminophen (TYLENOL) 325 MG tablet, Take 2 tablets  (650 mg total) by mouth every 6 (six) hours as needed (Headaches (take with magnesium and benadryl))., Disp: 30 tablet, Rfl: 0    calcium citrate/vitamin D3 (CITRACAL REGULAR ORAL), Take by mouth., Disp: , Rfl:     dicyclomine (BENTYL) 20 mg tablet, Take 1 tablet (20 mg total) by mouth 3 (three) times daily as needed (abdominal pain)., Disp: 60 tablet, Rfl: 2    diphenhydrAMINE (BENADRYL) 50 MG capsule, Take 1 capsule (50 mg total) by mouth every 12 (twelve) hours as needed (Headaches (take with tylenol and magnesium))., Disp: 30 capsule, Rfl: 0    ferrous sulfate (IRON ORAL), Take by mouth., Disp: , Rfl:     hydrocortisone (CORTEF) 10 MG Tab, Take 1 tablet (10 mg total) by mouth once daily. In the morning., Disp: 30 tablet, Rfl: 0    hydrocortisone (CORTEF) 5 MG Tab, Take 1 tablet (5 mg total) by mouth once daily. In the afternoon around 4 pm., Disp: 60 tablet, Rfl: 0    omeprazole (PRILOSEC) 40 MG capsule, Take 1 capsule (40 mg total) by mouth every morning., Disp: 30 capsule, Rfl: 11    ondansetron (ZOFRAN-ODT) 4 MG TbDL, Dissolve 1 tablet (4 mg total) by mouth every 8 (eight) hours as needed (nausea & vomiting)., Disp: 24 tablet, Rfl: 0    hyoscyamine (LEVSIN/SL) 0.125 mg Subl, Take 0.125 mg by mouth 4 (four) times daily as needed., Disp: , Rfl:     magnesium oxide (MAG-OX) 400 mg (241.3 mg magnesium) tablet, Take 1 tablet (400 mg total) by mouth 2 (two) times daily as needed (Headaches (take with tylenol and benadryl))., Disp: 30 tablet, Rfl: 0    mv,calcium,min/iron/folic/vitK (ONE-A-DAY WOMEN'S COMPLETE ORAL), Take by mouth., Disp: , Rfl:     oxyCODONE (ROXICODONE) 5 MG immediate release tablet, Take 1 tablet (5 mg total) by mouth every 6 (six) hours as needed for Pain (take as needed for pain/headaches). (Patient not taking: Reported on 11/2/2022), Disp: 10 tablet, Rfl: 0    oxymetazoline (AFRIN) 0.05 % nasal spray, 3 sprays by Nasal route every 2 (two) hours as needed (epistaxis). (Patient not taking:  Reported on 11/2/2022), Disp: 30 mL, Rfl: 0    sodium chloride (OCEAN) 0.65 % nasal spray, 2 sprays by Nasal route every 4 (four) hours while awake. (Patient not taking: Reported on 11/2/2022), Disp: 44 mL, Rfl: 0    thyroid, pork, (ARMOUR THYROID) 15 mg Tab, Take 1 tablet (15 mg total) by mouth before breakfast. (Patient not taking: Reported on 11/2/2022), Disp: 30 tablet, Rfl: 6    REVIEW OF SYSTEMS:  General: No fevers or chills; ENT: No sore throat; Allergy and Immunology: no persistent infections; Hematological and Lymphatic: No history of bleeding or easy bruising; Endocrine: negative; Respiratory: no cough, shortness of breath, or wheezing; Cardiovascular: no chest pain or dyspnea on exertion; Gastrointestinal: no abdominal pain/back, change in bowel habits, or bloody stools; Genito-Urinary: no dysuria, trouble voiding, or hematuria; Musculoskeletal: edema and pain; Neurological: no TIA or stroke symptoms; Psychiatric: no nervousness, anxiety or depression.    PHYSICAL EXAM:      Pulse: 88  Temp: 97.3 °F (36.3 °C)      General appearance:  Alert, well-appearing, and in no distress.  Oriented to person, place, and time                    Neurological: Normal speech, no focal findings noted; CN II - XII grossly intact. RLE with sensation to light touch, LLE with sensation to light touch.            Musculoskeletal: Digits/nail without cyanosis/clubbing.  Strength 5/5 BLE.                    Neck: Supple, no significant adenopathy                  Chest:  No wheezes, symmetric air entry. No use of accessory muscles               Cardiac: Normal rate and regular rhythm            Abdomen: Soft, nontender, nondistended      Extremities:   2+ R DP pulse, 2+ L DP pulse      1+ RLE edema, 1+ LLE edema    Skin:  RLE no ulcer; LLE no ulcer      RLE + spider veins, LLE + spider veins      RLE no varicose veins, LLE no varicose veins    CEAP 3/3    VCSS 10    LAB RESULTS:  No results found for: CBC  Lab Results    Component Value Date    LABPROT 10.0 06/11/2021    INR 0.9 06/11/2021     Lab Results   Component Value Date     10/31/2022    K 3.9 10/31/2022     10/31/2022    CO2 25 10/31/2022    GLU 87 10/31/2022    BUN 12 10/31/2022    CREATININE 1.0 10/31/2022    CALCIUM 9.4 10/31/2022    ANIONGAP 12 10/31/2022    EGFRNONAA >60 07/29/2022     Lab Results   Component Value Date    WBC 15.16 (H) 10/26/2022    RBC 4.15 10/26/2022    HGB 12.7 10/26/2022    HCT 37.8 10/26/2022    MCV 91 10/26/2022    MCH 30.6 10/26/2022    MCHC 33.6 10/26/2022    RDW 13.3 10/26/2022     10/26/2022    MPV 10.0 10/26/2022    GRAN 11.6 (H) 10/26/2022    GRAN 76.5 (H) 10/26/2022    LYMPH 2.4 10/26/2022    LYMPH 15.6 (L) 10/26/2022    MONO 1.1 (H) 10/26/2022    MONO 7.3 10/26/2022    EOS 0.0 10/26/2022    BASO 0.02 10/26/2022    EOSINOPHIL 0.0 10/26/2022    BASOPHIL 0.1 10/26/2022    DIFFMETHOD Automated 10/26/2022     .  Lab Results   Component Value Date    HGBA1C 5.4 10/24/2022       IMAGING:  All pertinent imaging has been reviewed and interpreted independently.    Venous US 2022 Impression:  EXAMINATION:  US LOWER EXTREMITY VEINS BILATERAL INSUFFICIENCY     CLINICAL HISTORY:  suspected venous insuff;  Pain in unspecified lower leg     TECHNIQUE:  Bilateral lower extremity venous Doppler exam including Valsalva or standing maneuvers for evaluation of venous insufficiency of the superficial systems bilaterally.  Reflux times greater than 500 ms were considered indicative of superficial system reflux.     COMPARISON:  None     FINDINGS:  Deep venous system:     There is evidence of flow, compressibility and augmentation within bilateral common femoral, femoral, and popliteal veins.  Flow is seen within bilateral peroneal, posterior tibial and anterior tibial veins.     Superficial venous system:     Right leg:     Reflux times right greater saphenous vein up to 3062 milliseconds.     The maximal diameter within the right greater  saphenous vein is 10 mm.     The maximal diameter within the right lesser saphenous vein is 5mm.     Left leg:     Reflux times in the greater saphenous vein up to 1700 milliseconds     The maximal diameter within the left greater saphenous vein is 7 mm.     The maximal diameter within the left lesser saphenous vein is 3mm.        Impression:     1.  No evidence of deep venous thrombosis in either lower extremity.     2.  Hemodynamically significant venous reflux right and left greater saphenous veins.        Electronically signed by: Manju Grimaldo  Date:                                            10/28/2022  Time:                                           14:21    IMP/PLAN:  47 y.o. female with   Patient Active Problem List   Diagnosis    Risk for coronary artery disease less than 10% in next 10 years    Heart palpitations    Hyperglycemia    Fibromyalgia    PUD (peptic ulcer disease)    Menorrhagia    Pituitary macroadenoma with extrasellar extension    Hypothyroidism    Appendicitis    Peripheral artery disease    Hyponatremia    S/P transsphenoidal selective adenomectomy    Bilious vomiting with nausea    Adrenal insufficiency    Migraine    being managed by PCP and specialists who is here today for evaluation of BLE edema and pain due to venous insufficiency.    -recommend compression with Rx stockings, elevation, dietary changes associated with water and sodium intake discussed at length with patient  -Exercise   -RTC 3 months for further evaluation    I spent 15 minutes evaluating this patient and greater than 50% of the time was spent counseling, coordinator care and discussing the plan of care.  All questions were answered and patient stated understanding with agreement with the above treatment plan.    Messi Schaefer MD [facility]  Vascular and Endovascular Surgery

## 2022-11-02 NOTE — PROGRESS NOTES
Subjective:       Patient ID: Tessa Wynne is a 47 y.o. female.    Chief Complaint: Abdominal Pain and Diarrhea    47 y/o Burmese speaking female history of fibromyalgia, peptic ulcer disease, appendicitis, hypothyroidism, and s/p TNTS resection of pituitary macroadenoma 10/3 presents to clinic for follow up with c/o abdominal pain and diarrhea. No symptom improvement with daily PPI.      Language translation via [employer].    Abdominal Pain  This is a recurrent problem. The current episode started more than 1 month ago. The problem occurs intermittently. The problem has been waxing and waning. The pain is located in the generalized abdominal region. The quality of the pain is aching, cramping and burning. The abdominal pain does not radiate. Associated symptoms include belching, constipation, diarrhea, nausea and vomiting. Pertinent negatives include no fever, headaches, hematochezia, melena or weight loss. Nothing aggravates the pain. The pain is relieved by Nothing. She has tried proton pump inhibitors and acetaminophen for the symptoms. Prior diagnostic workup includes CT scan.     Past Medical History:   Diagnosis Date    Elevated fasting glucose     GERD (gastroesophageal reflux disease)     Hypothyroidism     PAD (peripheral artery disease)     Pituitary lesion 9/7/2022       Past Surgical History:   Procedure Laterality Date    CHOLECYSTECTOMY      ENDOMETRIAL ABLATION N/A 2/8/2022    Procedure: ABLATION, ENDOMETRIUM;  Surgeon: Ariel Butler MD;  Location: [facility];  Service: OB/GYN;  Laterality: N/A;    EXCISION, NEOPLASM, PITUITARY, TRANSSPHENOIDAL APPROACH, USING COMPUTER-ASSISTED NAVIGATION N/A 10/3/2022    Procedure: EXCISION, NEOPLASM, PITUITARY, TRANSSPHENOIDAL APPROACH, USING COMPUTER-ASSISTED NAVIGATION;  Surgeon: Ed Astorga MD;  Location: [address];  Service: ENT;  Laterality: N/A;    EXCISION, NEOPLASM, PITUITARY, TRANSSPHENOIDAL APPROACH, USING COMPUTER-ASSISTED NAVIGATION  N/A 10/3/2022    Procedure: EXCISION, NEOPLASM, PITUITARY, TRANSSPHENOIDAL APPROACH, USING COMPUTER-ASSISTED NAVIGATION;  Surgeon: Miguel Ángel Ferrell DO;  Location: [facility] OR [address] [place] [address];  Service: Neurosurgery;  Laterality: N/A;    HYSTEROSCOPY WITH DILATION AND CURETTAGE OF UTERUS N/A 2/8/2022    Procedure: HYSTEROSCOPY, WITH DILATION AND CURETTAGE OF UTERUS;  Surgeon: Ariel Butler MD;  Location: [facility];  Service: OB/GYN;  Laterality: N/A;       Family History   Problem Relation Age of Onset    Diabetes Mother     Diabetes Mellitus Mother     Diabetes Father     Diabetes Mellitus Father     No Known Problems Sister     No Known Problems Sister     No Known Problems Brother     No Known Problems Brother     No Known Problems Daughter     No Known Problems Son     Colon cancer Neg Hx     Breast cancer Neg Hx     Ovarian cancer Neg Hx     Uterine cancer Neg Hx     Heart attack Neg Hx        Social History     Socioeconomic History    Marital status:     Number of children: 2   Occupational History    Occupation: cleaning housing    Tobacco Use    Smoking status: Never    Smokeless tobacco: Never   Substance and Sexual Activity    Alcohol use: No    Drug use: No    Sexual activity: Yes     Partners: Male     Birth control/protection: OCP   Social History Narrative    Patient is originally from [place] [id number]         School at ; high school         College/university ;         Working ; [employer]                 Children    joban - 21    lidnsey - 13         Lives with     daught and son and husabd        Diet/Exericse           Review of Systems   Constitutional:  Negative for appetite change, fever and weight loss.   HENT:  Negative for trouble swallowing.    Respiratory:  Negative for shortness of breath.    Cardiovascular:  Negative for chest pain.   Gastrointestinal:  Positive for abdominal pain, constipation, diarrhea, nausea and vomiting. Negative for hematochezia and melena.   Neurological:  Negative  "for headaches.   Hematological:  Negative for adenopathy.   Psychiatric/Behavioral:  Negative for dysphoric mood.        Objective:     Vitals:    10/31/22 1529   BP: 97/65   BP Location: Left arm   Patient Position: Sitting   BP Method: Large (Automatic)   Pulse: 80   Weight: 72.9 kg (160 lb 11.2 oz)   Height: 5' 4" (1.626 m)          Physical Exam  Constitutional:       General: She is not in acute distress.     Appearance: Normal appearance. She is not ill-appearing.   HENT:      Head: Normocephalic.   Eyes:      Conjunctiva/sclera: Conjunctivae normal.      Pupils: Pupils are equal, round, and reactive to light.   Pulmonary:      Effort: Pulmonary effort is normal. No respiratory distress.   Musculoskeletal:         General: Normal range of motion.      Cervical back: Normal range of motion.   Neurological:      Mental Status: She is alert and oriented to person, place, and time.   Psychiatric:         Mood and Affect: Mood normal.         Behavior: Behavior normal.             Assessment:         ICD-10-CM ICD-9-CM   1. Generalized abdominal pain  R10.84 789.07   2. Irregular bowel habits  R19.8 569.89       Plan:       Generalized abdominal pain  -     dicyclomine (BENTYL) 20 mg tablet; Take 1 tablet (20 mg total) by mouth 3 (three) times daily as needed (abdominal pain).  Dispense: 60 tablet; Refill: 2    Irregular bowel habits    - OTC probiotic daily  - Dicyclomine 20 mg three times per day as needed for abdominal pain. Take at least once daily    Follow up if symptoms worsen or fail to improve.     Patient's Medications   New Prescriptions    DICYCLOMINE (BENTYL) 20 MG TABLET    Take 1 tablet (20 mg total) by mouth 3 (three) times daily as needed (abdominal pain).    THYROID, PORK, (ARMOUR THYROID) 15 MG TAB    Take 1 tablet (15 mg total) by mouth before breakfast.   Previous Medications    ACETAMINOPHEN (TYLENOL) 325 MG TABLET    Take 2 tablets (650 mg total) by mouth every 6 (six) hours as needed " (Headaches (take with magnesium and benadryl)).    CALCIUM CITRATE/VITAMIN D3 (CITRACAL REGULAR ORAL)    Take by mouth.    DIPHENHYDRAMINE (BENADRYL) 50 MG CAPSULE    Take 1 capsule (50 mg total) by mouth every 12 (twelve) hours as needed (Headaches (take with tylenol and magnesium)).    FERROUS SULFATE (IRON ORAL)    Take by mouth.    HYDROCORTISONE (CORTEF) 10 MG TAB    Take 1 tablet (10 mg total) by mouth once daily. In the morning.    HYDROCORTISONE (CORTEF) 5 MG TAB    Take 1 tablet (5 mg total) by mouth once daily. In the afternoon around 4 pm.    HYOSCYAMINE (LEVSIN/SL) 0.125 MG SUBL    Take 0.125 mg by mouth 4 (four) times daily as needed.    MAGNESIUM OXIDE (MAG-OX) 400 MG (241.3 MG MAGNESIUM) TABLET    Take 1 tablet (400 mg total) by mouth 2 (two) times daily as needed (Headaches (take with tylenol and benadryl)).    MV,CALCIUM,MIN/IRON/FOLIC/VITK (ONE-A-DAY WOMEN'S COMPLETE ORAL)    Take by mouth.    OMEPRAZOLE (PRILOSEC) 40 MG CAPSULE    Take 1 capsule (40 mg total) by mouth every morning.    ONDANSETRON (ZOFRAN-ODT) 4 MG TBDL    Dissolve 1 tablet (4 mg total) by mouth every 8 (eight) hours as needed (nausea & vomiting).    OXYCODONE (ROXICODONE) 5 MG IMMEDIATE RELEASE TABLET    Take 1 tablet (5 mg total) by mouth every 6 (six) hours as needed for Pain (take as needed for pain/headaches).    OXYMETAZOLINE (AFRIN) 0.05 % NASAL SPRAY    3 sprays by Nasal route every 2 (two) hours as needed (epistaxis).    SODIUM CHLORIDE (OCEAN) 0.65 % NASAL SPRAY    2 sprays by Nasal route every 4 (four) hours while awake.   Modified Medications    No medications on file   Discontinued Medications    LEVOTHYROXINE (SYNTHROID) 50 MCG TABLET    Take 1 tablet (50 mcg total) by mouth before breakfast.

## 2022-11-03 ENCOUNTER — OFFICE VISIT (OUTPATIENT)
Dept: CARDIOLOGY | Facility: CLINIC | Age: 47
End: 2022-11-03
Payer: MEDICAID

## 2022-11-03 VITALS
SYSTOLIC BLOOD PRESSURE: 128 MMHG | WEIGHT: 158.63 LBS | HEART RATE: 69 BPM | DIASTOLIC BLOOD PRESSURE: 64 MMHG | HEIGHT: 64 IN | OXYGEN SATURATION: 98 % | BODY MASS INDEX: 27.08 KG/M2

## 2022-11-03 DIAGNOSIS — Z98.890 S/P TRANSSPHENOIDAL SELECTIVE ADENOMECTOMY: ICD-10-CM

## 2022-11-03 DIAGNOSIS — R00.2 HEART PALPITATIONS: ICD-10-CM

## 2022-11-03 DIAGNOSIS — R07.89 ATYPICAL CHEST PAIN: ICD-10-CM

## 2022-11-03 DIAGNOSIS — Z91.89 RISK FOR CORONARY ARTERY DISEASE LESS THAN 10% IN NEXT 10 YEARS: ICD-10-CM

## 2022-11-03 DIAGNOSIS — Z86.018 S/P TRANSSPHENOIDAL SELECTIVE ADENOMECTOMY: ICD-10-CM

## 2022-11-03 DIAGNOSIS — I73.9 BILATERAL CLAUDICATION OF LOWER LIMB: ICD-10-CM

## 2022-11-03 DIAGNOSIS — D35.2 PITUITARY MACROADENOMA WITH EXTRASELLAR EXTENSION: ICD-10-CM

## 2022-11-03 PROCEDURE — 3078F PR MOST RECENT DIASTOLIC BLOOD PRESSURE < 80 MM HG: ICD-10-PCS | Mod: CPTII,,, | Performed by: INTERNAL MEDICINE

## 2022-11-03 PROCEDURE — 3074F SYST BP LT 130 MM HG: CPT | Mod: CPTII,,, | Performed by: INTERNAL MEDICINE

## 2022-11-03 PROCEDURE — 3044F HG A1C LEVEL LT 7.0%: CPT | Mod: CPTII,,, | Performed by: INTERNAL MEDICINE

## 2022-11-03 PROCEDURE — 3074F PR MOST RECENT SYSTOLIC BLOOD PRESSURE < 130 MM HG: ICD-10-PCS | Mod: CPTII,,, | Performed by: INTERNAL MEDICINE

## 2022-11-03 PROCEDURE — 1160F RVW MEDS BY RX/DR IN RCRD: CPT | Mod: CPTII,,, | Performed by: INTERNAL MEDICINE

## 2022-11-03 PROCEDURE — 1159F MED LIST DOCD IN RCRD: CPT | Mod: CPTII,,, | Performed by: INTERNAL MEDICINE

## 2022-11-03 PROCEDURE — 99999 PR PBB SHADOW E&M-EST. PATIENT-LVL III: ICD-10-PCS | Mod: PBBFAC,,, | Performed by: INTERNAL MEDICINE

## 2022-11-03 PROCEDURE — 3008F BODY MASS INDEX DOCD: CPT | Mod: CPTII,,, | Performed by: INTERNAL MEDICINE

## 2022-11-03 PROCEDURE — 3008F PR BODY MASS INDEX (BMI) DOCUMENTED: ICD-10-PCS | Mod: CPTII,,, | Performed by: INTERNAL MEDICINE

## 2022-11-03 PROCEDURE — 1159F PR MEDICATION LIST DOCUMENTED IN MEDICAL RECORD: ICD-10-PCS | Mod: CPTII,,, | Performed by: INTERNAL MEDICINE

## 2022-11-03 PROCEDURE — 1160F PR REVIEW ALL MEDS BY PRESCRIBER/CLIN PHARMACIST DOCUMENTED: ICD-10-PCS | Mod: CPTII,,, | Performed by: INTERNAL MEDICINE

## 2022-11-03 PROCEDURE — 3044F PR MOST RECENT HEMOGLOBIN A1C LEVEL <7.0%: ICD-10-PCS | Mod: CPTII,,, | Performed by: INTERNAL MEDICINE

## 2022-11-03 PROCEDURE — 99204 OFFICE O/P NEW MOD 45 MIN: CPT | Mod: S$PBB,,, | Performed by: INTERNAL MEDICINE

## 2022-11-03 PROCEDURE — 99204 PR OFFICE/OUTPT VISIT, NEW, LEVL IV, 45-59 MIN: ICD-10-PCS | Mod: S$PBB,,, | Performed by: INTERNAL MEDICINE

## 2022-11-03 PROCEDURE — 99999 PR PBB SHADOW E&M-EST. PATIENT-LVL III: CPT | Mod: PBBFAC,,, | Performed by: INTERNAL MEDICINE

## 2022-11-03 PROCEDURE — 3078F DIAST BP <80 MM HG: CPT | Mod: CPTII,,, | Performed by: INTERNAL MEDICINE

## 2022-11-03 PROCEDURE — 1111F PR DISCHARGE MEDS RECONCILED W/ CURRENT OUTPATIENT MED LIST: ICD-10-PCS | Mod: CPTII,,, | Performed by: INTERNAL MEDICINE

## 2022-11-03 PROCEDURE — 99213 OFFICE O/P EST LOW 20 MIN: CPT | Mod: PBBFAC,PN | Performed by: INTERNAL MEDICINE

## 2022-11-03 PROCEDURE — 1111F DSCHRG MED/CURRENT MED MERGE: CPT | Mod: CPTII,,, | Performed by: INTERNAL MEDICINE

## 2022-11-03 NOTE — ASSESSMENT & PLAN NOTE
Check ETT to r/o ischemia.    Physical Therapy Initial Evaluation  4/18/2019     Precautions/Restrictions/MD instructions: PT eval and treat. 6 (e&t)    Subjective:   Date of Onset: Shoulder symptoms began about 2 years ago.   Order: 4/11/2019  Primary Symptoms: Pain with lifting arm overhead, behind the back, reaching out front or to the side.   Pains are described as constant in nature. Pain is rated 2/10.   History of symptoms: Pains began gradually as the result of insidious onset.     Worsened by: reaching out, above head, behind back, laying on shoulder, not taking tylenol.    Alleviated by: Tylenol.    Pertinent medical/surgical history: Atrial fibrillation: pacemaker for last 4 years, history of DVT in LE, patient takes coumadin. Chemical dependency, heart problems, high blood pressure, sleep disorder, thyroid problems, calf pain, swelling-warmth.  Imaging: no imaging on shoulder yet.   Current occupational status: Retired.   Patient's goals are: decrease pain, wants an exercise regime, lose weight, less pain with shoulder motion.   Return to MD:  PRN. Cardiologist at end of month. No follow up with physician noted.   *Patient denies any red flags.   Pain location: Anterior lateral arm, down to mid upper arm. RT>LT with symptoms.     Therapist Impression:   Patient is a 78 year old male with history of right shoulder pain. He presents with shoulder impingement and signs and symptoms consistent with rotator cuff tendinopathy. These impairments limit his ability to reach, lift and perform ADL's. Skilled PT services are necessary in order to reduce impairments and improve independent function.      SHOULDER EXAMINATION      Cervical Screen    Dermatome C4 C5 C6 C7 C8 T1    wnl wnl wnl wnl wnl wnl   DTR (RT) C5 C6 C7 (LT) C5 C6 C7              Myotome Right Left   C1-2 5/5 5/5   C3 5/5 5/5   C4 5/5 5/5   C5 4+/5, + 5-/5, +   C6 5/5 5/5/   C7 5/5 5/5   C8 5/5 5/5   T1 5/5 5/5   Special Tests     Spurlings     Compression     Distraction            Shoulder Screen    Hand Dominance: Right    STATIC POSTURE  Forward head on neck, Increased thoracic kyphosis and Rounded shoulders     SCAPULAR KINEMATIC EVALUATION  Position/Test Findings   Hands resting at sides No obvious findings   Hands on hips No obvious findings   90deg scaption No obvious findings   Repeated scaption No obvious findings   Resisted scaption NA   Flip sign/resisted ER NA                 SHOULDER RANGE OF MOTION & STRENGTH  (*Indicates patient s pain)   AROM RT AROM LT PROM RT PROM LT MMT   Flex 110 + 130 + 120 + 140 + 4+/5 ++    + 130+ >105 + >130 + 4+/5 ++   ER 40+ 70 + 40++ 70 + 4+/5 +   IR sacrum lumbar   5/5   Ext wnl wnl      Add     5/5   Pull Test     negative   Elbow Flex wnl wnl      Elbow Ext Slight loss wnl        JOINT MOBILITY  NA    SPECIAL TESTS   (+) L: NA   (-) L: Load and shift  (+) R: Neer, Eugenie, Empty can and Positive painful arc   (-) R: Load and shift    Nerve Testing (ULTT)    ULTT RT Left   Median      Ulnar     Radial            Palpation: Tightness of bilateral trapezius noted.   Neuromuscular Assessment: patient demonstrates compensatory involvement of upper trapezius resulting in shoulder elevation and increase of shoulder pain and decrease in ROM.                                             Assessment/Plan:    Patient is a 78 year old male with both sides shoulder complaints.    Patient has the following significant findings with corresponding treatment plan.                Diagnosis 1:  Bilateral shoulder pain, shoulder impingement with signs and symptoms consistent with rotator cuff tendinopathy    Pain -  hot/cold therapy and manual therapy  Decreased ROM/flexibility - manual therapy, therapeutic exercise and home program  Decreased strength - therapeutic exercise, therapeutic activities and home program  Impaired muscle performance - neuro re-education and home program  Decreased function - therapeutic activities and home  program    Therapy Evaluation Codes:   1) History comprised of:   Personal factors that impact the plan of care:      Age.    Comorbidity factors that impact the plan of care are:      Chemical Dependency, Heart problems, Sleep disorder/apnea and thyroid problems, DVT.     Medications impacting care: Cardiac, High blood pressure, Heparin/coumadin, Pain and Thyroid.  2) Examination of Body Systems comprised of:   Body structures and functions that impact the plan of care:      Shoulder.   Activity limitations that impact the plan of care are:      Lifting and reaching.  3) Clinical presentation characteristics are:   Stable/Uncomplicated.  4) Decision-Making    Low complexity using standardized patient assessment instrument and/or measureable assessment of functional outcome.  Cumulative Therapy Evaluation is: Low complexity.    Previous and current functional limitations:  (See Goal Flow Sheet for this information)    Short term and Long term goals: (See Goal Flow Sheet for this information)     Communication ability:  Patient appears to be able to clearly communicate and understand verbal and written communication and follow directions correctly.  Treatment Explanation - The following has been discussed with the patient:   RX ordered/plan of care  Anticipated outcomes  Possible risks and side effects  This patient would benefit from PT intervention to resume normal activities.   Rehab potential is good.    Frequency:  1 X week, once daily  Duration:  for 6 weeks  Discharge Plan:  Achieve all LTG.  Independent in home treatment program.  Reach maximal therapeutic benefit.    Please refer to the daily flowsheet for treatment today, total treatment time and time spent performing 1:1 timed codes.

## 2022-11-03 NOTE — PROGRESS NOTES
Subjective:    Patient ID:  Tessa Wynne is a 47 y.o. female who presents for evaluation of No chief complaint on file.      PCP: Sabiha Overton MD     HPI  Pt is a 48 yo F w/ PMH of GERD, Hypothyroidism, and pituitary lesions s/p transphenoidal resection 10/3/2022 who present for evaluation of cp x2-3 months.  She describes the pain as a burning sensation which presents randomly and may last 5-10 mins.  She denies exertional cp, sob, edema, orthopnea, PND, presyncope, LOC, and claudication.  She notes random episodes of palpitations which are infrequent however she has noted for the past few years.  She notes pain in her BLE when she walks over the past month.  She She states that she had not exercised since her surgery however states she did prior w/o limitation.  She does not monitor her BP at home regularly.       Past Medical History:   Diagnosis Date    Elevated fasting glucose     GERD (gastroesophageal reflux disease)     Hypothyroidism     PAD (peripheral artery disease)     Pituitary lesion 9/7/2022     Past Surgical History:   Procedure Laterality Date    CHOLECYSTECTOMY      ENDOMETRIAL ABLATION N/A 2/8/2022    Procedure: ABLATION, ENDOMETRIUM;  Surgeon: Ariel Butler MD;  Location: [facility];  Service: OB/GYN;  Laterality: N/A;    EXCISION, NEOPLASM, PITUITARY, TRANSSPHENOIDAL APPROACH, USING COMPUTER-ASSISTED NAVIGATION N/A 10/3/2022    Procedure: EXCISION, NEOPLASM, PITUITARY, TRANSSPHENOIDAL APPROACH, USING COMPUTER-ASSISTED NAVIGATION;  Surgeon: Ed Astorga MD;  Location: [facility] OR [address] [place] [address];  Service: ENT;  Laterality: N/A;    EXCISION, NEOPLASM, PITUITARY, TRANSSPHENOIDAL APPROACH, USING COMPUTER-ASSISTED NAVIGATION N/A 10/3/2022    Procedure: EXCISION, NEOPLASM, PITUITARY, TRANSSPHENOIDAL APPROACH, USING COMPUTER-ASSISTED NAVIGATION;  Surgeon: Miguel Ángel Ferrell DO;  Location: [facility] OR [address] [place] [address];  Service: Neurosurgery;  Laterality: N/A;    HYSTEROSCOPY WITH DILATION AND CURETTAGE OF UTERUS N/A  2/8/2022    Procedure: HYSTEROSCOPY, WITH DILATION AND CURETTAGE OF UTERUS;  Surgeon: Ariel Butler MD;  Location: [facility];  Service: OB/GYN;  Laterality: N/A;     Social History     Socioeconomic History    Marital status:     Number of children: 2   Occupational History    Occupation: cleaning housing    Tobacco Use    Smoking status: Never    Smokeless tobacco: Never   Substance and Sexual Activity    Alcohol use: No    Drug use: No    Sexual activity: Yes     Partners: Male     Birth control/protection: OCP   Social History Narrative    Patient is originally from [place] [id number]         School at ; high school         College/university ;         Working ; [employer]                 Children    joban - 21    silvia - 13         Lives with     zeb and son and finn        Diet/Exericse         Family History   Problem Relation Age of Onset    Diabetes Mother     Diabetes Mellitus Mother     Diabetes Father     Diabetes Mellitus Father     No Known Problems Sister     No Known Problems Sister     No Known Problems Brother     No Known Problems Brother     No Known Problems Daughter     No Known Problems Son     Colon cancer Neg Hx     Breast cancer Neg Hx     Ovarian cancer Neg Hx     Uterine cancer Neg Hx     Heart attack Neg Hx        Review of patient's allergies indicates:  No Known Allergies    Medication List with Changes/Refills   Current Medications    ACETAMINOPHEN (TYLENOL) 325 MG TABLET    Take 2 tablets (650 mg total) by mouth every 6 (six) hours as needed (Headaches (take with magnesium and benadryl)).    CALCIUM CITRATE/VITAMIN D3 (CITRACAL REGULAR ORAL)    Take by mouth.    DICYCLOMINE (BENTYL) 20 MG TABLET    Take 1 tablet (20 mg total) by mouth 3 (three) times daily as needed (abdominal pain).    DIPHENHYDRAMINE (BENADRYL) 50 MG CAPSULE    Take 1 capsule (50 mg total) by mouth every 12 (twelve) hours as needed (Headaches (take with tylenol and magnesium)).    FERROUS SULFATE  (IRON ORAL)    Take by mouth.    HYDROCORTISONE (CORTEF) 10 MG TAB    Take 1 tablet (10 mg total) by mouth once daily. In the morning.    HYDROCORTISONE (CORTEF) 5 MG TAB    Take 1 tablet (5 mg total) by mouth once daily. In the afternoon around 4 pm.    HYOSCYAMINE (LEVSIN/SL) 0.125 MG SUBL    Take 0.125 mg by mouth 4 (four) times daily as needed.    MAGNESIUM OXIDE (MAG-OX) 400 MG (241.3 MG MAGNESIUM) TABLET    Take 1 tablet (400 mg total) by mouth 2 (two) times daily as needed (Headaches (take with tylenol and benadryl)).    MV,CALCIUM,MIN/IRON/FOLIC/VITK (ONE-A-DAY WOMEN'S COMPLETE ORAL)    Take by mouth.    OMEPRAZOLE (PRILOSEC) 40 MG CAPSULE    Take 1 capsule (40 mg total) by mouth every morning.    ONDANSETRON (ZOFRAN-ODT) 4 MG TBDL    Dissolve 1 tablet (4 mg total) by mouth every 8 (eight) hours as needed (nausea & vomiting).    OXYCODONE (ROXICODONE) 5 MG IMMEDIATE RELEASE TABLET    Take 1 tablet (5 mg total) by mouth every 6 (six) hours as needed for Pain (take as needed for pain/headaches).    OXYMETAZOLINE (AFRIN) 0.05 % NASAL SPRAY    3 sprays by Nasal route every 2 (two) hours as needed (epistaxis).    SODIUM CHLORIDE (OCEAN) 0.65 % NASAL SPRAY    2 sprays by Nasal route every 4 (four) hours while awake.    THYROID, PORK, (ARMOUR THYROID) 15 MG TAB    Take 1 tablet (15 mg total) by mouth before breakfast.       Review of Systems   Constitutional: Negative for diaphoresis and fever.   HENT:  Negative for congestion and hearing loss.    Eyes:  Negative for blurred vision and pain.   Cardiovascular:  Positive for chest pain, claudication and palpitations. Negative for dyspnea on exertion, leg swelling, near-syncope and syncope.   Respiratory:  Negative for shortness of breath and sleep disturbances due to breathing.    Hematologic/Lymphatic: Negative for bleeding problem. Does not bruise/bleed easily.   Skin:  Negative for color change and poor wound healing.   Gastrointestinal:  Negative for abdominal  "pain and nausea.   Genitourinary:  Negative for bladder incontinence and flank pain.   Neurological:  Negative for focal weakness and light-headedness.      Objective:   /64   Pulse 69   Ht 5' 4" (1.626 m)   Wt 71.9 kg (158 lb 9.6 oz)   LMP 01/15/2022   SpO2 98%   BMI 27.22 kg/m²    Physical Exam  Constitutional:       Appearance: She is well-developed. She is not diaphoretic.   HENT:      Head: Normocephalic and atraumatic.   Eyes:      General: No scleral icterus.     Pupils: Pupils are equal, round, and reactive to light.   Neck:      Vascular: No JVD.   Cardiovascular:      Rate and Rhythm: Normal rate and regular rhythm.      Pulses: Intact distal pulses.      Heart sounds: S1 normal and S2 normal. No murmur heard.    No friction rub. No gallop.   Pulmonary:      Effort: Pulmonary effort is normal. No respiratory distress.      Breath sounds: Normal breath sounds. No wheezing or rales.   Chest:      Chest wall: No tenderness.   Abdominal:      General: Bowel sounds are normal. There is no distension.      Palpations: Abdomen is soft. There is no mass.      Tenderness: There is no abdominal tenderness. There is no rebound.   Musculoskeletal:         General: No tenderness. Normal range of motion.      Cervical back: Normal range of motion and neck supple.   Skin:     General: Skin is warm and dry.      Coloration: Skin is not pale.   Neurological:      Mental Status: She is alert and oriented to person, place, and time.      Coordination: Coordination normal.      Deep Tendon Reflexes: Reflexes normal.   Psychiatric:         Behavior: Behavior normal.         Judgment: Judgment normal.         ECG: 10/18/2022- reviewed.  Sinus jaun, otherwise NL ECG.      Echo: 10/25/2022- reviewed.    Summary    The left ventricle is normal in size with concentric remodeling and normal systolic function. The estimated ejection fraction is 60%.  Normal right ventricular size with normal right ventricular systolic " function.  Normal left ventricular diastolic function.  Normal central venous pressure (3 mmHg).      Assessment:       1. Atypical chest pain    2. S/P transsphenoidal selective adenomectomy    3. Pituitary macroadenoma with extrasellar extension    4. Risk for coronary artery disease less than 10% in next 10 years    5. Heart palpitations    6. Bilateral claudication of lower limb         Plan:         Atypical chest pain  Check ETT to r/o ischemia.     S/P transsphenoidal selective adenomectomy  Followed by neurosurgery.      Pituitary macroadenoma with extrasellar extension  Followed by neurosurgery.      Risk for coronary artery disease less than 10% in next 10 years  Risk factor and lifestyle modifications.     Heart palpitations  Infrequent episodes.  Unlikely related to an arrhythmia.    - check 48 hr Holter    Bilateral claudication of lower limb  Check NASIMA of BLE.        Total duration of face to face visit time 30 minutes.  Total time spent counseling greater than fifty percent of total visit time.  Counseling included discussion regarding imaging findings, diagnosis, possibilities, treatment options, risks and benefits.  The patient had many questions regarding the options and long-term effects      Roldan Rosa M.D.  Interventional Cardiology

## 2022-11-04 ENCOUNTER — TELEPHONE (OUTPATIENT)
Dept: ENDOCRINOLOGY | Facility: CLINIC | Age: 47
End: 2022-11-04
Payer: MEDICAID

## 2022-11-08 ENCOUNTER — TUMOR BOARD CONFERENCE (OUTPATIENT)
Dept: NEUROSURGERY | Facility: CLINIC | Age: 47
End: 2022-11-08
Payer: MEDICAID

## 2022-11-08 ENCOUNTER — TELEPHONE (OUTPATIENT)
Dept: CARDIOLOGY | Facility: CLINIC | Age: 47
End: 2022-11-08
Payer: MEDICAID

## 2022-11-08 NOTE — TELEPHONE ENCOUNTER
Informed pt of test dates for Holter and NASIMA  (Received Holter availability date from Johnny Reyna)  Pt expressed understanding     Language line used. Angolan,  number [id number]

## 2022-11-08 NOTE — PROGRESS NOTES
Subjective:   I, Sheila Ramos, attest that this documentation has been prepared under the direction and in the presence of Domenico Verduzco MD.     Patient ID: Tessa Wynne is a 47 y.o. female       This patient's relevant clinical data was reviewed before the multidisciplinary tumor board in order to establish an optimum treatment plan in this pt.        Tessa Wynne clinical data were presented by Dr. Seals at our Multi-Disciplinary Tumor Board which included representatives of Neurosurgery, Neuro-Radiology, Neuro-Pathology, Radiation Oncology, Medical Oncology, Palliative Care.     The pt is a 47 y.o. year old female who had HA x 1 year (mainly occipital area) with bilateral hemianopia.  Brain MRI, obtained for syncopal episode and HA, revealed large pituitary tumor. Patient underwent TSR with Dr. Ferrell on 10/3/22. The patient did not receive any perioperative glucocorticoids and had no evidence of adrenal insufficiency in the hospital. The patient did not develop diabetes insipidus but presented to the ED on 10/8 SIADH (Na 129). She recovered but then was readmitted to hospital for Na of 123 and concern for AI, patient was using home DDAVP nightly. Patient was discharged with physiologic steroids and sodium normalized after holding DDAVP.       Additionally, we reviewed previous medical and familial history of present illness, and recent lab results along with all available histopathologic and imaging studies. The tumor board considered available treatment options and made the following recommendations: The general consensus was to  follow up. Patient has a scheduled appointment with Dr. Ferrell, neurosurgery on 11/22/2022.    The following procedures/referrals were also placed: No orders of the defined types were placed in this encounter.     National site-specific guidelines were discussed with respect to the case.     Tumor board is a meeting of clinicians from various specialty areas who evaluate and  discuss patients for whom a multidisciplinary approach is being considered. Final determinations in the plan of care are those of the provider(s). The responsibility for follow up of recommendations given during tumor board is that of the provider.       Past Medical History:   Diagnosis Date    Elevated fasting glucose     GERD (gastroesophageal reflux disease)     Hypothyroidism     PAD (peripheral artery disease)     Pituitary lesion 9/7/2022       Objective:   Pathology:  10-3-2022 ([facility]) Pituitary, trans-sphenoidal hypophysectomy: gonadotroph adenoma, immunoreactive for alpha-subunit and SF1.      IMAGING:  MRI Pituitary W WO Contrast (10/25/2022):  MR sella: Evolving operative change from transsphenoidal sellar/suprasellar mass resection.  There is continued somewhat lobular hypoenhancing material within the sella remains concerning for possible residual lesion and residual pituitary tissue.  No evidence for significant mass effect on the suprasellar neurovascular structures or suprasellar extension.  Clinical correlation and continued follow-up advised. MRI brain: Otherwise unremarkable MRI of the brain specifically without evidence for hydrocephalus or new parenchymal signal abnormality.     I, Dr. Domenico Verduzco, personally performed the services described in this documentation. All medical record entries made by the scribe, Sheila Ramos, were at my direction and in my presence.  I have reviewed the chart and agree that the record reflects my personal performance and is accurate and complete. Domenico Verduzco MD. 11/08/2022       Plan:    The general consensus was to  follow up. Patient has a scheduled appointment with Dr. Ferrell, neurosurgery on 11/22/2022.

## 2022-11-14 ENCOUNTER — TELEPHONE (OUTPATIENT)
Dept: FAMILY MEDICINE | Facility: CLINIC | Age: 47
End: 2022-11-14
Payer: MEDICAID

## 2022-11-14 ENCOUNTER — PATIENT MESSAGE (OUTPATIENT)
Dept: FAMILY MEDICINE | Facility: CLINIC | Age: 47
End: 2022-11-14
Payer: MEDICAID

## 2022-11-15 NOTE — TELEPHONE ENCOUNTER
----- Message from Jimmy Batista MD sent at 11/7/2022  8:56 AM CST -----  Regarding: RE: Thyroid medication  Hello     Eran Thyroid can not be used in patients with pituitary disorders as the only blood test we can monitor to ensure that they are getting the correct dose of thyroid hormone is the FT4.  The TSH is not accurate in patients with secondary hypothyroidism and pituitary disorders so on desiccated pig thyroid we have no way to monitor the dose.  If she is unwilling to take levothyroxine I think the safer option would be to just have her stop the thyroid hormone replacement and keep an eye on her labs to see if she continues to need it as sometimes people no longer need thyroid hormone replacement a couple months after pituitary surgery.      Jimmy Batista MD   ----- Message -----  From: Sabiha Overton MD  Sent: 11/4/2022  11:54 PM CST  To: Jimmy Batista MD  Subject: Thyroid medication                               ZOEY Desai, Mrs. Wynne continues to complain of multiple pains that she feels are more vascular and associates and to double thyroxine.  The levothyroxine was decreased during the last visit but reports the pain was not modified.  She wanted to be off the medication and we will try [place] Thyroid.  She has some labs scheduled for your department.   Thanks,  Sabiha

## 2022-11-18 ENCOUNTER — CLINICAL SUPPORT (OUTPATIENT)
Dept: OPHTHALMOLOGY | Facility: CLINIC | Age: 47
End: 2022-11-18
Payer: MEDICAID

## 2022-11-18 ENCOUNTER — OFFICE VISIT (OUTPATIENT)
Dept: OPHTHALMOLOGY | Facility: CLINIC | Age: 47
End: 2022-11-18
Payer: MEDICAID

## 2022-11-18 DIAGNOSIS — D35.2 PITUITARY MACROADENOMA WITH EXTRASELLAR EXTENSION: ICD-10-CM

## 2022-11-18 DIAGNOSIS — H53.15 VISUAL DISTORTIONS OF SHAPE AND SIZE: Primary | ICD-10-CM

## 2022-11-18 DIAGNOSIS — Z86.018 S/P TRANSSPHENOIDAL SELECTIVE ADENOMECTOMY: ICD-10-CM

## 2022-11-18 DIAGNOSIS — Z98.890 S/P TRANSSPHENOIDAL SELECTIVE ADENOMECTOMY: ICD-10-CM

## 2022-11-18 PROCEDURE — 1111F DSCHRG MED/CURRENT MED MERGE: CPT | Mod: CPTII,,, | Performed by: STUDENT IN AN ORGANIZED HEALTH CARE EDUCATION/TRAINING PROGRAM

## 2022-11-18 PROCEDURE — 3044F PR MOST RECENT HEMOGLOBIN A1C LEVEL <7.0%: ICD-10-PCS | Mod: CPTII,,, | Performed by: STUDENT IN AN ORGANIZED HEALTH CARE EDUCATION/TRAINING PROGRAM

## 2022-11-18 PROCEDURE — 92133 POSTERIOR SEGMENT OCT OPTIC NERVE(OCULAR COHERENCE TOMOGRAPHY) - OU - BOTH EYES: ICD-10-PCS | Mod: 26,S$PBB,, | Performed by: STUDENT IN AN ORGANIZED HEALTH CARE EDUCATION/TRAINING PROGRAM

## 2022-11-18 PROCEDURE — 99999 PR PBB SHADOW E&M-EST. PATIENT-LVL III: CPT | Mod: PBBFAC,,, | Performed by: STUDENT IN AN ORGANIZED HEALTH CARE EDUCATION/TRAINING PROGRAM

## 2022-11-18 PROCEDURE — 99999 PR PBB SHADOW E&M-EST. PATIENT-LVL III: ICD-10-PCS | Mod: PBBFAC,,, | Performed by: STUDENT IN AN ORGANIZED HEALTH CARE EDUCATION/TRAINING PROGRAM

## 2022-11-18 PROCEDURE — 1159F PR MEDICATION LIST DOCUMENTED IN MEDICAL RECORD: ICD-10-PCS | Mod: CPTII,,, | Performed by: STUDENT IN AN ORGANIZED HEALTH CARE EDUCATION/TRAINING PROGRAM

## 2022-11-18 PROCEDURE — 99215 PR OFFICE/OUTPT VISIT, EST, LEVL V, 40-54 MIN: ICD-10-PCS | Mod: S$PBB,,, | Performed by: STUDENT IN AN ORGANIZED HEALTH CARE EDUCATION/TRAINING PROGRAM

## 2022-11-18 PROCEDURE — 92083 EXTENDED VISUAL FIELD XM: CPT | Mod: PBBFAC | Performed by: STUDENT IN AN ORGANIZED HEALTH CARE EDUCATION/TRAINING PROGRAM

## 2022-11-18 PROCEDURE — 3044F HG A1C LEVEL LT 7.0%: CPT | Mod: CPTII,,, | Performed by: STUDENT IN AN ORGANIZED HEALTH CARE EDUCATION/TRAINING PROGRAM

## 2022-11-18 PROCEDURE — 99215 OFFICE O/P EST HI 40 MIN: CPT | Mod: S$PBB,,, | Performed by: STUDENT IN AN ORGANIZED HEALTH CARE EDUCATION/TRAINING PROGRAM

## 2022-11-18 PROCEDURE — 99213 OFFICE O/P EST LOW 20 MIN: CPT | Mod: PBBFAC | Performed by: STUDENT IN AN ORGANIZED HEALTH CARE EDUCATION/TRAINING PROGRAM

## 2022-11-18 PROCEDURE — 1111F PR DISCHARGE MEDS RECONCILED W/ CURRENT OUTPATIENT MED LIST: ICD-10-PCS | Mod: CPTII,,, | Performed by: STUDENT IN AN ORGANIZED HEALTH CARE EDUCATION/TRAINING PROGRAM

## 2022-11-18 PROCEDURE — 1159F MED LIST DOCD IN RCRD: CPT | Mod: CPTII,,, | Performed by: STUDENT IN AN ORGANIZED HEALTH CARE EDUCATION/TRAINING PROGRAM

## 2022-11-18 PROCEDURE — 92083 HUMPHREY VISUAL FIELD - OU - BOTH EYES: ICD-10-PCS | Mod: 26,S$PBB,, | Performed by: STUDENT IN AN ORGANIZED HEALTH CARE EDUCATION/TRAINING PROGRAM

## 2022-11-18 PROCEDURE — 92133 CPTRZD OPH DX IMG PST SGM ON: CPT | Mod: PBBFAC | Performed by: STUDENT IN AN ORGANIZED HEALTH CARE EDUCATION/TRAINING PROGRAM

## 2022-11-18 NOTE — PROGRESS NOTES
"  Date:  11/18/2022    ?  Referring Provider:   Jimmy Batista MD    Copies of Letters to the Following:   MD Miguel Ángel Rick MD    Chief Complaint:  I saw Tessa Wynne at the [facility] for neuro-ophthalmic evaluation.   She is a 47 y.o. female with a history of FM, HLD, pituitary adenoma who presents for follow up of formal visual fields.     History:        47 y/o female is here for Neuro-ophthalmic evaluation who present with   concerns of Pituitary lesion. H/o of Pterygium excision, right eye. Pt   report of blurry vision of the LT eye associated with headaches, eye   allergies and tinnitus. No diplopia or flashes of light reported today. Pt   is accompany by son who is her . Declines formal interpretation.     Eyemeds  No gtts       6/27/2022 Fadi:  "Presents today for ocular health check.  Pt reports blurry vision x 7 months.  Pt report occasional twitching OD.  Pt denies eye pain, flashes and floaters.  No using any eye meds. "  ?  Interval History: 11/18/2022    She reports daily headaches that last for 1 hour. She has pressure   sensation in ears. No floaters, flashes of light, diplopia, migraines, or   eye allergies reported today.     Eyemeds  No gtts    She underwent transsphenoidal resection of pituitary mass on 10/3/2022. Post operative course complicated by DI and re-hospitalization for hyponatremia.     DAT used for interpretation today # [id number] [place]      11/8/2022 Tumor board:  "Patient underwent TSR with Dr. Ferrell on 10/3/22. The patient did not receive any perioperative glucocorticoids and had no evidence of adrenal insufficiency in the hospital. The patient did not develop diabetes insipidus but presented to the ED on 10/8 SIADH (Na 129). She recovered but then was readmitted to hospital for Na of 123 and concern for AI, patient was using home DDAVP nightly. Patient was discharged with physiologic steroids and sodium normalized after holding DDAVP.    " "     Additionally, we reviewed previous medical and familial history of present illness, and recent lab results along with all available histopathologic and imaging studies. The tumor board considered available treatment options and made the following recommendations: The general consensus was to  follow up. Patient has a scheduled appointment with Dr. Ferrell, neurosurgery on 11/22/2022.   "    Current Outpatient Medications   Medication Sig Dispense Refill    acetaminophen (TYLENOL) 325 MG tablet Take 2 tablets (650 mg total) by mouth every 6 (six) hours as needed (Headaches (take with magnesium and benadryl)). 30 tablet 0    calcium citrate/vitamin D3 (CITRACAL REGULAR ORAL) Take by mouth.      dicyclomine (BENTYL) 20 mg tablet Take 1 tablet (20 mg total) by mouth 3 (three) times daily as needed (abdominal pain). 60 tablet 2    diphenhydrAMINE (BENADRYL) 50 MG capsule Take 1 capsule (50 mg total) by mouth every 12 (twelve) hours as needed (Headaches (take with tylenol and magnesium)). 30 capsule 0    ferrous sulfate (IRON ORAL) Take by mouth.      hydrocortisone (CORTEF) 10 MG Tab Take 1 tablet (10 mg total) by mouth once daily. In the morning. 30 tablet 0    hydrocortisone (CORTEF) 5 MG Tab Take 1 tablet (5 mg total) by mouth once daily. In the afternoon around 4 pm. 60 tablet 0    hyoscyamine (LEVSIN/SL) 0.125 mg Subl Take 0.125 mg by mouth 4 (four) times daily as needed.      magnesium oxide (MAG-OX) 400 mg (241.3 mg magnesium) tablet Take 1 tablet (400 mg total) by mouth 2 (two) times daily as needed (Headaches (take with tylenol and benadryl)). 30 tablet 0    mv,calcium,min/iron/folic/vitK (ONE-A-DAY WOMEN'S COMPLETE ORAL) Take by mouth.      omeprazole (PRILOSEC) 40 MG capsule Take 1 capsule (40 mg total) by mouth every morning. 30 capsule 11    ondansetron (ZOFRAN-ODT) 4 MG TbDL Dissolve 1 tablet (4 mg total) by mouth every 8 (eight) hours as needed (nausea & vomiting). 24 tablet 0    oxyCODONE (ROXICODONE) 5 " MG immediate release tablet Take 1 tablet (5 mg total) by mouth every 6 (six) hours as needed for Pain (take as needed for pain/headaches). 10 tablet 0    oxymetazoline (AFRIN) 0.05 % nasal spray 3 sprays by Nasal route every 2 (two) hours as needed (epistaxis). 30 mL 0    sodium chloride (OCEAN) 0.65 % nasal spray 2 sprays by Nasal route every 4 (four) hours while awake. 44 mL 0    thyroid, pork, (ARMOUR THYROID) 15 mg Tab Take 1 tablet (15 mg total) by mouth before breakfast. 30 tablet 6     No current facility-administered medications for this visit.     Review of patient's allergies indicates:  No Known Allergies  Past Medical History:   Diagnosis Date    Elevated fasting glucose     GERD (gastroesophageal reflux disease)     Hypothyroidism     PAD (peripheral artery disease)     Pituitary lesion 9/7/2022     Past Surgical History:   Procedure Laterality Date    CHOLECYSTECTOMY      ENDOMETRIAL ABLATION N/A 2/8/2022    Procedure: ABLATION, ENDOMETRIUM;  Surgeon: Ariel Butler MD;  Location: [facility] OR;  Service: OB/GYN;  Laterality: N/A;    EXCISION, NEOPLASM, PITUITARY, TRANSSPHENOIDAL APPROACH, USING COMPUTER-ASSISTED NAVIGATION N/A 10/3/2022    Procedure: EXCISION, NEOPLASM, PITUITARY, TRANSSPHENOIDAL APPROACH, USING COMPUTER-ASSISTED NAVIGATION;  Surgeon: Ed Astorga MD;  Location: [facility] OR [address] [place] [address];  Service: ENT;  Laterality: N/A;    EXCISION, NEOPLASM, PITUITARY, TRANSSPHENOIDAL APPROACH, USING COMPUTER-ASSISTED NAVIGATION N/A 10/3/2022    Procedure: EXCISION, NEOPLASM, PITUITARY, TRANSSPHENOIDAL APPROACH, USING COMPUTER-ASSISTED NAVIGATION;  Surgeon: Miguel Ángel Ferrell DO;  Location: [facility] OR [address] [place] [address];  Service: Neurosurgery;  Laterality: N/A;    HYSTEROSCOPY WITH DILATION AND CURETTAGE OF UTERUS N/A 2/8/2022    Procedure: HYSTEROSCOPY, WITH DILATION AND CURETTAGE OF UTERUS;  Surgeon: Ariel Butler MD;  Location: [facility] OR;  Service: OB/GYN;  Laterality: N/A;     Family History   Problem Relation  Age of Onset    Diabetes Mother     Diabetes Mellitus Mother     Diabetes Father     Diabetes Mellitus Father     No Known Problems Sister     No Known Problems Sister     No Known Problems Brother     No Known Problems Brother     No Known Problems Daughter     No Known Problems Son     Colon cancer Neg Hx     Breast cancer Neg Hx     Ovarian cancer Neg Hx     Uterine cancer Neg Hx     Heart attack Neg Hx      Social History     Socioeconomic History    Marital status:     Number of children: 2   Occupational History    Occupation: cleaning housing    Tobacco Use    Smoking status: Never    Smokeless tobacco: Never   Substance and Sexual Activity    Alcohol use: No    Drug use: No    Sexual activity: Yes     Partners: Male     Birth control/protection: OCP   Social History Narrative    Patient is originally from [place] [id number]         School at ; high school         College/university ;         Working ; [employer]                 Children    joban - 21    lidnsey - 13         Lives with     dazoë and son and husabd        Diet/Exericse           ?  Review of Systems:  Detailed review of systems was negative except as noted below.  Endocrine (hormone): Negative  Cardiovascular ( heart/blood vessels): Negative  Fevers/weight loss (constitutional):Negative  Ear, nose, or throat : Negative  Respiratory (lung): Negative  Gastrointestinal (stomach): Negative  Genitourinary (bladder/kidneys): Negative  Musculoskeletal (muscle/bones): Negative  Skin: Negative  Psychiatric: Negative  Hematologic (blood): Negative    Examination:  She was well-appearing. She was alert and oriented. Attention span and concentration were normal. Speech, language, memory, and general knowledge were intact.      Her distance visual acuity without correction was 20/25  in the right eye and 20/25  in the left eye.  Her near visual acuity without correction was J5 PH J2 in the right eye and J5 PH J2 in the left eye.      She perceived 8/8  OD and 8/8 OS Ishihara color plates correctly. Pupils were brisk to light without an afferent defect. Ocular ductions were full. Orthophoric in primary, right, left, up and down gaze by cross cover. There was no nystagmus. Saccades and pursuits were normal. Lids were symmetric.     Optic discs with mild pallor OU, no edema. Pupillary dilation was not necessary for visualization of the optic disc today.       Laboratories Reviewed:     n/a  ?  Neuroimaging Reviewed:     8/15/2022 MRI brain wo contrast  No midline shift or hydrocephalus.  No acute infarction.  Brain parenchyma is overall normal in signal and contour.  No acute intracranial hemorrhage.  Possible left frontal developmental venous anomaly incidentally noted on SWAN images.  No abnormal extra-axial fluid collections.  Mild cerebellar tonsillar ectopia.  Otherwise, structures at the craniocervical junction show no significant abnormalities.  Major skull base flow voids are present.  Orbits, paranasal sinuses and mastoid air cells show no significant abnormalities.     Sella: There is a large sellar lesion with suprasellar extension.  Lesion measures roughly 3.2 x 2.8 x 3.5 cm.  There is mass effect on the adjacent structures including the optic chiasm which is displaced superiorly as well as the bilateral A1 segments of the anterior cerebral arteries.  There is possible extension into the cavernous sinuses bilaterally best visualized on coronal T2 series 10.  The carotid flow voids are preserved.  There is bony remodeling of the adjacent clivus secondary to this lesion with preserved marrow signal.     Impression:     Large sellar/suprasellar lesion primarily worrisome for pituitary macroadenoma.  There is mass effect on the optic chiasm and possible extension into the cavernous sinus bilaterally.  Recommend correlation with pituitary labs, dedicated MRI pituitary protocol and follow-up with neurosurgery.     10/25/2022 MRI pituitary w/wo  contrast  Finding: Brain is normal in contour.  Ventricles stable without hydrocephalus.  There is no restricted diffusion to suggest acute infarction.  No abnormal parenchymal susceptibility to suggest parenchymal hemorrhage.  No major intracranial skull base T2 flow voids are present.  No abnormal parenchymal enhancement     Sella: Evolving operative change status post transsphenoidal approach sellar/suprasellar mass resection.  There is continued heterogeneous signal predominantly hypoenhancing material along the posterior sphenoid sinus extending to the sella felt to represent postoperative packing material.  There is thin marginated enhancement about this which may be reactive in granulation tissue.  There is continued somewhat lobular enhancing material along the postoperative floor of the sella which may in part represent residual pituitary tissue though remains concerning for component of residual lesion.  The infundibulum is deviated to the right.  Compared to most recent prior the nodular enhancing material is similar measuring approximately 1.6 cm AP x 1.4 cm TV and 0.7 cm craniocaudal.  There is no significant mass effect on the suprasellar neurovascular structures clinical correlation and continued follow-up advised.     Impression:     MR sella: Evolving operative change from transsphenoidal sellar/suprasellar mass resection.  There is continued somewhat lobular hypoenhancing material within the sella remains concerning for possible residual lesion and residual pituitary tissue.  No evidence for significant mass effect on the suprasellar neurovascular structures or suprasellar extension.  Clinical correlation and continued follow-up advised     MRI brain: Otherwise unremarkable MRI of the brain specifically without evidence for hydrocephalus or new parenchymal signal abnormality.  ?  Ocular Imaging, Photos, Records Reviewed:     OCT RNFL  8/25/2022:   Right Eye - Average RNFL 81 borderline inferior and  temporal thinning   Left Eye - Average RNFL 81 borderline inferior thinning     Normal macular architecture OU    OCT RNFL Today 11/18/2022:   Right Eye - Average RNFL 78 borderline inferior and temporal thinning   Left Eye - Average RNFL 78 borderline inferior and temporal thinning     Macular architecture normal OU    Visual Field Test 24-2 OU  8/25/2022: Right Eye - fixation losses 0/13, false positives 0%, false negatives 6%, MD -9.38dB, Impression OD: moderate generalized depression, worst temporally, ST. Left Eye - fixation losses 0/15, false positives 0%, false negatives 9%, MD -9.28dB, Impression OS: temporal depression, worst ST.    Visual Field Test 24-2 OU Today 11/18/2022: Right Eye - fixation losses 0/11, false positives 0%, false negatives 0%, MD -2.80dB, Impression OD: moderate duprotemporal depression. Left Eye - fixation losses 1/11, false positives 0%, false negatives 0%, MD -1.95dB, Impression OS: mild superotemporal depression.  ?  Impression:  Tessa Wynne has history of FM, HLD, pituitary adenoma who presents for evaluation of formal visual fields. She reports blurred vision and headaches. She has MRI with large sellar mass with optic chiasm compression/elevation and extension into bilateral cavernous sinuses. OCT with borderline RNFL thinning OD>OS. HVF with generalized depression OD (worse temporally) and temporal depression OS. Findings consistent with chiasmal compression. Exam notable for mild disc pallor OU, normal eye movements and alignment. I will monitor her for any changes throughout treatment and beyond.     11/18/2022: Underwent trans-sphenoidal resection of pituitary mass on 10/3/2022. Pathology consistent with gonadotroph adenoma. Complicated by DI with hospitalization for hyponatremia a few days post resection. Formal visual fields with dramatic improvement in visual field defects! OCT stable. She will monitor for any subjective changes in vision and I will monitor her in  clinic moving forward.   ?  Plan:  1. Follow up with Dalia Batista and Ghassan (11/22/2022) as planned   2. Monitor for any new blur, red desaturation, diplopia, ptosis  3. Follow up with optometry/ophthalmology for yearly routine eye exams and refraction needs    Follow-up:  I will see her in follow-up in 6 months or sooner with any change.  ?OCT and HVF  ?  Visit Checklist (as applicable):  1. Status of new and prior symptoms discussed? yes  2. Neuroimaging reviewed/ ordered as appropriate? yes  3. Ocular imaging and photos reviewed/ ordered as appropriate? yes  4. Plan for work-up and treatment discussed with patient? yes  5. Potential medication side-effects and monitoring plan discussed? n/a  6. Review of outside medical records was performed and pertinent details are summarized in the HPI above? n/a    Time spent on this encounter: 45 minutes. This includes face to face time and non-face to face time preparing to see the patient (eg, review of tests), obtaining and/or reviewing separately obtained history, documenting clinical information in the electronic or other health record, independently interpreting results and communicating results to the patient/family/caregiver, or care coordinator.      JUANITA Palmer  Neuro-Ophthalmology Consultant

## 2022-11-21 ENCOUNTER — LAB VISIT (OUTPATIENT)
Dept: LAB | Facility: HOSPITAL | Age: 47
End: 2022-11-21
Attending: INTERNAL MEDICINE
Payer: MEDICAID

## 2022-11-21 DIAGNOSIS — D35.2 PITUITARY MACROADENOMA WITH EXTRASELLAR EXTENSION: ICD-10-CM

## 2022-11-21 DIAGNOSIS — E23.2 DIABETES INSIPIDUS: ICD-10-CM

## 2022-11-21 LAB
ANION GAP SERPL CALC-SCNC: 7 MMOL/L (ref 8–16)
ANION GAP SERPL CALC-SCNC: 7 MMOL/L (ref 8–16)
BILIRUB UR QL STRIP: NEGATIVE
BUN SERPL-MCNC: 10 MG/DL (ref 6–20)
BUN SERPL-MCNC: 10 MG/DL (ref 6–20)
CALCIUM SERPL-MCNC: 9.4 MG/DL (ref 8.7–10.5)
CALCIUM SERPL-MCNC: 9.4 MG/DL (ref 8.7–10.5)
CHLORIDE SERPL-SCNC: 106 MMOL/L (ref 95–110)
CHLORIDE SERPL-SCNC: 106 MMOL/L (ref 95–110)
CLARITY UR: CLEAR
CO2 SERPL-SCNC: 29 MMOL/L (ref 23–29)
CO2 SERPL-SCNC: 29 MMOL/L (ref 23–29)
COLOR UR: COLORLESS
CORTIS SERPL-MCNC: 13.4 UG/DL (ref 4.3–22.4)
CREAT SERPL-MCNC: 0.9 MG/DL (ref 0.5–1.4)
CREAT SERPL-MCNC: 0.9 MG/DL (ref 0.5–1.4)
EST. GFR  (NO RACE VARIABLE): >60 ML/MIN/1.73 M^2
EST. GFR  (NO RACE VARIABLE): >60 ML/MIN/1.73 M^2
GLUCOSE SERPL-MCNC: 92 MG/DL (ref 70–110)
GLUCOSE SERPL-MCNC: 92 MG/DL (ref 70–110)
GLUCOSE UR QL STRIP: NEGATIVE
HGB UR QL STRIP: NEGATIVE
KETONES UR QL STRIP: NEGATIVE
LEUKOCYTE ESTERASE UR QL STRIP: NEGATIVE
NITRITE UR QL STRIP: NEGATIVE
PH UR STRIP: 8 [PH] (ref 5–8)
POTASSIUM SERPL-SCNC: 4.3 MMOL/L (ref 3.5–5.1)
POTASSIUM SERPL-SCNC: 4.3 MMOL/L (ref 3.5–5.1)
PROT UR QL STRIP: NEGATIVE
SODIUM SERPL-SCNC: 142 MMOL/L (ref 136–145)
SODIUM SERPL-SCNC: 142 MMOL/L (ref 136–145)
SP GR UR STRIP: 1.01 (ref 1–1.03)
T4 FREE SERPL-MCNC: 0.85 NG/DL (ref 0.71–1.51)
TSH SERPL DL<=0.005 MIU/L-ACNC: 1.66 UIU/ML (ref 0.4–4)
URN SPEC COLLECT METH UR: ABNORMAL
UROBILINOGEN UR STRIP-ACNC: NEGATIVE EU/DL

## 2022-11-21 PROCEDURE — 82024 ASSAY OF ACTH: CPT | Performed by: INTERNAL MEDICINE

## 2022-11-21 PROCEDURE — 84443 ASSAY THYROID STIM HORMONE: CPT | Performed by: INTERNAL MEDICINE

## 2022-11-21 PROCEDURE — 84439 ASSAY OF FREE THYROXINE: CPT | Performed by: INTERNAL MEDICINE

## 2022-11-21 PROCEDURE — 82533 TOTAL CORTISOL: CPT | Performed by: INTERNAL MEDICINE

## 2022-11-21 PROCEDURE — 81003 URINALYSIS AUTO W/O SCOPE: CPT | Performed by: INTERNAL MEDICINE

## 2022-11-21 PROCEDURE — 80048 BASIC METABOLIC PNL TOTAL CA: CPT | Performed by: INTERNAL MEDICINE

## 2022-11-22 ENCOUNTER — OFFICE VISIT (OUTPATIENT)
Dept: ENDOCRINOLOGY | Facility: CLINIC | Age: 47
End: 2022-11-22
Payer: MEDICAID

## 2022-11-22 ENCOUNTER — OFFICE VISIT (OUTPATIENT)
Dept: NEUROSURGERY | Facility: CLINIC | Age: 47
End: 2022-11-22
Payer: MEDICAID

## 2022-11-22 VITALS
BODY MASS INDEX: 27.83 KG/M2 | HEIGHT: 64 IN | WEIGHT: 163 LBS | HEIGHT: 64 IN | HEART RATE: 85 BPM | SYSTOLIC BLOOD PRESSURE: 132 MMHG | SYSTOLIC BLOOD PRESSURE: 132 MMHG | BODY MASS INDEX: 27.92 KG/M2 | WEIGHT: 163.56 LBS | DIASTOLIC BLOOD PRESSURE: 80 MMHG | DIASTOLIC BLOOD PRESSURE: 80 MMHG | HEART RATE: 85 BPM

## 2022-11-22 DIAGNOSIS — E27.40 ADRENAL INSUFFICIENCY: ICD-10-CM

## 2022-11-22 DIAGNOSIS — E23.7 PITUITARY LESION: Primary | ICD-10-CM

## 2022-11-22 DIAGNOSIS — Z86.018 S/P TRANSSPHENOIDAL SELECTIVE ADENOMECTOMY: Primary | ICD-10-CM

## 2022-11-22 DIAGNOSIS — E03.8 OTHER SPECIFIED HYPOTHYROIDISM: ICD-10-CM

## 2022-11-22 DIAGNOSIS — Z98.890 S/P TRANSSPHENOIDAL SELECTIVE ADENOMECTOMY: Primary | ICD-10-CM

## 2022-11-22 LAB — ACTH PLAS-MCNC: 26 PG/ML (ref 0–46)

## 2022-11-22 PROCEDURE — 1160F PR REVIEW ALL MEDS BY PRESCRIBER/CLIN PHARMACIST DOCUMENTED: ICD-10-PCS | Mod: CPTII,,, | Performed by: NEUROLOGICAL SURGERY

## 2022-11-22 PROCEDURE — 99024 PR POST-OP FOLLOW-UP VISIT: ICD-10-PCS | Mod: ,,, | Performed by: NEUROLOGICAL SURGERY

## 2022-11-22 PROCEDURE — 1111F DSCHRG MED/CURRENT MED MERGE: CPT | Mod: CPTII,,, | Performed by: INTERNAL MEDICINE

## 2022-11-22 PROCEDURE — 99999 PR PBB SHADOW E&M-EST. PATIENT-LVL III: CPT | Mod: PBBFAC,,, | Performed by: INTERNAL MEDICINE

## 2022-11-22 PROCEDURE — 3008F PR BODY MASS INDEX (BMI) DOCUMENTED: ICD-10-PCS | Mod: CPTII,,, | Performed by: NEUROLOGICAL SURGERY

## 2022-11-22 PROCEDURE — 3044F PR MOST RECENT HEMOGLOBIN A1C LEVEL <7.0%: ICD-10-PCS | Mod: CPTII,,, | Performed by: INTERNAL MEDICINE

## 2022-11-22 PROCEDURE — 99024 POSTOP FOLLOW-UP VISIT: CPT | Mod: ,,, | Performed by: NEUROLOGICAL SURGERY

## 2022-11-22 PROCEDURE — 3008F PR BODY MASS INDEX (BMI) DOCUMENTED: ICD-10-PCS | Mod: CPTII,,, | Performed by: INTERNAL MEDICINE

## 2022-11-22 PROCEDURE — 1159F PR MEDICATION LIST DOCUMENTED IN MEDICAL RECORD: ICD-10-PCS | Mod: CPTII,,, | Performed by: NEUROLOGICAL SURGERY

## 2022-11-22 PROCEDURE — 3075F PR MOST RECENT SYSTOLIC BLOOD PRESS GE 130-139MM HG: ICD-10-PCS | Mod: CPTII,,, | Performed by: INTERNAL MEDICINE

## 2022-11-22 PROCEDURE — 3008F BODY MASS INDEX DOCD: CPT | Mod: CPTII,,, | Performed by: INTERNAL MEDICINE

## 2022-11-22 PROCEDURE — 99214 PR OFFICE/OUTPT VISIT, EST, LEVL IV, 30-39 MIN: ICD-10-PCS | Mod: S$PBB,,, | Performed by: INTERNAL MEDICINE

## 2022-11-22 PROCEDURE — 1159F MED LIST DOCD IN RCRD: CPT | Mod: CPTII,,, | Performed by: NEUROLOGICAL SURGERY

## 2022-11-22 PROCEDURE — 3079F PR MOST RECENT DIASTOLIC BLOOD PRESSURE 80-89 MM HG: ICD-10-PCS | Mod: CPTII,,, | Performed by: INTERNAL MEDICINE

## 2022-11-22 PROCEDURE — 1160F RVW MEDS BY RX/DR IN RCRD: CPT | Mod: CPTII,,, | Performed by: NEUROLOGICAL SURGERY

## 2022-11-22 PROCEDURE — 1111F PR DISCHARGE MEDS RECONCILED W/ CURRENT OUTPATIENT MED LIST: ICD-10-PCS | Mod: CPTII,,, | Performed by: INTERNAL MEDICINE

## 2022-11-22 PROCEDURE — 3044F HG A1C LEVEL LT 7.0%: CPT | Mod: CPTII,,, | Performed by: NEUROLOGICAL SURGERY

## 2022-11-22 PROCEDURE — 3008F BODY MASS INDEX DOCD: CPT | Mod: CPTII,,, | Performed by: NEUROLOGICAL SURGERY

## 2022-11-22 PROCEDURE — 3079F PR MOST RECENT DIASTOLIC BLOOD PRESSURE 80-89 MM HG: ICD-10-PCS | Mod: CPTII,,, | Performed by: NEUROLOGICAL SURGERY

## 2022-11-22 PROCEDURE — 99214 OFFICE O/P EST MOD 30 MIN: CPT | Mod: S$PBB,,, | Performed by: INTERNAL MEDICINE

## 2022-11-22 PROCEDURE — 3044F PR MOST RECENT HEMOGLOBIN A1C LEVEL <7.0%: ICD-10-PCS | Mod: CPTII,,, | Performed by: NEUROLOGICAL SURGERY

## 2022-11-22 PROCEDURE — 3075F PR MOST RECENT SYSTOLIC BLOOD PRESS GE 130-139MM HG: ICD-10-PCS | Mod: CPTII,,, | Performed by: NEUROLOGICAL SURGERY

## 2022-11-22 PROCEDURE — 99213 OFFICE O/P EST LOW 20 MIN: CPT | Mod: PBBFAC | Performed by: NEUROLOGICAL SURGERY

## 2022-11-22 PROCEDURE — 3075F SYST BP GE 130 - 139MM HG: CPT | Mod: CPTII,,, | Performed by: NEUROLOGICAL SURGERY

## 2022-11-22 PROCEDURE — 99999 PR PBB SHADOW E&M-EST. PATIENT-LVL III: ICD-10-PCS | Mod: PBBFAC,,, | Performed by: INTERNAL MEDICINE

## 2022-11-22 PROCEDURE — 99213 OFFICE O/P EST LOW 20 MIN: CPT | Mod: PBBFAC,27 | Performed by: INTERNAL MEDICINE

## 2022-11-22 PROCEDURE — 3079F DIAST BP 80-89 MM HG: CPT | Mod: CPTII,,, | Performed by: INTERNAL MEDICINE

## 2022-11-22 PROCEDURE — 3079F DIAST BP 80-89 MM HG: CPT | Mod: CPTII,,, | Performed by: NEUROLOGICAL SURGERY

## 2022-11-22 PROCEDURE — 3044F HG A1C LEVEL LT 7.0%: CPT | Mod: CPTII,,, | Performed by: INTERNAL MEDICINE

## 2022-11-22 PROCEDURE — 99999 PR PBB SHADOW E&M-EST. PATIENT-LVL III: CPT | Mod: PBBFAC,,, | Performed by: NEUROLOGICAL SURGERY

## 2022-11-22 PROCEDURE — 3075F SYST BP GE 130 - 139MM HG: CPT | Mod: CPTII,,, | Performed by: INTERNAL MEDICINE

## 2022-11-22 PROCEDURE — 99999 PR PBB SHADOW E&M-EST. PATIENT-LVL III: ICD-10-PCS | Mod: PBBFAC,,, | Performed by: NEUROLOGICAL SURGERY

## 2022-11-22 RX ORDER — LEVOTHYROXINE SODIUM 25 UG/1
TABLET ORAL
COMMUNITY
Start: 2022-11-02 | End: 2022-11-22

## 2022-11-22 NOTE — PROGRESS NOTES
Hvf done ou. Rel./fix/coop. good ou. /chart checked for latex allergy. Interpeter used [place] # [id number]/ .25 + .25 x 1 /od .25/os-sm

## 2022-11-22 NOTE — PATIENT INSTRUCTIONS
El nikole antes de herrera prueba de leanne (8 am) no tome el hydrocotisona.  Reinicie hydrocortisona despues de la cata de leanne.      Deje a nicole levothyroxine

## 2022-11-22 NOTE — PROGRESS NOTES
PITUITARY [facility] ENDOCRINOLOGY FOLLOW UP  11/22/2022     The patient was seen in the multidisciplinary pituitary clinic with Dr. Ferrell today.       Due to language barrier, an  was present during the entire visit.     The patient's last visit with me was on 10/11/2022.     Subjective:      CC: post-op follow up s/p TSR for pituitary macroadenoma causing vision loss    HPI:   Tessa Wynne is a 47 y.o. female who presents for evaluation of pituitary lesion compressing optic chiasm, found on imaging done after a syncopal episode.     Initial presentation:   Brain imaging done for syncope and HA.  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  Preoperative labs notable for elevated prolactin from stalk effect but otherwise normal    Underwent TSR by Dr. Ferrell on 10/3/22 ([facility] path - GONADOTROPH ADENOMA, IMMUNOREACTIVE FOR ALPHA-SUBUNIT AND SF1).  The patient did not receive any perioperative glucocorticoids and had no evidence of adrenal insufficiency in the hospital. The patient did not develop diabetes insipidus but presented to the ED on 10/8 for severe HA, CP, and HTN, found to have Na of 129 (on d/c Na was 140).      Interval hx:  At her last visit (preop) she was started on levothyroxine 75 mcg daily for secondary hypothyroidism. This was reduced to 25 mcg daily by her primary care provider after she reported symptoms on 75 mcg.  D/c from hospital on levothyroxine 50 mcg daily.  Due to reported side effects from the medication she is now only taking levothyroxine 25 mcg daily    Has been admitted for hyponatremia that resolved after starting hydrocortisone.   She then developed symptoms of diabetes insipidus and was prescribed DDAVP which she was advised not to take when she was found to have hyponatremia but due to confusion she was taking DDAVP and was readmitted with hyponatremia.  This resolved after discontinuing DDAVP.  Did not require Na restriction.  Now normal Na      She remains  "on hydrocortisone 10 mg morning and 5 mg in the afternoon.  denies symptoms of adrenal insufficiency (no lightheadedness, N/V/abd pain, hypotension).  No unexplained weight loss  No symptoms of DI (denies polyuria/polydipsia).       She continues to have significant headaches.      Imaging:    10/4/22  Evolving postsurgical changes status post transsphenoidal hypophysectomy.  Nodular enhancement along the floor of the sella on the left is suspicious for residual neoplasm unless otherwise suggested clinically.  There is however no compression of the optic chiasm as noted on the preoperative study.     MRI 8/15/22  Sella: There is a large sellar lesion with suprasellar extension.  Lesion measures roughly 3.2 x 2.8 x 3.5 cm.  There is mass effect on the adjacent structures including the optic chiasm which is displaced superiorly as well as the bilateral A1 segments of the anterior cerebral arteries.  There is possible extension into the cavernous sinuses bilaterally best visualized on coronal T2 series 10.  The carotid flow voids are preserved.  There is bony remodeling of the adjacent clivus secondary to this lesion with preserved marrow signal.    Headache:    Ongoing headache    Vision change:   denies    Formal Visual burns:   HVF w/ Dr. Yan 8/25/22, per note:  " OCT with borderline RNFL thinning OD>OS. HVF with generalized depression OD (worse temporally) and temporal depression OS. Findings consistent with chiasmal compression." rtc in 3 month(s)        Current symptoms:  Hyperprolactinemia:  []  breast tenderness []  nipple discharge []  Menstrual Irregularity [x]  Denies   Initially noticed blx nipple dc for a few years around time of menstrual cycle   Reports that after D&C in 2/2022 galactorrhea resolved  Resolved since surgery    Lab Results   Component Value Date    PROLACTIN 138.0 (H) 08/26/2022     Thyroid:  Currently on levothyroxine 25 mcg daily   Prescribed serge thyroid by her primary care " provider but never took it  []  fatigue []  weight change []  temp intolerance  [x]  Denies    []  BM change []  skin/hair change [] palpitations []  tremor [x]  Denies    Having some lower abd pain -seeing GI   Lab Results   Component Value Date    TSH 1.657 11/21/2022    FREET4 0.85 11/21/2022       Growth Hormone Excess:  []  increase hand/foot size []  teeth spacing change    [x]  Denies    []  worse glycemic control []  joint pain     [x]  Denies    Last IGF-1:   Lab Results   Component Value Date    SOMATMDN 42 (L) 08/26/2022      denies symptoms of adrenal insufficiency (no lightheadedness, N/V/abd pain, hypotension).      Gonadotrophs:     [x]  Irregular menses []  Postmenopausal  []  Decreased libido   []  ED   []  Denies    Previously regular then Irregular menses for 4 month(s) prior to d&c in 2/2022 (prior to that 3 month(s) of bleeding)  Repro hx: 2 children, no problems with conception  LMP: prior to D&C          Review of patient's allergies indicates:  No Known Allergies      Current Outpatient Medications:     acetaminophen (TYLENOL) 325 MG tablet, Take 2 tablets (650 mg total) by mouth every 6 (six) hours as needed (Headaches (take with magnesium and benadryl))., Disp: 30 tablet, Rfl: 0    calcium citrate/vitamin D3 (CITRACAL REGULAR ORAL), Take by mouth., Disp: , Rfl:     dicyclomine (BENTYL) 20 mg tablet, Take 1 tablet (20 mg total) by mouth 3 (three) times daily as needed (abdominal pain)., Disp: 60 tablet, Rfl: 2    diphenhydrAMINE (BENADRYL) 50 MG capsule, Take 1 capsule (50 mg total) by mouth every 12 (twelve) hours as needed (Headaches (take with tylenol and magnesium))., Disp: 30 capsule, Rfl: 0    ferrous sulfate (IRON ORAL), Take by mouth., Disp: , Rfl:     hydrocortisone (CORTEF) 10 MG Tab, Take 1 tablet (10 mg total) by mouth once daily. In the morning., Disp: 30 tablet, Rfl: 0    hydrocortisone (CORTEF) 5 MG Tab, Take 1 tablet (5 mg total) by mouth once daily. In the afternoon around 4  "pm., Disp: 60 tablet, Rfl: 0    hyoscyamine (LEVSIN/SL) 0.125 mg Subl, Take 0.125 mg by mouth 4 (four) times daily as needed., Disp: , Rfl:     magnesium oxide (MAG-OX) 400 mg (241.3 mg magnesium) tablet, Take 1 tablet (400 mg total) by mouth 2 (two) times daily as needed (Headaches (take with tylenol and benadryl))., Disp: 30 tablet, Rfl: 0    mv,calcium,min/iron/folic/vitK (ONE-A-DAY WOMEN'S COMPLETE ORAL), Take by mouth., Disp: , Rfl:     omeprazole (PRILOSEC) 40 MG capsule, Take 1 capsule (40 mg total) by mouth every morning., Disp: 30 capsule, Rfl: 11    ondansetron (ZOFRAN-ODT) 4 MG TbDL, Dissolve 1 tablet (4 mg total) by mouth every 8 (eight) hours as needed (nausea & vomiting)., Disp: 24 tablet, Rfl: 0    oxymetazoline (AFRIN) 0.05 % nasal spray, 3 sprays by Nasal route every 2 (two) hours as needed (epistaxis)., Disp: 30 mL, Rfl: 0    sodium chloride (OCEAN) 0.65 % nasal spray, 2 sprays by Nasal route every 4 (four) hours while awake., Disp: 44 mL, Rfl: 0    levothyroxine (SYNTHROID) 25 MCG tablet, , Disp: , Rfl:     oxyCODONE (ROXICODONE) 5 MG immediate release tablet, Take 1 tablet (5 mg total) by mouth every 6 (six) hours as needed for Pain (take as needed for pain/headaches). (Patient not taking: Reported on 11/22/2022), Disp: 10 tablet, Rfl: 0    thyroid, pork, (ARMOUR THYROID) 15 mg Tab, Take 1 tablet (15 mg total) by mouth before breakfast. (Patient not taking: Reported on 11/22/2022), Disp: 30 tablet, Rfl: 6    ROS:  see HPI    Objective:   Physical Exam   /80   Pulse 85   Ht 5' 4" (1.626 m)   Wt 73.9 kg (163 lb)   LMP 01/15/2022   BMI 27.98 kg/m²   Wt Readings from Last 3 Encounters:   11/22/22 73.9 kg (163 lb)   11/22/22 74.2 kg (163 lb 9.3 oz)   11/03/22 71.9 kg (158 lb 9.6 oz)   ]    Constitutional:  Pleasant,  in no acute distress.   HENT:   Eyes:     No scleral icterus.   Respiratory:   Effort normal   Neurological:  normal speech  Psych:  Normal mood and affect.      LABORATORY " REVIEW:    Chemistry        Component Value Date/Time     11/21/2022 0823     11/21/2022 0823    K 4.3 11/21/2022 0823    K 4.3 11/21/2022 0823     11/21/2022 0823     11/21/2022 0823    CO2 29 11/21/2022 0823    CO2 29 11/21/2022 0823    BUN 10 11/21/2022 0823    BUN 10 11/21/2022 0823    CREATININE 0.9 11/21/2022 0823    CREATININE 0.9 11/21/2022 0823    GLU 92 11/21/2022 0823    GLU 92 11/21/2022 0823        Component Value Date/Time    CALCIUM 9.4 11/21/2022 0823    CALCIUM 9.4 11/21/2022 0823    ALKPHOS 66 10/26/2022 0553    AST 16 10/26/2022 0553    ALT 27 10/26/2022 0553    BILITOT 0.3 10/26/2022 0553    ESTGFRAFRICA >60 07/29/2022 0827    EGFRNONAA >60 07/29/2022 0827        MRI 8/15/22- preop       MRI 10/25/22 - postop  Evolving operative change status post transsphenoidal approach sellar/suprasellar mass resection.  There is continued heterogeneous signal predominantly hypoenhancing material along the posterior sphenoid sinus extending to the sella felt to represent postoperative packing material.  There is thin marginated enhancement about this which may be reactive in granulation tissue.  There is continued somewhat lobular enhancing material along the postoperative floor of the sella which may in part represent residual pituitary tissue though remains concerning for component of residual lesion.  The infundibulum is deviated to the right.  Compared to most recent prior the nodular enhancing material is similar measuring approximately 1.6 cm AP x 1.4 cm TV and 0.7 cm craniocaudal.  There is no significant mass effect on the suprasellar neurovascular structures clinical correlation and continued follow-up advised.          Assessment/Plan:     Problem List Items Addressed This Visit          2     Adrenal insufficiency     Patient is currently on physiologic hydrocortisone replacement since being admitted for hyponatremia several times.  She is currently asymptomatic.  Will have  her hold both doses of hydrocortisone the day prior to her 8:00 a.m. blood draw and check ACTH and cortisol.  If normal will have her discontinue hydrocortisone.  I have advised her to restart hydrocortisone after her blood draw until she hears otherwise from me.         Hypothyroidism     Most recent thyroid labs normal on only levothyroxine 25 mcg daily so I suspect secondary hypothyroidism has resolved since her pituitary surgery.  Will stop thyroid hormone replacement and monitor TFTs.  If FT4 is again low will need to restart levothyroxine.          Other Visit Diagnoses       Pituitary lesion    -  Primary    Relevant Orders    Prolactin (Completed)    Cortisol, 8AM (Completed)    ACTH (Completed)    TSH    T4, Free          Needs 8 am fasting lab for prolactin, ACTH, cortiosl in the next week    Rtc in 3 month(s) with TSH, fT4 prior    Jimmy Batista MD

## 2022-11-28 ENCOUNTER — OFFICE VISIT (OUTPATIENT)
Dept: OBSTETRICS AND GYNECOLOGY | Facility: CLINIC | Age: 47
End: 2022-11-28
Payer: MEDICAID

## 2022-11-28 ENCOUNTER — LAB VISIT (OUTPATIENT)
Dept: LAB | Facility: HOSPITAL | Age: 47
End: 2022-11-28
Attending: INTERNAL MEDICINE
Payer: MEDICAID

## 2022-11-28 VITALS
HEIGHT: 64 IN | BODY MASS INDEX: 27.33 KG/M2 | DIASTOLIC BLOOD PRESSURE: 75 MMHG | WEIGHT: 160.06 LBS | SYSTOLIC BLOOD PRESSURE: 116 MMHG

## 2022-11-28 DIAGNOSIS — M79.10 MYALGIA: ICD-10-CM

## 2022-11-28 DIAGNOSIS — R10.2 PELVIC PAIN IN FEMALE: ICD-10-CM

## 2022-11-28 DIAGNOSIS — Z00.00 ANNUAL PHYSICAL EXAM: ICD-10-CM

## 2022-11-28 DIAGNOSIS — D35.2 PITUITARY MACROADENOMA WITH EXTRASELLAR EXTENSION: ICD-10-CM

## 2022-11-28 DIAGNOSIS — Z12.4 CERVICAL CANCER SCREENING: ICD-10-CM

## 2022-11-28 DIAGNOSIS — E23.7 PITUITARY LESION: ICD-10-CM

## 2022-11-28 DIAGNOSIS — Z12.31 OTHER SCREENING MAMMOGRAM: ICD-10-CM

## 2022-11-28 DIAGNOSIS — Z01.419 ROUTINE GYNECOLOGICAL EXAMINATION: Primary | ICD-10-CM

## 2022-11-28 LAB
ALBUMIN SERPL BCP-MCNC: 4.1 G/DL (ref 3.5–5.2)
ALP SERPL-CCNC: 61 U/L (ref 55–135)
ALT SERPL W/O P-5'-P-CCNC: 39 U/L (ref 10–44)
ANION GAP SERPL CALC-SCNC: 11 MMOL/L (ref 8–16)
AST SERPL-CCNC: 33 U/L (ref 10–40)
BASOPHILS # BLD AUTO: 0 K/UL (ref 0–0.2)
BASOPHILS NFR BLD: 0 % (ref 0–1.9)
BILIRUB SERPL-MCNC: 0.4 MG/DL (ref 0.1–1)
BUN SERPL-MCNC: 11 MG/DL (ref 6–20)
CALCIUM SERPL-MCNC: 9.3 MG/DL (ref 8.7–10.5)
CHLORIDE SERPL-SCNC: 108 MMOL/L (ref 95–110)
CO2 SERPL-SCNC: 24 MMOL/L (ref 23–29)
CORTIS SERPL-MCNC: 13.1 UG/DL (ref 4.3–22.4)
CREAT SERPL-MCNC: 1 MG/DL (ref 0.5–1.4)
DIFFERENTIAL METHOD: ABNORMAL
EOSINOPHIL # BLD AUTO: 0 K/UL (ref 0–0.5)
EOSINOPHIL NFR BLD: 0.8 % (ref 0–8)
ERYTHROCYTE [DISTWIDTH] IN BLOOD BY AUTOMATED COUNT: 12.8 % (ref 11.5–14.5)
EST. GFR  (NO RACE VARIABLE): >60 ML/MIN/1.73 M^2
GLUCOSE SERPL-MCNC: 89 MG/DL (ref 70–110)
HCT VFR BLD AUTO: 39.4 % (ref 37–48.5)
HGB BLD-MCNC: 12.8 G/DL (ref 12–16)
IMM GRANULOCYTES # BLD AUTO: 0.01 K/UL (ref 0–0.04)
IMM GRANULOCYTES NFR BLD AUTO: 0.3 % (ref 0–0.5)
LYMPHOCYTES # BLD AUTO: 2.3 K/UL (ref 1–4.8)
LYMPHOCYTES NFR BLD: 58.2 % (ref 18–48)
MCH RBC QN AUTO: 30 PG (ref 27–31)
MCHC RBC AUTO-ENTMCNC: 32.5 G/DL (ref 32–36)
MCV RBC AUTO: 92 FL (ref 82–98)
MONOCYTES # BLD AUTO: 0.3 K/UL (ref 0.3–1)
MONOCYTES NFR BLD: 7 % (ref 4–15)
NEUTROPHILS # BLD AUTO: 1.3 K/UL (ref 1.8–7.7)
NEUTROPHILS NFR BLD: 33.7 % (ref 38–73)
NRBC BLD-RTO: 0 /100 WBC
PLATELET # BLD AUTO: 202 K/UL (ref 150–450)
PMV BLD AUTO: 10.3 FL (ref 9.2–12.9)
POTASSIUM SERPL-SCNC: 4.2 MMOL/L (ref 3.5–5.1)
PROLACTIN SERPL IA-MCNC: 15.7 NG/ML (ref 5.2–26.5)
PROT SERPL-MCNC: 7 G/DL (ref 6–8.4)
RBC # BLD AUTO: 4.27 M/UL (ref 4–5.4)
SODIUM SERPL-SCNC: 143 MMOL/L (ref 136–145)
TSH SERPL DL<=0.005 MIU/L-ACNC: 1.74 UIU/ML (ref 0.4–4)
WBC # BLD AUTO: 3.88 K/UL (ref 3.9–12.7)

## 2022-11-28 PROCEDURE — 99999 PR PBB SHADOW E&M-EST. PATIENT-LVL III: ICD-10-PCS | Mod: PBBFAC,,, | Performed by: OBSTETRICS & GYNECOLOGY

## 2022-11-28 PROCEDURE — 1159F PR MEDICATION LIST DOCUMENTED IN MEDICAL RECORD: ICD-10-PCS | Mod: CPTII,,, | Performed by: OBSTETRICS & GYNECOLOGY

## 2022-11-28 PROCEDURE — 82533 TOTAL CORTISOL: CPT | Performed by: INTERNAL MEDICINE

## 2022-11-28 PROCEDURE — 3074F PR MOST RECENT SYSTOLIC BLOOD PRESSURE < 130 MM HG: ICD-10-PCS | Mod: CPTII,,, | Performed by: OBSTETRICS & GYNECOLOGY

## 2022-11-28 PROCEDURE — 80053 COMPREHEN METABOLIC PANEL: CPT | Performed by: INTERNAL MEDICINE

## 2022-11-28 PROCEDURE — 3008F PR BODY MASS INDEX (BMI) DOCUMENTED: ICD-10-PCS | Mod: CPTII,,, | Performed by: OBSTETRICS & GYNECOLOGY

## 2022-11-28 PROCEDURE — 84146 ASSAY OF PROLACTIN: CPT | Performed by: INTERNAL MEDICINE

## 2022-11-28 PROCEDURE — 99999 PR PBB SHADOW E&M-EST. PATIENT-LVL III: CPT | Mod: PBBFAC,,, | Performed by: OBSTETRICS & GYNECOLOGY

## 2022-11-28 PROCEDURE — 3078F DIAST BP <80 MM HG: CPT | Mod: CPTII,,, | Performed by: OBSTETRICS & GYNECOLOGY

## 2022-11-28 PROCEDURE — 3008F BODY MASS INDEX DOCD: CPT | Mod: CPTII,,, | Performed by: OBSTETRICS & GYNECOLOGY

## 2022-11-28 PROCEDURE — 99213 OFFICE O/P EST LOW 20 MIN: CPT | Mod: PBBFAC,PO | Performed by: OBSTETRICS & GYNECOLOGY

## 2022-11-28 PROCEDURE — 99396 PREV VISIT EST AGE 40-64: CPT | Mod: S$PBB,,, | Performed by: OBSTETRICS & GYNECOLOGY

## 2022-11-28 PROCEDURE — 3074F SYST BP LT 130 MM HG: CPT | Mod: CPTII,,, | Performed by: OBSTETRICS & GYNECOLOGY

## 2022-11-28 PROCEDURE — 3044F HG A1C LEVEL LT 7.0%: CPT | Mod: CPTII,,, | Performed by: OBSTETRICS & GYNECOLOGY

## 2022-11-28 PROCEDURE — 3044F PR MOST RECENT HEMOGLOBIN A1C LEVEL <7.0%: ICD-10-PCS | Mod: CPTII,,, | Performed by: OBSTETRICS & GYNECOLOGY

## 2022-11-28 PROCEDURE — 82024 ASSAY OF ACTH: CPT | Performed by: INTERNAL MEDICINE

## 2022-11-28 PROCEDURE — 84443 ASSAY THYROID STIM HORMONE: CPT | Performed by: INTERNAL MEDICINE

## 2022-11-28 PROCEDURE — 1159F MED LIST DOCD IN RCRD: CPT | Mod: CPTII,,, | Performed by: OBSTETRICS & GYNECOLOGY

## 2022-11-28 PROCEDURE — 88141 CYTOPATH C/V INTERPRET: CPT | Mod: ,,, | Performed by: PATHOLOGY

## 2022-11-28 PROCEDURE — 88141 PR  CYTOPATH CERV/VAG INTERPRET: ICD-10-PCS | Mod: ,,, | Performed by: PATHOLOGY

## 2022-11-28 PROCEDURE — 85025 COMPLETE CBC W/AUTO DIFF WBC: CPT | Performed by: INTERNAL MEDICINE

## 2022-11-28 PROCEDURE — 3078F PR MOST RECENT DIASTOLIC BLOOD PRESSURE < 80 MM HG: ICD-10-PCS | Mod: CPTII,,, | Performed by: OBSTETRICS & GYNECOLOGY

## 2022-11-28 PROCEDURE — 87624 HPV HI-RISK TYP POOLED RSLT: CPT | Performed by: OBSTETRICS & GYNECOLOGY

## 2022-11-28 PROCEDURE — 99396 PR PREVENTIVE VISIT,EST,40-64: ICD-10-PCS | Mod: S$PBB,,, | Performed by: OBSTETRICS & GYNECOLOGY

## 2022-11-28 PROCEDURE — 88175 CYTOPATH C/V AUTO FLUID REDO: CPT | Performed by: PATHOLOGY

## 2022-11-28 NOTE — ASSESSMENT & PLAN NOTE
Most recent thyroid labs normal on only levothyroxine 25 mcg daily so I suspect secondary hypothyroidism has resolved since her pituitary surgery.  Will stop thyroid hormone replacement and monitor TFTs.  If FT4 is again low will need to restart levothyroxine.

## 2022-11-28 NOTE — ASSESSMENT & PLAN NOTE
Patient is currently on physiologic hydrocortisone replacement since being admitted for hyponatremia several times.  She is currently asymptomatic.  Will have her hold both doses of hydrocortisone the day prior to her 8:00 a.m. blood draw and check ACTH and cortisol.  If normal will have her discontinue hydrocortisone.  I have advised her to restart hydrocortisone after her blood draw until she hears otherwise from me.

## 2022-11-28 NOTE — PROGRESS NOTES
CHIEF COMPLAINT:  6wk postop f/u    INTERVAL HISTORY (11/22/22):  Routine 6 week postoperative follow-up.  Her main complaint is posterior headaches that have been present before surgery but remain.  They are intermittent but occurs most of the day and lessen as the day goes on.  She rates them as 7-8/10 on average and 1-2/10 at its best.  She takes Tylenol.  She is unable to take ibuprofen and is not on any migraine medicines.    She had been admitted for postop hyponatremia that resolved after starting hydrocortisone. Did not require Na restriction.  Now normal Na    She denies any vision changes.    INTERVAL HISTORY (10/11/22):  Patient returns for routine 2 week postoperative follow-up s/p endonasal endoscopic transsphenoidal resection of pituitary tumor on 10/3/22 with Dr. Astorga of ENT.  Patient tolerated the procedure well without any perioperative complications.  She was discharged few days after surgery without evidence of DI or adrenal insufficiency.  She is neurologically stable.  Today she reports that her vision is improved as well as her energy levels.  She was recently seen in the emergency room for severe headache in the back of her head but she explains that that has gotten better since.  She also reports tiny amount of clear watery appearing drainage from her nose periodically.  She states that this could happen up to 4-5 times but is a small amount and not bloody.  She does acknowledge that her headaches are worse when she gets up from bed but it is not necessarily positional.  She is urinating 6-7 times per day and 2 times per night.  A recent BMP in the ED revealed hyponatremia to 129.  She will be seeing Dr. Batista today for further evaluation of SIADH versus DI.    HPI:  Tessa Wynne is a 47 y.o.  female with below PMH, who is referred for evaluation of pituitary macroadenoma.  Brain MRI obtained for syncopal episode and HA revealed large pituitary tumor.  She's had HA x 1 year (mainly  occipital location).  Denies significant vision change however recent ophtho eval revealed evidence of chiasmal compression.    Elevated prolactin (138) and hypothyroidism.    See endo note for hormone eval    Review of patient's allergies indicates:  No Known Allergies    Past Medical History:   Diagnosis Date    Elevated fasting glucose     GERD (gastroesophageal reflux disease)     Hypothyroidism     PAD (peripheral artery disease)     Pituitary lesion 9/7/2022     Past Surgical History:   Procedure Laterality Date    CHOLECYSTECTOMY      ENDOMETRIAL ABLATION N/A 2/8/2022    Procedure: ABLATION, ENDOMETRIUM;  Surgeon: Ariel Butler MD;  Location: [facility];  Service: OB/GYN;  Laterality: N/A;    EXCISION, NEOPLASM, PITUITARY, TRANSSPHENOIDAL APPROACH, USING COMPUTER-ASSISTED NAVIGATION N/A 10/3/2022    Procedure: EXCISION, NEOPLASM, PITUITARY, TRANSSPHENOIDAL APPROACH, USING COMPUTER-ASSISTED NAVIGATION;  Surgeon: Ed Astorga MD;  Location: [facility] OR [address] [place] [address];  Service: ENT;  Laterality: N/A;    EXCISION, NEOPLASM, PITUITARY, TRANSSPHENOIDAL APPROACH, USING COMPUTER-ASSISTED NAVIGATION N/A 10/3/2022    Procedure: EXCISION, NEOPLASM, PITUITARY, TRANSSPHENOIDAL APPROACH, USING COMPUTER-ASSISTED NAVIGATION;  Surgeon: Miguel Ángel Ferrell DO;  Location: [facility] OR [address] [place] [address];  Service: Neurosurgery;  Laterality: N/A;    HYSTEROSCOPY WITH DILATION AND CURETTAGE OF UTERUS N/A 2/8/2022    Procedure: HYSTEROSCOPY, WITH DILATION AND CURETTAGE OF UTERUS;  Surgeon: Ariel Butler MD;  Location: [facility];  Service: OB/GYN;  Laterality: N/A;     Family History   Problem Relation Age of Onset    Diabetes Mother     Diabetes Mellitus Mother     Diabetes Father     Diabetes Mellitus Father     No Known Problems Sister     No Known Problems Sister     No Known Problems Brother     No Known Problems Brother     No Known Problems Daughter     No Known Problems Son     Colon cancer Neg Hx     Breast cancer Neg Hx     Ovarian cancer  Neg Hx     Uterine cancer Neg Hx     Heart attack Neg Hx      Social History     Tobacco Use    Smoking status: Never    Smokeless tobacco: Never   Substance Use Topics    Alcohol use: No    Drug use: No        Review of Systems   Constitutional: Negative.    HENT:  Negative for congestion, ear discharge, ear pain, hearing loss, nosebleeds, sinus pain and tinnitus.    Eyes:  Negative for blurred vision, double vision, photophobia, pain, discharge and redness.   Respiratory: Negative.     Cardiovascular:  Negative for chest pain, palpitations, claudication and leg swelling.   Gastrointestinal:  Negative for abdominal pain, blood in stool, constipation, diarrhea, melena and vomiting.   Genitourinary:  Negative for flank pain, frequency and urgency.   Musculoskeletal:  Negative for falls.   Skin: Negative.    Neurological:  Positive for headaches. Negative for dizziness, tingling, tremors, sensory change, speech change, focal weakness, seizures, loss of consciousness and weakness.   Endo/Heme/Allergies:  Does not bruise/bleed easily.   Psychiatric/Behavioral: Negative.       OBJECTIVE:   Vital Signs:  Pulse: 85 (11/22/22 1411)  BP: 132/80 (11/22/22 1411)    Physical Exam:  Constitutional: Patient sitting comfortably in chair. Appears well developed and well nourished.  Skin: Exposed areas are intact without abnormal markings, rashes or other lesions.  HEENT: Normocephalic. Normal conjunctivae.  Cardiovascular: Normal rate and regular rhythm.  Respiratory: Chest wall rises and falls symmetrically, without signs of respiratory distress.  Abdomen: Soft and non-tender.  Extremities: Warm and without edema. Calves supple, non-tender.  Psych/Behavior: Normal affect.    Neurological:    Mental status: Alert and oriented. Conversational and appropriate.       Cranial Nerves: VFF to confrontation. PERRL. EOMI without nystagmus. Facial STLT normal and symmetric. Strong, symmetric muscles of mastication. Facial strength full and  symmetric. Hearing equal bilaterally to finger rub. Palate and uvula rise and fall normally in midline. Shoulder shrug 5/5 strength. Tongue midline.     Motor:    Upper:  Deltoids Triceps Biceps WE WF     R 5/5 5/5 5/5 5/5 5/5 5/5    L 5/5 5/5 5/5 5/5 5/5 5/5      Lower:  HF KE KF DF PF EHL    R 5/5 5/5 5/5 5/5 5/5 5/5    L 5/5 5/5 5/5 5/5 5/5 5/5     Sensory: Intact sensation to light touch in all extremities. Romberg negative.    Reflexes:          DTR: 2+ symmetrically throughout.     Rey's: Negative.     Babinski's: Negative.     Clonus: Negative.    Cerebellar: Finger-to-nose and rapid alternating movements normal.     Gait stable    Diagnostic Results:  All imaging was independently reviewed by me.    BMP - Na 142 from 134 from 129    Postop MRI - GTR    MRI brain, dated 8/15/22:  1. 2.8 x 3.5 x 2.7 cm pit macroadenoma with compression of chiasm  2. Knosp 3 on R  3. Knosp 2 on L    ASSESSMENT/PLAN:     Problem List Items Addressed This Visit          Endocrine    S/P transsphenoidal selective adenomectomy - Primary    Relevant Orders    MRI Pituitary W W/O Contrast    Ambulatory referral/consult to Neurology     S/p TSA for resection of pituitary macroadenoma.      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  Surgery achieved gross total resection without spinal fluid leak.  She developed postop hyponatremia that resolved with hydrocortisone.  She is doing from neurosurgical and neuroendocrine standpoint.  But main issue is headaches that have been persistent since before surgery.  I will send to HA specialist.    - Ok to advance activities as tolerated (ok to lift > 10 lbs, gradual return to exercise, avoid extreme twisting/bending or overly strenuous activities if possible)  - Ok to drive  - Referral to neurology re: HA  - F/u with ENT, endo and ophtho as scheduled  - Cont HC  - RTC in 6m with pit MRI    The patient understands and agrees with the plan of care. All questions  were answered.    Time spent on this encounter: 40 minutes. This includes face-to-face time and non-face to face time preparing to see the patient (eg, review of tests), obtaining and/or reviewing separately obtained history, documenting clinical information in the electronic or other health record, independently interpreting results and communicating results to the patient/family/caregiver, or care coordinator.

## 2022-11-28 NOTE — PROGRESS NOTES
"  The patient presents for routine gyn visit.  She is s/p hysteroscopy, endometrial ablation  For irregular menstrual cycles on on 2/8/2022.  No cycles since her procedure.  During well today.  No complaints.  Declines STD testing.    ROS:  GENERAL: Denies weight gain or weight loss. Feeling well overall.   SKIN: Denies rash or lesions.   HEAD: Denies head injury or headache.   CHEST: Denies chest pain or shortness of breath.   CARDIOVASCULAR: Denies palpitations or left sided chest pain.   ABDOMEN: No abdominal pain, constipation, diarrhea, nausea, vomiting or rectal bleeding.   URINARY: No frequency, dysuria, hematuria, or burning on urination.  REPRODUCTIVE: no issues today.   BREASTS: denies pain, lumps, or nipple discharge.   HEMATOLOGIC: No easy bruisability or excessive bleeding.   MUSCULOSKELETAL: Denies joint pain or swelling.   NEUROLOGIC: Denies syncope or weakness.   PSYCHIATRIC: Denies depression, anxiety or mood swings.       PE:   Vitals: /75   Ht 5' 4" (1.626 m)   Wt 72.6 kg (160 lb 0.9 oz)   LMP 01/15/2022   BMI 27.47 kg/m²   APPEARANCE: Well nourished, well developed, in no acute distress.  SKIN: Normal skin turgor, no lesions.  ABDOMEN: Soft. No tenderness or masses. No hepatosplenomegaly. No hernias.  BREASTS: Symmetrical, no skin changes or visible lesions. No palpable masses, nipple discharge or adenopathy bilaterally.  PELVIC: Normal external female genitalia without lesions. Normal hair distribution. Adequate perineal body, normal urethral meatus. Vagina moist and well rugated without lesions or discharge. Cervix pink and without lesions. No significant cystocele or rectocele. Bimanual exam showed uterus normal size, shape, position, mobile and nontender. Adnexa without masses or tenderness. Urethra and bladder normal.      AP  Routine gyn  -s/p normal breast exam: mammogram ordered  -s/p normal pelvic exam:   -Pap and HPV: collected  -STD testing: declined  -colon cancer screeing:  "  will discuss with PCP    TERESA Butler MD

## 2022-11-29 LAB — ACTH PLAS-MCNC: 26 PG/ML (ref 0–46)

## 2022-11-29 NOTE — PROGRESS NOTES
Please call patient with [place] translation services and confirm that she skipped both doses of hydrocortisone the day before her blood draw and restarted the hydrocortisone after her blood was drawn.  If she skipped the hydrocortisone the day before she can stop taking the hydrocortisone but restart it if she develops symptoms of adrenal insufficiency.      Symptoms of low cortisol or adrenal insufficiency can be the following:    - unexplained weight loss  - low blood pressure  - lightheadedness  - nausea and/or vomiting  - abdominal pain  - new severe fatigue    If you start experiencing these symptoms please restart the hydrocortisone and let me know.      Please route telephone encounter back to me.

## 2022-11-29 NOTE — ASSESSMENT & PLAN NOTE
I have independently reviewed the pituitary MRI from 10/25/22 which shows debulking of pituitary adenoma with possible residual vs. Scar tissue kin the left aspect fo the sella and inferior surgical cavity*.  The pituitary stalk is deviated to the right.  no impingement upon the optic apparatus.      She will continue to follow up with neurosurgery with serial imaging as well as have visual fields monitored.      Referred to neurology for persistent HA

## 2022-12-08 ENCOUNTER — OFFICE VISIT (OUTPATIENT)
Dept: GASTROENTEROLOGY | Facility: CLINIC | Age: 47
End: 2022-12-08
Payer: MEDICAID

## 2022-12-08 ENCOUNTER — PATIENT MESSAGE (OUTPATIENT)
Dept: FAMILY MEDICINE | Facility: CLINIC | Age: 47
End: 2022-12-08
Payer: MEDICAID

## 2022-12-08 ENCOUNTER — LAB VISIT (OUTPATIENT)
Dept: LAB | Facility: HOSPITAL | Age: 47
End: 2022-12-08
Attending: INTERNAL MEDICINE
Payer: MEDICAID

## 2022-12-08 VITALS
HEART RATE: 67 BPM | DIASTOLIC BLOOD PRESSURE: 63 MMHG | SYSTOLIC BLOOD PRESSURE: 98 MMHG | WEIGHT: 165.38 LBS | HEIGHT: 64 IN | BODY MASS INDEX: 28.24 KG/M2

## 2022-12-08 DIAGNOSIS — R10.84 GENERALIZED ABDOMINAL PAIN: ICD-10-CM

## 2022-12-08 DIAGNOSIS — E03.9 HYPOTHYROIDISM, UNSPECIFIED TYPE: ICD-10-CM

## 2022-12-08 DIAGNOSIS — R19.8 IRREGULAR BOWEL HABITS: Primary | ICD-10-CM

## 2022-12-08 DIAGNOSIS — M79.609 PAIN IN EXTREMITY, UNSPECIFIED EXTREMITY: ICD-10-CM

## 2022-12-08 LAB
CK SERPL-CCNC: 51 U/L (ref 20–180)
FINAL PATHOLOGIC DIAGNOSIS: ABNORMAL
Lab: ABNORMAL
T3 SERPL-MCNC: 87 NG/DL (ref 60–180)
T4 FREE SERPL-MCNC: 0.78 NG/DL (ref 0.71–1.51)
TSH SERPL DL<=0.005 MIU/L-ACNC: 2.04 UIU/ML (ref 0.4–4)

## 2022-12-08 PROCEDURE — 84480 ASSAY TRIIODOTHYRONINE (T3): CPT | Performed by: INTERNAL MEDICINE

## 2022-12-08 PROCEDURE — 1159F MED LIST DOCD IN RCRD: CPT | Mod: CPTII,,, | Performed by: NURSE PRACTITIONER

## 2022-12-08 PROCEDURE — 3044F PR MOST RECENT HEMOGLOBIN A1C LEVEL <7.0%: ICD-10-PCS | Mod: CPTII,,, | Performed by: NURSE PRACTITIONER

## 2022-12-08 PROCEDURE — 99214 PR OFFICE/OUTPT VISIT, EST, LEVL IV, 30-39 MIN: ICD-10-PCS | Mod: S$PBB,,, | Performed by: NURSE PRACTITIONER

## 2022-12-08 PROCEDURE — 3078F DIAST BP <80 MM HG: CPT | Mod: CPTII,,, | Performed by: NURSE PRACTITIONER

## 2022-12-08 PROCEDURE — 84439 ASSAY OF FREE THYROXINE: CPT | Performed by: INTERNAL MEDICINE

## 2022-12-08 PROCEDURE — 1160F RVW MEDS BY RX/DR IN RCRD: CPT | Mod: CPTII,,, | Performed by: NURSE PRACTITIONER

## 2022-12-08 PROCEDURE — 3074F SYST BP LT 130 MM HG: CPT | Mod: CPTII,,, | Performed by: NURSE PRACTITIONER

## 2022-12-08 PROCEDURE — 36415 COLL VENOUS BLD VENIPUNCTURE: CPT | Performed by: INTERNAL MEDICINE

## 2022-12-08 PROCEDURE — 3044F HG A1C LEVEL LT 7.0%: CPT | Mod: CPTII,,, | Performed by: NURSE PRACTITIONER

## 2022-12-08 PROCEDURE — 3074F PR MOST RECENT SYSTOLIC BLOOD PRESSURE < 130 MM HG: ICD-10-PCS | Mod: CPTII,,, | Performed by: NURSE PRACTITIONER

## 2022-12-08 PROCEDURE — 3008F BODY MASS INDEX DOCD: CPT | Mod: CPTII,,, | Performed by: NURSE PRACTITIONER

## 2022-12-08 PROCEDURE — 99999 PR PBB SHADOW E&M-EST. PATIENT-LVL IV: CPT | Mod: PBBFAC,,, | Performed by: NURSE PRACTITIONER

## 2022-12-08 PROCEDURE — 3078F PR MOST RECENT DIASTOLIC BLOOD PRESSURE < 80 MM HG: ICD-10-PCS | Mod: CPTII,,, | Performed by: NURSE PRACTITIONER

## 2022-12-08 PROCEDURE — 99214 OFFICE O/P EST MOD 30 MIN: CPT | Mod: PBBFAC,PO | Performed by: NURSE PRACTITIONER

## 2022-12-08 PROCEDURE — 99214 OFFICE O/P EST MOD 30 MIN: CPT | Mod: S$PBB,,, | Performed by: NURSE PRACTITIONER

## 2022-12-08 PROCEDURE — 99999 PR PBB SHADOW E&M-EST. PATIENT-LVL IV: ICD-10-PCS | Mod: PBBFAC,,, | Performed by: NURSE PRACTITIONER

## 2022-12-08 PROCEDURE — 82550 ASSAY OF CK (CPK): CPT | Performed by: INTERNAL MEDICINE

## 2022-12-08 PROCEDURE — 3008F PR BODY MASS INDEX (BMI) DOCUMENTED: ICD-10-PCS | Mod: CPTII,,, | Performed by: NURSE PRACTITIONER

## 2022-12-08 PROCEDURE — 1159F PR MEDICATION LIST DOCUMENTED IN MEDICAL RECORD: ICD-10-PCS | Mod: CPTII,,, | Performed by: NURSE PRACTITIONER

## 2022-12-08 PROCEDURE — 1160F PR REVIEW ALL MEDS BY PRESCRIBER/CLIN PHARMACIST DOCUMENTED: ICD-10-PCS | Mod: CPTII,,, | Performed by: NURSE PRACTITIONER

## 2022-12-08 PROCEDURE — 84443 ASSAY THYROID STIM HORMONE: CPT | Performed by: INTERNAL MEDICINE

## 2022-12-08 RX ORDER — LEVOTHYROXINE SODIUM 25 UG/1
TABLET ORAL
COMMUNITY
Start: 2022-11-28 | End: 2022-12-13

## 2022-12-08 RX ORDER — SODIUM, POTASSIUM,MAG SULFATES 17.5-3.13G
1 SOLUTION, RECONSTITUTED, ORAL ORAL DAILY
Qty: 1 KIT | Refills: 0 | Status: SHIPPED | OUTPATIENT
Start: 2022-12-08 | End: 2022-12-10

## 2022-12-08 NOTE — PATIENT INSTRUCTIONS
SUPREP Instructions    [facility] [address]  [address]    You are scheduled for a colonoscopy with Dr. Bird on 12/15/2022 at [facility].   To ensure that your test is accurate and complete, you MUST follow these instructions listed below.  If you have any questions, please call our office at [phone].  Plan on being at the hospital for your procedure for 3-4 hours.    1.  Follow a CLEAR LIQUID DIET for the entire day before your scheduled colonoscopy.  This means no solid food the entire day starting when you wake.  You may have as much of the clear liquids as you want throughout the day.   CLEAR LIQUID DIET:   - Avoid Red, Orange, Purple, and/or Blue food coloring   - NO DAIRY   - You can have:  Coffee with sugar (no creamer), tea, water, soda, apple or white grape juice, chicken or beef broth/bouillon (no meat, noodles, or veggies), green/yellow popsicles, green/yellow Jell-O, lemonade.    2.  AT 5 pm the evening before your colonoscopy, POUR ONE (1) BOTTLE OF SUPREP INTO THE MIXING CONTAINER, PROVIDED INSIDE THE BOX.  ADD WATER TO THE LINE ON THE CONTAINER AND MIX IT WELL.  DRINK THE ENTIRE CONTAINER AND THEN DRINK TWO (2) MORE CONTAINERS OF WATER OVER THE NEXT 1 HOUR.  This is sometimes easier to drink if this solution is cold, so you can mix the solution 20 minutes ahead of time and place in the refrigerator prior to drinking.  You have to drink the solution within 30-45 minutes of mixing it.  Do NOT put this solution over ice.  It IS ok to drink with a straw.    3.  The endoscopy department will call you 1 day before your colonoscopy to tell you the exact time to arrive, AND to tell you the exact time to drink the 2nd portion of your prep (which will be FIVE HOURS BEFORE YOUR ARRIVAL TIME).  At this time given to you, POUR ONE (1) BOTTLE OF SUPREP INTO THE MIXING CONTAINER, PROVIDED INSIDE THE BOX.  ADD WATER TO THE LINE ON THE CONTAINER AND MIX  IT WELL.  DRINK THE ENTIRE CONTAINER AND THEN DRINK TWO (2) MORE CONTAINERS OF WATER OVER THE NEXT 1 HOUR.  This is sometimes easier to drink if this solution is cold, so you can mix the solution 20 minutes ahead of time and place in the refrigerator prior to drinking.  You have to drink the solution within 30-45 minutes of mixing it.  Do NOT put this solution over ice.  It IS ok to drink with a straw.  Once this is complete, you may not have ANYTHING else by mouth!    4.  You must have someone with you to DRIVE YOU HOME since you will be receiving IV sedation for the colonoscopy.    5.  It is ok to take MOST of your REGULAR MEDICATIONS  in the morning of your test with a SIP of water.  THE ONLY MEDS YOU NEED TO HOLD ARE YOUR DIABETES MEDICATIONS,  SOME BLOOD PRESSURE MEDS, AND BLOOD THINNERS IF OK'D BY YOUR DOCTOR.  Do NOT have anything else to eat or drink the morning of your colonoscopy.  It is ok to brush your teeth.    6.  If you are on blood thinners THAT YOU HAVE BEEN INSTRUCTED TO HOLD BY YOUR DOCTOR FOR THIS PROCEDURE, then do NOT take this the morning of your colonoscopy.  Do NOT stop these medications on your own, they must be approved to be held by your doctor.  Your colonoscopy can NOT be done if you are on these medications.  Examples of blood thinners include: Coumadin, Aggrenox, Plavix, Pradaxa, Reapro, Pletal, Xarelto, Ticagrelor, Brilinta, Eliquis, and high dose aspirin (325 mg).  You do not have to stop baby aspirin 81 mg.    7.  IF YOU ARE DIABETIC:  NO INSULIN OR ORAL MEDICATIONS THE MORNING OF THE COLONOSCOPY.  TAKE ONLY HALF THE DOSE OF YOUR INSULIN THE DAY BEFORE THE COLONOSCOPY.  DO NOT TAKE ANY ORAL DIABETIC MEDICATIONS THE DAY BEFORE THE COLONOSCOPY.  IF YOU ARE AN INSULIN DEPENDENT DIABETIC WITH UNSTABLE BLOOD SUGARS, NOTIFY YOUR PRIMARY CARE PHYSICIAN FOR INSTRUCTIONS.    You will receive a call a few days before your colonoscopy to tell you the time to arrive.  If you have not received  a call by the day before your procedure, call the Pre-op Coordinator at [phone].

## 2022-12-08 NOTE — PROGRESS NOTES
Subjective:       Patient ID: Tessa Wynne is a 47 y.o. female.    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 Reports dull ache to lower abdomen after eating. Mild symptom improvement with dicyclomine prn. Alternating constipation and diarrhea. Previous stool studies negative (stool culture, HP, parasites). Has tried probiotics to improve stool pattern. Per patient last colonoscopy over 5 years ago normal; does not recall where colonoscopy completed.      Language translation via [employer].      Past Medical History:   Diagnosis Date    Elevated fasting glucose     GERD (gastroesophageal reflux disease)     Hypothyroidism     PAD (peripheral artery disease)     Pituitary lesion 9/7/2022       Past Surgical History:   Procedure Laterality Date    CHOLECYSTECTOMY      ENDOMETRIAL ABLATION N/A 2/8/2022    Procedure: ABLATION, ENDOMETRIUM;  Surgeon: Ariel Butler MD;  Location: [facility] OR;  Service: OB/GYN;  Laterality: N/A;    EXCISION, NEOPLASM, PITUITARY, TRANSSPHENOIDAL APPROACH, USING COMPUTER-ASSISTED NAVIGATION N/A 10/3/2022    Procedure: EXCISION, NEOPLASM, PITUITARY, TRANSSPHENOIDAL APPROACH, USING COMPUTER-ASSISTED NAVIGATION;  Surgeon: Ed Astorga MD;  Location: [facility] OR [address] [place] [address];  Service: ENT;  Laterality: N/A;    EXCISION, NEOPLASM, PITUITARY, TRANSSPHENOIDAL APPROACH, USING COMPUTER-ASSISTED NAVIGATION N/A 10/3/2022    Procedure: EXCISION, NEOPLASM, PITUITARY, TRANSSPHENOIDAL APPROACH, USING COMPUTER-ASSISTED NAVIGATION;  Surgeon: Miguel Ángel Ferrell DO;  Location: [facility] OR [address] [place] [address];  Service: Neurosurgery;  Laterality: N/A;    HYSTEROSCOPY WITH DILATION AND CURETTAGE OF UTERUS N/A 2/8/2022    Procedure: HYSTEROSCOPY, WITH DILATION AND CURETTAGE OF UTERUS;   "Surgeon: Ariel Butler MD;  Location: [facility] OR;  Service: OB/GYN;  Laterality: N/A;       Family History   Problem Relation Age of Onset    Diabetes Mother     Diabetes Mellitus Mother     Diabetes Father     Diabetes Mellitus Father     No Known Problems Sister     No Known Problems Sister     No Known Problems Brother     No Known Problems Brother     No Known Problems Daughter     No Known Problems Son     Colon cancer Neg Hx     Breast cancer Neg Hx     Ovarian cancer Neg Hx     Uterine cancer Neg Hx     Heart attack Neg Hx        Social History     Socioeconomic History    Marital status:     Number of children: 2   Occupational History    Occupation: cleaning housing    Tobacco Use    Smoking status: Never    Smokeless tobacco: Never   Substance and Sexual Activity    Alcohol use: No    Drug use: No    Sexual activity: Yes     Partners: Male     Birth control/protection: OCP   Social History Narrative    Patient is originally from [place] [address]         School at ; high school         College/university ;         Working ; [employer]                 Children    joban - 21    lidnsey - 13         Lives with     zeb and son and husabd        Diet/Exericse           Review of Systems   Constitutional:  Negative for appetite change and fever.   HENT:  Negative for trouble swallowing.    Respiratory:  Negative for shortness of breath.    Cardiovascular:  Negative for chest pain.   Gastrointestinal:  Positive for abdominal pain, constipation and diarrhea. Negative for nausea and vomiting.   Neurological:  Negative for headaches.   Hematological:  Negative for adenopathy.   Psychiatric/Behavioral:  Negative for dysphoric mood.        Objective:     Vitals:    12/08/22 1308   BP: 98/63   BP Location: Right arm   Patient Position: Sitting   BP Method: Large (Automatic)   Pulse: 67   Weight: 75 kg (165 lb 6.4 oz)   Height: 5' 4" (1.626 m)          Physical Exam  Constitutional:       General: She is not " in acute distress.     Appearance: Normal appearance. She is not ill-appearing.   HENT:      Head: Normocephalic.   Eyes:      Conjunctiva/sclera: Conjunctivae normal.      Pupils: Pupils are equal, round, and reactive to light.   Pulmonary:      Effort: Pulmonary effort is normal. No respiratory distress.   Musculoskeletal:         General: Normal range of motion.      Cervical back: Normal range of motion.   Neurological:      Mental Status: She is alert and oriented to person, place, and time.   Psychiatric:         Mood and Affect: Mood normal.         Behavior: Behavior normal.             Assessment:         ICD-10-CM ICD-9-CM   1. Irregular bowel habits  R19.8 569.89   2. Generalized abdominal pain  R10.84 789.07       Plan:       Irregular bowel habits  -     SUPREP BOWEL PREP KIT 17.5-3.13-1.6 gram SolR; Take 177 mLs by mouth once daily. for 2 days  Dispense: 1 kit; Refill: 0  -     Case Request Endoscopy: COLONOSCOPY    Generalized abdominal pain  -     Case Request Endoscopy: COLONOSCOPY    - Start daily fiber supplement. Continue dicyclomine prn. Schedule colonoscopy.     Follow up if symptoms worsen or fail to improve.     Patient's Medications   New Prescriptions    SUPREP BOWEL PREP KIT 17.5-3.13-1.6 GRAM SOLR    Take 177 mLs by mouth once daily. for 2 days   Previous Medications    ACETAMINOPHEN (TYLENOL) 325 MG TABLET    Take 2 tablets (650 mg total) by mouth every 6 (six) hours as needed (Headaches (take with magnesium and benadryl)).    CALCIUM CITRATE/VITAMIN D3 (CITRACAL REGULAR ORAL)    Take by mouth.    DICYCLOMINE (BENTYL) 20 MG TABLET    Take 1 tablet (20 mg total) by mouth 3 (three) times daily as needed (abdominal pain).    DIPHENHYDRAMINE (BENADRYL) 50 MG CAPSULE    Take 1 capsule (50 mg total) by mouth every 12 (twelve) hours as needed (Headaches (take with tylenol and magnesium)).    FERROUS SULFATE (IRON ORAL)    Take by mouth.    HYDROCORTISONE (CORTEF) 5 MG TAB    Take 1 tablet (5 mg  total) by mouth once daily. In the afternoon around 4 pm.    LEVOTHYROXINE (SYNTHROID) 25 MCG TABLET    Take by mouth.    MAGNESIUM OXIDE (MAG-OX) 400 MG (241.3 MG MAGNESIUM) TABLET    Take 1 tablet (400 mg total) by mouth 2 (two) times daily as needed (Headaches (take with tylenol and benadryl)).    MV,CALCIUM,MIN/IRON/FOLIC/VITK (ONE-A-DAY WOMEN'S COMPLETE ORAL)    Take by mouth.    OMEPRAZOLE (PRILOSEC) 40 MG CAPSULE    Take 1 capsule (40 mg total) by mouth every morning.    OXYMETAZOLINE (AFRIN) 0.05 % NASAL SPRAY    3 sprays by Nasal route every 2 (two) hours as needed (epistaxis).    SODIUM CHLORIDE (OCEAN) 0.65 % NASAL SPRAY    2 sprays by Nasal route every 4 (four) hours while awake.   Modified Medications    No medications on file   Discontinued Medications    No medications on file

## 2022-12-13 ENCOUNTER — OFFICE VISIT (OUTPATIENT)
Dept: NEUROLOGY | Facility: CLINIC | Age: 47
End: 2022-12-13
Payer: MEDICAID

## 2022-12-13 VITALS
HEART RATE: 73 BPM | SYSTOLIC BLOOD PRESSURE: 114 MMHG | BODY MASS INDEX: 27.31 KG/M2 | HEIGHT: 64 IN | DIASTOLIC BLOOD PRESSURE: 73 MMHG | WEIGHT: 160 LBS

## 2022-12-13 DIAGNOSIS — G44.59 OTHER COMPLICATED HEADACHE SYNDROME: Primary | ICD-10-CM

## 2022-12-13 DIAGNOSIS — Z86.018 S/P TRANSSPHENOIDAL SELECTIVE ADENOMECTOMY: ICD-10-CM

## 2022-12-13 DIAGNOSIS — Z98.890 S/P TRANSSPHENOIDAL SELECTIVE ADENOMECTOMY: ICD-10-CM

## 2022-12-13 PROCEDURE — 3008F BODY MASS INDEX DOCD: CPT | Mod: CPTII,,, | Performed by: PSYCHIATRY & NEUROLOGY

## 2022-12-13 PROCEDURE — 1159F MED LIST DOCD IN RCRD: CPT | Mod: CPTII,,, | Performed by: PSYCHIATRY & NEUROLOGY

## 2022-12-13 PROCEDURE — 3044F HG A1C LEVEL LT 7.0%: CPT | Mod: CPTII,,, | Performed by: PSYCHIATRY & NEUROLOGY

## 2022-12-13 PROCEDURE — 99215 OFFICE O/P EST HI 40 MIN: CPT | Mod: S$PBB,,, | Performed by: PSYCHIATRY & NEUROLOGY

## 2022-12-13 PROCEDURE — 1159F PR MEDICATION LIST DOCUMENTED IN MEDICAL RECORD: ICD-10-PCS | Mod: CPTII,,, | Performed by: PSYCHIATRY & NEUROLOGY

## 2022-12-13 PROCEDURE — 1160F PR REVIEW ALL MEDS BY PRESCRIBER/CLIN PHARMACIST DOCUMENTED: ICD-10-PCS | Mod: CPTII,,, | Performed by: PSYCHIATRY & NEUROLOGY

## 2022-12-13 PROCEDURE — 3044F PR MOST RECENT HEMOGLOBIN A1C LEVEL <7.0%: ICD-10-PCS | Mod: CPTII,,, | Performed by: PSYCHIATRY & NEUROLOGY

## 2022-12-13 PROCEDURE — 3074F PR MOST RECENT SYSTOLIC BLOOD PRESSURE < 130 MM HG: ICD-10-PCS | Mod: CPTII,,, | Performed by: PSYCHIATRY & NEUROLOGY

## 2022-12-13 PROCEDURE — 3074F SYST BP LT 130 MM HG: CPT | Mod: CPTII,,, | Performed by: PSYCHIATRY & NEUROLOGY

## 2022-12-13 PROCEDURE — 1160F RVW MEDS BY RX/DR IN RCRD: CPT | Mod: CPTII,,, | Performed by: PSYCHIATRY & NEUROLOGY

## 2022-12-13 PROCEDURE — 3078F DIAST BP <80 MM HG: CPT | Mod: CPTII,,, | Performed by: PSYCHIATRY & NEUROLOGY

## 2022-12-13 PROCEDURE — 99213 OFFICE O/P EST LOW 20 MIN: CPT | Mod: PBBFAC,PO | Performed by: PSYCHIATRY & NEUROLOGY

## 2022-12-13 PROCEDURE — 3078F PR MOST RECENT DIASTOLIC BLOOD PRESSURE < 80 MM HG: ICD-10-PCS | Mod: CPTII,,, | Performed by: PSYCHIATRY & NEUROLOGY

## 2022-12-13 PROCEDURE — 99215 PR OFFICE/OUTPT VISIT, EST, LEVL V, 40-54 MIN: ICD-10-PCS | Mod: S$PBB,,, | Performed by: PSYCHIATRY & NEUROLOGY

## 2022-12-13 PROCEDURE — 99999 PR PBB SHADOW E&M-EST. PATIENT-LVL III: CPT | Mod: PBBFAC,,, | Performed by: PSYCHIATRY & NEUROLOGY

## 2022-12-13 PROCEDURE — 99999 PR PBB SHADOW E&M-EST. PATIENT-LVL III: ICD-10-PCS | Mod: PBBFAC,,, | Performed by: PSYCHIATRY & NEUROLOGY

## 2022-12-13 PROCEDURE — 3008F PR BODY MASS INDEX (BMI) DOCUMENTED: ICD-10-PCS | Mod: CPTII,,, | Performed by: PSYCHIATRY & NEUROLOGY

## 2022-12-13 RX ORDER — GABAPENTIN 300 MG/1
CAPSULE ORAL
Qty: 168 CAPSULE | Refills: 0 | Status: SHIPPED | OUTPATIENT
Start: 2022-12-13 | End: 2023-05-23

## 2022-12-13 NOTE — PROGRESS NOTES
[facility]    CHIEF COMPLAINT?   Ms. Tessa Wynne chief concern is uncontrolled headache.     Name of the referring physician Miguel Ángel Ferrell,      Name of primary care provider Sabiha Overton MD      *Professional translation services were utilized during today's encounter via audio video feed, Yovany [id number]*    Subjective:    HPI:  Ms. Tessa Wynne presents today to discuss their ongoing uncontrolled headaches.  The patient shares that she was suffering with headaches for approximately 1 year prior to her October 3, 2022 pituitary macroadenoma resection that was accomplished through a transsphenoidal approach.  She was hopeful that headaches would subside.  She has however continued to suffer with daily head discomfort since the surgery.  Interestingly, she highlights that after each headache episode, she suffers with a mild itching sensation in the occipital region on the right.      Specifically questioned regarding electric, shock-like sensations of severe pain in the occipital regions; denied.    Currently denies any discharge from the nose.    When did they become more of a problem: >1yr ago  # of Total headache days per month: 30  # of Migraine or severe days per month: 20+  Intensity of average and most severe headaches: 3/10, 8-9/10  Duration of headache pain: short lasting, 3-4 minutes at a time usually; 30 minutes is the longest  Quality of pain: Pulsating/Throbbing  Laterality: sometimes unilateral, sometimes bilateral   Location: most often occipital on either side but can occur sporadically throughout the head  Photophobia and phonophobia: no  Nausea and vomiting: no  Causes avoidance of physical activity: no  Worsened by physical activity: no  Aura: no  Autonomic symptoms: no  Family Hx of migraines: none    No positional component: same sitting, standing, moving, etc     Preventive Medications failed: none  Acute medications failed: none    OTC Medications:  "tylenol (>15 days per month)  Is medication overuse contributing to the patient's current migraine burden: unsure; to meet full criteria, must be a named headache disorder    Current HA Regimen:  tylenol    LATEST IMAGING:  10/25/22 MRI pituitary w/wo:  "FINDINGS:  Finding: Brain is normal in contour.  Ventricles stable without hydrocephalus.  There is no restricted diffusion to suggest acute infarction.  No abnormal parenchymal susceptibility to suggest parenchymal hemorrhage.  No major intracranial skull base T2 flow voids are present.  No abnormal parenchymal enhancement  Sella: Evolving operative change status post transsphenoidal approach sellar/suprasellar mass resection.  There is continued heterogeneous signal predominantly hypoenhancing material along the posterior sphenoid sinus extending to the sella felt to represent postoperative packing material.  There is thin marginated enhancement about this which may be reactive in granulation tissue.  There is continued somewhat lobular enhancing material along the postoperative floor of the sella which may in part represent residual pituitary tissue though remains concerning for component of residual lesion.  The infundibulum is deviated to the right.  Compared to most recent prior the nodular enhancing material is similar measuring approximately 1.6 cm AP x 1.4 cm TV and 0.7 cm craniocaudal.  There is no significant mass effect on the suprasellar neurovascular structures clinical correlation and continued follow-up advised.  Impression:  MR sella: Evolving operative change from transsphenoidal sellar/suprasellar mass resection.  There is continued somewhat lobular hypoenhancing material within the sella remains concerning for possible residual lesion and residual pituitary tissue.  No evidence for significant mass effect on the suprasellar neurovascular structures or suprasellar extension.  Clinical correlation and continued follow-up advised  MRI brain: " "Otherwise unremarkable MRI of the brain specifically without evidence for hydrocephalus or new parenchymal signal abnormality."    PAST MEDICAL HISTORY:  Past Medical History:   Diagnosis Date    Elevated fasting glucose     GERD (gastroesophageal reflux disease)     Hypothyroidism     PAD (peripheral artery disease)     Pituitary lesion 9/7/2022       PAST SURGICAL HISTORY:  Past Surgical History:   Procedure Laterality Date    CHOLECYSTECTOMY      ENDOMETRIAL ABLATION N/A 2/8/2022    Procedure: ABLATION, ENDOMETRIUM;  Surgeon: Ariel Butler MD;  Location: [facility] OR;  Service: OB/GYN;  Laterality: N/A;    EXCISION, NEOPLASM, PITUITARY, TRANSSPHENOIDAL APPROACH, USING COMPUTER-ASSISTED NAVIGATION N/A 10/3/2022    Procedure: EXCISION, NEOPLASM, PITUITARY, TRANSSPHENOIDAL APPROACH, USING COMPUTER-ASSISTED NAVIGATION;  Surgeon: Ed Astorga MD;  Location: [facility] OR [address] [place] [address];  Service: ENT;  Laterality: N/A;    EXCISION, NEOPLASM, PITUITARY, TRANSSPHENOIDAL APPROACH, USING COMPUTER-ASSISTED NAVIGATION N/A 10/3/2022    Procedure: EXCISION, NEOPLASM, PITUITARY, TRANSSPHENOIDAL APPROACH, USING COMPUTER-ASSISTED NAVIGATION;  Surgeon: Miguel Ángel Ferrell DO;  Location: [facility] OR [address] [place] [address];  Service: Neurosurgery;  Laterality: N/A;    HYSTEROSCOPY WITH DILATION AND CURETTAGE OF UTERUS N/A 2/8/2022    Procedure: HYSTEROSCOPY, WITH DILATION AND CURETTAGE OF UTERUS;  Surgeon: Ariel Butler MD;  Location: [facility];  Service: OB/GYN;  Laterality: N/A;       CURRENT MEDS:  Current Outpatient Medications   Medication Sig Dispense Refill    calcium citrate/vitamin D3 (CITRACAL REGULAR ORAL) Take by mouth.      dicyclomine (BENTYL) 20 mg tablet Take 1 tablet (20 mg total) by mouth 3 (three) times daily as needed (abdominal pain). 60 tablet 2    ferrous sulfate (IRON ORAL) Take by mouth.      mv,calcium,min/iron/folic/vitK (ONE-A-DAY WOMEN'S COMPLETE ORAL) Take by mouth.      omeprazole (PRILOSEC) 40 MG capsule Take 1 capsule (40 mg " "total) by mouth every morning. 30 capsule 11     No current facility-administered medications for this visit.       ALLERGIES:  Review of patient's allergies indicates:  No Known Allergies    FAMILY HISTORY:  Family History   Problem Relation Age of Onset    Diabetes Mother     Diabetes Mellitus Mother     Diabetes Father     Diabetes Mellitus Father     No Known Problems Sister     No Known Problems Sister     No Known Problems Brother     No Known Problems Brother     No Known Problems Daughter     No Known Problems Son     Colon cancer Neg Hx     Breast cancer Neg Hx     Ovarian cancer Neg Hx     Uterine cancer Neg Hx     Heart attack Neg Hx        SOCIAL HISTORY:  Social History     Tobacco Use    Smoking status: Never    Smokeless tobacco: Never   Substance Use Topics    Alcohol use: No    Drug use: No       Review of Systems:  Gen: no fever, no chills, no generalized feeling of weakness   HEENT: no double vision, no blurred vision, no eye pain, no eye exudates.    Heart: no chest pain, no SOB    Lungs: no SOB, no cough    MSK: no weakness of legs, intact ROM    ABD: no abd pain, no N/V/D/C, no difficulty with defecation .    Extremities: No leg pain, no edema.    Objective:  PHYSICAL EXAMINATION??   Tessa Wynne is a 47 y.o. female patient who has the following vital signs with   Vitals:    12/13/22 1348   BP: 114/73   Pulse: 73   Weight: 72.6 kg (160 lb)   Height: 5' 4" (1.626 m)   , body surface area is 1.81 meters squared.     General: female in NAD, alert and awake, Aox3, well groomed. ?    ? ?    HEENT: Head is NC/AT EOMI, pupil size: 4 mm B/L, no nystagmus noted; hearing grossly intact b/l. Mucous membrane moist, uvula midline, no pharyngeal erythema,exudates or discharges.   Fundi normal      Neck: Supple. no nuchal rigidity.      Cardiovascular: well perfused, no cyanosis        Respiratory: Symmetric chest rise noted       Musculoskeletal: Muscle tone noted to be adequate for patient age, muscle " mass is WNL. No spontaneous movements or fasciculations noted during this examination.       Extremities: No pedal edema or calf tenderness. No cogwheel rigidity noted on B/L UE extremities.       Neurological Examination.    Mental status: AA&O x3; Affect/mood is euthymic/congruent; no aphasia noted during examination. Patient answers simple questions appropriately & follows simple commands; no dysarthria or expressive aphasia; no maru-neglect or extinction. Vocabulary/word finding: excellent.       Cranial Nerves: II-XII grossly intact bilaterally.     Muscle Function: Tone WNL and Muscle bulk WNL. Full and symmetric strength, 5/5, throughout.      Sensory: ?light touch is grossly intact in bilateral upper and lower extremities and face.     Reflexes: Left and Right biceps, triceps, brachioradialis are 2+/4. patellar 2+/4 on Left and 2+/4 on Right     Coordination: no dysmetria (finger to nose negative)     Gait: adequate casual gait with stride length and arm swing WNL. Tandem gait and walking on toes and heels are WNL.    REVIEW   We reviewed their current medical history, medications, allergies, past medical history, surgical history, social history, family history, and review of systems, and updated all the information.     ASSESSMENT AND PLAN   Tessa? is a very pleasant, 47 y.o. with complicated diagnosis of     1. Other complicated headache syndrome  gabapentin (NEURONTIN) 300 MG capsule      2. S/P transsphenoidal selective adenomectomy  Ambulatory referral/consult to Neurology        Tessa has headaches that do not clearly meet criteria for any names International Headache Society diagnosis.  Description of symptoms may lend to occipital neuropathy; no true neuralgiform symptoms are currently experienced.  No indication for occipital nerve blocks at this time.  Medication overuse is likely contributing to this patient's headaches.     Patient feels not only severe disabling pain, but is also unable to  function due to headaches when they are present.     My approach to every patient is multimodal.   I focused our discussion on addressing modifiable risk factors and trying to decrease them. After discussion with the patient, I recommend the followin. Preventive medications:    RESTART:  Gabapentin   We had an extensive discussion regarding her experience with previous low doses of gabapentin.  We agreed to try a trial over the next month of 300 3 times daily for a couple of weeks before proceeding to 600 3 times daily if needed.     2. Acute (abortive) medications: None given the average short duration of each episode    3. May consider neuromodulation such as cefaly in the future if needed    4. Medication overuse (Tylenol) is likely having a negative contribution to these headache cycles.  The patient was specifically counseled on reducing Tylenol usage to under 15 days per month, preferably under 10 days per month.    Benefits, side effects, and alternatives were discussed extensively with the patient.?     Tessa verbally endorsed understanding of all the recommendations.?     she is going to follow up with our clinic in 1 month or sooner as needed?     I spent a total of 45 minutes on the day of the visit. This includes face to face time and non-face to face time preparing to see the patient (eg, review of tests), obtaining and/or reviewing separately obtained history, documenting clinical information in the electronic or other health record, independently interpreting results and communicating results to the patient/family/caregiver, or care coordinator.    A dictation device was used to produce this documentation which can sometimes result in grammatical errors or the replacement of similar sounding words.    Joe Ngo, DO  Headache Medicine

## 2022-12-16 ENCOUNTER — TELEPHONE (OUTPATIENT)
Dept: GASTROENTEROLOGY | Facility: CLINIC | Age: 47
End: 2022-12-16
Payer: MEDICAID

## 2022-12-16 NOTE — TELEPHONE ENCOUNTER
----- Message from RIAN Sullivan sent at 12/15/2022 12:57 PM CST -----  Schedule follow up in 2 weeks.

## 2022-12-21 LAB
HPV HR 12 DNA SPEC QL NAA+PROBE: NEGATIVE
HPV16 AG SPEC QL: NEGATIVE
HPV18 DNA SPEC QL NAA+PROBE: NEGATIVE

## 2023-01-05 ENCOUNTER — HOSPITAL ENCOUNTER (OUTPATIENT)
Dept: RADIOLOGY | Facility: HOSPITAL | Age: 48
Discharge: HOME | End: 2023-01-05
Attending: OBSTETRICS & GYNECOLOGY
Payer: MEDICAID

## 2023-01-05 DIAGNOSIS — Z12.31 OTHER SCREENING MAMMOGRAM: ICD-10-CM

## 2023-01-05 PROCEDURE — 77067 MAMMO DIGITAL SCREENING BILAT WITH TOMO: ICD-10-PCS | Mod: 26,,, | Performed by: RADIOLOGY

## 2023-01-05 PROCEDURE — 77067 SCR MAMMO BI INCL CAD: CPT | Mod: 26,,, | Performed by: RADIOLOGY

## 2023-01-05 PROCEDURE — 77063 MAMMO DIGITAL SCREENING BILAT WITH TOMO: ICD-10-PCS | Mod: 26,,, | Performed by: RADIOLOGY

## 2023-01-05 PROCEDURE — 77067 SCR MAMMO BI INCL CAD: CPT | Mod: TC

## 2023-01-05 PROCEDURE — 77063 BREAST TOMOSYNTHESIS BI: CPT | Mod: 26,,, | Performed by: RADIOLOGY

## 2023-01-31 ENCOUNTER — OFFICE VISIT (OUTPATIENT)
Dept: GASTROENTEROLOGY | Facility: CLINIC | Age: 48
End: 2023-01-31
Payer: MEDICAID

## 2023-01-31 VITALS
WEIGHT: 162.63 LBS | SYSTOLIC BLOOD PRESSURE: 106 MMHG | HEART RATE: 69 BPM | DIASTOLIC BLOOD PRESSURE: 66 MMHG | HEIGHT: 64 IN | BODY MASS INDEX: 27.77 KG/M2

## 2023-01-31 DIAGNOSIS — K59.04 CHRONIC IDIOPATHIC CONSTIPATION: Primary | ICD-10-CM

## 2023-01-31 DIAGNOSIS — K21.9 GASTROESOPHAGEAL REFLUX DISEASE, UNSPECIFIED WHETHER ESOPHAGITIS PRESENT: ICD-10-CM

## 2023-01-31 PROCEDURE — 99999 PR PBB SHADOW E&M-EST. PATIENT-LVL III: ICD-10-PCS | Mod: PBBFAC,,, | Performed by: NURSE PRACTITIONER

## 2023-01-31 PROCEDURE — 99213 OFFICE O/P EST LOW 20 MIN: CPT | Mod: PBBFAC,PO | Performed by: NURSE PRACTITIONER

## 2023-01-31 PROCEDURE — 99214 OFFICE O/P EST MOD 30 MIN: CPT | Mod: S$PBB,,, | Performed by: NURSE PRACTITIONER

## 2023-01-31 PROCEDURE — 99214 PR OFFICE/OUTPT VISIT, EST, LEVL IV, 30-39 MIN: ICD-10-PCS | Mod: S$PBB,,, | Performed by: NURSE PRACTITIONER

## 2023-01-31 PROCEDURE — 99999 PR PBB SHADOW E&M-EST. PATIENT-LVL III: CPT | Mod: PBBFAC,,, | Performed by: NURSE PRACTITIONER

## 2023-01-31 RX ORDER — OMEPRAZOLE 40 MG/1
40 CAPSULE, DELAYED RELEASE ORAL EVERY MORNING
Qty: 30 CAPSULE | Refills: 11 | Status: SHIPPED | OUTPATIENT
Start: 2023-01-31 | End: 2024-02-20

## 2023-01-31 NOTE — PROGRESS NOTES
Subjective:       Patient ID: Tessa Wynne is a 47 y.o. female.    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 S/p colonoscopy 12/15 that showed diverticulosis in the sigmoid colon and internal hemorrhoids. No abdominal pain. Normal BMs daily since starting fiber supplement, Citrucel. Takes PPI daily for GERD.     Language translation via [employer].      Past Medical History:   Diagnosis Date    Elevated fasting glucose     GERD (gastroesophageal reflux disease)     Hypothyroidism     PAD (peripheral artery disease)     Pituitary lesion 9/7/2022       Past Surgical History:   Procedure Laterality Date    CHOLECYSTECTOMY      COLONOSCOPY N/A 12/15/2022    Procedure: COLONOSCOPY;  Surgeon: Kerrie Bird MD;  Location: [facility];  Service: Endoscopy;  Laterality: N/A;    ENDOMETRIAL ABLATION N/A 2/8/2022    Procedure: ABLATION, ENDOMETRIUM;  Surgeon: Ariel Butler MD;  Location: [facility] OR;  Service: OB/GYN;  Laterality: N/A;    EXCISION, NEOPLASM, PITUITARY, TRANSSPHENOIDAL APPROACH, USING COMPUTER-ASSISTED NAVIGATION N/A 10/3/2022    Procedure: EXCISION, NEOPLASM, PITUITARY, TRANSSPHENOIDAL APPROACH, USING COMPUTER-ASSISTED NAVIGATION;  Surgeon: Ed Astorga MD;  Location: [facility] OR [address] [place] [address];  Service: ENT;  Laterality: N/A;    EXCISION, NEOPLASM, PITUITARY, TRANSSPHENOIDAL APPROACH, USING COMPUTER-ASSISTED NAVIGATION N/A 10/3/2022    Procedure: EXCISION, NEOPLASM, PITUITARY, TRANSSPHENOIDAL APPROACH, USING COMPUTER-ASSISTED NAVIGATION;  Surgeon: Miguel Ángel Ferrell DO;  Location: [facility] OR [address] [place] [address];  Service: Neurosurgery;  Laterality: N/A;    HYSTEROSCOPY WITH DILATION AND CURETTAGE OF UTERUS N/A 2/8/2022    Procedure: HYSTEROSCOPY, WITH DILATION AND CURETTAGE OF UTERUS;  Surgeon: Ariel Butler MD;   "Location: [facility] OR;  Service: OB/GYN;  Laterality: N/A;       Family History   Problem Relation Age of Onset    Diabetes Mother     Diabetes Mellitus Mother     Diabetes Father     Diabetes Mellitus Father     No Known Problems Sister     No Known Problems Sister     No Known Problems Brother     No Known Problems Brother     No Known Problems Daughter     No Known Problems Son     Colon cancer Neg Hx     Breast cancer Neg Hx     Ovarian cancer Neg Hx     Uterine cancer Neg Hx     Heart attack Neg Hx        Social History     Socioeconomic History    Marital status:     Number of children: 2   Occupational History    Occupation: cleaning housing    Tobacco Use    Smoking status: Never    Smokeless tobacco: Never   Substance and Sexual Activity    Alcohol use: No    Drug use: No    Sexual activity: Yes     Partners: Male     Birth control/protection: OCP   Social History Narrative    Patient is originally from [place] [id number]         School at ; high school         College/university ;         Working ; [id number]                 Children    joban - 21    lidnsey - 13         Lives with     zeb and son and finn        Diet/Exericse           Review of Systems   Constitutional:  Negative for appetite change and fever.   HENT:  Negative for trouble swallowing.    Respiratory:  Negative for shortness of breath.    Cardiovascular:  Negative for chest pain.   Gastrointestinal:  Negative for abdominal pain, constipation, diarrhea, nausea and vomiting.   Neurological:  Negative for headaches.   Hematological:  Negative for adenopathy.   Psychiatric/Behavioral:  Negative for dysphoric mood.        Objective:     Vitals:    01/31/23 1312   BP: 106/66   BP Location: Right arm   Patient Position: Sitting   BP Method: Large (Automatic)   Pulse: 69   Weight: 73.8 kg (162 lb 9.6 oz)   Height: 5' 4" (1.626 m)          Physical Exam  Constitutional:       General: She is not in acute distress.     Appearance: Normal " appearance. She is not ill-appearing.   HENT:      Head: Normocephalic.   Eyes:      Conjunctiva/sclera: Conjunctivae normal.      Pupils: Pupils are equal, round, and reactive to light.   Pulmonary:      Effort: Pulmonary effort is normal. No respiratory distress.   Musculoskeletal:         General: Normal range of motion.      Cervical back: Normal range of motion.   Neurological:      Mental Status: She is alert and oriented to person, place, and time.   Psychiatric:         Mood and Affect: Mood normal.         Behavior: Behavior normal.             Assessment:         ICD-10-CM ICD-9-CM   1. Chronic idiopathic constipation  K59.04 564.00   2. Gastroesophageal reflux disease, unspecified whether esophagitis present  K21.9 530.81       Plan:       Chronic idiopathic constipation        -     Continue daily fiber supplement    Gastroesophageal reflux disease, unspecified whether esophagitis present  -     omeprazole (PRILOSEC) 40 MG capsule; Take 1 capsule (40 mg total) by mouth every morning.  Dispense: 30 capsule; Refill: 11      Follow up if symptoms worsen or fail to improve.     Patient's Medications   New Prescriptions    No medications on file   Previous Medications    CALCIUM CITRATE/VITAMIN D3 (CITRACAL REGULAR ORAL)    Take by mouth.    DICYCLOMINE (BENTYL) 20 MG TABLET    Take 1 tablet (20 mg total) by mouth 3 (three) times daily as needed (abdominal pain).    FERROUS SULFATE (IRON ORAL)    Take by mouth.    GABAPENTIN (NEURONTIN) 300 MG CAPSULE    Take 1 capsule (300 mg total) by mouth 3 (three) times daily for 14 days, THEN 2 capsules (600 mg total) 3 (three) times daily for 21 days.    MV,CALCIUM,MIN/IRON/FOLIC/VITK (ONE-A-DAY WOMEN'S COMPLETE ORAL)    Take by mouth.   Modified Medications    Modified Medication Previous Medication    OMEPRAZOLE (PRILOSEC) 40 MG CAPSULE omeprazole (PRILOSEC) 40 MG capsule       Take 1 capsule (40 mg total) by mouth every morning.    Take 1 capsule (40 mg total) by  mouth every morning.   Discontinued Medications    No medications on file

## 2023-02-22 ENCOUNTER — LAB VISIT (OUTPATIENT)
Dept: LAB | Facility: HOSPITAL | Age: 48
End: 2023-02-22
Attending: INTERNAL MEDICINE
Payer: MEDICAID

## 2023-02-22 DIAGNOSIS — E23.7 PITUITARY LESION: ICD-10-CM

## 2023-02-22 LAB
T4 FREE SERPL-MCNC: 0.81 NG/DL (ref 0.71–1.51)
TSH SERPL DL<=0.005 MIU/L-ACNC: 1.78 UIU/ML (ref 0.4–4)

## 2023-02-22 PROCEDURE — 84443 ASSAY THYROID STIM HORMONE: CPT | Performed by: INTERNAL MEDICINE

## 2023-02-22 PROCEDURE — 36415 COLL VENOUS BLD VENIPUNCTURE: CPT | Performed by: INTERNAL MEDICINE

## 2023-02-22 PROCEDURE — 84439 ASSAY OF FREE THYROXINE: CPT | Performed by: INTERNAL MEDICINE

## 2023-02-28 ENCOUNTER — OFFICE VISIT (OUTPATIENT)
Dept: ENDOCRINOLOGY | Facility: CLINIC | Age: 48
End: 2023-02-28
Payer: MEDICAID

## 2023-02-28 VITALS
DIASTOLIC BLOOD PRESSURE: 73 MMHG | OXYGEN SATURATION: 99 % | HEIGHT: 64 IN | BODY MASS INDEX: 27.4 KG/M2 | SYSTOLIC BLOOD PRESSURE: 110 MMHG | HEART RATE: 78 BPM | WEIGHT: 160.5 LBS

## 2023-02-28 DIAGNOSIS — Z98.890 S/P TRANSSPHENOIDAL SELECTIVE ADENOMECTOMY: ICD-10-CM

## 2023-02-28 DIAGNOSIS — E23.7 PITUITARY LESION: Primary | ICD-10-CM

## 2023-02-28 DIAGNOSIS — Z86.018 S/P TRANSSPHENOIDAL SELECTIVE ADENOMECTOMY: ICD-10-CM

## 2023-02-28 PROCEDURE — 3078F PR MOST RECENT DIASTOLIC BLOOD PRESSURE < 80 MM HG: ICD-10-PCS | Mod: CPTII,,, | Performed by: INTERNAL MEDICINE

## 2023-02-28 PROCEDURE — 99213 OFFICE O/P EST LOW 20 MIN: CPT | Mod: S$PBB,,, | Performed by: INTERNAL MEDICINE

## 2023-02-28 PROCEDURE — 3008F PR BODY MASS INDEX (BMI) DOCUMENTED: ICD-10-PCS | Mod: CPTII,,, | Performed by: INTERNAL MEDICINE

## 2023-02-28 PROCEDURE — 3008F BODY MASS INDEX DOCD: CPT | Mod: CPTII,,, | Performed by: INTERNAL MEDICINE

## 2023-02-28 PROCEDURE — 3074F SYST BP LT 130 MM HG: CPT | Mod: CPTII,,, | Performed by: INTERNAL MEDICINE

## 2023-02-28 PROCEDURE — 99999 PR PBB SHADOW E&M-EST. PATIENT-LVL III: CPT | Mod: PBBFAC,,, | Performed by: INTERNAL MEDICINE

## 2023-02-28 PROCEDURE — 3078F DIAST BP <80 MM HG: CPT | Mod: CPTII,,, | Performed by: INTERNAL MEDICINE

## 2023-02-28 PROCEDURE — 99213 PR OFFICE/OUTPT VISIT, EST, LEVL III, 20-29 MIN: ICD-10-PCS | Mod: S$PBB,,, | Performed by: INTERNAL MEDICINE

## 2023-02-28 PROCEDURE — 99999 PR PBB SHADOW E&M-EST. PATIENT-LVL III: ICD-10-PCS | Mod: PBBFAC,,, | Performed by: INTERNAL MEDICINE

## 2023-02-28 PROCEDURE — 3074F PR MOST RECENT SYSTOLIC BLOOD PRESSURE < 130 MM HG: ICD-10-PCS | Mod: CPTII,,, | Performed by: INTERNAL MEDICINE

## 2023-02-28 PROCEDURE — 99213 OFFICE O/P EST LOW 20 MIN: CPT | Mod: PBBFAC | Performed by: INTERNAL MEDICINE

## 2023-02-28 NOTE — PROGRESS NOTES
[facility] ENDOCRINOLOGY FOLLOW UP  02/28/2023     The patient was seen in the multidisciplinary pituitary clinic with Dr. Ferrell today.       Due to language barrier, an  was present during the entire visit.     The patient's last visit with me was on 11/22/2022.     Subjective:      CC: follow up s/p TSR for pituitary macroadenoma causing vision loss    HPI:   Tessa Wynne is a 47 y.o. female who presents for evaluation of pituitary lesion compressing optic chiasm, found on imaging done after a syncopal episode.     Initial presentation:   Brain imaging done for syncope and HA.  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  Preoperative labs notable for elevated prolactin from stalk effect but otherwise normal    Underwent TSR by Dr. Ferrell on 10/3/22 ([facility] path - GONADOTROPH ADENOMA, IMMUNOREACTIVE FOR ALPHA-SUBUNIT AND SF1).  The patient did not receive any perioperative glucocorticoids and had no evidence of adrenal insufficiency in the hospital. The patient did not develop diabetes insipidus but presented to the ED on 10/8 for severe HA, CP, and HTN, found to have Na of 129 (on d/c Na was 140).      Has been admitted for hyponatremia that resolved after starting hydrocortisone.   She then developed symptoms of diabetes insipidus and was prescribed DDAVP which she was advised not to take when she was found to have hyponatremia but due to confusion she was taking DDAVP and was readmitted with hyponatremia.  This resolved after discontinuing DDAVP.  Did not require Na restriction.  Now normal Na    Preop she was started on levothyroxine 75 mcg daily for secondary hypothyroidism. This was reduced to 25 mcg daily by her primary care provider after she reported symptoms on 75 mcg.  D/c from hospital on levothyroxine 50 mcg daily.  Now no longer requiring levothyroxine.     Interval hx:  At last visit levothyroxine stopped.   She had normal ACTH and cortisol after holding hydrocortisone  "day prior to hydrocortisone was stopped. She was referred to neurology for headaches.  HA now significantly improved on gabapentin, no longer requiring it it.      denies symptoms of adrenal insufficiency (no lightheadedness, N/V/abd pain, hypotension).  No unexplained weight loss. Some intentional weight loss with walking     She is feeling great now with no complaints    No symptoms of DI (denies polyuria/polydipsia).     Only mild HA now about 2x/wk    Imaging:   10/25/22  Evolving operative change from transsphenoidal sellar/suprasellar mass resection.  There is continued somewhat lobular hypoenhancing material within the sella remains concerning for possible residual lesion and residual pituitary tissue.  No evidence for significant mass effect on the suprasellar neurovascular structures or suprasellar extension.  Clinical correlation and continued follow-up advised       MRI 8/15/22  Sella: There is a large sellar lesion with suprasellar extension.  Lesion measures roughly 3.2 x 2.8 x 3.5 cm.  There is mass effect on the adjacent structures including the optic chiasm which is displaced superiorly as well as the bilateral A1 segments of the anterior cerebral arteries.  There is possible extension into the cavernous sinuses bilaterally best visualized on coronal T2 series 10.  The carotid flow voids are preserved.  There is bony remodeling of the adjacent clivus secondary to this lesion with preserved marrow signal.    Headache:    Less severe and no longer bothersome    Vision change:   Denies (did not notice any change before surgery)    Formal Visual burns:   HVF w/ Dr. Yan 11/18/2022   Formal visual fields with dramatic improvement in visual field defects! OCT stable. She will monitor for any subjective changes in vision and I will monitor her in clinic moving forward. RTC 6 month(s)     8/25/22, per note:  " OCT with borderline RNFL thinning OD>OS. HVF with generalized depression OD (worse temporally) " "and temporal depression OS. Findings consistent with chiasmal compression." rtc in 3 month(s)      Current symptoms:  Hyperprolactinemia:  []  breast tenderness []  nipple discharge []  Menstrual Irregularity [x]  Denies   Initially noticed blx nipple dc for a few years around time of menstrual cycle   Reports that after D&C in 2/2022 galactorrhea resolved  Resolved since surgery    Lab Results   Component Value Date    PROLACTIN 15.7 11/28/2022    PROLACTIN 138.0 (H) 08/26/2022     Thyroid:  No longer on levothyroxine   []  fatigue []  weight change []  temp intolerance  [x]  Denies    []  BM change []  skin/hair change [] palpitations []  tremor [x]  Denies      Lab Results   Component Value Date    TSH 1.776 02/22/2023    FREET4 0.81 02/22/2023       Growth Hormone Excess:  []  increase hand/foot size []  teeth spacing change    [x]  Denies    []  worse glycemic control []  joint pain     [x]  Denies    Last IGF-1:   Lab Results   Component Value Date    SOMATMDN 42 (L) 08/26/2022        Gonadotrophs:     [x]  Irregular menses []  Postmenopausal  []  Decreased libido   []  ED   []  Denies    Previously regular then Irregular menses for 4 month(s) prior to d&c in 2/2022 (prior to that 3 month(s) of bleeding)  Repro hx: 2 children, no problems with conception  LMP: prior to D&C          Review of patient's allergies indicates:  No Known Allergies      Current Outpatient Medications:     calcium citrate/vitamin D3 (CITRACAL REGULAR ORAL), Take by mouth., Disp: , Rfl:     dicyclomine (BENTYL) 20 mg tablet, Take 1 tablet (20 mg total) by mouth 3 (three) times daily as needed (abdominal pain)., Disp: 60 tablet, Rfl: 2    ferrous sulfate (IRON ORAL), Take by mouth., Disp: , Rfl:     mv,calcium,min/iron/folic/vitK (ONE-A-DAY WOMEN'S COMPLETE ORAL), Take by mouth., Disp: , Rfl:     omeprazole (PRILOSEC) 40 MG capsule, Take 1 capsule (40 mg total) by mouth every morning., Disp: 30 capsule, Rfl: 11    gabapentin (NEURONTIN) " "300 MG capsule, Take 1 capsule (300 mg total) by mouth 3 (three) times daily for 14 days, THEN 2 capsules (600 mg total) 3 (three) times daily for 21 days., Disp: 168 capsule, Rfl: 0    ROS:  see HPI    Objective:   Physical Exam   /73 (BP Location: Left arm, Patient Position: Sitting, BP Method: Medium (Manual))   Pulse 78   Ht 5' 4" (1.626 m)   Wt 72.8 kg (160 lb 7.9 oz)   LMP 01/15/2022   SpO2 99%   BMI 27.55 kg/m²   Wt Readings from Last 3 Encounters:   02/28/23 72.8 kg (160 lb 7.9 oz)   01/31/23 73.8 kg (162 lb 9.6 oz)   12/15/22 72.3 kg (159 lb 6.3 oz)   ]    Constitutional:  Pleasant,  in no acute distress.   HENT:   Eyes:     No scleral icterus.   Respiratory:   Effort normal   Neurological:  normal speech  Psych:  Normal mood and affect.      LABORATORY REVIEW:    Chemistry        Component Value Date/Time     11/28/2022 0804    K 4.2 11/28/2022 0804     11/28/2022 0804    CO2 24 11/28/2022 0804    BUN 11 11/28/2022 0804    CREATININE 1.0 11/28/2022 0804    GLU 89 11/28/2022 0804        Component Value Date/Time    CALCIUM 9.3 11/28/2022 0804    ALKPHOS 61 11/28/2022 0804    AST 33 11/28/2022 0804    ALT 39 11/28/2022 0804    BILITOT 0.4 11/28/2022 0804    ESTGFRAFRICA >60 07/29/2022 0827    EGFRNONAA >60 07/29/2022 0827        MRI 8/15/22- preop       MRI 10/25/22 - postop  Evolving operative change status post transsphenoidal approach sellar/suprasellar mass resection.  There is continued heterogeneous signal predominantly hypoenhancing material along the posterior sphenoid sinus extending to the sella felt to represent postoperative packing material.  There is thin marginated enhancement about this which may be reactive in granulation tissue.  There is continued somewhat lobular enhancing material along the postoperative floor of the sella which may in part represent residual pituitary tissue though remains concerning for component of residual lesion.  The infundibulum is deviated " to the right.  Compared to most recent prior the nodular enhancing material is similar measuring approximately 1.6 cm AP x 1.4 cm TV and 0.7 cm craniocaudal.  There is no significant mass effect on the suprasellar neurovascular structures clinical correlation and continued follow-up advised.          Assessment/Plan:     Problem List Items Addressed This Visit          3     S/P transsphenoidal selective adenomectomy     Clinically doing well now w/o diabetes insipidus or SIADH.  Thyroid labs normal off replacement.      Not requiring any pituitary hormone replacement with las MRI showing decompression of optic chiasm and small surgical cavity.        Will continue to monitor with periodic labs and repeat hvf (blake visual fields) as planned with Dr. Yan          Other Visit Diagnoses       Pituitary lesion    -  Primary    Relevant Orders    Basic Metabolic Panel (Completed)    ACTH (Completed)    CORTISOL, 8AM (Completed)    Insulin-Like Growth Factor (Completed)              RTC in 3 month(s) same day as Dr. Ferrell and MRI   Needs labs 8 am fasting 1 week prior b for ACTH, cortisol, igf-1, BMP     Jimmy Batista MD

## 2023-02-28 NOTE — Clinical Note
RTC in 3 month(s) same day as Dr. Ferrell and MRI  Needs labs 8 am fasting 1 week prior b for ACTH, cortisol, igf-1, BMP

## 2023-03-23 ENCOUNTER — TELEPHONE (OUTPATIENT)
Dept: OPHTHALMOLOGY | Facility: CLINIC | Age: 48
End: 2023-03-23
Payer: MEDICAID

## 2023-03-23 NOTE — TELEPHONE ENCOUNTER
----- Message from Bill Narayanan sent at 3/23/2023  2:17 PM CDT -----  Contact: [phone]  Pt is calling to schedule her yearly with Dr. Yan for HVF and OCT but nothing is showing up. Please call back to further assist.

## 2023-04-10 ENCOUNTER — OFFICE VISIT (OUTPATIENT)
Dept: OPTOMETRY | Facility: CLINIC | Age: 48
End: 2023-04-10
Payer: MEDICAID

## 2023-04-10 DIAGNOSIS — H11.001 PTERYGIUM EYE, RIGHT: Primary | ICD-10-CM

## 2023-04-10 PROCEDURE — 1159F MED LIST DOCD IN RCRD: CPT | Mod: CPTII,,, | Performed by: OPTOMETRIST

## 2023-04-10 PROCEDURE — 1159F PR MEDICATION LIST DOCUMENTED IN MEDICAL RECORD: ICD-10-PCS | Mod: CPTII,,, | Performed by: OPTOMETRIST

## 2023-04-10 PROCEDURE — 99212 PR OFFICE/OUTPT VISIT, EST, LEVL II, 10-19 MIN: ICD-10-PCS | Mod: S$PBB,,, | Performed by: OPTOMETRIST

## 2023-04-10 PROCEDURE — 99999 PR PBB SHADOW E&M-EST. PATIENT-LVL II: CPT | Mod: PBBFAC,,, | Performed by: OPTOMETRIST

## 2023-04-10 PROCEDURE — 99212 OFFICE O/P EST SF 10 MIN: CPT | Mod: PBBFAC,PN | Performed by: OPTOMETRIST

## 2023-04-10 PROCEDURE — 99999 PR PBB SHADOW E&M-EST. PATIENT-LVL II: ICD-10-PCS | Mod: PBBFAC,,, | Performed by: OPTOMETRIST

## 2023-04-10 PROCEDURE — 99212 OFFICE O/P EST SF 10 MIN: CPT | Mod: S$PBB,,, | Performed by: OPTOMETRIST

## 2023-04-10 RX ORDER — PREDNISOLONE ACETATE 10 MG/ML
1 SUSPENSION/ DROPS OPHTHALMIC 4 TIMES DAILY
Qty: 5 ML | Refills: 2 | Status: SHIPPED | OUTPATIENT
Start: 2023-04-10 | End: 2023-05-23

## 2023-04-13 NOTE — PROGRESS NOTES
HPI    Pt is here today for ocular concerns due to eye pain.  She states about 3 weeks ago she began to experience redness, soreness and   irritation OD. She has Pterygium removal surgery twice on this eye and   feels it is starting to grow back.    No current eye meds.  Last edited by Ruddy Briones on 4/10/2023  1:08 PM.            Assessment /Plan     For exam results, see Encounter Report.    Pterygium eye, right    Other orders  -     prednisoLONE acetate (PRED FORTE) 1 % DrpS; Place 1 drop into both eyes 4 (four) times daily.  Dispense: 5 mL; Refill: 2      MONITOR. ED PT ON ALL EXAM FINDINGS  MILDLY INFLAMMED PTERGYIUM OD; S/P REMOVAL SURGERY PER PT (2X); REPORTS NO SIGNIFICANT IMPROVEMENT W/ AT'S AND GELS   INTERESTED IN CONSULT FOR POSSIBLE REMOVAL; DISCUSSED WITH PT UV PROTECTION AND LUBRICATION; POSSIBLE TO RECUR; PT UNDERSTOOD.   RX PF 1% QID OD X 1 WEEK; CONTINUE WITH AT'S PRN   RTC 4-6 MONTHS FOR REE/DFE

## 2023-05-15 ENCOUNTER — LAB VISIT (OUTPATIENT)
Dept: LAB | Facility: HOSPITAL | Age: 48
End: 2023-05-15
Attending: INTERNAL MEDICINE
Payer: MEDICAID

## 2023-05-15 DIAGNOSIS — E23.7 PITUITARY LESION: ICD-10-CM

## 2023-05-15 LAB
ANION GAP SERPL CALC-SCNC: 8 MMOL/L (ref 8–16)
BUN SERPL-MCNC: 12 MG/DL (ref 6–20)
CALCIUM SERPL-MCNC: 9 MG/DL (ref 8.7–10.5)
CHLORIDE SERPL-SCNC: 108 MMOL/L (ref 95–110)
CO2 SERPL-SCNC: 24 MMOL/L (ref 23–29)
CORTIS SERPL-MCNC: 12.2 UG/DL (ref 4.3–22.4)
CREAT SERPL-MCNC: 0.9 MG/DL (ref 0.5–1.4)
EST. GFR  (NO RACE VARIABLE): >60 ML/MIN/1.73 M^2
GLUCOSE SERPL-MCNC: 92 MG/DL (ref 70–110)
POTASSIUM SERPL-SCNC: 3.9 MMOL/L (ref 3.5–5.1)
SODIUM SERPL-SCNC: 140 MMOL/L (ref 136–145)

## 2023-05-15 PROCEDURE — 36415 COLL VENOUS BLD VENIPUNCTURE: CPT | Performed by: INTERNAL MEDICINE

## 2023-05-15 PROCEDURE — 84305 ASSAY OF SOMATOMEDIN: CPT | Performed by: INTERNAL MEDICINE

## 2023-05-15 PROCEDURE — 82024 ASSAY OF ACTH: CPT | Performed by: INTERNAL MEDICINE

## 2023-05-15 PROCEDURE — 80048 BASIC METABOLIC PNL TOTAL CA: CPT | Performed by: INTERNAL MEDICINE

## 2023-05-15 PROCEDURE — 82533 TOTAL CORTISOL: CPT | Performed by: INTERNAL MEDICINE

## 2023-05-16 LAB — ACTH PLAS-MCNC: 22 PG/ML (ref 0–46)

## 2023-05-17 ENCOUNTER — TELEPHONE (OUTPATIENT)
Dept: NEUROSURGERY | Facility: CLINIC | Age: 48
End: 2023-05-17
Payer: MEDICAID

## 2023-05-17 ENCOUNTER — OFFICE VISIT (OUTPATIENT)
Dept: VASCULAR SURGERY | Facility: CLINIC | Age: 48
End: 2023-05-17
Payer: MEDICAID

## 2023-05-17 VITALS
HEIGHT: 64 IN | HEART RATE: 64 BPM | DIASTOLIC BLOOD PRESSURE: 66 MMHG | SYSTOLIC BLOOD PRESSURE: 110 MMHG | WEIGHT: 160.5 LBS | BODY MASS INDEX: 27.4 KG/M2

## 2023-05-17 DIAGNOSIS — M79.669 PAIN AND SWELLING OF LOWER LEG, UNSPECIFIED LATERALITY: ICD-10-CM

## 2023-05-17 DIAGNOSIS — M79.89 PAIN AND SWELLING OF LOWER LEG, UNSPECIFIED LATERALITY: ICD-10-CM

## 2023-05-17 DIAGNOSIS — I87.2 VENOUS INSUFFICIENCY OF BOTH LOWER EXTREMITIES: Primary | ICD-10-CM

## 2023-05-17 PROCEDURE — 3078F PR MOST RECENT DIASTOLIC BLOOD PRESSURE < 80 MM HG: ICD-10-PCS | Mod: CPTII,S$GLB,, | Performed by: SURGERY

## 2023-05-17 PROCEDURE — 3078F DIAST BP <80 MM HG: CPT | Mod: CPTII,S$GLB,, | Performed by: SURGERY

## 2023-05-17 PROCEDURE — 99214 OFFICE O/P EST MOD 30 MIN: CPT | Mod: S$GLB,,, | Performed by: SURGERY

## 2023-05-17 PROCEDURE — 3008F PR BODY MASS INDEX (BMI) DOCUMENTED: ICD-10-PCS | Mod: CPTII,S$GLB,, | Performed by: SURGERY

## 2023-05-17 PROCEDURE — 1159F PR MEDICATION LIST DOCUMENTED IN MEDICAL RECORD: ICD-10-PCS | Mod: CPTII,S$GLB,, | Performed by: SURGERY

## 2023-05-17 PROCEDURE — 3074F SYST BP LT 130 MM HG: CPT | Mod: CPTII,S$GLB,, | Performed by: SURGERY

## 2023-05-17 PROCEDURE — 99214 PR OFFICE/OUTPT VISIT, EST, LEVL IV, 30-39 MIN: ICD-10-PCS | Mod: S$GLB,,, | Performed by: SURGERY

## 2023-05-17 PROCEDURE — 3074F PR MOST RECENT SYSTOLIC BLOOD PRESSURE < 130 MM HG: ICD-10-PCS | Mod: CPTII,S$GLB,, | Performed by: SURGERY

## 2023-05-17 PROCEDURE — 3008F BODY MASS INDEX DOCD: CPT | Mod: CPTII,S$GLB,, | Performed by: SURGERY

## 2023-05-17 PROCEDURE — 1159F MED LIST DOCD IN RCRD: CPT | Mod: CPTII,S$GLB,, | Performed by: SURGERY

## 2023-05-17 NOTE — PROGRESS NOTES
Messi Schaefer MD, RPVI                                 [facility]                           [facility]                             05/17/2023    HPI:  Tessa Wynne is a 47 y.o. female with   Patient Active Problem List   Diagnosis    Risk for coronary artery disease less than 10% in next 10 years    Heart palpitations    Hyperglycemia    Fibromyalgia    PUD (peptic ulcer disease)    Menorrhagia    Pituitary macroadenoma with extrasellar extension    Hypothyroidism    Appendicitis    Bilateral claudication of lower limb    Hyponatremia    S/P transsphenoidal selective adenomectomy    Bilious vomiting with nausea    Adrenal insufficiency    Migraine    Atypical chest pain    Irregular bowel habits    Generalized abdominal pain    being managed by PCP and specialists who is here today for evaluation of BLE edema.  Patient states location is BLE occurring for yrs.  Associated signs and symptoms include pain.  Quality is heavy and severity is 6/10.  Symptoms began yrs ago.  Alleviating factors include elevation.  Worsening factors include dependency.  Patient has not been wearing compression stockings for greater than 3 months.  No FH of venous disease.  Symptoms do limit patient's functional status and daily activities.  no DVT history.  no venous interventions.  no low sodium diet.  no excessive water intake.    Migraine with aura: no  PFO/ASD/right to left shunt: no  Pregnant: no  Breastfeeding: no    no MI  no Stroke  Tobacco use: no    5/2023:  c/o BLE edema and pain despite compression and elevation > 3 mo.    Past Medical History:   Diagnosis Date    Elevated fasting glucose     GERD (gastroesophageal reflux disease)     Hypothyroidism     PAD (peripheral artery disease)     Pituitary lesion 9/7/2022     Past Surgical History:   Procedure Laterality Date    CHOLECYSTECTOMY      COLONOSCOPY N/A 12/15/2022    Procedure: COLONOSCOPY;  Surgeon: Kerrie Bird MD;  Location:  [facility];  Service: Endoscopy;  Laterality: N/A;    ENDOMETRIAL ABLATION N/A 2/8/2022    Procedure: ABLATION, ENDOMETRIUM;  Surgeon: Ariel Butler MD;  Location: [facility];  Service: OB/GYN;  Laterality: N/A;    EXCISION, NEOPLASM, PITUITARY, TRANSSPHENOIDAL APPROACH, USING COMPUTER-ASSISTED NAVIGATION N/A 10/3/2022    Procedure: EXCISION, NEOPLASM, PITUITARY, TRANSSPHENOIDAL APPROACH, USING COMPUTER-ASSISTED NAVIGATION;  Surgeon: Ed Astorga MD;  Location: [facility] OR [address] [place] [address];  Service: ENT;  Laterality: N/A;    EXCISION, NEOPLASM, PITUITARY, TRANSSPHENOIDAL APPROACH, USING COMPUTER-ASSISTED NAVIGATION N/A 10/3/2022    Procedure: EXCISION, NEOPLASM, PITUITARY, TRANSSPHENOIDAL APPROACH, USING COMPUTER-ASSISTED NAVIGATION;  Surgeon: Miguel Ángel Ferrell DO;  [facility];  Service: Neurosurgery;  Laterality: N/A;    HYSTEROSCOPY WITH DILATION AND CURETTAGE OF UTERUS N/A 2/8/2022    Procedure: HYSTEROSCOPY, WITH DILATION AND CURETTAGE OF UTERUS;  Surgeon: Ariel Butler MD;  Location: [facility];  Service: OB/GYN;  Laterality: N/A;     Family History   Problem Relation Age of Onset    Diabetes Mother     Diabetes Mellitus Mother     Diabetes Father     Diabetes Mellitus Father     No Known Problems Sister     No Known Problems Sister     No Known Problems Brother     No Known Problems Brother     No Known Problems Daughter     No Known Problems Son     Colon cancer Neg Hx     Breast cancer Neg Hx     Ovarian cancer Neg Hx     Uterine cancer Neg Hx     Heart attack Neg Hx      Social History     Socioeconomic History    Marital status:     Number of children: 2   Occupational History    Occupation: cleaning housing    Tobacco Use    Smoking status: Never    Smokeless tobacco: Never   Substance and Sexual Activity    Alcohol use: No    Drug use: No    Sexual activity: Yes     Partners: Male     Birth control/protection: OCP   Social History Narrative    Patient is originally from [place] [id number]          School at ; high school         College/university ;         Working ; [employer]                 Children    joban - 21    lidnsey - 13         Lives with     dazoë and son and husabd        Diet/Exericse           Current Outpatient Medications:     calcium citrate/vitamin D3 (CITRACAL REGULAR ORAL), Take by mouth., Disp: , Rfl:     ferrous sulfate (IRON ORAL), Take by mouth., Disp: , Rfl:     mv,calcium,min/iron/folic/vitK (ONE-A-DAY WOMEN'S COMPLETE ORAL), Take by mouth., Disp: , Rfl:     omeprazole (PRILOSEC) 40 MG capsule, Take 1 capsule (40 mg total) by mouth every morning., Disp: 30 capsule, Rfl: 11    dicyclomine (BENTYL) 20 mg tablet, Take 1 tablet (20 mg total) by mouth 3 (three) times daily as needed (abdominal pain). (Patient not taking: Reported on 5/17/2023), Disp: 60 tablet, Rfl: 2    gabapentin (NEURONTIN) 300 MG capsule, Take 1 capsule (300 mg total) by mouth 3 (three) times daily for 14 days, THEN 2 capsules (600 mg total) 3 (three) times daily for 21 days., Disp: 168 capsule, Rfl: 0    prednisoLONE acetate (PRED FORTE) 1 % DrpS, Place 1 drop into both eyes 4 (four) times daily. (Patient not taking: Reported on 5/17/2023), Disp: 5 mL, Rfl: 2    REVIEW OF SYSTEMS:  General: No fevers or chills; ENT: No sore throat; Allergy and Immunology: no persistent infections; Hematological and Lymphatic: No history of bleeding or easy bruising; Endocrine: negative; Respiratory: no cough, shortness of breath, or wheezing; Cardiovascular: no chest pain or dyspnea on exertion; Gastrointestinal: no abdominal pain/back, change in bowel habits, or bloody stools; Genito-Urinary: no dysuria, trouble voiding, or hematuria; Musculoskeletal: edema and pain; Neurological: no TIA or stroke symptoms; Psychiatric: no nervousness, anxiety or depression.    PHYSICAL EXAM:      Pulse: 64         General appearance:  Alert, well-appearing, and in no distress.  Oriented to person, place, and time                     Neurological: Normal speech, no focal findings noted; CN II - XII grossly intact. RLE with sensation to light touch, LLE with sensation to light touch.            Musculoskeletal: Digits/nail without cyanosis/clubbing.  Strength 5/5 BLE.                    Neck: Supple, no significant adenopathy                  Chest:  No wheezes, symmetric air entry. No use of accessory muscles               Cardiac: Normal rate and regular rhythm            Abdomen: Soft, nontender, nondistended      Extremities:   2+ R DP pulse, 2+ L DP pulse      1+ RLE edema, 1+ LLE edema    Skin:  RLE no ulcer; LLE no ulcer      RLE + spider veins, LLE + spider veins      RLE no varicose veins, LLE no varicose veins    CEAP 3/3    VCSS 10    LAB RESULTS:  No results found for: CBC  Lab Results   Component Value Date    LABPROT 10.0 06/11/2021    INR 0.9 06/11/2021     Lab Results   Component Value Date     05/15/2023    K 3.9 05/15/2023     05/15/2023    CO2 24 05/15/2023    GLU 92 05/15/2023    BUN 12 05/15/2023    CREATININE 0.9 05/15/2023    CALCIUM 9.0 05/15/2023    ANIONGAP 8 05/15/2023    EGFRNONAA >60 07/29/2022     Lab Results   Component Value Date    WBC 3.88 (L) 11/28/2022    RBC 4.27 11/28/2022    HGB 12.8 11/28/2022    HCT 39.4 11/28/2022    MCV 92 11/28/2022    MCH 30.0 11/28/2022    MCHC 32.5 11/28/2022    RDW 12.8 11/28/2022     11/28/2022    MPV 10.3 11/28/2022    GRAN 1.3 (L) 11/28/2022    GRAN 33.7 (L) 11/28/2022    LYMPH 2.3 11/28/2022    LYMPH 58.2 (H) 11/28/2022    MONO 0.3 11/28/2022    MONO 7.0 11/28/2022    EOS 0.0 11/28/2022    BASO 0.00 11/28/2022    EOSINOPHIL 0.8 11/28/2022    BASOPHIL 0.0 11/28/2022    DIFFMETHOD Automated 11/28/2022     .  Lab Results   Component Value Date    HGBA1C 5.4 10/24/2022       IMAGING:  All pertinent imaging has been reviewed and interpreted independently.    Venous US 2022 Impression:  EXAMINATION:  US LOWER EXTREMITY VEINS BILATERAL INSUFFICIENCY     CLINICAL  HISTORY:  suspected venous insuff;  Pain in unspecified lower leg     TECHNIQUE:  Bilateral lower extremity venous Doppler exam including Valsalva or standing maneuvers for evaluation of venous insufficiency of the superficial systems bilaterally.  Reflux times greater than 500 ms were considered indicative of superficial system reflux.     COMPARISON:  None     FINDINGS:  Deep venous system:     There is evidence of flow, compressibility and augmentation within bilateral common femoral, femoral, and popliteal veins.  Flow is seen within bilateral peroneal, posterior tibial and anterior tibial veins.     Superficial venous system:     Right leg:     Reflux times right greater saphenous vein up to 3062 milliseconds.     The maximal diameter within the right greater saphenous vein is 10 mm.     The maximal diameter within the right lesser saphenous vein is 5mm.     Left leg:     Reflux times in the greater saphenous vein up to 1700 milliseconds     The maximal diameter within the left greater saphenous vein is 7 mm.     The maximal diameter within the left lesser saphenous vein is 3mm.        Impression:     1.  No evidence of deep venous thrombosis in either lower extremity.     2.  Hemodynamically significant venous reflux right and left greater saphenous veins.        Electronically signed by: Manju Grimaldo  Date:                                            10/28/2022  Time:                                           14:21    IMP/PLAN:  47 y.o. female with   Patient Active Problem List   Diagnosis    Risk for coronary artery disease less than 10% in next 10 years    Heart palpitations    Hyperglycemia    Fibromyalgia    PUD (peptic ulcer disease)    Menorrhagia    Pituitary macroadenoma with extrasellar extension    Hypothyroidism    Appendicitis    Bilateral claudication of lower limb    Hyponatremia    S/P transsphenoidal selective adenomectomy    Bilious vomiting with nausea    Adrenal insufficiency    Migraine     Atypical chest pain    Irregular bowel habits    Generalized abdominal pain    being managed by PCP and specialists who is here today for evaluation of BLE edema and pain due to venous insufficiency.    -rec L GSV EVLT  -future BLE sclerotherapy  -recommend compression with Rx stockings, elevation, dietary changes associated with water and sodium intake discussed at length with patient  -Exercise     I spent 15 minutes evaluating this patient and greater than 50% of the time was spent counseling, coordinator care and discussing the plan of care.  All questions were answered and patient stated understanding with agreement with the above treatment plan.    Messi Schaefer MD [facility]  Vascular and Endovascular Surgery

## 2023-05-17 NOTE — TELEPHONE ENCOUNTER
----- Message from Esther Ordoñez RN sent at 5/16/2023  4:25 PM CDT -----  Regarding: FW: MRI Authrozation  Contact: Dorinda with [facility] [phone]    ----- Message -----  From: Zeus Valiente  Sent: 5/16/2023   1:13 PM CDT  To: [facility] Staff  Subject: MRI Authrozation                                 [facility] calling in regards to letting Dr know MRI of the brain has been denied. Please call to discuss if needed  Tracking number: [id number]

## 2023-05-18 ENCOUNTER — TELEPHONE (OUTPATIENT)
Dept: NEUROSURGERY | Facility: CLINIC | Age: 48
End: 2023-05-18
Payer: MEDICAID

## 2023-05-18 DIAGNOSIS — I87.2 VENOUS INSUFFICIENCY: Primary | ICD-10-CM

## 2023-05-18 LAB
IGF-I SERPL-MCNC: 49 NG/ML (ref 44–227)
IGF-I Z-SCORE SERPL: -1.65 SD

## 2023-05-18 NOTE — TELEPHONE ENCOUNTER
----- Message from Russell Valenzuela MA sent at 5/17/2023  5:05 PM CDT -----  Regarding: FW: pt advice  Contact: Thomas [phone]    ----- Message -----  From: Sugar Blue  Sent: 5/17/2023   4:56 PM CDT  To: [facility] Staff  Subject: pt advice                                        [facility]  calling for Brain MRI authorization     Auth #[id number]    Start date: 5/17/2023- 0616/2023    Tracking [id number]

## 2023-05-23 ENCOUNTER — OFFICE VISIT (OUTPATIENT)
Dept: ENDOCRINOLOGY | Facility: CLINIC | Age: 48
End: 2023-05-23
Payer: MEDICAID

## 2023-05-23 VITALS
WEIGHT: 158.31 LBS | OXYGEN SATURATION: 99 % | BODY MASS INDEX: 27.17 KG/M2 | SYSTOLIC BLOOD PRESSURE: 108 MMHG | DIASTOLIC BLOOD PRESSURE: 71 MMHG | HEART RATE: 63 BPM | RESPIRATION RATE: 16 BRPM

## 2023-05-23 DIAGNOSIS — Z98.890 S/P TRANSSPHENOIDAL SELECTIVE ADENOMECTOMY: ICD-10-CM

## 2023-05-23 DIAGNOSIS — E23.7 PITUITARY LESION: Primary | ICD-10-CM

## 2023-05-23 DIAGNOSIS — Z86.018 S/P TRANSSPHENOIDAL SELECTIVE ADENOMECTOMY: ICD-10-CM

## 2023-05-23 PROBLEM — E87.1 HYPONATREMIA: Status: RESOLVED | Noted: 2022-10-11 | Resolved: 2023-05-23

## 2023-05-23 PROBLEM — E27.40 ADRENAL INSUFFICIENCY: Status: RESOLVED | Noted: 2022-10-18 | Resolved: 2023-05-23

## 2023-05-23 PROBLEM — E03.9 HYPOTHYROIDISM: Status: RESOLVED | Noted: 2022-09-05 | Resolved: 2023-05-23

## 2023-05-23 PROBLEM — R00.2 HEART PALPITATIONS: Status: RESOLVED | Noted: 2017-10-19 | Resolved: 2023-05-23

## 2023-05-23 PROBLEM — R11.14 BILIOUS VOMITING WITH NAUSEA: Status: RESOLVED | Noted: 2022-10-18 | Resolved: 2023-05-23

## 2023-05-23 PROCEDURE — 1159F MED LIST DOCD IN RCRD: CPT | Mod: CPTII,,, | Performed by: INTERNAL MEDICINE

## 2023-05-23 PROCEDURE — 3078F PR MOST RECENT DIASTOLIC BLOOD PRESSURE < 80 MM HG: ICD-10-PCS | Mod: CPTII,,, | Performed by: INTERNAL MEDICINE

## 2023-05-23 PROCEDURE — 99213 OFFICE O/P EST LOW 20 MIN: CPT | Mod: PBBFAC | Performed by: INTERNAL MEDICINE

## 2023-05-23 PROCEDURE — 1159F PR MEDICATION LIST DOCUMENTED IN MEDICAL RECORD: ICD-10-PCS | Mod: CPTII,,, | Performed by: INTERNAL MEDICINE

## 2023-05-23 PROCEDURE — 3008F PR BODY MASS INDEX (BMI) DOCUMENTED: ICD-10-PCS | Mod: CPTII,,, | Performed by: INTERNAL MEDICINE

## 2023-05-23 PROCEDURE — 99214 OFFICE O/P EST MOD 30 MIN: CPT | Mod: S$PBB,,, | Performed by: INTERNAL MEDICINE

## 2023-05-23 PROCEDURE — 1160F PR REVIEW ALL MEDS BY PRESCRIBER/CLIN PHARMACIST DOCUMENTED: ICD-10-PCS | Mod: CPTII,,, | Performed by: INTERNAL MEDICINE

## 2023-05-23 PROCEDURE — 3008F BODY MASS INDEX DOCD: CPT | Mod: CPTII,,, | Performed by: INTERNAL MEDICINE

## 2023-05-23 PROCEDURE — 99999 PR PBB SHADOW E&M-EST. PATIENT-LVL III: ICD-10-PCS | Mod: PBBFAC,,, | Performed by: INTERNAL MEDICINE

## 2023-05-23 PROCEDURE — 3074F SYST BP LT 130 MM HG: CPT | Mod: CPTII,,, | Performed by: INTERNAL MEDICINE

## 2023-05-23 PROCEDURE — 3078F DIAST BP <80 MM HG: CPT | Mod: CPTII,,, | Performed by: INTERNAL MEDICINE

## 2023-05-23 PROCEDURE — 99214 PR OFFICE/OUTPT VISIT, EST, LEVL IV, 30-39 MIN: ICD-10-PCS | Mod: S$PBB,,, | Performed by: INTERNAL MEDICINE

## 2023-05-23 PROCEDURE — 3074F PR MOST RECENT SYSTOLIC BLOOD PRESSURE < 130 MM HG: ICD-10-PCS | Mod: CPTII,,, | Performed by: INTERNAL MEDICINE

## 2023-05-23 PROCEDURE — 1160F RVW MEDS BY RX/DR IN RCRD: CPT | Mod: CPTII,,, | Performed by: INTERNAL MEDICINE

## 2023-05-23 PROCEDURE — 99999 PR PBB SHADOW E&M-EST. PATIENT-LVL III: CPT | Mod: PBBFAC,,, | Performed by: INTERNAL MEDICINE

## 2023-05-23 NOTE — PROGRESS NOTES
PITUITARY [facility] ENDOCRINOLOGY FOLLOW UP  05/23/2023       Due to language barrier, an  was present during the entire visit.     The patient's last visit with me was on 2/28/2023.     Subjective:      CC: post-op follow up s/p TSR for pituitary macroadenoma causing vision loss    HPI:   Tessa Wynne is a 47 y.o. female who presents for evaluation of pituitary lesion compressing optic chiasm, found on imaging done after a syncopal episode.     Initial presentation:   Brain imaging done for syncope and HA.  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  Preoperative labs notable for elevated prolactin from stalk effect but otherwise normal    Underwent TSR by Dr. Ferrell on 10/3/22 ([facility] path - GONADOTROPH ADENOMA, IMMUNOREACTIVE FOR ALPHA-SUBUNIT AND SF1).  The patient did not receive any perioperative glucocorticoids and had no evidence of adrenal insufficiency in the hospital. The patient did not develop diabetes insipidus but presented to the ED on 10/8 for severe HA, CP, and HTN, found to have Na of 129 (on d/c Na was 140).      Has been admitted for hyponatremia that resolved after starting hydrocortisone.   She then developed symptoms of diabetes insipidus and was prescribed DDAVP which she was advised not to take when she was found to have hyponatremia but due to confusion she was taking DDAVP and was readmitted with hyponatremia.  This resolved after discontinuing DDAVP.  Did not require Na restriction.  Now normal Na    Preop she was started on levothyroxine 75 mcg daily for secondary hypothyroidism. This was reduced to 25 mcg daily by her primary care provider after she reported symptoms on 75 mcg.  D/c from hospital on levothyroxine 50 mcg daily.  Now no longer requiring levothyroxine.     Interval hx:  At last visit she was doing well off levothyroxine, not on any pituitary hormone replacement.   She is scheduled for hvf (blake visual fields), MRI and visit with Dr. Ferrell next  "week.   Still with headaches once a week (occipital pressure, short durration)    denies symptoms of adrenal insufficiency (no lightheadedness, N/V/abd pain, hypotension).  No unexplained weight loss.     She is feeling great now with only some minor blx ear pressure    No symptoms of DI (denies polyuria/polydipsia).  Some nights up 4 times, usually only once overnight.  Not high volume urination.      Imaging:   10/25/22  Evolving operative change from transsphenoidal sellar/suprasellar mass resection.  There is continued somewhat lobular hypoenhancing material within the sella remains concerning for possible residual lesion and residual pituitary tissue.  No evidence for significant mass effect on the suprasellar neurovascular structures or suprasellar extension.  Clinical correlation and continued follow-up advised       MRI 8/15/22  Sella: There is a large sellar lesion with suprasellar extension.  Lesion measures roughly 3.2 x 2.8 x 3.5 cm.  There is mass effect on the adjacent structures including the optic chiasm which is displaced superiorly as well as the bilateral A1 segments of the anterior cerebral arteries.  There is possible extension into the cavernous sinuses bilaterally best visualized on coronal T2 series 10.  The carotid flow voids are preserved.  There is bony remodeling of the adjacent clivus secondary to this lesion with preserved marrow signal.    Headache:    Less severe and no longer bothersome (see above)    Vision change:   Denies (did not notice any change before surgery)    Formal Visual burns:   HVF w/ Dr. Yan 11/18/2022   Formal visual fields with dramatic improvement in visual field defects! OCT stable. She will monitor for any subjective changes in vision and I will monitor her in clinic moving forward. RTC 6 month(s)     8/25/22, per note:  " OCT with borderline RNFL thinning OD>OS. HVF with generalized depression OD (worse temporally) and temporal depression OS. Findings " "consistent with chiasmal compression." rtc in 3 month(s)    Pituitary labs: normal including Na   Latest Reference Range & Units 02/22/23 15:38 05/15/23 07:08 05/15/23 07:11   Cortisol, 8 AM 4.30 - 22.40 ug/dL   12.20   ACTH 0 - 46 pg/mL  22    Somatomedin (IGF-I) 44 - 227 ng/mL   49   TSH 0.400 - 4.000 uIU/mL 1.776     Free T4 0.71 - 1.51 ng/dL 0.81         Current symptoms:  Hyperprolactinemia:  []  breast tenderness []  nipple discharge []  Menstrual Irregularity [x]  Denies   Initially noticed blx nipple dc for a few years around time of menstrual cycle   Reports that after D&C in 2/2022 galactorrhea resolved  Resolved since surgery    Lab Results   Component Value Date    PROLACTIN 15.7 11/28/2022    PROLACTIN 138.0 (H) 08/26/2022     Thyroid:  No longer on levothyroxine   []  fatigue []  weight change []  temp intolerance  [x]  Denies    []  BM change []  skin/hair change [] palpitations []  tremor [x]  Denies      Lab Results   Component Value Date    TSH 1.776 02/22/2023    FREET4 0.81 02/22/2023       Growth Hormone Excess:  []  increase hand/foot size []  teeth spacing change    [x]  Denies    []  worse glycemic control []  joint pain     [x]  Denies    Last IGF-1:   Lab Results   Component Value Date    SOMATMDN 49 05/15/2023    SOMATMDN 42 (L) 08/26/2022        Gonadotrophs:     [x]  Irregular menses []  Postmenopausal  []  Decreased libido   []  ED   []  Denies    Previously regular then Irregular menses for 4 month(s) prior to d&c in 2/2022 (prior to that 3 month(s) of bleeding)  Repro hx: 2 children, no problems with conception  LMP: prior to D&C      Review of patient's allergies indicates:  No Known Allergies      Current Outpatient Medications:     calcium citrate/vitamin D3 (CITRACAL REGULAR ORAL), Take by mouth., Disp: , Rfl:     ferrous sulfate (IRON ORAL), Take by mouth., Disp: , Rfl:     mv,calcium,min/iron/folic/vitK (ONE-A-DAY WOMEN'S COMPLETE ORAL), Take by mouth., Disp: , Rfl:     " omeprazole (PRILOSEC) 40 MG capsule, Take 1 capsule (40 mg total) by mouth every morning., Disp: 30 capsule, Rfl: 11    dicyclomine (BENTYL) 20 mg tablet, Take 1 tablet (20 mg total) by mouth 3 (three) times daily as needed (abdominal pain). (Patient not taking: Reported on 5/17/2023), Disp: 60 tablet, Rfl: 2    ROS:  see HPI    Objective:   Physical Exam   LMP 01/15/2022   Wt Readings from Last 3 Encounters:   05/17/23 72.8 kg (160 lb 7.9 oz)   02/28/23 72.8 kg (160 lb 7.9 oz)   01/31/23 73.8 kg (162 lb 9.6 oz)   ]    Constitutional:  Pleasant,  in no acute distress.   HENT:   Eyes:     No scleral icterus.   Respiratory:   Effort normal   Neurological:  normal speech  Psych:  Normal mood and affect.      LABORATORY REVIEW:    Chemistry        Component Value Date/Time     05/15/2023 0711    K 3.9 05/15/2023 0711     05/15/2023 0711    CO2 24 05/15/2023 0711    BUN 12 05/15/2023 0711    CREATININE 0.9 05/15/2023 0711    GLU 92 05/15/2023 0711        Component Value Date/Time    CALCIUM 9.0 05/15/2023 0711    ALKPHOS 61 11/28/2022 0804    AST 33 11/28/2022 0804    ALT 39 11/28/2022 0804    BILITOT 0.4 11/28/2022 0804    ESTGFRAFRICA >60 07/29/2022 0827    EGFRNONAA >60 07/29/2022 0827        MRI 8/15/22- preop       MRI 10/25/22 - postop  Evolving operative change status post transsphenoidal approach sellar/suprasellar mass resection.  There is continued heterogeneous signal predominantly hypoenhancing material along the posterior sphenoid sinus extending to the sella felt to represent postoperative packing material.  There is thin marginated enhancement about this which may be reactive in granulation tissue.  There is continued somewhat lobular enhancing material along the postoperative floor of the sella which may in part represent residual pituitary tissue though remains concerning for component of residual lesion.  The infundibulum is deviated to the right.  Compared to most recent prior the nodular  enhancing material is similar measuring approximately 1.6 cm AP x 1.4 cm TV and 0.7 cm craniocaudal.  There is no significant mass effect on the suprasellar neurovascular structures clinical correlation and continued follow-up advised.          Assessment/Plan:     Problem List Items Addressed This Visit          3     S/P transsphenoidal selective adenomectomy     Clinically doing well without symptoms of adrenal insufficiency or insipidus.  Sodium has been normal and she is no longer requiring levothyroxine since her surgery.      She has upcoming follow-up with neuro surgery after MRI next week as well as visual field testing with Dr. Richardson.  Will plan to follow-up in 1 year with 8:00 a.m. fasting labs prior to reassess pituitary hormones.            Other Visit Diagnoses       Pituitary lesion    -  Primary    Relevant Orders    ACTH    Cortisol, 8AM    Follicle Stimulating Hormone    Insulin-Like Growth Factor    Prolactin    T4, Free    TSH    Basic Metabolic Panel            RTC in 1 year with labs 1 wk prior 8 am fasting    Jimmy Batista MD

## 2023-05-23 NOTE — Clinical Note
Patient s/p pituitary surgery with ear pressure overdue for follow up.  Can you please help schedule?

## 2023-05-23 NOTE — ASSESSMENT & PLAN NOTE
Clinically doing well without symptoms of adrenal insufficiency or insipidus.  Sodium has been normal and she is no longer requiring levothyroxine since her surgery.      She has upcoming follow-up with neuro surgery after MRI next week as well as visual field testing with Dr. Richardson.  Will plan to follow-up in 1 year with 8:00 a.m. fasting labs prior to reassess pituitary hormones.

## 2023-05-24 NOTE — PROGRESS NOTES
"  Date:  5/25/2023    ?  Referring Provider:   Jimmy Batista MD    Copies of Letters to the Following:   MD Miguel Ángel Rick MD    Chief Complaint:  I saw Tessa Wynne at the [facility] for neuro-ophthalmic evaluation.   She is a 47 y.o. female with a history of FM, HLD, pituitary adenoma who presents for follow up of formal visual fields.     History:        45 y/o female is here for Neuro-ophthalmic evaluation who present with   concerns of Pituitary lesion. H/o of Pterygium excision, right eye. Pt   report of blurry vision of the LT eye associated with headaches, eye   allergies and tinnitus. No diplopia or flashes of light reported today. Pt   is accompany by son who is her . Declines formal interpretation.     Eyemeds  No gtts       6/27/2022 Fadi:  "Presents today for ocular health check.  Pt reports blurry vision x 7 months.  Pt report occasional twitching OD.  Pt denies eye pain, flashes and floaters.  No using any eye meds. "  ?  11/18/2022    She reports daily headaches that last for 1 hour. She has pressure   sensation in ears. No floaters, flashes of light, diplopia, migraines, or   eye allergies reported today.     Eyemeds  No gtts    She underwent transsphenoidal resection of pituitary mass on 10/3/2022. Post operative course complicated by DI and re-hospitalization for hyponatremia.     DAT used for interpretation today # [id number] [place]      11/8/2022 Tumor board:  "Patient underwent TSR with Dr. Ferrell on 10/3/22. The patient did not receive any perioperative glucocorticoids and had no evidence of adrenal insufficiency in the hospital. The patient did not develop diabetes insipidus but presented to the ED on 10/8 SIADH (Na 129). She recovered but then was readmitted to hospital for Na of 123 and concern for AI, patient was using home DDAVP nightly. Patient was discharged with physiologic steroids and sodium normalized after holding DDAVP.         Additionally, " "we reviewed previous medical and familial history of present illness, and recent lab results along with all available histopathologic and imaging studies. The tumor board considered available treatment options and made the following recommendations: The general consensus was to  follow up. Patient has a scheduled appointment with Dr. Ferrell, neurosurgery on 11/22/2022.   "    Interval History:  5/25/2023    HPI    DSL- 11/18/2022 Dr. Yan    48 y/o female present to clinic for Pituitary Macroadenoma f/u with   HVF/OCT review. Pt states headaches has subsided since last visit. She   states unresolved blurry vision- difficultly seeing small print. Positive   for tinnitus. No floaters, flashes of light, diplopia, or migraines   reported.     Pterygium still very bothersome, she is on the pred-forte TID. Not using AT.    [place]en used: Darnell ID#[id number]    Eyemeds  No gtts    Last edited by Emerald Medina on 5/25/2023  3:11 PM.     MRI planned for tomorrow and NSGY fu next week.     5/23/2023 Lester:  "At last visit she was doing well off levothyroxine, not on any pituitary hormone replacement.   She is scheduled for hvf (blake visual fields), MRI and visit with Dr. Ferrell next week.   Still with headaches once a week (occipital pressure, short durration)     denies symptoms of adrenal insufficiency (no lightheadedness, N/V/abd pain, hypotension).  No unexplained weight loss.      She is feeling great now with only some minor blx ear pressure     No symptoms of DI (denies polyuria/polydipsia).  Some nights up 4 times, usually only once overnight.  Not high volume urination.  "    Current Outpatient Medications   Medication Sig Dispense Refill    calcium citrate/vitamin D3 (CITRACAL REGULAR ORAL) Take by mouth.      dicyclomine (BENTYL) 20 mg tablet Take 1 tablet (20 mg total) by mouth 3 (three) times daily as needed (abdominal pain). (Patient not taking: Reported on 5/17/2023) 60 tablet 2    ferrous sulfate (IRON ORAL) " Take by mouth.      mv,calcium,min/iron/folic/vitK (ONE-A-DAY WOMEN'S COMPLETE ORAL) Take by mouth.      omeprazole (PRILOSEC) 40 MG capsule Take 1 capsule (40 mg total) by mouth every morning. 30 capsule 11     No current facility-administered medications for this visit.     Review of patient's allergies indicates:  No Known Allergies  Past Medical History:   Diagnosis Date    Elevated fasting glucose     GERD (gastroesophageal reflux disease)     Hypothyroidism     PAD (peripheral artery disease)     Pituitary lesion 9/7/2022     Past Surgical History:   Procedure Laterality Date    CHOLECYSTECTOMY      COLONOSCOPY N/A 12/15/2022    Procedure: COLONOSCOPY;  Surgeon: Kerrie Bird MD;  [facility]: [facility];  Service: Endoscopy;  Laterality: N/A;    ENDOMETRIAL ABLATION N/A 2/8/2022    Procedure: ABLATION, ENDOMETRIUM;  Surgeon: Ariel Butler MD;  Location: [facility] OR;  Service: OB/GYN;  Laterality: N/A;    EXCISION, NEOPLASM, PITUITARY, TRANSSPHENOIDAL APPROACH, USING COMPUTER-ASSISTED NAVIGATION N/A 10/3/2022    Procedure: EXCISION, NEOPLASM, PITUITARY, TRANSSPHENOIDAL APPROACH, USING COMPUTER-ASSISTED NAVIGATION;  Surgeon: Ed Astorga MD;  [facility];  Service: ENT;  Laterality: N/A;    EXCISION, NEOPLASM, PITUITARY, TRANSSPHENOIDAL APPROACH, USING COMPUTER-ASSISTED NAVIGATION N/A 10/3/2022    Procedure: EXCISION, NEOPLASM, PITUITARY, TRANSSPHENOIDAL APPROACH, USING COMPUTER-ASSISTED NAVIGATION;  Surgeon: Miguel Ángel Ferrell DO;  [facility];  Service: Neurosurgery;  Laterality: N/A;    HYSTEROSCOPY WITH DILATION AND CURETTAGE OF UTERUS N/A 2/8/2022    Procedure: HYSTEROSCOPY, WITH DILATION AND CURETTAGE OF UTERUS;  Surgeon: Ariel Butler MD;  Location: [facility] OR;  Service: OB/GYN;  Laterality: N/A;     Family History   Problem Relation Age of Onset    Diabetes Mother     Diabetes Mellitus Mother     Diabetes Father     Diabetes Mellitus Father     No Known Problems  Sister     No Known Problems Sister     No Known Problems Brother     No Known Problems Brother     No Known Problems Daughter     No Known Problems Son     Colon cancer Neg Hx     Breast cancer Neg Hx     Ovarian cancer Neg Hx     Uterine cancer Neg Hx     Heart attack Neg Hx      Social History     Socioeconomic History    Marital status:     Number of children: 2   Occupational History    Occupation: cleaning housing    Tobacco Use    Smoking status: Never    Smokeless tobacco: Never   Substance and Sexual Activity    Alcohol use: No    Drug use: No    Sexual activity: Yes     Partners: Male     Birth control/protection: OCP   Social History Narrative    Patient is originally from [place] [id number]         School at ; high school         College/university ;         Working ; [employer]                 Children    joban - 21    lidnsey - 13         Lives with     zeb and son and finn        Diet/Exericse           Examination:  She was well-appearing. She was alert and oriented. Attention span and concentration were normal. Speech, language, memory, and general knowledge were intact.      Her distance visual acuity without correction was 20/20  in the right eye and 20/20  in the left eye.  Her near visual acuity without correction was J16 PH J3 in the right eye and J10 pH J3 in the left eye.      She perceived 8/8 OD and 8/8 OS Ishihara color plates correctly. Pupils were brisk to light without an afferent defect. Ocular ductions were full. Orthophoric in primary, right, left, up and down gaze by cross cover. There was no nystagmus. Saccades and pursuits were normal. Lids were symmetric.     Optic discs with mild pallor OU, no edema. Pupillary dilation was not necessary for visualization of the optic disc today.       Laboratories Reviewed:     n/a  ?  Neuroimaging Reviewed:     8/15/2022 MRI brain wo contrast  No midline shift or hydrocephalus.  No acute infarction.  Brain parenchyma is overall normal  in signal and contour.  No acute intracranial hemorrhage.  Possible left frontal developmental venous anomaly incidentally noted on SWAN images.  No abnormal extra-axial fluid collections.  Mild cerebellar tonsillar ectopia.  Otherwise, structures at the craniocervical junction show no significant abnormalities.  Major skull base flow voids are present.  Orbits, paranasal sinuses and mastoid air cells show no significant abnormalities.     Sella: There is a large sellar lesion with suprasellar extension.  Lesion measures roughly 3.2 x 2.8 x 3.5 cm.  There is mass effect on the adjacent structures including the optic chiasm which is displaced superiorly as well as the bilateral A1 segments of the anterior cerebral arteries.  There is possible extension into the cavernous sinuses bilaterally best visualized on coronal T2 series 10.  The carotid flow voids are preserved.  There is bony remodeling of the adjacent clivus secondary to this lesion with preserved marrow signal.     Impression:     Large sellar/suprasellar lesion primarily worrisome for pituitary macroadenoma.  There is mass effect on the optic chiasm and possible extension into the cavernous sinus bilaterally.  Recommend correlation with pituitary labs, dedicated MRI pituitary protocol and follow-up with neurosurgery.     10/25/2022 MRI pituitary w/wo contrast  Finding: Brain is normal in contour.  Ventricles stable without hydrocephalus.  There is no restricted diffusion to suggest acute infarction.  No abnormal parenchymal susceptibility to suggest parenchymal hemorrhage.  No major intracranial skull base T2 flow voids are present.  No abnormal parenchymal enhancement     Sella: Evolving operative change status post transsphenoidal approach sellar/suprasellar mass resection.  There is continued heterogeneous signal predominantly hypoenhancing material along the posterior sphenoid sinus extending to the sella felt to represent postoperative packing  material.  There is thin marginated enhancement about this which may be reactive in granulation tissue.  There is continued somewhat lobular enhancing material along the postoperative floor of the sella which may in part represent residual pituitary tissue though remains concerning for component of residual lesion.  The infundibulum is deviated to the right.  Compared to most recent prior the nodular enhancing material is similar measuring approximately 1.6 cm AP x 1.4 cm TV and 0.7 cm craniocaudal.  There is no significant mass effect on the suprasellar neurovascular structures clinical correlation and continued follow-up advised.     Impression:     MR sella: Evolving operative change from transsphenoidal sellar/suprasellar mass resection.  There is continued somewhat lobular hypoenhancing material within the sella remains concerning for possible residual lesion and residual pituitary tissue.  No evidence for significant mass effect on the suprasellar neurovascular structures or suprasellar extension.  Clinical correlation and continued follow-up advised     MRI brain: Otherwise unremarkable MRI of the brain specifically without evidence for hydrocephalus or new parenchymal signal abnormality.  ?  Ocular Imaging, Photos, Records Reviewed:     OCT RNFL  8/25/2022:   Right Eye - Average RNFL 81 borderline inferior and temporal thinning   Left Eye - Average RNFL 81 borderline inferior thinning     Normal macular architecture OU    OCT RNFL 11/18/2022:   Right Eye - Average RNFL 78 borderline inferior and temporal thinning   Left Eye - Average RNFL 78 borderline inferior and temporal thinning     Macular architecture normal OU    OCT RNFL Today 5/25/2023:   Right Eye - Average RNFL 73 temporal thinning and borderline inferior thinning   Left Eye - Average RNFL 76 borderline temporal and inferior thinning     Macular architecture normal OU    Visual Field Test 24-2 OU  8/25/2022: Right Eye - fixation losses 0/13, false  positives 0%, false negatives 6%, MD -9.38dB, Impression OD: moderate generalized depression, worst temporally, ST. Left Eye - fixation losses 0/15, false positives 0%, false negatives 9%, MD -9.28dB, Impression OS: temporal depression, worst ST.    Visual Field Test 24-2 OU 11/18/2022: Right Eye - fixation losses 0/11, false positives 0%, false negatives 0%, MD -2.80dB, Impression OD: moderate superotemporal depression. Left Eye - fixation losses 1/11, false positives 0%, false negatives 0%, MD -1.95dB, Impression OS: mild superotemporal depression.    Visual Field Test 24-2 OU Today 5/25/2023: Right Eye - fixation losses 0/11, false positives 0%, false negatives 0%, MD -2.70dB, Impression OD: mild temporal depression. Left Eye - fixation losses 0/11, false positives 0%, false negatives 1%, MD -2.53dB, Impression OS: mild superotemporal depression.  ?  Impression:  Tessa Wynne has history of FM, HLD, pituitary adenoma who presents for evaluation of formal visual fields. She reports blurred vision and headaches. She has MRI with large sellar mass with optic chiasm compression/elevation and extension into bilateral cavernous sinuses. OCT with borderline RNFL thinning OD>OS. HVF with generalized depression OD (worse temporally) and temporal depression OS. Findings consistent with chiasmal compression. Exam notable for mild disc pallor OU, normal eye movements and alignment. I will monitor her for any changes throughout treatment and beyond.     11/18/2022: Underwent trans-sphenoidal resection of pituitary mass on 10/3/2022. Pathology consistent with gonadotroph adenoma. Complicated by DI with hospitalization for hyponatremia a few days post resection. Formal visual fields with dramatic improvement in visual field defects! OCT stable. She will monitor for any subjective changes in vision and I will monitor her in clinic moving forward.     5/24/2023: OCT and HVF stable. Exam stable.  MRI planned for tomorrow and  AVA fu next week. Pterygium still very bothersome, she is on the pred-forte TID. Not using AT. Will try Durezol OD for one week, add AT TID and send referral for repeat pterygium resection with [facility].  ?  Plan:  1. Follow up with Dalia Batista and Ghassan (5/30/2023) as planned   2. Monitor for any new blur, red desaturation, diplopia, ptosis  3. Follow up with optometry/ophthalmology for yearly routine eye exams and refraction needs, try OTC readers +1.75 until next appointment  4. Durezol OD BID for one week  5. Referral made for repeat pterygium resection    Follow-up:  I will see her in follow-up in 1 year or sooner with any change.  ?OCT and HVF  ?  Visit Checklist (as applicable):  1. Status of new and prior symptoms discussed? yes  2. Neuroimaging reviewed/ ordered as appropriate? yes  3. Ocular imaging and photos reviewed/ ordered as appropriate? yes  4. Plan for work-up and treatment discussed with patient? yes  5. Potential medication side-effects and monitoring plan discussed? yes  6. Review of outside medical records was performed and pertinent details are summarized in the HPI above? n/a    Time spent on this encounter: 45 minutes. This includes face to face time and non-face to face time preparing to see the patient (eg, review of tests), obtaining and/or reviewing separately obtained history, documenting clinical information in the electronic or other health record, independently interpreting results and communicating results to the patient/family/caregiver, or care coordinator.      JUANITA Palmer  Neuro-Ophthalmology Consultant

## 2023-05-25 ENCOUNTER — CLINICAL SUPPORT (OUTPATIENT)
Dept: OPHTHALMOLOGY | Facility: CLINIC | Age: 48
End: 2023-05-25
Payer: MEDICAID

## 2023-05-25 ENCOUNTER — OFFICE VISIT (OUTPATIENT)
Dept: OPHTHALMOLOGY | Facility: CLINIC | Age: 48
End: 2023-05-25
Payer: MEDICAID

## 2023-05-25 DIAGNOSIS — H53.15 VISUAL DISTORTIONS OF SHAPE AND SIZE: Primary | ICD-10-CM

## 2023-05-25 DIAGNOSIS — H11.001 PTERYGIUM OF RIGHT EYE: ICD-10-CM

## 2023-05-25 DIAGNOSIS — Z98.890 S/P TRANSSPHENOIDAL SELECTIVE ADENOMECTOMY: ICD-10-CM

## 2023-05-25 DIAGNOSIS — H11.001 PTERYGIUM EYE, RIGHT: ICD-10-CM

## 2023-05-25 DIAGNOSIS — Z86.018 S/P TRANSSPHENOIDAL SELECTIVE ADENOMECTOMY: ICD-10-CM

## 2023-05-25 DIAGNOSIS — H10.9 CONJUNCTIVITIS OF RIGHT EYE, UNSPECIFIED CONJUNCTIVITIS TYPE: ICD-10-CM

## 2023-05-25 DIAGNOSIS — D35.2 PITUITARY MACROADENOMA WITH EXTRASELLAR EXTENSION: ICD-10-CM

## 2023-05-25 PROCEDURE — 1160F RVW MEDS BY RX/DR IN RCRD: CPT | Mod: CPTII,,, | Performed by: STUDENT IN AN ORGANIZED HEALTH CARE EDUCATION/TRAINING PROGRAM

## 2023-05-25 PROCEDURE — 99999 PR PBB SHADOW E&M-EST. PATIENT-LVL II: ICD-10-PCS | Mod: PBBFAC,,, | Performed by: STUDENT IN AN ORGANIZED HEALTH CARE EDUCATION/TRAINING PROGRAM

## 2023-05-25 PROCEDURE — 99999 PR PBB SHADOW E&M-EST. PATIENT-LVL II: CPT | Mod: PBBFAC,,, | Performed by: STUDENT IN AN ORGANIZED HEALTH CARE EDUCATION/TRAINING PROGRAM

## 2023-05-25 PROCEDURE — 92133 CPTRZD OPH DX IMG PST SGM ON: CPT | Mod: PBBFAC | Performed by: STUDENT IN AN ORGANIZED HEALTH CARE EDUCATION/TRAINING PROGRAM

## 2023-05-25 PROCEDURE — 1159F MED LIST DOCD IN RCRD: CPT | Mod: CPTII,,, | Performed by: STUDENT IN AN ORGANIZED HEALTH CARE EDUCATION/TRAINING PROGRAM

## 2023-05-25 PROCEDURE — 99215 PR OFFICE/OUTPT VISIT, EST, LEVL V, 40-54 MIN: ICD-10-PCS | Mod: S$PBB,,, | Performed by: STUDENT IN AN ORGANIZED HEALTH CARE EDUCATION/TRAINING PROGRAM

## 2023-05-25 PROCEDURE — 99215 OFFICE O/P EST HI 40 MIN: CPT | Mod: S$PBB,,, | Performed by: STUDENT IN AN ORGANIZED HEALTH CARE EDUCATION/TRAINING PROGRAM

## 2023-05-25 PROCEDURE — 1159F PR MEDICATION LIST DOCUMENTED IN MEDICAL RECORD: ICD-10-PCS | Mod: CPTII,,, | Performed by: STUDENT IN AN ORGANIZED HEALTH CARE EDUCATION/TRAINING PROGRAM

## 2023-05-25 PROCEDURE — 92133 POSTERIOR SEGMENT OCT OPTIC NERVE(OCULAR COHERENCE TOMOGRAPHY) - OU - BOTH EYES: ICD-10-PCS | Mod: 26,S$PBB,, | Performed by: STUDENT IN AN ORGANIZED HEALTH CARE EDUCATION/TRAINING PROGRAM

## 2023-05-25 PROCEDURE — 99212 OFFICE O/P EST SF 10 MIN: CPT | Mod: PBBFAC | Performed by: STUDENT IN AN ORGANIZED HEALTH CARE EDUCATION/TRAINING PROGRAM

## 2023-05-25 PROCEDURE — 92083 HUMPHREY VISUAL FIELD - OU - BOTH EYES: ICD-10-PCS | Mod: 26,S$PBB,, | Performed by: STUDENT IN AN ORGANIZED HEALTH CARE EDUCATION/TRAINING PROGRAM

## 2023-05-25 PROCEDURE — 1160F PR REVIEW ALL MEDS BY PRESCRIBER/CLIN PHARMACIST DOCUMENTED: ICD-10-PCS | Mod: CPTII,,, | Performed by: STUDENT IN AN ORGANIZED HEALTH CARE EDUCATION/TRAINING PROGRAM

## 2023-05-25 PROCEDURE — 92083 EXTENDED VISUAL FIELD XM: CPT | Mod: PBBFAC | Performed by: STUDENT IN AN ORGANIZED HEALTH CARE EDUCATION/TRAINING PROGRAM

## 2023-05-25 RX ORDER — DIFLUPREDNATE OPHTHALMIC 0.5 MG/ML
1 EMULSION OPHTHALMIC 2 TIMES DAILY
Qty: 5 ML | Refills: 0 | Status: SHIPPED | OUTPATIENT
Start: 2023-05-25 | End: 2023-06-08

## 2023-05-26 ENCOUNTER — HOSPITAL ENCOUNTER (OUTPATIENT)
Dept: RADIOLOGY | Facility: HOSPITAL | Age: 48
Discharge: HOME | End: 2023-05-26
Attending: NEUROLOGICAL SURGERY
Payer: MEDICAID

## 2023-05-26 DIAGNOSIS — Z86.018 S/P TRANSSPHENOIDAL SELECTIVE ADENOMECTOMY: ICD-10-CM

## 2023-05-26 DIAGNOSIS — Z98.890 S/P TRANSSPHENOIDAL SELECTIVE ADENOMECTOMY: ICD-10-CM

## 2023-05-26 PROCEDURE — 70553 MRI BRAIN STEM W/O & W/DYE: CPT | Mod: TC

## 2023-05-26 PROCEDURE — 25500020 PHARM REV CODE 255: Performed by: NEUROLOGICAL SURGERY

## 2023-05-26 PROCEDURE — A9585 GADOBUTROL INJECTION: HCPCS | Performed by: NEUROLOGICAL SURGERY

## 2023-05-26 PROCEDURE — 70553 MRI PITUITARY W W/O CONTRAST: ICD-10-PCS | Mod: 26,,, | Performed by: RADIOLOGY

## 2023-05-26 PROCEDURE — 70553 MRI BRAIN STEM W/O & W/DYE: CPT | Mod: 26,,, | Performed by: RADIOLOGY

## 2023-05-26 RX ORDER — GADOBUTROL 604.72 MG/ML
7.5 INJECTION INTRAVENOUS
Status: COMPLETED | OUTPATIENT
Start: 2023-05-26 | End: 2023-05-26

## 2023-05-26 RX ADMIN — GADOBUTROL 7.5 ML: 604.72 INJECTION INTRAVENOUS at 01:05

## 2023-05-29 NOTE — PROGRESS NOTES
HVF done ou. Rel/fix/coop. Good ou./chart checked for latex allergy.-/ .25 + .25 x 1/od .25/os-smh

## 2023-05-30 ENCOUNTER — OFFICE VISIT (OUTPATIENT)
Dept: NEUROSURGERY | Facility: CLINIC | Age: 48
End: 2023-05-30
Payer: MEDICAID

## 2023-05-30 VITALS
BODY MASS INDEX: 26.98 KG/M2 | HEART RATE: 62 BPM | HEIGHT: 64 IN | SYSTOLIC BLOOD PRESSURE: 111 MMHG | DIASTOLIC BLOOD PRESSURE: 70 MMHG | WEIGHT: 158 LBS

## 2023-05-30 DIAGNOSIS — H93.19 TINNITUS, UNSPECIFIED LATERALITY: Primary | ICD-10-CM

## 2023-05-30 PROCEDURE — 3078F DIAST BP <80 MM HG: CPT | Mod: CPTII,,, | Performed by: NEUROLOGICAL SURGERY

## 2023-05-30 PROCEDURE — 1159F MED LIST DOCD IN RCRD: CPT | Mod: CPTII,,, | Performed by: NEUROLOGICAL SURGERY

## 2023-05-30 PROCEDURE — 3074F PR MOST RECENT SYSTOLIC BLOOD PRESSURE < 130 MM HG: ICD-10-PCS | Mod: CPTII,,, | Performed by: NEUROLOGICAL SURGERY

## 2023-05-30 PROCEDURE — 99999 PR PBB SHADOW E&M-EST. PATIENT-LVL III: ICD-10-PCS | Mod: PBBFAC,,, | Performed by: NEUROLOGICAL SURGERY

## 2023-05-30 PROCEDURE — 1160F PR REVIEW ALL MEDS BY PRESCRIBER/CLIN PHARMACIST DOCUMENTED: ICD-10-PCS | Mod: CPTII,,, | Performed by: NEUROLOGICAL SURGERY

## 2023-05-30 PROCEDURE — 99214 PR OFFICE/OUTPT VISIT, EST, LEVL IV, 30-39 MIN: ICD-10-PCS | Mod: S$PBB,,, | Performed by: NEUROLOGICAL SURGERY

## 2023-05-30 PROCEDURE — 3074F SYST BP LT 130 MM HG: CPT | Mod: CPTII,,, | Performed by: NEUROLOGICAL SURGERY

## 2023-05-30 PROCEDURE — 1160F RVW MEDS BY RX/DR IN RCRD: CPT | Mod: CPTII,,, | Performed by: NEUROLOGICAL SURGERY

## 2023-05-30 PROCEDURE — 3008F BODY MASS INDEX DOCD: CPT | Mod: CPTII,,, | Performed by: NEUROLOGICAL SURGERY

## 2023-05-30 PROCEDURE — 3078F PR MOST RECENT DIASTOLIC BLOOD PRESSURE < 80 MM HG: ICD-10-PCS | Mod: CPTII,,, | Performed by: NEUROLOGICAL SURGERY

## 2023-05-30 PROCEDURE — 99999 PR PBB SHADOW E&M-EST. PATIENT-LVL III: CPT | Mod: PBBFAC,,, | Performed by: NEUROLOGICAL SURGERY

## 2023-05-30 PROCEDURE — 99214 OFFICE O/P EST MOD 30 MIN: CPT | Mod: S$PBB,,, | Performed by: NEUROLOGICAL SURGERY

## 2023-05-30 PROCEDURE — 99213 OFFICE O/P EST LOW 20 MIN: CPT | Mod: PBBFAC | Performed by: NEUROLOGICAL SURGERY

## 2023-05-30 PROCEDURE — 1159F PR MEDICATION LIST DOCUMENTED IN MEDICAL RECORD: ICD-10-PCS | Mod: CPTII,,, | Performed by: NEUROLOGICAL SURGERY

## 2023-05-30 PROCEDURE — 3008F PR BODY MASS INDEX (BMI) DOCUMENTED: ICD-10-PCS | Mod: CPTII,,, | Performed by: NEUROLOGICAL SURGERY

## 2023-05-30 NOTE — PROGRESS NOTES
CHIEF COMPLAINT:  Annual postop surveillance    INTERVAL HISTORY (5/30/23):  Returns for annual postop surveillance re: pituitary tumor resection.  She is doing well.  Mild posterior occipital HA and bilateral tinnitus.  Denies vision changes.  Has been tolerating being off levothyroxin.  Dr Yan reports good vision.  New MRI does not show any obvious recurrence.    INTERVAL HISTORY (11/22/22):  Routine 6 week postoperative follow-up.  Her main complaint is posterior headaches that have been present before surgery but remain.  They are intermittent but occurs most of the day and lessen as the day goes on.  She rates them as 7-8/10 on average and 1-2/10 at its best.  She takes Tylenol.  She is unable to take ibuprofen and is not on any migraine medicines.    She had been admitted for postop hyponatremia that resolved after starting hydrocortisone. Did not require Na restriction.  Now normal Na    She denies any vision changes.    INTERVAL HISTORY (10/11/22):  Patient returns for routine 2 week postoperative follow-up s/p endonasal endoscopic transsphenoidal resection of pituitary tumor on 10/3/22 with Dr. Astorga of ENT.  Patient tolerated the procedure well without any perioperative complications.  She was discharged few days after surgery without evidence of DI or adrenal insufficiency.  She is neurologically stable.  Today she reports that her vision is improved as well as her energy levels.  She was recently seen in the emergency room for severe headache in the back of her head but she explains that that has gotten better since.  She also reports tiny amount of clear watery appearing drainage from her nose periodically.  She states that this could happen up to 4-5 times but is a small amount and not bloody.  She does acknowledge that her headaches are worse when she gets up from bed but it is not necessarily positional.  She is urinating 6-7 times per day and 2 times per night.  A recent BMP in the ED revealed  hyponatremia to 129.  She will be seeing Dr. Batista today for further evaluation of SIADH versus DI.    HPI:  Tessa Wynne is a 47 y.o.  female with below PMH, who is referred for evaluation of pituitary macroadenoma.  Brain MRI obtained for syncopal episode and HA revealed large pituitary tumor.  She's had HA x 1 year (mainly occipital location).  Denies significant vision change however recent ophtho eval revealed evidence of chiasmal compression.    Elevated prolactin (138) and hypothyroidism.    See endo note for hormone eval    Review of patient's allergies indicates:  No Known Allergies    Past Medical History:   Diagnosis Date    Elevated fasting glucose     GERD (gastroesophageal reflux disease)     Hypothyroidism     PAD (peripheral artery disease)     Pituitary lesion 9/7/2022     Past Surgical History:   Procedure Laterality Date    CHOLECYSTECTOMY      COLONOSCOPY N/A 12/15/2022    Procedure: COLONOSCOPY;  Surgeon: Kerrie Bird MD;  Location: [facility] END[facility];  Service: Endoscopy;  Laterality: N/A;    ENDOMETRIAL ABLATION N/A 2/8/2022    Procedure: ABLATION, ENDOMETRIUM;  Surgeon: Ariel Butler MD;  Location: [facility] OR;  Service: OB/GYN;  Laterality: N/A;    EXCISION, NEOPLASM, PITUITARY, TRANSSPHENOIDAL APPROACH, USING COMPUTER-ASSISTED NAVIGATION N/A 10/3/2022    Procedure: EXCISION, NEOPLASM, PITUITARY, TRANSSPHENOIDAL APPROACH, USING COMPUTER-ASSISTED NAVIGATION;  Surgeon: Ed Astorga MD;  Location: [facility] OR [address] [place] [address];  Service: ENT;  Laterality: N/A;    EXCISION, NEOPLASM, PITUITARY, TRANSSPHENOIDAL APPROACH, USING COMPUTER-ASSISTED NAVIGATION N/A 10/3/2022    Procedure: EXCISION, NEOPLASM, PITUITARY, TRANSSPHENOIDAL APPROACH, USING COMPUTER-ASSISTED NAVIGATION;  Surgeon: Miguel Ángel Ferrell DO;  Location: [facility] OR [address] [place] [address];  Service: Neurosurgery;  Laterality: N/A;    HYSTEROSCOPY WITH DILATION AND CURETTAGE OF UTERUS N/A 2/8/2022    Procedure: HYSTEROSCOPY, WITH DILATION AND CURETTAGE OF  UTERUS;  Surgeon: Ariel Butler MD;  Location: [facility];  Service: OB/GYN;  Laterality: N/A;     Family History   Problem Relation Age of Onset    Diabetes Mother     Diabetes Mellitus Mother     Diabetes Father     Diabetes Mellitus Father     No Known Problems Sister     No Known Problems Sister     No Known Problems Brother     No Known Problems Brother     No Known Problems Daughter     No Known Problems Son     Colon cancer Neg Hx     Breast cancer Neg Hx     Ovarian cancer Neg Hx     Uterine cancer Neg Hx     Heart attack Neg Hx      Social History     Tobacco Use    Smoking status: Never    Smokeless tobacco: Never   Substance Use Topics    Alcohol use: No    Drug use: No        Review of Systems   Constitutional: Negative.    HENT:  Negative for congestion, ear discharge, ear pain, hearing loss, nosebleeds, sinus pain and tinnitus.    Eyes:  Negative for blurred vision, double vision, photophobia, pain, discharge and redness.   Respiratory: Negative.     Cardiovascular:  Negative for chest pain, palpitations, claudication and leg swelling.   Gastrointestinal:  Negative for abdominal pain, blood in stool, constipation, diarrhea, melena and vomiting.   Genitourinary:  Negative for flank pain, frequency and urgency.   Musculoskeletal:  Negative for falls.   Skin: Negative.    Neurological:  Negative for dizziness, tingling, tremors, sensory change, speech change, focal weakness, seizures, loss of consciousness, weakness and headaches.   Endo/Heme/Allergies:  Does not bruise/bleed easily.   Psychiatric/Behavioral: Negative.       OBJECTIVE:   Vital Signs:  Pulse: 62 (05/30/23 1031)  BP: 111/70 (05/30/23 1031)    Physical Exam:  Constitutional: Patient sitting comfortably in chair. Appears well developed and well nourished.  Skin: Exposed areas are intact without abnormal markings, rashes or other lesions.  HEENT: Normocephalic. Normal conjunctivae.  Cardiovascular: Normal rate and regular  rhythm.  Respiratory: Chest wall rises and falls symmetrically, without signs of respiratory distress.  Abdomen: Soft and non-tender.  Extremities: Warm and without edema. Calves supple, non-tender.  Psych/Behavior: Normal affect.    Neurological:    Mental status: Alert and oriented. Conversational and appropriate.       Cranial Nerves: VFF to confrontation. PERRL. EOMI without nystagmus. Facial STLT normal and symmetric. Strong, symmetric muscles of mastication. Facial strength full and symmetric. Hearing equal bilaterally to finger rub. Palate and uvula rise and fall normally in midline. Shoulder shrug 5/5 strength. Tongue midline.     Motor:    Upper:  Deltoids Triceps Biceps WE WF     R 5/5 5/5 5/5 5/5 5/5 5/5    L 5/5 5/5 5/5 5/5 5/5 5/5      Lower:  HF KE KF DF PF EHL    R 5/5 5/5 5/5 5/5 5/5 5/5    L 5/5 5/5 5/5 5/5 5/5 5/5     Sensory: Intact sensation to light touch in all extremities. Romberg negative.    Reflexes:          DTR: 2+ symmetrically throughout.     Rey's: Negative.     Babinski's: Negative.     Clonus: Negative.    Cerebellar: Finger-to-nose and rapid alternating movements normal.     Gait stable    Diagnostic Results:  All imaging was independently reviewed by me.    BMP - Na 142 from 134 from 129    [facility], 5/26/23:  No obvious recurrence  Continued decompression of optic chiasm        Postop MRI - GTR    MRI brain, dated 8/15/22:  1. 2.8 x 3.5 x 2.7 cm pit macroadenoma with compression of chiasm  2. Knosp 3 on R  3. Knosp 2 on L    ASSESSMENT/PLAN:     Problem List Items Addressed This Visit    None  Visit Diagnoses       Tinnitus, unspecified laterality    -  Primary    Relevant Orders    Ambulatory referral/consult to ENT            S/p TSA for resection of pituitary macroadenoma.      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  Surgery achieved gross total resection without spinal fluid leak.  Doing well, no vision issues and off hormone replacement.  Reports bilateral tinnitus that is unrelated to pituitary surgery.    - RTC in 1 yr with hormone panel and pit MRI  - Referral to to ENT re: tinnitus    The patient understands and agrees with the plan of care. All questions were answered.    Time spent on this encounter: 30 minutes. This includes face-to-face time and non-face to face time preparing to see the patient (eg, review of tests), obtaining and/or reviewing separately obtained history, documenting clinical information in the electronic or other health record, independently interpreting results and communicating results to the patient/family/caregiver, or care coordinator.

## 2023-06-28 NOTE — ASSESSMENT & PLAN NOTE
Clinically doing well now w/o diabetes insipidus or SIADH.  Thyroid labs normal off replacement.      Not requiring any pituitary hormone replacement with las MRI showing decompression of optic chiasm and small surgical cavity.        Will continue to monitor with periodic labs and repeat hvf (blake visual fields) as planned with Dr. Yan

## 2023-08-02 ENCOUNTER — CLINICAL SUPPORT (OUTPATIENT)
Dept: AUDIOLOGY | Facility: CLINIC | Age: 48
End: 2023-08-02
Payer: MEDICAID

## 2023-08-02 ENCOUNTER — OFFICE VISIT (OUTPATIENT)
Dept: OTOLARYNGOLOGY | Facility: CLINIC | Age: 48
End: 2023-08-02
Payer: MEDICAID

## 2023-08-02 DIAGNOSIS — Z01.10 NORMAL HEARING EXAM: ICD-10-CM

## 2023-08-02 DIAGNOSIS — H93.293 ABNORMAL AUDITORY PERCEPTION OF BOTH EARS: Primary | ICD-10-CM

## 2023-08-02 DIAGNOSIS — H93.13 TINNITUS, BILATERAL: Primary | ICD-10-CM

## 2023-08-02 PROCEDURE — 1159F PR MEDICATION LIST DOCUMENTED IN MEDICAL RECORD: ICD-10-PCS | Mod: CPTII,,, | Performed by: NURSE PRACTITIONER

## 2023-08-02 PROCEDURE — 92567 TYMPANOMETRY: CPT | Mod: PBBFAC

## 2023-08-02 PROCEDURE — 99212 OFFICE O/P EST SF 10 MIN: CPT | Mod: PBBFAC | Performed by: NURSE PRACTITIONER

## 2023-08-02 PROCEDURE — 99999 PR PBB SHADOW E&M-EST. PATIENT-LVL II: CPT | Mod: PBBFAC,,, | Performed by: NURSE PRACTITIONER

## 2023-08-02 PROCEDURE — 99214 PR OFFICE/OUTPT VISIT, EST, LEVL IV, 30-39 MIN: ICD-10-PCS | Mod: S$PBB,,, | Performed by: NURSE PRACTITIONER

## 2023-08-02 PROCEDURE — 92557 COMPREHENSIVE HEARING TEST: CPT | Mod: PBBFAC

## 2023-08-02 PROCEDURE — 1159F MED LIST DOCD IN RCRD: CPT | Mod: CPTII,,, | Performed by: NURSE PRACTITIONER

## 2023-08-02 PROCEDURE — 99214 OFFICE O/P EST MOD 30 MIN: CPT | Mod: S$PBB,,, | Performed by: NURSE PRACTITIONER

## 2023-08-02 PROCEDURE — 99999 PR PBB SHADOW E&M-EST. PATIENT-LVL II: ICD-10-PCS | Mod: PBBFAC,,, | Performed by: NURSE PRACTITIONER

## 2023-08-02 NOTE — PROGRESS NOTES
Subjective:   Tessa Wynne is a 47 y.o. female who was self-referred for tinnitus. She reports bilateral constant tinnitus x 5 months. Reports that left ear > right ear. Pt describes a humming sound that is more pronounced at night. Pt reports that tinnitus does not disrupt her quality of sleep. She has not tried anything for this problem. She denies otalgia, otorrhea, hearing loss or vertigo. There is not a family history of hearing loss at a young age. There is not a prior history of ear surgery. There is not a prior history of ear infections. There is not a history of ear trauma. She denies a history of significant noise exposure. Does not take any medications regularly and denies regular ASA/NSAID's/tylenol use. Denies significant stress, tobacco/ alcohol use or caffeine.     History of trans-sphenoid resection of pituitary adenoma with Dr. Ferrell and Dr. Astorga in October 2022. Following up annually with neurosurgery.     Past Medical History  She has a past medical history of Elevated fasting glucose, GERD (gastroesophageal reflux disease), Hypothyroidism, PAD (peripheral artery disease), and Pituitary lesion.    Past Surgical History  She has a past surgical history that includes Cholecystectomy; Hysteroscopy with dilation and curettage of uterus (N/A, 2/8/2022); Endometrial ablation (N/A, 2/8/2022); Excision, neoplasm, pituitary, transsphenoidal approach, using computer-assisted navigation (N/A, 10/3/2022); Excision, neoplasm, pituitary, transsphenoidal approach, using computer-assisted navigation (N/A, 10/3/2022); and Colonoscopy (N/A, 12/15/2022).    Family History  Her family history includes Diabetes in her father and mother; Diabetes Mellitus in her father and mother; No Known Problems in her brother, brother, daughter, sister, sister, and son.    Social History  She reports that she has never smoked. She has never used smokeless tobacco. She reports that she does not drink alcohol and does not use  drugs.    Allergies  She has No Known Allergies.    Medications  She has a current medication list which includes the following prescription(s): calcium citrate/vitamin d3, dicyclomine, ferrous sulfate, multivit-min/iron fum/folic ac, and omeprazole.  Review of Systems     Constitutional: Negative for appetite change, chills, fatigue, fever and unexpected weight loss.      HENT: Positive for ear pain and ringing in the ears.  Negative for ear discharge, facial swelling, hearing loss and voice change.      Respiratory:  Negative for shortness of breath.      Cardiovascular:  Negative for chest pain.     Gastrointestinal:  Negative for diarrhea and vomiting.     Neurological: Negative for dizziness, headaches and light-headedness.      Psychiatric: Negative for depression, nervous/anxious and sleep disturbance.          Objective:     Constitutional:   She is oriented to person, place, and time. She appears well-developed and well-nourished. She appears alert. She is cooperative.  Non-toxic appearance. She does not have a sickly appearance. She does not appear ill. Normal speech.      Head:  Normocephalic and atraumatic. Head is without abrasion, without right periorbital erythema, without left periorbital erythema and without TMJ tenderness. Salivary glands normal.  Facial strength is normal.      Ears:    Right Ear: No drainage, swelling or tenderness. No mastoid tenderness. Tympanic membrane is not injected, not scarred, not perforated, not erythematous, not retracted and not bulging. No middle ear effusion.   Left Ear: No drainage, swelling or tenderness. No mastoid tenderness. Tympanic membrane is not injected, not scarred, not perforated, not erythematous, not retracted and not bulging.  No middle ear effusion.     Nose:  No mucosal edema, rhinorrhea or sinus tenderness. No epistaxis. Turbinates normal.  Right sinus exhibits no maxillary sinus tenderness and no frontal sinus tenderness. Left sinus exhibits no  maxillary sinus tenderness and no frontal sinus tenderness.     Mouth/Throat  Oropharynx not clear and moist and abnormal uvula midline. Normal dentition. No uvula swelling. No oropharyngeal exudate.     Neck:  Trachea normal, phonation normal and no adenopathy.     Pulmonary/Chest:   Effort normal.     Psychiatric:   She has a normal mood and affect. Her speech is normal and behavior is normal.     Neurological:   She is alert and oriented to person, place, and time. She has neurological normal, alert and oriented.     Procedure  None    Audiogram    I independently reviewed the tracings of the complete audiometric evaluation. I reviewed the audiogram with the patient as well. Pertinent findings include a normal study.  Assessment:     1. Tinnitus, bilateral    2. Normal hearing exam      Plan:     Tinnitus, bilateral  Reassurance provided. Discussed the etiology of tinnitus and management strategies including the use of background sound enrichment. Further instructed that avoidance of caffeine, alcohol, tobacco, and stress can be of benefit.     Normal hearing exam  Reviewed patient's audiometric testing interpretation which is consistent with normal hearing bilaterally. Hearing conservation strongly recommended when in noisy environments. Patient encouraged to return to clinic PRN.

## 2023-08-02 NOTE — PROGRESS NOTES
Tessa Wynne was seen today in the clinic for an audiologic evaluation. Patient declined use of  for today's appointment. Patient's main complaint was bilateral aural fullness.  Mrs. Wynne reported that her fullness began around 5-6 months ago and is accompanied by tinnitus and muffled hearing.    Tympanometry revealed Type As in the right ear and Type A in the left ear.     Audiogram results revealed normal hearing sensitivity in the right ear and normal hearing sensitivity in the left ear.      Speech reception thresholds were noted at 5 dB in the right ear and 5 dB in the left ear.    Speech discrimination scores were 100% in the right ear and 100% in the left ear.    Recommendations:  Otologic evaluation  Repeat audiogram as needed   Hearing protection when in noise

## 2023-09-06 DIAGNOSIS — Z98.890 STATUS POST LASER ABLATION OF INCOMPETENT VEIN: Primary | ICD-10-CM

## 2023-09-07 ENCOUNTER — PATIENT MESSAGE (OUTPATIENT)
Dept: VASCULAR SURGERY | Facility: CLINIC | Age: 48
End: 2023-09-07
Payer: MEDICAID

## 2023-09-08 ENCOUNTER — PROCEDURE VISIT (OUTPATIENT)
Dept: VASCULAR SURGERY | Facility: CLINIC | Age: 48
End: 2023-09-08
Payer: MEDICAID

## 2023-09-08 VITALS
SYSTOLIC BLOOD PRESSURE: 124 MMHG | BODY MASS INDEX: 26.98 KG/M2 | WEIGHT: 158 LBS | DIASTOLIC BLOOD PRESSURE: 68 MMHG | HEIGHT: 64 IN | HEART RATE: 70 BPM

## 2023-09-08 DIAGNOSIS — Z91.89 AT HIGH RISK FOR PAIN FROM PROCEDURE: Primary | ICD-10-CM

## 2023-09-08 DIAGNOSIS — I87.2 VENOUS INSUFFICIENCY: ICD-10-CM

## 2023-09-08 PROCEDURE — 36478 PR ENDOVENOUS LASER, 1ST VEIN: ICD-10-PCS | Mod: LT,S$GLB,, | Performed by: SURGERY

## 2023-09-08 PROCEDURE — 36478 ENDOVENOUS LASER 1ST VEIN: CPT | Mod: LT,S$GLB,, | Performed by: SURGERY

## 2023-09-08 RX ORDER — MELOXICAM 7.5 MG/1
TABLET ORAL
COMMUNITY
Start: 2023-09-07 | End: 2024-02-20

## 2023-09-08 RX ORDER — ALPRAZOLAM 0.5 MG/1
0.5 TABLET ORAL 2 TIMES DAILY PRN
COMMUNITY
Start: 2023-09-07 | End: 2024-02-20

## 2023-09-08 RX ORDER — LIDOCAINE HYDROCHLORIDE 10 MG/ML
1 INJECTION INFILTRATION; PERINEURAL
Status: DISCONTINUED | OUTPATIENT
Start: 2023-09-08 | End: 2024-02-20

## 2023-09-08 NOTE — PROCEDURES
"Tessa Wynne is a 47 y.o. female patient.    Pulse: 70 (09/08/23 0947)  BP: 124/68 (09/08/23 0947)  Weight: 71.7 kg (158 lb) (09/08/23 0947)  Height: 5' 4" (162.6 cm) (09/08/23 0947)       Procedures    9/8/2023    Tessa Wynne  09/08/2023     Pre-Procedure Diagnosis: Left greater saphenous vein reflux; significant superficial venous insufficiency    Post-Procedure Diagnsosis: Same    Procedure: Laser endovenous ablation of the left greater saphenous vein    Surgeon: Messi Schaefer MD    Anesthesia: Local    The skin of the leg was prepped and draped in sterile fashion.  Ultrasound-guidance was used throughout the procedure with a portable duplex ultrasound machine.  The GSV was cannulated using a micro-puncture system.  An 0.035 wire J-tip followed by a 4-Fr Angiodynamics sheath was placed throughout the GSV.   Using tumescent anesthesia, the entire sheathed portion of the GSV was anesthesized keeping the vein at least one cm from the skin; Klein pump was used.  Position of the tip of the laser was then reconfirmed to be approximately 2 cm from the saphenofemoral junction.  The 1470 nm laser was then activated and the entire length of the vein was treated at ~ 49 J/cm.  The fiber and sheath were then removed intact.  Duplex confirmed occlusion of the GSV with continued patency of the common femoral vein.   The incision was closed with Steri-strips.   Sterile compression dressings and a compression stocking were applied and the patient was discharged to home in a satisfactory condition.    Cannulation site: calf    Sheath length: 38 cm    Christine of power: 7 W    Laser time: 270.1 sec    Joules: 1890 J             Messi Schaefer MD   Vascular & Endovascular Surgery    "

## 2023-09-11 ENCOUNTER — HOSPITAL ENCOUNTER (OUTPATIENT)
Dept: RADIOLOGY | Facility: OTHER | Age: 48
Discharge: HOME | End: 2023-09-11
Attending: SURGERY
Payer: MEDICAID

## 2023-09-11 DIAGNOSIS — Z98.890 STATUS POST LASER ABLATION OF INCOMPETENT VEIN: ICD-10-CM

## 2023-09-11 PROCEDURE — 93971 EXTREMITY STUDY: CPT | Mod: 26,LT,, | Performed by: RADIOLOGY

## 2023-09-11 PROCEDURE — 93970 EXTREMITY STUDY: CPT | Mod: TC,LT

## 2023-09-11 PROCEDURE — 93971 US LOWER EXTREMITY VENOUS INSUFFICIENCY LEFT: ICD-10-PCS | Mod: 26,LT,, | Performed by: RADIOLOGY

## 2023-12-28 ENCOUNTER — OFFICE VISIT (OUTPATIENT)
Dept: VASCULAR SURGERY | Facility: CLINIC | Age: 48
End: 2023-12-28
Payer: MEDICAID

## 2023-12-28 DIAGNOSIS — I78.1 SPIDER VEIN, SYMPTOMATIC: ICD-10-CM

## 2023-12-28 DIAGNOSIS — I87.2 VENOUS INSUFFICIENCY: Primary | ICD-10-CM

## 2023-12-28 DIAGNOSIS — I78.1 SPIDER VEIN, SYMPTOMATIC: Primary | ICD-10-CM

## 2023-12-28 DIAGNOSIS — Z98.890 STATUS POST LASER ABLATION OF INCOMPETENT VEIN: ICD-10-CM

## 2023-12-28 PROCEDURE — 99213 PR OFFICE/OUTPT VISIT, EST, LEVL III, 20-29 MIN: ICD-10-PCS | Mod: S$GLB,,, | Performed by: SURGERY

## 2023-12-28 PROCEDURE — 99213 OFFICE O/P EST LOW 20 MIN: CPT | Mod: S$GLB,,, | Performed by: SURGERY

## 2023-12-28 NOTE — PROGRESS NOTES
Messi Schaefer MD, RPVI                                 [facility]                           [facility]                             12/28/2023    HPI:  Tessa Wynne is a 48 y.o. female with   Patient Active Problem List   Diagnosis    Risk for coronary artery disease less than 10% in next 10 years    Hyperglycemia    Fibromyalgia    PUD (peptic ulcer disease)    Menorrhagia    Pituitary macroadenoma with extrasellar extension    Appendicitis    Bilateral claudication of lower limb    S/P transsphenoidal selective adenomectomy    Migraine    Atypical chest pain    Irregular bowel habits    Generalized abdominal pain    being managed by PCP and specialists who is here today for evaluation of BLE edema.  Patient states location is BLE occurring for yrs.  Associated signs and symptoms include pain.  Quality is heavy and severity is 6/10.  Symptoms began yrs ago.  Alleviating factors include elevation.  Worsening factors include dependency.  Patient has not been wearing compression stockings for greater than 3 months.  No FH of venous disease.  Symptoms do limit patient's functional status and daily activities.  no DVT history.  no venous interventions.  no low sodium diet.  no excessive water intake.    Migraine with aura: no  PFO/ASD/right to left shunt: no  Pregnant: no  Breastfeeding: no    no MI  no Stroke  Tobacco use: no    5/2023:  c/o BLE edema and pain despite compression and elevation > 3 mo.    12/2023:  s/p Laser endovenous ablation of the left greater saphenous vein 9/2023.  C/o painful spider veins despite compression and elevation > 3 mo.    Past Medical History:   Diagnosis Date    Elevated fasting glucose     GERD (gastroesophageal reflux disease)     Hypothyroidism     PAD (peripheral artery disease)     Pituitary lesion 9/7/2022     Past Surgical History:   Procedure Laterality Date    CHOLECYSTECTOMY      COLONOSCOPY N/A 12/15/2022    Procedure: COLONOSCOPY;   Surgeon: Kerrie Bird MD;  Location: [facility] ENDO;  Service: Endoscopy;  Laterality: N/A;    ENDOMETRIAL ABLATION N/A 2/8/2022    Procedure: ABLATION, ENDOMETRIUM;  Surgeon: Ariel Butler MD;  Location: [facility] OR;  Service: OB/GYN;  Laterality: N/A;    EXCISION, NEOPLASM, PITUITARY, TRANSSPHENOIDAL APPROACH, USING COMPUTER-ASSISTED NAVIGATION N/A 10/3/2022    Procedure: EXCISION, NEOPLASM, PITUITARY, TRANSSPHENOIDAL APPROACH, USING COMPUTER-ASSISTED NAVIGATION;  Surgeon: Ed Astorga MD;  [facility];  Service: ENT;  Laterality: N/A;    EXCISION, NEOPLASM, PITUITARY, TRANSSPHENOIDAL APPROACH, USING COMPUTER-ASSISTED NAVIGATION N/A 10/3/2022    Procedure: EXCISION, NEOPLASM, PITUITARY, TRANSSPHENOIDAL APPROACH, USING COMPUTER-ASSISTED NAVIGATION;  Surgeon: Miguel Ángel Ferrell DO;  [facility];  Service: Neurosurgery;  Laterality: N/A;    HYSTEROSCOPY WITH DILATION AND CURETTAGE OF UTERUS N/A 2/8/2022    Procedure: HYSTEROSCOPY, WITH DILATION AND CURETTAGE OF UTERUS;  Surgeon: Ariel Butler MD;  Location: [facility] OR;  Service: OB/GYN;  Laterality: N/A;     Family History   Problem Relation Age of Onset    Diabetes Mother     Diabetes Mellitus Mother     Diabetes Father     Diabetes Mellitus Father     No Known Problems Sister     No Known Problems Sister     No Known Problems Brother     No Known Problems Brother     No Known Problems Daughter     No Known Problems Son     Colon cancer Neg Hx     Breast cancer Neg Hx     Ovarian cancer Neg Hx     Uterine cancer Neg Hx     Heart attack Neg Hx      Social History     Socioeconomic History    Marital status:     Number of children: 2   Occupational History    Occupation: cleaning housing    Tobacco Use    Smoking status: Never    Smokeless tobacco: Never   Substance and Sexual Activity    Alcohol use: No    Drug use: No    Sexual activity: Yes     Partners: Male     Birth control/protection: OCP   Social History Narrative     Patient is originally from [place] [address]         School at ; [employer]         College/university ;         Working ; [id number]                 Children    jobluci - 21    lidnsey - 13         Lives with     dazoë and son and husabd        Diet/Exericse           Current Outpatient Medications:     ALPRAZolam (XANAX) 0.5 MG tablet, Take 0.5 mg by mouth 2 (two) times daily as needed., Disp: , Rfl:     calcium citrate/vitamin D3 (CITRACAL REGULAR ORAL), Take by mouth., Disp: , Rfl:     dicyclomine (BENTYL) 20 mg tablet, Take 1 tablet (20 mg total) by mouth 3 (three) times daily as needed (abdominal pain). (Patient not taking: Reported on 9/8/2023), Disp: 60 tablet, Rfl: 2    ferrous sulfate (IRON ORAL), Take by mouth., Disp: , Rfl:     meloxicam (MOBIC) 7.5 MG tablet, Take by mouth., Disp: , Rfl:     mv,calcium,min/iron/folic/vitK (ONE-A-DAY WOMEN'S COMPLETE ORAL), Take by mouth., Disp: , Rfl:     omeprazole (PRILOSEC) 40 MG capsule, Take 1 capsule (40 mg total) by mouth every morning. (Patient not taking: Reported on 9/8/2023), Disp: 30 capsule, Rfl: 11    Current Facility-Administered Medications:     LIDOcaine HCL 10 mg/ml (1%) injection 1 mL, 1 mL, Other, 1 time in Clinic/HOD, Messi Schaefer MD    LIDOcaine-EPINEPHrine 1%-1:100,000 30 mL, LIDOcaine HCL 10 mg/ml (1%) 20 mL, sodium bicarbonate 10 mL in sodium chloride 0.9% 500 mL solution, , MISCELLANEOUS, 1 time in Clinic/HOD, Messi Schaefer MD    REVIEW OF SYSTEMS:  General: No fevers or chills; ENT: No sore throat; Allergy and Immunology: no persistent infections; Hematological and Lymphatic: No history of bleeding or easy bruising; Endocrine: negative; Respiratory: no cough, shortness of breath, or wheezing; Cardiovascular: no chest pain or dyspnea on exertion; Gastrointestinal: no abdominal pain/back, change in bowel habits, or bloody stools; Genito-Urinary: no dysuria, trouble voiding, or hematuria; Musculoskeletal: edema and pain;  "Neurological: no TIA or stroke symptoms; Psychiatric: no nervousness, anxiety or depression.    PHYSICAL EXAM:                General appearance:  Alert, well-appearing, and in no distress.  Oriented to person, place, and time                    Neurological: Normal speech, no focal findings noted; CN II - XII grossly intact. RLE with sensation to light touch, LLE with sensation to light touch.            Musculoskeletal: Digits/nail without cyanosis/clubbing.  Strength 5/5 BLE.                    Neck: Supple, no significant adenopathy                  Chest:  No wheezes, symmetric air entry. No use of accessory muscles               Cardiac: Normal rate and regular rhythm            Abdomen: Soft, nontender, nondistended      Extremities:   2+ R DP pulse, 2+ L DP pulse      1+ RLE edema, 1+ LLE edema    Skin:  RLE no ulcer; LLE no ulcer      RLE + spider veins, LLE + spider veins      RLE no varicose veins, LLE no varicose veins    CEAP 3/3    VCSS 10    LAB RESULTS:  No results found for: "CBC"  Lab Results   Component Value Date    LABPROT 10.0 06/11/2021    INR 0.9 06/11/2021     Lab Results   Component Value Date     05/15/2023    K 3.9 05/15/2023     05/15/2023    CO2 24 05/15/2023    GLU 92 05/15/2023    BUN 12 05/15/2023    CREATININE 0.9 05/15/2023    CALCIUM 9.0 05/15/2023    ANIONGAP 8 05/15/2023    EGFRNONAA >60 07/29/2022     Lab Results   Component Value Date    WBC 3.88 (L) 11/28/2022    RBC 4.27 11/28/2022    HGB 12.8 11/28/2022    HCT 39.4 11/28/2022    MCV 92 11/28/2022    MCH 30.0 11/28/2022    MCHC 32.5 11/28/2022    RDW 12.8 11/28/2022     11/28/2022    MPV 10.3 11/28/2022    GRAN 1.3 (L) 11/28/2022    GRAN 33.7 (L) 11/28/2022    LYMPH 2.3 11/28/2022    LYMPH 58.2 (H) 11/28/2022    MONO 0.3 11/28/2022    MONO 7.0 11/28/2022    EOS 0.0 11/28/2022    BASO 0.00 11/28/2022    EOSINOPHIL 0.8 11/28/2022    BASOPHIL 0.0 11/28/2022    DIFFMETHOD Automated 11/28/2022     .  Lab " Results   Component Value Date    HGBA1C 5.4 10/24/2022       IMAGING:  All pertinent imaging has been reviewed and interpreted independently.    Venous US 2022 Impression:  EXAMINATION:  US LOWER EXTREMITY VEINS BILATERAL INSUFFICIENCY     CLINICAL HISTORY:  suspected venous insuff;  Pain in unspecified lower leg     TECHNIQUE:  Bilateral lower extremity venous Doppler exam including Valsalva or standing maneuvers for evaluation of venous insufficiency of the superficial systems bilaterally.  Reflux times greater than 500 ms were considered indicative of superficial system reflux.     COMPARISON:  None     FINDINGS:  Deep venous system:     There is evidence of flow, compressibility and augmentation within bilateral common femoral, femoral, and popliteal veins.  Flow is seen within bilateral peroneal, posterior tibial and anterior tibial veins.     Superficial venous system:     Right leg:     Reflux times right greater saphenous vein up to 3062 milliseconds.     The maximal diameter within the right greater saphenous vein is 10 mm.     The maximal diameter within the right lesser saphenous vein is 5mm.     Left leg:     Reflux times in the greater saphenous vein up to 1700 milliseconds     The maximal diameter within the left greater saphenous vein is 7 mm.     The maximal diameter within the left lesser saphenous vein is 3mm.        Impression:     1.  No evidence of deep venous thrombosis in either lower extremity.     2.  Hemodynamically significant venous reflux right and left greater saphenous veins.        Electronically signed by: Manju Grimaldo  Date:                                            10/28/2022  Time:                                           14:21    IMP/PLAN:  48 y.o. female with   Patient Active Problem List   Diagnosis    Risk for coronary artery disease less than 10% in next 10 years    Hyperglycemia    Fibromyalgia    PUD (peptic ulcer disease)    Menorrhagia    Pituitary macroadenoma with  extrasellar extension    Appendicitis    Bilateral claudication of lower limb    S/P transsphenoidal selective adenomectomy    Migraine    Atypical chest pain    Irregular bowel habits    Generalized abdominal pain    being managed by PCP and specialists who is here today for evaluation of BLE edema and pain due to venous insufficiency.    -s/p L GSV EVLT  -rec BLE sclerotherapy  -recommend compression with Rx stockings, elevation, dietary changes associated with water and sodium intake discussed at length with patient  -Exercise     I spent 15 minutes evaluating this patient and greater than 50% of the time was spent counseling, coordinator care and discussing the plan of care.  All questions were answered and patient stated understanding with agreement with the above treatment plan.    Messi Schaefer MD [facility]  Vascular and Endovascular Surgery

## 2023-12-29 ENCOUNTER — TELEPHONE (OUTPATIENT)
Dept: FAMILY MEDICINE | Facility: CLINIC | Age: 48
End: 2023-12-29
Payer: MEDICAID

## 2023-12-29 ENCOUNTER — TELEPHONE (OUTPATIENT)
Dept: OPHTHALMOLOGY | Facility: CLINIC | Age: 48
End: 2023-12-29
Payer: MEDICAID

## 2023-12-29 NOTE — TELEPHONE ENCOUNTER
Spoke to patient to schedule appointment with another pcp who spoke Serbian    Transitional Care Management Visit       Admit - Discharge Date: 11/20/2023 - 11/21/2023.  Today's visit 11/27/2023 is 6 days after discharge.    Юлия Carolina is a 71 year old here for Transitional Care Management     The discharge summary was reviewed. It documents that the patient was hospitalized for:  PRIMARY DIAGNOSIS:  Dysarthria resolved, consistent with TIA  Remote history of paroxysm of atrial fibrillation, not seen during admission  Recent diagnosis of gastric stromal tumor  Sepsis due to right lower lobe pneumonia  Essential hypertension  Chronic diastolic heart failure  Chronic back pain    Overall feeling much better.    Medication changes include: clopidogrel  Pertinent un-finalized hospital labs and tests were reviewed..  Durable Medical Equipment/Assistive devices ordered: None     Review of Systems  Tired at times, chronic neck and back pain but otherwise comprehensive negative review of systems besides positives noted in HPI above.    Advance care planning documents on file - yes    Objective   Vitals:    11/27/23 0853   BP: 130/60   Pulse: 76   Resp: 18   Temp: 98.3 °F (36.8 °C)   SpO2: 97%   Weight: 119.3 kg (263 lb)   Height: 5' 11\" (1.803 m)     Constitutional: Well-developed and well-nourished. No distress. Appropriate Hygiene.  HEENT:   Head: Normocephalic and atraumatic.   Eyes: Conjunctivae normal. EOM grossly normal.  Nose: Nose normal.  Mouth: Normal oral mucosa and oropharyngeal exam.  Neck: about 25 to 30% loss of range of motion, chronic and not new.   Cardiovascular: Normal rate, regular rhythm, distal pulses intact. No murmur heard.  Pulmonary/Chest: Effort normal and breath sounds normal.   Musculoskeletal: ambulates with cane.  Neurological: Alert and oriented to person, place, and time  Skin: Warm and dry.   Psychiatric: Normal mood and affect. Thought content normal.            ASSESSMENT AND PLAN        1. TIA (transient ischemic attack)  2. Gastric stromal tumor (CMD)  3.  Pneumonia of right lower lobe due to infectious organism    1. TIA (transient ischemic attack)  -continue clopidogrel, statin. EP is consulted, unclear if etiology is paroxysmal atrial fibrillation or not, last noted 2019 during etoh withdrawal.    2. Gastric stromal tumor (CMD)  Consult oncology. No nausea/vomiting/early satiety.    3. Pneumonia of right lower lobe due to infectious organism  Improved, breathing well, complete course of antibiotics, vitals WNL.    Discharge medication were reviewed and updated with patient/family: fully compliant with the medication regimen prescribed at the time of discharge     Adherence to discharge treatment plan was assessed: fully adherent with the entire discharge treatment plan.    Follow Up     Future Appointments        NOV 27 2023   02:15 PM - Follow-up Visit  [facility] - Agustin More MD           DEC 18    2023   11:15 AM - New Patient Visit  [facility] [place] Zaki Arias DO           DEC 27    2023   10:00 AM - Lab Visit  [facility]           SANTINO 3    2024   11:30 AM - Follow-up Visit  [facility] - Agustin More MD           JAN 22 2024   10:00 AM - [facility] Ad[facility]            Displaying the next 5 appointments. This patient has additional appointments scheduled.

## 2023-12-29 NOTE — TELEPHONE ENCOUNTER
----- Message from Sabiha Overton MD sent at 12/29/2023  1:27 PM CST -----  Please help with options. TY  ----- Message -----  From: Dina Charles MA  Sent: 12/29/2023  10:21 AM CST  To: Sabiha Overton MD      ----- Message -----  From: Rona Hamilton MA  Sent: 12/28/2023   3:50 PM CST  To: [facility] Clinical Support Staff      ----- Message -----  From: Hillary Pettit  Sent: 12/28/2023   3:24 PM CST  To: Olive Swenson Staff    Type:  Needs Medical Advice    Who Called: Pt  Would the patient rather a call back or a response via [facility]? call  Best Call Back Number: [phone]  Additional Information: pt would like a call from nurse in office regarding Dr Blount referring her to new PCP please call

## 2024-01-24 ENCOUNTER — TELEPHONE (OUTPATIENT)
Dept: OBSTETRICS AND GYNECOLOGY | Facility: CLINIC | Age: 49
End: 2024-01-24
Payer: COMMERCIAL

## 2024-01-24 DIAGNOSIS — Z12.31 SCREENING MAMMOGRAM FOR BREAST CANCER: Primary | ICD-10-CM

## 2024-01-24 NOTE — TELEPHONE ENCOUNTER
LVM to pt to inform her that we put in her mammogram order. Pt can call [facility] to schedule appointment.

## 2024-01-31 ENCOUNTER — PATIENT MESSAGE (OUTPATIENT)
Dept: OBSTETRICS AND GYNECOLOGY | Facility: CLINIC | Age: 49
End: 2024-01-31
Payer: COMMERCIAL

## 2024-02-07 ENCOUNTER — TELEPHONE (OUTPATIENT)
Dept: OPHTHALMOLOGY | Facility: CLINIC | Age: 49
End: 2024-02-07
Payer: COMMERCIAL

## 2024-02-07 NOTE — TELEPHONE ENCOUNTER
----- Message from Patito Beckett sent at 2/6/2024  4:54 PM CST -----  Regarding: FW: Missed Call  Contact: [phone]    ----- Message -----  From: Alanis Riley  Sent: 2/6/2024   4:41 PM CST  To: Yuriy CAMPBELL Staff  Subject: Missed Call                                      Pt is returning a missed call from [facility] on 12/29/2023 about scheduling an appt.  Please call.

## 2024-02-08 ENCOUNTER — HOSPITAL ENCOUNTER (OUTPATIENT)
Dept: RADIOLOGY | Facility: HOSPITAL | Age: 49
Discharge: HOME | End: 2024-02-08
Attending: OBSTETRICS & GYNECOLOGY
Payer: COMMERCIAL

## 2024-02-08 DIAGNOSIS — Z12.31 SCREENING MAMMOGRAM FOR BREAST CANCER: ICD-10-CM

## 2024-02-08 PROCEDURE — 77063 BREAST TOMOSYNTHESIS BI: CPT | Mod: 26,,, | Performed by: RADIOLOGY

## 2024-02-08 PROCEDURE — 77067 SCR MAMMO BI INCL CAD: CPT | Mod: TC

## 2024-02-08 PROCEDURE — 77067 SCR MAMMO BI INCL CAD: CPT | Mod: 26,,, | Performed by: RADIOLOGY

## 2024-02-20 ENCOUNTER — LAB VISIT (OUTPATIENT)
Dept: LAB | Facility: HOSPITAL | Age: 49
End: 2024-02-20
Attending: FAMILY MEDICINE
Payer: COMMERCIAL

## 2024-02-20 ENCOUNTER — OFFICE VISIT (OUTPATIENT)
Dept: FAMILY MEDICINE | Facility: CLINIC | Age: 49
End: 2024-02-20
Payer: COMMERCIAL

## 2024-02-20 VITALS
BODY MASS INDEX: 26.16 KG/M2 | SYSTOLIC BLOOD PRESSURE: 106 MMHG | WEIGHT: 153.25 LBS | HEART RATE: 64 BPM | DIASTOLIC BLOOD PRESSURE: 70 MMHG | HEIGHT: 64 IN | OXYGEN SATURATION: 99 %

## 2024-02-20 DIAGNOSIS — Z86.018 S/P TRANSSPHENOIDAL SELECTIVE ADENOMECTOMY: ICD-10-CM

## 2024-02-20 DIAGNOSIS — G43.709 CHRONIC MIGRAINE W/O AURA, NOT INTRACTABLE, W/O STAT MIGR: ICD-10-CM

## 2024-02-20 DIAGNOSIS — R35.1 NOCTURIA MORE THAN TWICE PER NIGHT: ICD-10-CM

## 2024-02-20 DIAGNOSIS — Z00.01 ENCOUNTER FOR GENERAL ADULT MEDICAL EXAMINATION WITH ABNORMAL FINDINGS: Primary | ICD-10-CM

## 2024-02-20 DIAGNOSIS — K58.2 IRRITABLE BOWEL SYNDROME WITH BOTH CONSTIPATION AND DIARRHEA: ICD-10-CM

## 2024-02-20 DIAGNOSIS — Z28.21 PNEUMOCOCCAL VACCINATION DECLINED: ICD-10-CM

## 2024-02-20 DIAGNOSIS — Z98.890 S/P TRANSSPHENOIDAL SELECTIVE ADENOMECTOMY: ICD-10-CM

## 2024-02-20 DIAGNOSIS — D35.2 PITUITARY MACROADENOMA WITH EXTRASELLAR EXTENSION: ICD-10-CM

## 2024-02-20 DIAGNOSIS — Z28.21 INFLUENZA VACCINATION DECLINED: ICD-10-CM

## 2024-02-20 PROBLEM — R07.89 ATYPICAL CHEST PAIN: Status: RESOLVED | Noted: 2022-11-03 | Resolved: 2024-02-20

## 2024-02-20 PROBLEM — M79.7 FIBROMYALGIA: Status: RESOLVED | Noted: 2019-04-02 | Resolved: 2024-02-20

## 2024-02-20 PROBLEM — R73.9 HYPERGLYCEMIA: Status: RESOLVED | Noted: 2017-11-08 | Resolved: 2024-02-20

## 2024-02-20 PROBLEM — R19.8 IRREGULAR BOWEL HABITS: Status: RESOLVED | Noted: 2022-12-08 | Resolved: 2024-02-20

## 2024-02-20 PROBLEM — R10.84 GENERALIZED ABDOMINAL PAIN: Status: RESOLVED | Noted: 2022-12-08 | Resolved: 2024-02-20

## 2024-02-20 PROBLEM — I73.9 BILATERAL CLAUDICATION OF LOWER LIMB: Status: RESOLVED | Noted: 2022-09-21 | Resolved: 2024-02-20

## 2024-02-20 PROBLEM — N92.0 MENORRHAGIA: Status: RESOLVED | Noted: 2022-02-08 | Resolved: 2024-02-20

## 2024-02-20 PROBLEM — K37 APPENDICITIS: Status: RESOLVED | Noted: 2022-09-19 | Resolved: 2024-02-20

## 2024-02-20 PROCEDURE — 81003 URINALYSIS AUTO W/O SCOPE: CPT | Performed by: FAMILY MEDICINE

## 2024-02-20 PROCEDURE — 3078F DIAST BP <80 MM HG: CPT | Mod: CPTII,S$GLB,, | Performed by: FAMILY MEDICINE

## 2024-02-20 PROCEDURE — 99999 PR PBB SHADOW E&M-EST. PATIENT-LVL III: CPT | Mod: PBBFAC,,, | Performed by: FAMILY MEDICINE

## 2024-02-20 PROCEDURE — 3074F SYST BP LT 130 MM HG: CPT | Mod: CPTII,S$GLB,, | Performed by: FAMILY MEDICINE

## 2024-02-20 PROCEDURE — 99396 PREV VISIT EST AGE 40-64: CPT | Mod: S$GLB,,, | Performed by: FAMILY MEDICINE

## 2024-02-20 PROCEDURE — 1160F RVW MEDS BY RX/DR IN RCRD: CPT | Mod: CPTII,S$GLB,, | Performed by: FAMILY MEDICINE

## 2024-02-20 PROCEDURE — 3008F BODY MASS INDEX DOCD: CPT | Mod: CPTII,S$GLB,, | Performed by: FAMILY MEDICINE

## 2024-02-20 PROCEDURE — 1159F MED LIST DOCD IN RCRD: CPT | Mod: CPTII,S$GLB,, | Performed by: FAMILY MEDICINE

## 2024-02-20 RX ORDER — PREDNISOLONE ACETATE 10 MG/ML
1 SUSPENSION/ DROPS OPHTHALMIC 4 TIMES DAILY
COMMUNITY
Start: 2024-01-12 | End: 2024-02-20

## 2024-02-20 RX ORDER — CIPROFLOXACIN HYDROCHLORIDE 3 MG/ML
1 SOLUTION/ DROPS OPHTHALMIC 4 TIMES DAILY
COMMUNITY
Start: 2024-01-11 | End: 2024-02-20

## 2024-02-20 RX ORDER — OXYBUTYNIN CHLORIDE 10 MG/1
10 TABLET, EXTENDED RELEASE ORAL DAILY
Qty: 90 TABLET | Refills: 1 | Status: SHIPPED | OUTPATIENT
Start: 2024-02-20

## 2024-02-20 NOTE — PROGRESS NOTES
"Subjective:         Patient ID: Tessa Wynne is a 48 y.o. female.    Chief Complaint: Establish Care    Patient Active Problem List   Diagnosis    Risk for coronary artery disease less than 10% in next 10 years    PUD (peptic ulcer disease)    Pituitary macroadenoma with extrasellar extension    S/P transsphenoidal selective adenomectomy    Chronic migraine w/o aura, not intractable, w/o stat migr    Irritable bowel syndrome with both constipation and diarrhea      HPI    Tessa is a 48 y.o. female who presents today for her annual exam, to establish care.     Patient with history of pituitary adenoma s/p transsphenoidal selective adenomectomy. Overall stable.   Not on any medications per patient with prior normal/stable blood work but overdue for recheck.     Review of Systems   All other systems reviewed and are negative.       Objective:     Vitals:    02/20/24 1328   BP: 106/70   BP Location: Left arm   Patient Position: Sitting   BP Method: Medium (Manual)   Pulse: 64   SpO2: 99%   Weight: 69.5 kg (153 lb 3.5 oz)   Height: 5' 4" (1.626 m)         Physical Exam  Vitals and nursing note reviewed.   Constitutional:       General: She is not in acute distress.     Appearance: Normal appearance. She is not ill-appearing, toxic-appearing or diaphoretic.   HENT:      Head: Normocephalic and atraumatic.   Eyes:      General: No scleral icterus.     Conjunctiva/sclera: Conjunctivae normal.   Cardiovascular:      Rate and Rhythm: Normal rate.      Heart sounds: Normal heart sounds. No murmur heard.  Pulmonary:      Effort: Pulmonary effort is normal. No respiratory distress.      Breath sounds: Normal breath sounds.   Abdominal:      General: Bowel sounds are normal.   Skin:     Coloration: Skin is not pale.   Neurological:      Mental Status: She is alert. Mental status is at baseline.   Psychiatric:         Attention and Perception: Attention and perception normal.         Mood and Affect: Mood and affect normal.    "      Speech: Speech normal.         Behavior: Behavior normal.         Cognition and Memory: Cognition and memory normal.         Judgment: Judgment normal.       Assessment:       1. Encounter for general adult medical examination with abnormal findings    2. Nocturia more than twice per night    3. Pituitary macroadenoma with extrasellar extension    4. S/P transsphenoidal selective adenomectomy    5. Chronic migraine w/o aura, not intractable, w/o stat migr    6. Irritable bowel syndrome with both constipation and diarrhea    7. Influenza vaccination declined    8. Pneumococcal vaccination declined        Plan:   Recent relevant labs results reviewed with patient.         1. Encounter for general adult medical examination with abnormal findings  -     Hepatic Function Panel; Future; Expected date: 02/20/2024  -     Lipid Panel; Future; Expected date: 02/20/2024  -     Hemoglobin A1C; Future; Expected date: 02/20/2024  -     CBC Auto Differential; Future; Expected date: 02/20/2024  - Risk and age appropriate anticipatory guidance. HM reviewed and updated. Recommendations discussed with patient as appropriate.     2. Nocturia more than twice per night  -     Urinalysis, Reflex to Urine Culture Urine, Clean Catch; Future; Expected date: 02/20/2024  -     oxybutynin (DITROPAN-XL) 10 MG 24 hr tablet; Take 1 tablet (10 mg total) by mouth once daily.  Dispense: 90 tablet; Refill: 1  Reports x4 nocturia some nights, no daytime symptoms.   Discussed to limit liquid intake nightly prior before bed, if symptoms improve, no need to start med. Return if no improvement. Reports complete emptying.    3. Pituitary macroadenoma with extrasellar extension  4. S/P transsphenoidal selective adenomectomy  Endo labs to be done.   Overall stable, asymptomatic, not on any meds    5. Chronic migraine w/o aura, not intractable  Chronic, controlled.    6. Irritable bowel syndrome with both constipation and diarrhea  Chronic, ongoing,  overall stable and non impairing at this time per patient. UTD on colonoscopy.    Patient's questions answered. Plan reviewed with patient at the end of visit. Relevant precautions to chief complaint and reasons to seek further medical care or to contact the office sooner reviewed with patient.     Follow up in about 1 year (around 2/20/2025) for Annual Exam.

## 2024-02-21 ENCOUNTER — LAB VISIT (OUTPATIENT)
Dept: LAB | Facility: HOSPITAL | Age: 49
End: 2024-02-21
Attending: FAMILY MEDICINE
Payer: COMMERCIAL

## 2024-02-21 DIAGNOSIS — Z00.01 ENCOUNTER FOR GENERAL ADULT MEDICAL EXAMINATION WITH ABNORMAL FINDINGS: ICD-10-CM

## 2024-02-21 DIAGNOSIS — E23.7 PITUITARY LESION: ICD-10-CM

## 2024-02-21 LAB
ALBUMIN SERPL BCP-MCNC: 4 G/DL (ref 3.5–5.2)
ALP SERPL-CCNC: 59 U/L (ref 55–135)
ALT SERPL W/O P-5'-P-CCNC: 16 U/L (ref 10–44)
ANION GAP SERPL CALC-SCNC: 11 MMOL/L (ref 8–16)
AST SERPL-CCNC: 21 U/L (ref 10–40)
BASOPHILS # BLD AUTO: 0.02 K/UL (ref 0–0.2)
BASOPHILS NFR BLD: 0.4 % (ref 0–1.9)
BILIRUB DIRECT SERPL-MCNC: 0.2 MG/DL (ref 0.1–0.3)
BILIRUB SERPL-MCNC: 0.5 MG/DL (ref 0.1–1)
BILIRUB UR QL STRIP: NEGATIVE
BUN SERPL-MCNC: 14 MG/DL (ref 6–20)
CALCIUM SERPL-MCNC: 9.5 MG/DL (ref 8.7–10.5)
CHLORIDE SERPL-SCNC: 106 MMOL/L (ref 95–110)
CHOLEST SERPL-MCNC: 190 MG/DL (ref 120–199)
CHOLEST/HDLC SERPL: 3 {RATIO} (ref 2–5)
CLARITY UR REFRACT.AUTO: CLEAR
CO2 SERPL-SCNC: 26 MMOL/L (ref 23–29)
COLOR UR AUTO: COLORLESS
CORTIS SERPL-MCNC: 14.8 UG/DL (ref 4.3–22.4)
CREAT SERPL-MCNC: 1 MG/DL (ref 0.5–1.4)
DIFFERENTIAL METHOD BLD: ABNORMAL
EOSINOPHIL # BLD AUTO: 0.1 K/UL (ref 0–0.5)
EOSINOPHIL NFR BLD: 1.6 % (ref 0–8)
ERYTHROCYTE [DISTWIDTH] IN BLOOD BY AUTOMATED COUNT: 12.6 % (ref 11.5–14.5)
EST. GFR  (NO RACE VARIABLE): >60 ML/MIN/1.73 M^2
ESTIMATED AVG GLUCOSE: 108 MG/DL (ref 68–131)
FSH SERPL-ACNC: 14.12 MIU/ML
GLUCOSE SERPL-MCNC: 86 MG/DL (ref 70–110)
GLUCOSE UR QL STRIP: NEGATIVE
HBA1C MFR BLD: 5.4 % (ref 4–5.6)
HCT VFR BLD AUTO: 42.1 % (ref 37–48.5)
HDLC SERPL-MCNC: 64 MG/DL (ref 40–75)
HDLC SERPL: 33.7 % (ref 20–50)
HGB BLD-MCNC: 14 G/DL (ref 12–16)
HGB UR QL STRIP: NEGATIVE
IMM GRANULOCYTES # BLD AUTO: 0 K/UL (ref 0–0.04)
IMM GRANULOCYTES NFR BLD AUTO: 0 % (ref 0–0.5)
KETONES UR QL STRIP: NEGATIVE
LDLC SERPL CALC-MCNC: 110.2 MG/DL (ref 63–159)
LEUKOCYTE ESTERASE UR QL STRIP: NEGATIVE
LYMPHOCYTES # BLD AUTO: 2.4 K/UL (ref 1–4.8)
LYMPHOCYTES NFR BLD: 48.4 % (ref 18–48)
MCH RBC QN AUTO: 29.5 PG (ref 27–31)
MCHC RBC AUTO-ENTMCNC: 33.3 G/DL (ref 32–36)
MCV RBC AUTO: 89 FL (ref 82–98)
MONOCYTES # BLD AUTO: 0.4 K/UL (ref 0.3–1)
MONOCYTES NFR BLD: 8.1 % (ref 4–15)
NEUTROPHILS # BLD AUTO: 2.1 K/UL (ref 1.8–7.7)
NEUTROPHILS NFR BLD: 41.5 % (ref 38–73)
NITRITE UR QL STRIP: NEGATIVE
NONHDLC SERPL-MCNC: 126 MG/DL
NRBC BLD-RTO: 0 /100 WBC
PH UR STRIP: 7 [PH] (ref 5–8)
PLATELET # BLD AUTO: 252 K/UL (ref 150–450)
PMV BLD AUTO: 10.9 FL (ref 9.2–12.9)
POTASSIUM SERPL-SCNC: 3.9 MMOL/L (ref 3.5–5.1)
PROLACTIN SERPL IA-MCNC: 10.5 NG/ML (ref 5.2–26.5)
PROT SERPL-MCNC: 7 G/DL (ref 6–8.4)
PROT UR QL STRIP: NEGATIVE
RBC # BLD AUTO: 4.74 M/UL (ref 4–5.4)
SODIUM SERPL-SCNC: 143 MMOL/L (ref 136–145)
SP GR UR STRIP: 1 (ref 1–1.03)
T4 FREE SERPL-MCNC: 0.92 NG/DL (ref 0.71–1.51)
TRIGL SERPL-MCNC: 79 MG/DL (ref 30–150)
TSH SERPL DL<=0.005 MIU/L-ACNC: 2.71 UIU/ML (ref 0.4–4)
URN SPEC COLLECT METH UR: ABNORMAL
WBC # BLD AUTO: 4.96 K/UL (ref 3.9–12.7)

## 2024-02-21 PROCEDURE — 84439 ASSAY OF FREE THYROXINE: CPT | Performed by: INTERNAL MEDICINE

## 2024-02-21 PROCEDURE — 82024 ASSAY OF ACTH: CPT | Performed by: INTERNAL MEDICINE

## 2024-02-21 PROCEDURE — 84305 ASSAY OF SOMATOMEDIN: CPT | Performed by: INTERNAL MEDICINE

## 2024-02-21 PROCEDURE — 36415 COLL VENOUS BLD VENIPUNCTURE: CPT | Performed by: INTERNAL MEDICINE

## 2024-02-21 PROCEDURE — 84443 ASSAY THYROID STIM HORMONE: CPT | Performed by: INTERNAL MEDICINE

## 2024-02-21 PROCEDURE — 84146 ASSAY OF PROLACTIN: CPT | Performed by: INTERNAL MEDICINE

## 2024-02-21 PROCEDURE — 85025 COMPLETE CBC W/AUTO DIFF WBC: CPT | Performed by: FAMILY MEDICINE

## 2024-02-21 PROCEDURE — 83001 ASSAY OF GONADOTROPIN (FSH): CPT | Performed by: INTERNAL MEDICINE

## 2024-02-21 PROCEDURE — 83036 HEMOGLOBIN GLYCOSYLATED A1C: CPT | Performed by: FAMILY MEDICINE

## 2024-02-21 PROCEDURE — 82533 TOTAL CORTISOL: CPT | Performed by: INTERNAL MEDICINE

## 2024-02-21 PROCEDURE — 80061 LIPID PANEL: CPT | Performed by: FAMILY MEDICINE

## 2024-02-21 PROCEDURE — 80076 HEPATIC FUNCTION PANEL: CPT | Performed by: FAMILY MEDICINE

## 2024-02-21 PROCEDURE — 80048 BASIC METABOLIC PNL TOTAL CA: CPT | Performed by: INTERNAL MEDICINE

## 2024-02-23 LAB — ACTH PLAS-MCNC: 27 PG/ML (ref 0–46)

## 2024-02-27 LAB
IGF-I SERPL-MCNC: 65 NG/ML (ref 44–227)
IGF-I Z-SCORE SERPL: -1.28 SD

## 2024-02-28 ENCOUNTER — PATIENT MESSAGE (OUTPATIENT)
Dept: NEUROSURGERY | Facility: CLINIC | Age: 49
End: 2024-02-28
Payer: COMMERCIAL

## 2024-02-28 DIAGNOSIS — E23.6 PITUITARY MASS: Primary | ICD-10-CM

## 2024-02-28 DIAGNOSIS — Z86.018 S/P TRANSSPHENOIDAL SELECTIVE ADENOMECTOMY: ICD-10-CM

## 2024-02-28 DIAGNOSIS — Z98.890 S/P TRANSSPHENOIDAL SELECTIVE ADENOMECTOMY: ICD-10-CM

## 2024-03-07 DIAGNOSIS — F41.9 ANXIETY DUE TO INVASIVE PROCEDURE: Primary | ICD-10-CM

## 2024-03-08 RX ORDER — ALPRAZOLAM 0.5 MG/1
TABLET ORAL
Qty: 2 TABLET | Refills: 0 | Status: SHIPPED | OUTPATIENT
Start: 2024-03-08

## 2024-03-11 ENCOUNTER — PROCEDURE VISIT (OUTPATIENT)
Dept: VASCULAR SURGERY | Facility: CLINIC | Age: 49
End: 2024-03-11
Payer: COMMERCIAL

## 2024-03-11 VITALS
WEIGHT: 150 LBS | HEART RATE: 69 BPM | DIASTOLIC BLOOD PRESSURE: 71 MMHG | SYSTOLIC BLOOD PRESSURE: 112 MMHG | HEIGHT: 64 IN | BODY MASS INDEX: 25.61 KG/M2

## 2024-03-11 DIAGNOSIS — I78.1 SPIDER VEIN, SYMPTOMATIC: Primary | ICD-10-CM

## 2024-03-11 PROCEDURE — 36471 NJX SCLRSNT MLT INCMPTNT VN: CPT | Mod: 50,S$GLB,, | Performed by: SURGERY

## 2024-03-11 NOTE — PROCEDURES
03/11/2024    Pre-Procedure Diagnosis:   1. Bilateral lower extremity spider veins  2. Bilateral lower extremity reticular veins    Post-Procedure Diagnsosis: Same    Procedure: Bilateral lower extremity sclerotherapy with Asclera 2%    Surgeon: MD JESSE Diaz    Anesthesia: Asclera     Estimated blood loss: <5cc    Complications: None       Time out performed and patient was prepped and draped in sterile fashion.  Vein light used to identify reticular veins feeding bilateral lower extremity spider veins of the R thigh and L medial thigh.  Skin overlying affected areas cleaned with alcohol.  Reticular veins were then injected with 0.5% Asclera foam sclerosant created with Tessari method with resolution of reticular veins.  Next the spider veins were identified and the overlying skin was cleaned with alcohol.  The veins were accessed with a 30 gauge needle and injected with 0.25% Asclera liquid sclerosant with near resolution of all treated areas.  Once all areas of concern to the patient were treated, sterile cotton balls and Tegaderms were applied for light compression as well as ice for 5 minutes.  We then placed a Coban wrap over the treated areas followed by compression stockings.  The patient ambulated after the procedure without issues.      MD JESSE Diaz  [facility]

## 2024-05-02 ENCOUNTER — TELEPHONE (OUTPATIENT)
Dept: NEUROSURGERY | Facility: CLINIC | Age: 49
End: 2024-05-02
Payer: MEDICAID

## 2024-05-02 ENCOUNTER — HOSPITAL ENCOUNTER (OUTPATIENT)
Dept: RADIOLOGY | Facility: HOSPITAL | Age: 49
Discharge: HOME | End: 2024-05-02
Attending: NEUROLOGICAL SURGERY
Payer: MEDICAID

## 2024-05-02 DIAGNOSIS — Z86.018 S/P TRANSSPHENOIDAL SELECTIVE ADENOMECTOMY: ICD-10-CM

## 2024-05-02 DIAGNOSIS — E23.6 PITUITARY MASS: ICD-10-CM

## 2024-05-02 DIAGNOSIS — Z98.890 S/P TRANSSPHENOIDAL SELECTIVE ADENOMECTOMY: ICD-10-CM

## 2024-05-02 PROCEDURE — 25500020 PHARM REV CODE 255: Performed by: NEUROLOGICAL SURGERY

## 2024-05-02 PROCEDURE — 70553 MRI BRAIN STEM W/O & W/DYE: CPT | Mod: TC

## 2024-05-02 PROCEDURE — A9585 GADOBUTROL INJECTION: HCPCS | Performed by: NEUROLOGICAL SURGERY

## 2024-05-02 PROCEDURE — 70553 MRI BRAIN STEM W/O & W/DYE: CPT | Mod: 26,,, | Performed by: RADIOLOGY

## 2024-05-02 RX ORDER — GADOBUTROL 604.72 MG/ML
7.5 INJECTION INTRAVENOUS
Status: COMPLETED | OUTPATIENT
Start: 2024-05-02 | End: 2024-05-02

## 2024-05-02 RX ADMIN — GADOBUTROL 7.5 ML: 604.72 INJECTION INTRAVENOUS at 03:05

## 2024-05-02 NOTE — TELEPHONE ENCOUNTER
Left voicemail for patient with  (Consuelo ID # [id number]) offering appt at 1:40 pm for 5/7. If she would prefer a virtual appt, we can do that also at 9 am or 1:40 pm. Asked pt to call back to confirm.    ----- Message from Indy Mcgee MA sent at 4/30/2024  4:46 PM CDT -----  Contact: [phone]    ----- Message -----  From: Freddy Ferrari  Sent: 4/30/2024   3:41 PM CDT  To: Ghassan Wynne calling regarding Appointment Access  (message) Pt asking for a call back to see if she can push your appt time for schedule appt on 05/07 to the any time in the afternoon pt asking for a call back to confirm

## 2024-05-07 ENCOUNTER — OFFICE VISIT (OUTPATIENT)
Dept: ENDOCRINOLOGY | Facility: CLINIC | Age: 49
End: 2024-05-07
Payer: MEDICAID

## 2024-05-07 ENCOUNTER — OFFICE VISIT (OUTPATIENT)
Dept: NEUROSURGERY | Facility: CLINIC | Age: 49
End: 2024-05-07
Payer: MEDICAID

## 2024-05-07 VITALS
SYSTOLIC BLOOD PRESSURE: 104 MMHG | HEIGHT: 64 IN | DIASTOLIC BLOOD PRESSURE: 69 MMHG | BODY MASS INDEX: 26.12 KG/M2 | WEIGHT: 153 LBS | HEART RATE: 67 BPM

## 2024-05-07 DIAGNOSIS — E23.7 PITUITARY LESION: Primary | ICD-10-CM

## 2024-05-07 DIAGNOSIS — Z98.890 S/P TRANSSPHENOIDAL SELECTIVE ADENOMECTOMY: Primary | ICD-10-CM

## 2024-05-07 DIAGNOSIS — Z86.018 S/P TRANSSPHENOIDAL SELECTIVE ADENOMECTOMY: ICD-10-CM

## 2024-05-07 DIAGNOSIS — Z86.018 S/P TRANSSPHENOIDAL SELECTIVE ADENOMECTOMY: Primary | ICD-10-CM

## 2024-05-07 DIAGNOSIS — Z98.890 S/P TRANSSPHENOIDAL SELECTIVE ADENOMECTOMY: ICD-10-CM

## 2024-05-07 PROBLEM — D35.2 PITUITARY MACROADENOMA WITH EXTRASELLAR EXTENSION: Status: RESOLVED | Noted: 2022-08-25 | Resolved: 2024-05-07

## 2024-05-07 PROCEDURE — 99999 PR PBB SHADOW E&M-EST. PATIENT-LVL III: CPT | Mod: PBBFAC,,, | Performed by: INTERNAL MEDICINE

## 2024-05-07 PROCEDURE — 3008F BODY MASS INDEX DOCD: CPT | Mod: CPTII,,, | Performed by: INTERNAL MEDICINE

## 2024-05-07 PROCEDURE — 99213 OFFICE O/P EST LOW 20 MIN: CPT | Mod: S$PBB,,, | Performed by: NEUROLOGICAL SURGERY

## 2024-05-07 PROCEDURE — 99999 PR PBB SHADOW E&M-EST. PATIENT-LVL II: CPT | Mod: PBBFAC,,, | Performed by: NEUROLOGICAL SURGERY

## 2024-05-07 PROCEDURE — 1160F RVW MEDS BY RX/DR IN RCRD: CPT | Mod: CPTII,,, | Performed by: NEUROLOGICAL SURGERY

## 2024-05-07 PROCEDURE — 1159F MED LIST DOCD IN RCRD: CPT | Mod: CPTII,,, | Performed by: NEUROLOGICAL SURGERY

## 2024-05-07 PROCEDURE — 3044F HG A1C LEVEL LT 7.0%: CPT | Mod: CPTII,,, | Performed by: NEUROLOGICAL SURGERY

## 2024-05-07 PROCEDURE — 3078F DIAST BP <80 MM HG: CPT | Mod: CPTII,,, | Performed by: INTERNAL MEDICINE

## 2024-05-07 PROCEDURE — 3044F HG A1C LEVEL LT 7.0%: CPT | Mod: CPTII,,, | Performed by: INTERNAL MEDICINE

## 2024-05-07 PROCEDURE — 3074F SYST BP LT 130 MM HG: CPT | Mod: CPTII,,, | Performed by: INTERNAL MEDICINE

## 2024-05-07 PROCEDURE — 99214 OFFICE O/P EST MOD 30 MIN: CPT | Mod: S$PBB,,, | Performed by: INTERNAL MEDICINE

## 2024-05-07 PROCEDURE — 99213 OFFICE O/P EST LOW 20 MIN: CPT | Mod: PBBFAC,27 | Performed by: INTERNAL MEDICINE

## 2024-05-07 PROCEDURE — 99212 OFFICE O/P EST SF 10 MIN: CPT | Mod: 25,PBBFAC | Performed by: NEUROLOGICAL SURGERY

## 2024-05-07 NOTE — PROGRESS NOTES
PITUITARY [facility] ENDOCRINOLOGY FOLLOW UP  05/07/2024       Due to language barrier, an virtual  was present during the entire visit.     The patient's last visit with me was on 5/23/2023.     Subjective:      CC: follow up s/p TSR for pituitary macroadenoma causing vision loss    HPI:   Tessa Wynne is a 48 y.o. female who presents for evaluation of pituitary lesion compressing optic chiasm, found on imaging done after a syncopal episode.     Initial presentation:   Brain imaging done for syncope and HA.  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  Preoperative labs notable for elevated prolactin from stalk effect but otherwise normal    Underwent TSR by Dr. Ferrell on 10/3/22 ([facility] path - GONADOTROPH ADENOMA, IMMUNOREACTIVE FOR ALPHA-SUBUNIT AND SF1).  The patient did not receive any perioperative glucocorticoids and had no evidence of adrenal insufficiency in the hospital. The patient did not develop diabetes insipidus but presented to the ED on 10/8 for severe HA, CP, and HTN, found to have Na of 129 (on d/c Na was 140).  She was started on fluid restriction for SIADH with resolution followed by transient -D (previously known as DI), now resolved.     Preop she was started on levothyroxine 75 mcg daily for secondary hypothyroidism which resolved after TSR, now not on any replacement meds.     Interval hx:  Denies any new medical problems or concerns since last visit.     denies symptoms of adrenal insufficiency (no lightheadedness, N/V/abd pain, hypotension).  No unexplained weight loss     No symptoms of DI (denies polyuria/polydipsia).  Some nights up 4 times, usually only once overnight.  Not high volume urination.    Usually clear urine       Pituitary MRI 5/2/24  Stable postoperative appearance of pituitary fossa no evidence of recurrent neoplasm       Headache:    Denies, resolved    Vision change:   Denies (did not notice any change before surgery)    Formal Visual fields:  "  HVF w/ Dr. Yan 5/2023-  OCT and HVF stable      8/25/22, per note:  " OCT with borderline RNFL thinning OD>OS. HVF with generalized depression OD (worse temporally) and temporal depression OS. Findings consistent with chiasmal compression." rtc in 3 month(s)    Pituitary labs: normal    Latest Reference Range & Units 02/21/24 07:11   Cortisol, 8 AM 4.30 - 22.40 ug/dL 14.80   Hemoglobin A1C External 4.0 - 5.6 % 5.4   Estimated Avg Glucose 68 - 131 mg/dL 108   ACTH 0 - 46 pg/mL 27   Somatomedin (IGF-I) 44 - 227 ng/mL 65   TSH 0.400 - 4.000 uIU/mL 2.708   Free T4 0.71 - 1.51 ng/dL 0.92   FSH See Text mIU/mL 14.12   Prolactin 5.2 - 26.5 ng/mL 10.5       Current symptoms:  Hyperprolactinemia:  []  breast tenderness []  nipple discharge []  Menstrual Irregularity [x]  Denies   Initially noticed blx nipple dc for a few years around time of menstrual cycle   Reports that after D&C in 2/2022 galactorrhea resolved  Resolved since surgery    Lab Results   Component Value Date    PROLACTIN 10.5 02/21/2024    PROLACTIN 15.7 11/28/2022    PROLACTIN 138.0 (H) 08/26/2022     Thyroid:  No longer on levothyroxine   []  fatigue []  weight change []  temp intolerance  [x]  Denies    []  BM change []  skin/hair change [] palpitations []  tremor [x]  Denies      Lab Results   Component Value Date    TSH 2.708 02/21/2024    FREET4 0.92 02/21/2024       Growth Hormone  Last IGF-1:   Lab Results   Component Value Date    SOMATMDN 65 02/21/2024    SOMATMDN 49 05/15/2023    SOMATMDN 42 (L) 08/26/2022        Gonadotrophs:     [x]  Irregular menses []  Postmenopausal  []  Decreased libido   []  ED   []  Denies    Previously regular then Irregular menses for 4 month(s) prior to d&c in 2/2022 (prior to that 3 month(s) of bleeding)  Repro hx: 2 children, no problems with conception  LMP: prior to D&C      Review of patient's allergies indicates:  No Known Allergies      Current Outpatient Medications:     ALPRAZolam (XANAX) 0.5 MG tablet, Take " one tablet by mouth 1 hour before procedure and bring other tablet with you to the procedure., Disp: 2 tablet, Rfl: 0    mv,calcium,min/iron/folic/vitK (ONE-A-DAY WOMEN'S COMPLETE ORAL), Take by mouth., Disp: , Rfl:     oxybutynin (DITROPAN-XL) 10 MG 24 hr tablet, Take 1 tablet (10 mg total) by mouth once daily., Disp: 90 tablet, Rfl: 1    Current Facility-Administered Medications:     polidocanoL 1 % (20 mg/2 mL) injection Soln 0.2 mL, 0.2 mL, Intravenous, 1 time in Clinic/HOD, Messi Schaefer MD    ROS:  see HPI    Objective:   Physical Exam   LMP 01/15/2022   Wt Readings from Last 3 Encounters:   03/11/24 68 kg (150 lb)   02/20/24 69.5 kg (153 lb 3.5 oz)   09/08/23 71.7 kg (158 lb)   ]    Constitutional:  Pleasant,  in no acute distress.   HENT:   Eyes:     No scleral icterus.   Respiratory:   Effort normal   Neurological:  normal speech  Psych:  Normal mood and affect.      LABORATORY REVIEW:    Chemistry        Component Value Date/Time     02/21/2024 0711    K 3.9 02/21/2024 0711     02/21/2024 0711    CO2 26 02/21/2024 0711    BUN 14 02/21/2024 0711    CREATININE 1.0 02/21/2024 0711    GLU 86 02/21/2024 0711        Component Value Date/Time    CALCIUM 9.5 02/21/2024 0711    ALKPHOS 59 02/21/2024 0711    AST 21 02/21/2024 0711    ALT 16 02/21/2024 0711    BILITOT 0.5 02/21/2024 0711    ESTGFRAFRICA >60 07/29/2022 0827    EGFRNONAA >60 07/29/2022 0827        MRI 8/15/22- preop       MRI 10/25/22 - postop  Evolving operative change status post transsphenoidal approach sellar/suprasellar mass resection.  There is continued heterogeneous signal predominantly hypoenhancing material along the posterior sphenoid sinus extending to the sella felt to represent postoperative packing material.  There is thin marginated enhancement about this which may be reactive in granulation tissue.  There is continued somewhat lobular enhancing material along the postoperative floor of the sella which may in part  represent residual pituitary tissue though remains concerning for component of residual lesion.  The infundibulum is deviated to the right.  Compared to most recent prior the nodular enhancing material is similar measuring approximately 1.6 cm AP x 1.4 cm TV and 0.7 cm craniocaudal.  There is no significant mass effect on the suprasellar neurovascular structures clinical correlation and continued follow-up advised.        Assessment/Plan:     Problem List Items Addressed This Visit       S/P transsphenoidal selective adenomectomy     Doing well with normal pituitary labs and MRI w/o evidence of recurrence.      Will check 8 am fasting pituitary labs in a year with visit.  Continue regular hvf (blake visual fields) and periodic imaging with Dr. Ferrell              Other Visit Diagnoses       Pituitary lesion    -  Primary    Relevant Orders    ACTH    Cortisol, 8AM    Follicle Stimulating Hormone    Insulin-Like Growth Factor    Prolactin    T4, Free    TSH          RTC in 1 year with Dr. Benitez with 8 am fasting labs prior    Jimmy Batista MD

## 2024-05-07 NOTE — ASSESSMENT & PLAN NOTE
Doing well with normal pituitary labs and MRI w/o evidence of recurrence.      Will check 8 am fasting pituitary labs in a year with visit.  Continue regular hvf (blake visual fields) and periodic imaging with Dr. Ferrell

## 2024-05-07 NOTE — PROGRESS NOTES
CHIEF COMPLAINT:  Annual postop surveillance    INTERVAL HISTORY (5/7/24):  Annual postop surveillance.  She is doing great.  Denies any changes or symptoms.  MRI does not show any evidence of recurrnce.    INTERVAL HISTORY (5/30/23):  Returns for annual postop surveillance re: pituitary tumor resection.  She is doing well.  Mild posterior occipital HA and bilateral tinnitus.  Denies vision changes.  Has been tolerating being off levothyroxin.  Dr Yan reports good vision.  New MRI does not show any obvious recurrence.    INTERVAL HISTORY (11/22/22):  Routine 6 week postoperative follow-up.  Her main complaint is posterior headaches that have been present before surgery but remain.  They are intermittent but occurs most of the day and lessen as the day goes on.  She rates them as 7-8/10 on average and 1-2/10 at its best.  She takes Tylenol.  She is unable to take ibuprofen and is not on any migraine medicines.    She had been admitted for postop hyponatremia that resolved after starting hydrocortisone. Did not require Na restriction.  Now normal Na    She denies any vision changes.    INTERVAL HISTORY (10/11/22):  Patient returns for routine 2 week postoperative follow-up s/p endonasal endoscopic transsphenoidal resection of pituitary tumor on 10/3/22 with Dr. Astorga of ENT.  Patient tolerated the procedure well without any perioperative complications.  She was discharged few days after surgery without evidence of DI or adrenal insufficiency.  She is neurologically stable.  Today she reports that her vision is improved as well as her energy levels.  She was recently seen in the emergency room for severe headache in the back of her head but she explains that that has gotten better since.  She also reports tiny amount of clear watery appearing drainage from her nose periodically.  She states that this could happen up to 4-5 times but is a small amount and not bloody.  She does acknowledge that her headaches are worse  when she gets up from bed but it is not necessarily positional.  She is urinating 6-7 times per day and 2 times per night.  A recent BMP in the ED revealed hyponatremia to 129.  She will be seeing Dr. Batista today for further evaluation of SIADH versus DI.    HPI:  Tessa Wynne is a 48 y.o.  female with below PMH, who is referred for evaluation of pituitary macroadenoma.  Brain MRI obtained for syncopal episode and HA revealed large pituitary tumor.  She's had HA x 1 year (mainly occipital location).  Denies significant vision change however recent ophtho eval revealed evidence of chiasmal compression.    Elevated prolactin (138) and hypothyroidism.    See endo note for hormone eval    Review of patient's allergies indicates:  No Known Allergies    Past Medical History:   Diagnosis Date    Elevated fasting glucose     GERD (gastroesophageal reflux disease)     Hypothyroidism     PAD (peripheral artery disease)     Pituitary lesion 09/07/2022    Pituitary macroadenoma with extrasellar extension 08/25/2022     Past Surgical History:   Procedure Laterality Date    CHOLECYSTECTOMY      COLONOSCOPY N/A 12/15/2022    Procedure: COLONOSCOPY;  Surgeon: Kerrie Bird MD;  Location: [facility] ENDO;  Service: Endoscopy;  Laterality: N/A;    ENDOMETRIAL ABLATION N/A 2/8/2022    Procedure: ABLATION, ENDOMETRIUM;  Surgeon: Ariel Butler MD;  Location: [facility] OR;  Service: OB/GYN;  Laterality: N/A;    EXCISION, NEOPLASM, PITUITARY, TRANSSPHENOIDAL APPROACH, USING COMPUTER-ASSISTED NAVIGATION N/A 10/3/2022    Procedure: EXCISION, NEOPLASM, PITUITARY, TRANSSPHENOIDAL APPROACH, USING COMPUTER-ASSISTED NAVIGATION;  Surgeon: Ed Astorga MD;  Location: [facility] OR [address] [place] [address];  Service: ENT;  Laterality: N/A;    EXCISION, NEOPLASM, PITUITARY, TRANSSPHENOIDAL APPROACH, USING COMPUTER-ASSISTED NAVIGATION N/A 10/3/2022    Procedure: EXCISION, NEOPLASM, PITUITARY, TRANSSPHENOIDAL APPROACH, USING COMPUTER-ASSISTED NAVIGATION;  Surgeon:  Miguel Ángel Ferrell DO;  Location: [facility] OR [address] [place] [address];  Service: Neurosurgery;  Laterality: N/A;    HYSTEROSCOPY WITH DILATION AND CURETTAGE OF UTERUS N/A 2/8/2022    Procedure: HYSTEROSCOPY, WITH DILATION AND CURETTAGE OF UTERUS;  Surgeon: Ariel Butler MD;  Location: [facility] OR;  Service: OB/GYN;  Laterality: N/A;     Family History   Problem Relation Name Age of Onset    Diabetes Mother      Diabetes Mellitus Mother      Diabetes Father      Diabetes Mellitus Father      No Known Problems Sister      No Known Problems Sister      No Known Problems Brother      No Known Problems Brother      No Known Problems Daughter      No Known Problems Son      Colon cancer Neg Hx      Breast cancer Neg Hx      Ovarian cancer Neg Hx      Uterine cancer Neg Hx      Heart attack Neg Hx       Social History     Tobacco Use    Smoking status: Never    Smokeless tobacco: Never   Substance Use Topics    Alcohol use: No    Drug use: No        Review of Systems   Constitutional: Negative.    HENT:  Negative for congestion, ear discharge, ear pain, hearing loss, nosebleeds, sinus pain and tinnitus.    Eyes:  Negative for blurred vision, double vision, photophobia, pain, discharge and redness.   Respiratory: Negative.     Cardiovascular:  Negative for chest pain, palpitations, claudication and leg swelling.   Gastrointestinal:  Negative for abdominal pain, blood in stool, constipation, diarrhea, melena and vomiting.   Genitourinary:  Negative for flank pain, frequency and urgency.   Musculoskeletal:  Negative for falls.   Skin: Negative.    Neurological:  Negative for dizziness, tingling, tremors, sensory change, speech change, focal weakness, seizures, loss of consciousness, weakness and headaches.   Endo/Heme/Allergies:  Does not bruise/bleed easily.   Psychiatric/Behavioral: Negative.         OBJECTIVE:   Vital Signs:       Physical Exam:  Constitutional: Patient sitting comfortably in chair. Appears well developed and well  nourished.  Skin: Exposed areas are intact without abnormal markings, rashes or other lesions.  HEENT: Normocephalic. Normal conjunctivae.  Cardiovascular: Normal rate and regular rhythm.  Respiratory: Chest wall rises and falls symmetrically, without signs of respiratory distress.  Abdomen: Soft and non-tender.  Extremities: Warm and without edema. Calves supple, non-tender.  Psych/Behavior: Normal affect.    Neurological:    Mental status: Alert and oriented. Conversational and appropriate.       Cranial Nerves: VFF to confrontation. PERRL. EOMI without nystagmus. Facial STLT normal and symmetric. Strong, symmetric muscles of mastication. Facial strength full and symmetric. Hearing equal bilaterally to finger rub. Palate and uvula rise and fall normally in midline. Shoulder shrug 5/5 strength. Tongue midline.     Motor:    Upper:  Deltoids Triceps Biceps WE WF     R 5/5 5/5 5/5 5/5 5/5 5/5    L 5/5 5/5 5/5 5/5 5/5 5/5      Lower:  HF KE KF DF PF EHL    R 5/5 5/5 5/5 5/5 5/5 5/5    L 5/5 5/5 5/5 5/5 5/5 5/5     Sensory: Intact sensation to light touch in all extremities. Romberg negative.    Reflexes:          DTR: 2+ symmetrically throughout.     Rey's: Negative.     Babinski's: Negative.     Clonus: Negative.    Cerebellar: Finger-to-nose and rapid alternating movements normal.     Gait stable    Diagnostic Results:  All imaging was independently reviewed by me.    BMRI, 5/2/24:  No obvious recurrence  Continued decompression of optic chiasm    BMP - Na 142 from 134 from 129    [facility]RI, 5/26/23:  No obvious recurrence  Continued decompression of optic chiasm        Postop MRI - GTR    MRI brain, dated 8/15/22:  1. 2.8 x 3.5 x 2.7 cm pit macroadenoma with compression of chiasm  2. Knosp 3 on R  3. Knosp 2 on L    ASSESSMENT/PLAN:     Problem List Items Addressed This Visit          Endocrine    S/P transsphenoidal selective adenomectomy - Primary    Relevant Orders    MRI Pituitary W W/O Contrast       S/p  TSA for resection of pituitary macroadenoma.      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  Surgery achieved gross total resection without spinal fluid leak.  Cont to do well.  No issues.    - RTC in 1 yr with pit MRI  - F/u with endo as scheduled    The patient understands and agrees with the plan of care. All questions were answered.    Time spent on this encounter: 20 minutes. This includes face-to-face time and non-face to face time preparing to see the patient (eg, review of tests), obtaining and/or reviewing separately obtained history, documenting clinical information in the electronic or other health record, independently interpreting results and communicating results to the patient/family/caregiver, or care coordinator.

## 2024-05-22 ENCOUNTER — OFFICE VISIT (OUTPATIENT)
Dept: OPHTHALMOLOGY | Facility: CLINIC | Age: 49
End: 2024-05-22
Payer: MEDICAID

## 2024-05-22 ENCOUNTER — CLINICAL SUPPORT (OUTPATIENT)
Dept: OPHTHALMOLOGY | Facility: CLINIC | Age: 49
End: 2024-05-22
Payer: MEDICAID

## 2024-05-22 DIAGNOSIS — D35.2 PITUITARY MACROADENOMA WITH EXTRASELLAR EXTENSION: Primary | ICD-10-CM

## 2024-05-22 DIAGNOSIS — H53.15 VISUAL DISTORTIONS OF SHAPE AND SIZE: ICD-10-CM

## 2024-05-22 PROCEDURE — 92133 CPTRZD OPH DX IMG PST SGM ON: CPT | Mod: PBBFAC | Performed by: STUDENT IN AN ORGANIZED HEALTH CARE EDUCATION/TRAINING PROGRAM

## 2024-05-22 PROCEDURE — 99215 OFFICE O/P EST HI 40 MIN: CPT | Mod: S$PBB,,, | Performed by: STUDENT IN AN ORGANIZED HEALTH CARE EDUCATION/TRAINING PROGRAM

## 2024-05-22 PROCEDURE — 99212 OFFICE O/P EST SF 10 MIN: CPT | Mod: PBBFAC,25 | Performed by: STUDENT IN AN ORGANIZED HEALTH CARE EDUCATION/TRAINING PROGRAM

## 2024-05-22 PROCEDURE — 1159F MED LIST DOCD IN RCRD: CPT | Mod: CPTII,,, | Performed by: STUDENT IN AN ORGANIZED HEALTH CARE EDUCATION/TRAINING PROGRAM

## 2024-05-22 PROCEDURE — 3044F HG A1C LEVEL LT 7.0%: CPT | Mod: CPTII,,, | Performed by: STUDENT IN AN ORGANIZED HEALTH CARE EDUCATION/TRAINING PROGRAM

## 2024-05-22 PROCEDURE — 99999 PR PBB SHADOW E&M-EST. PATIENT-LVL II: CPT | Mod: PBBFAC,,, | Performed by: STUDENT IN AN ORGANIZED HEALTH CARE EDUCATION/TRAINING PROGRAM

## 2024-05-22 PROCEDURE — 92083 EXTENDED VISUAL FIELD XM: CPT | Mod: PBBFAC | Performed by: STUDENT IN AN ORGANIZED HEALTH CARE EDUCATION/TRAINING PROGRAM

## 2024-05-22 PROCEDURE — 1160F RVW MEDS BY RX/DR IN RCRD: CPT | Mod: CPTII,,, | Performed by: STUDENT IN AN ORGANIZED HEALTH CARE EDUCATION/TRAINING PROGRAM

## 2024-05-22 NOTE — PROGRESS NOTES
"  Date:  5/22/2024    ?  Referring Provider:   Jimmy Batista MD    Copies of Letters to the Following:   MD Miguel Ángel Rick MD    Chief Complaint:  I saw Tessa Wynne at the [facility] for neuro-ophthalmic evaluation.   She is a 48 y.o. female with a history of FM, HLD, pituitary adenoma who presents for follow up of formal visual fields.     History:        45 y/o female is here for Neuro-ophthalmic evaluation who present with   concerns of Pituitary lesion. H/o of Pterygium excision, right eye. Pt   report of blurry vision of the LT eye associated with headaches, eye   allergies and tinnitus. No diplopia or flashes of light reported today. Pt   is accompany by son who is her . Declines formal interpretation.     Eyemeds  No gtts       6/27/2022 Fadi:  "Presents today for ocular health check.  Pt reports blurry vision x 7 months.  Pt report occasional twitching OD.  Pt denies eye pain, flashes and floaters.  No using any eye meds. "  ?  11/18/2022    She reports daily headaches that last for 1 hour. She has pressure   sensation in ears. No floaters, flashes of light, diplopia, migraines, or   eye allergies reported today.     Eyemeds  No gtts    She underwent transsphenoidal resection of pituitary mass on 10/3/2022. Post operative course complicated by DI and re-hospitalization for hyponatremia.     DAT used for interpretation today # [id number] [place]      11/8/2022 Tumor board:  "Patient underwent TSR with Dr. Ferrell on 10/3/22. The patient did not receive any perioperative glucocorticoids and had no evidence of adrenal insufficiency in the hospital. The patient did not develop diabetes insipidus but presented to the ED on 10/8 SIADH (Na 129). She recovered but then was readmitted to hospital for Na of 123 and concern for AI, patient was using home DDAVP nightly. Patient was discharged with physiologic steroids and sodium normalized after holding DDAVP.         Additionally, " "we reviewed previous medical and familial history of present illness, and recent lab results along with all available histopathologic and imaging studies. The tumor board considered available treatment options and made the following recommendations: The general consensus was to  follow up. Patient has a scheduled appointment with Dr. Ferrell, neurosurgery on 11/22/2022.   "    5/25/2023    HPI    DSL- 11/18/2022 Dr. Yan    46 y/o female present to clinic for Pituitary Macroadenoma f/u with   HVF/OCT review. Pt states headaches has subsided since last visit. She   states unresolved blurry vision- difficultly seeing small print. Positive   for tinnitus. No floaters, flashes of light, diplopia, or migraines   reported.     Pterygium still very bothersome, she is on the pred-forte TID. Not using AT.    Aspen used: Darnell ID#[id number]    Eyemeds  No gtts    Last edited by Emerald Medina on 5/25/2023  3:11 PM.     MRI planned for tomorrow and NSGY fu next week.     5/23/2023 Lester:  "At last visit she was doing well off levothyroxine, not on any pituitary hormone replacement.   She is scheduled for hvf (blake visual fields), MRI and visit with Dr. Ferrell next week.   Still with headaches once a week (occipital pressure, short durration)     denies symptoms of adrenal insufficiency (no lightheadedness, N/V/abd pain, hypotension).  No unexplained weight loss.      She is feeling great now with only some minor blx ear pressure     No symptoms of DI (denies polyuria/polydipsia).  Some nights up 4 times, usually only once overnight.  Not high volume urination.  "    Interval History:  5/22/2024      HPI    DSL- 5/25/2023 Dr. Yan    47 y/o female present to clinic for Pituitary macroadenoma f/u with   HVF/OCT review. Pt states no changes since last visit. She denies visual   changes, diplopia, eye allergies, floaters, flashes of lights, headaches,   tinnitus, and migraines. She states ptosis of right eye is still present. "     Eyemeds  No gtts  Last edited by Emerald Medina on 5/22/2024  2:43 PM.        Current Outpatient Medications   Medication Sig Dispense Refill    ALPRAZolam (XANAX) 0.5 MG tablet Take one tablet by mouth 1 hour before procedure and bring other tablet with you to the procedure. 2 tablet 0    mv,calcium,min/iron/folic/vitK (ONE-A-DAY WOMEN'S COMPLETE ORAL) Take by mouth.      oxybutynin (DITROPAN-XL) 10 MG 24 hr tablet Take 1 tablet (10 mg total) by mouth once daily. 90 tablet 1     Current Facility-Administered Medications   Medication Dose Route Frequency Provider Last Rate Last Admin    polidocanoL 1 % (20 mg/2 mL) injection Soln 0.2 mL  0.2 mL Intravenous 1 time in Clinic/HOD Messi Schaefer MD         Review of patient's allergies indicates:  No Known Allergies  Past Medical History:   Diagnosis Date    Elevated fasting glucose     GERD (gastroesophageal reflux disease)     Hypothyroidism     PAD (peripheral artery disease)     Pituitary lesion 09/07/2022    Pituitary macroadenoma with extrasellar extension 08/25/2022     Past Surgical History:   Procedure Laterality Date    CHOLECYSTECTOMY      COLONOSCOPY N/A 12/15/2022    Procedure: COLONOSCOPY;  Surgeon: Kerrie Bird MD;  Location: [facility] ENDO;  Service: Endoscopy;  Laterality: N/A;    ENDOMETRIAL ABLATION N/A 2/8/2022    Procedure: ABLATION, ENDOMETRIUM;  Surgeon: Ariel Butler MD;  Location: [facility] OR;  Service: OB/GYN;  Laterality: N/A;    EXCISION, NEOPLASM, PITUITARY, TRANSSPHENOIDAL APPROACH, USING COMPUTER-ASSISTED NAVIGATION N/A 10/3/2022    Procedure: EXCISION, NEOPLASM, PITUITARY, TRANSSPHENOIDAL APPROACH, USING COMPUTER-ASSISTED NAVIGATION;  Surgeon: Ed Astorga MD;  Location: [address];  Service: ENT;  Laterality: N/A;    EXCISION, NEOPLASM, PITUITARY, TRANSSPHENOIDAL APPROACH, USING COMPUTER-ASSISTED NAVIGATION N/A 10/3/2022    Procedure: EXCISION, NEOPLASM, PITUITARY, TRANSSPHENOIDAL APPROACH, USING  COMPUTER-ASSISTED NAVIGATION;  Surgeon: Miguel Ángel Ferrell DO;  Location: [facility] OR [address] [place] [address];  Service: Neurosurgery;  Laterality: N/A;    HYSTEROSCOPY WITH DILATION AND CURETTAGE OF UTERUS N/A 2/8/2022    Procedure: HYSTEROSCOPY, WITH DILATION AND CURETTAGE OF UTERUS;  Surgeon: Ariel Butler MD;  Location: [facility] OR;  Service: OB/GYN;  Laterality: N/A;     Family History   Problem Relation Name Age of Onset    Diabetes Mother      Diabetes Mellitus Mother      Diabetes Father      Diabetes Mellitus Father      No Known Problems Sister      No Known Problems Sister      No Known Problems Brother      No Known Problems Brother      No Known Problems Daughter      No Known Problems Son      Colon cancer Neg Hx      Breast cancer Neg Hx      Ovarian cancer Neg Hx      Uterine cancer Neg Hx      Heart attack Neg Hx       Social History     Socioeconomic History    Marital status:     Number of children: 2   Occupational History    Occupation: cleaning housing    Tobacco Use    Smoking status: Never    Smokeless tobacco: Never   Substance and Sexual Activity    Alcohol use: No    Drug use: No    Sexual activity: Yes     Partners: Male     Birth control/protection: OCP   Social History Narrative    Patient is originally from [place] [id number]         School at ; high school         College/university ;         Working ; [employer]                 Children    joban - 21    lidnsey - 13         Lives with     zeb and son and husabd        Diet/Exericse           Examination:  She was well-appearing. She was alert and oriented. Attention span and concentration were normal. Speech, language, memory, and general knowledge were intact.      Her distance visual acuity without correction was 20/20  in the right eye and 20/20  in the left eye.  Her near visual acuity without correction was J7 PH J5 in the right eye and J7 PH J5 in the left eye.      She perceived 8/8 OD and 8/8 OS Ishihara color plates correctly. Pupils were  brisk to light without an afferent defect. Ocular ductions were full. Orthophoric in primary, right, left, up and down gaze by cross cover. There was no nystagmus. Saccades and pursuits were normal. Lids were symmetric.     Optic discs with mild pallor OU, no edema. Pupillary dilation was not necessary for visualization of the optic disc today.       Laboratories Reviewed:     n/a  ?  Neuroimaging Reviewed:     8/15/2022 MRI brain wo contrast  No midline shift or hydrocephalus.  No acute infarction.  Brain parenchyma is overall normal in signal and contour.  No acute intracranial hemorrhage.  Possible left frontal developmental venous anomaly incidentally noted on SWAN images.  No abnormal extra-axial fluid collections.  Mild cerebellar tonsillar ectopia.  Otherwise, structures at the craniocervical junction show no significant abnormalities.  Major skull base flow voids are present.  Orbits, paranasal sinuses and mastoid air cells show no significant abnormalities.     Sella: There is a large sellar lesion with suprasellar extension.  Lesion measures roughly 3.2 x 2.8 x 3.5 cm.  There is mass effect on the adjacent structures including the optic chiasm which is displaced superiorly as well as the bilateral A1 segments of the anterior cerebral arteries.  There is possible extension into the cavernous sinuses bilaterally best visualized on coronal T2 series 10.  The carotid flow voids are preserved.  There is bony remodeling of the adjacent clivus secondary to this lesion with preserved marrow signal.     Impression:     Large sellar/suprasellar lesion primarily worrisome for pituitary macroadenoma.  There is mass effect on the optic chiasm and possible extension into the cavernous sinus bilaterally.  Recommend correlation with pituitary labs, dedicated MRI pituitary protocol and follow-up with neurosurgery.     10/25/2022 MRI pituitary w/wo contrast  Finding: Brain is normal in contour.  Ventricles stable without  hydrocephalus.  There is no restricted diffusion to suggest acute infarction.  No abnormal parenchymal susceptibility to suggest parenchymal hemorrhage.  No major intracranial skull base T2 flow voids are present.  No abnormal parenchymal enhancement     Sella: Evolving operative change status post transsphenoidal approach sellar/suprasellar mass resection.  There is continued heterogeneous signal predominantly hypoenhancing material along the posterior sphenoid sinus extending to the sella felt to represent postoperative packing material.  There is thin marginated enhancement about this which may be reactive in granulation tissue.  There is continued somewhat lobular enhancing material along the postoperative floor of the sella which may in part represent residual pituitary tissue though remains concerning for component of residual lesion.  The infundibulum is deviated to the right.  Compared to most recent prior the nodular enhancing material is similar measuring approximately 1.6 cm AP x 1.4 cm TV and 0.7 cm craniocaudal.  There is no significant mass effect on the suprasellar neurovascular structures clinical correlation and continued follow-up advised.     Impression:     MR sella: Evolving operative change from transsphenoidal sellar/suprasellar mass resection.  There is continued somewhat lobular hypoenhancing material within the sella remains concerning for possible residual lesion and residual pituitary tissue.  No evidence for significant mass effect on the suprasellar neurovascular structures or suprasellar extension.  Clinical correlation and continued follow-up advised     MRI brain: Otherwise unremarkable MRI of the brain specifically without evidence for hydrocephalus or new parenchymal signal abnormality.    5/2/2024 MRI pituitary w/wo contrast  There is no change from the prior study.  There is postsurgical change status post resection of a pituitary mass.  There is stable enhancing tissue in the  pituitary fossa and there is stable deviation of the infundibular stalk to the right.     The diffusion sequence is normal without evidence of acute ischemia or infarct.  Craniocervical junction is unremarkable.  No white matter lesion mass gliosis edema or demyelination is seen.  The brain parenchyma is unremarkable.  Flow voids are present were expected.  There is mild sinusitis change.  GRE images demonstrate no significant blood products.  No abnormal enhancement seen.     Stable postsurgical appearance of the pituitary fossa     Impression:     Stable postoperative appearance of pituitary fossa no evidence of recurrent neoplasm.        ?  Ocular Imaging, Photos, Records Reviewed:     OCT RNFL  8/25/2022:   Right Eye - Average RNFL 81 borderline inferior and temporal thinning   Left Eye - Average RNFL 81 borderline inferior thinning     Normal macular architecture OU    OCT RNFL 11/18/2022:   Right Eye - Average RNFL 78 borderline inferior and temporal thinning   Left Eye - Average RNFL 78 borderline inferior and temporal thinning     Macular architecture normal OU    OCT RNFL 5/25/2023:   Right Eye - Average RNFL 73 temporal thinning and borderline inferior thinning   Left Eye - Average RNFL 76 borderline temporal and inferior thinning     Macular architecture normal OU    OCT RNFL Today 5/22/2024:   Right Eye - Average RNFL 71 temporal thinning and borderline inferior thinning    Left Eye - Average RNFL 75 borderline inferior thinning     Macular architecture normal OU    Visual Field Test 24-2 OU  8/25/2022: Right Eye - fixation losses 0/13, false positives 0%, false negatives 6%, MD -9.38dB, Impression OD: moderate generalized depression, worst temporally, ST. Left Eye - fixation losses 0/15, false positives 0%, false negatives 9%, MD -9.28dB, Impression OS: temporal depression, worst ST.    Visual Field Test 24-2 OU 11/18/2022: Right Eye - fixation losses 0/11, false positives 0%, false negatives 0%, MD  -2.80dB, Impression OD: moderate superotemporal depression. Left Eye - fixation losses 1/11, false positives 0%, false negatives 0%, MD -1.95dB, Impression OS: mild superotemporal depression.    Visual Field Test 24-2 OU 5/25/2023: Right Eye - fixation losses 0/11, false positives 0%, false negatives 0%, MD -2.70dB, Impression OD: mild temporal depression. Left Eye - fixation losses 0/11, false positives 0%, false negatives 1%, MD -2.53dB, Impression OS: mild superotemporal depression.    Visual Field Test 24-2 OU Today 5/22/2024: Right Eye - fixation losses 0/11, false positives 8%, false negatives 0%, MD -0.78dB, Impression OD: few superotemporal points. Left Eye - fixation losses 1/11, false positives 2%, false negatives 0%, MD -1.45dB, Impression OS: subtle temporal depression.    ?  Impression:  Tessa Wynne has history of FM, HLD, pituitary adenoma who presents for evaluation of formal visual fields. She reports blurred vision and headaches. She has MRI with large sellar mass with optic chiasm compression/elevation and extension into bilateral cavernous sinuses. OCT with borderline RNFL thinning OD>OS. HVF with generalized depression OD (worse temporally) and temporal depression OS. Findings consistent with chiasmal compression. Exam notable for mild disc pallor OU, normal eye movements and alignment. I will monitor her for any changes throughout treatment and beyond.     11/18/2022: Underwent trans-sphenoidal resection of pituitary mass on 10/3/2022. Pathology consistent with gonadotroph adenoma. Complicated by DI with hospitalization for hyponatremia a few days post resection. Formal visual fields with dramatic improvement in visual field defects! OCT stable. She will monitor for any subjective changes in vision and I will monitor her in clinic moving forward.     5/24/2023: OCT and HVF stable. Exam stable.  MRI planned for tomorrow and NSGY fu next week. Pterygium still very bothersome, she is on the  pred-forte TID. Not using AT. Will try Durezol OD for one week, add AT TID and send referral for repeat pterygium resection with [facility].    5/22/2024: Repeat MRI in early 5/2024 stable. No new complaints. OCT and HVF stable. Had her pterygium resection in early 2024, very happy!  ?  Plan:  1. Follow up in neurosurgery and endocrinology clinics as planned   2. Monitor for any new blur, red desaturation, diplopia, ptosis      Follow-up:  I will see her in follow-up in 1 year or sooner with any change.  ?OCT and HVF  ?  Visit Checklist (as applicable):  1. Status of new and prior symptoms discussed? yes  2. Neuroimaging reviewed/ ordered as appropriate? yes  3. Ocular imaging and photos reviewed/ ordered as appropriate? yes  4. Plan for work-up and treatment discussed with patient? yes  5. Potential medication side-effects and monitoring plan discussed? yes  6. Review of outside medical records was performed and pertinent details are summarized in the HPI above? n/a    Time spent on this encounter: 45 minutes. This includes face to face time and non-face to face time preparing to see the patient (eg, review of tests), obtaining and/or reviewing separately obtained history, documenting clinical information in the electronic or other health record, independently interpreting results and communicating results to the patient/family/caregiver, or care coordinator.      JUANITA Palmer  Neuro-Ophthalmology Consultant

## 2024-05-23 NOTE — PROGRESS NOTES
VISUAL FIELD TEST 24-2 SFAST-OU-DONE/AB  OU-REL-FIX-COOP-GOOD/AB    PT HAS NO KNOWN ALLERGIES TO LATEX OR ADHESIVES./AB

## 2024-06-06 NOTE — PROGRESS NOTES
"Tessa Wynne is a 41 y.o. female patient.   1. Cholecystitis      No past medical history on file.  No past surgical history pertinent negatives on file.    S/p lap johnny on 9/7, doing well.    Review of patient's allergies indicates:  No Known Allergies  There are no hospital problems to display for this patient.    Blood pressure 104/76, pulse 85, temperature 97.1 °F (36.2 °C), temperature source Oral, height 5' 4" (1.626 m), weight 74.6 kg (164 lb 9.2 oz), last menstrual period 09/10/2017.    Subjective:   Diet: Adequate intake.  Patient reports no nausea.    Activity level: Normal.    Pain control: Well controlled.    Wound: Subjective wound complaints: healing well.      Objective:  Vital signs (most recent): Blood pressure 104/76, pulse 85, temperature 97.1 °F (36.2 °C), temperature source Oral, height 5' 4" (1.626 m), weight 74.6 kg (164 lb 9.2 oz), last menstrual period 09/10/2017.  General appearance: Comfortable.    Lungs:  Normal effort.    Heart: Normal rate.    Abdomen: Abdomen is soft.  (Incisions healing well, drain in place with SS output).       Assessment & Plan   - drain removed today in clinic  - return to clinic uday Aden MD  9/14/2017  " [No Acute Distress] : no acute distress [Well Nourished] : well nourished [Well Developed] : well developed [Well-Appearing] : well-appearing [Normal Sclera/Conjunctiva] : normal sclera/conjunctiva [Normal Outer Ear/Nose] : the outer ears and nose were normal in appearance [Normal Oropharynx] : the oropharynx was normal [Supple] : supple [No Respiratory Distress] : no respiratory distress  [Clear to Auscultation] : lungs were clear to auscultation bilaterally [No Accessory Muscle Use] : no accessory muscle use [Normal Rate] : normal rate  [Regular Rhythm] : with a regular rhythm [Normal S1, S2] : normal S1 and S2 [Pedal Pulses Present] : the pedal pulses are present [No Extremity Clubbing/Cyanosis] : no extremity clubbing/cyanosis [Soft] : abdomen soft [Non Tender] : non-tender [Non-distended] : non-distended [Normal Bowel Sounds] : normal bowel sounds [No Spinal Tenderness] : no spinal tenderness [Grossly Normal Strength/Tone] : grossly normal strength/tone [No Rash] : no rash [Coordination Grossly Intact] : coordination grossly intact [No Focal Deficits] : no focal deficits [Normal Affect] : the affect was normal [Alert and Oriented x3] : oriented to person, place, and time [de-identified] : no thrush [de-identified] : trace B ankle edema

## 2025-02-27 ENCOUNTER — OFFICE VISIT (OUTPATIENT)
Dept: FAMILY MEDICINE | Facility: CLINIC | Age: 50
End: 2025-02-27
Payer: MEDICAID

## 2025-02-27 VITALS
HEIGHT: 64 IN | HEART RATE: 56 BPM | SYSTOLIC BLOOD PRESSURE: 106 MMHG | DIASTOLIC BLOOD PRESSURE: 68 MMHG | WEIGHT: 145.06 LBS | OXYGEN SATURATION: 98 % | BODY MASS INDEX: 24.77 KG/M2

## 2025-02-27 DIAGNOSIS — K27.9 PUD (PEPTIC ULCER DISEASE): ICD-10-CM

## 2025-02-27 DIAGNOSIS — R35.1 NOCTURIA MORE THAN TWICE PER NIGHT: ICD-10-CM

## 2025-02-27 DIAGNOSIS — G43.709 CHRONIC MIGRAINE W/O AURA, NOT INTRACTABLE, W/O STAT MIGR: ICD-10-CM

## 2025-02-27 DIAGNOSIS — Z98.890 S/P TRANSSPHENOIDAL SELECTIVE ADENOMECTOMY: ICD-10-CM

## 2025-02-27 DIAGNOSIS — K58.2 IRRITABLE BOWEL SYNDROME WITH BOTH CONSTIPATION AND DIARRHEA: ICD-10-CM

## 2025-02-27 DIAGNOSIS — Z86.018 S/P TRANSSPHENOIDAL SELECTIVE ADENOMECTOMY: ICD-10-CM

## 2025-02-27 DIAGNOSIS — Z91.89 10 YEAR RISK OF MI OR STROKE < 7.5%: ICD-10-CM

## 2025-02-27 DIAGNOSIS — Z12.31 ENCOUNTER FOR SCREENING MAMMOGRAM FOR BREAST CANCER: ICD-10-CM

## 2025-02-27 DIAGNOSIS — Z00.01 ENCOUNTER FOR GENERAL ADULT MEDICAL EXAMINATION WITH ABNORMAL FINDINGS: Primary | ICD-10-CM

## 2025-02-27 DIAGNOSIS — D35.2 PITUITARY MACROADENOMA WITH EXTRASELLAR EXTENSION: ICD-10-CM

## 2025-02-27 DIAGNOSIS — Z23 IMMUNIZATION DUE: ICD-10-CM

## 2025-02-27 PROCEDURE — 1160F RVW MEDS BY RX/DR IN RCRD: CPT | Mod: CPTII,,, | Performed by: FAMILY MEDICINE

## 2025-02-27 PROCEDURE — 99999 PR PBB SHADOW E&M-EST. PATIENT-LVL IV: CPT | Mod: PBBFAC,,, | Performed by: FAMILY MEDICINE

## 2025-02-27 PROCEDURE — 99214 OFFICE O/P EST MOD 30 MIN: CPT | Mod: PBBFAC,PO | Performed by: FAMILY MEDICINE

## 2025-02-27 PROCEDURE — 1159F MED LIST DOCD IN RCRD: CPT | Mod: CPTII,,, | Performed by: FAMILY MEDICINE

## 2025-02-27 PROCEDURE — 3074F SYST BP LT 130 MM HG: CPT | Mod: CPTII,,, | Performed by: FAMILY MEDICINE

## 2025-02-27 PROCEDURE — 99396 PREV VISIT EST AGE 40-64: CPT | Mod: S$PBB,,, | Performed by: FAMILY MEDICINE

## 2025-02-27 PROCEDURE — 3078F DIAST BP <80 MM HG: CPT | Mod: CPTII,,, | Performed by: FAMILY MEDICINE

## 2025-02-27 PROCEDURE — 3008F BODY MASS INDEX DOCD: CPT | Mod: CPTII,,, | Performed by: FAMILY MEDICINE

## 2025-03-05 ENCOUNTER — HOSPITAL ENCOUNTER (OUTPATIENT)
Dept: RADIOLOGY | Facility: HOSPITAL | Age: 50
Discharge: HOME | End: 2025-03-05
Attending: FAMILY MEDICINE
Payer: MEDICAID

## 2025-03-05 DIAGNOSIS — Z12.31 ENCOUNTER FOR SCREENING MAMMOGRAM FOR BREAST CANCER: ICD-10-CM

## 2025-03-05 PROCEDURE — 77063 BREAST TOMOSYNTHESIS BI: CPT | Mod: TC

## 2025-03-05 PROCEDURE — 77063 BREAST TOMOSYNTHESIS BI: CPT | Mod: 26,,, | Performed by: RADIOLOGY

## 2025-03-05 PROCEDURE — 77067 SCR MAMMO BI INCL CAD: CPT | Mod: 26,,, | Performed by: RADIOLOGY

## 2025-03-06 ENCOUNTER — RESULTS FOLLOW-UP (OUTPATIENT)
Dept: FAMILY MEDICINE | Facility: CLINIC | Age: 50
End: 2025-03-06

## 2025-03-10 ENCOUNTER — PATIENT MESSAGE (OUTPATIENT)
Dept: FAMILY MEDICINE | Facility: CLINIC | Age: 50
End: 2025-03-10
Payer: MEDICAID

## 2025-03-31 ENCOUNTER — TELEPHONE (OUTPATIENT)
Dept: ENDOCRINOLOGY | Facility: CLINIC | Age: 50
End: 2025-03-31
Payer: MEDICAID

## 2025-04-01 ENCOUNTER — TELEPHONE (OUTPATIENT)
Dept: OPHTHALMOLOGY | Facility: CLINIC | Age: 50
End: 2025-04-01
Payer: MEDICAID

## 2025-04-01 NOTE — TELEPHONE ENCOUNTER
----- Message from Nicole sent at 4/1/2025  2:19 PM CDT -----  Regarding: Returning a Missed Call  Contact: [phone]  Returning a Missed Call Caller: pt son  Returning call to:  Emerald  Caller can be reached @:  [phone] Nature of the call:  Scheduling , please leave detailed message if no answer

## 2025-04-01 NOTE — TELEPHONE ENCOUNTER
----- Message from Tech Sukhdev sent at 3/31/2025  4:46 PM CDT -----  Regarding: FW: Appt f/u    ----- Message -----  From: Rox Eckert  Sent: 3/31/2025   2:22 PM CDT  To: Yuriy CAMPBELL Staff  Subject: Appt f/u                                         Pt called in to schedule an appt; no available appts in Epic. Pt is asking for a call back soon to schedule. Thanks. Reason appt not schedule: no appt avail  Patient requesting call back or Russchjanice msg:  Call back

## 2025-05-01 ENCOUNTER — LAB VISIT (OUTPATIENT)
Dept: LAB | Facility: HOSPITAL | Age: 50
End: 2025-05-01
Attending: FAMILY MEDICINE
Payer: MEDICAID

## 2025-05-01 DIAGNOSIS — E23.7 PITUITARY LESION: ICD-10-CM

## 2025-05-01 DIAGNOSIS — Z00.01 ENCOUNTER FOR GENERAL ADULT MEDICAL EXAMINATION WITH ABNORMAL FINDINGS: ICD-10-CM

## 2025-05-01 LAB
ABSOLUTE EOSINOPHIL (OHS): 0.07 K/UL
ABSOLUTE MONOCYTE (OHS): 0.35 K/UL (ref 0.3–1)
ABSOLUTE NEUTROPHIL COUNT (OHS): 1.44 K/UL (ref 1.8–7.7)
ALBUMIN SERPL BCP-MCNC: 4 G/DL (ref 3.5–5.2)
ALP SERPL-CCNC: 61 UNIT/L (ref 40–150)
ALT SERPL W/O P-5'-P-CCNC: 24 UNIT/L (ref 10–44)
ANION GAP (OHS): 8 MMOL/L (ref 8–16)
AST SERPL-CCNC: 34 UNIT/L (ref 11–45)
BASOPHILS # BLD AUTO: 0.02 K/UL
BASOPHILS NFR BLD AUTO: 0.5 %
BILIRUB DIRECT SERPL-MCNC: 0.2 MG/DL (ref 0.1–0.3)
BILIRUB SERPL-MCNC: 0.5 MG/DL (ref 0.1–1)
BUN SERPL-MCNC: 18 MG/DL (ref 6–20)
CALCIUM SERPL-MCNC: 9.3 MG/DL (ref 8.7–10.5)
CHLORIDE SERPL-SCNC: 109 MMOL/L (ref 95–110)
CHOLEST SERPL-MCNC: 178 MG/DL (ref 120–199)
CHOLEST/HDLC SERPL: 2.7 {RATIO} (ref 2–5)
CO2 SERPL-SCNC: 24 MMOL/L (ref 23–29)
CORTIS SERPL-MCNC: 13.5 UG/DL (ref 4.3–22.4)
CREAT SERPL-MCNC: 1 MG/DL (ref 0.5–1.4)
EAG (OHS): 105 MG/DL (ref 68–131)
ERYTHROCYTE [DISTWIDTH] IN BLOOD BY AUTOMATED COUNT: 12.9 % (ref 11.5–14.5)
FSH SERPL-ACNC: 12.22 MIU/ML
GFR SERPLBLD CREATININE-BSD FMLA CKD-EPI: >60 ML/MIN/1.73/M2
GLUCOSE SERPL-MCNC: 86 MG/DL (ref 70–110)
HBA1C MFR BLD: 5.3 % (ref 4–5.6)
HCT VFR BLD AUTO: 40.4 % (ref 37–48.5)
HDLC SERPL-MCNC: 66 MG/DL (ref 40–75)
HDLC SERPL: 37.1 % (ref 20–50)
HGB BLD-MCNC: 13.7 GM/DL (ref 12–16)
IMM GRANULOCYTES # BLD AUTO: 0.01 K/UL (ref 0–0.04)
IMM GRANULOCYTES NFR BLD AUTO: 0.2 % (ref 0–0.5)
LDLC SERPL CALC-MCNC: 101.2 MG/DL (ref 63–159)
LYMPHOCYTES # BLD AUTO: 2.26 K/UL (ref 1–4.8)
MCH RBC QN AUTO: 30.1 PG (ref 27–31)
MCHC RBC AUTO-ENTMCNC: 33.9 G/DL (ref 32–36)
MCV RBC AUTO: 89 FL (ref 82–98)
NONHDLC SERPL-MCNC: 112 MG/DL
NUCLEATED RBC (/100WBC) (OHS): 0 /100 WBC
PHOSPHATE SERPL-MCNC: 4.1 MG/DL (ref 2.7–4.5)
PLATELET # BLD AUTO: 219 K/UL (ref 150–450)
PMV BLD AUTO: 10.5 FL (ref 9.2–12.9)
POTASSIUM SERPL-SCNC: 4 MMOL/L (ref 3.5–5.1)
PROLACTIN SERPL IA-MCNC: 11.2 NG/ML (ref 5.2–26.5)
PROT SERPL-MCNC: 7 GM/DL (ref 6–8.4)
RBC # BLD AUTO: 4.55 M/UL (ref 4–5.4)
RELATIVE EOSINOPHIL (OHS): 1.7 %
RELATIVE LYMPHOCYTE (OHS): 54.5 % (ref 18–48)
RELATIVE MONOCYTE (OHS): 8.4 % (ref 4–15)
RELATIVE NEUTROPHIL (OHS): 34.7 % (ref 38–73)
SODIUM SERPL-SCNC: 141 MMOL/L (ref 136–145)
T4 FREE SERPL-MCNC: 0.9 NG/DL (ref 0.71–1.51)
T4 FREE SERPL-MCNC: 0.9 NG/DL (ref 0.71–1.51)
TRIGL SERPL-MCNC: 54 MG/DL (ref 30–150)
TSH SERPL-ACNC: 2.55 UIU/ML (ref 0.4–4)
WBC # BLD AUTO: 4.15 K/UL (ref 3.9–12.7)

## 2025-05-01 PROCEDURE — 36415 COLL VENOUS BLD VENIPUNCTURE: CPT

## 2025-05-01 PROCEDURE — 85025 COMPLETE CBC W/AUTO DIFF WBC: CPT

## 2025-05-01 PROCEDURE — 84305 ASSAY OF SOMATOMEDIN: CPT

## 2025-05-01 PROCEDURE — 83001 ASSAY OF GONADOTROPIN (FSH): CPT

## 2025-05-01 PROCEDURE — 84146 ASSAY OF PROLACTIN: CPT

## 2025-05-01 PROCEDURE — 83036 HEMOGLOBIN GLYCOSYLATED A1C: CPT

## 2025-05-01 PROCEDURE — 84439 ASSAY OF FREE THYROXINE: CPT

## 2025-05-01 PROCEDURE — 82024 ASSAY OF ACTH: CPT

## 2025-05-01 PROCEDURE — 84100 ASSAY OF PHOSPHORUS: CPT

## 2025-05-01 PROCEDURE — 80061 LIPID PANEL: CPT

## 2025-05-01 PROCEDURE — 82533 TOTAL CORTISOL: CPT

## 2025-05-01 PROCEDURE — 82248 BILIRUBIN DIRECT: CPT

## 2025-05-01 PROCEDURE — 80053 COMPREHEN METABOLIC PANEL: CPT

## 2025-05-02 LAB — ACTH (OHS): 30 PG/ML

## 2025-05-05 ENCOUNTER — HOSPITAL ENCOUNTER (OUTPATIENT)
Dept: RADIOLOGY | Facility: HOSPITAL | Age: 50
Discharge: HOME | End: 2025-05-05
Attending: NEUROLOGICAL SURGERY
Payer: MEDICAID

## 2025-05-05 DIAGNOSIS — Z86.018 S/P TRANSSPHENOIDAL SELECTIVE ADENOMECTOMY: ICD-10-CM

## 2025-05-05 DIAGNOSIS — Z98.890 S/P TRANSSPHENOIDAL SELECTIVE ADENOMECTOMY: ICD-10-CM

## 2025-05-05 PROCEDURE — 70553 MRI BRAIN STEM W/O & W/DYE: CPT | Mod: TC

## 2025-05-05 PROCEDURE — 25500020 PHARM REV CODE 255: Performed by: NEUROLOGICAL SURGERY

## 2025-05-05 PROCEDURE — A9585 GADOBUTROL INJECTION: HCPCS | Performed by: NEUROLOGICAL SURGERY

## 2025-05-05 RX ORDER — GADOBUTROL 604.72 MG/ML
3.5 INJECTION INTRAVENOUS
Status: COMPLETED | OUTPATIENT
Start: 2025-05-05 | End: 2025-05-05

## 2025-05-05 RX ADMIN — GADOBUTROL 3.5 ML: 604.72 INJECTION INTRAVENOUS at 07:05

## 2025-05-06 ENCOUNTER — OFFICE VISIT (OUTPATIENT)
Dept: NEUROSURGERY | Facility: CLINIC | Age: 50
End: 2025-05-06
Payer: MEDICAID

## 2025-05-06 ENCOUNTER — OFFICE VISIT (OUTPATIENT)
Dept: ENDOCRINOLOGY | Facility: CLINIC | Age: 50
End: 2025-05-06
Payer: MEDICAID

## 2025-05-06 VITALS
BODY MASS INDEX: 24.67 KG/M2 | DIASTOLIC BLOOD PRESSURE: 64 MMHG | HEART RATE: 69 BPM | SYSTOLIC BLOOD PRESSURE: 110 MMHG | WEIGHT: 144.5 LBS | OXYGEN SATURATION: 98 % | HEIGHT: 64 IN

## 2025-05-06 VITALS — HEART RATE: 64 BPM | SYSTOLIC BLOOD PRESSURE: 104 MMHG | DIASTOLIC BLOOD PRESSURE: 68 MMHG

## 2025-05-06 DIAGNOSIS — Z98.890 S/P TRANSSPHENOIDAL SELECTIVE ADENOMECTOMY: Primary | ICD-10-CM

## 2025-05-06 DIAGNOSIS — Z86.018 S/P TRANSSPHENOIDAL SELECTIVE ADENOMECTOMY: Primary | ICD-10-CM

## 2025-05-06 DIAGNOSIS — D35.2 PITUITARY MACROADENOMA WITH EXTRASELLAR EXTENSION: ICD-10-CM

## 2025-05-06 PROCEDURE — 1160F RVW MEDS BY RX/DR IN RCRD: CPT | Mod: CPTII,,, | Performed by: STUDENT IN AN ORGANIZED HEALTH CARE EDUCATION/TRAINING PROGRAM

## 2025-05-06 PROCEDURE — 3044F HG A1C LEVEL LT 7.0%: CPT | Mod: CPTII,,, | Performed by: STUDENT IN AN ORGANIZED HEALTH CARE EDUCATION/TRAINING PROGRAM

## 2025-05-06 PROCEDURE — 1159F MED LIST DOCD IN RCRD: CPT | Mod: CPTII,,, | Performed by: STUDENT IN AN ORGANIZED HEALTH CARE EDUCATION/TRAINING PROGRAM

## 2025-05-06 PROCEDURE — 3044F HG A1C LEVEL LT 7.0%: CPT | Mod: CPTII,,, | Performed by: NEUROLOGICAL SURGERY

## 2025-05-06 PROCEDURE — 99212 OFFICE O/P EST SF 10 MIN: CPT | Mod: PBBFAC,27 | Performed by: NEUROLOGICAL SURGERY

## 2025-05-06 PROCEDURE — 99999 PR PBB SHADOW E&M-EST. PATIENT-LVL IV: CPT | Mod: PBBFAC,,, | Performed by: STUDENT IN AN ORGANIZED HEALTH CARE EDUCATION/TRAINING PROGRAM

## 2025-05-06 PROCEDURE — 99999 PR PBB SHADOW E&M-EST. PATIENT-LVL II: CPT | Mod: PBBFAC,,, | Performed by: NEUROLOGICAL SURGERY

## 2025-05-06 PROCEDURE — 1160F RVW MEDS BY RX/DR IN RCRD: CPT | Mod: CPTII,,, | Performed by: NEUROLOGICAL SURGERY

## 2025-05-06 PROCEDURE — 3078F DIAST BP <80 MM HG: CPT | Mod: CPTII,,, | Performed by: NEUROLOGICAL SURGERY

## 2025-05-06 PROCEDURE — 3008F BODY MASS INDEX DOCD: CPT | Mod: CPTII,,, | Performed by: STUDENT IN AN ORGANIZED HEALTH CARE EDUCATION/TRAINING PROGRAM

## 2025-05-06 PROCEDURE — G2211 COMPLEX E/M VISIT ADD ON: HCPCS | Mod: S$PBB,,, | Performed by: STUDENT IN AN ORGANIZED HEALTH CARE EDUCATION/TRAINING PROGRAM

## 2025-05-06 PROCEDURE — 3078F DIAST BP <80 MM HG: CPT | Mod: CPTII,,, | Performed by: STUDENT IN AN ORGANIZED HEALTH CARE EDUCATION/TRAINING PROGRAM

## 2025-05-06 PROCEDURE — 3074F SYST BP LT 130 MM HG: CPT | Mod: CPTII,,, | Performed by: STUDENT IN AN ORGANIZED HEALTH CARE EDUCATION/TRAINING PROGRAM

## 2025-05-06 PROCEDURE — 99214 OFFICE O/P EST MOD 30 MIN: CPT | Mod: S$PBB,,, | Performed by: NEUROLOGICAL SURGERY

## 2025-05-06 PROCEDURE — 3074F SYST BP LT 130 MM HG: CPT | Mod: CPTII,,, | Performed by: NEUROLOGICAL SURGERY

## 2025-05-06 PROCEDURE — 99214 OFFICE O/P EST MOD 30 MIN: CPT | Mod: S$PBB,,, | Performed by: STUDENT IN AN ORGANIZED HEALTH CARE EDUCATION/TRAINING PROGRAM

## 2025-05-06 PROCEDURE — 1159F MED LIST DOCD IN RCRD: CPT | Mod: CPTII,,, | Performed by: NEUROLOGICAL SURGERY

## 2025-05-06 PROCEDURE — 99214 OFFICE O/P EST MOD 30 MIN: CPT | Mod: PBBFAC | Performed by: STUDENT IN AN ORGANIZED HEALTH CARE EDUCATION/TRAINING PROGRAM

## 2025-05-06 NOTE — PROGRESS NOTES
ENDOCRINOLOGY CLINIC FOLLOW UP  05/06/2025     Offered  however she declined multiple times.    Subjective:      CC: follow up s/p TSR for pituitary macroadenoma causing vision loss    HPI:   Tessa Wynne is a 49 y.o. female who presents for evaluation of pituitary lesion compressing optic chiasm, found on imaging done after a syncopal episode.     Initial presentation:   Brain imaging done for syncope and HA.  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  Preoperative labs notable for elevated prolactin from stalk effect but otherwise normal    Underwent TSR by Dr. Ferrell on 10/3/22 ([facility] path - GONADOTROPH ADENOMA, IMMUNOREACTIVE FOR ALPHA-SUBUNIT AND SF1).  The patient did not receive any perioperative glucocorticoids and had no evidence of adrenal insufficiency in the hospital. The patient did not develop diabetes insipidus but presented to the ED on 10/8 for severe HA, CP, and HTN, found to have Na of 129 (on d/c Na was 140).  She was started on fluid restriction for SIADH with resolution followed by transient -D (previously known as DI), now resolved.     Preop she was started on levothyroxine 75 mcg daily for secondary hypothyroidism which resolved after TSR, now not on any replacement meds.     Interval hx:  She was last seen by Dr Batista in 5/2024. At that time she was doing well with normal pituitary labs and MRI w/o evidence of recurrence.  Has an appt with Ghassan today.   Scheduled for a visit with Dr Yan and F in July 2025.    denies symptoms of adrenal insufficiency (no lightheadedness, N/V/abd pain, hypotension).  No unexplained weight loss     No symptoms of DI (denies polyuria/polydipsia).  Some nights up 4 times, usually only once overnight.  Not high volume urination.    Usually clear urine       Pituitary MRI 5/2/24  Stable postoperative appearance of pituitary fossa no evidence of recurrent neoplasm   Pituitary MRI done yesterday 5/5 - not yet read.  "      Headache:    Denies, resolved, mild HA occasionally     Vision change:   Denies (did not notice any change before surgery)    Formal Visual burns:   HVF w/ Dr. Yan 5/2024-  OCT and HVF stable, scheduled for f/u in 7/2025 8/25/22 (before TSR), per note:  " OCT with borderline RNFL thinning OD>OS. HVF with generalized depression OD (worse temporally) and temporal depression OS. Findings consistent with chiasmal compression." rtc in 3 month(s)    Pituitary labs: normal    Latest Reference Range & Units 02/21/24 07:11   Cortisol, 8 AM 4.30 - 22.40 ug/dL 14.80   Hemoglobin A1C External 4.0 - 5.6 % 5.4   Estimated Avg Glucose 68 - 131 mg/dL 108   ACTH 0 - 46 pg/mL 27   Somatomedin (IGF-I) 44 - 227 ng/mL 65   TSH 0.400 - 4.000 uIU/mL 2.708   Free T4 0.71 - 1.51 ng/dL 0.92   FSH See Text mIU/mL 14.12   Prolactin 5.2 - 26.5 ng/mL 10.5       Current symptoms:  Hyperprolactinemia:  []  breast tenderness []  nipple discharge []  Menstrual Irregularity [x]  Denies   Initially noticed blx nipple dc for a few years around time of menstrual cycle   Reports that after D&C in 2/2022 galactorrhea resolved -- after D&C no longer had a period   Resolved since surgery    Lab Results   Component Value Date    PROLACTIN 11.2 05/01/2025    PROLACTIN 10.5 02/21/2024    PROLACTIN 15.7 11/28/2022    PROLACTIN 138.0 (H) 08/26/2022     Thyroid:  No longer on levothyroxine   []  fatigue []  weight change []  temp intolerance  [x]  Denies    []  BM change []  skin/hair change [] palpitations []  tremor [x]  Denies      Lab Results   Component Value Date    TSH 2.548 05/01/2025    FREET4 0.90 05/01/2025    FREET4 0.90 05/01/2025       Growth Hormone  Last IGF-1:   Lab Results   Component Value Date    SOMATMDN 65 02/21/2024    SOMATMDN 49 05/15/2023    SOMATMDN 42 (L) 08/26/2022        Gonadotrophs:     [x]  Irregular menses []  Postmenopausal  []  Decreased libido   []  ED   []  Denies    Previously regular then Irregular menses for " "4 month(s) prior to d&c in 2/2022 (prior to that 3 month(s) of bleeding) - no period since D&C in 2022  Repro hx: 2 children, no problems with conception  LMP: prior to D&C      Review of patient's allergies indicates:  No Known Allergies      Current Outpatient Medications:     mv,calcium,min/iron/folic/vitK (ONE-A-DAY WOMEN'S COMPLETE ORAL), Take by mouth., Disp: , Rfl:   No current facility-administered medications for this visit.    ROS:  see HPI    Objective:   Physical Exam   /64 (BP Location: Left arm, Patient Position: Sitting)   Pulse 69   Ht 5' 4" (1.626 m)   Wt 65.5 kg (144 lb 8.2 oz)   LMP 01/15/2022   SpO2 98%   BMI 24.81 kg/m²   Wt Readings from Last 3 Encounters:   05/06/25 65.5 kg (144 lb 8.2 oz)   02/27/25 65.8 kg (145 lb 1 oz)   05/07/24 69.4 kg (153 lb)       Constitutional:  Pleasant,  in no acute distress.   HENT:   Eyes:    No scleral icterus.   Respiratory:   Effort normal   Neurological:  normal speech  Psych:  Normal mood and affect.      LABORATORY REVIEW:    Chemistry        Component Value Date/Time     05/01/2025 0701     02/21/2024 0711    K 4.0 05/01/2025 0701    K 3.9 02/21/2024 0711     05/01/2025 0701     02/21/2024 0711    CO2 24 05/01/2025 0701    CO2 26 02/21/2024 0711    BUN 18 05/01/2025 0701    CREATININE 1.0 05/01/2025 0701    GLU 86 05/01/2025 0701    GLU 86 02/21/2024 0711        Component Value Date/Time    CALCIUM 9.3 05/01/2025 0701    CALCIUM 9.5 02/21/2024 0711    ALKPHOS 61 05/01/2025 0701    ALKPHOS 59 02/21/2024 0711    AST 34 05/01/2025 0701    AST 21 02/21/2024 0711    ALT 24 05/01/2025 0701    ALT 16 02/21/2024 0711    BILITOT 0.5 05/01/2025 0701    BILITOT 0.5 02/21/2024 0711    ESTGFRAFRICA >60 07/29/2022 0827    EGFRNONAA >60 07/29/2022 0827        MRI 8/15/22- preop       MRI 10/25/22 - postop  Evolving operative change status post transsphenoidal approach sellar/suprasellar mass resection.  There is continued heterogeneous " signal predominantly hypoenhancing material along the posterior sphenoid sinus extending to the sella felt to represent postoperative packing material.  There is thin marginated enhancement about this which may be reactive in granulation tissue.  There is continued somewhat lobular enhancing material along the postoperative floor of the sella which may in part represent residual pituitary tissue though remains concerning for component of residual lesion.  The infundibulum is deviated to the right.  Compared to most recent prior the nodular enhancing material is similar measuring approximately 1.6 cm AP x 1.4 cm TV and 0.7 cm craniocaudal.  There is no significant mass effect on the suprasellar neurovascular structures clinical correlation and continued follow-up advised.        Assessment/Plan:     1. S/P transsphenoidal selective adenomectomy  -     Ambulatory referral/consult to Endocrinology  -     TSH; Future; Expected date: 05/06/2026  -     T4, Free; Future; Expected date: 05/06/2026  -     Prolactin; Future; Expected date: 05/06/2026  -     Insulin-Like Growth Factor; Future; Expected date: 05/06/2026  -     Follicle Stimulating Hormone; Future; Expected date: 05/06/2026  -     Cortisol, 8AM; Future; Expected date: 05/06/2026  -     ACTH; Future; Expected date: 05/06/2026    2. Pituitary macroadenoma with extrasellar extension  -     Ambulatory referral/consult to Endocrinology  -     TSH; Future; Expected date: 05/06/2026  -     T4, Free; Future; Expected date: 05/06/2026  -     Prolactin; Future; Expected date: 05/06/2026  -     Insulin-Like Growth Factor; Future; Expected date: 05/06/2026  -     Follicle Stimulating Hormone; Future; Expected date: 05/06/2026  -     Cortisol, 8AM; Future; Expected date: 05/06/2026  -     ACTH; Future; Expected date: 05/06/2026        PITUITARY MACROADENOMA WITH EXTRASELLAR EXTENSION  S/P TSR:  - All pituitary hormone labs are normal  - No clinical s/sx of AI, DI, thyroid  dysfunction, or other pituitary hormone abnormality   - Educated on symptoms of adrenal insufficiency (unexplained weight loss, low BP, lightheadedness, dizziness, nausea, vomiting, abdominal pain).  - Contact the office if experiencing any new symptoms or those related to adrenal insufficiency.  - MRI has been stable and following with neurosurgeon Dr Ferrell - sees him this afternoon  - VF remain stable and she is still following annually for HVF with Dr Yan - next scheduled in July   - IGF1 still pending, but we reviewed all other normal labs  - Will check 8 am fasting pituitary labs in a year with visit.  - Continue regular hvf (blake visual fields) and periodic imaging with Dr. Ferrell    AMENORRHEA:  - FSH levels indicate not yet in menopause.  - Explained FSH levels in relation to menopause status and discussed average age of menopause onset (mid-50s).  - no period since D&C in 2022. No menopausal symptoms now.      RTC 1yr with fasting 8am labs 1wk before       Karen Noguera MD   Endocrinology    Visit today included increased complexity associated with the care of the problems addressed and managing the longitudinal care of the patient due to the serious and/or complex managed problems    This note was generated with the assistance of ambient listening technology. Verbal consent was obtained by the patient and accompanying visitor(s) for the recording of patient appointment to facilitate this note. I attest to having reviewed and edited the generated note for accuracy, though some syntax or spelling errors may persist. Please contact the author of this note for any clarification.

## 2025-05-06 NOTE — PROGRESS NOTES
CHIEF COMPLAINT:  Annual postop surveillance    INTERVAL HISTORY (5/6/25):  Annual postop surveillance.  She is doing great.  Denies any changes or symptoms.  MRI stable.  Saw endo today and doing well.    INTERVAL HISTORY (5/7/24):  Annual postop surveillance.  She is doing great.  Denies any changes or symptoms.  MRI does not show any evidence of recurrnce.    INTERVAL HISTORY (5/30/23):  Returns for annual postop surveillance re: pituitary tumor resection.  She is doing well.  Mild posterior occipital HA and bilateral tinnitus.  Denies vision changes.  Has been tolerating being off levothyroxin.  Dr Yan reports good vision.  New MRI does not show any obvious recurrence.    INTERVAL HISTORY (11/22/22):  Routine 6 week postoperative follow-up.  Her main complaint is posterior headaches that have been present before surgery but remain.  They are intermittent but occurs most of the day and lessen as the day goes on.  She rates them as 7-8/10 on average and 1-2/10 at its best.  She takes Tylenol.  She is unable to take ibuprofen and is not on any migraine medicines.    She had been admitted for postop hyponatremia that resolved after starting hydrocortisone. Did not require Na restriction.  Now normal Na    She denies any vision changes.    INTERVAL HISTORY (10/11/22):  Patient returns for routine 2 week postoperative follow-up s/p endonasal endoscopic transsphenoidal resection of pituitary tumor on 10/3/22 with Dr. Astorga of ENT.  Patient tolerated the procedure well without any perioperative complications.  She was discharged few days after surgery without evidence of DI or adrenal insufficiency.  She is neurologically stable.  Today she reports that her vision is improved as well as her energy levels.  She was recently seen in the emergency room for severe headache in the back of her head but she explains that that has gotten better since.  She also reports tiny amount of clear watery appearing drainage from her  nose periodically.  She states that this could happen up to 4-5 times but is a small amount and not bloody.  She does acknowledge that her headaches are worse when she gets up from bed but it is not necessarily positional.  She is urinating 6-7 times per day and 2 times per night.  A recent BMP in the ED revealed hyponatremia to 129.  She will be seeing Dr. Batista today for further evaluation of SIADH versus DI.    HPI:  Tessa Wynne is a 49 y.o.  female with below PMH, who is referred for evaluation of pituitary macroadenoma.  Brain MRI obtained for syncopal episode and HA revealed large pituitary tumor.  She's had HA x 1 year (mainly occipital location).  Denies significant vision change however recent ophtho eval revealed evidence of chiasmal compression.    Elevated prolactin (138) and hypothyroidism.    See endo note for hormone eval    Review of patient's allergies indicates:  No Known Allergies    Past Medical History:   Diagnosis Date    Elevated fasting glucose     GERD (gastroesophageal reflux disease)     Hypothyroidism     PAD (peripheral artery disease)     Pituitary lesion 09/07/2022    Pituitary macroadenoma with extrasellar extension 08/25/2022     Past Surgical History:   Procedure Laterality Date    CHOLECYSTECTOMY      COLONOSCOPY N/A 12/15/2022    Procedure: COLONOSCOPY;  Surgeon: Kerrie Bird MD;  Location: [facility] ENDO;  Service: Endoscopy;  Laterality: N/A;    ENDOMETRIAL ABLATION N/A 2/8/2022    Procedure: ABLATION, ENDOMETRIUM;  Surgeon: Ariel Butler MD;  Location: [facility] OR;  Service: OB/GYN;  Laterality: N/A;    EXCISION, NEOPLASM, PITUITARY, TRANSSPHENOIDAL APPROACH, USING COMPUTER-ASSISTED NAVIGATION N/A 10/3/2022    Procedure: EXCISION, NEOPLASM, PITUITARY, TRANSSPHENOIDAL APPROACH, USING COMPUTER-ASSISTED NAVIGATION;  Surgeon: Ed Astorga MD;  Location: [facility] OR [address] [place] [address];  Service: ENT;  Laterality: N/A;    EXCISION, NEOPLASM, PITUITARY, TRANSSPHENOIDAL APPROACH, USING  COMPUTER-ASSISTED NAVIGATION N/A 10/3/2022    Procedure: EXCISION, NEOPLASM, PITUITARY, TRANSSPHENOIDAL APPROACH, USING COMPUTER-ASSISTED NAVIGATION;  Surgeon: Miguel Ángel Ferrell DO;  Location: [address];  Service: Neurosurgery;  Laterality: N/A;    HYSTEROSCOPY WITH DILATION AND CURETTAGE OF UTERUS N/A 2/8/2022    Procedure: HYSTEROSCOPY, WITH DILATION AND CURETTAGE OF UTERUS;  Surgeon: Ariel Butler MD;  Location: [facility];  Service: OB/GYN;  Laterality: N/A;     Family History   Problem Relation Name Age of Onset    Diabetes Mother      Diabetes Mellitus Mother      Diabetes Father      Diabetes Mellitus Father      No Known Problems Sister      No Known Problems Sister      No Known Problems Brother      No Known Problems Brother      No Known Problems Daughter      No Known Problems Son      Colon cancer Neg Hx      Breast cancer Neg Hx      Ovarian cancer Neg Hx      Uterine cancer Neg Hx      Heart attack Neg Hx       Social History     Tobacco Use    Smoking status: Never    Smokeless tobacco: Never   Substance Use Topics    Alcohol use: No    Drug use: No        Review of Systems   Constitutional: Negative.    HENT:  Negative for congestion, ear discharge, ear pain, hearing loss, nosebleeds, sinus pain and tinnitus.    Eyes:  Negative for blurred vision, double vision, photophobia, pain, discharge and redness.   Respiratory: Negative.     Cardiovascular:  Negative for chest pain, palpitations, claudication and leg swelling.   Gastrointestinal:  Negative for abdominal pain, blood in stool, constipation, diarrhea, melena and vomiting.   Genitourinary:  Negative for flank pain, frequency and urgency.   Musculoskeletal:  Negative for falls.   Skin: Negative.    Neurological:  Negative for dizziness, tingling, tremors, sensory change, speech change, focal weakness, seizures, loss of consciousness, weakness and headaches.   Endo/Heme/Allergies:  Does not bruise/bleed easily.   Psychiatric/Behavioral:  Negative.         OBJECTIVE:   Vital Signs:  Pulse: 64 (05/06/25 1406)  BP: 104/68 (05/06/25 1406)    Physical Exam:  Constitutional: Patient sitting comfortably in chair. Appears well developed and well nourished.  Skin: Exposed areas are intact without abnormal markings, rashes or other lesions.  HEENT: Normocephalic. Normal conjunctivae.  Cardiovascular: Normal rate and regular rhythm.  Respiratory: Chest wall rises and falls symmetrically, without signs of respiratory distress.  Abdomen: Soft and non-tender.  Extremities: Warm and without edema. Calves supple, non-tender.  Psych/Behavior: Normal affect.    Neurological:    Mental status: Alert and oriented. Conversational and appropriate.       Cranial Nerves: VFF to confrontation. PERRL. EOMI without nystagmus. Facial STLT normal and symmetric. Strong, symmetric muscles of mastication. Facial strength full and symmetric. Hearing equal bilaterally to finger rub. Palate and uvula rise and fall normally in midline. Shoulder shrug 5/5 strength. Tongue midline.     Motor:    Upper:  Deltoids Triceps Biceps WE WF     R 5/5 5/5 5/5 5/5 5/5 5/5    L 5/5 5/5 5/5 5/5 5/5 5/5      Lower:  HF KE KF DF PF EHL    R 5/5 5/5 5/5 5/5 5/5 5/5    L 5/5 5/5 5/5 5/5 5/5 5/5     Sensory: Intact sensation to light touch in all extremities. Romberg negative.    Reflexes:          DTR: 2+ symmetrically throughout.     Rey's: Negative.     Babinski's: Negative.     Clonus: Negative.    Cerebellar: Finger-to-nose and rapid alternating movements normal.     Gait stable    Diagnostic Results:  All imaging was independently reviewed by me.    BMRI, 5/5/25:  Stable    BMRI, 5/2/24:  No obvious recurrence  Continued decompression of optic chiasm    BMP - Na 142 from 134 from 129    BMRI, 5/26/23:  No obvious recurrence  Continued decompression of optic chiasm        Postop MRI - GTR    MRI brain, dated 8/15/22:  1. 2.8 x 3.5 x 2.7 cm pit macroadenoma with compression of chiasm  2.  Knosp 3 on R  3. Knosp 2 on L    ASSESSMENT/PLAN:     Problem List Items Addressed This Visit          Endocrine    S/P transsphenoidal selective adenomectomy - Primary    Relevant Orders    MRI Pituitary W W/O Contrast       S/p TSA for resection of pituitary macroadenoma.      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  Surgery achieved gross total resection without spinal fluid leak.  Cont to do well.  No ophtho, neuro, or endo issues.  MRI stable.    - RTC in 1 yr with pit MRI  - F/u with endo as scheduled    The patient understands and agrees with the plan of care. All questions were answered.    Time spent on this encounter: 30 minutes. This includes face-to-face time and non-face to face time preparing to see the patient (eg, review of tests), obtaining and/or reviewing separately obtained history, documenting clinical information in the electronic or other health record, independently interpreting results and communicating results to the patient/family/caregiver, or care coordinator.

## 2025-05-06 NOTE — PATIENT INSTRUCTIONS
Everything is looking good from a pituitary hormone perspective. We will plan to repeat fasting 8am labs in 1 yr, at least 1 week before a follow up with us.  Our schedule opens 4 months in advance. Set yourself a reminder to call in early January to schedule May labs and a visit.      Symptoms of low cortisol or adrenal insufficiency can be the following:    - unexplained weight loss  - low blood pressure  - lightheadedness  - nausea and/or vomiting  - abdominal pain  - new severe fatigue    If you start experiencing these symptoms please let me know so that I can order and 8 am fasting blood draw to look at your cortisol levels.

## (undated) DEVICE — DRAIN JACKSON PRATT 10MM 20/CA

## (undated) DEVICE — COVER BACK TBL HD 2-TIER 72IN

## (undated) DEVICE — DRAPE SURGICAL STERI IRRG PCH

## (undated) DEVICE — ELECTRODE REM PLYHSV RETURN 9

## (undated) DEVICE — DRESSING TEGADERM 2 3/8 X 2.75

## (undated) DEVICE — SPONGE DERMACEA 4X4IN 12PLY

## (undated) DEVICE — DISSECTOR CURVED 5DCD

## (undated) DEVICE — TUBING SUCTION STERILE

## (undated) DEVICE — TROCAR ENDOPATH XCEL 5MM 7.5CM

## (undated) DEVICE — CONTAINER SPECIMEN STRL 4OZ

## (undated) DEVICE — SEE MEDLINE ITEM 157116

## (undated) DEVICE — Device

## (undated) DEVICE — DRAPE ABDOMINAL TIBURON 14X11

## (undated) DEVICE — KIT SINUS OMC

## (undated) DEVICE — TROCAR ENDOPATH XCEL 11MM 10CM

## (undated) DEVICE — SOL NS 1000CC

## (undated) DEVICE — GLOVE BIOGEL PIMICRO INDIC 6.5

## (undated) DEVICE — SYR 50CC LL

## (undated) DEVICE — SPONGE DERMA 8PLY 2X2

## (undated) DEVICE — DEVICE ABLATION NOVASURE DISP

## (undated) DEVICE — DRESSING NASOPORE 8CM BIORSRBL

## (undated) DEVICE — SUT MONOCYRL 4-0 PS2 UND

## (undated) DEVICE — SPONGE PATTY SURGICAL .5X3IN

## (undated) DEVICE — SYR 10CC LUER LOCK

## (undated) DEVICE — COVER OVERHEAD SURG LT BLUE

## (undated) DEVICE — SEALANT ADHERUS AUTOSPRY DURAL

## (undated) DEVICE — TRACKER PATIENT NON INVASIVE

## (undated) DEVICE — PANTIES FEMININE NAPKIN LG/XLG

## (undated) DEVICE — PACK BASIC

## (undated) DEVICE — DRESSING TRANS 2X2 TEGADERM

## (undated) DEVICE — BLADE SURG CARBON STEEL SZ11

## (undated) DEVICE — APPLIER CLIP ENDO LIGAMAX 5MM

## (undated) DEVICE — APPLICATOR CHLORAPREP ORN 26ML

## (undated) DEVICE — GLOVE BIOGEL ECLIPSE SZ 6.5

## (undated) DEVICE — TUBING INSUFFLATION 10

## (undated) DEVICE — BLADE QUADCUT STRAIGHT 4.3MM

## (undated) DEVICE — SEE MEDLINE ITEM 154981

## (undated) DEVICE — SCISSOR 5MMX35CM DIRECT DRIVE

## (undated) DEVICE — CLOSURE SKIN STERI STRIP 1/2X4

## (undated) DEVICE — KIT URINARY CATH URINE METER

## (undated) DEVICE — DRAPE CORETEMP FLD WRM 56X62IN

## (undated) DEVICE — SUT ETHILON 2-0 FS 18IN BLK

## (undated) DEVICE — TOWEL OR DISP STRL BLUE 4/PK

## (undated) DEVICE — PENCIL ROCKER SWITCH 10FT CORD

## (undated) DEVICE — SUT 0 VICRYL / UR6 (J603)

## (undated) DEVICE — PAD PREP 50/CA

## (undated) DEVICE — SEE MEDLINE ITEM 156955

## (undated) DEVICE — CATH URETHRAL 16FR RED

## (undated) DEVICE — KIT ANTIFOG

## (undated) DEVICE — POWDER ARISTA AH 3G

## (undated) DEVICE — APPLICATOR ARISTA FLEX XL

## (undated) DEVICE — CONTAINER MULTIPURPOSE/SPECIME

## (undated) DEVICE — BAG TISS RETRV MONARCH 10MM

## (undated) DEVICE — DRESSING TELFA N ADH 3X8

## (undated) DEVICE — NDL HYPO REG 25G X 1 1/2

## (undated) DEVICE — SUT MCRYL PLUS 4-0 PS2 27IN

## (undated) DEVICE — GLOVE SURGICAL LATEX SZ 6.5

## (undated) DEVICE — PAD CNTOUR SUP-ABSRB POSTPRTM

## (undated) DEVICE — COVER MAYO STND XL 30X57IN

## (undated) DEVICE — TROCAR ENDOPATH XCEL 12X100MM

## (undated) DEVICE — JELLY LUBRICANT STERILE 4 OZ

## (undated) DEVICE — SEE MEDLINE ITEM 152622

## (undated) DEVICE — UNDERGLOVE BIOGEL PI SZ 6.5 LF

## (undated) DEVICE — MANIFOLD 4 PORT

## (undated) DEVICE — GLOVE BIOGEL ECLIPSE SZ 6

## (undated) DEVICE — TRACKER ENT INSTRUMENT

## (undated) DEVICE — SHEATH CLN ENDOSCRB 4MM

## (undated) DEVICE — TROCAR ENDOPATH XCEL 5X75MM

## (undated) DEVICE — IRRIGATOR ENDOSCOPY DISP.